# Patient Record
Sex: MALE | Race: BLACK OR AFRICAN AMERICAN | NOT HISPANIC OR LATINO | Employment: OTHER | URBAN - NONMETROPOLITAN AREA
[De-identification: names, ages, dates, MRNs, and addresses within clinical notes are randomized per-mention and may not be internally consistent; named-entity substitution may affect disease eponyms.]

---

## 2017-01-09 ENCOUNTER — LAB (OUTPATIENT)
Dept: LAB | Facility: HOSPITAL | Age: 72
End: 2017-01-09

## 2017-01-09 ENCOUNTER — TRANSCRIBE ORDERS (OUTPATIENT)
Dept: ADMINISTRATIVE | Facility: HOSPITAL | Age: 72
End: 2017-01-09

## 2017-01-09 DIAGNOSIS — Z79.899 LONG TERM USE OF DRUG: Primary | ICD-10-CM

## 2017-01-09 PROCEDURE — 85610 PROTHROMBIN TIME: CPT | Performed by: INTERNAL MEDICINE

## 2017-01-10 LAB
INR PPP: 2.7 (ref 0.91–1.09)
PROTHROMBIN TIME: 32.8 SECONDS (ref 10–13.8)

## 2017-01-23 ENCOUNTER — TRANSCRIBE ORDERS (OUTPATIENT)
Dept: ADMINISTRATIVE | Facility: HOSPITAL | Age: 72
End: 2017-01-23

## 2017-01-23 ENCOUNTER — LAB (OUTPATIENT)
Dept: LAB | Facility: HOSPITAL | Age: 72
End: 2017-01-23

## 2017-01-23 DIAGNOSIS — I80.9 THROMBOPHLEBITIS: ICD-10-CM

## 2017-01-23 DIAGNOSIS — I10 ESSENTIAL (PRIMARY) HYPERTENSION: Primary | ICD-10-CM

## 2017-01-23 DIAGNOSIS — Z79.899 OTHER LONG TERM (CURRENT) DRUG THERAPY: ICD-10-CM

## 2017-01-23 LAB
ALBUMIN SERPL-MCNC: 3.9 G/DL (ref 3.5–5)
ALBUMIN/GLOB SERPL: 1.1 G/DL (ref 1.1–2.5)
ALP SERPL-CCNC: 99 U/L (ref 24–120)
ALT SERPL W P-5'-P-CCNC: 35 U/L (ref 0–54)
ANION GAP SERPL CALCULATED.3IONS-SCNC: 9 MMOL/L (ref 4–13)
ARTICHOKE IGE QN: 116 MG/DL (ref 0–99)
AST SERPL-CCNC: 28 U/L (ref 7–45)
BASOPHILS # BLD AUTO: 0.02 10*3/MM3 (ref 0–0.2)
BASOPHILS NFR BLD AUTO: 0.3 % (ref 0–2)
BILIRUB SERPL-MCNC: 0.4 MG/DL (ref 0.1–1)
BUN BLD-MCNC: 25 MG/DL (ref 5–21)
BUN/CREAT SERPL: 24.3 (ref 7–25)
CALCIUM SPEC-SCNC: 9 MG/DL (ref 8.4–10.4)
CHLORIDE SERPL-SCNC: 99 MMOL/L (ref 98–110)
CHOLEST SERPL-MCNC: 205 MG/DL (ref 130–200)
CO2 SERPL-SCNC: 34 MMOL/L (ref 24–31)
CREAT BLD-MCNC: 1.03 MG/DL (ref 0.5–1.4)
DEPRECATED RDW RBC AUTO: 45.6 FL (ref 40–54)
EOSINOPHIL # BLD AUTO: 0.18 10*3/MM3 (ref 0–0.7)
EOSINOPHIL NFR BLD AUTO: 2.9 % (ref 0–4)
ERYTHROCYTE [DISTWIDTH] IN BLOOD BY AUTOMATED COUNT: 13.7 % (ref 12–15)
GFR SERPL CREATININE-BSD FRML MDRD: 87 ML/MIN/1.73
GLOBULIN UR ELPH-MCNC: 3.6 GM/DL
GLUCOSE BLD-MCNC: 124 MG/DL (ref 70–100)
HCT VFR BLD AUTO: 38.1 % (ref 40–52)
HDLC SERPL-MCNC: 42 MG/DL
HGB BLD-MCNC: 12.5 G/DL (ref 14–18)
IMM GRANULOCYTES # BLD: 0.01 10*3/MM3 (ref 0–0.03)
IMM GRANULOCYTES NFR BLD: 0.2 % (ref 0–5)
INR PPP: 2.5 (ref 0.91–1.09)
LDLC/HDLC SERPL: 2.95 {RATIO}
LYMPHOCYTES # BLD AUTO: 1.72 10*3/MM3 (ref 0.72–4.86)
LYMPHOCYTES NFR BLD AUTO: 27.5 % (ref 15–45)
MCH RBC QN AUTO: 30.2 PG (ref 28–32)
MCHC RBC AUTO-ENTMCNC: 32.8 G/DL (ref 33–36)
MCV RBC AUTO: 92 FL (ref 82–95)
MONOCYTES # BLD AUTO: 0.55 10*3/MM3 (ref 0.19–1.3)
MONOCYTES NFR BLD AUTO: 8.8 % (ref 4–12)
NEUTROPHILS # BLD AUTO: 3.77 10*3/MM3 (ref 1.87–8.4)
NEUTROPHILS NFR BLD AUTO: 60.3 % (ref 39–78)
PLATELET # BLD AUTO: 153 10*3/MM3 (ref 130–400)
PMV BLD AUTO: 12.3 FL (ref 6–12)
POTASSIUM BLD-SCNC: 4.1 MMOL/L (ref 3.5–5.3)
PROT SERPL-MCNC: 7.5 G/DL (ref 6.3–8.7)
PROTHROMBIN TIME: 29.6 SECONDS (ref 10–13.8)
RBC # BLD AUTO: 4.14 10*6/MM3 (ref 4.8–5.9)
SODIUM BLD-SCNC: 142 MMOL/L (ref 135–145)
TRIGL SERPL-MCNC: 195 MG/DL (ref 0–149)
TSH SERPL DL<=0.05 MIU/L-ACNC: 0.69 MIU/ML (ref 0.47–4.68)
WBC NRBC COR # BLD: 6.25 10*3/MM3 (ref 4.8–10.8)

## 2017-01-23 PROCEDURE — 80053 COMPREHEN METABOLIC PANEL: CPT | Performed by: INTERNAL MEDICINE

## 2017-01-23 PROCEDURE — 80061 LIPID PANEL: CPT | Performed by: INTERNAL MEDICINE

## 2017-01-23 PROCEDURE — 85025 COMPLETE CBC W/AUTO DIFF WBC: CPT | Performed by: INTERNAL MEDICINE

## 2017-01-23 PROCEDURE — 85610 PROTHROMBIN TIME: CPT | Performed by: INTERNAL MEDICINE

## 2017-01-23 PROCEDURE — 36415 COLL VENOUS BLD VENIPUNCTURE: CPT | Performed by: INTERNAL MEDICINE

## 2017-01-23 PROCEDURE — 84443 ASSAY THYROID STIM HORMONE: CPT | Performed by: INTERNAL MEDICINE

## 2017-01-30 PROBLEM — D64.9 ANEMIA: Status: ACTIVE | Noted: 2017-01-30

## 2017-01-30 PROBLEM — F32.A DEPRESSION: Status: ACTIVE | Noted: 2017-01-30

## 2017-01-30 PROBLEM — M54.50 LOW BACK PAIN: Status: ACTIVE | Noted: 2017-01-30

## 2017-01-30 PROBLEM — I10 HYPERTENSION: Status: ACTIVE | Noted: 2017-01-30

## 2017-01-30 PROBLEM — K29.70 GASTRITIS: Status: ACTIVE | Noted: 2017-01-30

## 2017-01-30 PROBLEM — G89.29 CHRONIC PAIN: Status: ACTIVE | Noted: 2017-01-30

## 2017-01-30 PROBLEM — I49.9 ARRHYTHMIA: Status: ACTIVE | Noted: 2017-01-30

## 2017-01-30 PROBLEM — F41.9 ANXIETY: Status: ACTIVE | Noted: 2017-01-30

## 2017-01-30 PROBLEM — E78.5 HYPERLIPIDEMIA: Status: ACTIVE | Noted: 2017-01-30

## 2017-01-30 PROBLEM — G62.9 NEUROPATHY: Status: ACTIVE | Noted: 2017-01-30

## 2017-01-30 PROBLEM — I50.9 CHF (CONGESTIVE HEART FAILURE) (HCC): Status: ACTIVE | Noted: 2017-01-30

## 2017-01-30 PROBLEM — Z95.0 STATUS POST PLACEMENT OF CARDIAC PACEMAKER: Status: ACTIVE | Noted: 2017-01-30

## 2017-01-30 PROBLEM — E66.9 OBESITY: Status: ACTIVE | Noted: 2017-01-30

## 2017-01-30 PROBLEM — I42.9 CARDIOMYOPATHY: Status: ACTIVE | Noted: 2017-01-30

## 2017-01-30 RX ORDER — WARFARIN SODIUM 5 MG/1
5 TABLET ORAL
COMMUNITY
End: 2019-08-22 | Stop reason: SDUPTHER

## 2017-01-30 RX ORDER — DIAZEPAM 2 MG/1
1 TABLET ORAL DAILY
COMMUNITY

## 2017-01-30 RX ORDER — WARFARIN SODIUM 2 MG/1
2 TABLET ORAL
COMMUNITY
End: 2019-08-22 | Stop reason: SDUPTHER

## 2017-01-30 RX ORDER — WARFARIN SODIUM 4 MG/1
4 TABLET ORAL
COMMUNITY
End: 2019-08-22 | Stop reason: SDUPTHER

## 2017-01-30 RX ORDER — METOPROLOL SUCCINATE 100 MG/1
100 TABLET, EXTENDED RELEASE ORAL DAILY
COMMUNITY
End: 2019-04-09 | Stop reason: SDUPTHER

## 2017-01-30 RX ORDER — HYDROCODONE BITARTRATE AND ACETAMINOPHEN 7.5; 325 MG/1; MG/1
1 TABLET ORAL EVERY 6 HOURS PRN
COMMUNITY
End: 2017-07-31

## 2017-01-30 RX ORDER — FUROSEMIDE 40 MG/1
40 TABLET ORAL DAILY
Status: ON HOLD | COMMUNITY
End: 2019-10-29 | Stop reason: SDUPTHER

## 2017-01-30 RX ORDER — LOSARTAN POTASSIUM 25 MG/1
25 TABLET ORAL DAILY
COMMUNITY
End: 2019-01-14 | Stop reason: SDUPTHER

## 2017-01-30 RX ORDER — UBIQUINOL 100 MG
750 CAPSULE ORAL 2 TIMES DAILY
COMMUNITY

## 2017-01-30 RX ORDER — PANTOPRAZOLE SODIUM 40 MG/1
40 TABLET, DELAYED RELEASE ORAL DAILY
COMMUNITY
End: 2019-05-30 | Stop reason: SDUPTHER

## 2017-01-30 RX ORDER — FUROSEMIDE 20 MG/1
20 TABLET ORAL DAILY
Status: ON HOLD | COMMUNITY
End: 2019-10-29 | Stop reason: SDUPTHER

## 2017-01-30 RX ORDER — SIMVASTATIN 20 MG
20 TABLET ORAL NIGHTLY
COMMUNITY
End: 2019-01-31 | Stop reason: SDUPTHER

## 2017-01-30 RX ORDER — TAMSULOSIN HYDROCHLORIDE 0.4 MG/1
1 CAPSULE ORAL NIGHTLY
COMMUNITY
End: 2019-02-27 | Stop reason: SDUPTHER

## 2017-01-30 RX ORDER — POLYETHYLENE GLYCOL 3350 17 G/17G
17 POWDER, FOR SOLUTION ORAL DAILY
COMMUNITY
End: 2017-07-31

## 2017-01-30 RX ORDER — BUPRENORPHINE 10 UG/H
10 PATCH TRANSDERMAL WEEKLY
COMMUNITY
End: 2017-07-31

## 2017-01-30 RX ORDER — GABAPENTIN 400 MG/1
800 CAPSULE ORAL 3 TIMES DAILY
COMMUNITY
End: 2021-05-18

## 2017-01-30 RX ORDER — TIZANIDINE 4 MG/1
4 TABLET ORAL 3 TIMES DAILY PRN
COMMUNITY
End: 2020-12-10

## 2017-01-31 ENCOUNTER — OFFICE VISIT (OUTPATIENT)
Dept: CARDIOLOGY | Facility: CLINIC | Age: 72
End: 2017-01-31

## 2017-01-31 ENCOUNTER — CLINICAL SUPPORT (OUTPATIENT)
Dept: CARDIOLOGY | Facility: CLINIC | Age: 72
End: 2017-01-31

## 2017-01-31 VITALS
HEIGHT: 74 IN | WEIGHT: 295 LBS | HEART RATE: 77 BPM | BODY MASS INDEX: 37.86 KG/M2 | SYSTOLIC BLOOD PRESSURE: 128 MMHG | DIASTOLIC BLOOD PRESSURE: 60 MMHG

## 2017-01-31 DIAGNOSIS — I50.22 CHRONIC SYSTOLIC CONGESTIVE HEART FAILURE (HCC): ICD-10-CM

## 2017-01-31 DIAGNOSIS — I42.8 NON-ISCHEMIC CARDIOMYOPATHY (HCC): ICD-10-CM

## 2017-01-31 DIAGNOSIS — I50.22 CHRONIC SYSTOLIC CONGESTIVE HEART FAILURE (HCC): Primary | ICD-10-CM

## 2017-01-31 DIAGNOSIS — E78.2 MIXED HYPERLIPIDEMIA: ICD-10-CM

## 2017-01-31 DIAGNOSIS — I10 ESSENTIAL HYPERTENSION: ICD-10-CM

## 2017-01-31 DIAGNOSIS — Z95.810 AICD (AUTOMATIC CARDIOVERTER/DEFIBRILLATOR) PRESENT: Primary | ICD-10-CM

## 2017-01-31 DIAGNOSIS — I47.20 VENTRICULAR TACHYCARDIA (HCC): ICD-10-CM

## 2017-01-31 PROCEDURE — 93289 INTERROG DEVICE EVAL HEART: CPT | Performed by: INTERNAL MEDICINE

## 2017-01-31 PROCEDURE — 93000 ELECTROCARDIOGRAM COMPLETE: CPT | Performed by: INTERNAL MEDICINE

## 2017-01-31 PROCEDURE — 99203 OFFICE O/P NEW LOW 30 MIN: CPT | Performed by: INTERNAL MEDICINE

## 2017-01-31 NOTE — PROGRESS NOTES
Subjective:     Encounter Date:01/31/2017      Patient ID: Jonh Peacock is a 71 y.o. male.  With nonischemic cardiomyopathy, nonsustained ventricular tachycardia, mitral regurgitation, hypertension, hyperlipidemia here to establish cardiac care.    Referring Providers: Mir Avelar M.D. and John Broadbent, M.D.    Reason for Referral: Establish cardiac care    Chief Complaint:  Congestive Heart Failure   Presents for initial visit. Associated symptoms include edema, palpitations and shortness of breath. Pertinent negatives include no abdominal pain, chest pain, chest pressure, claudication, fatigue, muscle weakness, near-syncope, nocturia, orthopnea, paroxysmal nocturnal dyspnea or unexpected weight change. The symptoms have been stable. Past treatments include angiotensin receptor blockers, beta blockers and salt and fluid restriction. The treatment provided significant relief. Compliance with prior treatments has been good. His past medical history is significant for DVT and HTN. There is no history of CAD, DM or myocarditis.   Hypertension   This is a chronic problem. The problem is unchanged. The problem is controlled. Associated symptoms include palpitations, peripheral edema and shortness of breath. Pertinent negatives include no blurred vision, chest pain, headaches, neck pain, orthopnea or PND. Past treatments include angiotensin blockers and beta blockers. The current treatment provides significant improvement. There are no compliance problems.  Hypertensive end-organ damage includes heart failure. There is no history of CAD/MI or CVA.   Hyperlipidemia   This is a chronic problem. The problem is controlled. Recent lipid tests were reviewed and are normal. Exacerbating diseases include obesity. He has no history of diabetes. Factors aggravating his hyperlipidemia include beta blockers. Associated symptoms include shortness of breath. Pertinent negatives include no chest pain, focal sensory loss,  focal weakness, leg pain or myalgias. Current antihyperlipidemic treatment includes statins. The current treatment provides significant improvement of lipids. Risk factors for coronary artery disease include hypertension, male sex, obesity and dyslipidemia.     The patient presents today to establish care.  He has a history of nonischemic cardiomyopathy, previously followed by Dr. Broadbent.  He also has a history of nonsustained ventricular tachycardia and possible atrial fibrillation, mitral regurgitation, hypertension, hyperlipidemia.  The patient has an ICD in place.  He has had no trouble lately.  He has back pain due to multiple back surgeries in his mobility is somewhat limited.  He does note persistent shortness of breath and dyspnea on exertion with mild activities.  He denies any orthopnea or PND.  He sleeps with the bed elevated due to back pain but not due to shortness of breath.  He does have mild intermittent edema.  He denies any lightheadedness, dizziness, syncope, ICD shocks.  He has had no trouble with his medications.    Past Medical History   Diagnosis Date   • Anemia 1/30/2017   • Anxiety 1/30/2017   • Arrhythmia 1/30/2017   • Cardiomyopathy 1/30/2017   • CHF (congestive heart failure) 1/30/2017   • Chronic pain 1/30/2017   • Depression 1/30/2017   • DVT (deep venous thrombosis)    • Gastritis 1/30/2017   • Hyperlipidemia 1/30/2017   • Hypertension 1/30/2017   • Low back pain 1/30/2017   • Neuropathy 1/30/2017   • Obesity 1/30/2017   • Status post placement of cardiac pacemaker 1/30/2017     Past Surgical History   Procedure Laterality Date   • Back surgery  2002     BACK FUSION   • Joint replacement Right 2002     KNEE   • Total hip arthroplasty Right 2004   • Foot surgery Bilateral 2005   • Back surgery  2007   • Total hip arthroplasty Left 12/2008   • Pacemaker implantation  2012   • Colon resection with colostomy  03/09/2010       Current Outpatient Prescriptions:   •  Buprenorphine  (BUTRANS) 10 MCG/HR patch weekly, Place 10 mcg on the skin 1 (One) Time Per Week., Disp: , Rfl:   •  CHONDROITIN SULFATE A PO, Take 600 mg by mouth 2 (Two) Times a Day., Disp: , Rfl:   •  diazePAM (VALIUM) 2 MG tablet, Take 2 mg by mouth Daily., Disp: , Rfl:   •  furosemide (LASIX) 20 MG tablet, Take 20 mg by mouth Daily., Disp: , Rfl:   •  furosemide (LASIX) 40 MG tablet, Take 40 mg by mouth Daily. TAKE WITH 20 MG TABLET FOR 60 PER MG PER DAY, Disp: , Rfl:   •  gabapentin (NEURONTIN) 600 MG tablet, Take 600 mg by mouth 3 (Three) Times a Day., Disp: , Rfl:   •  Glucosamine 750 MG tablet, Take 750 mg by mouth 2 (Two) Times a Day., Disp: , Rfl:   •  HYDROcodone-acetaminophen (NORCO) 7.5-325 MG per tablet, Take 1 tablet by mouth Every 6 (Six) Hours As Needed for moderate pain (4-6)., Disp: , Rfl:   •  losartan (COZAAR) 25 MG tablet, Take 25 mg by mouth Daily., Disp: , Rfl:   •  metoprolol succinate XL (TOPROL-XL) 100 MG 24 hr tablet, Take 100 mg by mouth Daily., Disp: , Rfl:   •  Multiple Vitamins-Minerals (MULTIVITAMIN ADULT PO), Take  by mouth Daily., Disp: , Rfl:   •  pantoprazole (PROTONIX) 40 MG EC tablet, Take 40 mg by mouth Daily., Disp: , Rfl:   •  polyethylene glycol (MIRALAX) packet, Take 17 g by mouth Daily., Disp: , Rfl:   •  simvastatin (ZOCOR) 20 MG tablet, Take 20 mg by mouth Every Night., Disp: , Rfl:   •  tamsulosin (FLOMAX) 0.4 MG capsule 24 hr capsule, Take 1 capsule by mouth Every Night., Disp: , Rfl:   •  tiZANidine (ZANAFLEX) 4 MG tablet, Take 4 mg by mouth 3 (Three) Times a Day As Needed for muscle spasms., Disp: , Rfl:   •  warfarin (COUMADIN) 2 MG tablet, Take 2 mg by mouth Daily., Disp: , Rfl:   •  warfarin (COUMADIN) 4 MG tablet, Take 4 mg by mouth Daily., Disp: , Rfl:   •  warfarin (COUMADIN) 5 MG tablet, Take 5 mg by mouth Daily., Disp: , Rfl:     Allergies   Allergen Reactions   • Amiodarone    • Celebrex [Celecoxib]    • Oxycontin [Oxycodone Hcl]    • Penicillins Hives     Allergic to All  cillins. Amoxacillin.   • Vioxx [Rofecoxib]      Social History   Substance Use Topics   • Smoking status: Never Smoker   • Smokeless tobacco: Never Used   • Alcohol use No     Family History   Problem Relation Age of Onset   • Heart disease Mother    • Heart disease Brother      Review of Systems   Constitution: Negative for chills, fatigue, fever, night sweats, unexpected weight change and weight loss.   HENT: Negative for congestion, headaches and hearing loss.    Eyes: Negative for blurred vision and pain.   Cardiovascular: Positive for dyspnea on exertion, leg swelling and palpitations. Negative for chest pain, claudication, irregular heartbeat, near-syncope, orthopnea, paroxysmal nocturnal dyspnea and syncope.   Respiratory: Positive for shortness of breath. Negative for cough, hemoptysis and wheezing.    Endocrine: Negative for cold intolerance, heat intolerance, polydipsia and polyuria.   Hematologic/Lymphatic: Negative for adenopathy and bleeding problem. Does not bruise/bleed easily.   Skin: Negative for color change, poor wound healing and rash.   Musculoskeletal: Positive for arthritis, back pain and joint pain. Negative for joint swelling, muscle weakness, myalgias and neck pain.   Gastrointestinal: Negative for abdominal pain, change in bowel habit, constipation, diarrhea, heartburn, hematochezia, melena, nausea and vomiting.   Genitourinary: Negative for bladder incontinence, dysuria, frequency, hematuria and nocturia.   Neurological: Negative for dizziness, focal weakness, light-headedness, loss of balance, numbness and seizures.   Psychiatric/Behavioral: Negative for altered mental status, memory loss and substance abuse.   Allergic/Immunologic: Negative for hives and persistent infections.       ECG 12 Lead  Date/Time: 1/31/2017 3:45 PM  Performed by: RANDOLPH BILLS  Authorized by: RANDOLPH BILLS   Comparison: compared with previous ECG   Similar to previous ECG  Rhythm: sinus  rhythm  Ectopy: PVCs  Rate: normal  QRS axis: normal  Clinical impression: abnormal ECG             Objective:     Physical Exam   Constitutional: He is oriented to person, place, and time. Vital signs are normal. He appears well-developed and well-nourished. He is cooperative.  Non-toxic appearance. No distress.   HENT:   Head: Normocephalic and atraumatic.   Right Ear: External ear normal.   Left Ear: External ear normal.   Nose: Nose normal.   Mouth/Throat: Uvula is midline, oropharynx is clear and moist and mucous membranes are normal. Mucous membranes are not pale, not dry and not cyanotic. No oropharyngeal exudate.   Eyes: EOM and lids are normal. Pupils are equal, round, and reactive to light.   Neck: Normal range of motion. Neck supple. No hepatojugular reflux and no JVD present. Carotid bruit is not present. No tracheal deviation and no edema present. No thyroid mass and no thyromegaly present.   Cardiovascular: Normal rate, regular rhythm, S1 normal, S2 normal, normal heart sounds and intact distal pulses.   No extrasystoles are present. PMI is not displaced.  Exam reveals no gallop and no friction rub.    No murmur heard.  Pulses:       Radial pulses are 2+ on the right side, and 2+ on the left side.        Femoral pulses are 2+ on the right side, and 2+ on the left side.       Dorsalis pedis pulses are 1+ on the right side, and 1+ on the left side.        Posterior tibial pulses are 1+ on the right side, and 1+ on the left side.   Pulmonary/Chest: Effort normal and breath sounds normal. No accessory muscle usage. No respiratory distress. He has no wheezes. He has no rales. He exhibits no tenderness.   Abdominal: Soft. Normal appearance and bowel sounds are normal. He exhibits no distension, no abdominal bruit and no pulsatile midline mass. There is no hepatosplenomegaly. There is no tenderness.   Musculoskeletal: Normal range of motion. He exhibits edema (trace bilateral lower extremity edema). He  "exhibits no tenderness or deformity.   Lymphadenopathy:     He has no cervical adenopathy.   Neurological: He is oriented to person, place, and time. He has normal strength. No cranial nerve deficit.   Skin: Skin is warm, dry and intact. No rash noted. He is not diaphoretic. No cyanosis or erythema. Nails show no clubbing.   Psychiatric: He has a normal mood and affect. His speech is normal and behavior is normal. Thought content normal.   Vitals reviewed.    Visit Vitals   • /60 (BP Location: Left arm, Patient Position: Sitting)   • Pulse 77   • Ht 74\" (188 cm)   • Wt 295 lb (134 kg)   • BMI 37.88 kg/m2     Lab/Data Review:   Echocardiogram in 2010 showed ejection fraction 25-30%, moderate to severe mitral regurgitation      Assessment:          Diagnosis Plan   1. Chronic systolic congestive heart failure     2. Non-ischemic cardiomyopathy     3. Essential hypertension     4. Mixed hyperlipidemia     5. Ventricular tachycardia            Plan:       1.  Chronic systolic congestive heart failure/nonischemic cardiomyopathy: The patient appears to be rather euvolemic at this time and doing quite well.  He has chronic class 2-3 symptoms.  These are unchanged for him.  He does have a ICD implant placed and we will check this today to transfer his care here.  Otherwise, I would see him back in 6 months unless needed sooner.  Continue current medical therapy.    2.  Essential hypertension: The patient's blood pressures under good control current time.  Continue current medications.    3.  Mixed hyperlipidemia: Dr. Avelar follows the patient's lipids closely.  He remains on Zocor 20 mg daily.    4.  History of ventricular tachycardia: The patient has an ICD in place.  We will interrogate his device today.    Follow-up: 6 months unless needed sooner.    EMR Dragon/Transcription disclaimer: Much of this encounter note is an electronic transcription/translation of spoken language to printed text. The electronic " translation of spoken language may permit erroneous, or at times, nonsensical words or phrases to be inadvertently transcribed; although I have reviewed the note for such errors, some may still exist.

## 2017-01-31 NOTE — LETTER
January 31, 2017     Mir Avelar MD  [address] [place] [address]    Patient: Jonh Peacock   YOB: 1945   Date of Visit: 1/31/2017       Dear Dr. Valentino MD:    Thank you for referring Jonh Peacock to me for evaluation. Below are the relevant portions of my assessment and plan of care.    If you have questions, please do not hesitate to call me. I look forward to following Jonh along with you.         Sincerely,        Arun Banks MD        CC: John E Broadbent, MD Michael Wade Faulkner, MD  1/31/2017  3:49 PM  Sign at close encounter      Subjective:     Encounter Date:01/31/2017      Patient ID: Jonh Peacock is a 71 y.o. male.  With nonischemic cardiomyopathy, nonsustained ventricular tachycardia, mitral regurgitation, hypertension, hyperlipidemia here to establish cardiac care.    Referring Providers: Mir Avelar M.D. and John Broadbent, M.D.    Reason for Referral: Establish cardiac care    Chief Complaint:  Congestive Heart Failure   Presents for initial visit. Associated symptoms include edema, palpitations and shortness of breath. Pertinent negatives include no abdominal pain, chest pain, chest pressure, claudication, fatigue, muscle weakness, near-syncope, nocturia, orthopnea, paroxysmal nocturnal dyspnea or unexpected weight change. The symptoms have been stable. Past treatments include angiotensin receptor blockers, beta blockers and salt and fluid restriction. The treatment provided significant relief. Compliance with prior treatments has been good. His past medical history is significant for DVT and HTN. There is no history of CAD, DM or myocarditis.   Hypertension   This is a chronic problem. The problem is unchanged. The problem is controlled. Associated symptoms include palpitations, peripheral edema and shortness of breath. Pertinent negatives include no blurred vision, chest pain, headaches, neck pain, orthopnea or PND. Past treatments include  angiotensin blockers and beta blockers. The current treatment provides significant improvement. There are no compliance problems.  Hypertensive end-organ damage includes heart failure. There is no history of CAD/MI or CVA.   Hyperlipidemia   This is a chronic problem. The problem is controlled. Recent lipid tests were reviewed and are normal. Exacerbating diseases include obesity. He has no history of diabetes. Factors aggravating his hyperlipidemia include beta blockers. Associated symptoms include shortness of breath. Pertinent negatives include no chest pain, focal sensory loss, focal weakness, leg pain or myalgias. Current antihyperlipidemic treatment includes statins. The current treatment provides significant improvement of lipids. Risk factors for coronary artery disease include hypertension, male sex, obesity and dyslipidemia.     The patient presents today to establish care.  He has a history of nonischemic cardiomyopathy, previously followed by Dr. Broadbent.  He also has a history of nonsustained ventricular tachycardia and possible atrial fibrillation, mitral regurgitation, hypertension, hyperlipidemia.  The patient has an ICD in place.  He has had no trouble lately.  He has back pain due to multiple back surgeries in his mobility is somewhat limited.  He does note persistent shortness of breath and dyspnea on exertion with mild activities.  He denies any orthopnea or PND.  He sleeps with the bed elevated due to back pain but not due to shortness of breath.  He does have mild intermittent edema.  He denies any lightheadedness, dizziness, syncope, ICD shocks.  He has had no trouble with his medications.    Past Medical History   Diagnosis Date   • Anemia 1/30/2017   • Anxiety 1/30/2017   • Arrhythmia 1/30/2017   • Cardiomyopathy 1/30/2017   • CHF (congestive heart failure) 1/30/2017   • Chronic pain 1/30/2017   • Depression 1/30/2017   • DVT (deep venous thrombosis)    • Gastritis 1/30/2017   •  Hyperlipidemia 1/30/2017   • Hypertension 1/30/2017   • Low back pain 1/30/2017   • Neuropathy 1/30/2017   • Obesity 1/30/2017   • Status post placement of cardiac pacemaker 1/30/2017     Past Surgical History   Procedure Laterality Date   • Back surgery  2002     BACK FUSION   • Joint replacement Right 2002     KNEE   • Total hip arthroplasty Right 2004   • Foot surgery Bilateral 2005   • Back surgery  2007   • Total hip arthroplasty Left 12/2008   • Pacemaker implantation  2012   • Colon resection with colostomy  03/09/2010       Current Outpatient Prescriptions:   •  Buprenorphine (BUTRANS) 10 MCG/HR patch weekly, Place 10 mcg on the skin 1 (One) Time Per Week., Disp: , Rfl:   •  CHONDROITIN SULFATE A PO, Take 600 mg by mouth 2 (Two) Times a Day., Disp: , Rfl:   •  diazePAM (VALIUM) 2 MG tablet, Take 2 mg by mouth Daily., Disp: , Rfl:   •  furosemide (LASIX) 20 MG tablet, Take 20 mg by mouth Daily., Disp: , Rfl:   •  furosemide (LASIX) 40 MG tablet, Take 40 mg by mouth Daily. TAKE WITH 20 MG TABLET FOR 60 PER MG PER DAY, Disp: , Rfl:   •  gabapentin (NEURONTIN) 600 MG tablet, Take 600 mg by mouth 3 (Three) Times a Day., Disp: , Rfl:   •  Glucosamine 750 MG tablet, Take 750 mg by mouth 2 (Two) Times a Day., Disp: , Rfl:   •  HYDROcodone-acetaminophen (NORCO) 7.5-325 MG per tablet, Take 1 tablet by mouth Every 6 (Six) Hours As Needed for moderate pain (4-6)., Disp: , Rfl:   •  losartan (COZAAR) 25 MG tablet, Take 25 mg by mouth Daily., Disp: , Rfl:   •  metoprolol succinate XL (TOPROL-XL) 100 MG 24 hr tablet, Take 100 mg by mouth Daily., Disp: , Rfl:   •  Multiple Vitamins-Minerals (MULTIVITAMIN ADULT PO), Take  by mouth Daily., Disp: , Rfl:   •  pantoprazole (PROTONIX) 40 MG EC tablet, Take 40 mg by mouth Daily., Disp: , Rfl:   •  polyethylene glycol (MIRALAX) packet, Take 17 g by mouth Daily., Disp: , Rfl:   •  simvastatin (ZOCOR) 20 MG tablet, Take 20 mg by mouth Every Night., Disp: , Rfl:   •  tamsulosin  (FLOMAX) 0.4 MG capsule 24 hr capsule, Take 1 capsule by mouth Every Night., Disp: , Rfl:   •  tiZANidine (ZANAFLEX) 4 MG tablet, Take 4 mg by mouth 3 (Three) Times a Day As Needed for muscle spasms., Disp: , Rfl:   •  warfarin (COUMADIN) 2 MG tablet, Take 2 mg by mouth Daily., Disp: , Rfl:   •  warfarin (COUMADIN) 4 MG tablet, Take 4 mg by mouth Daily., Disp: , Rfl:   •  warfarin (COUMADIN) 5 MG tablet, Take 5 mg by mouth Daily., Disp: , Rfl:     Allergies   Allergen Reactions   • Amiodarone    • Celebrex [Celecoxib]    • Oxycontin [Oxycodone Hcl]    • Penicillins Hives     Allergic to All cillins. Amoxacillin.   • Vioxx [Rofecoxib]      Social History   Substance Use Topics   • Smoking status: Never Smoker   • Smokeless tobacco: Never Used   • Alcohol use No     Family History   Problem Relation Age of Onset   • Heart disease Mother    • Heart disease Brother      Review of Systems   Constitution: Negative for chills, fatigue, fever, night sweats, unexpected weight change and weight loss.   HENT: Negative for congestion, headaches and hearing loss.    Eyes: Negative for blurred vision and pain.   Cardiovascular: Positive for dyspnea on exertion, leg swelling and palpitations. Negative for chest pain, claudication, irregular heartbeat, near-syncope, orthopnea, paroxysmal nocturnal dyspnea and syncope.   Respiratory: Positive for shortness of breath. Negative for cough, hemoptysis and wheezing.    Endocrine: Negative for cold intolerance, heat intolerance, polydipsia and polyuria.   Hematologic/Lymphatic: Negative for adenopathy and bleeding problem. Does not bruise/bleed easily.   Skin: Negative for color change, poor wound healing and rash.   Musculoskeletal: Positive for arthritis, back pain and joint pain. Negative for joint swelling, muscle weakness, myalgias and neck pain.   Gastrointestinal: Negative for abdominal pain, change in bowel habit, constipation, diarrhea, heartburn, hematochezia, melena, nausea and  vomiting.   Genitourinary: Negative for bladder incontinence, dysuria, frequency, hematuria and nocturia.   Neurological: Negative for dizziness, focal weakness, light-headedness, loss of balance, numbness and seizures.   Psychiatric/Behavioral: Negative for altered mental status, memory loss and substance abuse.   Allergic/Immunologic: Negative for hives and persistent infections.       ECG 12 Lead  Date/Time: 1/31/2017 3:45 PM  Performed by: RANDOLPH BILLS  Authorized by: RANDOLPH BILLS   Comparison: compared with previous ECG   Similar to previous ECG  Rhythm: sinus rhythm  Ectopy: PVCs  Rate: normal  QRS axis: normal  Clinical impression: abnormal ECG             Objective:     Physical Exam   Constitutional: He is oriented to person, place, and time. Vital signs are normal. He appears well-developed and well-nourished. He is cooperative.  Non-toxic appearance. No distress.   HENT:   Head: Normocephalic and atraumatic.   Right Ear: External ear normal.   Left Ear: External ear normal.   Nose: Nose normal.   Mouth/Throat: Uvula is midline, oropharynx is clear and moist and mucous membranes are normal. Mucous membranes are not pale, not dry and not cyanotic. No oropharyngeal exudate.   Eyes: EOM and lids are normal. Pupils are equal, round, and reactive to light.   Neck: Normal range of motion. Neck supple. No hepatojugular reflux and no JVD present. Carotid bruit is not present. No tracheal deviation and no edema present. No thyroid mass and no thyromegaly present.   Cardiovascular: Normal rate, regular rhythm, S1 normal, S2 normal, normal heart sounds and intact distal pulses.   No extrasystoles are present. PMI is not displaced.  Exam reveals no gallop and no friction rub.    No murmur heard.  Pulses:       Radial pulses are 2+ on the right side, and 2+ on the left side.        Femoral pulses are 2+ on the right side, and 2+ on the left side.       Dorsalis pedis pulses are 1+ on the right side,  "and 1+ on the left side.        Posterior tibial pulses are 1+ on the right side, and 1+ on the left side.   Pulmonary/Chest: Effort normal and breath sounds normal. No accessory muscle usage. No respiratory distress. He has no wheezes. He has no rales. He exhibits no tenderness.   Abdominal: Soft. Normal appearance and bowel sounds are normal. He exhibits no distension, no abdominal bruit and no pulsatile midline mass. There is no hepatosplenomegaly. There is no tenderness.   Musculoskeletal: Normal range of motion. He exhibits edema (trace bilateral lower extremity edema). He exhibits no tenderness or deformity.   Lymphadenopathy:     He has no cervical adenopathy.   Neurological: He is oriented to person, place, and time. He has normal strength. No cranial nerve deficit.   Skin: Skin is warm, dry and intact. No rash noted. He is not diaphoretic. No cyanosis or erythema. Nails show no clubbing.   Psychiatric: He has a normal mood and affect. His speech is normal and behavior is normal. Thought content normal.   Vitals reviewed.    Visit Vitals   • /60 (BP Location: Left arm, Patient Position: Sitting)   • Pulse 77   • Ht 74\" (188 cm)   • Wt 295 lb (134 kg)   • BMI 37.88 kg/m2     Lab/Data Review:   Echocardiogram in 2010 showed ejection fraction 25-30%, moderate to severe mitral regurgitation      Assessment:          Diagnosis Plan   1. Chronic systolic congestive heart failure     2. Non-ischemic cardiomyopathy     3. Essential hypertension     4. Mixed hyperlipidemia     5. Ventricular tachycardia            Plan:       1.  Chronic systolic congestive heart failure/nonischemic cardiomyopathy: The patient appears to be rather euvolemic at this time and doing quite well.  He has chronic class 2-3 symptoms.  These are unchanged for him.  He does have a ICD implant placed and we will check this today to transfer his care here.  Otherwise, I would see him back in 6 months unless needed sooner.  Continue " current medical therapy.    2.  Essential hypertension: The patient's blood pressures under good control current time.  Continue current medications.    3.  Mixed hyperlipidemia: Dr. Avelar follows the patient's lipids closely.  He remains on Zocor 20 mg daily.    4.  History of ventricular tachycardia: The patient has an ICD in place.  We will interrogate his device today.    Follow-up: 6 months unless needed sooner.    EMR Dragon/Transcription disclaimer: Much of this encounter note is an electronic transcription/translation of spoken language to printed text. The electronic translation of spoken language may permit erroneous, or at times, nonsensical words or phrases to be inadvertently transcribed; although I have reviewed the note for such errors, some may still exist.

## 2017-01-31 NOTE — MR AVS SNAPSHOT
Jonh Peacock   1/31/2017 1:45 PM   Office Visit    Dept Phone:  [phone]   Encounter #:  [id number]    Provider:  Arun Banks MD   Department:  [facility]                Your Full Care Plan              Your Updated Medication List          This list is accurate as of: 1/31/17  2:31 PM.  Always use your most recent med list.                BUTRANS 10 MCG/HR patch weekly   Generic drug:  Buprenorphine       CHONDROITIN SULFATE A PO       diazePAM 2 MG tablet   Commonly known as:  VALIUM       * furosemide 40 MG tablet   Commonly known as:  LASIX       * furosemide 20 MG tablet   Commonly known as:  LASIX       gabapentin 600 MG tablet   Commonly known as:  NEURONTIN       Glucosamine 750 MG tablet       HYDROcodone-acetaminophen 7.5-325 MG per tablet   Commonly known as:  NORCO       losartan 25 MG tablet   Commonly known as:  COZAAR       metoprolol succinate  MG 24 hr tablet   Commonly known as:  TOPROL-XL       MULTIVITAMIN ADULT PO       pantoprazole 40 MG EC tablet   Commonly known as:  PROTONIX       polyethylene glycol packet   Commonly known as:  MIRALAX       simvastatin 20 MG tablet   Commonly known as:  ZOCOR       tamsulosin 0.4 MG capsule 24 hr capsule   Commonly known as:  FLOMAX       tiZANidine 4 MG tablet   Commonly known as:  ZANAFLEX       * warfarin 4 MG tablet   Commonly known as:  COUMADIN       * warfarin 5 MG tablet   Commonly known as:  COUMADIN       * warfarin 2 MG tablet   Commonly known as:  COUMADIN       * Notice:  This list has 5 medication(s) that are the same as other medications prescribed for you. Read the directions carefully, and ask your doctor or other care provider to review them with you.            You Were Diagnosed With        Codes Comments    Chronic systolic congestive heart failure    -  Primary ICD-10-CM: I50.22  ICD-9-CM: 428.22, 428.0     Non-ischemic cardiomyopathy     ICD-10-CM:  I42.9  ICD-9-CM: 425.4     Essential hypertension     ICD-10-CM: I10  ICD-9-CM: 401.9     Mixed hyperlipidemia     ICD-10-CM: E78.2  ICD-9-CM: 272.2     Ventricular tachycardia     ICD-10-CM: I47.2  ICD-9-CM: 427.1       Instructions     None    Patient Instructions History      Upcoming Appointments     Visit Type Date Time Department    NEW PATIENT 2017  1:45 PM [facility]    FOLLOW UP 2017  1:45 PM [facility]      MyChart Signup     [facility] Dragonfly List allows you to send messages to your doctor, view your test results, renew your prescriptions, schedule appointments, and more. To sign up, go to [employer] and click on the Sign Up Now link in the New User? box. Enter your Dragonfly List Activation Code exactly as it appears below along with the last four digits of your Social Security Number and your Date of Birth () to complete the sign-up process. If you do not sign up before the expiration date, you must request a new code.    Dragonfly List Activation Code: [email or web address]  Expires: 2017  2:27 PM    If you have questions, you can email [email or web address] or call [phone] to talk to our Dragonfly List staff. Remember, Dragonfly List is NOT to be used for urgent needs. For medical emergencies, dial 911.               Other Info from Your Visit           Your Appointments     2017  1:45 PM CDT   Follow Up with Arun Banks MD   [facility] (--)    [address] [place] [address] [address]  [facility] [id number] [phone]           Arrive 15 minutes prior to appointment.              Allergies     Amiodarone      Celebrex [Celecoxib]      Oxycontin [Oxycodone Hcl]      Penicillins Allergy Hives    Allergic to All cillins. Amoxacillin.    Vioxx [Rofecoxib]        Reason for Visit     Cardiomyopathy     Hypertension     Hyperlipidemia     Irregular Heart Beat           Vital Signs     Blood Pressure Pulse Height Weight Body Mass Index  "Smoking Status    128/60 (BP Location: Left arm, Patient Position: Sitting) 77 74\" (188 cm) 295 lb (134 kg) 37.88 kg/m2 Never Smoker      Problems and Diagnoses Noted     Abnormal heart rhythm    Heart failure    High cholesterol or triglycerides    High blood pressure    Non-ischemic cardiomyopathy        "

## 2017-02-13 ENCOUNTER — APPOINTMENT (OUTPATIENT)
Dept: LAB | Facility: HOSPITAL | Age: 72
End: 2017-02-13

## 2017-02-13 ENCOUNTER — TRANSCRIBE ORDERS (OUTPATIENT)
Dept: ADMINISTRATIVE | Facility: HOSPITAL | Age: 72
End: 2017-02-13

## 2017-02-13 DIAGNOSIS — Z79.899 OTHER LONG TERM (CURRENT) DRUG THERAPY: Primary | ICD-10-CM

## 2017-02-13 DIAGNOSIS — I10 ESSENTIAL (PRIMARY) HYPERTENSION: Primary | ICD-10-CM

## 2017-02-13 DIAGNOSIS — Z79.899 OTHER LONG TERM (CURRENT) DRUG THERAPY: ICD-10-CM

## 2017-02-13 DIAGNOSIS — I49.9 CARDIAC ARRHYTHMIA, UNSPECIFIED CARDIAC ARRHYTHMIA TYPE: ICD-10-CM

## 2017-02-27 ENCOUNTER — LAB (OUTPATIENT)
Dept: LAB | Facility: HOSPITAL | Age: 72
End: 2017-02-27

## 2017-02-27 ENCOUNTER — APPOINTMENT (OUTPATIENT)
Dept: LAB | Facility: HOSPITAL | Age: 72
End: 2017-02-27

## 2017-02-27 LAB
INR PPP: 2.4 (ref 0.91–1.09)
PROTHROMBIN TIME: 29.2 SECONDS (ref 10–13.8)

## 2017-02-27 PROCEDURE — 85610 PROTHROMBIN TIME: CPT | Performed by: INTERNAL MEDICINE

## 2017-03-13 ENCOUNTER — TRANSCRIBE ORDERS (OUTPATIENT)
Dept: ADMINISTRATIVE | Facility: HOSPITAL | Age: 72
End: 2017-03-13

## 2017-03-13 ENCOUNTER — LAB (OUTPATIENT)
Dept: LAB | Facility: HOSPITAL | Age: 72
End: 2017-03-13

## 2017-03-13 DIAGNOSIS — I49.9 CARDIAC ARRHYTHMIA, UNSPECIFIED CARDIAC ARRHYTHMIA TYPE: Primary | ICD-10-CM

## 2017-03-13 LAB
INR PPP: 2.2 (ref 0.91–1.09)
PROTHROMBIN TIME: 26.5 SECONDS (ref 10–13.8)

## 2017-03-13 PROCEDURE — 85610 PROTHROMBIN TIME: CPT | Performed by: INTERNAL MEDICINE

## 2017-03-27 ENCOUNTER — LAB (OUTPATIENT)
Dept: LAB | Facility: HOSPITAL | Age: 72
End: 2017-03-27

## 2017-03-27 ENCOUNTER — TRANSCRIBE ORDERS (OUTPATIENT)
Dept: ADMINISTRATIVE | Facility: HOSPITAL | Age: 72
End: 2017-03-27

## 2017-03-27 DIAGNOSIS — I49.9 CARDIAC ARRHYTHMIA, UNSPECIFIED: Primary | ICD-10-CM

## 2017-03-27 LAB
INR PPP: 2.6 (ref 0.91–1.09)
PROTHROMBIN TIME: 30.8 SECONDS (ref 10–13.8)

## 2017-03-27 PROCEDURE — 85610 PROTHROMBIN TIME: CPT | Performed by: INTERNAL MEDICINE

## 2017-04-10 ENCOUNTER — TRANSCRIBE ORDERS (OUTPATIENT)
Dept: ADMINISTRATIVE | Facility: HOSPITAL | Age: 72
End: 2017-04-10

## 2017-04-10 ENCOUNTER — LAB (OUTPATIENT)
Dept: LAB | Facility: HOSPITAL | Age: 72
End: 2017-04-10

## 2017-04-10 ENCOUNTER — CLINICAL SUPPORT (OUTPATIENT)
Dept: CARDIOLOGY | Facility: CLINIC | Age: 72
End: 2017-04-10

## 2017-04-10 DIAGNOSIS — I49.9 CARDIAC ARRHYTHMIA, UNSPECIFIED CARDIAC ARRHYTHMIA TYPE: Primary | ICD-10-CM

## 2017-04-10 DIAGNOSIS — I47.1 PAROXYSMAL SVT (SUPRAVENTRICULAR TACHYCARDIA) (HCC): ICD-10-CM

## 2017-04-10 DIAGNOSIS — I10 ESSENTIAL (PRIMARY) HYPERTENSION: ICD-10-CM

## 2017-04-10 DIAGNOSIS — I42.8 NON-ISCHEMIC CARDIOMYOPATHY (HCC): ICD-10-CM

## 2017-04-10 DIAGNOSIS — I50.22 CHRONIC SYSTOLIC CONGESTIVE HEART FAILURE (HCC): ICD-10-CM

## 2017-04-10 DIAGNOSIS — Z79.899 OTHER LONG TERM (CURRENT) DRUG THERAPY: ICD-10-CM

## 2017-04-10 DIAGNOSIS — Z95.810 AICD (AUTOMATIC CARDIOVERTER/DEFIBRILLATOR) PRESENT: Primary | ICD-10-CM

## 2017-04-10 DIAGNOSIS — I49.9 CARDIAC ARRHYTHMIA, UNSPECIFIED CARDIAC ARRHYTHMIA TYPE: ICD-10-CM

## 2017-04-10 LAB
ALBUMIN SERPL-MCNC: 3.7 G/DL (ref 3.5–5)
ALBUMIN/GLOB SERPL: 1.2 G/DL (ref 1.1–2.5)
ALP SERPL-CCNC: 99 U/L (ref 24–120)
ALT SERPL W P-5'-P-CCNC: 22 U/L (ref 0–54)
ANION GAP SERPL CALCULATED.3IONS-SCNC: 9 MMOL/L (ref 4–13)
ARTICHOKE IGE QN: 107 MG/DL (ref 0–99)
AST SERPL-CCNC: 26 U/L (ref 7–45)
BASOPHILS # BLD AUTO: 0.02 10*3/MM3 (ref 0–0.2)
BASOPHILS NFR BLD AUTO: 0.3 % (ref 0–2)
BILIRUB SERPL-MCNC: 0.4 MG/DL (ref 0.1–1)
BUN BLD-MCNC: 19 MG/DL (ref 5–21)
BUN/CREAT SERPL: 19.2 (ref 7–25)
CALCIUM SPEC-SCNC: 9 MG/DL (ref 8.4–10.4)
CHLORIDE SERPL-SCNC: 98 MMOL/L (ref 98–110)
CHOLEST SERPL-MCNC: 203 MG/DL (ref 130–200)
CO2 SERPL-SCNC: 33 MMOL/L (ref 24–31)
CREAT BLD-MCNC: 0.99 MG/DL (ref 0.5–1.4)
DEPRECATED RDW RBC AUTO: 45.8 FL (ref 40–54)
EOSINOPHIL # BLD AUTO: 0.13 10*3/MM3 (ref 0–0.7)
EOSINOPHIL NFR BLD AUTO: 2.1 % (ref 0–4)
ERYTHROCYTE [DISTWIDTH] IN BLOOD BY AUTOMATED COUNT: 13.7 % (ref 12–15)
GFR SERPL CREATININE-BSD FRML MDRD: 91 ML/MIN/1.73
GLOBULIN UR ELPH-MCNC: 3.2 GM/DL
GLUCOSE BLD-MCNC: 192 MG/DL (ref 70–100)
HCT VFR BLD AUTO: 36.4 % (ref 40–52)
HDLC SERPL-MCNC: 41 MG/DL
HGB BLD-MCNC: 12.4 G/DL (ref 14–18)
IMM GRANULOCYTES # BLD: 0.01 10*3/MM3 (ref 0–0.03)
IMM GRANULOCYTES NFR BLD: 0.2 % (ref 0–5)
INR PPP: 2.4 (ref 0.91–1.09)
LDLC/HDLC SERPL: 2.99 {RATIO}
LYMPHOCYTES # BLD AUTO: 1.71 10*3/MM3 (ref 0.72–4.86)
LYMPHOCYTES NFR BLD AUTO: 28.2 % (ref 15–45)
MCH RBC QN AUTO: 30.9 PG (ref 28–32)
MCHC RBC AUTO-ENTMCNC: 34.1 G/DL (ref 33–36)
MCV RBC AUTO: 90.8 FL (ref 82–95)
MONOCYTES # BLD AUTO: 0.45 10*3/MM3 (ref 0.19–1.3)
MONOCYTES NFR BLD AUTO: 7.4 % (ref 4–12)
NEUTROPHILS # BLD AUTO: 3.75 10*3/MM3 (ref 1.87–8.4)
NEUTROPHILS NFR BLD AUTO: 61.8 % (ref 39–78)
NRBC BLD MANUAL-RTO: 0 /100 WBC (ref 0–0)
PLATELET # BLD AUTO: 161 10*3/MM3 (ref 130–400)
PMV BLD AUTO: 11.6 FL (ref 6–12)
POTASSIUM BLD-SCNC: 3.8 MMOL/L (ref 3.5–5.3)
PROT SERPL-MCNC: 6.9 G/DL (ref 6.3–8.7)
PROTHROMBIN TIME: 28.8 SECONDS (ref 10–13.8)
RBC # BLD AUTO: 4.01 10*6/MM3 (ref 4.8–5.9)
SODIUM BLD-SCNC: 140 MMOL/L (ref 135–145)
TRIGL SERPL-MCNC: 197 MG/DL (ref 0–149)
TSH SERPL DL<=0.05 MIU/L-ACNC: 0.6 MIU/ML (ref 0.47–4.68)
WBC NRBC COR # BLD: 6.07 10*3/MM3 (ref 4.8–10.8)

## 2017-04-10 PROCEDURE — 93297 REM INTERROG DEV EVAL ICPMS: CPT | Performed by: INTERNAL MEDICINE

## 2017-04-10 PROCEDURE — 36415 COLL VENOUS BLD VENIPUNCTURE: CPT

## 2017-04-10 PROCEDURE — 84443 ASSAY THYROID STIM HORMONE: CPT

## 2017-04-10 PROCEDURE — 80053 COMPREHEN METABOLIC PANEL: CPT

## 2017-04-10 PROCEDURE — 80061 LIPID PANEL: CPT

## 2017-04-10 PROCEDURE — 93299 PR REM INTERROG ICPMS/SCRMS <30 D TECH REVIEW: CPT | Performed by: INTERNAL MEDICINE

## 2017-04-10 PROCEDURE — 85025 COMPLETE CBC W/AUTO DIFF WBC: CPT

## 2017-04-10 PROCEDURE — 85610 PROTHROMBIN TIME: CPT

## 2017-04-10 NOTE — PROGRESS NOTES
Dual Chamber AICD Evaluation Report  [facility]    April 10, 2017    Primary Cardiologist: Jocelyn  : Medtronic Model: Protecta  Implant date: September 27, 2012    Reason for evaluation:routine  Indication for AICD: chronic systolic congestive heart failure and nonischemic cardiomyopathy    Measurements  Atrial sensing - P wave: 2.5 mV  Atrial threshold: 0.375 V @ 0.4 ms  Atrial lead impedance: 361 ohms  Ventricular sensing - R wave: 5.8 mV  Ventricular threshold: 0.875 V @ 0.4 ms  Ventricular lead impedance:  399 ohms  Shock impedance:  RV- 38 ohms   SVC- 49 ohms      Diagnostic Data  Atrial paced: 40.1 %  Ventricular paced: 0.2 %  Other: 73 SVT episodes- longest 22 min max v rate 194 bpm, pt shocked once unsuccessfully and treated with ATP 22 times   5 a-fib episodes- longest 84 min, max v rate 162 bpm   Optivol WNL  Battery status: satisfactory  2.95V      Final Parameters  Mode: AAIR+  Lower rate: 60 bpm Upper rate: 130 bpm  AV Delay: Paced- 180 ms    Sensed- 150 ms     Atrial - Amplitude: 1.5 V Pulse width: 0.4 ms Sensitivity: 0.6 mV   Ventricular - Amplitude: 2 V Pulse width: 0.4 ms Sensitivity: 0.3 mV   Changes made: n/a  Conclusions: normal AICD function and stable pacing and sensing thresholds    Follow up: 3 months or sooner if needed    Notified Dr. Banks of events.     I will make referral to Dr. Chavez ad the patient has a history of cardiomyopathy and likely limited options for antiarrhythmics to determine if he is best served with medical therapy versus abaltion.

## 2017-04-27 NOTE — PROGRESS NOTES
Diamond, can you please make Mr. Talbot know that I have placed a referral for him to see Dr. Cahvez.  He has had quite a few rhythm abnormalities recently, and I think, given his history of heart disease, that we should let EP see him and make recommendations.

## 2017-04-28 ENCOUNTER — OFFICE VISIT (OUTPATIENT)
Dept: PODIATRY | Facility: CLINIC | Age: 72
End: 2017-04-28

## 2017-04-28 VITALS
BODY MASS INDEX: 36.7 KG/M2 | WEIGHT: 286 LBS | HEART RATE: 64 BPM | DIASTOLIC BLOOD PRESSURE: 72 MMHG | HEIGHT: 74 IN | SYSTOLIC BLOOD PRESSURE: 116 MMHG

## 2017-04-28 DIAGNOSIS — B35.3 TINEA PEDIS OF BOTH FEET: ICD-10-CM

## 2017-04-28 DIAGNOSIS — M21.41 PES PLANUS OF BOTH FEET: ICD-10-CM

## 2017-04-28 DIAGNOSIS — B35.1 ONYCHOMYCOSIS: Primary | ICD-10-CM

## 2017-04-28 DIAGNOSIS — M21.42 PES PLANUS OF BOTH FEET: ICD-10-CM

## 2017-04-28 DIAGNOSIS — Z79.01 ANTICOAGULANT LONG-TERM USE: ICD-10-CM

## 2017-04-28 DIAGNOSIS — M21.70 LOWER LIMB LENGTH DIFFERENCE: ICD-10-CM

## 2017-04-28 PROCEDURE — 11721 DEBRIDE NAIL 6 OR MORE: CPT | Performed by: PODIATRIST

## 2017-04-28 PROCEDURE — 99203 OFFICE O/P NEW LOW 30 MIN: CPT | Performed by: PODIATRIST

## 2017-04-28 RX ORDER — PRENATAL VIT 91/IRON/FOLIC/DHA 28-975-200
COMBINATION PACKAGE (EA) ORAL 2 TIMES DAILY
Qty: 36 G | Refills: 5 | Status: SHIPPED | OUTPATIENT
Start: 2017-04-28 | End: 2017-07-31

## 2017-04-28 NOTE — PROGRESS NOTES
[facility] - PODIATRY    Today's Date: 04/28/2017    Patient Name: Jonh Peacock  MRN: [id number]  CSN: [id number]  PCP: Mir Avelar MD  Referring Provider: No ref. provider found    SUBJECTIVE   CC: Painful fungal toenails    HPI: Jonh Peacock, a 71 y.o.male, comes to clinic as a(n) new patient complaining of painful fungal toenails. Pt with h/o Anxiety, Arrhythmia, Cardiomyopathy, CHF, Depression, previous DVT, HTN, Obesity, Anticoagulation with Warfarin. Denies diabetes. Relates that his nails are thick and he is unable to trim them himelf and routinely his wife will trim them but does not feel comfortable trimming them due to being on warfarin. Also relates that he has a limb length discrepancy which he has a lift in his left foot and custom inserts for both feet. Admits pain at 3/10 level and described as aching and tingling. Denies any constitutional symptoms. No other pedal complaints at this time.    Past Medical History:   Diagnosis Date   • Anemia 1/30/2017   • Anxiety 1/30/2017   • Arrhythmia 1/30/2017   • Cardiomyopathy 1/30/2017   • CHF (congestive heart failure) 1/30/2017   • Chronic pain 1/30/2017   • Depression 1/30/2017   • DVT (deep venous thrombosis)    • Gastritis 1/30/2017   • Hyperlipidemia 1/30/2017   • Hypertension 1/30/2017   • Low back pain 1/30/2017   • Neuropathy 1/30/2017   • Obesity 1/30/2017   • Status post placement of cardiac pacemaker 1/30/2017     Past Surgical History:   Procedure Laterality Date   • BACK SURGERY  2002    BACK FUSION   • BACK SURGERY  2007   • COLON RESECTION WITH COLOSTOMY  03/09/2010   • FOOT SURGERY Bilateral 2005   • JOINT REPLACEMENT Right 2002    KNEE   • PACEMAKER IMPLANTATION  2012   • TOTAL HIP ARTHROPLASTY Right 2004   • TOTAL HIP ARTHROPLASTY Left 12/2008     Family History   Problem Relation Age of Onset   • Heart disease Mother    • Heart disease Brother      Social History     Social History   • Marital status:       Spouse name: N/A   • Number of children: N/A   • Years of education: N/A     Occupational History   • Not on file.     Social History Main Topics   • Smoking status: Never Smoker   • Smokeless tobacco: Never Used   • Alcohol use No   • Drug use: No   • Sexual activity: Defer     Other Topics Concern   • Not on file     Social History Narrative     Allergies   Allergen Reactions   • Amiodarone    • Celebrex [Celecoxib]    • Oxycontin [Oxycodone Hcl]    • Penicillins Hives     Allergic to All cillins. Amoxacillin.   • Vioxx [Rofecoxib]      Current Outpatient Prescriptions   Medication Sig Dispense Refill   • Buprenorphine (BUTRANS) 10 MCG/HR patch weekly Place 10 mcg on the skin 1 (One) Time Per Week.     • CHONDROITIN SULFATE A PO Take 600 mg by mouth 2 (Two) Times a Day.     • diazePAM (VALIUM) 2 MG tablet Take 2 mg by mouth Daily.     • furosemide (LASIX) 20 MG tablet Take 20 mg by mouth Daily.     • furosemide (LASIX) 40 MG tablet Take 40 mg by mouth Daily. TAKE WITH 20 MG TABLET FOR 60 PER MG PER DAY     • gabapentin (NEURONTIN) 600 MG tablet Take 600 mg by mouth 3 (Three) Times a Day.     • Glucosamine 750 MG tablet Take 750 mg by mouth 2 (Two) Times a Day.     • HYDROcodone-acetaminophen (NORCO) 7.5-325 MG per tablet Take 1 tablet by mouth Every 6 (Six) Hours As Needed for moderate pain (4-6).     • losartan (COZAAR) 25 MG tablet Take 25 mg by mouth Daily.     • metoprolol succinate XL (TOPROL-XL) 100 MG 24 hr tablet Take 100 mg by mouth Daily.     • Multiple Vitamins-Minerals (MULTIVITAMIN ADULT PO) Take  by mouth Daily.     • pantoprazole (PROTONIX) 40 MG EC tablet Take 40 mg by mouth Daily.     • polyethylene glycol (MIRALAX) packet Take 17 g by mouth Daily.     • simvastatin (ZOCOR) 20 MG tablet Take 20 mg by mouth Every Night.     • tamsulosin (FLOMAX) 0.4 MG capsule 24 hr capsule Take 1 capsule by mouth Every Night.     • terbinafine (lamISIL) 1 % cream Apply  topically 2 (Two) Times a Day. 36 g 5    • tiZANidine (ZANAFLEX) 4 MG tablet Take 4 mg by mouth 3 (Three) Times a Day As Needed for muscle spasms.     • warfarin (COUMADIN) 2 MG tablet Take 2 mg by mouth Daily.     • warfarin (COUMADIN) 4 MG tablet Take 4 mg by mouth Daily.     • warfarin (COUMADIN) 5 MG tablet Take 5 mg by mouth Daily.       No current facility-administered medications for this visit.      Review of Systems   Constitutional: Negative for chills and fever.   HENT: Negative for congestion.    Respiratory: Negative for shortness of breath.    Cardiovascular: Positive for leg swelling. Negative for chest pain.   Gastrointestinal: Negative for constipation, diarrhea, nausea and vomiting.   Musculoskeletal:        Foot pain   Skin: Negative for wound.   Neurological: Negative for numbness.       OBJECTIVE     Vitals:    04/28/17 1508   BP: 116/72   Pulse: 64       PHYSICAL EXAM  GEN:   A&Ox3, NAD. Pt presents to clinic ambulating with walker and wearing Casual Shoes with custom inserts, left shoes has lift. Accompanied by wife.     NEURO:   Protective sensation intact to 10/10 sites Right foot, 10/10 site Left foot using [place] monofilament  Light touch sensation diminished  No Tinel's or [place] sign.    VASC:  Skin temperature Warm to Warm proximal to distal carlotta  DP pulses 1/4 Right, 1/4 Left  PT pulses 1/4 Right, 1/4 Left  CFT 4 sec carlotta  Pedal hair growth absent  1+ pitting edema noted carlotta  Varicosities absent carlotta    MUSC/SKEL:  Muscle Strength Right foot Dorsiflexors 5/5, Plantarflexors 5/5, Evertors 5/5, Invertors 5/5  Muscle Strength Left foot Dorsiflexors 5/5, Plantarflexors 5/5, Evertors 5/5, Invertors 5/5  ROM of the 1st MTP is full without pain or crepitus  ROM of the MTJ is full without pain or crepitus    ROM of the STJ is full without pain or crepitus    ROM of the ankle joint is full without pain or crepitus    POP of nail plates  Planus foot type with decreased arch height and abduction of forefoot  Right LE longer  than Left LE     DERM:  Pedal nails x10 are thickened, elongated and discolored with presence of subunugual debris  Webspace 4 carlotta is mildly macerated but intact  Skin is normal in turgor and texture with no open wounds or sores appreciated.  Dry flaky skin in moccasin distribution carlotta  Stable cicatrix noted to dorsal 1st MTPJ carlotta and dorsal digits      RADIOLOGY/NUCLEAR:  No results found.    LABORATORY/CULTURE RESULTS:      PATHOLOGY RESULTS:       ASSESSMENT/PLAN     Diagnoses and all orders for this visit:    Onychomycosis    Tinea pedis of both feet    Anticoagulant long-term use    Pes planus of both feet    Lower limb length difference    Other orders  -     terbinafine (lamISIL) 1 % cream; Apply  topically 2 (Two) Times a Day.      Comprehensive lower extremity examination and evaluation was performed.  Discussed findings and treatment plan including risks, benefits, and treatment options with patient in detail. Patient agreed with treatment plan.  After verbal consent obtained, nail(s) x10 debrided of length and thickness with nail nipper without incidence, Patient may maintain nails and calluses at home utilizing [place] board of pumice stone between visits as needed, Reviewed at home diabetic foot care   Continue with custom shoes with and inserts  An After Visit Summary was printed and given to the patient at discharge, including (if requested) any available informative/educational handouts regarding diagnosis, treatment, or medications. All questions were answered to patient/family satisfaction. Should symptoms fail to improve or worsen they agree to call or return to clinic or to go to the Emergency Department. Discussed the importance of following up with any needed screening tests/labs/specialist appointments and any requested follow-up recommended by me today. Importance of maintaining follow-up discussed and patient accepts that missed appointments can delay diagnosis and potentially lead to  worsening of conditions.  Return in about 3 months (around 7/28/2017)., or sooner if acute issues arise.        This document has been electronically signed by Deion Avelar DPM on April 28, 2017 4:39 PM     Please note that portions of this note may have been completed with a voice recognition program. Efforts were made to edit the dictations, but occasionally words are mistranscribed.

## 2017-04-28 NOTE — PATIENT INSTRUCTIONS
Athlete's Foot  Athlete's foot (tinea pedis) is a fungal infection of the skin on the feet. It often occurs on the skin that is between or underneath the toes. It can also occur on the soles of the feet. The infection can spread from person to person (is contagious).  HOME CARE  · Apply or take over-the-counter and prescription medicines only as told by your doctor.  · Keep all follow-up visits as told by your doctor. This is important.  · Do not scratch your feet.  · Keep your feet dry:    Wear cotton or wool socks. Change your socks every day or if they become wet.    Wear shoes that allow air to move around, such as sandals or canvas tennis shoes.  · Wash and dry your feet:    Every day or as told by your doctor.    After exercising.    Including the area between your toes.  · Wear sandals in wet areas, such as locker rooms and shared showers.  · Do not share any of these items:    Towels.    Nail clippers.    Other personal items that touch your feet.  · If you have diabetes, keep your blood sugar under control.  GET HELP IF:  · You have a fever.  · You have swelling, soreness, warmth, or redness in your foot.  · You are not getting better with treatment.  · Your symptoms get worse.  · You have new symptoms.     This information is not intended to replace advice given to you by your health care provider. Make sure you discuss any questions you have with your health care provider.     Document Released: 06/05/2009 Document Revised: 01/13/2017 Document Reviewed: 06/20/2016  [employer] Interactive Patient Education ©2016 [employer]    Flat Feet  Flat feet, also called fallen arches, are feet that do not have a curve (arch) on the inner side of the foot. Normally, an arch develops during childhood and creates a gap between the foot and the ground. Flat feet rest entirely on the ground, without a gap. In some cases, an arch never develops. In other cases, an arch develops and later collapses.   This is a common  condition that can affect one foot or both feet.  CAUSES  This condition may be caused by:  · Failure of normal arch development during childhood.  · Injury to connective tissues (tendons and ligaments) in the foot, such as the tendon that supports the arch (posterior tibial tendon).  · Loose tendons or ligaments in the foot.  · A wearing down of the arch over time.  · Injury to bones in the foot.  · Abnormality in the bones of the foot (tarsal coalition). This is when two or more bones in the foot are joined together (fused) during development before birth. This limits foot movement and can cause flat feet.  RISK FACTORS  This condition is more common in females. It is also more likely to develop in people who:  · Are 40 years or older.  · Have a family history of flat feet.  · Have a history of childhood flexible flatfoot. This is a common childhood condition in which the arch disappears when a child is standing but is present when a child is sitting or standing on tiptoes.  · Are obese.  · Have diabetes.  · Have high blood pressure.  · Participate in high-impact sports that can damage the posterior tibial tendon.  · Have inflammatory arthritis.  · Have a history of broken (fractured) or dislocated bones in the foot.  SYMPTOMS  Symptoms of this condition may include:  · Pain or tightness along the bottom of the foot.  · Foot pain that gets worse with activity.  · Swelling of the inner foot.  · Swelling of the ankle.  · Pain on the outside of the ankle.  · Changes in the way you walk (gait).  · Pronation. This is when the foot and ankle lean inward when standing.  · Bony bumps on the top or inside of the foot.  DIAGNOSIS  This condition is diagnosed with a physical exam of your foot and ankle. Your health care provider may look at your shoes for patterns of wear on the soles. You may also have tests, including X-rays, CT scan, or MRI. You may be referred to a health care provider who specializes in feet  (podiatrist) or a physical therapist.  TREATMENT  Treatment may include one or more of the following:  · Stretching exercises or physical therapy to lengthen the heel tendon (Achilles tendon), increase range of motion, and relieve pain.  · Shoe inserts (orthotics) to support the arches of your feet. These can be purchased from a store or they can be custom-made by your health care provider.  · Wearing shoes with appropriate arch support. This is especially important for athletes. Your health care provider may recommend shoes for you to wear.  · Medicines to relieve pain.  · An ankle brace, boot, or cast to relieve pressure on your foot. You may be given crutches if walking is painful.  · Surgery to improve alignment of your foot. This is only needed if your posterior tibial tendon is torn, or if you have tarsal coalition.  HOME CARE INSTRUCTIONS  · Take over-the-counter and prescription medicines only as told by your health care provider.  · Wear orthotics and appropriate shoes as told by your health care provider.  · Rest your feet and avoid or change activities that cause pain.  · Perform exercises to strengthen and stretch your feet as told by your health care provider or physical therapist.  · If you have nerve damage from diabetes, check your feet daily for swelling, sores, blisters, or calluses.  PREVENTION  Flat feet cannot always be prevented. However, you can take these actions to help reduce your chance of developing flat feet or to help prevent your current condition from getting worse:  · Wear comfortable, supportive shoes that are appropriate for your activities.  · Maintain a healthy weight.  · Stay active, as told by your health care provider. This will help to keep your feet flexible and strong.  · Manage long-term (chronic) health conditions, such as diabetes, high blood pressure, and inflammatory arthritis, if this applies.  · Work with a health care provider if you have concerns about your feet or  your shoes.  SEEK MEDICAL CARE IF:  · You have pain in your foot or lower leg that gets worse or does not improve with medicine.  · You have pain or difficulty when walking.  · You have problems with your orthotics or your shoes.     This information is not intended to replace advice given to you by your health care provider. Make sure you discuss any questions you have with your health care provider.     Document Released: 10/15/2010 Document Revised: 01/13/2017 Document Reviewed: 06/15/2016  [employer] Interactive Patient Education ©2016 [employer]

## 2017-05-08 ENCOUNTER — TRANSCRIBE ORDERS (OUTPATIENT)
Dept: ADMINISTRATIVE | Facility: HOSPITAL | Age: 72
End: 2017-05-08

## 2017-05-08 ENCOUNTER — LAB (OUTPATIENT)
Dept: LAB | Facility: HOSPITAL | Age: 72
End: 2017-05-08

## 2017-05-08 DIAGNOSIS — I49.9 CARDIAC ARRHYTHMIA, UNSPECIFIED CARDIAC ARRHYTHMIA TYPE: Primary | ICD-10-CM

## 2017-05-08 LAB
INR PPP: 2.4 (ref 0.91–1.09)
PROTHROMBIN TIME: 29 SECONDS (ref 10–13.8)

## 2017-05-08 PROCEDURE — 85610 PROTHROMBIN TIME: CPT | Performed by: INTERNAL MEDICINE

## 2017-06-05 ENCOUNTER — LAB (OUTPATIENT)
Dept: LAB | Facility: HOSPITAL | Age: 72
End: 2017-06-05

## 2017-06-05 ENCOUNTER — TRANSCRIBE ORDERS (OUTPATIENT)
Dept: ADMINISTRATIVE | Facility: HOSPITAL | Age: 72
End: 2017-06-05

## 2017-06-05 DIAGNOSIS — I48.91 ATRIAL FIBRILLATION, UNSPECIFIED TYPE (HCC): Primary | ICD-10-CM

## 2017-06-05 PROCEDURE — 85610 PROTHROMBIN TIME: CPT | Performed by: INTERNAL MEDICINE

## 2017-06-06 LAB
INR PPP: 2.3 (ref 0.91–1.09)
PROTHROMBIN TIME: 27.1 SECONDS (ref 10–13.8)

## 2017-06-19 ENCOUNTER — TELEPHONE (OUTPATIENT)
Dept: UROLOGY | Facility: CLINIC | Age: 72
End: 2017-06-19

## 2017-06-19 DIAGNOSIS — N40.1 BENIGN NON-NODULAR PROSTATIC HYPERPLASIA WITH LOWER URINARY TRACT SYMPTOMS: Primary | ICD-10-CM

## 2017-06-19 NOTE — TELEPHONE ENCOUNTER
----- Message from Glo Chris sent at 6/14/2017  2:42 PM CDT -----  He needs order for PSA and he will have it done at [facility] on 7/24/17. thanks

## 2017-06-26 ENCOUNTER — TRANSCRIBE ORDERS (OUTPATIENT)
Dept: ADMINISTRATIVE | Facility: HOSPITAL | Age: 72
End: 2017-06-26

## 2017-06-26 ENCOUNTER — LAB (OUTPATIENT)
Dept: LAB | Facility: HOSPITAL | Age: 72
End: 2017-06-26

## 2017-06-26 DIAGNOSIS — I49.9 VENTRICULAR ARRHYTHMIA: Primary | ICD-10-CM

## 2017-06-26 LAB
INR PPP: 2 (ref 0.91–1.09)
PROTHROMBIN TIME: 24 SECONDS (ref 10–13.8)

## 2017-06-26 PROCEDURE — 85610 PROTHROMBIN TIME: CPT | Performed by: INTERNAL MEDICINE

## 2017-07-10 ENCOUNTER — LAB (OUTPATIENT)
Dept: LAB | Facility: HOSPITAL | Age: 72
End: 2017-07-10

## 2017-07-10 ENCOUNTER — TRANSCRIBE ORDERS (OUTPATIENT)
Dept: ADMINISTRATIVE | Facility: HOSPITAL | Age: 72
End: 2017-07-10

## 2017-07-10 DIAGNOSIS — I10 ESSENTIAL HYPERTENSION: ICD-10-CM

## 2017-07-10 DIAGNOSIS — I49.9 CARDIAC ARRHYTHMIA, UNSPECIFIED CARDIAC ARRHYTHMIA TYPE: Primary | ICD-10-CM

## 2017-07-10 DIAGNOSIS — Z79.899 LONG TERM USE OF DRUG: ICD-10-CM

## 2017-07-10 DIAGNOSIS — Z13.29 SCREENING FOR THYROID DISORDER: ICD-10-CM

## 2017-07-10 DIAGNOSIS — D64.9 ANEMIA, UNSPECIFIED TYPE: ICD-10-CM

## 2017-07-10 DIAGNOSIS — E78.2 MIXED HYPERLIPIDEMIA: ICD-10-CM

## 2017-07-10 LAB
INR PPP: 3 (ref 0.91–1.09)
PROTHROMBIN TIME: 36.1 SECONDS (ref 10–13.8)

## 2017-07-10 PROCEDURE — 85610 PROTHROMBIN TIME: CPT | Performed by: INTERNAL MEDICINE

## 2017-07-11 ENCOUNTER — APPOINTMENT (OUTPATIENT)
Dept: LAB | Facility: HOSPITAL | Age: 72
End: 2017-07-11

## 2017-07-11 LAB
ALBUMIN SERPL-MCNC: 3.9 G/DL (ref 3.5–5)
ALBUMIN/GLOB SERPL: 1.2 G/DL (ref 1.1–2.5)
ALP SERPL-CCNC: 111 U/L (ref 24–120)
ALT SERPL W P-5'-P-CCNC: 39 U/L (ref 0–54)
ANION GAP SERPL CALCULATED.3IONS-SCNC: 9 MMOL/L (ref 4–13)
ARTICHOKE IGE QN: 123 MG/DL (ref 0–99)
AST SERPL-CCNC: 31 U/L (ref 7–45)
BASOPHILS # BLD AUTO: 0.02 10*3/MM3 (ref 0–0.2)
BASOPHILS NFR BLD AUTO: 0.3 % (ref 0–2)
BILIRUB SERPL-MCNC: 0.7 MG/DL (ref 0.1–1)
BUN BLD-MCNC: 18 MG/DL (ref 5–21)
BUN/CREAT SERPL: 19.4 (ref 7–25)
CALCIUM SPEC-SCNC: 9 MG/DL (ref 8.4–10.4)
CHLORIDE SERPL-SCNC: 101 MMOL/L (ref 98–110)
CHOLEST SERPL-MCNC: 226 MG/DL (ref 130–200)
CO2 SERPL-SCNC: 34 MMOL/L (ref 24–31)
CREAT BLD-MCNC: 0.93 MG/DL (ref 0.5–1.4)
DEPRECATED RDW RBC AUTO: 44.8 FL (ref 40–54)
EOSINOPHIL # BLD AUTO: 0.16 10*3/MM3 (ref 0–0.7)
EOSINOPHIL NFR BLD AUTO: 2.7 % (ref 0–4)
ERYTHROCYTE [DISTWIDTH] IN BLOOD BY AUTOMATED COUNT: 13.6 % (ref 12–15)
GFR SERPL CREATININE-BSD FRML MDRD: 97 ML/MIN/1.73
GLOBULIN UR ELPH-MCNC: 3.3 GM/DL
GLUCOSE BLD-MCNC: 165 MG/DL (ref 70–100)
HCT VFR BLD AUTO: 38.2 % (ref 40–52)
HDLC SERPL-MCNC: 37 MG/DL
HGB BLD-MCNC: 12.6 G/DL (ref 14–18)
IMM GRANULOCYTES # BLD: 0.01 10*3/MM3 (ref 0–0.03)
IMM GRANULOCYTES NFR BLD: 0.2 % (ref 0–5)
LDLC/HDLC SERPL: 4.15 {RATIO}
LYMPHOCYTES # BLD AUTO: 1.66 10*3/MM3 (ref 0.72–4.86)
LYMPHOCYTES NFR BLD AUTO: 27.6 % (ref 15–45)
MCH RBC QN AUTO: 30.2 PG (ref 28–32)
MCHC RBC AUTO-ENTMCNC: 33 G/DL (ref 33–36)
MCV RBC AUTO: 91.6 FL (ref 82–95)
MONOCYTES # BLD AUTO: 0.39 10*3/MM3 (ref 0.19–1.3)
MONOCYTES NFR BLD AUTO: 6.5 % (ref 4–12)
NEUTROPHILS # BLD AUTO: 3.77 10*3/MM3 (ref 1.87–8.4)
NEUTROPHILS NFR BLD AUTO: 62.7 % (ref 39–78)
NRBC BLD MANUAL-RTO: 0 /100 WBC (ref 0–0)
PLATELET # BLD AUTO: 164 10*3/MM3 (ref 130–400)
PMV BLD AUTO: 12.3 FL (ref 6–12)
POTASSIUM BLD-SCNC: 3.8 MMOL/L (ref 3.5–5.3)
PROT SERPL-MCNC: 7.2 G/DL (ref 6.3–8.7)
RBC # BLD AUTO: 4.17 10*6/MM3 (ref 4.8–5.9)
SODIUM BLD-SCNC: 144 MMOL/L (ref 135–145)
TRIGL SERPL-MCNC: 178 MG/DL (ref 0–149)
TSH SERPL DL<=0.05 MIU/L-ACNC: 0.62 MIU/ML (ref 0.47–4.68)
WBC NRBC COR # BLD: 6.01 10*3/MM3 (ref 4.8–10.8)

## 2017-07-11 PROCEDURE — 36415 COLL VENOUS BLD VENIPUNCTURE: CPT | Performed by: INTERNAL MEDICINE

## 2017-07-11 PROCEDURE — 80053 COMPREHEN METABOLIC PANEL: CPT | Performed by: INTERNAL MEDICINE

## 2017-07-11 PROCEDURE — 84443 ASSAY THYROID STIM HORMONE: CPT | Performed by: INTERNAL MEDICINE

## 2017-07-11 PROCEDURE — 80061 LIPID PANEL: CPT | Performed by: INTERNAL MEDICINE

## 2017-07-11 PROCEDURE — 85025 COMPLETE CBC W/AUTO DIFF WBC: CPT | Performed by: INTERNAL MEDICINE

## 2017-07-24 ENCOUNTER — TRANSCRIBE ORDERS (OUTPATIENT)
Dept: ADMINISTRATIVE | Facility: HOSPITAL | Age: 72
End: 2017-07-24

## 2017-07-24 ENCOUNTER — LAB (OUTPATIENT)
Dept: LAB | Facility: HOSPITAL | Age: 72
End: 2017-07-24

## 2017-07-24 ENCOUNTER — APPOINTMENT (OUTPATIENT)
Dept: LAB | Facility: HOSPITAL | Age: 72
End: 2017-07-24
Attending: UROLOGY

## 2017-07-24 DIAGNOSIS — I49.40 CARDIAC ARRHYTHMIA DUE TO PREMATURE DEPOLARIZATION, UNSPECIFIED TYPE: Primary | ICD-10-CM

## 2017-07-24 DIAGNOSIS — N40.1 BENIGN NON-NODULAR PROSTATIC HYPERPLASIA WITH LOWER URINARY TRACT SYMPTOMS: Primary | ICD-10-CM

## 2017-07-24 LAB
INR PPP: 2.3 (ref 0.91–1.09)
PROTHROMBIN TIME: 27.4 SECONDS (ref 10–13.8)

## 2017-07-24 PROCEDURE — 36415 COLL VENOUS BLD VENIPUNCTURE: CPT

## 2017-07-24 PROCEDURE — 85610 PROTHROMBIN TIME: CPT | Performed by: INTERNAL MEDICINE

## 2017-07-24 PROCEDURE — 84153 ASSAY OF PSA TOTAL: CPT | Performed by: UROLOGY

## 2017-07-25 LAB — PSA SERPL-MCNC: 1.2 NG/ML (ref 0–4)

## 2017-07-31 ENCOUNTER — OFFICE VISIT (OUTPATIENT)
Dept: CARDIOLOGY | Facility: CLINIC | Age: 72
End: 2017-07-31

## 2017-07-31 ENCOUNTER — OFFICE VISIT (OUTPATIENT)
Dept: PODIATRY | Facility: CLINIC | Age: 72
End: 2017-07-31

## 2017-07-31 VITALS
HEIGHT: 74 IN | BODY MASS INDEX: 36.57 KG/M2 | DIASTOLIC BLOOD PRESSURE: 80 MMHG | HEART RATE: 82 BPM | WEIGHT: 285 LBS | SYSTOLIC BLOOD PRESSURE: 130 MMHG

## 2017-07-31 VITALS
HEIGHT: 74 IN | SYSTOLIC BLOOD PRESSURE: 116 MMHG | OXYGEN SATURATION: 95 % | BODY MASS INDEX: 36.57 KG/M2 | WEIGHT: 285 LBS | RESPIRATION RATE: 18 BRPM | DIASTOLIC BLOOD PRESSURE: 100 MMHG | HEART RATE: 91 BPM

## 2017-07-31 DIAGNOSIS — B35.1 ONYCHOMYCOSIS: Primary | ICD-10-CM

## 2017-07-31 DIAGNOSIS — I48.0 PAF (PAROXYSMAL ATRIAL FIBRILLATION) (HCC): ICD-10-CM

## 2017-07-31 DIAGNOSIS — G62.9 NEUROPATHY: ICD-10-CM

## 2017-07-31 DIAGNOSIS — Z79.01 ANTICOAGULANT LONG-TERM USE: ICD-10-CM

## 2017-07-31 DIAGNOSIS — I50.22 CHRONIC SYSTOLIC CONGESTIVE HEART FAILURE (HCC): Primary | ICD-10-CM

## 2017-07-31 DIAGNOSIS — I10 ESSENTIAL HYPERTENSION: ICD-10-CM

## 2017-07-31 PROCEDURE — 99213 OFFICE O/P EST LOW 20 MIN: CPT | Performed by: PODIATRIST

## 2017-07-31 PROCEDURE — 99214 OFFICE O/P EST MOD 30 MIN: CPT | Performed by: INTERNAL MEDICINE

## 2017-07-31 PROCEDURE — 11721 DEBRIDE NAIL 6 OR MORE: CPT | Performed by: PODIATRIST

## 2017-07-31 PROCEDURE — 93000 ELECTROCARDIOGRAM COMPLETE: CPT | Performed by: INTERNAL MEDICINE

## 2017-07-31 RX ORDER — HYDROCODONE BITARTRATE AND ACETAMINOPHEN 7.5; 325 MG/1; MG/1
1 TABLET ORAL EVERY 6 HOURS PRN
COMMUNITY
End: 2017-11-01

## 2017-07-31 RX ORDER — BUPRENORPHINE 10 UG/H
15 PATCH TRANSDERMAL
COMMUNITY

## 2017-07-31 NOTE — PROGRESS NOTES
Subjective:     Encounter Date: 07/31/2017      Patient ID: Jonh Peacock is a 71 y.o. male.With nonischemic cardiomyopathy, nonsustained ventricular tachycardia, atrial fibrillation, atrial tachycardia, mitral regurgitation, hypertension, hyperlipidemia here for follow-up.    Chief Complaint: Routine follow-up    Congestive Heart Failure   Presents for follow-up visit. Associated symptoms include edema and shortness of breath. Pertinent negatives include no abdominal pain, chest pain, chest pressure, claudication, fatigue, muscle weakness, near-syncope, nocturia, palpitations, paroxysmal nocturnal dyspnea or unexpected weight change. The symptoms have been stable. There is no history of CAD. Compliance with total regimen is %. Compliance with diet is %. Compliance with exercise is %. Compliance with medications is %.   Atrial Fibrillation   Presents for follow-up visit. Symptoms include shortness of breath. Symptoms are negative for an AICD problem, chest pain, dizziness, hemodynamic instability, hypertension, hypotension, palpitations, syncope, tachycardia and weakness. The symptoms have been stable. Past medical history includes atrial fibrillation and CHF. There is no history of CAD. There are no medication compliance problems.   Shortness of Breath   This is a chronic problem. The problem occurs intermittently. The problem has been unchanged. Associated symptoms include leg swelling. Pertinent negatives include no abdominal pain, chest pain, claudication, fever, headaches, hemoptysis, orthopnea, PND, sore throat, syncope, vomiting or wheezing. The symptoms are aggravated by exercise. He has tried rest for the symptoms. The treatment provided significant relief. His past medical history is significant for a heart failure. There is no history of CAD.     The patient presents today for routine follow-up.  We last saw him in January.  At that time, his defibrillator was interrogated  and it seems that he received shocks for inappropriate reasons, atrial fibrillation, atrial tachycardia.  He was then referred to Dr. Chavez for further evaluation, given his history of nonischemic cardiomyopathy in the difficulty in potentially managing antiarrhythmic drugs.  Dr. Chavez saw the patient and reprogrammed his device.  The patient has not been shocked since his last office visit with us in January.  His shortness of breath and dyspnea on exertion or chronic and unchanged.  He is short of breath with moderate exertion at this time.  He has some mild intermittent lower extremity edema but this is not new or changed recently.  He denies any chest pain.  He has not had any trouble with his medications, including Coumadin.  He denies any significant palpitations.  He denies lightheadedness, dizziness, syncopal, orthopnea, PND.      Current Outpatient Prescriptions:   •  Buprenorphine (BUTRANS) 10 MCG/HR patch weekly, Place 10 mcg on the skin Every 7 (Seven) Days., Disp: , Rfl:   •  CHONDROITIN SULFATE A PO, Take 600 mg by mouth 2 (Two) Times a Day., Disp: , Rfl:   •  diazePAM (VALIUM) 2 MG tablet, Take 2 mg by mouth Daily., Disp: , Rfl:   •  furosemide (LASIX) 20 MG tablet, Take 20 mg by mouth Daily., Disp: , Rfl:   •  furosemide (LASIX) 40 MG tablet, Take 40 mg by mouth Daily. TAKE WITH 20 MG TABLET FOR 60 PER MG PER DAY, Disp: , Rfl:   •  gabapentin (NEURONTIN) 600 MG tablet, Take 600 mg by mouth 3 (Three) Times a Day., Disp: , Rfl:   •  Glucosamine 750 MG tablet, Take 750 mg by mouth 2 (Two) Times a Day., Disp: , Rfl:   •  HYDROcodone-acetaminophen (NORCO) 7.5-325 MG per tablet, Take 1 tablet by mouth Every 6 (Six) Hours As Needed for Moderate Pain (4-6)., Disp: , Rfl:   •  losartan (COZAAR) 25 MG tablet, Take 25 mg by mouth Daily., Disp: , Rfl:   •  metoprolol succinate XL (TOPROL-XL) 100 MG 24 hr tablet, Take 100 mg by mouth Daily., Disp: , Rfl:   •  Multiple Vitamins-Minerals (MULTIVITAMIN ADULT PO),  Take  by mouth Daily., Disp: , Rfl:   •  pantoprazole (PROTONIX) 40 MG EC tablet, Take 40 mg by mouth Daily., Disp: , Rfl:   •  POLYETHYLENE GLYCOL 3350 PO, Take 17 granules by mouth Every Night., Disp: , Rfl:   •  simvastatin (ZOCOR) 20 MG tablet, Take 20 mg by mouth Every Night., Disp: , Rfl:   •  tamsulosin (FLOMAX) 0.4 MG capsule 24 hr capsule, Take 1 capsule by mouth Every Night., Disp: , Rfl:   •  tiZANidine (ZANAFLEX) 4 MG tablet, Take 4 mg by mouth 3 (Three) Times a Day As Needed for muscle spasms., Disp: , Rfl:   •  warfarin (COUMADIN) 2 MG tablet, Take 2 mg by mouth Daily., Disp: , Rfl:   •  warfarin (COUMADIN) 4 MG tablet, Take 4 mg by mouth Daily., Disp: , Rfl:   •  warfarin (COUMADIN) 5 MG tablet, Take 5 mg by mouth Daily., Disp: , Rfl:     Allergies   Allergen Reactions   • Amiodarone    • Oxycontin [Oxycodone Hcl] Other (See Comments)     Can not urinate.   • Penicillins Hives     Allergic to All cillins. Amoxacillin.   • Vioxx [Rofecoxib]      Social History   Substance Use Topics   • Smoking status: Never Smoker   • Smokeless tobacco: Never Used   • Alcohol use No     Review of Systems   Constitution: Negative for chills, fatigue, fever, weakness, night sweats, unexpected weight change and weight loss.   HENT: Negative for congestion, headaches and sore throat.    Cardiovascular: Positive for dyspnea on exertion and leg swelling. Negative for chest pain, claudication, irregular heartbeat, near-syncope, orthopnea, palpitations, paroxysmal nocturnal dyspnea and syncope.   Respiratory: Positive for shortness of breath. Negative for cough, hemoptysis and wheezing.    Endocrine: Negative for cold intolerance, heat intolerance, polydipsia and polyuria.   Hematologic/Lymphatic: Negative for bleeding problem. Does not bruise/bleed easily.   Musculoskeletal: Negative for muscle weakness.   Gastrointestinal: Negative for abdominal pain, hematemesis, hematochezia, melena, nausea and vomiting.   Genitourinary:  "Negative for bladder incontinence, dysuria, hematuria and nocturia.   Neurological: Negative for dizziness, loss of balance, numbness, paresthesias and seizures.       ECG 12 Lead  Date/Time: 7/31/2017 2:20 PM  Performed by: RANDOLPH BILLS  Authorized by: RANDOLPH BILLS   Comparison: compared with previous ECG from 1/31/2017  Similar to previous ECG  Rhythm: sinus rhythm  Ectopy: PVCs  Rate: normal  BPM: 82  Conduction: conduction normal  QRS axis: normal  Other findings: prolonged QTc interval  Clinical impression: abnormal ECG             Objective:     Physical Exam   Constitutional: He is oriented to person, place, and time. He appears well-developed and well-nourished. No distress.   HENT:   Head: Normocephalic and atraumatic.   Mouth/Throat: Oropharynx is clear and moist.   Eyes: EOM are normal. Pupils are equal, round, and reactive to light.   Neck: Normal range of motion. Neck supple. No JVD present. No thyromegaly present.   Cardiovascular: Normal rate, regular rhythm, S1 normal, S2 normal, normal heart sounds and intact distal pulses.   Occasional extrasystoles are present. Exam reveals no gallop and no friction rub.    No murmur heard.  Pulmonary/Chest: Effort normal and breath sounds normal.   Abdominal: Soft. Bowel sounds are normal. He exhibits no distension. There is no tenderness.   Musculoskeletal: Normal range of motion. He exhibits edema (trace pretibial edema bilaterally).   Neurological: He is alert and oriented to person, place, and time. No cranial nerve deficit.   Skin: Skin is warm and dry. No rash noted. No cyanosis or erythema. Nails show no clubbing.   Psychiatric: He has a normal mood and affect.   Vitals reviewed.    /80 (BP Location: Left arm, Patient Position: Sitting)  Pulse 82  Ht 74\" (188 cm)  Wt 285 lb (129 kg)  BMI 36.59 kg/m2    Data/Lab Review:     I reivewed office noted from Dr. Chavez on 6/10/17.  At that time, the patient had his defibrillator " reprogrammed.  It was thought that he had supraventricular arrhythmias that had led to his ICD shocks.  At that time, his ventricular fibrillation zone was increased at 200.  Also, the VT zone was eliminated.      Assessment:          Diagnosis Plan   1. Chronic systolic congestive heart failure  ECG 12 Lead   2. Essential hypertension     3. PAF (paroxysmal atrial fibrillation)            Plan:       1.  Chronic systolic congestive heart failure/nonischemic cardiomyopathy: The patient remains clinically stable at this time with New York Heart Association class II-III symptoms.  He appears to be euvolemic on examination.  He continues to take Lasix which he is tolerating well.  He is also on angiotensin receptor blocker as well as beta blocker therapies.  Continue current medications.  We will see him back in 6 months unless needed sooner.     2.  Essential hypertension: The patient continues to have good control of his blood pressure.  No changes at this time.     3.  Paroxysmal atrial fibrillation and paroxysmal atrial tachycardia: The patient remains clinically stable without any significant palpitations.  He had previously been shocked by his ICD for these reasons.  The device was reprogrammed as noted above by Dr. Chavez.  The patient has had no further ICD therapies delivered.  At this time, continue beta blocker therapy.     Follow-up: 6 months unless needed sooner.     EMR Dragon/Transcription disclaimer: Much of this encounter note is an electronic transcription/translation of spoken language to printed text. The electronic translation of spoken language may permit erroneous, or at times, nonsensical words or phrases to be inadvertently transcribed; although I have reviewed the note for such errors, some may still exist.

## 2017-07-31 NOTE — PROGRESS NOTES
[facility] - PODIATRY    Today's Date: 07/31/17    Patient Name: Jonh Peacock  MRN: [id number]  CSN: [id number]  PCP: Mir Avlear MD  Referring Provider: No ref. provider found    SUBJECTIVE     Chief Complaint   Patient presents with   • Left Foot - Follow-up, athletes feet   • Right Foot - Follow-up, athletes feet       HPI: Jonh Peacock, a 71 y.o.male, comes to clinic as a(n) established patient complaining of painful fungal toenails. Pt with h/o Anxiety, Arrhythmia, Cardiomyopathy, CHF, Depression, previous DVT, HTN, Obesity, Anticoagulation with Warfarin. Denies diabetes. Relates that his nails are thick and he is unable to trim them himelf and routinely his wife will trim them but does not feel comfortable trimming them due to being on warfarin. Also relates that he has a limb length discrepancy which he has a lift in his left foot and custom inserts for both feet. Admits pain at 3/10 level and described as aching and tingling. Denies any constitutional symptoms. No other pedal complaints at this time.    Past Medical History:   Diagnosis Date   • Anemia 1/30/2017   • Anxiety 1/30/2017   • Arrhythmia 1/30/2017   • Cardiomyopathy 1/30/2017   • CHF (congestive heart failure) 1/30/2017   • Chronic pain 1/30/2017   • Depression 1/30/2017   • DVT (deep venous thrombosis)    • Gastritis 1/30/2017   • Hyperlipidemia 1/30/2017   • Hypertension 1/30/2017   • Low back pain 1/30/2017   • Neuropathy 1/30/2017   • Obesity 1/30/2017   • Status post placement of cardiac pacemaker 1/30/2017     Past Surgical History:   Procedure Laterality Date   • BACK SURGERY  2002    BACK FUSION   • BACK SURGERY  2007   • COLON RESECTION WITH COLOSTOMY  03/09/2010   • FOOT SURGERY Bilateral 2005   • JOINT REPLACEMENT Right 2002    KNEE   • PACEMAKER IMPLANTATION  2012   • TOTAL HIP ARTHROPLASTY Right 2004   • TOTAL HIP ARTHROPLASTY Left 12/2008     Family History   Problem Relation Age of Onset   • Heart  disease Mother    • Heart disease Brother      Social History     Social History   • Marital status:      Spouse name: N/A   • Number of children: N/A   • Years of education: N/A     Occupational History   • Not on file.     Social History Main Topics   • Smoking status: Never Smoker   • Smokeless tobacco: Never Used   • Alcohol use No   • Drug use: No   • Sexual activity: Defer     Other Topics Concern   • Not on file     Social History Narrative     Allergies   Allergen Reactions   • Amiodarone    • Celebrex [Celecoxib]    • Oxycontin [Oxycodone Hcl]    • Penicillins Hives     Allergic to All cillins. Amoxacillin.   • Vioxx [Rofecoxib]      Current Outpatient Prescriptions   Medication Sig Dispense Refill   • diazePAM (VALIUM) 2 MG tablet Take 2 mg by mouth Daily.     • furosemide (LASIX) 20 MG tablet Take 20 mg by mouth Daily.     • furosemide (LASIX) 40 MG tablet Take 40 mg by mouth Daily. TAKE WITH 20 MG TABLET FOR 60 PER MG PER DAY     • gabapentin (NEURONTIN) 600 MG tablet Take 600 mg by mouth 3 (Three) Times a Day.     • Glucosamine 750 MG tablet Take 750 mg by mouth 2 (Two) Times a Day.     • losartan (COZAAR) 25 MG tablet Take 25 mg by mouth Daily.     • metoprolol succinate XL (TOPROL-XL) 100 MG 24 hr tablet Take 100 mg by mouth Daily.     • Multiple Vitamins-Minerals (MULTIVITAMIN ADULT PO) Take  by mouth Daily.     • pantoprazole (PROTONIX) 40 MG EC tablet Take 40 mg by mouth Daily.     • simvastatin (ZOCOR) 20 MG tablet Take 20 mg by mouth Every Night.     • tamsulosin (FLOMAX) 0.4 MG capsule 24 hr capsule Take 1 capsule by mouth Every Night.     • tiZANidine (ZANAFLEX) 4 MG tablet Take 4 mg by mouth 3 (Three) Times a Day As Needed for muscle spasms.     • warfarin (COUMADIN) 2 MG tablet Take 2 mg by mouth Daily.     • warfarin (COUMADIN) 4 MG tablet Take 4 mg by mouth Daily.     • warfarin (COUMADIN) 5 MG tablet Take 5 mg by mouth Daily.       No current facility-administered medications for  this visit.      Review of Systems   Constitutional: Negative for chills and fever.   HENT: Negative for congestion.    Respiratory: Negative for shortness of breath.    Cardiovascular: Positive for leg swelling. Negative for chest pain.   Gastrointestinal: Negative for constipation, diarrhea, nausea and vomiting.   Musculoskeletal:        Foot pain   Skin: Negative for wound.   Neurological: Negative for numbness.       OBJECTIVE     Vitals:    07/31/17 1134   BP: 116/100   Pulse: 91   Resp: 18   SpO2: 95%       PHYSICAL EXAM  GEN:   A&Ox3, NAD. Pt presents to clinic ambulating with walker and wearing Casual Shoes with custom inserts, left shoes has lift. Accompanied by wife.     NEURO:   Protective sensation intact to 10/10 sites Right foot, 10/10 site Left foot using [place] monofilament  Light touch sensation diminished  No Tinel's or [place] sign.    VASC:  Skin temperature Warm to Warm proximal to distal carlotta  DP pulses 1/4 Right, 1/4 Left  PT pulses 1/4 Right, 1/4 Left  CFT 4 sec carlotta  Pedal hair growth absent  1+ pitting edema noted carlotta  Varicosities absent carlotta    MUSC/SKEL:  Muscle Strength Right foot Dorsiflexors 5/5, Plantarflexors 5/5, Evertors 5/5, Invertors 5/5  Muscle Strength Left foot Dorsiflexors 5/5, Plantarflexors 5/5, Evertors 5/5, Invertors 5/5  ROM of the 1st MTP is full without pain or crepitus  ROM of the MTJ is full without pain or crepitus    ROM of the STJ is full without pain or crepitus    ROM of the ankle joint is full without pain or crepitus    POP of nail plates  Planus foot type with decreased arch height and abduction of forefoot  Right LE longer than Left LE     DERM:  Pedal nails x10 are thickened, elongated and discolored with presence of subunugual debris  Webspaces are clean, dry, intact  Skin is normal in turgor and texture with no open wounds or sores appreciated.  Stable cicatrix noted to dorsal 1st MTPJ carlotta and dorsal digits      RADIOLOGY/NUCLEAR:  No results  found.    LABORATORY/CULTURE RESULTS:      PATHOLOGY RESULTS:       ASSESSMENT/PLAN     Jonh was seen today for follow-up, athletes feet, follow-up and athletes feet.    Diagnoses and all orders for this visit:    Onychomycosis    Neuropathy    Anticoagulant long-term use      Comprehensive lower extremity examination and evaluation was performed.  Discussed findings and treatment plan including risks, benefits, and treatment options with patient in detail. Patient agreed with treatment plan.  After verbal consent obtained, nail(s) x10 debrided of length and thickness with nail nipper without incidence, Patient may maintain nails and calluses at home utilizing emery board of pumice stone between visits as needed, Reviewed at home diabetic foot care   Continue with custom shoes with and inserts  An After Visit Summary was printed and given to the patient at discharge, including (if requested) any available informative/educational handouts regarding diagnosis, treatment, or medications. All questions were answered to patient/family satisfaction. Should symptoms fail to improve or worsen they agree to call or return to clinic or to go to the Emergency Department. Discussed the importance of following up with any needed screening tests/labs/specialist appointments and any requested follow-up recommended by me today. Importance of maintaining follow-up discussed and patient accepts that missed appointments can delay diagnosis and potentially lead to worsening of conditions.  Return in about 3 months (around 10/31/2017)., or sooner if acute issues arise.        This document has been electronically signed by Deion Avelar DPM on July 31, 2017 11:49 AM     Please note that portions of this note may have been completed with a voice recognition program. Efforts were made to edit the dictations, but occasionally words are mistranscribed.

## 2017-08-02 ENCOUNTER — OFFICE VISIT (OUTPATIENT)
Dept: UROLOGY | Facility: CLINIC | Age: 72
End: 2017-08-02

## 2017-08-02 VITALS — HEIGHT: 74 IN | BODY MASS INDEX: 36.7 KG/M2 | TEMPERATURE: 97.6 F | WEIGHT: 286 LBS

## 2017-08-02 DIAGNOSIS — N40.1 BENIGN NON-NODULAR PROSTATIC HYPERPLASIA WITH LOWER URINARY TRACT SYMPTOMS: Primary | ICD-10-CM

## 2017-08-02 LAB
BILIRUB BLD-MCNC: NEGATIVE MG/DL
CLARITY, POC: CLEAR
COLOR UR: YELLOW
GLUCOSE UR STRIP-MCNC: NEGATIVE MG/DL
KETONES UR QL: NEGATIVE
LEUKOCYTE EST, POC: NEGATIVE
NITRITE UR-MCNC: NEGATIVE MG/ML
PH UR: 6 [PH] (ref 5–8)
PROT UR STRIP-MCNC: ABNORMAL MG/DL
RBC # UR STRIP: NEGATIVE /UL
SP GR UR: 1.02 (ref 1–1.03)
UROBILINOGEN UR QL: NORMAL

## 2017-08-02 PROCEDURE — 81003 URINALYSIS AUTO W/O SCOPE: CPT | Performed by: UROLOGY

## 2017-08-02 PROCEDURE — 99212 OFFICE O/P EST SF 10 MIN: CPT | Performed by: UROLOGY

## 2017-08-02 NOTE — PROGRESS NOTES
Mr. Peacock is 71 y.o. male    CHIEF COMPLAINT: I am here to follow up on my BPH    HPI  Benign Prostatic hyperplasia  Patient was initially diagnosed with benign prostatic hyperplasia approximately6 years ago. This was identified in the context of worsening voiding symptoms. Severity of the diease would be moderate. This has been managed with Alpha blockers. Alpha blockers has/have improved the symptoms to an acceptable level to the patient.  His disease has not been complicated by gross hematuria, urinary infection, bladder stones and retention requiring catheterization. His most bothersome symptom(s) is/are urinary hesitancy, weak stream.      Lab Results   Component Value Date    PSA 1.2 07/24/2017    PSA 1.01 07/12/2016         The following portions of the patient's history were reviewed and updated as appropriate: allergies, current medications, past family history, past medical history, past social history, past surgical history and problem list.    Review of Systems   Constitutional: Negative for chills and fever.   Gastrointestinal: Negative for abdominal pain, anal bleeding and blood in stool.   Genitourinary: Negative for flank pain and hematuria.         Current Outpatient Prescriptions:   •  Buprenorphine (BUTRANS) 10 MCG/HR patch weekly, Place 10 mcg on the skin Every 7 (Seven) Days., Disp: , Rfl:   •  CHONDROITIN SULFATE A PO, Take 600 mg by mouth 2 (Two) Times a Day., Disp: , Rfl:   •  diazePAM (VALIUM) 2 MG tablet, Take 2 mg by mouth Daily., Disp: , Rfl:   •  furosemide (LASIX) 20 MG tablet, Take 20 mg by mouth Daily., Disp: , Rfl:   •  furosemide (LASIX) 40 MG tablet, Take 40 mg by mouth Daily. TAKE WITH 20 MG TABLET FOR 60 PER MG PER DAY, Disp: , Rfl:   •  gabapentin (NEURONTIN) 600 MG tablet, Take 600 mg by mouth 3 (Three) Times a Day., Disp: , Rfl:   •  Glucosamine 750 MG tablet, Take 750 mg by mouth 2 (Two) Times a Day., Disp: , Rfl:   •  HYDROcodone-acetaminophen (NORCO) 7.5-325 MG per  tablet, Take 1 tablet by mouth Every 6 (Six) Hours As Needed for Moderate Pain (4-6)., Disp: , Rfl:   •  losartan (COZAAR) 25 MG tablet, Take 25 mg by mouth Daily., Disp: , Rfl:   •  metoprolol succinate XL (TOPROL-XL) 100 MG 24 hr tablet, Take 100 mg by mouth Daily., Disp: , Rfl:   •  Multiple Vitamins-Minerals (MULTIVITAMIN ADULT PO), Take  by mouth Daily., Disp: , Rfl:   •  pantoprazole (PROTONIX) 40 MG EC tablet, Take 40 mg by mouth Daily., Disp: , Rfl:   •  POLYETHYLENE GLYCOL 3350 PO, Take 17 granules by mouth Every Night., Disp: , Rfl:   •  simvastatin (ZOCOR) 20 MG tablet, Take 20 mg by mouth Every Night., Disp: , Rfl:   •  tamsulosin (FLOMAX) 0.4 MG capsule 24 hr capsule, Take 1 capsule by mouth Every Night., Disp: , Rfl:   •  tiZANidine (ZANAFLEX) 4 MG tablet, Take 4 mg by mouth 3 (Three) Times a Day As Needed for muscle spasms., Disp: , Rfl:   •  warfarin (COUMADIN) 2 MG tablet, Take 2 mg by mouth Daily., Disp: , Rfl:   •  warfarin (COUMADIN) 4 MG tablet, Take 4 mg by mouth Daily., Disp: , Rfl:   •  warfarin (COUMADIN) 5 MG tablet, Take 5 mg by mouth Daily., Disp: , Rfl:     Past Medical History:   Diagnosis Date   • Anemia 1/30/2017   • Anxiety 1/30/2017   • Arrhythmia 1/30/2017   • Cardiomyopathy 1/30/2017   • CHF (congestive heart failure) 1/30/2017   • Chronic pain 1/30/2017   • Depression 1/30/2017   • DVT (deep venous thrombosis)    • Gastritis 1/30/2017   • Hyperlipidemia 1/30/2017   • Hypertension 1/30/2017   • Low back pain 1/30/2017   • Neuropathy 1/30/2017   • Obesity 1/30/2017   • Status post placement of cardiac pacemaker 1/30/2017       Past Surgical History:   Procedure Laterality Date   • BACK SURGERY  2002    BACK FUSION   • BACK SURGERY  2007   • COLON RESECTION WITH COLOSTOMY  03/09/2010   • FOOT SURGERY Bilateral 2005   • JOINT REPLACEMENT Right 2002    KNEE   • PACEMAKER IMPLANTATION  2012   • TOTAL HIP ARTHROPLASTY Right 2004   • TOTAL HIP ARTHROPLASTY Left 12/2008       Social  "History     Social History   • Marital status:      Spouse name: N/A   • Number of children: N/A   • Years of education: N/A     Social History Main Topics   • Smoking status: Never Smoker   • Smokeless tobacco: Never Used   • Alcohol use No   • Drug use: No   • Sexual activity: Defer     Other Topics Concern   • None     Social History Narrative       Family History   Problem Relation Age of Onset   • Heart disease Mother    • Heart disease Brother        Temp 97.6 °F (36.4 °C)  Ht 74\" (188 cm)  Wt 286 lb (130 kg)  BMI 36.72 kg/m2    Physical Exam  Constitutional: He is oriented to person, place, and time. He appears well-developed and well-nourished.   Pulmonary/Chest: Effort normal.   Abdominal: Soft. He exhibits no distension and no mass. There is no tenderness. There is no rebound and no guarding. No hernia.   Genitourinary: Penis normal. Rectal exam shows no mass, no tenderness and anal tone normal. Enlarged: for the age of the patient. Right testis shows no mass, no swelling and no tenderness. Left testis shows no mass, no swelling and no tenderness. No hypospadias. No discharge found.   Genitourinary Comments:  The urethral meatus normal in position without evidence of stricture. Epididymis without mass or tenderness. Vas Deferens is palpably normal.Anus and perineum without mass or tenderness. The prostate is approximately 25 ml. It is Symmetric, with a Soft consistency. There are no nodules present. . The seminal vesicles are Palpably normal in size and without mass.   Neurological: He is alert and oriented to person, place, and time.   Vitals reviewed.        Results for orders placed or performed in visit on 08/02/17   POC Urinalysis Dipstick, Automated   Result Value Ref Range    Color Yellow Yellow, Straw, Dark Yellow, Slime    Clarity, UA Clear Clear    Glucose, UA Negative Negative, 1000 mg/dL (3+) mg/dL    Bilirubin Negative Negative    Ketones, UA Negative Negative    Specific Gravity  " 1.020 1.005 - 1.030    Blood, UA Negative Negative    pH, Urine 6.0 5.0 - 8.0    Protein,  mg/dL (A) Negative mg/dL    Urobilinogen, UA Normal Normal    Leukocytes Negative Negative    Nitrite, UA Negative Negative       Imaging Results (last 7 days)     ** No results found for the last 168 hours. **          Assessment and Plan  Diagnoses and all orders for this visit:    Benign non-nodular prostatic hyperplasia with lower urinary tract symptoms  -     POC Urinalysis Dipstick, Automated  His voiding symptoms are stable on his current regimen. He has remained infection free since his last visit. He has no evidence of progression. We discussed symptoms that would be worrisome of either of these. We talked about the importance of being seen if he develops gross hematuria, burning with urination, or episodes that may be worrisome for inability to empty the bladder. We talked about medications that may increase the risk of urinary retention especially over the counter decongestants. This chronic issue appears stable overall.       Celso Ellsworth MD  08/02/17  11:30 AM      Cc: Mir Avelar MD

## 2017-08-14 ENCOUNTER — LAB (OUTPATIENT)
Dept: LAB | Facility: HOSPITAL | Age: 72
End: 2017-08-14

## 2017-08-14 ENCOUNTER — TRANSCRIBE ORDERS (OUTPATIENT)
Dept: ADMINISTRATIVE | Facility: HOSPITAL | Age: 72
End: 2017-08-14

## 2017-08-14 DIAGNOSIS — Z79.899 LONG TERM USE OF DRUG: ICD-10-CM

## 2017-08-14 DIAGNOSIS — E78.5 HYPERLIPIDEMIA, UNSPECIFIED HYPERLIPIDEMIA TYPE: ICD-10-CM

## 2017-08-14 DIAGNOSIS — Z79.01 LONG TERM (CURRENT) USE OF ANTICOAGULANTS: Primary | ICD-10-CM

## 2017-08-14 DIAGNOSIS — Z13.29 SCREENING FOR THYROID DISORDER: ICD-10-CM

## 2017-08-14 DIAGNOSIS — I49.9 CARDIAC ARRHYTHMIA, UNSPECIFIED CARDIAC ARRHYTHMIA TYPE: ICD-10-CM

## 2017-08-14 DIAGNOSIS — I10 ESSENTIAL (PRIMARY) HYPERTENSION: ICD-10-CM

## 2017-08-14 LAB
INR PPP: 1.9 (ref 0.91–1.09)
PROTHROMBIN TIME: 23.2 SECONDS (ref 10–13.8)

## 2017-08-14 PROCEDURE — 85610 PROTHROMBIN TIME: CPT | Performed by: INTERNAL MEDICINE

## 2017-08-15 ENCOUNTER — TRANSCRIBE ORDERS (OUTPATIENT)
Dept: ADMINISTRATIVE | Facility: HOSPITAL | Age: 72
End: 2017-08-15

## 2017-08-28 ENCOUNTER — TRANSCRIBE ORDERS (OUTPATIENT)
Dept: ADMINISTRATIVE | Facility: HOSPITAL | Age: 72
End: 2017-08-28

## 2017-08-28 ENCOUNTER — LAB (OUTPATIENT)
Dept: LAB | Facility: HOSPITAL | Age: 72
End: 2017-08-28

## 2017-08-28 DIAGNOSIS — Z79.01 LONG TERM (CURRENT) USE OF ANTICOAGULANTS: Primary | ICD-10-CM

## 2017-08-28 LAB
INR PPP: 2.8 (ref 0.91–1.09)
PROTHROMBIN TIME: 34.1 SECONDS (ref 10–13.8)

## 2017-08-28 PROCEDURE — 85610 PROTHROMBIN TIME: CPT | Performed by: INTERNAL MEDICINE

## 2017-09-11 ENCOUNTER — LAB (OUTPATIENT)
Dept: LAB | Facility: HOSPITAL | Age: 72
End: 2017-09-11

## 2017-09-11 ENCOUNTER — TELEPHONE (OUTPATIENT)
Dept: CARDIOLOGY | Facility: CLINIC | Age: 72
End: 2017-09-11

## 2017-09-11 LAB
INR PPP: 2.4 (ref 0.91–1.09)
PROTHROMBIN TIME: 28.3 SECONDS (ref 10–13.8)

## 2017-09-11 PROCEDURE — 85610 PROTHROMBIN TIME: CPT | Performed by: INTERNAL MEDICINE

## 2017-09-11 NOTE — TELEPHONE ENCOUNTER
----- Message from Blossom Fermin sent at 9/11/2017  8:09 AM CDT -----      ----- Message -----     From: Marlene Valle PA-C     Sent: 9/8/2017   6:26 PM       To: Blossom Fermin    Please call and inquire about S&S of HF..shortness of breath, weight gain, leg swelling, orthopnea, pnd, etc.  Optivol trending up.  F/u 1 mo.  ----- Message -----     From: Blossom Fermin     Sent: 9/7/2017   4:37 PM       To: Marlene Valle PA-C

## 2017-09-11 NOTE — TELEPHONE ENCOUNTER
Patient came into the office since he was already up here and he Said that he has Shortness of Breathe but that is all the time. He stated he doesn't have any other symptoms. He does not take his Lasix Monday Wednesday or Friday.

## 2017-09-27 ENCOUNTER — TELEPHONE (OUTPATIENT)
Dept: CARDIOLOGY | Facility: CLINIC | Age: 72
End: 2017-09-27

## 2017-10-02 ENCOUNTER — LAB (OUTPATIENT)
Dept: LAB | Facility: HOSPITAL | Age: 72
End: 2017-10-02

## 2017-10-02 ENCOUNTER — TRANSCRIBE ORDERS (OUTPATIENT)
Dept: ADMINISTRATIVE | Facility: HOSPITAL | Age: 72
End: 2017-10-02

## 2017-10-02 DIAGNOSIS — Z79.01 LONG-TERM (CURRENT) USE OF ANTICOAGULANTS: ICD-10-CM

## 2017-10-02 DIAGNOSIS — Z13.29 SCREENING FOR THYROID DISORDER: ICD-10-CM

## 2017-10-02 DIAGNOSIS — I10 ESSENTIAL HYPERTENSION: ICD-10-CM

## 2017-10-02 DIAGNOSIS — E78.5 HYPERLIPIDEMIA, UNSPECIFIED HYPERLIPIDEMIA TYPE: Primary | ICD-10-CM

## 2017-10-02 DIAGNOSIS — L10.0 PEMPHIGUS VULGARIS: ICD-10-CM

## 2017-10-02 DIAGNOSIS — Z79.899 NEED FOR PROPHYLACTIC CHEMOTHERAPY: ICD-10-CM

## 2017-10-02 DIAGNOSIS — Z79.899 LONG TERM USE OF DRUG: ICD-10-CM

## 2017-10-02 LAB
INR PPP: 2.5 (ref 0.91–1.09)
PROTHROMBIN TIME: 29.8 SECONDS (ref 10–13.8)

## 2017-10-02 PROCEDURE — 85610 PROTHROMBIN TIME: CPT | Performed by: INTERNAL MEDICINE

## 2017-10-16 ENCOUNTER — LAB (OUTPATIENT)
Dept: LAB | Facility: HOSPITAL | Age: 72
End: 2017-10-16

## 2017-10-16 ENCOUNTER — TRANSCRIBE ORDERS (OUTPATIENT)
Dept: ADMINISTRATIVE | Facility: HOSPITAL | Age: 72
End: 2017-10-16

## 2017-10-16 DIAGNOSIS — Z79.01 LONG-TERM (CURRENT) USE OF ANTICOAGULANTS: ICD-10-CM

## 2017-10-16 DIAGNOSIS — Z79.01 LONG-TERM (CURRENT) USE OF ANTICOAGULANTS: Primary | ICD-10-CM

## 2017-10-16 LAB
INR PPP: 2.4 (ref 0.91–1.09)
PROTHROMBIN TIME: 28.9 SECONDS (ref 10–13.8)

## 2017-10-16 PROCEDURE — 85610 PROTHROMBIN TIME: CPT | Performed by: INTERNAL MEDICINE

## 2017-10-17 ENCOUNTER — CLINICAL SUPPORT (OUTPATIENT)
Dept: CARDIOLOGY | Facility: CLINIC | Age: 72
End: 2017-10-17

## 2017-10-17 DIAGNOSIS — I50.22 CHRONIC SYSTOLIC CONGESTIVE HEART FAILURE (HCC): ICD-10-CM

## 2017-10-17 DIAGNOSIS — Z95.810 AICD (AUTOMATIC CARDIOVERTER/DEFIBRILLATOR) PRESENT: Primary | ICD-10-CM

## 2017-10-17 NOTE — PROGRESS NOTES
[facility]    October 17, 2017    Primary Cardiologist: Jocelyn    Reason for evaluation: Heart Failure Management  Indication for AICD: chronic systolic congestive heart failure    Findings:  First monthly HFMR reviewed. Impedance below reference and Optivol  is at the threshold of 60 ohms. Of note, Optivol has been around 60  ohms since 9/16. ICD is V paced 0.2% and A paced 27.3%. 1 VT/VF  and 1 SVT. 1 shock on 9/15. 2 days in AT/AF since the beginning of  September. Patient is on Warfarin. The remaining cardiac compass  trends are within normal limits. Weight and blood pressure readings  within normal limits. Patient is reporting edema in his ankles. HFMR  indicates Limited high risk markers identified for worsening HFRecommend  clinic to follow up within 1 week    Follow up: 1 month

## 2017-10-24 ENCOUNTER — CLINICAL SUPPORT (OUTPATIENT)
Dept: CARDIOLOGY | Facility: CLINIC | Age: 72
End: 2017-10-24

## 2017-10-24 DIAGNOSIS — Z95.810 AICD (AUTOMATIC CARDIOVERTER/DEFIBRILLATOR) PRESENT: Primary | ICD-10-CM

## 2017-10-24 DIAGNOSIS — I50.22 CHRONIC SYSTOLIC CONGESTIVE HEART FAILURE (HCC): ICD-10-CM

## 2017-10-24 PROCEDURE — 93297 REM INTERROG DEV EVAL ICPMS: CPT | Performed by: INTERNAL MEDICINE

## 2017-10-26 ENCOUNTER — CLINICAL SUPPORT (OUTPATIENT)
Dept: FAMILY MEDICINE CLINIC | Facility: CLINIC | Age: 72
End: 2017-10-26

## 2017-10-26 DIAGNOSIS — Z23 NEED FOR PNEUMOCOCCAL VACCINE: ICD-10-CM

## 2017-10-26 DIAGNOSIS — Z23 NEED FOR VACCINATION: Primary | ICD-10-CM

## 2017-10-26 PROCEDURE — G0009 ADMIN PNEUMOCOCCAL VACCINE: HCPCS | Performed by: FAMILY MEDICINE

## 2017-10-26 PROCEDURE — 90732 PPSV23 VACC 2 YRS+ SUBQ/IM: CPT | Performed by: FAMILY MEDICINE

## 2017-10-30 ENCOUNTER — TRANSCRIBE ORDERS (OUTPATIENT)
Dept: LAB | Facility: HOSPITAL | Age: 72
End: 2017-10-30

## 2017-10-30 ENCOUNTER — LAB (OUTPATIENT)
Dept: LAB | Facility: HOSPITAL | Age: 72
End: 2017-10-30

## 2017-10-30 ENCOUNTER — APPOINTMENT (OUTPATIENT)
Dept: LAB | Facility: HOSPITAL | Age: 72
End: 2017-10-30

## 2017-10-30 DIAGNOSIS — Z79.01 LONG-TERM (CURRENT) USE OF ANTICOAGULANTS: Primary | ICD-10-CM

## 2017-10-30 DIAGNOSIS — I10 ESSENTIAL (PRIMARY) HYPERTENSION: ICD-10-CM

## 2017-10-30 DIAGNOSIS — Z79.899 LONG TERM USE OF DRUG: ICD-10-CM

## 2017-10-30 DIAGNOSIS — E78.2 MIXED HYPERLIPIDEMIA: ICD-10-CM

## 2017-10-30 DIAGNOSIS — Z13.29 SCREENING FOR THYROID DISORDER: ICD-10-CM

## 2017-10-30 DIAGNOSIS — D64.9 ANEMIA, UNSPECIFIED TYPE: ICD-10-CM

## 2017-10-30 LAB
ALBUMIN SERPL-MCNC: 3.8 G/DL (ref 3.5–5)
ALBUMIN/GLOB SERPL: 1.2 G/DL (ref 1.1–2.5)
ALP SERPL-CCNC: 89 U/L (ref 24–120)
ALT SERPL W P-5'-P-CCNC: 34 U/L (ref 0–54)
ANION GAP SERPL CALCULATED.3IONS-SCNC: 9 MMOL/L (ref 4–13)
ARTICHOKE IGE QN: 110 MG/DL (ref 0–99)
AST SERPL-CCNC: 24 U/L (ref 7–45)
BASOPHILS # BLD AUTO: 0.02 10*3/MM3 (ref 0–0.2)
BASOPHILS NFR BLD AUTO: 0.3 % (ref 0–2)
BILIRUB SERPL-MCNC: 0.3 MG/DL (ref 0.1–1)
BUN BLD-MCNC: 22 MG/DL (ref 5–21)
BUN/CREAT SERPL: 18 (ref 7–25)
CALCIUM SPEC-SCNC: 9 MG/DL (ref 8.4–10.4)
CHLORIDE SERPL-SCNC: 101 MMOL/L (ref 98–110)
CHOLEST SERPL-MCNC: 194 MG/DL (ref 130–200)
CO2 SERPL-SCNC: 34 MMOL/L (ref 24–31)
CREAT BLD-MCNC: 1.22 MG/DL (ref 0.5–1.4)
DEPRECATED RDW RBC AUTO: 46.9 FL (ref 40–54)
EOSINOPHIL # BLD AUTO: 0.18 10*3/MM3 (ref 0–0.7)
EOSINOPHIL NFR BLD AUTO: 2.8 % (ref 0–4)
ERYTHROCYTE [DISTWIDTH] IN BLOOD BY AUTOMATED COUNT: 13.7 % (ref 12–15)
GFR SERPL CREATININE-BSD FRML MDRD: 71 ML/MIN/1.73
GLOBULIN UR ELPH-MCNC: 3.1 GM/DL
GLUCOSE BLD-MCNC: 154 MG/DL (ref 70–100)
HCT VFR BLD AUTO: 37.2 % (ref 40–52)
HDLC SERPL-MCNC: 42 MG/DL
HGB BLD-MCNC: 12 G/DL (ref 14–18)
IMM GRANULOCYTES # BLD: 0.01 10*3/MM3 (ref 0–0.03)
IMM GRANULOCYTES NFR BLD: 0.2 % (ref 0–5)
INR PPP: 2.5 (ref 0.91–1.09)
LDLC/HDLC SERPL: 2.59 {RATIO}
LYMPHOCYTES # BLD AUTO: 1.7 10*3/MM3 (ref 0.72–4.86)
LYMPHOCYTES NFR BLD AUTO: 26.2 % (ref 15–45)
MCH RBC QN AUTO: 30.4 PG (ref 28–32)
MCHC RBC AUTO-ENTMCNC: 32.3 G/DL (ref 33–36)
MCV RBC AUTO: 94.2 FL (ref 82–95)
MONOCYTES # BLD AUTO: 0.53 10*3/MM3 (ref 0.19–1.3)
MONOCYTES NFR BLD AUTO: 8.2 % (ref 4–12)
NEUTROPHILS # BLD AUTO: 4.05 10*3/MM3 (ref 1.87–8.4)
NEUTROPHILS NFR BLD AUTO: 62.3 % (ref 39–78)
PLATELET # BLD AUTO: 146 10*3/MM3 (ref 130–400)
PMV BLD AUTO: 12.4 FL (ref 6–12)
POTASSIUM BLD-SCNC: 4.2 MMOL/L (ref 3.5–5.3)
PROT SERPL-MCNC: 6.9 G/DL (ref 6.3–8.7)
PROTHROMBIN TIME: 29.9 SECONDS (ref 10–13.8)
RBC # BLD AUTO: 3.95 10*6/MM3 (ref 4.8–5.9)
SODIUM BLD-SCNC: 144 MMOL/L (ref 135–145)
TRIGL SERPL-MCNC: 216 MG/DL (ref 0–149)
TSH SERPL DL<=0.05 MIU/L-ACNC: 0.53 MIU/ML (ref 0.47–4.68)
WBC NRBC COR # BLD: 6.49 10*3/MM3 (ref 4.8–10.8)

## 2017-10-30 PROCEDURE — 85610 PROTHROMBIN TIME: CPT | Performed by: INTERNAL MEDICINE

## 2017-10-30 PROCEDURE — 80053 COMPREHEN METABOLIC PANEL: CPT | Performed by: INTERNAL MEDICINE

## 2017-10-30 PROCEDURE — 80061 LIPID PANEL: CPT | Performed by: INTERNAL MEDICINE

## 2017-10-30 PROCEDURE — 84443 ASSAY THYROID STIM HORMONE: CPT | Performed by: INTERNAL MEDICINE

## 2017-10-30 PROCEDURE — 85025 COMPLETE CBC W/AUTO DIFF WBC: CPT | Performed by: INTERNAL MEDICINE

## 2017-10-30 PROCEDURE — 36415 COLL VENOUS BLD VENIPUNCTURE: CPT

## 2017-11-01 ENCOUNTER — OFFICE VISIT (OUTPATIENT)
Dept: PODIATRY | Facility: CLINIC | Age: 72
End: 2017-11-01

## 2017-11-01 VITALS — HEART RATE: 67 BPM | OXYGEN SATURATION: 98 % | HEIGHT: 74 IN | WEIGHT: 290 LBS | BODY MASS INDEX: 37.22 KG/M2

## 2017-11-01 DIAGNOSIS — Z79.01 ANTICOAGULANT LONG-TERM USE: ICD-10-CM

## 2017-11-01 DIAGNOSIS — B35.1 ONYCHOMYCOSIS: Primary | ICD-10-CM

## 2017-11-01 DIAGNOSIS — G62.9 NEUROPATHY: ICD-10-CM

## 2017-11-01 PROCEDURE — 11721 DEBRIDE NAIL 6 OR MORE: CPT | Performed by: PODIATRIST

## 2017-11-01 NOTE — PROGRESS NOTES
[facility] - PODIATRY    Today's Date: 11/01/17    Patient Name: Jonh Peacock  MRN: [id number]  CSN: [id number]  PCP: Mir Avelar MD  Referring Provider: No ref. provider found    SUBJECTIVE     Chief Complaint   Patient presents with   • Nail Problem     3 month f/u of onychomycosis and neuropathy.  Pt states that he is doing better. Pt would like his nail care.  Pt last saw his PCP, Dr. Mir Avelar, last week.       HPI: Jonh Peacock, a 71 y.o.male, comes to clinic as a(n) {new established:71391} patient {Clinic Presentation:09314}. {Med Hx:58809}. {Pod Subjective #1 (Optional):92742} {Pod Subjective #2 (Optional):85793}. {Pod Subjective #3 (Optional):03115}. Denies any constitutional symptoms. No other pedal complaints at this time.    Past Medical History:   Diagnosis Date   • Anemia 1/30/2017   • Anxiety 1/30/2017   • Arrhythmia 1/30/2017   • Cardiomyopathy 1/30/2017   • CHF (congestive heart failure) 1/30/2017   • Chronic pain 1/30/2017   • Depression 1/30/2017   • DVT (deep venous thrombosis)    • Gastritis 1/30/2017   • Hyperlipidemia 1/30/2017   • Hypertension 1/30/2017   • Low back pain 1/30/2017   • Neuropathy 1/30/2017   • Obesity 1/30/2017   • Status post placement of cardiac pacemaker 1/30/2017     Past Surgical History:   Procedure Laterality Date   • BACK SURGERY  2002    BACK FUSION   • BACK SURGERY  2007   • COLON RESECTION WITH COLOSTOMY  03/09/2010   • FOOT SURGERY Bilateral 2005   • JOINT REPLACEMENT Right 2002    KNEE   • PACEMAKER IMPLANTATION  2012   • TOTAL HIP ARTHROPLASTY Right 2004   • TOTAL HIP ARTHROPLASTY Left 12/2008     Family History   Problem Relation Age of Onset   • Heart disease Mother    • Heart disease Brother      Social History     Social History   • Marital status:      Spouse name: N/A   • Number of children: N/A   • Years of education: N/A     Occupational History   • Not on file.     Social History Main Topics   • Smoking status:  Never Smoker   • Smokeless tobacco: Never Used   • Alcohol use No   • Drug use: No   • Sexual activity: Defer     Other Topics Concern   • Not on file     Social History Narrative     Allergies   Allergen Reactions   • Amiodarone    • Oxycontin [Oxycodone Hcl] Other (See Comments)     Can not urinate.   • Penicillins Hives     Allergic to All cillins. Amoxacillin.   • Vioxx [Rofecoxib]      Current Outpatient Prescriptions   Medication Sig Dispense Refill   • Buprenorphine (BUTRANS) 10 MCG/HR patch weekly Place 10 mcg on the skin Every 7 (Seven) Days.     • CHONDROITIN SULFATE A PO Take 600 mg by mouth 2 (Two) Times a Day.     • diazePAM (VALIUM) 2 MG tablet Take 2 mg by mouth Daily.     • furosemide (LASIX) 20 MG tablet Take 20 mg by mouth Daily.     • furosemide (LASIX) 40 MG tablet Take 40 mg by mouth Daily. TAKE WITH 20 MG TABLET FOR 60 PER MG PER DAY     • gabapentin (NEURONTIN) 600 MG tablet Take 600 mg by mouth 3 (Three) Times a Day.     • Glucosamine 750 MG tablet Take 750 mg by mouth 2 (Two) Times a Day.     • losartan (COZAAR) 25 MG tablet Take 25 mg by mouth Daily.     • metoprolol succinate XL (TOPROL-XL) 100 MG 24 hr tablet Take 100 mg by mouth Daily.     • Multiple Vitamins-Minerals (MULTIVITAMIN ADULT PO) Take  by mouth Daily.     • pantoprazole (PROTONIX) 40 MG EC tablet Take 40 mg by mouth Daily.     • POLYETHYLENE GLYCOL 3350 PO Take 17 granules by mouth Every Night.     • simvastatin (ZOCOR) 20 MG tablet Take 20 mg by mouth Every Night.     • tamsulosin (FLOMAX) 0.4 MG capsule 24 hr capsule Take 1 capsule by mouth Every Night.     • tiZANidine (ZANAFLEX) 4 MG tablet Take 4 mg by mouth 3 (Three) Times a Day As Needed for muscle spasms.     • warfarin (COUMADIN) 2 MG tablet Take 2 mg by mouth Daily.     • warfarin (COUMADIN) 4 MG tablet Take 4 mg by mouth Daily.     • warfarin (COUMADIN) 5 MG tablet Take 5 mg by mouth Daily.       No current facility-administered medications for this visit.           Review of Systems    OBJECTIVE     Vitals:    11/01/17 1518   Pulse: 67   SpO2: 98%       PHYSICAL EXAM  GEN:   A&Ox3, NAD. Pt presents to clinic {Ambulation Status:60151} and wearing {Shoe Gear:28475}. Accompanied by {ACCOMPANYING PERSON:44609}.     NEURO:   Protective sensation intact to {NUMBERS 0-10:24141}/10 sites Right foot, {NUMBERS 0-10:61083}/10 site Left foot using [place] monofilament  Light touch sensation {ABSENT/DIMINISHED:14983}  No Tinel's or [place] sign.    VASC:  Skin temperature {Foot Temp:13043} to {Foot Temp:62440} proximal to distal carlotta  DP pulses {NUMBERS 0-4:07304}/4 Right, {NUMBERS 0-4:99479}/4 Left  PT pulses {NUMBERS 0-4:92732}/4 Right, {NUMBERS 0-4:47185}/4 Left  CFT {Foot CFT:47163} sec carlotta  Pedal hair growth {DESC; PRESENT/ABSENT:19112}  {NUMBERS; EDEMA:88152} edema noted carlotta  Varicosities {POD VARICOSITIES:20028} carlotta    MUSC/SKEL:  Muscle Strength Right foot Dorsiflexors 5/5, Plantarflexors 5/5, Evertors 5/5, Invertors 5/5  Muscle Strength Left foot Dorsiflexors 5/5, Plantarflexors 5/5, Evertors 5/5, Invertors 5/5  ROM of the 1st MTP is full without pain or crepitus  ROM of the MTJ is full without pain or crepitus    ROM of the STJ is full without pain or crepitus    ROM of the ankle joint is full without pain or crepitus    POP of ***  {Foot Type:36703} foot type   Gait pattern: {Gait Pattern:35833}  No gross pedal musculoskeletal deformities noted.     DERM:  Pedal nails x10 are {Pod Nail:73231}  Webspaces are {Pod Webspace Exam:45097}  Skin is {Skin Texture:33101} with no open wounds or sores appreciated.      RADIOLOGY/NUCLEAR:  No results found.    LABORATORY/CULTURE RESULTS:      PATHOLOGY RESULTS:       ASSESSMENT/PLAN     Jonh was seen today for nail problem.    Diagnoses and all orders for this visit:    Onychomycosis    Anticoagulant long-term use      Comprehensive lower extremity examination and evaluation was performed.  Discussed findings and treatment plan  including risks, benefits, and treatment options with patient in detail. Patient agreed with treatment plan.  {Pod Plan #1:55005}   An After Visit Summary was printed and given to the patient at discharge, including (if requested) any available informative/educational handouts regarding diagnosis, treatment, or medications. All questions were answered to patient/family satisfaction. Should symptoms fail to improve or worsen they agree to call or return to clinic or to go to the Emergency Department. Discussed the importance of following up with any needed screening tests/labs/specialist appointments and any requested follow-up recommended by me today. Importance of maintaining follow-up discussed and patient accepts that missed appointments can delay diagnosis and potentially lead to worsening of conditions.  Return in about 3 months (around 2/1/2018)., or sooner if acute issues arise.    Lab Frequency Next Occurrence   PSA Once 8/2/2018   Protime-INR         This document has been electronically signed by Deion Avelar DPM on November 1, 2017 3:36 PM

## 2017-11-02 NOTE — PROGRESS NOTES
[facility] - PODIATRY    Today's Date: 11/01/17    Patient Name: Jonh Peacock  MRN: [id number]  CSN: [id number]  PCP: Mir Avelar MD  Referring Provider: No ref. provider found    SUBJECTIVE     Chief Complaint   Patient presents with   • Nail Problem     3 month f/u of onychomycosis and neuropathy.  Pt states that he is doing better. Pt would like his nail care.  Pt last saw his PCP, Dr. Mir Avelar, last week.         HPI: Jonh Peacock, a 71 y.o.male, comes to clinic as a(n) established patient complaining of painful fungal toenails. Pt with h/o Anxiety, Arrhythmia, Cardiomyopathy, CHF, Depression, previous DVT, HTN, Obesity, Anticoagulation with Warfarin. Denies diabetes. Relates that his nails are thick and he is unable to trim them himelf and routinely his wife will trim them but does not feel comfortable trimming them due to being on warfarin. Also relates that he has a limb length discrepancy which he has a lift in his left foot and custom inserts for both feet. Admits pain at 1/10 level and described as aching and tingling. Relates that he typically gets around in his motorized scooter. Denies any constitutional symptoms. No other pedal complaints at this time.    Past Medical History:   Diagnosis Date   • Anemia 1/30/2017   • Anxiety 1/30/2017   • Arrhythmia 1/30/2017   • Cardiomyopathy 1/30/2017   • CHF (congestive heart failure) 1/30/2017   • Chronic pain 1/30/2017   • Depression 1/30/2017   • DVT (deep venous thrombosis)    • Gastritis 1/30/2017   • Hyperlipidemia 1/30/2017   • Hypertension 1/30/2017   • Low back pain 1/30/2017   • Neuropathy 1/30/2017   • Obesity 1/30/2017   • Status post placement of cardiac pacemaker 1/30/2017     Past Surgical History:   Procedure Laterality Date   • BACK SURGERY  2002    BACK FUSION   • BACK SURGERY  2007   • COLON RESECTION WITH COLOSTOMY  03/09/2010   • FOOT SURGERY Bilateral 2005   • JOINT REPLACEMENT Right 2002    KNEE   •  PACEMAKER IMPLANTATION  2012   • TOTAL HIP ARTHROPLASTY Right 2004   • TOTAL HIP ARTHROPLASTY Left 12/2008     Family History   Problem Relation Age of Onset   • Heart disease Mother    • Heart disease Brother      Social History     Social History   • Marital status:      Spouse name: N/A   • Number of children: N/A   • Years of education: N/A     Occupational History   • Not on file.     Social History Main Topics   • Smoking status: Never Smoker   • Smokeless tobacco: Never Used   • Alcohol use No   • Drug use: No   • Sexual activity: Defer     Other Topics Concern   • Not on file     Social History Narrative     Allergies   Allergen Reactions   • Amiodarone    • Oxycontin [Oxycodone Hcl] Other (See Comments)     Can not urinate.   • Penicillins Hives     Allergic to All cillins. Amoxacillin.   • Vioxx [Rofecoxib]      Current Outpatient Prescriptions   Medication Sig Dispense Refill   • Buprenorphine (BUTRANS) 10 MCG/HR patch weekly Place 10 mcg on the skin Every 7 (Seven) Days.     • CHONDROITIN SULFATE A PO Take 600 mg by mouth 2 (Two) Times a Day.     • diazePAM (VALIUM) 2 MG tablet Take 2 mg by mouth Daily.     • furosemide (LASIX) 20 MG tablet Take 20 mg by mouth Daily.     • furosemide (LASIX) 40 MG tablet Take 40 mg by mouth Daily. TAKE WITH 20 MG TABLET FOR 60 PER MG PER DAY     • gabapentin (NEURONTIN) 600 MG tablet Take 600 mg by mouth 3 (Three) Times a Day.     • Glucosamine 750 MG tablet Take 750 mg by mouth 2 (Two) Times a Day.     • losartan (COZAAR) 25 MG tablet Take 25 mg by mouth Daily.     • metoprolol succinate XL (TOPROL-XL) 100 MG 24 hr tablet Take 100 mg by mouth Daily.     • Multiple Vitamins-Minerals (MULTIVITAMIN ADULT PO) Take  by mouth Daily.     • pantoprazole (PROTONIX) 40 MG EC tablet Take 40 mg by mouth Daily.     • POLYETHYLENE GLYCOL 3350 PO Take 17 granules by mouth Every Night.     • simvastatin (ZOCOR) 20 MG tablet Take 20 mg by mouth Every Night.     • tamsulosin  (FLOMAX) 0.4 MG capsule 24 hr capsule Take 1 capsule by mouth Every Night.     • tiZANidine (ZANAFLEX) 4 MG tablet Take 4 mg by mouth 3 (Three) Times a Day As Needed for muscle spasms.     • warfarin (COUMADIN) 2 MG tablet Take 2 mg by mouth Daily.     • warfarin (COUMADIN) 4 MG tablet Take 4 mg by mouth Daily.     • warfarin (COUMADIN) 5 MG tablet Take 5 mg by mouth Daily.       No current facility-administered medications for this visit.      Review of Systems   Constitutional: Negative for chills and fever.   HENT: Negative for congestion.    Respiratory: Negative for shortness of breath.    Cardiovascular: Positive for leg swelling. Negative for chest pain.   Gastrointestinal: Negative for constipation, diarrhea, nausea and vomiting.   Musculoskeletal:        Foot pain   Skin: Negative for wound.        Thick Toenails   Neurological: Negative for numbness.       OBJECTIVE     Vitals:    11/01/17 1518   Pulse: 67   SpO2: 98%       PHYSICAL EXAM  GEN:   A&Ox3, NAD. Pt presents to clinic in motorized scooter and wearing Casual Shoes with custom inserts, left shoes has lift.      NEURO:   Protective sensation intact to 10/10 sites Right foot, 10/10 site Left foot using [place] monofilament  Light touch sensation diminished  No Tinel's or [place] sign.    VASC:  Skin temperature Warm to Warm proximal to distal carlotta  DP pulses 1/4 Right, 1/4 Left  PT pulses 1/4 Right, 1/4 Left  CFT 4 sec carlotta  Pedal hair growth absent  1+ pitting edema noted carlotta  Varicosities absent carlotta    MUSC/SKEL:  Muscle Strength Right foot Dorsiflexors 5/5, Plantarflexors 5/5, Evertors 5/5, Invertors 5/5  Muscle Strength Left foot Dorsiflexors 5/5, Plantarflexors 5/5, Evertors 5/5, Invertors 5/5  ROM of the 1st MTP is full without pain or crepitus  ROM of the MTJ is full without pain or crepitus    ROM of the STJ is full without pain or crepitus    ROM of the ankle joint is full without pain or crepitus    POP of nail plates  Planus foot  type with decreased arch height and abduction of forefoot  Right LE longer than Left LE     DERM:  Pedal nails x10 are thickened, elongated and discolored with presence of subunugual debris  Webspaces are clean, dry, intact  Skin is normal in turgor and texture with no open wounds or sores appreciated.  Stable cicatrix noted to dorsal 1st MTPJ carlotta and dorsal digits      RADIOLOGY/NUCLEAR:  No results found.    LABORATORY/CULTURE RESULTS:      PATHOLOGY RESULTS:       ASSESSMENT/PLAN     Jonh was seen today for nail problem.    Diagnoses and all orders for this visit:    Onychomycosis    Anticoagulant long-term use    Neuropathy      Comprehensive lower extremity examination and evaluation was performed.  Discussed findings and treatment plan including risks, benefits, and treatment options with patient in detail. Patient agreed with treatment plan.  After verbal consent obtained, nail(s) x10 debrided of length and thickness with nail nipper without incidence  Patient may maintain nails and calluses at home utilizing emery board or pumice stone between visits as needed  Reviewed at home diabetic foot care including daily foot checks   Continue with custom shoes with and inserts  Defers compression stockings at this time  An After Visit Summary was printed and given to the patient at discharge, including (if requested) any available informative/educational handouts regarding diagnosis, treatment, or medications. All questions were answered to patient/family satisfaction. Should symptoms fail to improve or worsen they agree to call or return to clinic or to go to the Emergency Department. Discussed the importance of following up with any needed screening tests/labs/specialist appointments and any requested follow-up recommended by me today. Importance of maintaining follow-up discussed and patient accepts that missed appointments can delay diagnosis and potentially lead to worsening of conditions.  Return in about 3  months (around 2/1/2018)., or sooner if acute issues arise.        This document has been electronically signed by Deion Avelar DPM on November 2, 2017 12:01 PM     Please note that portions of this note may have been completed with a voice recognition program. Efforts were made to edit the dictations, but occasionally words are mistranscribed.

## 2017-11-13 ENCOUNTER — LAB (OUTPATIENT)
Dept: LAB | Facility: HOSPITAL | Age: 72
End: 2017-11-13

## 2017-11-13 DIAGNOSIS — Z79.01 LONG-TERM (CURRENT) USE OF ANTICOAGULANTS: ICD-10-CM

## 2017-11-13 LAB
INR PPP: 2.5 (ref 0.91–1.09)
PROTHROMBIN TIME: 30.4 SECONDS (ref 10–13.8)

## 2017-11-13 PROCEDURE — 85610 PROTHROMBIN TIME: CPT | Performed by: INTERNAL MEDICINE

## 2017-11-14 ENCOUNTER — CLINICAL SUPPORT (OUTPATIENT)
Dept: CARDIOLOGY | Facility: CLINIC | Age: 72
End: 2017-11-14

## 2017-11-14 DIAGNOSIS — I50.22 CHRONIC SYSTOLIC CONGESTIVE HEART FAILURE (HCC): ICD-10-CM

## 2017-11-14 DIAGNOSIS — Z95.810 AICD (AUTOMATIC CARDIOVERTER/DEFIBRILLATOR) PRESENT: Primary | ICD-10-CM

## 2017-11-14 NOTE — PROGRESS NOTES
Oral Report    November 14, 2017    Primary Cardiologist: Jocelyn    Reason for evaluation: Heart Failure Management  Indication for AICD: chronic systolic congestive heart failure    Findings:  Monthly HFMR reviewed. Impedance below reference and Optivol 5  ohms. ICD is V paced 0.2% and A paced 26.8%. Patient was shocked  10/23 after getting out of the shower. He did not go to the ER or call his  cardiology team as he assumed his cardiology team got a report and  was aware. 1 VT/VF. 11 hours in AT/AF. Ventricular rate averaging 120  bpm with rates up to 150 bpm. Patient is on Warfarin. The remaining  cardiac compass trends are within normal limits. Blood pressure  readings within normal limits. Weight is up 5.8 lbs in the last 4 days.  Patient reported having some increased sodium intake. Patient is  reporting worsening edema below his knees. He said his SOB is not  worse and he does not have any abdominal distension. HFMR indicates  Multiple high risk markers identified for worsening HF - Recommend  clinic to follow up within 24 hours    Follow up: 1 month

## 2017-11-27 ENCOUNTER — LAB (OUTPATIENT)
Dept: LAB | Facility: HOSPITAL | Age: 72
End: 2017-11-27

## 2017-11-27 DIAGNOSIS — Z79.01 LONG-TERM (CURRENT) USE OF ANTICOAGULANTS: ICD-10-CM

## 2017-11-27 PROCEDURE — 85610 PROTHROMBIN TIME: CPT

## 2017-11-28 LAB
INR PPP: 1.9 (ref 0.91–1.09)
PROTHROMBIN TIME: 23.1 SECONDS (ref 10–13.8)

## 2017-12-04 ENCOUNTER — CLINICAL SUPPORT (OUTPATIENT)
Dept: CARDIOLOGY | Facility: CLINIC | Age: 72
End: 2017-12-04

## 2017-12-04 DIAGNOSIS — I42.8 NON-ISCHEMIC CARDIOMYOPATHY (HCC): ICD-10-CM

## 2017-12-04 PROCEDURE — 93295 DEV INTERROG REMOTE 1/2/MLT: CPT | Performed by: PHYSICIAN ASSISTANT

## 2017-12-05 NOTE — PROGRESS NOTES
Dual Chamber AICD Evaluation Report  [facility]    December 5, 2017    Primary Cardiologist: Jocelyn  : Medtronic Model: Protecta  Implant date: 9/27/12    Reason for evaluation: routine  Indication for pacemaker: chronic systolic congestive heart failure and nonischemic cardiomyopathy    Measurements  Atrial sensing - P wave: 2.1 mV  Atrial threshold: 0.375 V@ 0.4ms  Atrial lead impedance: 361 ohms  Ventricular sensing - R wave: 7.0 mV  Ventricular threshold: 0.875 V @ 0.4 ms  Ventricular lead impedance:   361 ohms   Shock impedance:  RV-37 ohms SVC 45 ohms    Diagnostic Data  Atrial paced: 30.4 %  Ventricular paced: 0.1 %  Other: ATP x 2 and shock delivered for SVT.  Battery status: satisfactory         Final Parameters  Mode:  AAIR+  Lower rate: 60 bpm   Upper rate: 130 bpm  AV Delay: paced- 180 ms  Sensed-150 ms  Atrial - Amplitude: 1.5 V   Pulse width: 0.4 ms   Sensitivity: 0.6 mV     Ventricular - Amplitude: 2.0 V  Pulse width: 0.4 ms  Sensitivity: 0.3 mV    Changes made: n/a  Conclusions: normal pacemaker function    Follow up: 3 months

## 2017-12-11 ENCOUNTER — LAB (OUTPATIENT)
Dept: LAB | Facility: HOSPITAL | Age: 72
End: 2017-12-11

## 2017-12-11 DIAGNOSIS — Z79.01 LONG-TERM (CURRENT) USE OF ANTICOAGULANTS: ICD-10-CM

## 2017-12-11 LAB
INR PPP: 2.2 (ref 0.91–1.09)
PROTHROMBIN TIME: 26.9 SECONDS (ref 10–13.8)

## 2017-12-11 PROCEDURE — 85610 PROTHROMBIN TIME: CPT | Performed by: INTERNAL MEDICINE

## 2017-12-12 ENCOUNTER — TRANSCRIBE ORDERS (OUTPATIENT)
Dept: ADMINISTRATIVE | Facility: HOSPITAL | Age: 72
End: 2017-12-12

## 2017-12-27 ENCOUNTER — TRANSCRIBE ORDERS (OUTPATIENT)
Dept: ADMINISTRATIVE | Facility: HOSPITAL | Age: 72
End: 2017-12-27

## 2017-12-27 ENCOUNTER — LAB (OUTPATIENT)
Dept: LAB | Facility: HOSPITAL | Age: 72
End: 2017-12-27
Attending: EMERGENCY MEDICINE

## 2017-12-27 DIAGNOSIS — E78.5 HYPERLIPIDEMIA, UNSPECIFIED HYPERLIPIDEMIA TYPE: ICD-10-CM

## 2017-12-27 DIAGNOSIS — I10 ESSENTIAL (PRIMARY) HYPERTENSION: ICD-10-CM

## 2017-12-27 DIAGNOSIS — Z79.01 LONG-TERM (CURRENT) USE OF ANTICOAGULANTS: ICD-10-CM

## 2017-12-27 DIAGNOSIS — Z13.29 SCREENING FOR THYROID DISORDER: ICD-10-CM

## 2017-12-27 DIAGNOSIS — Z79.899 LONG TERM USE OF DRUG: Primary | ICD-10-CM

## 2017-12-27 LAB
INR PPP: 2.3 (ref 0.91–1.09)
PROTHROMBIN TIME: 27.5 SECONDS (ref 10–13.8)

## 2017-12-27 PROCEDURE — 85610 PROTHROMBIN TIME: CPT | Performed by: EMERGENCY MEDICINE

## 2018-01-03 ENCOUNTER — CLINICAL SUPPORT (OUTPATIENT)
Dept: CARDIOLOGY | Facility: CLINIC | Age: 73
End: 2018-01-03

## 2018-01-03 DIAGNOSIS — Z95.810 AICD (AUTOMATIC CARDIOVERTER/DEFIBRILLATOR) PRESENT: Primary | ICD-10-CM

## 2018-01-03 DIAGNOSIS — I50.22 CHRONIC SYSTOLIC CONGESTIVE HEART FAILURE (HCC): ICD-10-CM

## 2018-01-03 PROCEDURE — 93297 REM INTERROG DEV EVAL ICPMS: CPT | Performed by: INTERNAL MEDICINE

## 2018-01-03 NOTE — PROGRESS NOTES
[place] Report    January 3, 2018    Primary Cardiologist: Jocelyn    Reason for evaluation: Heart Failure Management  Indication for AICD: chronic systolic congestive heart failure    Findings:  Monthly HFMR reviewed. Impedance below reference and no recent  Optivol crossing. Optivol 20 ohms. ICD is V paced 0.1% and A paced  25.7%. No episodes of VT/VF and no shocks. No AT/AF. The remaining  cardiac compass trends are within normal limits. Weight and blood  pressure readings within normal limits and patient is not reporting any  symptoms of heart fialure. HFMR indicates Low risk for worsening HF -  Recommend routine clinic follow up    Follow up: 1 month

## 2018-01-08 ENCOUNTER — TRANSCRIBE ORDERS (OUTPATIENT)
Dept: ADMINISTRATIVE | Facility: HOSPITAL | Age: 73
End: 2018-01-08

## 2018-01-08 ENCOUNTER — LAB (OUTPATIENT)
Dept: LAB | Facility: HOSPITAL | Age: 73
End: 2018-01-08
Attending: EMERGENCY MEDICINE

## 2018-01-08 DIAGNOSIS — Z79.01 LONG-TERM (CURRENT) USE OF ANTICOAGULANTS: Primary | ICD-10-CM

## 2018-01-08 LAB
INR PPP: 2.4 (ref 0.91–1.09)
PROTHROMBIN TIME: 28.8 SECONDS (ref 10–13.8)

## 2018-01-08 PROCEDURE — 85610 PROTHROMBIN TIME: CPT | Performed by: EMERGENCY MEDICINE

## 2018-01-23 ENCOUNTER — LAB (OUTPATIENT)
Dept: LAB | Facility: HOSPITAL | Age: 73
End: 2018-01-23

## 2018-01-23 DIAGNOSIS — Z79.01 LONG-TERM (CURRENT) USE OF ANTICOAGULANTS: ICD-10-CM

## 2018-01-23 LAB
INR PPP: 2.5 (ref 0.91–1.09)
PROTHROMBIN TIME: 30.1 SECONDS (ref 10–13.8)

## 2018-01-23 PROCEDURE — 85610 PROTHROMBIN TIME: CPT | Performed by: INTERNAL MEDICINE

## 2018-01-30 ENCOUNTER — OFFICE VISIT (OUTPATIENT)
Dept: CARDIOLOGY | Facility: CLINIC | Age: 73
End: 2018-01-30

## 2018-01-30 VITALS
WEIGHT: 290 LBS | OXYGEN SATURATION: 96 % | HEIGHT: 74 IN | BODY MASS INDEX: 37.22 KG/M2 | DIASTOLIC BLOOD PRESSURE: 80 MMHG | SYSTOLIC BLOOD PRESSURE: 130 MMHG | HEART RATE: 74 BPM

## 2018-01-30 DIAGNOSIS — I48.0 PAF (PAROXYSMAL ATRIAL FIBRILLATION) (HCC): ICD-10-CM

## 2018-01-30 DIAGNOSIS — I50.22 CHRONIC SYSTOLIC CONGESTIVE HEART FAILURE (HCC): Primary | ICD-10-CM

## 2018-01-30 DIAGNOSIS — I10 ESSENTIAL HYPERTENSION: ICD-10-CM

## 2018-01-30 PROCEDURE — 99214 OFFICE O/P EST MOD 30 MIN: CPT | Performed by: INTERNAL MEDICINE

## 2018-01-30 PROCEDURE — 93000 ELECTROCARDIOGRAM COMPLETE: CPT | Performed by: INTERNAL MEDICINE

## 2018-01-30 RX ORDER — HYDROCODONE BITARTRATE AND ACETAMINOPHEN 7.5; 325 MG/1; MG/1
1 TABLET ORAL AS NEEDED
COMMUNITY
End: 2022-07-21

## 2018-01-30 NOTE — PROGRESS NOTES
Subjective:     Encounter Date:01/30/2018      Patient ID: Jonh Peacock is a 72 y.o. male with nonischemic cardiomyopathy, nonsustained ventricular tachycardia, atrial fibrillation, atrial tachycardia, mitral regurgitation, hypertension, hyperlipidemia here for follow-up.    Chief Complaint: Routine follow-up    Congestive Heart Failure   Presents for follow-up visit. Associated symptoms include edema and shortness of breath. Pertinent negatives include no abdominal pain, chest pain, claudication, fatigue, near-syncope, orthopnea, palpitations, paroxysmal nocturnal dyspnea or unexpected weight change. The symptoms have been stable. Compliance with total regimen is %. Compliance with diet is %. Compliance with exercise is %. Compliance with medications is %.   Atrial Fibrillation   Presents for follow-up visit. Symptoms include shortness of breath. Symptoms are negative for an AICD problem, chest pain, dizziness, palpitations, syncope and weakness. The symptoms have been stable. Past medical history includes atrial fibrillation and CHF. There are no medication compliance problems.   Shortness of Breath   This is a chronic problem. The problem occurs intermittently. The problem has been waxing and waning. Associated symptoms include leg swelling. Pertinent negatives include no abdominal pain, chest pain, claudication, fever, headaches, hemoptysis, orthopnea, PND, sore throat, syncope, vomiting or wheezing. The symptoms are aggravated by exercise. He has tried rest for the symptoms. The treatment provided moderate relief. His past medical history is significant for a heart failure.     The patient presents today for routine follow-up.  He says that overall he is stable and doing reasonably well.  He says that his volume status varies based on the days that he takes his diuretics.  The patient remains physically active although does note some shortness of breath with mild to moderate  exertion.  His weight has been reasonably stable.  His diuretics were well to keep him about the same weight.  The patient denies orthopnea or PND but does have chronic lower extremity edema.  The patient has a history of atrial arrhythmias and has received a few ICD shocks in recent years.  The patient has not experienced one recently.  The patient continues anticoagulation for his history of atrial fibrillation and is tolerating this well.  Altogether, the patient says that he feels reasonably well given his circumstances and continues to be active doing things that he would like to do, albeit still with some limitation due to shortness of breath.      Current Outpatient Prescriptions:   •  Buprenorphine (BUTRANS) 10 MCG/HR patch weekly, Place 10 mcg on the skin Every 7 (Seven) Days., Disp: , Rfl:   •  CHONDROITIN SULFATE A PO, Take 600 mg by mouth 2 (Two) Times a Day., Disp: , Rfl:   •  diazePAM (VALIUM) 2 MG tablet, Take 2 mg by mouth Daily., Disp: , Rfl:   •  furosemide (LASIX) 20 MG tablet, Take 20 mg by mouth Daily., Disp: , Rfl:   •  furosemide (LASIX) 40 MG tablet, Take 40 mg by mouth Daily. TAKE WITH 20 MG TABLET FOR 60 PER MG PER DAY, Disp: , Rfl:   •  gabapentin (NEURONTIN) 600 MG tablet, Take 600 mg by mouth 3 (Three) Times a Day., Disp: , Rfl:   •  Glucosamine 750 MG tablet, Take 750 mg by mouth 2 (Two) Times a Day., Disp: , Rfl:   •  HYDROcodone-acetaminophen (NORCO) 7.5-325 MG per tablet, Take 1 tablet by mouth Every 6 (Six) Hours As Needed for Moderate Pain ., Disp: , Rfl:   •  metoprolol succinate XL (TOPROL-XL) 100 MG 24 hr tablet, Take 100 mg by mouth Daily., Disp: , Rfl:   •  Multiple Vitamins-Minerals (MULTIVITAMIN ADULT PO), Take  by mouth Daily., Disp: , Rfl:   •  pantoprazole (PROTONIX) 40 MG EC tablet, Take 40 mg by mouth Daily., Disp: , Rfl:   •  POLYETHYLENE GLYCOL 3350 PO, Take 17 granules by mouth Every Night., Disp: , Rfl:   •  simvastatin (ZOCOR) 20 MG tablet, Take 20 mg by mouth  Every Night., Disp: , Rfl:   •  tamsulosin (FLOMAX) 0.4 MG capsule 24 hr capsule, Take 1 capsule by mouth Every Night., Disp: , Rfl:   •  tiZANidine (ZANAFLEX) 4 MG tablet, Take 4 mg by mouth 3 (Three) Times a Day As Needed for muscle spasms., Disp: , Rfl:   •  warfarin (COUMADIN) 2 MG tablet, Take 2 mg by mouth Daily., Disp: , Rfl:   •  warfarin (COUMADIN) 4 MG tablet, Take 4 mg by mouth Daily., Disp: , Rfl:   •  warfarin (COUMADIN) 5 MG tablet, Take 5 mg by mouth Daily., Disp: , Rfl:   •  losartan (COZAAR) 25 MG tablet, Take 25 mg by mouth Daily., Disp: , Rfl:     Allergies   Allergen Reactions   • Amiodarone    • Oxycontin [Oxycodone Hcl] Other (See Comments)     Can not urinate.   • Penicillins Hives     Allergic to All cillins. Amoxacillin.   • Vioxx [Rofecoxib]      Social History   Substance Use Topics   • Smoking status: Never Smoker   • Smokeless tobacco: Never Used   • Alcohol use No     Review of Systems   Constitution: Negative for chills, fatigue, fever, weakness, night sweats, unexpected weight change and weight loss.   HENT: Negative for congestion and sore throat.    Cardiovascular: Positive for dyspnea on exertion and leg swelling. Negative for chest pain, claudication, irregular heartbeat, near-syncope, orthopnea, palpitations, paroxysmal nocturnal dyspnea and syncope.   Respiratory: Positive for shortness of breath. Negative for cough, hemoptysis and wheezing.    Endocrine: Negative for cold intolerance, heat intolerance, polydipsia and polyuria.   Hematologic/Lymphatic: Negative for bleeding problem. Does not bruise/bleed easily.   Gastrointestinal: Negative for abdominal pain, hematemesis, hematochezia, melena, nausea and vomiting.   Genitourinary: Negative for bladder incontinence, dysuria and hematuria.   Neurological: Negative for dizziness, headaches, loss of balance, numbness, paresthesias and seizures.       ECG 12 Lead  Date/Time: 1/30/2018 5:09 PM  Performed by: RANDOLPH BILLS  "ERIN  Authorized by: RANDOLPH BILLS   Comparison: compared with previous ECG from 7/31/2017  Similar to previous ECG  Rhythm: sinus rhythm  Rate: normal  Conduction: conduction normal  ST Segments: ST segments normal  T Waves: T waves normal  QRS axis: left  Other: no other findings  Clinical impression: non-specific ECG             Objective:     Physical Exam   Constitutional: He is oriented to person, place, and time. He appears well-developed and well-nourished. No distress.   HENT:   Head: Normocephalic and atraumatic.   Mouth/Throat: Oropharynx is clear and moist.   Eyes: EOM are normal. Pupils are equal, round, and reactive to light.   Neck: Normal range of motion. Neck supple. No JVD present. No thyromegaly present.   Cardiovascular: Normal rate, regular rhythm, S1 normal, S2 normal, normal heart sounds and intact distal pulses.  Exam reveals no gallop and no friction rub.    No murmur heard.  Pulmonary/Chest: Effort normal and breath sounds normal.   Abdominal: Soft. Bowel sounds are normal. He exhibits no distension. There is no tenderness.   Musculoskeletal: Normal range of motion. He exhibits edema.   Neurological: He is alert and oriented to person, place, and time. No cranial nerve deficit.   Skin: Skin is warm and dry. No rash noted. No cyanosis or erythema. Nails show no clubbing.   Psychiatric: He has a normal mood and affect.   Nursing note and vitals reviewed.    /80 (BP Location: Right arm, Patient Position: Sitting)  Pulse 74  Ht 188 cm (74\")  Wt 132 kg (290 lb)  SpO2 96%  BMI 37.23 kg/m2    Data/Lab Review:     Did review the patient's most recent pacemaker interrogation from 12/5/17: In summary, the patient's parameters are all appropriate however the patient did receive ATP ×2 and had one shock delivered for what appeared to be potentially SVT.    I reviewed the patient's most recent [place] report from 1/3/18: At that time, everything seemed to be appropriate and the " patient was at low risk for worsening heart failure.      Assessment:          Diagnosis Plan   1. Chronic systolic congestive heart failure     2. PAF (paroxysmal atrial fibrillation)  ECG 12 Lead   3. Essential hypertension            Plan:       1.  Chronic systolic congestive heart failure/nonischemic cardiomyopathy: The patient continues to be reasonably stable at this time.  He continues to have New York Heart Association class II-III symptoms.  On examination today he appears to be euvolemic.  Continue current medical therapies and follow-up at six-month intervals.  The patient is also enrolled in [employer], which would continue to follow.      2.  Essential hypertension: Blood pressure remains well-controlled at this time.  Continue current medications.      3.  Paroxysmal atrial fibrillation and paroxysmal atrial tachycardia: The patient continues to have some evidence of atrial arrhythmias and has had ATP on 2 occasions and one shock delivered for what appears to be SVT prior to his last pacemaker interrogation.  Dr. Chavez had previously reprogram the device and recommended amiodarone.  The patient has previously had an intolerance to amiodarone.  The patient has not been shocked in quite some time.  We will continue to monitor this closely.  I have asked the patient to call or return if he should experience more shocks.      Follow-up: 6 months unless needed sooner.      EMR Dragon/Transcription disclaimer: Much of this encounter note is an electronic transcription/translation of spoken language to printed text. The electronic translation of spoken language may permit erroneous, or at times, nonsensical words or phrases to be inadvertently transcribed; although I have reviewed the note for such errors, some may still exist.

## 2018-02-05 ENCOUNTER — OFFICE VISIT (OUTPATIENT)
Dept: PODIATRY | Facility: CLINIC | Age: 73
End: 2018-02-05

## 2018-02-05 ENCOUNTER — LAB (OUTPATIENT)
Dept: LAB | Facility: HOSPITAL | Age: 73
End: 2018-02-05

## 2018-02-05 VITALS
OXYGEN SATURATION: 98 % | HEART RATE: 76 BPM | BODY MASS INDEX: 37.22 KG/M2 | HEIGHT: 74 IN | DIASTOLIC BLOOD PRESSURE: 86 MMHG | SYSTOLIC BLOOD PRESSURE: 124 MMHG | WEIGHT: 290 LBS

## 2018-02-05 DIAGNOSIS — Z79.01 LONG-TERM (CURRENT) USE OF ANTICOAGULANTS: ICD-10-CM

## 2018-02-05 DIAGNOSIS — Z79.01 ANTICOAGULANT LONG-TERM USE: ICD-10-CM

## 2018-02-05 DIAGNOSIS — B35.1 ONYCHOMYCOSIS: Primary | ICD-10-CM

## 2018-02-05 DIAGNOSIS — G62.9 NEUROPATHY: ICD-10-CM

## 2018-02-05 LAB
INR PPP: 2 (ref 0.91–1.09)
PROTHROMBIN TIME: 24.6 SECONDS (ref 10–13.8)

## 2018-02-05 PROCEDURE — 99213 OFFICE O/P EST LOW 20 MIN: CPT | Performed by: PODIATRIST

## 2018-02-05 PROCEDURE — 85610 PROTHROMBIN TIME: CPT | Performed by: INTERNAL MEDICINE

## 2018-02-05 PROCEDURE — 11721 DEBRIDE NAIL 6 OR MORE: CPT | Performed by: PODIATRIST

## 2018-02-05 NOTE — PROGRESS NOTES
[facility] - PODIATRY    Today's Date: 02/05/18    Patient Name: Jonh Peacock  MRN: [id number]  CSN: [id number]  PCP: Mir Avelar MD  Referring Provider: No ref. provider found    SUBJECTIVE     Chief Complaint   Patient presents with   • Left Foot - Follow-up, Pain     Patient states he is here for nail care. He is complaining of bilateral heel pain while walking in the morning. 5/10 on a pain scale. He describes the pain as lightening and pins and needles sticking in. PCP Dr. Ric Rehman 01/02/2018   • Right Foot - Follow-up, Pain       HPI: Jonh Peacock, a 72 y.o.male, comes to clinic as a(n) established patient complaining of painful fungal toenails. Pt with h/o Anxiety, Arrhythmia, Cardiomyopathy, CHF, Depression, previous DVT, HTN, Obesity, Anticoagulation with Warfarin. Denies diabetes. Relates that his nails are thick and he is unable to trim them himelf and routinely his wife will trim them but does not feel comfortable trimming them due to being on warfarin. Also relates that he has a limb length discrepancy which he has a lift in his left foot and custom inserts for both feet. Admits pain at 5/10 level and described as shooting, pins/needles and tingling. Relates that he typically gets around in his motorized scooter. Denies any constitutional symptoms. No other pedal complaints at this time.    Past Medical History:   Diagnosis Date   • Anemia 1/30/2017   • Anxiety 1/30/2017   • Arrhythmia 1/30/2017   • Cardiomyopathy 1/30/2017   • CHF (congestive heart failure) 1/30/2017   • Chronic pain 1/30/2017   • Depression 1/30/2017   • DVT (deep venous thrombosis)    • Gastritis 1/30/2017   • Hyperlipidemia 1/30/2017   • Hypertension 1/30/2017   • Low back pain 1/30/2017   • Neuropathy 1/30/2017   • Obesity 1/30/2017   • Status post placement of cardiac pacemaker 1/30/2017     Past Surgical History:   Procedure Laterality Date   • BACK SURGERY  2002    BACK FUSION   • BACK  SURGERY  2007   • COLON RESECTION WITH COLOSTOMY  03/09/2010   • FOOT SURGERY Bilateral 2005   • JOINT REPLACEMENT Right 2002    KNEE   • PACEMAKER IMPLANTATION  2012   • TOTAL HIP ARTHROPLASTY Right 2004   • TOTAL HIP ARTHROPLASTY Left 12/2008     Family History   Problem Relation Age of Onset   • Heart disease Mother    • Heart disease Brother      Social History     Social History   • Marital status:      Spouse name: N/A   • Number of children: N/A   • Years of education: N/A     Occupational History   • Not on file.     Social History Main Topics   • Smoking status: Never Smoker   • Smokeless tobacco: Never Used   • Alcohol use No   • Drug use: No   • Sexual activity: Defer     Other Topics Concern   • Not on file     Social History Narrative     Allergies   Allergen Reactions   • Amiodarone    • Oxycontin [Oxycodone Hcl] Other (See Comments)     Can not urinate.   • Penicillins Hives     Allergic to All cillins. Amoxacillin.   • Vioxx [Rofecoxib]      Current Outpatient Prescriptions   Medication Sig Dispense Refill   • Buprenorphine (BUTRANS) 10 MCG/HR patch weekly Place 10 mcg on the skin Every 7 (Seven) Days.     • CHONDROITIN SULFATE A PO Take 600 mg by mouth 2 (Two) Times a Day.     • diazePAM (VALIUM) 2 MG tablet Take 2 mg by mouth Daily.     • furosemide (LASIX) 20 MG tablet Take 20 mg by mouth Daily.     • furosemide (LASIX) 40 MG tablet Take 40 mg by mouth Daily. TAKE WITH 20 MG TABLET FOR 60 PER MG PER DAY     • gabapentin (NEURONTIN) 600 MG tablet Take 600 mg by mouth 3 (Three) Times a Day.     • HYDROcodone-acetaminophen (NORCO) 7.5-325 MG per tablet Take 1 tablet by mouth Every 6 (Six) Hours As Needed for Moderate Pain .     • losartan (COZAAR) 25 MG tablet Take 25 mg by mouth Daily.     • metoprolol succinate XL (TOPROL-XL) 100 MG 24 hr tablet Take 100 mg by mouth Daily.     • Multiple Vitamins-Minerals (MULTIVITAMIN ADULT PO) Take  by mouth Daily.     • pantoprazole (PROTONIX) 40 MG EC  tablet Take 40 mg by mouth Daily.     • POLYETHYLENE GLYCOL 3350 PO Take 17 granules by mouth Every Night.     • simvastatin (ZOCOR) 20 MG tablet Take 20 mg by mouth Every Night.     • tamsulosin (FLOMAX) 0.4 MG capsule 24 hr capsule Take 1 capsule by mouth Every Night.     • warfarin (COUMADIN) 2 MG tablet Take 2 mg by mouth Daily.     • warfarin (COUMADIN) 4 MG tablet Take 4 mg by mouth Daily.     • warfarin (COUMADIN) 5 MG tablet Take 5 mg by mouth Daily.     • Glucosamine 750 MG tablet Take 750 mg by mouth 2 (Two) Times a Day.     • tiZANidine (ZANAFLEX) 4 MG tablet Take 4 mg by mouth 3 (Three) Times a Day As Needed for muscle spasms.       No current facility-administered medications for this visit.      Review of Systems   Constitutional: Negative for chills and fever.   HENT: Negative for congestion.    Respiratory: Negative for shortness of breath.    Cardiovascular: Positive for leg swelling. Negative for chest pain.   Gastrointestinal: Negative for constipation, diarrhea, nausea and vomiting.   Musculoskeletal:        Foot pain   Skin: Negative for wound.        Thick Toenails   Neurological: Negative for numbness.       OBJECTIVE     Vitals:    02/05/18 1500   BP: 124/86   Pulse: 76   SpO2: 98%       PHYSICAL EXAM  GEN:   A&Ox3, NAD. Pt presents to clinic in motorized scooter and wearing Casual Shoes with custom inserts, left shoe has lift.      NEURO:   Protective sensation intact to 10/10 sites Right foot, 10/10 site Left foot using [place] monofilament  Light touch sensation diminished  No Tinel's or V[place] sign.    VASC:  Skin temperature Warm to Warm proximal to distal carlotta  DP pulses 1/4 Right, 1/4 Left  PT pulses 1/4 Right, 1/4 Left  CFT 4 sec carlotta  Pedal hair growth absent  1+ pitting edema noted carlotta  Varicosities absent carlotta    MUSC/SKEL:  Muscle Strength Right foot Dorsiflexors 5/5, Plantarflexors 5/5, Evertors 5/5, Invertors 5/5  Muscle Strength Left foot Dorsiflexors 5/5, Plantarflexors  5/5, Evertors 5/5, Invertors 5/5  ROM of the 1st MTP is full without pain or crepitus  ROM of the MTJ is full without pain or crepitus    ROM of the STJ is full without pain or crepitus    ROM of the ankle joint is full without pain or crepitus    POP of nail plates  Planus foot type with decreased arch height and abduction of forefoot  Right LE longer than Left LE     DERM:  Pedal nails x10 are thickened, elongated and discolored with presence of subunugual debris  Webspaces are clean, dry, intact  Skin is normal in turgor and texture with no open wounds or sores appreciated.  Stable cicatrix noted to dorsal 1st MTPJ carlotta and dorsal digits      RADIOLOGY/NUCLEAR:  No results found.    LABORATORY/CULTURE RESULTS:      PATHOLOGY RESULTS:       ASSESSMENT/PLAN     Jonh was seen today for follow-up, pain, follow-up and pain.    Diagnoses and all orders for this visit:    Onychomycosis    Anticoagulant long-term use    Neuropathy      Comprehensive lower extremity examination and evaluation was performed.  Discussed findings and treatment plan including risks, benefits, and treatment options with patient in detail. Patient agreed with treatment plan.  After verbal consent obtained, nail(s) x10 debrided of length and thickness with nail nipper without incidence  Patient may maintain nails and calluses at home utilizing emery board or pumice stone between visits as needed  Reviewed at home diabetic foot care including daily foot checks   Continue with custom shoes with and inserts  Defers compression stockings at this time  An After Visit Summary was printed and given to the patient at discharge, including (if requested) any available informative/educational handouts regarding diagnosis, treatment, or medications. All questions were answered to patient/family satisfaction. Should symptoms fail to improve or worsen they agree to call or return to clinic or to go to the Emergency Department. Discussed the importance of  following up with any needed screening tests/labs/specialist appointments and any requested follow-up recommended by me today. Importance of maintaining follow-up discussed and patient accepts that missed appointments can delay diagnosis and potentially lead to worsening of conditions.  Return in about 3 months (around 5/5/2018)., or sooner if acute issues arise.        This document has been electronically signed by Deion Avelar DPM on February 5, 2018 3:33 PM     Please note that portions of this note may have been completed with a voice recognition program. Efforts were made to edit the dictations, but occasionally words are mistranscribed.

## 2018-02-19 ENCOUNTER — LAB (OUTPATIENT)
Dept: LAB | Facility: HOSPITAL | Age: 73
End: 2018-02-19
Attending: EMERGENCY MEDICINE

## 2018-02-19 ENCOUNTER — TRANSCRIBE ORDERS (OUTPATIENT)
Dept: ADMINISTRATIVE | Facility: HOSPITAL | Age: 73
End: 2018-02-19

## 2018-02-19 DIAGNOSIS — Z79.01 LONG-TERM (CURRENT) USE OF ANTICOAGULANTS: Primary | ICD-10-CM

## 2018-02-19 LAB
INR PPP: 2.3 (ref 0.91–1.09)
PROTHROMBIN TIME: 27.9 SECONDS (ref 10–13.8)

## 2018-02-19 PROCEDURE — 85610 PROTHROMBIN TIME: CPT | Performed by: EMERGENCY MEDICINE

## 2018-03-05 ENCOUNTER — LAB (OUTPATIENT)
Dept: LAB | Facility: HOSPITAL | Age: 73
End: 2018-03-05

## 2018-03-05 DIAGNOSIS — Z79.01 LONG-TERM (CURRENT) USE OF ANTICOAGULANTS: ICD-10-CM

## 2018-03-05 LAB
INR PPP: 3.4 (ref 0.91–1.09)
PROTHROMBIN TIME: 40.4 SECONDS (ref 10–13.8)

## 2018-03-05 PROCEDURE — 85610 PROTHROMBIN TIME: CPT | Performed by: INTERNAL MEDICINE

## 2018-03-20 ENCOUNTER — LAB (OUTPATIENT)
Dept: LAB | Facility: HOSPITAL | Age: 73
End: 2018-03-20
Attending: EMERGENCY MEDICINE

## 2018-03-20 ENCOUNTER — TRANSCRIBE ORDERS (OUTPATIENT)
Dept: ADMINISTRATIVE | Facility: HOSPITAL | Age: 73
End: 2018-03-20

## 2018-03-20 DIAGNOSIS — E78.5 HYPERLIPIDEMIA, UNSPECIFIED HYPERLIPIDEMIA TYPE: ICD-10-CM

## 2018-03-20 DIAGNOSIS — Z79.01 LONG-TERM (CURRENT) USE OF ANTICOAGULANTS: ICD-10-CM

## 2018-03-20 DIAGNOSIS — Z79.899 LONG TERM USE OF DRUG: Primary | ICD-10-CM

## 2018-03-20 DIAGNOSIS — I10 ESSENTIAL HYPERTENSION, MALIGNANT: ICD-10-CM

## 2018-03-20 DIAGNOSIS — Z79.899 LONG TERM USE OF DRUG: ICD-10-CM

## 2018-03-20 DIAGNOSIS — Z13.29 THYROID DISORDER SCREENING: ICD-10-CM

## 2018-03-20 LAB
ALBUMIN SERPL-MCNC: 4 G/DL (ref 3.5–5)
ALBUMIN/GLOB SERPL: 1.1 G/DL (ref 1.1–2.5)
ALP SERPL-CCNC: 100 U/L (ref 24–120)
ALT SERPL W P-5'-P-CCNC: 32 U/L (ref 0–54)
ANION GAP SERPL CALCULATED.3IONS-SCNC: 10 MMOL/L (ref 4–13)
ARTICHOKE IGE QN: 117 MG/DL (ref 0–99)
AST SERPL-CCNC: 30 U/L (ref 7–45)
BASOPHILS # BLD AUTO: 0.03 10*3/MM3 (ref 0–0.2)
BASOPHILS NFR BLD AUTO: 0.5 % (ref 0–2)
BILIRUB SERPL-MCNC: 0.6 MG/DL (ref 0.1–1)
BUN BLD-MCNC: 15 MG/DL (ref 5–21)
BUN/CREAT SERPL: 14 (ref 7–25)
CALCIUM SPEC-SCNC: 9.3 MG/DL (ref 8.4–10.4)
CHLORIDE SERPL-SCNC: 99 MMOL/L (ref 98–110)
CHOLEST SERPL-MCNC: 208 MG/DL (ref 130–200)
CO2 SERPL-SCNC: 36 MMOL/L (ref 24–31)
CREAT BLD-MCNC: 1.07 MG/DL (ref 0.5–1.4)
DEPRECATED RDW RBC AUTO: 45.1 FL (ref 40–54)
EOSINOPHIL # BLD AUTO: 0.18 10*3/MM3 (ref 0–0.7)
EOSINOPHIL NFR BLD AUTO: 3.2 % (ref 0–4)
ERYTHROCYTE [DISTWIDTH] IN BLOOD BY AUTOMATED COUNT: 13.4 % (ref 12–15)
GFR SERPL CREATININE-BSD FRML MDRD: 83 ML/MIN/1.73
GLOBULIN UR ELPH-MCNC: 3.5 GM/DL
GLUCOSE BLD-MCNC: 122 MG/DL (ref 70–100)
HCT VFR BLD AUTO: 38.4 % (ref 40–52)
HDLC SERPL-MCNC: 43 MG/DL
HGB BLD-MCNC: 12.8 G/DL (ref 14–18)
IMM GRANULOCYTES # BLD: 0.01 10*3/MM3 (ref 0–0.03)
IMM GRANULOCYTES NFR BLD: 0.2 % (ref 0–5)
INR PPP: 1.87 (ref 0.91–1.09)
INR PPP: 2 (ref 0.91–1.09)
LDLC/HDLC SERPL: 3.15 {RATIO}
LYMPHOCYTES # BLD AUTO: 1.59 10*3/MM3 (ref 0.72–4.86)
LYMPHOCYTES NFR BLD AUTO: 28.1 % (ref 15–45)
MCH RBC QN AUTO: 30.5 PG (ref 28–32)
MCHC RBC AUTO-ENTMCNC: 33.3 G/DL (ref 33–36)
MCV RBC AUTO: 91.4 FL (ref 82–95)
MONOCYTES # BLD AUTO: 0.45 10*3/MM3 (ref 0.19–1.3)
MONOCYTES NFR BLD AUTO: 8 % (ref 4–12)
NEUTROPHILS # BLD AUTO: 3.4 10*3/MM3 (ref 1.87–8.4)
NEUTROPHILS NFR BLD AUTO: 60 % (ref 39–78)
NRBC BLD MANUAL-RTO: 0 /100 WBC (ref 0–0)
PLATELET # BLD AUTO: 162 10*3/MM3 (ref 130–400)
PMV BLD AUTO: 11.7 FL (ref 6–12)
POTASSIUM BLD-SCNC: 3.7 MMOL/L (ref 3.5–5.3)
PROT SERPL-MCNC: 7.5 G/DL (ref 6.3–8.7)
PROTHROMBIN TIME: 22.2 SECONDS (ref 11.9–14.6)
PROTHROMBIN TIME: 24.1 SECONDS (ref 10–13.8)
RBC # BLD AUTO: 4.2 10*6/MM3 (ref 4.8–5.9)
SODIUM BLD-SCNC: 145 MMOL/L (ref 135–145)
TRIGL SERPL-MCNC: 147 MG/DL (ref 0–149)
TSH SERPL DL<=0.05 MIU/L-ACNC: 1.06 MIU/ML (ref 0.47–4.68)
WBC NRBC COR # BLD: 5.66 10*3/MM3 (ref 4.8–10.8)

## 2018-03-20 PROCEDURE — 80053 COMPREHEN METABOLIC PANEL: CPT | Performed by: EMERGENCY MEDICINE

## 2018-03-20 PROCEDURE — 36415 COLL VENOUS BLD VENIPUNCTURE: CPT

## 2018-03-20 PROCEDURE — 85610 PROTHROMBIN TIME: CPT | Performed by: EMERGENCY MEDICINE

## 2018-03-20 PROCEDURE — 84443 ASSAY THYROID STIM HORMONE: CPT | Performed by: EMERGENCY MEDICINE

## 2018-03-20 PROCEDURE — 80061 LIPID PANEL: CPT | Performed by: EMERGENCY MEDICINE

## 2018-03-20 PROCEDURE — 85025 COMPLETE CBC W/AUTO DIFF WBC: CPT | Performed by: EMERGENCY MEDICINE

## 2018-03-22 ENCOUNTER — CLINICAL SUPPORT NO REQUIREMENTS (OUTPATIENT)
Dept: CARDIOLOGY | Facility: CLINIC | Age: 73
End: 2018-03-22

## 2018-03-22 DIAGNOSIS — Z95.810 AICD (AUTOMATIC CARDIOVERTER/DEFIBRILLATOR) PRESENT: Primary | ICD-10-CM

## 2018-03-22 DIAGNOSIS — I50.22 CHRONIC SYSTOLIC CONGESTIVE HEART FAILURE (HCC): ICD-10-CM

## 2018-03-22 DIAGNOSIS — I42.8 NON-ISCHEMIC CARDIOMYOPATHY (HCC): ICD-10-CM

## 2018-03-22 NOTE — PROGRESS NOTES
Dual Chamber AICD Evaluation Report  IN OFFICE    March 22, 2018    Primary Cardiologist: Jocelyn  : Medtronic Model: Protecta XT DR  Implant date: 09/27/2012    Reason for evaluation: routine  Indication for AICD: chronic systolic congestive heart failure and nonischemic cardiomyopathy    Measurements  Atrial sensing - P wave: 2 mV  Atrial threshold: 0.375 V @ 0.4 ms  Atrial lead impedance: 399 ohms  Ventricular sensing - R wave: 6 mV  Ventricular threshold: 1 V @ 0.4 ms  Ventricular lead impedance:  361 ohms  Shock impedance:  RV- 40 ohms   SVC- 46 ohms      Diagnostic Data  Atrial paced: 30.2 %  Ventricular paced: 0.1 %    Other:  Since Jan 2, 2018:    23 episodes of recorded as SVT-ST:  Max rate 207 bpm, longest duration 7 minutes, 12 seconds.  2 episodes of SVT-Wavelet:  Max rate 194 bpm, longest duration 1 minute, 40 seconds.    2 episodes of VT-Mon:  Max ventricular rate 188 bpm, longest duration 52 seconds.      Battery status: satisfactory  2.87V      Final Parameters  Mode: AAIR+  Lower rate: 60 bpm Upper rate: 130 bpm  AV Delay: Paced- 180 ms    Sensed- 150 ms     Atrial - Amplitude: 1.5 V Pulse width: 0.4 ms Sensitivity: 0.6 mV   Ventricular - Amplitude: 2 V Pulse width: 0.4 ms Sensitivity: 0.3 mV   Changes made: No changes made.  Report taken by Brenda Mccauley Rep, to Dr. Banks and Dr. Chavez for review.  Per Dr. Jocelyn Mccauley will be increasing patient's beta blocker and patient needs to follow up with Dr. Chavez in one month.  Patient notified of need to make appointment with Dr. Chavez.   Conclusions: normal AICD function, stable pacing and sensing thresholds and adequate battery reserve    Follow up: 1 month with Dr. Chavez, 3 months via [facility]     Interrogation performed by Garrick Keys RN, [employer] Device Rep

## 2018-03-23 ENCOUNTER — TELEPHONE (OUTPATIENT)
Dept: CARDIOLOGY | Facility: CLINIC | Age: 73
End: 2018-03-23

## 2018-03-23 NOTE — TELEPHONE ENCOUNTER
RN called and spoke to patient's wife, as patient was currently unavailable to speak.  RN informed patient's wife that patient had some elevated heart rate episodes on his AICD check this week and that Dr. Chavez would like to see him in one month.  Dr. Chavez's office number provided ([phone]) so that patient can call and make appointment at a time that is most convenient for his schedule.  RN also explained that Dr. Banks's medical assistant would be contacting patient soon about medication changes.  Patient instructed to call [facility] with any questions.  Wife verbalized understanding.

## 2018-03-27 ENCOUNTER — CLINICAL SUPPORT NO REQUIREMENTS (OUTPATIENT)
Dept: CARDIOLOGY | Facility: CLINIC | Age: 73
End: 2018-03-27

## 2018-03-27 DIAGNOSIS — I50.22 CHRONIC SYSTOLIC CONGESTIVE HEART FAILURE (HCC): ICD-10-CM

## 2018-03-27 DIAGNOSIS — Z95.810 AICD (AUTOMATIC CARDIOVERTER/DEFIBRILLATOR) PRESENT: Primary | ICD-10-CM

## 2018-03-27 PROCEDURE — 93297 REM INTERROG DEV EVAL ICPMS: CPT | Performed by: INTERNAL MEDICINE

## 2018-03-29 DIAGNOSIS — I48.0 PAROXYSMAL ATRIAL FIBRILLATION (HCC): Primary | ICD-10-CM

## 2018-03-29 NOTE — PROGRESS NOTES
I have reviewed this patient's medications.  Previously, Dr. Chavez had recommended potentially starting this patient on amiodarone however he does have an allergy to this.  He is on the max dose of beta blocker therapy at this time.  I will place the patient on Cardizem 30 mg every 6 hours to see if this may help with heart rate control.  The patient also follows up with Dr. Chavez very soon and medications can be adjusted further at that time.

## 2018-03-30 ENCOUNTER — TELEPHONE (OUTPATIENT)
Dept: CARDIOLOGY | Facility: CLINIC | Age: 73
End: 2018-03-30

## 2018-03-30 NOTE — PROGRESS NOTES
Spoke with patients wife and told her Dr Banks called a prescription for cardizem in to pharmacy. She verbalized understanding.

## 2018-03-30 NOTE — TELEPHONE ENCOUNTER
Spoke with patients wife and told her Dr Banks called him in a prescription for cardizem. She verbalized understanding.

## 2018-04-03 ENCOUNTER — TELEPHONE (OUTPATIENT)
Dept: CARDIOLOGY | Facility: CLINIC | Age: 73
End: 2018-04-03

## 2018-04-03 ENCOUNTER — LAB (OUTPATIENT)
Dept: LAB | Facility: HOSPITAL | Age: 73
End: 2018-04-03
Attending: EMERGENCY MEDICINE

## 2018-04-03 DIAGNOSIS — Z79.01 LONG-TERM (CURRENT) USE OF ANTICOAGULANTS: ICD-10-CM

## 2018-04-03 LAB
INR PPP: 2.6 (ref 0.91–1.09)
PROTHROMBIN TIME: 30.9 SECONDS (ref 10–13.8)

## 2018-04-03 PROCEDURE — 85610 PROTHROMBIN TIME: CPT | Performed by: EMERGENCY MEDICINE

## 2018-04-04 DIAGNOSIS — I48.0 PAROXYSMAL ATRIAL FIBRILLATION (HCC): ICD-10-CM

## 2018-04-17 ENCOUNTER — LAB (OUTPATIENT)
Dept: LAB | Facility: HOSPITAL | Age: 73
End: 2018-04-17
Attending: EMERGENCY MEDICINE

## 2018-04-17 DIAGNOSIS — Z79.01 LONG-TERM (CURRENT) USE OF ANTICOAGULANTS: ICD-10-CM

## 2018-04-17 LAB
INR PPP: 2.8 (ref 0.91–1.09)
PROTHROMBIN TIME: 33.2 SECONDS (ref 10–13.8)

## 2018-04-17 PROCEDURE — 85610 PROTHROMBIN TIME: CPT | Performed by: EMERGENCY MEDICINE

## 2018-04-30 ENCOUNTER — LAB (OUTPATIENT)
Dept: LAB | Facility: HOSPITAL | Age: 73
End: 2018-04-30
Attending: EMERGENCY MEDICINE

## 2018-04-30 DIAGNOSIS — Z79.01 LONG-TERM (CURRENT) USE OF ANTICOAGULANTS: ICD-10-CM

## 2018-04-30 PROCEDURE — 85610 PROTHROMBIN TIME: CPT | Performed by: EMERGENCY MEDICINE

## 2018-05-01 LAB
INR PPP: 2.5 (ref 0.91–1.09)
PROTHROMBIN TIME: 29.6 SECONDS (ref 10–13.8)

## 2018-05-07 ENCOUNTER — OFFICE VISIT (OUTPATIENT)
Dept: PODIATRY | Facility: CLINIC | Age: 73
End: 2018-05-07

## 2018-05-07 VITALS
SYSTOLIC BLOOD PRESSURE: 118 MMHG | HEART RATE: 65 BPM | BODY MASS INDEX: 36.57 KG/M2 | OXYGEN SATURATION: 91 % | WEIGHT: 285 LBS | HEIGHT: 74 IN | DIASTOLIC BLOOD PRESSURE: 80 MMHG

## 2018-05-07 DIAGNOSIS — Z79.01 ANTICOAGULANT LONG-TERM USE: ICD-10-CM

## 2018-05-07 DIAGNOSIS — B35.1 ONYCHOMYCOSIS: Primary | ICD-10-CM

## 2018-05-07 DIAGNOSIS — G62.9 NEUROPATHY: ICD-10-CM

## 2018-05-07 PROCEDURE — 11721 DEBRIDE NAIL 6 OR MORE: CPT | Performed by: PODIATRIST

## 2018-05-07 NOTE — PROGRESS NOTES
[facility] - PODIATRY    Today's Date: 05/07/18    Patient Name: Jonh Peacock  MRN: [id number]  CSN: [id number]  PCP: Ric Mustafa MD  Referring Provider: No ref. provider found    SUBJECTIVE     Chief Complaint   Patient presents with   • Left Foot - Follow-up, Pain     PT c/o occasional bilateral pain in feet, swelling, and occasional bilateral numbness. Pain scale: 3/10. PCP: Dr. Ric Mustafa    • Right Foot - Follow-up, Pain       HPI: Jonh Peacock, a 72 y.o.male, comes to clinic as a(n) established patient complaining of painful fungal toenails. Pt with h/o Anxiety, Arrhythmia, Cardiomyopathy, CHF, Depression, previous DVT, HTN, Obesity, Anticoagulation with Warfarin. Denies diabetes. Relates that his nails are thick and he is unable to cut them himelf and routinely his wife will cut them but does not feel comfortable cutting them due to being on warfarin. Also relates that he has a limb length discrepancy which he has a lift in his left foot and custom inserts for both feet. Admits pain at 3/10 level and described as shooting, pins/needles and tingling. Relates that he typically gets around in his motorized scooter. Notes improvement with regular visits. Denies any constitutional symptoms. No other pedal complaints at this time.    Past Medical History:   Diagnosis Date   • Anemia 1/30/2017   • Anxiety 1/30/2017   • Arrhythmia 1/30/2017   • Cardiomyopathy 1/30/2017   • CHF (congestive heart failure) 1/30/2017   • Chronic pain 1/30/2017   • Depression 1/30/2017   • DVT (deep venous thrombosis)    • Gastritis 1/30/2017   • Hyperlipidemia 1/30/2017   • Hypertension 1/30/2017   • Low back pain 1/30/2017   • Neuropathy 1/30/2017   • Obesity 1/30/2017   • Status post placement of cardiac pacemaker 1/30/2017     Past Surgical History:   Procedure Laterality Date   • BACK SURGERY  2002    BACK FUSION   • BACK SURGERY  2007   • COLON RESECTION WITH COLOSTOMY  03/09/2010   • FOOT  SURGERY Bilateral 2005   • JOINT REPLACEMENT Right 2002    KNEE   • PACEMAKER IMPLANTATION  2012   • TOTAL HIP ARTHROPLASTY Right 2004   • TOTAL HIP ARTHROPLASTY Left 12/2008     Family History   Problem Relation Age of Onset   • Heart disease Mother    • Heart disease Brother      Social History     Social History   • Marital status:      Spouse name: N/A   • Number of children: N/A   • Years of education: N/A     Occupational History   • Not on file.     Social History Main Topics   • Smoking status: Never Smoker   • Smokeless tobacco: Never Used   • Alcohol use No   • Drug use: No   • Sexual activity: Defer     Other Topics Concern   • Not on file     Social History Narrative   • No narrative on file     Allergies   Allergen Reactions   • Amiodarone    • Oxycontin [Oxycodone Hcl] Other (See Comments)     Can not urinate.   • Penicillins Hives     Allergic to All cillins. Amoxacillin.   • Vioxx [Rofecoxib]      Current Outpatient Prescriptions   Medication Sig Dispense Refill   • Buprenorphine (BUTRANS) 10 MCG/HR patch weekly Place 10 mcg on the skin Every 7 (Seven) Days.     • CHONDROITIN SULFATE A PO Take 600 mg by mouth 2 (Two) Times a Day.     • diazePAM (VALIUM) 2 MG tablet Take 2 mg by mouth Daily.     • diltiaZEM (CARDIZEM) 30 MG tablet Take 1 tablet by mouth 4 (Four) Times a Day. 360 tablet 3   • furosemide (LASIX) 20 MG tablet Take 20 mg by mouth Daily.     • furosemide (LASIX) 40 MG tablet Take 40 mg by mouth Daily. TAKE WITH 20 MG TABLET FOR 60 PER MG PER DAY     • gabapentin (NEURONTIN) 600 MG tablet Take 600 mg by mouth 3 (Three) Times a Day.     • Glucosamine 750 MG tablet Take 750 mg by mouth 2 (Two) Times a Day.     • HYDROcodone-acetaminophen (NORCO) 7.5-325 MG per tablet Take 1 tablet by mouth Every 6 (Six) Hours As Needed for Moderate Pain .     • losartan (COZAAR) 25 MG tablet Take 25 mg by mouth Daily.     • metoprolol succinate XL (TOPROL-XL) 100 MG 24 hr tablet Take 100 mg by mouth  Daily.     • Multiple Vitamins-Minerals (MULTIVITAMIN ADULT PO) Take  by mouth Daily.     • pantoprazole (PROTONIX) 40 MG EC tablet Take 40 mg by mouth Daily.     • POLYETHYLENE GLYCOL 3350 PO Take 17 granules by mouth Every Night.     • simvastatin (ZOCOR) 20 MG tablet Take 20 mg by mouth Every Night.     • tamsulosin (FLOMAX) 0.4 MG capsule 24 hr capsule Take 1 capsule by mouth Every Night.     • tiZANidine (ZANAFLEX) 4 MG tablet Take 4 mg by mouth 3 (Three) Times a Day As Needed for muscle spasms.     • warfarin (COUMADIN) 2 MG tablet Take 2 mg by mouth Daily.     • warfarin (COUMADIN) 4 MG tablet Take 4 mg by mouth Daily.     • warfarin (COUMADIN) 5 MG tablet Take 5 mg by mouth Daily.       No current facility-administered medications for this visit.      Review of Systems   Constitutional: Negative for chills and fever.   HENT: Negative for congestion.    Respiratory: Negative for shortness of breath.    Cardiovascular: Positive for leg swelling. Negative for chest pain.   Gastrointestinal: Negative for constipation, diarrhea, nausea and vomiting.   Musculoskeletal:        Foot pain   Skin: Negative for wound.        Thick Toenails   Neurological: Negative for numbness.       OBJECTIVE     Vitals:    05/07/18 1550   BP: 118/80   Pulse: 65   SpO2: 91%       PHYSICAL EXAM  GEN:   A&Ox3, NAD. Pt presents to clinic in motorized scooter and wearing Casual Shoes with custom inserts, left shoe has lift.      NEURO:   Protective sensation intact to 10/10 sites Right foot, 10/10 site Left foot using Gasquet-Akhil monofilament  Light touch sensation diminished  No Tinel's or Villeux sign.    VASC:  Skin temperature Warm to Warm proximal to distal carlotta  DP pulses 1/4 Right, 1/4 Left  PT pulses 1/4 Right, 1/4 Left  CFT 4 sec carlotta  Pedal hair growth absent  1+ pitting edema noted carlotta  Varicosities absent carlotta    MUSC/SKEL:  Muscle Strength Right foot Dorsiflexors 5/5, Plantarflexors 5/5, Evertors 5/5, Invertors 5/5  Muscle  Strength Left foot Dorsiflexors 5/5, Plantarflexors 5/5, Evertors 5/5, Invertors 5/5  ROM of the 1st MTP is full without pain or crepitus  ROM of the MTJ is full without pain or crepitus    ROM of the STJ is full without pain or crepitus    ROM of the ankle joint is full without pain or crepitus    POP of nail plates  Planus foot type with decreased arch height and abduction of forefoot  Right LE longer than Left LE     DERM:  Pedal nails x10 are thickened, elongated and discolored with presence of subunugual debris  Webspaces are clean, dry, intact  Skin is normal in turgor and texture with no open wounds or sores appreciated.  Stable cicatrix noted to dorsal 1st MTPJ carlotta and dorsal digits      RADIOLOGY/NUCLEAR:  No results found.    LABORATORY/CULTURE RESULTS:      PATHOLOGY RESULTS:       ASSESSMENT/PLAN     Jonh was seen today for follow-up, pain, follow-up and pain.    Diagnoses and all orders for this visit:    Onychomycosis    Anticoagulant long-term use    Neuropathy      Comprehensive lower extremity examination and evaluation was performed.  Discussed findings and treatment plan including risks, benefits, and treatment options with patient in detail. Patient agreed with treatment plan.  After verbal consent obtained, nail(s) x10 debrided of length and thickness with nail nipper without incidence  Patient may maintain nails and calluses at home utilizing emery board or pumice stone between visits as needed  Reviewed at home diabetic foot care including daily foot checks   Continue with custom shoes with and inserts  Defers compression stockings at this time  An After Visit Summary was printed and given to the patient at discharge, including (if requested) any available informative/educational handouts regarding diagnosis, treatment, or medications. All questions were answered to patient/family satisfaction. Should symptoms fail to improve or worsen they agree to call or return to clinic or to go to the  Emergency Department. Discussed the importance of following up with any needed screening tests/labs/specialist appointments and any requested follow-up recommended by me today. Importance of maintaining follow-up discussed and patient accepts that missed appointments can delay diagnosis and potentially lead to worsening of conditions.  Return in about 3 months (around 8/7/2018)., or sooner if acute issues arise.        This document has been electronically signed by Deion Avelar DPM on May 7, 2018 4:30 PM     Please note that portions of this note may have been completed with a voice recognition program. Efforts were made to edit the dictations, but occasionally words are mistranscribed.

## 2018-05-08 ENCOUNTER — CLINICAL SUPPORT (OUTPATIENT)
Dept: CARDIOLOGY | Facility: CLINIC | Age: 73
End: 2018-05-08

## 2018-05-08 DIAGNOSIS — Z95.810 AICD (AUTOMATIC CARDIOVERTER/DEFIBRILLATOR) PRESENT: Primary | ICD-10-CM

## 2018-05-08 DIAGNOSIS — I50.22 CHRONIC SYSTOLIC CONGESTIVE HEART FAILURE (HCC): ICD-10-CM

## 2018-05-08 PROCEDURE — 93297 REM INTERROG DEV EVAL ICPMS: CPT | Performed by: INTERNAL MEDICINE

## 2018-05-08 NOTE — PROGRESS NOTES
[place] Report    May 8, 2018    Primary Cardiologist: Jocelyn    Reason for evaluation: Heart Failure Management  Indication for AICD: chronic systolic congestive heart failure    Findings:  Monthly HFMR reviewed. Impedance at reference and no recent Optivol  crossing. Optivol at 20 ohms. ICD pt is v paced 0.2% and atrial pacing  46.6%. No episodes of VT/VF, AT/AF or shocks. Pt activity at 1 hr/day  and appears to be baseline. The remaining cardiac compass trends  within normal limits. Weight and blood pressure trends within normal  limits and pt is not reporting any symptoms of HF. HFMR indicates Low  Risk for worsening HF-Recommend routine clinic follow up.    Follow up: 1 month

## 2018-05-21 ENCOUNTER — APPOINTMENT (OUTPATIENT)
Dept: LAB | Facility: HOSPITAL | Age: 73
End: 2018-05-21

## 2018-05-21 ENCOUNTER — LAB (OUTPATIENT)
Dept: LAB | Facility: HOSPITAL | Age: 73
End: 2018-05-21
Attending: EMERGENCY MEDICINE

## 2018-05-21 DIAGNOSIS — Z79.01 LONG-TERM (CURRENT) USE OF ANTICOAGULANTS: ICD-10-CM

## 2018-05-21 LAB
INR PPP: 2.4 (ref 0.91–1.09)
PROTHROMBIN TIME: 28.6 SECONDS (ref 10–13.8)

## 2018-05-21 PROCEDURE — 85610 PROTHROMBIN TIME: CPT | Performed by: EMERGENCY MEDICINE

## 2018-06-08 ENCOUNTER — CLINICAL SUPPORT (OUTPATIENT)
Dept: CARDIOLOGY | Facility: CLINIC | Age: 73
End: 2018-06-08

## 2018-06-08 DIAGNOSIS — I50.22 CHRONIC SYSTOLIC CONGESTIVE HEART FAILURE (HCC): ICD-10-CM

## 2018-06-08 DIAGNOSIS — Z95.810 AICD (AUTOMATIC CARDIOVERTER/DEFIBRILLATOR) PRESENT: Primary | ICD-10-CM

## 2018-06-08 PROCEDURE — 93297 REM INTERROG DEV EVAL ICPMS: CPT | Performed by: INTERNAL MEDICINE

## 2018-06-08 NOTE — PROGRESS NOTES
[place] Report    June 8, 2018    Primary Cardiologist: Jocelyn    Reason for evaluation: Heart Failure Management  Indication for AICD: chronic systolic congestive heart failure    Findings:  Monthly HFMR reviewed. Impedance just above reference and no  recent Optivol crossing. Optivol at 0 ohms. ICD pt is v paced 0.2% and  atrial pacing 39.3%. No episodes of VT/VF, AT/AF or shocks. Pt activity  at 1 hr/day. The remaining cardiac compass trends within normal limits.  Blood pressure trends within normal limits. After starting Cardizem in  March, pt reports increased fatigue and weight gain. Weight is back at  baseline. He has started to take Lasix 7 days a week. HFMR indicates  Low Risk for worsening HF-Recommend routine clinic follow up.    Follow up: 1 month

## 2018-06-11 ENCOUNTER — LAB (OUTPATIENT)
Dept: LAB | Facility: HOSPITAL | Age: 73
End: 2018-06-11
Attending: EMERGENCY MEDICINE

## 2018-06-11 DIAGNOSIS — Z79.01 LONG-TERM (CURRENT) USE OF ANTICOAGULANTS: ICD-10-CM

## 2018-06-11 LAB
INR PPP: 2.5 (ref 0.91–1.09)
PROTHROMBIN TIME: 30.1 SECONDS (ref 10–13.8)

## 2018-06-11 PROCEDURE — 85610 PROTHROMBIN TIME: CPT | Performed by: EMERGENCY MEDICINE

## 2018-06-13 ENCOUNTER — OFFICE VISIT (OUTPATIENT)
Dept: CARDIOLOGY | Facility: CLINIC | Age: 73
End: 2018-06-13

## 2018-06-13 ENCOUNTER — CLINICAL SUPPORT NO REQUIREMENTS (OUTPATIENT)
Dept: CARDIOLOGY | Facility: CLINIC | Age: 73
End: 2018-06-13

## 2018-06-13 VITALS
WEIGHT: 283 LBS | BODY MASS INDEX: 36.32 KG/M2 | DIASTOLIC BLOOD PRESSURE: 62 MMHG | HEIGHT: 74 IN | HEART RATE: 82 BPM | SYSTOLIC BLOOD PRESSURE: 120 MMHG

## 2018-06-13 DIAGNOSIS — I50.22 CHRONIC SYSTOLIC CONGESTIVE HEART FAILURE (HCC): Primary | ICD-10-CM

## 2018-06-13 DIAGNOSIS — I42.8 NON-ISCHEMIC CARDIOMYOPATHY (HCC): ICD-10-CM

## 2018-06-13 DIAGNOSIS — Z95.810 AICD (AUTOMATIC CARDIOVERTER/DEFIBRILLATOR) PRESENT: Primary | ICD-10-CM

## 2018-06-13 DIAGNOSIS — I50.22 CHRONIC SYSTOLIC CONGESTIVE HEART FAILURE (HCC): ICD-10-CM

## 2018-06-13 DIAGNOSIS — I48.0 PAROXYSMAL ATRIAL FIBRILLATION (HCC): ICD-10-CM

## 2018-06-13 DIAGNOSIS — I48.0 PAF (PAROXYSMAL ATRIAL FIBRILLATION) (HCC): ICD-10-CM

## 2018-06-13 DIAGNOSIS — I49.8 VENTRICULAR BIGEMINY: ICD-10-CM

## 2018-06-13 DIAGNOSIS — I10 ESSENTIAL HYPERTENSION: ICD-10-CM

## 2018-06-13 PROCEDURE — 93289 INTERROG DEVICE EVAL HEART: CPT | Performed by: INTERNAL MEDICINE

## 2018-06-13 PROCEDURE — 93000 ELECTROCARDIOGRAM COMPLETE: CPT | Performed by: NURSE PRACTITIONER

## 2018-06-13 PROCEDURE — 99214 OFFICE O/P EST MOD 30 MIN: CPT | Performed by: NURSE PRACTITIONER

## 2018-06-13 NOTE — PATIENT INSTRUCTIONS
"Heart-Healthy Eating Plan  Heart-healthy meal planning includes:  · Limiting unhealthy fats.  · Increasing healthy fats.  · Making other small dietary changes.  You may need to talk with your doctor or a diet specialist (dietitian) to create an eating plan that is right for you.  What types of fat should I choose?  · Choose healthy fats. These include olive oil and canola oil, flaxseeds, walnuts, almonds, and seeds.  · Eat more omega-3 fats. These include salmon, mackerel, sardines, tuna, flaxseed oil, and ground flaxseeds. Try to eat fish at least twice each week.  · Limit saturated fats.  ¨ Saturated fats are often found in animal products, such as meats, butter, and cream.  ¨ Plant sources of saturated fats include palm oil, palm kernel oil, and coconut oil.  · Avoid foods with partially hydrogenated oils in them. These include stick margarine, some tub margarines, cookies, crackers, and other baked goods. These contain trans fats.  What general guidelines do I need to follow?  · Check food labels carefully. Identify foods with trans fats or high amounts of saturated fat.  · Fill one half of your plate with vegetables and green salads. Eat 4-5 servings of vegetables per day. A serving of vegetables is:  ¨ 1 cup of raw leafy vegetables.  ¨ ½ cup of raw or cooked cut-up vegetables.  ¨ ½ cup of vegetable juice.  · Fill one fourth of your plate with whole grains. Look for the word \"whole\" as the first word in the ingredient list.  · Fill one fourth of your plate with lean protein foods.  · Eat 4-5 servings of fruit per day. A serving of fruit is:  ¨ One medium whole fruit.  ¨ ¼ cup of dried fruit.  ¨ ½ cup of fresh, frozen, or canned fruit.  ¨ ½ cup of 100% fruit juice.  · Eat more foods that contain soluble fiber. These include apples, broccoli, carrots, beans, peas, and barley. Try to get 20-30 g of fiber per day.  · Eat more home-cooked food. Eat less restaurant, buffet, and fast food.  · Limit or avoid " alcohol.  · Limit foods high in starch and sugar.  · Avoid fried foods.  · Avoid frying your food. Try baking, boiling, grilling, or broiling it instead. You can also reduce fat by:  ¨ Removing the skin from poultry.  ¨ Removing all visible fats from meats.  ¨ Skimming the fat off of stews, soups, and gravies before serving them.  ¨ Steaming vegetables in water or broth.  · Lose weight if you are overweight.  · Eat 4-5 servings of nuts, legumes, and seeds per week:  ¨ One serving of dried beans or legumes equals ½ cup after being cooked.  ¨ One serving of nuts equals 1½ ounces.  ¨ One serving of seeds equals ½ ounce or one tablespoon.  · You may need to keep track of how much salt or sodium you eat. This is especially true if you have high blood pressure. Talk with your doctor or dietitian to get more information.  What foods can I eat?  Grains   Breads, including [place], white, pat, wheat, raisin, rye, oatmeal, and Italian. Tortillas that are neither fried nor made with lard or trans fat. Low-fat rolls, including hotdog and hamburger buns and English muffins. Biscuits. Muffins. Waffles. Pancakes. Light popcorn. Whole-grain cereals. Flatbread. Chinle toast. Pretzels. Breadsticks. Rusks. Low-fat snacks. Low-fat crackers, including oyster, saltine, matzo, flora, animal, and rye. Rice and pasta, including brown rice and pastas that are made with whole wheat.  Vegetables   All vegetables.  Fruits   All fruits, but limit coconut.  Meats and Other Protein Sources   Lean, well-trimmed beef, veal, pork, and lamb. Chicken and turkey without skin. All fish and shellfish. Wild duck, rabbit, pheasant, and venison. Egg whites or low-cholesterol egg substitutes. Dried beans, peas, lentils, and tofu. Seeds and most nuts.  Dairy   Low-fat or nonfat cheeses, including ricotta, string, and mozzarella. Skim or 1% milk that is liquid, powdered, or evaporated. Buttermilk that is made with low-fat milk. Nonfat or low-fat  yogurt.  Beverages   Mineral water. Diet carbonated beverages.  Sweets and Desserts   Sherbets and fruit ices. Honey, jam, marmalade, jelly, and syrups. Meringues and gelatins. Pure sugar candy, such as hard candy, jelly beans, gumdrops, mints, marshmallows, and small amounts of dark chocolate. Alexander food cake.  Eat all sweets and desserts in moderation.  Fats and Oils   Nonhydrogenated (trans-free) margarines. Vegetable oils, including soybean, sesame, sunflower, olive, peanut, safflower, corn, canola, and cottonseed. Salad dressings or mayonnaise made with a vegetable oil. Limit added fats and oils that you use for cooking, baking, salads, and as spreads.  Other   Cocoa powder. Coffee and tea. All seasonings and condiments.  The items listed above may not be a complete list of recommended foods or beverages. Contact your dietitian for more options.   What foods are not recommended?  Grains   Breads that are made with saturated or trans fats, oils, or whole milk. Croissants. Butter rolls. Cheese breads. Sweet rolls. Donuts. Buttered popcorn. Chow mein noodles. High-fat crackers, such as cheese or butter crackers.  Meats and Other Protein Sources   Fatty meats, such as hotdogs, short ribs, sausage, spareribs, chavira, rib eye roast or steak, and mutton. High-fat deli meats, such as salami and bologna. Caviar. Domestic duck and goose. Organ meats, such as kidney, liver, sweetbreads, and heart.  Dairy   Cream, sour cream, cream cheese, and creamed cottage cheese. Whole-milk cheeses, including blue (amna), [place] M[place], Brie, Corey, American, Havarti, Swiss, cheddar, Camembert, and [place]. Whole or 2% milk that is liquid, evaporated, or condensed. Whole buttermilk. Cream sauce or high-fat cheese sauce. Yogurt that is made from whole milk.  Beverages   Regular sodas and juice drinks with added sugar.  Sweets and Desserts   Frosting. Pudding. Cookies. Cakes other than alexander food cake. Candy that has milk chocolate or  white chocolate, hydrogenated fat, butter, coconut, or unknown ingredients. Buttered syrups. Full-fat ice cream or ice cream drinks.  Fats and Oils   Gravy that has suet, meat fat, or shortening. Cocoa butter, hydrogenated oils, palm oil, coconut oil, palm kernel oil. These can often be found in baked products, candy, fried foods, nondairy creamers, and whipped toppings. Solid fats and shortenings, including chavira fat, salt pork, lard, and butter. Nondairy cream substitutes, such as coffee creamers and sour cream substitutes. Salad dressings that are made of unknown oils, cheese, or sour cream.  The items listed above may not be a complete list of foods and beverages to avoid. Contact your dietitian for more information.   This information is not intended to replace advice given to you by your health care provider. Make sure you discuss any questions you have with your health care provider.  Document Released: 06/18/2013 Document Revised: 05/25/2017 Document Reviewed: 06/11/2015  [employer] Interactive Patient Education © 2017 [employer]

## 2018-06-13 NOTE — PROGRESS NOTES
"    Subjective:     Encounter Date:06/13/2018      Patient ID: Jonh Peacock is a 72 y.o. male.    Chief Complaint:  The patient reports he is feeling more tired and fatigued since starting Cardizem in March. He also reports he gained 8-10 lbs after starting the medication, but has since lost the weight. Per previous notes by Dr. Banks, Dr. Chavez recommended amiodarone for his PAF/SVT/VT on a previous ICD interrogation.  However the patient is allergic to amiodarone. He was already on a high dose beta blocker, therefore, Dr. Banks and Dr. Chavez agreed to start short acting low dose cardizem. Per review of recent [facility] reports, he has had no further sustained arrhythmias, but as noted above he is having side effects from the cardizem.  He denies chest pain, shortness of breath, palpitations, edema, syncope, or pre syncope.         The following portions of the patient's history were reviewed and updated as appropriate: allergies, current medications, past family history, past medical history, past social history, past surgical history and problem list.  /62   Pulse 82   Ht 188 cm (74\")   Wt 128 kg (283 lb)   BMI 36.34 kg/m²   Allergies   Allergen Reactions   • Amiodarone    • Oxycontin [Oxycodone Hcl] Other (See Comments)     Can not urinate.   • Penicillins Hives     Allergic to All cillins. Amoxacillin.   • Vioxx [Rofecoxib]        Current Outpatient Prescriptions:   •  Buprenorphine (BUTRANS) 10 MCG/HR patch weekly, Place 10 mcg on the skin Every 7 (Seven) Days., Disp: , Rfl:   •  CHONDROITIN SULFATE A PO, Take 600 mg by mouth 2 (Two) Times a Day., Disp: , Rfl:   •  diazePAM (VALIUM) 2 MG tablet, Take 2 mg by mouth Daily., Disp: , Rfl:   •  diltiaZEM (CARDIZEM) 30 MG tablet, Take 1 tablet by mouth 4 (Four) Times a Day., Disp: 360 tablet, Rfl: 3  •  furosemide (LASIX) 20 MG tablet, Take 20 mg by mouth Daily., Disp: , Rfl:   •  furosemide (LASIX) 40 MG tablet, Take 40 mg by mouth Daily. TAKE " WITH 20 MG TABLET FOR 60 PER MG PER DAY, Disp: , Rfl:   •  gabapentin (NEURONTIN) 600 MG tablet, Take 600 mg by mouth 3 (Three) Times a Day., Disp: , Rfl:   •  Glucosamine 750 MG tablet, Take 750 mg by mouth 2 (Two) Times a Day., Disp: , Rfl:   •  HYDROcodone-acetaminophen (NORCO) 7.5-325 MG per tablet, Take 1 tablet by mouth Every 6 (Six) Hours As Needed for Moderate Pain ., Disp: , Rfl:   •  losartan (COZAAR) 25 MG tablet, Take 25 mg by mouth Daily., Disp: , Rfl:   •  metoprolol succinate XL (TOPROL-XL) 100 MG 24 hr tablet, Take 100 mg by mouth Daily., Disp: , Rfl:   •  Multiple Vitamins-Minerals (MULTIVITAMIN ADULT PO), Take  by mouth Daily., Disp: , Rfl:   •  pantoprazole (PROTONIX) 40 MG EC tablet, Take 40 mg by mouth Daily., Disp: , Rfl:   •  POLYETHYLENE GLYCOL 3350 PO, Take 17 granules by mouth Every Night., Disp: , Rfl:   •  simvastatin (ZOCOR) 20 MG tablet, Take 20 mg by mouth Every Night., Disp: , Rfl:   •  tamsulosin (FLOMAX) 0.4 MG capsule 24 hr capsule, Take 1 capsule by mouth Every Night., Disp: , Rfl:   •  tiZANidine (ZANAFLEX) 4 MG tablet, Take 4 mg by mouth 3 (Three) Times a Day As Needed for muscle spasms., Disp: , Rfl:   •  warfarin (COUMADIN) 2 MG tablet, Take 2 mg by mouth Daily., Disp: , Rfl:   •  warfarin (COUMADIN) 4 MG tablet, Take 4 mg by mouth Daily., Disp: , Rfl:   •  warfarin (COUMADIN) 5 MG tablet, Take 5 mg by mouth Daily., Disp: , Rfl:   Past Medical History:   Diagnosis Date   • Anemia 1/30/2017   • Anxiety 1/30/2017   • Arrhythmia 1/30/2017   • Cardiomyopathy 1/30/2017   • CHF (congestive heart failure) 1/30/2017   • Chronic pain 1/30/2017   • Depression 1/30/2017   • DVT (deep venous thrombosis)    • Gastritis 1/30/2017   • Hyperlipidemia 1/30/2017   • Hypertension 1/30/2017   • Low back pain 1/30/2017   • Neuropathy 1/30/2017   • Obesity 1/30/2017   • Status post placement of cardiac pacemaker 1/30/2017     Past Surgical History:   Procedure Laterality Date   • BACK SURGERY  2002     BACK FUSION   • BACK SURGERY  2007   • COLON RESECTION WITH COLOSTOMY  03/09/2010   • FOOT SURGERY Bilateral 2005   • JOINT REPLACEMENT Right 2002    KNEE   • PACEMAKER IMPLANTATION  2012   • TOTAL HIP ARTHROPLASTY Right 2004   • TOTAL HIP ARTHROPLASTY Left 12/2008     Social History     Social History   • Marital status:      Spouse name: N/A   • Number of children: N/A   • Years of education: N/A     Occupational History   • Not on file.     Social History Main Topics   • Smoking status: Never Smoker   • Smokeless tobacco: Never Used   • Alcohol use No   • Drug use: No   • Sexual activity: Defer     Other Topics Concern   • Not on file     Social History Narrative   • No narrative on file     Family History   Problem Relation Age of Onset   • Heart disease Mother    • Heart disease Brother        Review of Systems   Constitution: Positive for weakness, malaise/fatigue and weight gain. Negative for chills, diaphoresis and fever.   HENT: Negative for nosebleeds.    Eyes: Negative for visual disturbance.   Cardiovascular: Negative for chest pain, claudication, cyanosis, dyspnea on exertion, irregular heartbeat, leg swelling, near-syncope, orthopnea, palpitations, paroxysmal nocturnal dyspnea and syncope.   Respiratory: Negative for cough, hemoptysis, shortness of breath, sputum production and wheezing.    Hematologic/Lymphatic: Negative for bleeding problem.   Skin: Negative for color change and flushing.   Musculoskeletal: Negative for falls.   Gastrointestinal: Negative for bloating, abdominal pain, hematemesis, hematochezia, melena, nausea and vomiting.   Genitourinary: Negative for hematuria.   Neurological: Negative for dizziness and light-headedness.   Psychiatric/Behavioral: Negative for altered mental status.         ECG 12 Lead  Date/Time: 6/13/2018 12:13 PM  Performed by: CLIFFORD LEWIS  Authorized by: CLIFFORD LEWIS   Comparison: compared with previous ECG from 1/30/2018  Similar to previous  ECG  Comparison to previous ECG: NSR with frequent PVCs in form of bigeminy and intermittent atrial pacing   Rhythm: sinus rhythm and paced  Ectopy: bigeminy               Objective:     Physical Exam   Constitutional: He is oriented to person, place, and time. He appears well-developed and well-nourished. No distress.   HENT:   Head: Normocephalic and atraumatic.   Eyes: Pupils are equal, round, and reactive to light.   Neck: Normal range of motion. Neck supple. No JVD present. No thyromegaly present.   Cardiovascular: Normal rate, normal heart sounds and intact distal pulses.  An irregular rhythm present. Exam reveals no gallop and no friction rub.    No murmur heard.  Pulmonary/Chest: Effort normal and breath sounds normal. No respiratory distress. He has no wheezes. He has no rales. He exhibits no tenderness.   Abdominal: Soft. Bowel sounds are normal. He exhibits no distension. There is no tenderness.   Musculoskeletal: Normal range of motion. He exhibits no edema.   Neurological: He is alert and oriented to person, place, and time. No cranial nerve deficit.   Skin: Skin is warm and dry. He is not diaphoretic.   Psychiatric: He has a normal mood and affect. His behavior is normal.     Patient's Body mass index is 36.34 kg/m². BMI is above normal parameters. Recommendations include: exercise counseling and nutrition counseling   .  Lab Review:       Assessment:          Diagnosis Plan   1. Chronic systolic congestive heart failure  Stage C, Class II-III. Compensated  LVEF 40-45% with mild MR by 2011 echo  DC AICD present - interrogated today- no sustained arrhythmia or therapy delivered. Optivol not elevated per recent [facility] report      2. Non-ischemic cardiomyopathy  Normal coronaries per cath 2012      3. PAF (paroxysmal atrial fibrillation) /PAT  NSR today, anticoagulated with warfarin    4. Essential hypertension    Controlled      5. Ventricular bigeminy  Per EKG today and ICD interrogation- possibly  contributing to his symptoms/fatigue           Plan:       Discussed with Dr. Banks   Due to complaints of fatigue with Cardizem- decrease to 30 mg TID   Otherwise, continue current medical therapy as listed above  Keep follow ups with Dr. Chavez and Dr. Banks in 6 weeks     Reviewed signs and symptoms of CHF and what to report with the patient. Patient instructed to restrict sodium and weigh daily. Report weight gain of greater than 2 lbs overnight or 5 lbs in 1 week. Pt verbalized understanding of instructions and plan of care.

## 2018-06-26 ENCOUNTER — APPOINTMENT (OUTPATIENT)
Dept: LAB | Facility: HOSPITAL | Age: 73
End: 2018-06-26

## 2018-06-26 LAB
INR PPP: 2.8 (ref 0.91–1.09)
PROTHROMBIN TIME: 33.1 SECONDS (ref 10–13.8)

## 2018-06-26 PROCEDURE — 85610 PROTHROMBIN TIME: CPT | Performed by: EMERGENCY MEDICINE

## 2018-06-27 ENCOUNTER — LAB (OUTPATIENT)
Dept: LAB | Facility: HOSPITAL | Age: 73
End: 2018-06-27
Attending: EMERGENCY MEDICINE

## 2018-06-27 DIAGNOSIS — E78.5 HYPERLIPIDEMIA, UNSPECIFIED HYPERLIPIDEMIA TYPE: ICD-10-CM

## 2018-06-27 DIAGNOSIS — I10 ESSENTIAL HYPERTENSION, MALIGNANT: ICD-10-CM

## 2018-06-27 DIAGNOSIS — Z13.29 THYROID DISORDER SCREENING: ICD-10-CM

## 2018-06-27 DIAGNOSIS — Z79.899 LONG TERM USE OF DRUG: ICD-10-CM

## 2018-06-27 LAB
ALBUMIN SERPL-MCNC: 4 G/DL (ref 3.5–5)
ALBUMIN/GLOB SERPL: 1.2 G/DL (ref 1.1–2.5)
ALP SERPL-CCNC: 99 U/L (ref 24–120)
ALT SERPL W P-5'-P-CCNC: 29 U/L (ref 0–54)
ANION GAP SERPL CALCULATED.3IONS-SCNC: 10 MMOL/L (ref 4–13)
ARTICHOKE IGE QN: 108 MG/DL (ref 0–99)
AST SERPL-CCNC: 25 U/L (ref 7–45)
BASOPHILS # BLD AUTO: 0.03 10*3/MM3 (ref 0–0.2)
BASOPHILS NFR BLD AUTO: 0.5 % (ref 0–2)
BILIRUB SERPL-MCNC: 0.6 MG/DL (ref 0.1–1)
BUN BLD-MCNC: 18 MG/DL (ref 5–21)
BUN/CREAT SERPL: 17.6 (ref 7–25)
CALCIUM SPEC-SCNC: 9 MG/DL (ref 8.4–10.4)
CHLORIDE SERPL-SCNC: 98 MMOL/L (ref 98–110)
CHOLEST SERPL-MCNC: 201 MG/DL (ref 130–200)
CO2 SERPL-SCNC: 35 MMOL/L (ref 24–31)
CREAT BLD-MCNC: 1.02 MG/DL (ref 0.5–1.4)
DEPRECATED RDW RBC AUTO: 43.1 FL (ref 40–54)
EOSINOPHIL # BLD AUTO: 0.14 10*3/MM3 (ref 0–0.7)
EOSINOPHIL NFR BLD AUTO: 2.3 % (ref 0–4)
ERYTHROCYTE [DISTWIDTH] IN BLOOD BY AUTOMATED COUNT: 13.2 % (ref 12–15)
GFR SERPL CREATININE-BSD FRML MDRD: 87 ML/MIN/1.73
GLOBULIN UR ELPH-MCNC: 3.3 GM/DL
GLUCOSE BLD-MCNC: 134 MG/DL (ref 70–100)
HCT VFR BLD AUTO: 39.9 % (ref 40–52)
HDLC SERPL-MCNC: 40 MG/DL
HGB BLD-MCNC: 13.2 G/DL (ref 14–18)
IMM GRANULOCYTES # BLD: 0.02 10*3/MM3 (ref 0–0.03)
IMM GRANULOCYTES NFR BLD: 0.3 % (ref 0–5)
LDLC/HDLC SERPL: 3.15 {RATIO}
LYMPHOCYTES # BLD AUTO: 1.44 10*3/MM3 (ref 0.72–4.86)
LYMPHOCYTES NFR BLD AUTO: 23.8 % (ref 15–45)
MCH RBC QN AUTO: 29.7 PG (ref 28–32)
MCHC RBC AUTO-ENTMCNC: 33.1 G/DL (ref 33–36)
MCV RBC AUTO: 89.9 FL (ref 82–95)
MONOCYTES # BLD AUTO: 0.42 10*3/MM3 (ref 0.19–1.3)
MONOCYTES NFR BLD AUTO: 6.9 % (ref 4–12)
NEUTROPHILS # BLD AUTO: 4.01 10*3/MM3 (ref 1.87–8.4)
NEUTROPHILS NFR BLD AUTO: 66.2 % (ref 39–78)
NRBC BLD MANUAL-RTO: 0 /100 WBC (ref 0–0)
PLATELET # BLD AUTO: 158 10*3/MM3 (ref 130–400)
PMV BLD AUTO: 12.2 FL (ref 6–12)
POTASSIUM BLD-SCNC: 3.7 MMOL/L (ref 3.5–5.3)
PROT SERPL-MCNC: 7.3 G/DL (ref 6.3–8.7)
RBC # BLD AUTO: 4.44 10*6/MM3 (ref 4.8–5.9)
SODIUM BLD-SCNC: 143 MMOL/L (ref 135–145)
TRIGL SERPL-MCNC: 176 MG/DL (ref 0–149)
TSH SERPL DL<=0.05 MIU/L-ACNC: 0.69 MIU/ML (ref 0.47–4.68)
WBC NRBC COR # BLD: 6.06 10*3/MM3 (ref 4.8–10.8)

## 2018-06-27 PROCEDURE — 80053 COMPREHEN METABOLIC PANEL: CPT | Performed by: EMERGENCY MEDICINE

## 2018-06-27 PROCEDURE — 36415 COLL VENOUS BLD VENIPUNCTURE: CPT

## 2018-06-27 PROCEDURE — 80061 LIPID PANEL: CPT | Performed by: EMERGENCY MEDICINE

## 2018-06-27 PROCEDURE — 84443 ASSAY THYROID STIM HORMONE: CPT | Performed by: EMERGENCY MEDICINE

## 2018-06-27 PROCEDURE — 85025 COMPLETE CBC W/AUTO DIFF WBC: CPT | Performed by: EMERGENCY MEDICINE

## 2018-07-17 ENCOUNTER — LAB (OUTPATIENT)
Dept: LAB | Facility: HOSPITAL | Age: 73
End: 2018-07-17
Attending: EMERGENCY MEDICINE

## 2018-07-17 DIAGNOSIS — Z79.01 LONG-TERM (CURRENT) USE OF ANTICOAGULANTS: ICD-10-CM

## 2018-07-17 LAB
INR PPP: 2.3 (ref 0.91–1.09)
PROTHROMBIN TIME: 27.5 SECONDS (ref 10–13.8)

## 2018-07-17 PROCEDURE — 85610 PROTHROMBIN TIME: CPT | Performed by: EMERGENCY MEDICINE

## 2018-07-31 ENCOUNTER — OFFICE VISIT (OUTPATIENT)
Dept: CARDIOLOGY | Facility: CLINIC | Age: 73
End: 2018-07-31

## 2018-07-31 VITALS
DIASTOLIC BLOOD PRESSURE: 54 MMHG | HEART RATE: 74 BPM | SYSTOLIC BLOOD PRESSURE: 94 MMHG | OXYGEN SATURATION: 97 % | WEIGHT: 279 LBS | BODY MASS INDEX: 35.81 KG/M2 | HEIGHT: 74 IN

## 2018-07-31 DIAGNOSIS — I10 ESSENTIAL HYPERTENSION: ICD-10-CM

## 2018-07-31 DIAGNOSIS — I42.8 NON-ISCHEMIC CARDIOMYOPATHY (HCC): ICD-10-CM

## 2018-07-31 DIAGNOSIS — I48.0 PAF (PAROXYSMAL ATRIAL FIBRILLATION) (HCC): ICD-10-CM

## 2018-07-31 DIAGNOSIS — I50.22 CHRONIC SYSTOLIC CONGESTIVE HEART FAILURE (HCC): Primary | ICD-10-CM

## 2018-07-31 PROCEDURE — 93000 ELECTROCARDIOGRAM COMPLETE: CPT | Performed by: INTERNAL MEDICINE

## 2018-07-31 PROCEDURE — 99214 OFFICE O/P EST MOD 30 MIN: CPT | Performed by: INTERNAL MEDICINE

## 2018-07-31 NOTE — PROGRESS NOTES
Subjective:     Encounter Date:07/31/2018      Patient ID: Jonh Peacock is a 72 y.o. male with nonischemic cardiomyopathy, history of nonsustained ventricular tachycardia, atrial fibrillation, atrial tachycardia, mitral regurgitation, hypertension and hyperlipidemia who presents today for follow-up.    Chief Complaint: Follow-up    Congestive Heart Failure   Presents for follow-up visit. Associated symptoms include edema, fatigue and shortness of breath. Pertinent negatives include no abdominal pain, chest pain, chest pressure, claudication, near-syncope, orthopnea, palpitations, paroxysmal nocturnal dyspnea or unexpected weight change. The symptoms have been stable. Compliance with total regimen is %. Compliance with diet is %. Compliance with exercise is %. Compliance with medications is %.   Atrial Fibrillation   Presents for follow-up visit. Symptoms include shortness of breath and weakness. Symptoms are negative for an AICD problem, chest pain, dizziness, hemodynamic instability, palpitations and syncope. The symptoms have been stable. Past medical history includes atrial fibrillation and CHF. There are no medication compliance problems.     This patient presents today for routine follow-up.  He says that he has been doing reasonably well.  At last office visit with our clinic, medications were adjusted as, to help control his heart rate, heart is not had been started but he started to feel more fatigued.  Dosing was decreased to 3 times daily and the patient says that he is feeling somewhat better with less fatigue.  The patient's breathing has been stable.  He does continue to have some mild shortness of breath and dyspnea on exertion.  The patient has been taking Lasix on a daily basis and his ketones lower extremity edema at a very minimum at this time.  His weight actually has improved and gone down quite a bit over the past few months.  The patient has been trying to watch  his salt and fluid intake.  He has not had any trouble with his medications.  He has not received any therapies from his device.  He denies any palpitations.  Overall, the patient says that he is doing reasonably well.    Current Outpatient Prescriptions:   •  Buprenorphine (BUTRANS) 10 MCG/HR patch weekly, Place 10 mcg on the skin Every 7 (Seven) Days., Disp: , Rfl:   •  CHONDROITIN SULFATE A PO, Take 600 mg by mouth 2 (Two) Times a Day., Disp: , Rfl:   •  diazePAM (VALIUM) 2 MG tablet, Take 2 mg by mouth Daily., Disp: , Rfl:   •  diltiaZEM (CARDIZEM) 30 MG tablet, Take 1 tablet by mouth 3 (Three) Times a Day., Disp: , Rfl:   •  furosemide (LASIX) 20 MG tablet, Take 20 mg by mouth Daily., Disp: , Rfl:   •  furosemide (LASIX) 40 MG tablet, Take 40 mg by mouth Daily. TAKE WITH 20 MG TABLET FOR 60 PER MG PER DAY, Disp: , Rfl:   •  gabapentin (NEURONTIN) 600 MG tablet, Take 600 mg by mouth 3 (Three) Times a Day., Disp: , Rfl:   •  Glucosamine 750 MG tablet, Take 750 mg by mouth 2 (Two) Times a Day., Disp: , Rfl:   •  HYDROcodone-acetaminophen (NORCO) 7.5-325 MG per tablet, Take 1 tablet by mouth Every 6 (Six) Hours As Needed for Moderate Pain ., Disp: , Rfl:   •  losartan (COZAAR) 25 MG tablet, Take 25 mg by mouth Daily., Disp: , Rfl:   •  metoprolol succinate XL (TOPROL-XL) 100 MG 24 hr tablet, Take 100 mg by mouth Daily., Disp: , Rfl:   •  Multiple Vitamins-Minerals (MULTIVITAMIN ADULT PO), Take  by mouth Daily., Disp: , Rfl:   •  pantoprazole (PROTONIX) 40 MG EC tablet, Take 40 mg by mouth Daily., Disp: , Rfl:   •  POLYETHYLENE GLYCOL 3350 PO, Take 17 granules by mouth Every Night., Disp: , Rfl:   •  simvastatin (ZOCOR) 20 MG tablet, Take 20 mg by mouth Every Night., Disp: , Rfl:   •  tamsulosin (FLOMAX) 0.4 MG capsule 24 hr capsule, Take 1 capsule by mouth Every Night., Disp: , Rfl:   •  tiZANidine (ZANAFLEX) 4 MG tablet, Take 4 mg by mouth 3 (Three) Times a Day As Needed for muscle spasms., Disp: , Rfl:   •   warfarin (COUMADIN) 2 MG tablet, Take 2 mg by mouth Daily., Disp: , Rfl:   •  warfarin (COUMADIN) 4 MG tablet, Take 4 mg by mouth Daily., Disp: , Rfl:   •  warfarin (COUMADIN) 5 MG tablet, Take 5 mg by mouth Daily., Disp: , Rfl:     Allergies   Allergen Reactions   • Amiodarone Other (See Comments)     INTERFERES WITH OTHER MEDICATIONS   • Oxycontin [Oxycodone Hcl] Other (See Comments)     Can not urinate.   • Penicillins Hives     Allergic to All cillins. Amoxacillin.   • Vioxx [Rofecoxib] GI Intolerance     Social History   Substance Use Topics   • Smoking status: Never Smoker   • Smokeless tobacco: Never Used   • Alcohol use No     Review of Systems   Constitution: Positive for fatigue, weakness and malaise/fatigue. Negative for chills, fever, night sweats, unexpected weight change and weight loss.   Cardiovascular: Positive for dyspnea on exertion and leg swelling. Negative for chest pain, claudication, irregular heartbeat, near-syncope, orthopnea, palpitations, paroxysmal nocturnal dyspnea and syncope.   Respiratory: Positive for shortness of breath. Negative for cough, hemoptysis and wheezing.    Endocrine: Negative for cold intolerance and heat intolerance.   Hematologic/Lymphatic: Negative for bleeding problem. Does not bruise/bleed easily.   Gastrointestinal: Negative for abdominal pain, hematemesis, hematochezia, melena, nausea and vomiting.   Genitourinary: Negative for dysuria and hematuria.   Neurological: Negative for dizziness, headaches, loss of balance and numbness.       ECG 12 Lead  Date/Time: 7/31/2018 1:09 PM  Performed by: RANDOLPH BILLS  Authorized by: RANDOLPH BILLS   Rhythm: paced  Ectopy: PVCs  Rate: normal  BPM: 69  Clinical impression: abnormal ECG             Objective:     Physical Exam   Constitutional: He is oriented to person, place, and time. He appears well-developed and well-nourished. No distress.   HENT:   Head: Normocephalic and atraumatic.   Mouth/Throat:  "Oropharynx is clear and moist.   Eyes: Pupils are equal, round, and reactive to light. EOM are normal.   Neck: Normal range of motion. Neck supple. No JVD present. No thyromegaly present.   Cardiovascular: Normal rate, regular rhythm, S1 normal, S2 normal, normal heart sounds and intact distal pulses.  Exam reveals no gallop and no friction rub.    No murmur heard.  Pulmonary/Chest: Effort normal and breath sounds normal.   Abdominal: Soft. Bowel sounds are normal. He exhibits no distension. There is no tenderness.   Musculoskeletal: Normal range of motion. He exhibits edema (trace).   Neurological: He is alert and oriented to person, place, and time. No cranial nerve deficit.   Skin: Skin is warm and dry. No rash noted. No cyanosis or erythema. Nails show no clubbing.   Psychiatric: He has a normal mood and affect.   Vitals reviewed.    BP 94/54 (BP Location: Right arm, Patient Position: Sitting)   Pulse 74   Ht 188 cm (74\")   Wt 127 kg (279 lb)   SpO2 97%   BMI 35.82 kg/m²     Lab Review:     Lab Results   Component Value Date    CHOL 201 (H) 06/27/2018    CHLPL 220 (H) 07/12/2016    TRIG 176 (H) 06/27/2018    HDL 40 06/27/2018     (H) 06/27/2018     Lab Results   Component Value Date    INR 2.3 (H) 07/17/2018    INR 2.8 (H) 06/26/2018    INR 2.5 (H) 06/11/2018    PROTIME 27.5 (H) 07/17/2018    PROTIME 33.1 (H) 06/26/2018    PROTIME 30.1 (H) 06/11/2018         Assessment:          Diagnosis Plan   1. Chronic systolic congestive heart failure (CMS/HCC)     2. Non-ischemic cardiomyopathy (CMS/HCC)     3. PAF (paroxysmal atrial fibrillation) (CMS/HCC)  ECG 12 Lead   4. Essential hypertension          Plan:       1.  Chronic systolic congestive heart failure/nonischemic cardiomyopathy: Clinically stable at the present time.  The patient appears to be euvolemic on examination today.  Continue current medications at this time.  The patient is enrolled in the [facility] program and we will continue to follow " this.    2.  Paroxysmal atrial fibrillation/paroxysmal atrial tachycardia: Heart rate seems better controlled after Cardizem was added.  The patient has not had any therapies from his ICD since this change was made.  We will continue to monitor for any recurrent arrhythmias or tachycardia.  The patient also continues Coumadin.  He has not had any difficulty with this medication.  Patient also continues to follow with Dr. Chavez regarding his arrhythmias.    3.  Essential hypertension: Blood pressure is well-controlled at this time, in fact somewhat low, although the patient is not symptomatic from this.  Continue current medications at this time.    Follow-up: 6 months unless otherwise needed sooner.

## 2018-08-02 ENCOUNTER — RESULTS ENCOUNTER (OUTPATIENT)
Dept: UROLOGY | Facility: CLINIC | Age: 73
End: 2018-08-02

## 2018-08-02 DIAGNOSIS — N40.1 BENIGN NON-NODULAR PROSTATIC HYPERPLASIA WITH LOWER URINARY TRACT SYMPTOMS: ICD-10-CM

## 2018-08-06 ENCOUNTER — APPOINTMENT (OUTPATIENT)
Dept: LAB | Facility: HOSPITAL | Age: 73
End: 2018-08-06
Attending: UROLOGY

## 2018-08-06 ENCOUNTER — LAB (OUTPATIENT)
Dept: LAB | Facility: HOSPITAL | Age: 73
End: 2018-08-06
Attending: EMERGENCY MEDICINE

## 2018-08-06 DIAGNOSIS — Z79.01 LONG-TERM (CURRENT) USE OF ANTICOAGULANTS: ICD-10-CM

## 2018-08-06 LAB
INR PPP: 2.6 (ref 0.91–1.09)
PROTHROMBIN TIME: 31.3 SECONDS (ref 10–13.8)

## 2018-08-06 PROCEDURE — 85610 PROTHROMBIN TIME: CPT | Performed by: EMERGENCY MEDICINE

## 2018-08-06 PROCEDURE — 36415 COLL VENOUS BLD VENIPUNCTURE: CPT | Performed by: UROLOGY

## 2018-08-06 PROCEDURE — 84153 ASSAY OF PSA TOTAL: CPT | Performed by: UROLOGY

## 2018-08-07 LAB — PSA SERPL-MCNC: 1.1 NG/ML (ref 0–4)

## 2018-08-08 ENCOUNTER — CLINICAL SUPPORT (OUTPATIENT)
Dept: CARDIOLOGY | Facility: CLINIC | Age: 73
End: 2018-08-08

## 2018-08-08 DIAGNOSIS — I50.22 CHRONIC SYSTOLIC CONGESTIVE HEART FAILURE (HCC): Primary | ICD-10-CM

## 2018-08-08 DIAGNOSIS — Z95.810 AICD (AUTOMATIC CARDIOVERTER/DEFIBRILLATOR) PRESENT: ICD-10-CM

## 2018-08-08 PROCEDURE — 93297 REM INTERROG DEV EVAL ICPMS: CPT | Performed by: INTERNAL MEDICINE

## 2018-08-08 NOTE — PROGRESS NOTES
[facility] Report    August 8, 2018    Primary Cardiologist: Jocelyn    Reason for evaluation: Heart Failure Management  Indication for AICD: chronic systolic congestive heart failure    Findings:  Monthly HFMR reviewed. Impedance at reference and no recent OptiVol  crossing. OptiVol is at 0 ohms. ICD pt is v paced 0.1% and atrial pacing  30.6%. No episodes of VT/VF, AT/AF or shocks. The remaining cardiac  compass trends within normal limits. Weight and blood pressure trends  within normal limits and pt is not reporting any symptoms of HF. HFMR  indicates Low Risk for worsening HF-Recommend routine clinic follow  up.    Follow up: 1 month

## 2018-08-10 NOTE — PROGRESS NOTES
Mr. Peacock is 72 y.o. male    CHIEF COMPLAINT: I am here for follow up on BPH. My PSA is 1.1    HPI  F/u BPH with obstruction  Location: Prostate enlargement  Severity: Symptom score of 5 suggest mild symptomatology.  Duration: Initially noted to have obstructive voiding symptoms consistent with benign prostatic hyperplasia around 2010.  Timing: This is a continuous issue with rather stable symptoms.  Modifying factors: Tamsulosin has improved his voiding symptoms significantly.  At times caffeine makes the symptoms worse.  Associated signs or symptoms: Intermittency and a weak stream occur for him less than half the time but are present.      Lab Results   Component Value Date    PSA 1.1 08/06/2018    PSA 1.2 07/24/2017    PSA 1.01 07/12/2016       The following portions of the patient's history were reviewed and updated as appropriate: allergies, current medications, past family history, past medical history, past social history, past surgical history and problem list.      Review of Systems   Constitutional: Negative for chills and fever.   Gastrointestinal: Negative for abdominal pain, anal bleeding and blood in stool.   Genitourinary: Negative for flank pain, frequency, hematuria and urgency.           Current Outpatient Prescriptions:   •  Buprenorphine (BUTRANS) 10 MCG/HR patch weekly, Place 10 mcg on the skin Every 7 (Seven) Days., Disp: , Rfl:   •  CHONDROITIN SULFATE A PO, Take 600 mg by mouth 2 (Two) Times a Day., Disp: , Rfl:   •  diazePAM (VALIUM) 2 MG tablet, Take 2 mg by mouth Daily., Disp: , Rfl:   •  diltiaZEM (CARDIZEM) 30 MG tablet, Take 1 tablet by mouth 3 (Three) Times a Day., Disp: , Rfl:   •  furosemide (LASIX) 20 MG tablet, Take 20 mg by mouth Daily., Disp: , Rfl:   •  furosemide (LASIX) 40 MG tablet, Take 40 mg by mouth Daily. TAKE WITH 20 MG TABLET FOR 60 PER MG PER DAY, Disp: , Rfl:   •  gabapentin (NEURONTIN) 600 MG tablet, Take 600 mg by mouth 3 (Three) Times a Day., Disp: , Rfl:    •  Glucosamine 750 MG tablet, Take 750 mg by mouth 2 (Two) Times a Day., Disp: , Rfl:   •  HYDROcodone-acetaminophen (NORCO) 7.5-325 MG per tablet, Take 1 tablet by mouth Every 6 (Six) Hours As Needed for Moderate Pain ., Disp: , Rfl:   •  losartan (COZAAR) 25 MG tablet, Take 25 mg by mouth Daily., Disp: , Rfl:   •  metoprolol succinate XL (TOPROL-XL) 100 MG 24 hr tablet, Take 100 mg by mouth Daily., Disp: , Rfl:   •  Multiple Vitamins-Minerals (MULTIVITAMIN ADULT PO), Take  by mouth Daily., Disp: , Rfl:   •  pantoprazole (PROTONIX) 40 MG EC tablet, Take 40 mg by mouth Daily., Disp: , Rfl:   •  POLYETHYLENE GLYCOL 3350 PO, Take 17 granules by mouth Every Night., Disp: , Rfl:   •  simvastatin (ZOCOR) 20 MG tablet, Take 20 mg by mouth Every Night., Disp: , Rfl:   •  tamsulosin (FLOMAX) 0.4 MG capsule 24 hr capsule, Take 1 capsule by mouth Every Night., Disp: , Rfl:   •  tiZANidine (ZANAFLEX) 4 MG tablet, Take 4 mg by mouth 3 (Three) Times a Day As Needed for muscle spasms., Disp: , Rfl:   •  warfarin (COUMADIN) 2 MG tablet, Take 2 mg by mouth Daily., Disp: , Rfl:   •  warfarin (COUMADIN) 4 MG tablet, Take 4 mg by mouth Daily., Disp: , Rfl:   •  warfarin (COUMADIN) 5 MG tablet, Take 5 mg by mouth Daily., Disp: , Rfl:     Past Medical History:   Diagnosis Date   • Anemia 1/30/2017   • Anxiety 1/30/2017   • Arrhythmia 1/30/2017   • Cardiomyopathy (CMS/HCC) 1/30/2017   • CHF (congestive heart failure) (CMS/HCC) 1/30/2017   • Chronic pain 1/30/2017   • Depression 1/30/2017   • DVT (deep venous thrombosis) (CMS/HCC)    • Gastritis 1/30/2017   • Hyperlipidemia 1/30/2017   • Hypertension 1/30/2017   • Low back pain 1/30/2017   • Neuropathy 1/30/2017   • Obesity 1/30/2017   • Status post placement of cardiac pacemaker 1/30/2017       Past Surgical History:   Procedure Laterality Date   • BACK SURGERY  2002    BACK FUSION   • BACK SURGERY  2007   • COLON RESECTION WITH COLOSTOMY  03/09/2010   • FOOT SURGERY Bilateral 2005   •  "JOINT REPLACEMENT Right 2002    KNEE   • PACEMAKER IMPLANTATION  2012   • TOTAL HIP ARTHROPLASTY Right 2004   • TOTAL HIP ARTHROPLASTY Left 12/2008       Social History     Social History   • Marital status:      Social History Main Topics   • Smoking status: Never Smoker   • Smokeless tobacco: Never Used   • Alcohol use No   • Drug use: No   • Sexual activity: Defer     Other Topics Concern   • Not on file       Family History   Problem Relation Age of Onset   • Heart disease Mother    • Heart disease Brother          /62   Temp 96.9 °F (36.1 °C)   Ht 188 cm (74\")   Wt 130 kg (286 lb)   BMI 36.72 kg/m²       Physical Exam  Alert and oriented ×3  Not agitated or distressed  No obvious deformities  No respiratory distress  Skin without pallor or diaphoresis  SERINA: Benign feeling prostate without nodule approximately 20 mL in size.   Penis and testicles are normal         Data  Results for orders placed or performed in visit on 08/16/18   POC Urinalysis Dipstick, Multipro   Result Value Ref Range    Color Yellow Yellow, Straw, Dark Yellow, Slime    Clarity, UA Clear Clear    Glucose, UA Negative Negative, 1000 mg/dL (3+) mg/dL    Bilirubin Negative Negative    Ketones, UA Negative Negative    Specific Gravity  1.020 1.005 - 1.030    Blood, UA Trace (A) Negative    pH, Urine 6.0 5.0 - 8.0    Protein, POC Negative Negative mg/dL    Urobilinogen, UA Normal Normal    Nitrite, UA Negative Negative    Leukocytes Negative Negative     Microscopic Urinalysis  I inspected the urine myself based on the clinical situation including the dipstick urine. The urine is spun in a centrifuge for three minutes. The spun urine shows 0-2 rbc/hpf, none wbc/hpf, none epi/hpf, negative bacteria, negative crystals, and negative casts.     International Prostate Symptom Score  The following is posted based on patient questionnaire answers:  0 - not at all    1-7 mild symptoms  1- Less than one time in five  8-19 moderate " symptoms  2 -Less than half the time  20-35 severe symptoms  3 - About half the time  4 - More than half the time  5 - Almost always     For following sections:  Incomplete Emptying: - How often have you had the sensation  of not emptying your bladder completely after you finished urinating?  0  Frequency: -How often have you had to urinate again less than   two hours after you finished urinating?      0  Intermittency: -How often have you found you stopped and started again  Several times when you urinate?       2  Urgency: -How often do you find it difficult to postpone urination?             0  Weak stream: - How often have you had a weak urinary stream?             2  Straining: - How often have you had to push or strain to begin  Urination?          0  Sleeping: -How many times did you most typically get up to urinate   From the time you went to bed at night until the time you got up in the   1  Morning          Total `  5    Quality of Life  How would you feel if you had to live with your urinary condition the way   1  It is now, no better, no worse for the rest of your life?    Where: 0=delighted; 1= pleased, 2= mostly satisfied, 3= mixed, 4 = mostly  Dissatisfied, 5= Unhappy, 6 = terrible      Assessment and Plan  Diagnoses and all orders for this visit:    Benign non-nodular prostatic hyperplasia with lower urinary tract symptoms  -     POC Urinalysis Dipstick, Multipro  -     PSA DIAGNOSTIC; Future    Microscopic hematuria      It is explained the benign prostatic hyperplasia is a chronic urologic condition.  His voiding symptoms are stable on his current regimen. He has remained infection free since his last visit. He has no evidence of progression. We discussed symptoms that would be worrisome of either of these. We talked about the importance of being seen if he develops gross hematuria, burning with urination, or episodes that may be worrisome for inability to empty the bladder. We talked about  medications that may increase the risk of urinary retention especially over the counter decongestants. This chronic issue appears stable overall. We will continue to monitor this with history, exam, urinalysis. Any suggestion of progression will require further work-up.      Hematuria low grade. Insignificant on microscopy. Takes coumadin.         (Please note that portions of this note were completed with a voice recognition program.)  Celso Ellsworth MD  08/16/18  3:47 PM      Cc: Ric Mustafa MD

## 2018-08-16 ENCOUNTER — TELEPHONE (OUTPATIENT)
Dept: PODIATRY | Facility: CLINIC | Age: 73
End: 2018-08-16

## 2018-08-16 ENCOUNTER — OFFICE VISIT (OUTPATIENT)
Dept: UROLOGY | Facility: CLINIC | Age: 73
End: 2018-08-16

## 2018-08-16 VITALS
BODY MASS INDEX: 36.7 KG/M2 | WEIGHT: 286 LBS | DIASTOLIC BLOOD PRESSURE: 62 MMHG | HEIGHT: 74 IN | TEMPERATURE: 96.9 F | SYSTOLIC BLOOD PRESSURE: 124 MMHG

## 2018-08-16 DIAGNOSIS — N40.1 BENIGN NON-NODULAR PROSTATIC HYPERPLASIA WITH LOWER URINARY TRACT SYMPTOMS: Primary | ICD-10-CM

## 2018-08-16 DIAGNOSIS — R31.29 MICROSCOPIC HEMATURIA: ICD-10-CM

## 2018-08-16 LAB
BILIRUB BLD-MCNC: NEGATIVE MG/DL
CLARITY, POC: CLEAR
COLOR UR: YELLOW
GLUCOSE UR STRIP-MCNC: NEGATIVE MG/DL
KETONES UR QL: NEGATIVE
LEUKOCYTE EST, POC: NEGATIVE
NITRITE UR-MCNC: NEGATIVE MG/ML
PH UR: 6 [PH] (ref 5–8)
PROT UR STRIP-MCNC: NEGATIVE MG/DL
RBC # UR STRIP: ABNORMAL /UL
SP GR UR: 1.02 (ref 1–1.03)
UROBILINOGEN UR QL: NORMAL

## 2018-08-16 PROCEDURE — 99213 OFFICE O/P EST LOW 20 MIN: CPT | Performed by: UROLOGY

## 2018-08-16 PROCEDURE — 81003 URINALYSIS AUTO W/O SCOPE: CPT | Performed by: UROLOGY

## 2018-08-16 NOTE — TELEPHONE ENCOUNTER
Left message and callback number reminding patient of appointment with Dr. Onofre Avelar on 08/20/2018 at 2:30 pm. Unable to confirm appointment at this time.

## 2018-08-16 NOTE — PATIENT INSTRUCTIONS

## 2018-08-20 ENCOUNTER — OFFICE VISIT (OUTPATIENT)
Dept: PODIATRY | Facility: CLINIC | Age: 73
End: 2018-08-20

## 2018-08-20 VITALS
HEIGHT: 74 IN | BODY MASS INDEX: 36.7 KG/M2 | WEIGHT: 286 LBS | SYSTOLIC BLOOD PRESSURE: 126 MMHG | OXYGEN SATURATION: 97 % | DIASTOLIC BLOOD PRESSURE: 72 MMHG | HEART RATE: 79 BPM

## 2018-08-20 DIAGNOSIS — G62.9 NEUROPATHY: ICD-10-CM

## 2018-08-20 DIAGNOSIS — B35.1 ONYCHOMYCOSIS: Primary | ICD-10-CM

## 2018-08-20 DIAGNOSIS — Z79.01 ANTICOAGULANT LONG-TERM USE: ICD-10-CM

## 2018-08-20 PROCEDURE — 11721 DEBRIDE NAIL 6 OR MORE: CPT | Performed by: PODIATRIST

## 2018-08-20 NOTE — PROGRESS NOTES
[facility] - PODIATRY    Today's Date: 08/20/18    Patient Name: Jonh Peacock  MRN: [id number]  CSN: [id number]  PCP: Ric Mustafa MD  Referring Provider: No ref. provider found    SUBJECTIVE     Chief Complaint   Patient presents with   • Left Foot - Follow-up     PT c/o bilateral painful toenails. Pain scale: 3/10. Denies diabetes. PCP: Dr. Ric Mustafa    • Right Foot - Follow-up       HPI: Jonh Peacock, a 72 y.o.male, comes to clinic as a(n) established patient complaining of painful fungal toenails. Pt with h/o Anxiety, Arrhythmia, Cardiomyopathy, CHF, Depression, previous DVT, HTN, Obesity, Anticoagulation with Warfarin. Denies diabetes. Relates that his nails are thick and he is unable to cut them himelf and routinely his wife will cut them but does not feel comfortable cutting them due to being on warfarin. Also relates that he has a limb length discrepancy which he has a lift in his left foot and custom inserts for both feet. Admits pain at 3/10 level and described as shooting, pins/needles and tingling. Relates that he typically gets around in his motorized scooter but is using a cane today. Notes improvement with regular visits. Denies any constitutional symptoms. No other pedal complaints at this time.    Past Medical History:   Diagnosis Date   • Anemia 1/30/2017   • Anxiety 1/30/2017   • Arrhythmia 1/30/2017   • Cardiomyopathy (CMS/HCC) 1/30/2017   • CHF (congestive heart failure) (CMS/HCC) 1/30/2017   • Chronic pain 1/30/2017   • Depression 1/30/2017   • DVT (deep venous thrombosis) (CMS/[facility])    • Gastritis 1/30/2017   • Hyperlipidemia 1/30/2017   • Hypertension 1/30/2017   • Low back pain 1/30/2017   • Neuropathy 1/30/2017   • Obesity 1/30/2017   • Status post placement of cardiac pacemaker 1/30/2017     Past Surgical History:   Procedure Laterality Date   • BACK SURGERY  2002    BACK FUSION   • BACK SURGERY  2007   • COLON RESECTION WITH COLOSTOMY  03/09/2010   •  FOOT SURGERY Bilateral 2005   • JOINT REPLACEMENT Right 2002    KNEE   • PACEMAKER IMPLANTATION  2012   • TOTAL HIP ARTHROPLASTY Right 2004   • TOTAL HIP ARTHROPLASTY Left 12/2008     Family History   Problem Relation Age of Onset   • Heart disease Mother    • Heart disease Brother      Social History     Social History   • Marital status:      Spouse name: N/A   • Number of children: N/A   • Years of education: N/A     Occupational History   • Not on file.     Social History Main Topics   • Smoking status: Never Smoker   • Smokeless tobacco: Never Used   • Alcohol use No   • Drug use: No   • Sexual activity: Defer     Other Topics Concern   • Not on file     Social History Narrative   • No narrative on file     Allergies   Allergen Reactions   • Amiodarone Other (See Comments)     INTERFERES WITH OTHER MEDICATIONS   • Oxycontin [Oxycodone Hcl] Other (See Comments)     Can not urinate.   • Penicillins Hives     Allergic to All cillins. Amoxacillin.   • Vioxx [Rofecoxib] GI Intolerance     Current Outpatient Prescriptions   Medication Sig Dispense Refill   • Buprenorphine (BUTRANS) 10 MCG/HR patch weekly Place 10 mcg on the skin Every 7 (Seven) Days.     • CHONDROITIN SULFATE A PO Take 600 mg by mouth 2 (Two) Times a Day.     • diazePAM (VALIUM) 2 MG tablet Take 2 mg by mouth Daily.     • diltiaZEM (CARDIZEM) 30 MG tablet Take 1 tablet by mouth 3 (Three) Times a Day.     • furosemide (LASIX) 20 MG tablet Take 20 mg by mouth Daily.     • furosemide (LASIX) 40 MG tablet Take 40 mg by mouth Daily. TAKE WITH 20 MG TABLET FOR 60 PER MG PER DAY     • gabapentin (NEURONTIN) 600 MG tablet Take 600 mg by mouth 3 (Three) Times a Day.     • Glucosamine 750 MG tablet Take 750 mg by mouth 2 (Two) Times a Day.     • HYDROcodone-acetaminophen (NORCO) 7.5-325 MG per tablet Take 1 tablet by mouth Every 6 (Six) Hours As Needed for Moderate Pain .     • losartan (COZAAR) 25 MG tablet Take 25 mg by mouth Daily.     • metoprolol  succinate XL (TOPROL-XL) 100 MG 24 hr tablet Take 100 mg by mouth Daily.     • Multiple Vitamins-Minerals (MULTIVITAMIN ADULT PO) Take  by mouth Daily.     • pantoprazole (PROTONIX) 40 MG EC tablet Take 40 mg by mouth Daily.     • POLYETHYLENE GLYCOL 3350 PO Take 17 granules by mouth Every Night.     • simvastatin (ZOCOR) 20 MG tablet Take 20 mg by mouth Every Night.     • tamsulosin (FLOMAX) 0.4 MG capsule 24 hr capsule Take 1 capsule by mouth Every Night.     • tiZANidine (ZANAFLEX) 4 MG tablet Take 4 mg by mouth 3 (Three) Times a Day As Needed for muscle spasms.     • warfarin (COUMADIN) 2 MG tablet Take 2 mg by mouth Daily.     • warfarin (COUMADIN) 4 MG tablet Take 4 mg by mouth Daily.     • warfarin (COUMADIN) 5 MG tablet Take 5 mg by mouth Daily.       No current facility-administered medications for this visit.      Review of Systems   Constitutional: Negative for chills and fever.   HENT: Negative for congestion.    Respiratory: Negative for shortness of breath.    Cardiovascular: Positive for leg swelling. Negative for chest pain.   Gastrointestinal: Negative for constipation, diarrhea, nausea and vomiting.   Musculoskeletal:        Foot pain   Skin: Negative for wound.        Thick Toenails   Neurological: Negative for numbness.       OBJECTIVE     Vitals:    08/20/18 1409   BP: 126/72   Pulse: 79   SpO2: 97%       PHYSICAL EXAM  GEN:   A&Ox3, NAD. Pt presents to clinic ambulating with cane and wearing Casual Shoes with custom inserts, left shoe has lift.      NEURO:   Protective sensation intact to 10/10 sites Right foot, 10/10 site Left foot using [place] monofilament  Light touch sensation diminished  No Tinel's or Villeux sign.    VASC:  Skin temperature Warm to Warm proximal to distal carlotta  DP pulses 1/4 Right, 1/4 Left  PT pulses 1/4 Right, 1/4 Left  CFT 4 sec carlotta  Pedal hair growth absent  1+ pitting edema noted carlotta  Varicosities absent carlotta    MUSC/SKEL:  Muscle Strength Right foot  Dorsiflexors 5/5, Plantarflexors 5/5, Evertors 5/5, Invertors 5/5  Muscle Strength Left foot Dorsiflexors 5/5, Plantarflexors 5/5, Evertors 5/5, Invertors 5/5  ROM of the 1st MTP is full without pain or crepitus  ROM of the MTJ is full without pain or crepitus    ROM of the STJ is full without pain or crepitus    ROM of the ankle joint is full without pain or crepitus    POP of nail plates  Planus foot type with decreased arch height and abduction of forefoot  Right LE longer than Left LE     DERM:  Pedal nails x10 are thickened, elongated and discolored with presence of subunugual debris  Webspaces are clean, dry, intact  Skin is normal in turgor and texture with no open wounds or sores appreciated.  Stable cicatrix noted to dorsal 1st MTPJ carlotta and dorsal digits      RADIOLOGY/NUCLEAR:  No results found.    LABORATORY/CULTURE RESULTS:      PATHOLOGY RESULTS:       ASSESSMENT/PLAN     Jonh was seen today for follow-up and follow-up.    Diagnoses and all orders for this visit:    Onychomycosis    Neuropathy    Anticoagulant long-term use      Comprehensive lower extremity examination and evaluation was performed.  Discussed findings and treatment plan including risks, benefits, and treatment options with patient in detail. Patient agreed with treatment plan.  After verbal consent obtained, nail(s) x10 debrided of length and thickness with nail nipper without incidence  Patient may maintain nails and calluses at home utilizing emery board or pumice stone between visits as needed  Reviewed at home diabetic foot care including daily foot checks   Continue with custom shoes with and inserts  Defers compression stockings at this time  An After Visit Summary was printed and given to the patient at discharge, including (if requested) any available informative/educational handouts regarding diagnosis, treatment, or medications. All questions were answered to patient/family satisfaction. Should symptoms fail to improve or  worsen they agree to call or return to clinic or to go to the Emergency Department. Discussed the importance of following up with any needed screening tests/labs/specialist appointments and any requested follow-up recommended by me today. Importance of maintaining follow-up discussed and patient accepts that missed appointments can delay diagnosis and potentially lead to worsening of conditions.  Return in about 3 months (around 11/20/2018)., or sooner if acute issues arise.        This document has been electronically signed by Deion Avelar DPM on August 20, 2018 2:42 PM     Please note that portions of this note may have been completed with a voice recognition program. Efforts were made to edit the dictations, but occasionally words are mistranscribed.

## 2018-08-27 ENCOUNTER — LAB (OUTPATIENT)
Dept: LAB | Facility: HOSPITAL | Age: 73
End: 2018-08-27
Attending: EMERGENCY MEDICINE

## 2018-08-27 DIAGNOSIS — Z79.01 LONG-TERM (CURRENT) USE OF ANTICOAGULANTS: ICD-10-CM

## 2018-08-27 PROCEDURE — 85610 PROTHROMBIN TIME: CPT | Performed by: EMERGENCY MEDICINE

## 2018-09-10 ENCOUNTER — LAB (OUTPATIENT)
Dept: LAB | Facility: HOSPITAL | Age: 73
End: 2018-09-10
Attending: EMERGENCY MEDICINE

## 2018-09-10 ENCOUNTER — CLINICAL SUPPORT (OUTPATIENT)
Dept: CARDIOLOGY | Facility: CLINIC | Age: 73
End: 2018-09-10

## 2018-09-10 DIAGNOSIS — Z79.01 LONG-TERM (CURRENT) USE OF ANTICOAGULANTS: ICD-10-CM

## 2018-09-10 DIAGNOSIS — I50.22 CHRONIC SYSTOLIC CONGESTIVE HEART FAILURE (HCC): Primary | ICD-10-CM

## 2018-09-10 LAB
INR PPP: 2.1 (ref 0.91–1.09)
PROTHROMBIN TIME: 24.9 SECONDS (ref 10–13.8)

## 2018-09-10 PROCEDURE — 93297 REM INTERROG DEV EVAL ICPMS: CPT | Performed by: INTERNAL MEDICINE

## 2018-09-10 PROCEDURE — 85610 PROTHROMBIN TIME: CPT | Performed by: EMERGENCY MEDICINE

## 2018-09-10 NOTE — PROGRESS NOTES
[facility] Report    September 10, 2018    Primary Cardiologist: Jocelyn    Reason for evaluation: Heart Failure Management  Indication for AICD: chronic systolic congestive heart failure    Findings:  Monthly HFMR reviewed. Impedance below reference and no recent  OptiVol crossing. OptiVol is at 35 ohms and trending up. ICD pt is v  paced 0.2% and atrial pacing 34.2%. 3 seconds of AT/AF. No episodes  of VT/VF or shocks. Patient activity level <1hr/day and reports activity  gradually declining. The remaining cardiac compass trends within  normal limits. Weight and blood pressure trends within normal limits.  He feels his SOB gradually increasing. Pt reported started school  again. He eats school lunches and takes Lasix on T, Th, Sat, Sun only  since volunteering at school again. HFMR indicates Low Risk for  worsening HF-Recommend routine clinic follow up.    Follow up: 1 month

## 2018-09-19 ENCOUNTER — CLINICAL SUPPORT (OUTPATIENT)
Dept: CARDIOLOGY | Facility: CLINIC | Age: 73
End: 2018-09-19

## 2018-09-19 DIAGNOSIS — Z95.810 AICD (AUTOMATIC CARDIOVERTER/DEFIBRILLATOR) PRESENT: Primary | ICD-10-CM

## 2018-09-19 DIAGNOSIS — I42.8 NON-ISCHEMIC CARDIOMYOPATHY (HCC): ICD-10-CM

## 2018-09-19 DIAGNOSIS — I50.22 CHRONIC SYSTOLIC CONGESTIVE HEART FAILURE (HCC): ICD-10-CM

## 2018-09-19 PROCEDURE — 93295 DEV INTERROG REMOTE 1/2/MLT: CPT | Performed by: INTERNAL MEDICINE

## 2018-09-19 PROCEDURE — 93296 REM INTERROG EVL PM/IDS: CPT | Performed by: INTERNAL MEDICINE

## 2018-09-26 ENCOUNTER — LAB (OUTPATIENT)
Dept: LAB | Facility: HOSPITAL | Age: 73
End: 2018-09-26
Attending: EMERGENCY MEDICINE

## 2018-09-26 DIAGNOSIS — Z79.01 LONG-TERM (CURRENT) USE OF ANTICOAGULANTS: ICD-10-CM

## 2018-09-26 LAB
INR PPP: 1.8 (ref 0.91–1.09)
PROTHROMBIN TIME: 21.3 SECONDS (ref 10–13.8)

## 2018-09-26 PROCEDURE — 85610 PROTHROMBIN TIME: CPT | Performed by: EMERGENCY MEDICINE

## 2018-10-02 ENCOUNTER — TRANSCRIBE ORDERS (OUTPATIENT)
Dept: LAB | Facility: HOSPITAL | Age: 73
End: 2018-10-02

## 2018-10-02 ENCOUNTER — HOSPITAL ENCOUNTER (OUTPATIENT)
Dept: ULTRASOUND IMAGING | Facility: HOSPITAL | Age: 73
Discharge: HOME | End: 2018-10-02
Attending: EMERGENCY MEDICINE | Admitting: EMERGENCY MEDICINE

## 2018-10-02 DIAGNOSIS — I10 ESSENTIAL (PRIMARY) HYPERTENSION: ICD-10-CM

## 2018-10-02 DIAGNOSIS — N28.1 CYSTIC KIDNEY DISEASE, ACQUIRED: ICD-10-CM

## 2018-10-02 DIAGNOSIS — I10 ESSENTIAL (PRIMARY) HYPERTENSION: Primary | ICD-10-CM

## 2018-10-02 PROCEDURE — 76700 US EXAM ABDOM COMPLETE: CPT

## 2018-10-08 ENCOUNTER — CLINICAL SUPPORT (OUTPATIENT)
Dept: CARDIOLOGY | Facility: CLINIC | Age: 73
End: 2018-10-08

## 2018-10-08 DIAGNOSIS — I50.22 CHRONIC SYSTOLIC CONGESTIVE HEART FAILURE (HCC): Primary | ICD-10-CM

## 2018-10-08 NOTE — PROGRESS NOTES
[place] Report    October 8, 2018    Primary Cardiologist: Jocelyn    Reason for evaluation: Heart Failure Management  Indication for AICD: chronic systolic congestive heart failure    Findings:  Monthly HFMR reviewed. Impedance below reference and OptiVol is at  85 ohms. Of note, OptiVol crossed 60 ohms on 9/9 and trending up.  ICD pt is v paced 0.2% and atrial pacing 43.8%%. 30 minutes of AT/AF.  No episodes of VT/VF or shocks. Pt activity is <1hr/day but baseline for  patient. The remaining cardiac compass trends within normal limits.  Blood pressure trends within normal limits. During the past month,  weight exceeded 5lb/7days but weight is not back to baseline. Pt is not  reporting any symptoms of HF. HFMR indicates Limited high risk  markers identified for worsening HF- Recommend clinic to follow up  within 1 week    Follow up: 1 month

## 2018-10-10 DIAGNOSIS — I48.0 PAROXYSMAL ATRIAL FIBRILLATION (HCC): ICD-10-CM

## 2018-10-10 NOTE — TELEPHONE ENCOUNTER
Needs a refill on the Diltiazem sent to his mail order [facility] for a 90 day supply.  Shreyas Benitez, CMA

## 2018-10-15 ENCOUNTER — LAB (OUTPATIENT)
Dept: LAB | Facility: HOSPITAL | Age: 73
End: 2018-10-15
Attending: EMERGENCY MEDICINE

## 2018-10-15 DIAGNOSIS — Z79.01 LONG TERM CURRENT USE OF ANTICOAGULANT THERAPY: ICD-10-CM

## 2018-10-15 LAB
INR PPP: 2.6 (ref 0.91–1.09)
PROTHROMBIN TIME: 31.4 SECONDS (ref 10–13.8)

## 2018-10-15 PROCEDURE — 85610 PROTHROMBIN TIME: CPT | Performed by: EMERGENCY MEDICINE

## 2018-10-29 ENCOUNTER — LAB (OUTPATIENT)
Dept: LAB | Facility: HOSPITAL | Age: 73
End: 2018-10-29
Attending: EMERGENCY MEDICINE

## 2018-10-29 DIAGNOSIS — Z13.29 THYROID DISORDER SCREENING: ICD-10-CM

## 2018-10-29 DIAGNOSIS — Z79.899 LONG TERM USE OF DRUG: ICD-10-CM

## 2018-10-29 DIAGNOSIS — I10 ESSENTIAL HYPERTENSION, MALIGNANT: ICD-10-CM

## 2018-10-29 DIAGNOSIS — E78.5 HYPERLIPIDEMIA, UNSPECIFIED HYPERLIPIDEMIA TYPE: ICD-10-CM

## 2018-10-29 LAB
ALBUMIN SERPL-MCNC: 3.8 G/DL (ref 3.5–5)
ALBUMIN/GLOB SERPL: 1.2 G/DL (ref 1.1–2.5)
ALP SERPL-CCNC: 88 U/L (ref 24–120)
ALT SERPL W P-5'-P-CCNC: 26 U/L (ref 0–54)
ANION GAP SERPL CALCULATED.3IONS-SCNC: 9 MMOL/L (ref 4–13)
ARTICHOKE IGE QN: 108 MG/DL (ref 0–99)
AST SERPL-CCNC: 27 U/L (ref 7–45)
BASOPHILS # BLD AUTO: 0.03 10*3/MM3 (ref 0–0.2)
BASOPHILS NFR BLD AUTO: 0.5 % (ref 0–2)
BILIRUB SERPL-MCNC: 0.5 MG/DL (ref 0.1–1)
BUN BLD-MCNC: 22 MG/DL (ref 5–21)
BUN/CREAT SERPL: 21 (ref 7–25)
CALCIUM SPEC-SCNC: 8.9 MG/DL (ref 8.4–10.4)
CHLORIDE SERPL-SCNC: 98 MMOL/L (ref 98–110)
CHOLEST SERPL-MCNC: 185 MG/DL (ref 130–200)
CO2 SERPL-SCNC: 34 MMOL/L (ref 24–31)
CREAT BLD-MCNC: 1.05 MG/DL (ref 0.5–1.4)
DEPRECATED RDW RBC AUTO: 43.3 FL (ref 40–54)
EOSINOPHIL # BLD AUTO: 0.16 10*3/MM3 (ref 0–0.7)
EOSINOPHIL NFR BLD AUTO: 2.6 % (ref 0–4)
ERYTHROCYTE [DISTWIDTH] IN BLOOD BY AUTOMATED COUNT: 13.1 % (ref 12–15)
GFR SERPL CREATININE-BSD FRML MDRD: 84 ML/MIN/1.73
GLOBULIN UR ELPH-MCNC: 3.1 GM/DL
GLUCOSE BLD-MCNC: 222 MG/DL (ref 70–100)
HCT VFR BLD AUTO: 36.2 % (ref 40–52)
HDLC SERPL-MCNC: 36 MG/DL
HGB BLD-MCNC: 12.3 G/DL (ref 14–18)
IMM GRANULOCYTES # BLD: 0.04 10*3/MM3 (ref 0–0.03)
IMM GRANULOCYTES NFR BLD: 0.6 % (ref 0–5)
INR PPP: 2.5 (ref 0.91–1.09)
LDLC/HDLC SERPL: 3.07 {RATIO}
LYMPHOCYTES # BLD AUTO: 1.59 10*3/MM3 (ref 0.72–4.86)
LYMPHOCYTES NFR BLD AUTO: 25.6 % (ref 15–45)
MCH RBC QN AUTO: 30.9 PG (ref 28–32)
MCHC RBC AUTO-ENTMCNC: 34 G/DL (ref 33–36)
MCV RBC AUTO: 91 FL (ref 82–95)
MONOCYTES # BLD AUTO: 0.47 10*3/MM3 (ref 0.19–1.3)
MONOCYTES NFR BLD AUTO: 7.6 % (ref 4–12)
NEUTROPHILS # BLD AUTO: 3.93 10*3/MM3 (ref 1.87–8.4)
NEUTROPHILS NFR BLD AUTO: 63.1 % (ref 39–78)
NRBC BLD MANUAL-RTO: 0 /100 WBC (ref 0–0)
PLATELET # BLD AUTO: 139 10*3/MM3 (ref 130–400)
PMV BLD AUTO: 12.4 FL (ref 6–12)
POTASSIUM BLD-SCNC: 3.7 MMOL/L (ref 3.5–5.3)
PROT SERPL-MCNC: 6.9 G/DL (ref 6.3–8.7)
PROTHROMBIN TIME: 30.5 SECONDS (ref 10–13.8)
RBC # BLD AUTO: 3.98 10*6/MM3 (ref 4.8–5.9)
SODIUM BLD-SCNC: 141 MMOL/L (ref 135–145)
TRIGL SERPL-MCNC: 193 MG/DL (ref 0–149)
WBC NRBC COR # BLD: 6.22 10*3/MM3 (ref 4.8–10.8)

## 2018-10-29 PROCEDURE — 36415 COLL VENOUS BLD VENIPUNCTURE: CPT

## 2018-10-29 PROCEDURE — 80061 LIPID PANEL: CPT | Performed by: EMERGENCY MEDICINE

## 2018-10-29 PROCEDURE — 80053 COMPREHEN METABOLIC PANEL: CPT | Performed by: EMERGENCY MEDICINE

## 2018-10-29 PROCEDURE — 85610 PROTHROMBIN TIME: CPT | Performed by: EMERGENCY MEDICINE

## 2018-10-29 PROCEDURE — 85025 COMPLETE CBC W/AUTO DIFF WBC: CPT | Performed by: EMERGENCY MEDICINE

## 2018-11-12 ENCOUNTER — CLINICAL SUPPORT (OUTPATIENT)
Dept: CARDIOLOGY | Facility: CLINIC | Age: 73
End: 2018-11-12

## 2018-11-12 DIAGNOSIS — I50.22 CHRONIC SYSTOLIC CONGESTIVE HEART FAILURE (HCC): Primary | ICD-10-CM

## 2018-11-12 PROCEDURE — 93297 REM INTERROG DEV EVAL ICPMS: CPT | Performed by: INTERNAL MEDICINE

## 2018-11-12 NOTE — PROGRESS NOTES
[place] Report    November 12, 2018    Primary Cardiologist: Jocelyn    Reason for evaluation: Heart Failure Management  Indication for AICD: chronic systolic congestive heart failure    Findings:  Monthly HFMR reviewed. Impedance just below reference. OptiVol 30  ohms. Of note, Optivol reached a max of 80 ohms before resetting on  10/6. ICD is V paced 0.3% and A paced 42.9%. 2 VT-NS. No shocks. 9  seconds in AT/AF. Activity is down due to hip pain. The remaining  cardiac compass trends are within normal limits. 30 day blood pressure  averages 109/68. Weight readings within normal limits. Pt is not  reporting any symptoms of HF. Patient did report that he currently has a  bad cold. HFMR indicates Low risk for worsening HF - Recommend  routine clinic follow up    Follow up: 1 month

## 2018-11-19 ENCOUNTER — LAB (OUTPATIENT)
Dept: LAB | Facility: HOSPITAL | Age: 73
End: 2018-11-19
Attending: EMERGENCY MEDICINE

## 2018-11-19 DIAGNOSIS — Z79.01 LONG TERM CURRENT USE OF ANTICOAGULANT THERAPY: ICD-10-CM

## 2018-11-19 LAB
INR PPP: 2.2 (ref 0.91–1.09)
PROTHROMBIN TIME: 26.3 SECONDS (ref 10–13.8)

## 2018-11-19 PROCEDURE — 85610 PROTHROMBIN TIME: CPT | Performed by: EMERGENCY MEDICINE

## 2018-11-20 NOTE — PROGRESS NOTES
[facility] - PODIATRY    Today's Date: 11/30/18    Patient Name: Jonh Peacock  MRN: [id number]  CSN: [id number]  PCP: Ric Mustafa MD  Referring Provider: No ref. provider found     SUBJECTIVE     Chief Complaint   Patient presents with   • Follow-up     Patient c/o painful fungus toenails. Pain scale: 3/10 PCP: Dr. Ric Mustafa 10/29/2018       HPI: Jonh Peacock, a 72 y.o.male, comes to clinic as a(n) established patient complaining of painful fungal toenails. Pt with h/o Anxiety, Arrhythmia, Cardiomyopathy, CHF, Depression, previous DVT, HTN, Obesity, Anticoagulation with Warfarin. Denies diabetes. Relates that his nails are thick and he is unable to cut them himelf and routinely his wife will cut them but does not feel comfortable cutting them due to being on warfarin. Also relates that he has a limb length discrepancy which he has a lift in his left foot and custom inserts for both feet. States that he would like to have a referral to establish with a new PCP. Admits pain at 3/10 level and described as shooting, pins/needles and tingling. Relates that he typically gets around in his motorized scooter. Notes improvement with regular visits. Denies any constitutional symptoms. No other pedal complaints at this time.    Past Medical History:   Diagnosis Date   • Anemia 1/30/2017   • Anxiety 1/30/2017   • Arrhythmia 1/30/2017   • Cardiomyopathy (CMS/HCC) 1/30/2017   • CHF (congestive heart failure) (CMS/HCC) 1/30/2017   • Chronic pain 1/30/2017   • Depression 1/30/2017   • DVT (deep venous thrombosis) (CMS/[facility])    • Gastritis 1/30/2017   • Hyperlipidemia 1/30/2017   • Hypertension 1/30/2017   • Low back pain 1/30/2017   • Neuropathy 1/30/2017   • Obesity 1/30/2017   • Status post placement of cardiac pacemaker 1/30/2017     Past Surgical History:   Procedure Laterality Date   • BACK SURGERY  2002    BACK FUSION   • BACK SURGERY  2007   • COLON RESECTION WITH COLOSTOMY  03/09/2010    • FOOT SURGERY Bilateral 2005   • JOINT REPLACEMENT Right 2002    KNEE   • PACEMAKER IMPLANTATION  2012   • TOTAL HIP ARTHROPLASTY Right 2004   • TOTAL HIP ARTHROPLASTY Left 12/2008     Family History   Problem Relation Age of Onset   • Heart disease Mother    • Heart disease Brother      Social History     Socioeconomic History   • Marital status:      Spouse name: Not on file   • Number of children: Not on file   • Years of education: Not on file   • Highest education level: Not on file   Social Needs   • Financial resource strain: Not on file   • Food insecurity - worry: Not on file   • Food insecurity - inability: Not on file   • Transportation needs - medical: Not on file   • Transportation needs - non-medical: Not on file   Occupational History   • Not on file   Tobacco Use   • Smoking status: Never Smoker   • Smokeless tobacco: Never Used   Substance and Sexual Activity   • Alcohol use: No   • Drug use: No   • Sexual activity: Defer   Other Topics Concern   • Not on file   Social History Narrative   • Not on file     Allergies   Allergen Reactions   • Amiodarone Other (See Comments)     INTERFERES WITH OTHER MEDICATIONS   • Oxycontin [Oxycodone Hcl] Other (See Comments)     Can not urinate.   • Penicillins Hives     Allergic to All cillins. Amoxacillin.   • Vioxx [Rofecoxib] GI Intolerance     Current Outpatient Medications   Medication Sig Dispense Refill   • Buprenorphine (BUTRANS) 10 MCG/HR patch weekly Place 10 mcg on the skin Every 7 (Seven) Days.     • CHONDROITIN SULFATE A PO Take 600 mg by mouth 2 (Two) Times a Day.     • diazePAM (VALIUM) 2 MG tablet Take 2 mg by mouth Daily.     • diltiaZEM (CARDIZEM) 30 MG tablet Take 1 tablet by mouth 3 (Three) Times a Day. 270 tablet 3   • furosemide (LASIX) 20 MG tablet Take 20 mg by mouth Daily.     • furosemide (LASIX) 40 MG tablet Take 40 mg by mouth Daily. TAKE WITH 20 MG TABLET FOR 60 PER MG PER DAY     • gabapentin (NEURONTIN) 600 MG tablet Take  600 mg by mouth 3 (Three) Times a Day.     • Glucosamine 750 MG tablet Take 750 mg by mouth 2 (Two) Times a Day.     • HYDROcodone-acetaminophen (NORCO) 7.5-325 MG per tablet Take 1 tablet by mouth Every 6 (Six) Hours As Needed for Moderate Pain .     • losartan (COZAAR) 25 MG tablet Take 25 mg by mouth Daily.     • metoprolol succinate XL (TOPROL-XL) 100 MG 24 hr tablet Take 100 mg by mouth Daily.     • Multiple Vitamins-Minerals (MULTIVITAMIN ADULT PO) Take  by mouth Daily.     • pantoprazole (PROTONIX) 40 MG EC tablet Take 40 mg by mouth Daily.     • POLYETHYLENE GLYCOL 3350 PO Take 17 granules by mouth Every Night.     • simvastatin (ZOCOR) 20 MG tablet Take 20 mg by mouth Every Night.     • tamsulosin (FLOMAX) 0.4 MG capsule 24 hr capsule Take 1 capsule by mouth Every Night.     • tiZANidine (ZANAFLEX) 4 MG tablet Take 4 mg by mouth 3 (Three) Times a Day As Needed for muscle spasms.     • warfarin (COUMADIN) 2 MG tablet Take 2 mg by mouth Daily.     • warfarin (COUMADIN) 4 MG tablet Take 4 mg by mouth Daily.     • warfarin (COUMADIN) 5 MG tablet Take 5 mg by mouth Daily.       No current facility-administered medications for this visit.      Review of Systems   Constitutional: Negative for chills and fever.   HENT: Negative for congestion.    Respiratory: Negative for shortness of breath.    Cardiovascular: Positive for leg swelling. Negative for chest pain.   Gastrointestinal: Negative for constipation, diarrhea, nausea and vomiting.   Musculoskeletal:        Foot pain   Skin: Negative for wound.        Thick Toenails   Neurological: Negative for numbness.       OBJECTIVE     Vitals:    11/30/18 1443   BP: 132/70   Pulse: 70   SpO2: 95%       PHYSICAL EXAM  GEN:   A&Ox3, NAD. Pt presents to clinic ambulating with cane and wearing Casual Shoes with custom inserts, left shoe has lift.      NEURO:   Protective sensation intact to 10/10 sites Right foot, 10/10 site Left foot using [place]  monofilament  Light touch sensation diminished  No Tinel's or V[place] sign.    VASC:  Skin temperature Warm to Warm proximal to distal carlotta  DP pulses 1/4 Right, 1/4 Left  PT pulses 1/4 Right, 1/4 Left  CFT 4 sec carlotta  Pedal hair growth absent  1+ pitting edema noted carlotta  Varicosities absent carlotta    MUSC/SKEL:  Muscle Strength Right foot Dorsiflexors 5/5, Plantarflexors 5/5, Evertors 5/5, Invertors 5/5  Muscle Strength Left foot Dorsiflexors 5/5, Plantarflexors 5/5, Evertors 5/5, Invertors 5/5  ROM of the 1st MTP is full without pain or crepitus  ROM of the MTJ is full without pain or crepitus    ROM of the STJ is full without pain or crepitus    ROM of the ankle joint is full without pain or crepitus    POP of nail plates  Planus foot type with decreased arch height and abduction of forefoot  Right LE longer than Left LE     DERM:  Pedal nails x10 are thickened, elongated and discolored with presence of subunugual debris  Webspaces are clean, dry, intact  Skin is normal in turgor and texture with no open wounds or sores appreciated.  Stable cicatrix noted to dorsal 1st MTPJ carlotta and dorsal digits      RADIOLOGY/NUCLEAR:  No results found.    LABORATORY/CULTURE RESULTS:      PATHOLOGY RESULTS:       ASSESSMENT/PLAN     Jonh was seen today for follow-up.    Diagnoses and all orders for this visit:    Onychomycosis    Neuropathy    Anticoagulant long-term use    Peripheral edema    Encounter to establish care  -     Ambulatory Referral to Internal Medicine      Comprehensive lower extremity examination and evaluation was performed.  Discussed findings and treatment plan including risks, benefits, and treatment options with patient in detail. Patient agreed with treatment plan.  After verbal consent obtained, nail(s) x10 debrided of length and thickness with nail nipper without incidence  Patient may maintain nails and calluses at home utilizing emery board or pumice stone between visits as needed  Reviewed at home  diabetic foot care including daily foot checks   Continue with custom shoes with and inserts  Defers compression stockings at this time  An After Visit Summary was printed and given to the patient at discharge, including (if requested) any available informative/educational handouts regarding diagnosis, treatment, or medications. All questions were answered to patient/family satisfaction. Should symptoms fail to improve or worsen they agree to call or return to clinic or to go to the Emergency Department. Discussed the importance of following up with any needed screening tests/labs/specialist appointments and any requested follow-up recommended by me today. Importance of maintaining follow-up discussed and patient accepts that missed appointments can delay diagnosis and potentially lead to worsening of conditions.  Return in about 3 months (around 2/28/2019)., or sooner if acute issues arise.        This document has been electronically signed by Deion Avelar DPM on November 30, 2018 3:39 PM     Please note that portions of this note may have been completed with a voice recognition program. Efforts were made to edit the dictations, but occasionally words are mistranscribed.

## 2018-11-29 ENCOUNTER — TELEPHONE (OUTPATIENT)
Dept: PODIATRY | Facility: CLINIC | Age: 73
End: 2018-11-29

## 2018-11-29 NOTE — TELEPHONE ENCOUNTER
Left message and callback number reminding patient of appointment with Dr. Onofre Avelar on November 30, 2018 at 3:15 pm. Unable to confirm appointment with patient at this time.

## 2018-11-30 ENCOUNTER — OFFICE VISIT (OUTPATIENT)
Dept: PODIATRY | Facility: CLINIC | Age: 73
End: 2018-11-30

## 2018-11-30 VITALS
DIASTOLIC BLOOD PRESSURE: 70 MMHG | OXYGEN SATURATION: 95 % | BODY MASS INDEX: 36.7 KG/M2 | WEIGHT: 286 LBS | HEART RATE: 70 BPM | SYSTOLIC BLOOD PRESSURE: 132 MMHG | HEIGHT: 74 IN

## 2018-11-30 DIAGNOSIS — Z79.01 ANTICOAGULANT LONG-TERM USE: ICD-10-CM

## 2018-11-30 DIAGNOSIS — R60.9 PERIPHERAL EDEMA: ICD-10-CM

## 2018-11-30 DIAGNOSIS — G62.9 NEUROPATHY: ICD-10-CM

## 2018-11-30 DIAGNOSIS — Z76.89 ENCOUNTER TO ESTABLISH CARE: ICD-10-CM

## 2018-11-30 DIAGNOSIS — B35.1 ONYCHOMYCOSIS: Primary | ICD-10-CM

## 2018-11-30 PROBLEM — R60.0 PERIPHERAL EDEMA: Status: ACTIVE | Noted: 2018-11-30

## 2018-11-30 PROCEDURE — 99213 OFFICE O/P EST LOW 20 MIN: CPT | Performed by: PODIATRIST

## 2018-11-30 PROCEDURE — 11721 DEBRIDE NAIL 6 OR MORE: CPT | Performed by: PODIATRIST

## 2018-12-03 ENCOUNTER — TELEPHONE (OUTPATIENT)
Dept: INTERNAL MEDICINE | Facility: CLINIC | Age: 73
End: 2018-12-03

## 2018-12-03 ENCOUNTER — LAB (OUTPATIENT)
Dept: LAB | Facility: HOSPITAL | Age: 73
End: 2018-12-03
Attending: EMERGENCY MEDICINE

## 2018-12-03 DIAGNOSIS — Z79.01 LONG TERM CURRENT USE OF ANTICOAGULANT THERAPY: ICD-10-CM

## 2018-12-03 LAB
INR PPP: 2.8 (ref 0.91–1.09)
PROTHROMBIN TIME: 34.1 SECONDS (ref 10–13.8)

## 2018-12-03 PROCEDURE — 85610 PROTHROMBIN TIME: CPT | Performed by: EMERGENCY MEDICINE

## 2018-12-03 NOTE — TELEPHONE ENCOUNTER
Patient requested refill of Bactriban cream upon check out. He states he has a small amount left and uses it prn.

## 2018-12-12 ENCOUNTER — CLINICAL SUPPORT (OUTPATIENT)
Dept: CARDIOLOGY | Facility: CLINIC | Age: 73
End: 2018-12-12

## 2018-12-12 DIAGNOSIS — I50.22 CHRONIC SYSTOLIC CONGESTIVE HEART FAILURE (HCC): Primary | ICD-10-CM

## 2018-12-12 PROCEDURE — 93297 REM INTERROG DEV EVAL ICPMS: CPT | Performed by: INTERNAL MEDICINE

## 2018-12-12 NOTE — PROGRESS NOTES
[place] Report    December 12, 2018    Primary Cardiologist: Jocelyn    Reason for evaluation: Heart Failure Management  Indication for AICD: chronic systolic congestive heart failure    Findings:  Monthly HFMR reviewed. Impedance below reference and no recent  OptiVol crossing. OptiVol 25 ohms. ICD is V paced 0.3% and A paced  41.8%. No episodes of VT/VF and no shocks. 8 seconds in AT/AF. The  remaining cardiac compass trends are within normal limits. 30 day blood  pressure averages 115/68. Weight readings within normal limits. Pt is  not reporting any symptoms of HF. HFMR indicates Low Risk for  worsening HF-Recommend routine clinic follow up.    Follow up: 1 month

## 2018-12-13 ENCOUNTER — OFFICE VISIT (OUTPATIENT)
Dept: INTERNAL MEDICINE | Facility: CLINIC | Age: 73
End: 2018-12-13

## 2018-12-13 VITALS
HEART RATE: 62 BPM | BODY MASS INDEX: 36.26 KG/M2 | OXYGEN SATURATION: 97 % | WEIGHT: 282.5 LBS | HEIGHT: 74 IN | SYSTOLIC BLOOD PRESSURE: 120 MMHG | DIASTOLIC BLOOD PRESSURE: 60 MMHG | RESPIRATION RATE: 16 BRPM

## 2018-12-13 DIAGNOSIS — G89.29 CHRONIC MIDLINE LOW BACK PAIN WITH BILATERAL SCIATICA: ICD-10-CM

## 2018-12-13 DIAGNOSIS — I48.0 PAROXYSMAL ATRIAL FIBRILLATION (HCC): ICD-10-CM

## 2018-12-13 DIAGNOSIS — M54.42 CHRONIC MIDLINE LOW BACK PAIN WITH BILATERAL SCIATICA: ICD-10-CM

## 2018-12-13 DIAGNOSIS — Z79.01 ANTICOAGULANT LONG-TERM USE: ICD-10-CM

## 2018-12-13 DIAGNOSIS — E78.2 MIXED HYPERLIPIDEMIA: ICD-10-CM

## 2018-12-13 DIAGNOSIS — M54.41 CHRONIC MIDLINE LOW BACK PAIN WITH BILATERAL SCIATICA: ICD-10-CM

## 2018-12-13 DIAGNOSIS — I50.22 CHRONIC SYSTOLIC CONGESTIVE HEART FAILURE (HCC): Primary | ICD-10-CM

## 2018-12-13 DIAGNOSIS — I42.8 NON-ISCHEMIC CARDIOMYOPATHY (HCC): ICD-10-CM

## 2018-12-13 DIAGNOSIS — N40.0 BENIGN PROSTATIC HYPERPLASIA WITHOUT LOWER URINARY TRACT SYMPTOMS: ICD-10-CM

## 2018-12-13 DIAGNOSIS — I10 ESSENTIAL HYPERTENSION: ICD-10-CM

## 2018-12-13 DIAGNOSIS — Z95.810 CARDIAC DEFIBRILLATOR IN SITU: ICD-10-CM

## 2018-12-13 DIAGNOSIS — E66.01 CLASS 2 SEVERE OBESITY DUE TO EXCESS CALORIES WITH SERIOUS COMORBIDITY AND BODY MASS INDEX (BMI) OF 36.0 TO 36.9 IN ADULT (HCC): ICD-10-CM

## 2018-12-13 PROBLEM — E66.812 CLASS 2 SEVERE OBESITY DUE TO EXCESS CALORIES WITH SERIOUS COMORBIDITY AND BODY MASS INDEX (BMI) OF 36.0 TO 36.9 IN ADULT: Status: ACTIVE | Noted: 2017-01-30

## 2018-12-13 PROBLEM — I49.9 ARRHYTHMIA: Status: RESOLVED | Noted: 2017-01-30 | Resolved: 2018-12-13

## 2018-12-13 PROBLEM — G47.33 OBSTRUCTIVE SLEEP APNEA OF ADULT: Status: ACTIVE | Noted: 2018-12-13

## 2018-12-13 PROCEDURE — 99204 OFFICE O/P NEW MOD 45 MIN: CPT | Performed by: INTERNAL MEDICINE

## 2018-12-13 NOTE — PROGRESS NOTES
CC: Establish care for atrial fibrillation    History:  Jonh Peacock is a 73 y.o. male who presents today for evaluation of the above problems.  He reports he has done well without any acute illness.  He has history of atrial fibrillation and also of systolic heart failure with a defibrillator in place.  He remains on rate control with diltiazem and metoprolol and is anticoagulated on warfarin.  He notes he did take Xarelto for a period of time, but had an episode of bleeding into the stool, so he was placed back on warfarin.  He has never used any other DOAC therapy.  He does follow with Dr. Banks and is on Toprol-XL and for a semi-as well as losartan as it relates to his systolic heart failure.  He watches his weight daily and has noticed no significant swings in his weight.  He does phone these in daily for monitoring.  He continues on Flomax and has no current lower urinary tract symptoms.    ROS:  Review of Systems   Constitutional: Negative for chills and fever.   HENT: Negative for congestion and sore throat.    Eyes: Negative for visual disturbance.   Respiratory: Negative for cough and shortness of breath.    Cardiovascular: Negative for chest pain, palpitations and leg swelling.   Gastrointestinal: Negative for abdominal pain, constipation and nausea.   Endocrine: Negative for cold intolerance and heat intolerance.   Genitourinary: Negative for difficulty urinating and frequency.   Musculoskeletal: Negative for arthralgias and back pain.   Skin: Negative for rash.   Neurological: Negative for dizziness and headaches.   Psychiatric/Behavioral: Negative for dysphoric mood. The patient is not nervous/anxious.        Allergies   Allergen Reactions   • Amiodarone Other (See Comments)     INTERFERES WITH OTHER MEDICATIONS   • Oxycontin [Oxycodone Hcl] Other (See Comments)     Can not urinate.   • Penicillins Hives     Allergic to All cillins. Amoxacillin.   • Vioxx [Rofecoxib] GI Intolerance     Past  Medical History:   Diagnosis Date   • Anemia 1/30/2017   • Anxiety 1/30/2017   • Arrhythmia 1/30/2017   • Cardiomyopathy (CMS/HCC) 1/30/2017   • CHF (congestive heart failure) (CMS/[facility]) 1/30/2017   • Chronic pain 1/30/2017   • Depression 1/30/2017   • Diverticulitis 2010   • DVT (deep venous thrombosis) (CMS/[facility])    • Gastritis 1/30/2017   • Hyperlipidemia 1/30/2017   • Hypertension 1/30/2017   • Low back pain 1/30/2017   • Neuropathy 1/30/2017   • Obesity 1/30/2017   • Status post placement of cardiac pacemaker 1/30/2017     Past Surgical History:   Procedure Laterality Date   • BACK SURGERY  2002    BACK FUSION   • BACK SURGERY  2007   • COLON RESECTION WITH COLOSTOMY  03/09/2010   • FOOT SURGERY Bilateral 2005   • JOINT REPLACEMENT Right 2002    KNEE   • PACEMAKER IMPLANTATION  2012   • TOTAL HIP ARTHROPLASTY Right 2004   • TOTAL HIP ARTHROPLASTY Left 12/2008     Family History   Problem Relation Age of Onset   • Heart disease Mother    • Heart disease Brother       reports that  has never smoked. he has never used smokeless tobacco. He reports that he does not drink alcohol or use drugs.      Current Outpatient Medications:   •  Buprenorphine (BUTRANS) 10 MCG/HR patch weekly, Place 10 mcg on the skin Every 7 (Seven) Days., Disp: , Rfl:   •  CHONDROITIN SULFATE A PO, Take 600 mg by mouth 2 (Two) Times a Day., Disp: , Rfl:   •  diazePAM (VALIUM) 2 MG tablet, Take 2 mg by mouth Daily., Disp: , Rfl:   •  diltiaZEM (CARDIZEM) 30 MG tablet, Take 1 tablet by mouth 3 (Three) Times a Day., Disp: 270 tablet, Rfl: 3  •  furosemide (LASIX) 20 MG tablet, Take 20 mg by mouth Daily., Disp: , Rfl:   •  furosemide (LASIX) 40 MG tablet, Take 40 mg by mouth Daily. TAKE WITH 20 MG TABLET FOR 60 PER MG PER DAY, Disp: , Rfl:   •  gabapentin (NEURONTIN) 600 MG tablet, Take 600 mg by mouth 3 (Three) Times a Day., Disp: , Rfl:   •  Glucosamine 750 MG tablet, Take 750 mg by mouth 2 (Two) Times a Day., Disp: , Rfl:   •   "HYDROcodone-acetaminophen (NORCO) 7.5-325 MG per tablet, Take 1 tablet by mouth Every 6 (Six) Hours As Needed for Moderate Pain ., Disp: , Rfl:   •  losartan (COZAAR) 25 MG tablet, Take 25 mg by mouth Daily., Disp: , Rfl:   •  metoprolol succinate XL (TOPROL-XL) 100 MG 24 hr tablet, Take 100 mg by mouth Daily., Disp: , Rfl:   •  Multiple Vitamins-Minerals (MULTIVITAMIN ADULT PO), Take  by mouth Daily., Disp: , Rfl:   •  pantoprazole (PROTONIX) 40 MG EC tablet, Take 40 mg by mouth Daily., Disp: , Rfl:   •  POLYETHYLENE GLYCOL 3350 PO, Take 17 granules by mouth Every Night., Disp: , Rfl:   •  simvastatin (ZOCOR) 20 MG tablet, Take 20 mg by mouth Every Night., Disp: , Rfl:   •  tamsulosin (FLOMAX) 0.4 MG capsule 24 hr capsule, Take 1 capsule by mouth Every Night., Disp: , Rfl:   •  tiZANidine (ZANAFLEX) 4 MG tablet, Take 4 mg by mouth 3 (Three) Times a Day As Needed for muscle spasms., Disp: , Rfl:   •  warfarin (COUMADIN) 2 MG tablet, Take 2 mg by mouth Daily., Disp: , Rfl:   •  warfarin (COUMADIN) 4 MG tablet, Take 4 mg by mouth Daily., Disp: , Rfl:   •  warfarin (COUMADIN) 5 MG tablet, Take 5 mg by mouth Daily., Disp: , Rfl:     OBJECTIVE:  /60 (BP Location: Left arm, Patient Position: Sitting, Cuff Size: Adult)   Pulse 62   Resp 16   Ht 188 cm (74\")   Wt 128 kg (282 lb 8 oz)   SpO2 97%   BMI 36.27 kg/m²    Physical Exam   Constitutional: He is oriented to person, place, and time. He appears well-developed and well-nourished. No distress.   HENT:   Head: Normocephalic and atraumatic.   Right Ear: External ear normal.   Left Ear: External ear normal.   Nose: Nose normal.   Mouth/Throat: Oropharynx is clear and moist. No oropharyngeal exudate.   Eyes: EOM are normal. No scleral icterus.   Neck: Normal range of motion. No tracheal deviation present.   Cardiovascular: Normal rate, regular rhythm and normal heart sounds.   No murmur heard.  Pulmonary/Chest: Effort normal and breath sounds normal. No accessory " muscle usage. No respiratory distress. He has no wheezes.   Abdominal: Soft. Bowel sounds are normal. He exhibits no distension. There is no tenderness.   Musculoskeletal: Normal range of motion. He exhibits edema (trace in the bilateral lower extremities to the knee).   Neurological: He is alert and oriented to person, place, and time. Coordination and gait normal.   Skin: Skin is warm and dry. No cyanosis. Nails show no clubbing.   No jaundice   Psychiatric: He has a normal mood and affect. His mood appears not anxious. He does not exhibit a depressed mood.       Assessment/Plan    Diagnoses and all orders for this visit:    Chronic systolic congestive heart failure (CMS/HCC)  Cardiac defibrillator in situ  Non-ischemic cardiomyopathy (CMS/HCC)  He is euvolemic on exam and continues on Toprol-XL, losartan, and diuretic therapy.    Essential hypertension  -     Comprehensive Metabolic Panel; Future  -     CBC (No Diff); Future  Well controlled, BP goal for age is <140/90 per JNC 8 guidelines and continue current medications    Paroxysmal atrial fibrillation (CMS/HCC)  Anticoagulant long-term use  -     Protime-INR; Standing  He is rate controlled on metoprolol and diltiazem and anticoagulated on warfarin.  We did discuss the possibility of trying an alternative DOAC agent such as Eliquis.  Furthermore, we discussed the possibility of home INR monitoring as an option.  He prefers not to make any changes at this time.  Also, we discussed the possible utility of long-acting diltiazem, but he is hesitant to do this because it would likely cost him more in co-pays.    Mixed hyperlipidemia  -     Lipid Panel; Future  Stable on moderate intensity statin therapy per ACC/AHA guidelines.    Chronic midline low back pain with bilateral sciatica  He is under the management of Dr. Sterling and reports no exacerbations in his pain.  He uses his motorized scooter primarily, but does ambulate around the house.    Benign prostatic  hyperplasia without lower urinary tract symptoms  Stable without symptoms on Flomax.    Class 2 severe obesity due to excess calories with serious comorbidity and body mass index (BMI) of 36.0 to 36.9 in adult (CMS/[facility])  Recommended attention to portion control and being careful about the types and timing of meals for the purpose of weight management.    An After Visit Summary was printed and given to the patient at discharge.  Return in about 4 months (around 4/13/2019) for Medicare Wellness.         Ric Antony D.O. 12/13/2018

## 2018-12-17 ENCOUNTER — LAB (OUTPATIENT)
Dept: LAB | Facility: HOSPITAL | Age: 73
End: 2018-12-17
Attending: INTERNAL MEDICINE

## 2018-12-17 DIAGNOSIS — Z79.01 ANTICOAGULANT LONG-TERM USE: ICD-10-CM

## 2018-12-17 DIAGNOSIS — I48.0 PAROXYSMAL ATRIAL FIBRILLATION (HCC): ICD-10-CM

## 2018-12-17 LAB
INR PPP: 2.3 (ref 0.91–1.09)
PROTHROMBIN TIME: 28.1 SECONDS (ref 10–13.8)

## 2018-12-17 PROCEDURE — 85610 PROTHROMBIN TIME: CPT | Performed by: INTERNAL MEDICINE

## 2018-12-31 ENCOUNTER — LAB (OUTPATIENT)
Dept: LAB | Facility: HOSPITAL | Age: 73
End: 2018-12-31
Attending: INTERNAL MEDICINE

## 2018-12-31 DIAGNOSIS — I48.0 PAROXYSMAL ATRIAL FIBRILLATION (HCC): ICD-10-CM

## 2018-12-31 DIAGNOSIS — Z79.01 ANTICOAGULANT LONG-TERM USE: ICD-10-CM

## 2018-12-31 PROCEDURE — 85610 PROTHROMBIN TIME: CPT | Performed by: INTERNAL MEDICINE

## 2019-01-02 LAB
INR PPP: 2.2 (ref 0.91–1.09)
PROTHROMBIN TIME: 26.3 SECONDS (ref 10–13.8)

## 2019-01-14 ENCOUNTER — LAB (OUTPATIENT)
Dept: LAB | Facility: HOSPITAL | Age: 74
End: 2019-01-14
Attending: INTERNAL MEDICINE

## 2019-01-14 ENCOUNTER — CLINICAL SUPPORT (OUTPATIENT)
Dept: CARDIOLOGY | Facility: CLINIC | Age: 74
End: 2019-01-14

## 2019-01-14 DIAGNOSIS — I48.0 PAROXYSMAL ATRIAL FIBRILLATION (HCC): ICD-10-CM

## 2019-01-14 DIAGNOSIS — Z79.01 ANTICOAGULANT LONG-TERM USE: ICD-10-CM

## 2019-01-14 DIAGNOSIS — I50.22 CHRONIC SYSTOLIC CONGESTIVE HEART FAILURE (HCC): Primary | ICD-10-CM

## 2019-01-14 PROCEDURE — 93297 REM INTERROG DEV EVAL ICPMS: CPT | Performed by: INTERNAL MEDICINE

## 2019-01-14 PROCEDURE — 85610 PROTHROMBIN TIME: CPT | Performed by: INTERNAL MEDICINE

## 2019-01-14 RX ORDER — LOSARTAN POTASSIUM 25 MG/1
25 TABLET ORAL DAILY
Qty: 90 TABLET | Refills: 2 | Status: SHIPPED | OUTPATIENT
Start: 2019-01-14 | End: 2019-04-09 | Stop reason: SDUPTHER

## 2019-01-14 NOTE — PROGRESS NOTES
[place] Report    January 14, 2019    Primary Cardiologist: Jocelyn    Reason for evaluation: Heart Failure Management  Indication for AICD: chronic systolic congestive heart failure    Findings:  Monthly HFMR reviewed. Impedance below reference. OptiVol 0 ohms.  ICD is V paced 0.4% and A paced 44.4%. No episodes of VT/VF and no  shocks. No AT/AF. The remaining cardiac compass trends are within  normal limits. 30 day blood pressure averages 114/71. Weight readings  within normal limits. Pt is not reporting any symptoms of HF. HFMR  indicates Low Risk for worsening HF-Recommend routine clinic follow  up.    Follow up: 1 month

## 2019-01-15 LAB
INR PPP: 2.9 (ref 0.91–1.09)
PROTHROMBIN TIME: 34.6 SECONDS (ref 10–13.8)

## 2019-01-28 ENCOUNTER — LAB (OUTPATIENT)
Dept: LAB | Facility: HOSPITAL | Age: 74
End: 2019-01-28
Attending: INTERNAL MEDICINE

## 2019-01-28 DIAGNOSIS — I48.0 PAROXYSMAL ATRIAL FIBRILLATION (HCC): ICD-10-CM

## 2019-01-28 DIAGNOSIS — Z79.01 ANTICOAGULANT LONG-TERM USE: ICD-10-CM

## 2019-01-28 PROCEDURE — 85610 PROTHROMBIN TIME: CPT | Performed by: INTERNAL MEDICINE

## 2019-01-29 LAB
INR PPP: 2.3 (ref 0.91–1.09)
PROTHROMBIN TIME: 27.5 SECONDS (ref 10–13.8)

## 2019-01-31 ENCOUNTER — TELEPHONE (OUTPATIENT)
Dept: INTERNAL MEDICINE | Facility: CLINIC | Age: 74
End: 2019-01-31

## 2019-01-31 RX ORDER — SIMVASTATIN 20 MG
20 TABLET ORAL NIGHTLY
Qty: 90 TABLET | Refills: 3 | Status: SHIPPED | OUTPATIENT
Start: 2019-01-31 | End: 2019-04-09 | Stop reason: SDUPTHER

## 2019-02-05 ENCOUNTER — OFFICE VISIT (OUTPATIENT)
Dept: CARDIOLOGY | Facility: CLINIC | Age: 74
End: 2019-02-05

## 2019-02-05 VITALS
DIASTOLIC BLOOD PRESSURE: 70 MMHG | HEIGHT: 74 IN | WEIGHT: 292 LBS | HEART RATE: 79 BPM | BODY MASS INDEX: 37.47 KG/M2 | SYSTOLIC BLOOD PRESSURE: 128 MMHG | OXYGEN SATURATION: 99 %

## 2019-02-05 DIAGNOSIS — I48.0 PAROXYSMAL ATRIAL FIBRILLATION (HCC): ICD-10-CM

## 2019-02-05 DIAGNOSIS — I42.8 NON-ISCHEMIC CARDIOMYOPATHY (HCC): Primary | ICD-10-CM

## 2019-02-05 DIAGNOSIS — I10 ESSENTIAL HYPERTENSION: ICD-10-CM

## 2019-02-05 PROCEDURE — 99214 OFFICE O/P EST MOD 30 MIN: CPT | Performed by: INTERNAL MEDICINE

## 2019-02-05 PROCEDURE — 93000 ELECTROCARDIOGRAM COMPLETE: CPT | Performed by: INTERNAL MEDICINE

## 2019-02-05 NOTE — PROGRESS NOTES
Subjective:     Encounter Date:02/05/2019      Patient ID: Jonh Peacock is a 73 y.o. male with nonischemic cardiomyopathy, history of nonsustained ventricular tachycardia, atrial fibrillation, atrial tachycardia, mitral regurgitation, hypertension and hyperlipidemia who presents today for follow-up.    Chief Complaint: Follow-up    Congestive Heart Failure   Presents for follow-up visit. Associated symptoms include edema, fatigue and shortness of breath. Pertinent negatives include no abdominal pain, chest pain, orthopnea, palpitations, paroxysmal nocturnal dyspnea or unexpected weight change. The symptoms have been stable. Compliance with total regimen is %. Compliance with diet is %. Compliance with exercise is %. Compliance with medications is %.   Atrial Fibrillation   Presents for follow-up visit. Symptoms include shortness of breath. Symptoms are negative for an AICD problem, chest pain, dizziness, hemodynamic instability, hypotension, palpitations, syncope, tachycardia and weakness. The symptoms have been stable. Past medical history includes atrial fibrillation and CHF. There are no medication compliance problems.      This patient presents today for routine follow-up of chronic nonischemic cardiomyopathy, atrial fibrillation.  The patient says that he has been doing reasonably well.  He continues to have chronic generalized fatigue.  However, this patient does remain active.  The patient denies any palpitations, lightheadedness, dizziness, syncope.  He has not had any significant chest discomfort.  He does have some intermittent lower extremity edema.  He denies orthopnea or PND.  He has some mild chronic shortness of breath and dyspnea on exertion but no increase lately.  His blood pressure has been well-controlled.  He remains on Coumadin and has not had any bleeding difficulties.  He has a history of an ICD placement and has not had any therapies delivered from his device  and has not had any difficulties with his device.  Otherwise, no complaints at this time.      Current Outpatient Medications:   •  Buprenorphine (BUTRANS) 10 MCG/HR patch weekly, Place 10 mcg on the skin Every 7 (Seven) Days., Disp: , Rfl:   •  CHONDROITIN SULFATE A PO, Take 600 mg by mouth 2 (Two) Times a Day., Disp: , Rfl:   •  diazePAM (VALIUM) 2 MG tablet, Take 2 mg by mouth Daily., Disp: , Rfl:   •  diltiaZEM (CARDIZEM) 30 MG tablet, Take 1 tablet by mouth 3 (Three) Times a Day., Disp: 270 tablet, Rfl: 3  •  furosemide (LASIX) 20 MG tablet, Take 20 mg by mouth Daily., Disp: , Rfl:   •  furosemide (LASIX) 40 MG tablet, Take 40 mg by mouth Daily. TAKE WITH 20 MG TABLET FOR 60 PER MG PER DAY, Disp: , Rfl:   •  gabapentin (NEURONTIN) 600 MG tablet, Take 600 mg by mouth 3 (Three) Times a Day., Disp: , Rfl:   •  Glucosamine 750 MG tablet, Take 750 mg by mouth 2 (Two) Times a Day., Disp: , Rfl:   •  HYDROcodone-acetaminophen (NORCO) 7.5-325 MG per tablet, Take 1 tablet by mouth Every 6 (Six) Hours As Needed for Moderate Pain ., Disp: , Rfl:   •  losartan (COZAAR) 25 MG tablet, Take 1 tablet by mouth Daily., Disp: 90 tablet, Rfl: 2  •  metoprolol succinate XL (TOPROL-XL) 100 MG 24 hr tablet, Take 100 mg by mouth Daily., Disp: , Rfl:   •  Multiple Vitamins-Minerals (MULTIVITAMIN ADULT PO), Take  by mouth Daily., Disp: , Rfl:   •  pantoprazole (PROTONIX) 40 MG EC tablet, Take 40 mg by mouth Daily., Disp: , Rfl:   •  POLYETHYLENE GLYCOL 3350 PO, Take 17 granules by mouth Every Night., Disp: , Rfl:   •  simvastatin (ZOCOR) 20 MG tablet, Take 1 tablet by mouth Every Night., Disp: 90 tablet, Rfl: 3  •  tamsulosin (FLOMAX) 0.4 MG capsule 24 hr capsule, Take 1 capsule by mouth Every Night., Disp: , Rfl:   •  tiZANidine (ZANAFLEX) 4 MG tablet, Take 4 mg by mouth 3 (Three) Times a Day As Needed for muscle spasms., Disp: , Rfl:   •  warfarin (COUMADIN) 2 MG tablet, Take 2 mg by mouth Daily., Disp: , Rfl:   •  warfarin (COUMADIN)  4 MG tablet, Take 4 mg by mouth Daily., Disp: , Rfl:   •  warfarin (COUMADIN) 5 MG tablet, Take 5 mg by mouth Daily., Disp: , Rfl:     Allergies   Allergen Reactions   • Amiodarone Other (See Comments)     INTERFERES WITH OTHER MEDICATIONS   • Oxycontin [Oxycodone Hcl] Other (See Comments)     Can not urinate.   • Penicillins Hives     Allergic to All cillins. Amoxacillin.   • Vioxx [Rofecoxib] GI Intolerance     Social History     Tobacco Use   • Smoking status: Never Smoker   • Smokeless tobacco: Never Used   Substance Use Topics   • Alcohol use: No     Review of Systems   Constitution: Positive for fatigue and malaise/fatigue. Negative for chills, fever, weakness, night sweats, unexpected weight change and weight loss.   Cardiovascular: Positive for dyspnea on exertion. Negative for chest pain, leg swelling, orthopnea, palpitations, paroxysmal nocturnal dyspnea and syncope.   Respiratory: Positive for shortness of breath. Negative for cough, hemoptysis and wheezing.    Endocrine: Negative for cold intolerance and heat intolerance.   Hematologic/Lymphatic: Negative for bleeding problem. Does not bruise/bleed easily.   Musculoskeletal: Positive for arthritis.   Gastrointestinal: Negative for abdominal pain, hematemesis, hematochezia, melena, nausea and vomiting.   Genitourinary: Negative for dysuria and hematuria.   Neurological: Negative for dizziness, headaches and loss of balance.       ECG 12 Lead  Date/Time: 2/5/2019 1:21 PM  Performed by: Arun Banks MD  Authorized by: Arun Banks MD   Comparison: compared with previous ECG from 7/31/2018  Similar to previous ECG  Rhythm: sinus rhythm  Ectopy: PVCs  Rate: normal  BPM: 76  Conduction: conduction normal  T Waves: T waves normal  QRS axis: normal  Other: no other findings  Clinical impression: abnormal ECG             Objective:     Physical Exam   Constitutional: He is oriented to person, place, and time. He appears well-developed and  "well-nourished. No distress.   HENT:   Head: Normocephalic and atraumatic.   Mouth/Throat: Oropharynx is clear and moist.   Eyes: EOM are normal. Pupils are equal, round, and reactive to light.   Neck: Normal range of motion. Neck supple. No JVD present. No thyromegaly present.   Cardiovascular: Normal rate, regular rhythm, S1 normal, S2 normal, normal heart sounds and intact distal pulses. Exam reveals no gallop and no friction rub.   No murmur heard.  Pulmonary/Chest: Effort normal and breath sounds normal.   Abdominal: Soft. Bowel sounds are normal. He exhibits no distension. There is no tenderness.   Musculoskeletal: Normal range of motion. He exhibits edema.   Neurological: He is alert and oriented to person, place, and time. No cranial nerve deficit.   Skin: Skin is warm and dry. No rash noted. No cyanosis or erythema. Nails show no clubbing.   Psychiatric: He has a normal mood and affect.   Vitals reviewed.    /70 (BP Location: Left arm, Patient Position: Sitting)   Pulse 79   Ht 188 cm (74\")   Wt 132 kg (292 lb)   SpO2 99%   BMI 37.49 kg/m²     Data/Lab Review:     Lab Results   Component Value Date    CHOL 185 10/29/2018    CHLPL 220 (H) 07/12/2016    TRIG 193 (H) 10/29/2018    HDL 36 (L) 10/29/2018     (H) 10/29/2018     I reviewed the patient's most recent [place] report on 1/14/19: This shows that the device is atrial paced 44.4% of the time, no episodes of VT/VF and no shocks delivered.  No evidence of atrial tachycardia or atrial fibrillation.  Cardiac Compass trends are within normal limits.  No reported symptoms of heart failure.    =============================================    Echocardiogram in 2010 showed ejection fraction 25-30 percent with moderate to severe mitral regurgitation.    ================================================    Records from Dr. Broadbent's office indicates that this patient did undergo cardiac catheterization on 11/19/2012 which showed no obstructive " coronary artery disease, ejection fraction of 40% with 1+ mitral regurgitation      Assessment:          Diagnosis Plan   1. Non-ischemic cardiomyopathy (CMS/HCC)  ECG 12 Lead    Adult Transthoracic Echo Complete W/ Cont if Necessary Per Protocol   2. Paroxysmal atrial fibrillation (CMS/HCC)     3. Essential hypertension            Plan:       1.  Chronic systolic congestive heart failure/nonischemic cardiomyopathy: The patient is clinically stable at this time.  He appears to be euvolemic on examination.  We continue to follow reports from his ICD which have been showing suitable volume status.  The patient has not had an assessment of his left ventricular systolic function number of years.  Although he remains stable, think that it is reasonable at this time to repeat an echocardiogram.     2.  Paroxysmal atrial fibrillation/paroxysmal atrial tachycardia: Clinically stable.  No obvious recurrence.  Dr. Chavez continues to follow this patient as well.     3.  Essential hypertension: Blood pressure remains well-controlled.  No changes in medications today.    Patient's Body mass index is 37.49 kg/m². BMI is above normal parameters. Recommendations include: exercise counseling and nutrition counseling.     Follow-up: 6 months unless otherwise needed sooner.

## 2019-02-07 ENCOUNTER — HOSPITAL ENCOUNTER (OUTPATIENT)
Dept: CARDIOLOGY | Facility: HOSPITAL | Age: 74
Discharge: HOME | End: 2019-02-07
Admitting: INTERNAL MEDICINE

## 2019-02-07 VITALS
BODY MASS INDEX: 37.35 KG/M2 | WEIGHT: 291.01 LBS | DIASTOLIC BLOOD PRESSURE: 79 MMHG | HEIGHT: 74 IN | SYSTOLIC BLOOD PRESSURE: 129 MMHG

## 2019-02-07 DIAGNOSIS — I42.8 NON-ISCHEMIC CARDIOMYOPATHY (HCC): ICD-10-CM

## 2019-02-07 PROCEDURE — 93306 TTE W/DOPPLER COMPLETE: CPT | Performed by: INTERNAL MEDICINE

## 2019-02-07 PROCEDURE — 25010000002 PERFLUTREN 6.52 MG/ML SUSPENSION: Performed by: INTERNAL MEDICINE

## 2019-02-07 PROCEDURE — 93306 TTE W/DOPPLER COMPLETE: CPT

## 2019-02-07 RX ADMIN — PERFLUTREN 8.48 MG: 6.52 INJECTION, SUSPENSION INTRAVENOUS at 15:45

## 2019-02-11 LAB
BH CV ECHO MEAS - AO MAX PG (FULL): 3.4 MMHG
BH CV ECHO MEAS - AO MAX PG: 5.3 MMHG
BH CV ECHO MEAS - AO MEAN PG (FULL): 3 MMHG
BH CV ECHO MEAS - AO MEAN PG: 4 MMHG
BH CV ECHO MEAS - AO ROOT AREA (BSA CORRECTED): 1.5
BH CV ECHO MEAS - AO ROOT AREA: 10.8 CM^2
BH CV ECHO MEAS - AO ROOT DIAM: 3.7 CM
BH CV ECHO MEAS - AO V2 MAX: 115 CM/SEC
BH CV ECHO MEAS - AO V2 MEAN: 92.2 CM/SEC
BH CV ECHO MEAS - AO V2 VTI: 28.5 CM
BH CV ECHO MEAS - AVA(I,A): 2.5 CM^2
BH CV ECHO MEAS - AVA(I,D): 2.5 CM^2
BH CV ECHO MEAS - AVA(V,A): 2.9 CM^2
BH CV ECHO MEAS - AVA(V,D): 2.9 CM^2
BH CV ECHO MEAS - BSA(HAYCOCK): 2.7 M^2
BH CV ECHO MEAS - BSA: 2.5 M^2
BH CV ECHO MEAS - BZI_BMI: 37.4 KILOGRAMS/M^2
BH CV ECHO MEAS - BZI_METRIC_HEIGHT: 188 CM
BH CV ECHO MEAS - BZI_METRIC_WEIGHT: 132 KG
BH CV ECHO MEAS - EDV(CUBED): 262.1 ML
BH CV ECHO MEAS - EDV(MOD-SP4): 340 ML
BH CV ECHO MEAS - EDV(TEICH): 208.5 ML
BH CV ECHO MEAS - EF(CUBED): 29.4 %
BH CV ECHO MEAS - EF(MOD-SP4): 29.4 %
BH CV ECHO MEAS - EF(TEICH): 23.2 %
BH CV ECHO MEAS - ESV(CUBED): 185.2 ML
BH CV ECHO MEAS - ESV(MOD-SP4): 240 ML
BH CV ECHO MEAS - ESV(TEICH): 160 ML
BH CV ECHO MEAS - FS: 10.9 %
BH CV ECHO MEAS - IVS/LVPW: 1.4
BH CV ECHO MEAS - IVSD: 1 CM
BH CV ECHO MEAS - LA DIMENSION: 4.4 CM
BH CV ECHO MEAS - LA/AO: 1.2
BH CV ECHO MEAS - LAT PEAK E' VEL: 4 CM/SEC
BH CV ECHO MEAS - LV DIASTOLIC VOL/BSA (35-75): 133.4 ML/M^2
BH CV ECHO MEAS - LV MASS(C)D: 224.7 GRAMS
BH CV ECHO MEAS - LV MASS(C)DI: 88.1 GRAMS/M^2
BH CV ECHO MEAS - LV MAX PG: 1.9 MMHG
BH CV ECHO MEAS - LV MEAN PG: 1 MMHG
BH CV ECHO MEAS - LV SYSTOLIC VOL/BSA (12-30): 94.2 ML/M^2
BH CV ECHO MEAS - LV V1 MAX: 68.5 CM/SEC
BH CV ECHO MEAS - LV V1 MEAN: 48.5 CM/SEC
BH CV ECHO MEAS - LV V1 VTI: 14.7 CM
BH CV ECHO MEAS - LVIDD: 6.4 CM
BH CV ECHO MEAS - LVIDS: 5.7 CM
BH CV ECHO MEAS - LVLD AP4: 11.1 CM
BH CV ECHO MEAS - LVLS AP4: 9.4 CM
BH CV ECHO MEAS - LVOT AREA (M): 4.9 CM^2
BH CV ECHO MEAS - LVOT AREA: 4.9 CM^2
BH CV ECHO MEAS - LVOT DIAM: 2.5 CM
BH CV ECHO MEAS - LVPWD: 0.7 CM
BH CV ECHO MEAS - MED PEAK E' VEL: 5.22 CM/SEC
BH CV ECHO MEAS - MV A MAX VEL: 80.1 CM/SEC
BH CV ECHO MEAS - MV DEC TIME: 0.3 SEC
BH CV ECHO MEAS - MV E MAX VEL: 46.1 CM/SEC
BH CV ECHO MEAS - MV E/A: 0.58
BH CV ECHO MEAS - PI END-D VEL: 125 CM/SEC
BH CV ECHO MEAS - RAP SYSTOLE: 5 MMHG
BH CV ECHO MEAS - RVSP: 35.5 MMHG
BH CV ECHO MEAS - SI(AO): 120.2 ML/M^2
BH CV ECHO MEAS - SI(CUBED): 30.2 ML/M^2
BH CV ECHO MEAS - SI(LVOT): 28.3 ML/M^2
BH CV ECHO MEAS - SI(MOD-SP4): 39.2 ML/M^2
BH CV ECHO MEAS - SI(TEICH): 19 ML/M^2
BH CV ECHO MEAS - SV(AO): 306.4 ML
BH CV ECHO MEAS - SV(CUBED): 77 ML
BH CV ECHO MEAS - SV(LVOT): 72.2 ML
BH CV ECHO MEAS - SV(MOD-SP4): 100 ML
BH CV ECHO MEAS - SV(TEICH): 48.5 ML
BH CV ECHO MEAS - TR MAX VEL: 276 CM/SEC
BH CV ECHO MEASUREMENTS AVERAGE E/E' RATIO: 10
LEFT ATRIUM VOLUME: 109 CM3
MAXIMAL PREDICTED HEART RATE: 148 BPM
STRESS TARGET HR: 126 BPM

## 2019-02-14 ENCOUNTER — CLINICAL SUPPORT (OUTPATIENT)
Dept: CARDIOLOGY | Facility: CLINIC | Age: 74
End: 2019-02-14

## 2019-02-14 DIAGNOSIS — I50.22 CHRONIC SYSTOLIC CONGESTIVE HEART FAILURE (HCC): Primary | ICD-10-CM

## 2019-02-14 PROCEDURE — 93297 REM INTERROG DEV EVAL ICPMS: CPT | Performed by: INTERNAL MEDICINE

## 2019-02-18 ENCOUNTER — LAB (OUTPATIENT)
Dept: LAB | Facility: HOSPITAL | Age: 74
End: 2019-02-18

## 2019-02-18 DIAGNOSIS — Z79.01 ANTICOAGULANT LONG-TERM USE: ICD-10-CM

## 2019-02-18 DIAGNOSIS — I48.0 PAROXYSMAL ATRIAL FIBRILLATION (HCC): ICD-10-CM

## 2019-02-18 LAB
INR PPP: 2.7 (ref 0.91–1.09)
PROTHROMBIN TIME: 33 SECONDS (ref 10–13.8)

## 2019-02-18 PROCEDURE — 85610 PROTHROMBIN TIME: CPT | Performed by: INTERNAL MEDICINE

## 2019-02-27 DIAGNOSIS — I48.0 PAROXYSMAL ATRIAL FIBRILLATION (HCC): ICD-10-CM

## 2019-02-27 RX ORDER — TAMSULOSIN HYDROCHLORIDE 0.4 MG/1
1 CAPSULE ORAL NIGHTLY
Qty: 90 CAPSULE | Refills: 3 | Status: SHIPPED | OUTPATIENT
Start: 2019-02-27 | End: 2020-01-28 | Stop reason: SDUPTHER

## 2019-02-27 NOTE — TELEPHONE ENCOUNTER
PLEASE CALL IN FLOMAX 0.4 MG CAPSULE AND DILTIAZEM 30 MG TABLET..TAKES 3 TIMES DAILY TO [facility] MAIL ORDER...90 DAY SUPPLY WITH 3 REFILLS

## 2019-03-04 ENCOUNTER — LAB (OUTPATIENT)
Dept: LAB | Facility: HOSPITAL | Age: 74
End: 2019-03-04

## 2019-03-04 ENCOUNTER — TELEPHONE (OUTPATIENT)
Dept: INTERNAL MEDICINE | Facility: CLINIC | Age: 74
End: 2019-03-04

## 2019-03-04 DIAGNOSIS — Z79.01 ANTICOAGULANT LONG-TERM USE: ICD-10-CM

## 2019-03-04 DIAGNOSIS — I48.0 PAROXYSMAL ATRIAL FIBRILLATION (HCC): ICD-10-CM

## 2019-03-04 LAB
INR PPP: 2.3 (ref 0.91–1.09)
PROTHROMBIN TIME: 27.4 SECONDS (ref 10–13.8)

## 2019-03-04 PROCEDURE — 85610 PROTHROMBIN TIME: CPT | Performed by: INTERNAL MEDICINE

## 2019-03-18 ENCOUNTER — LAB (OUTPATIENT)
Dept: LAB | Facility: HOSPITAL | Age: 74
End: 2019-03-18

## 2019-03-18 ENCOUNTER — CLINICAL SUPPORT (OUTPATIENT)
Dept: CARDIOLOGY | Facility: CLINIC | Age: 74
End: 2019-03-18

## 2019-03-18 DIAGNOSIS — I50.22 CHRONIC SYSTOLIC CONGESTIVE HEART FAILURE (HCC): Primary | ICD-10-CM

## 2019-03-18 DIAGNOSIS — I48.0 PAROXYSMAL ATRIAL FIBRILLATION (HCC): ICD-10-CM

## 2019-03-18 DIAGNOSIS — Z79.01 ANTICOAGULANT LONG-TERM USE: ICD-10-CM

## 2019-03-18 LAB
INR PPP: 3.1 (ref 0.91–1.09)
PROTHROMBIN TIME: 37.5 SECONDS (ref 10–13.8)

## 2019-03-18 PROCEDURE — 85610 PROTHROMBIN TIME: CPT | Performed by: INTERNAL MEDICINE

## 2019-03-18 PROCEDURE — 93297 REM INTERROG DEV EVAL ICPMS: CPT | Performed by: INTERNAL MEDICINE

## 2019-03-18 NOTE — PROGRESS NOTES
[facility] Report    March 18, 2019    Primary Cardiologist: Jocelyn    Reason for evaluation: Heart Failure Management  Indication for AICD: chronic systolic congestive heart failure    Findings:  Monthly HFMR reviewed. Impedance at reference and no recent OptiVol  crossing. OptiVol 0 ohms. ICD is V paced 0.3% and A paced 33.4%. No  episodes of VT/VF and no shocks. No AT/AF. The remaining cardiac  compass trends are within normal limits. 30 day blood pressure  averages 113/70. Weight readings within normal limits. Pt is not  reporting any symptoms of HF. HFMR indicates Low Risk for worsening  HF-Recommend routine clinic follow up.    Follow up: 1 month

## 2019-03-19 NOTE — PROGRESS NOTES
[facility] - PODIATRY    Today's Date: 03/22/19    Patient Name: Jonh Peacock  MRN: [id number]  CSN: [id number]  PCP: Ric Antony DO  Referring Provider: No ref. provider found     SUBJECTIVE     Chief Complaint   Patient presents with   • Follow-up     3 month f/u for foot and nail care-pt c/o long, thickened toenails and  bilateral foot pain- heel area , pain scale 3-4/10  PCP Dr. Antony last office visit 12/13/18 -        HPI: Jonh Peacock, a 73 y.o.male, comes to clinic as a(n) established patient complaining of painful fungal toenails. Pt with h/o Anxiety, Arrhythmia, Cardiomyopathy, CHF, Depression, previous DVT, HTN, Obesity, Anticoagulation with Warfarin. Denies diabetes. Relates that his nails are thick and he is unable to cut them himelf and routinely his wife will cut them but does not feel comfortable cutting them due to being on warfarin. Also relates that he has a limb length discrepancy which he has a lift in his left foot and custom inserts for both feet. States that he has established with his new PCP and very happy with the change. Admits pain at 3-4/10 level and described as shooting, pins/needles and tingling. Relates that he typically gets around in his motorized scooter. Notes improvement with regular visits. Denies any constitutional symptoms. No other pedal complaints at this time.    Past Medical History:   Diagnosis Date   • Anemia 1/30/2017   • Anxiety 1/30/2017   • Arrhythmia 1/30/2017   • Cardiomyopathy (CMS/HCC) 1/30/2017   • CHF (congestive heart failure) (CMS/HCC) 1/30/2017   • Chronic pain 1/30/2017   • Depression 1/30/2017   • Diverticulitis 2010   • DVT (deep venous thrombosis) (CMS/[facility])    • Gastritis 1/30/2017   • Hyperlipidemia 1/30/2017   • Hypertension 1/30/2017   • Low back pain 1/30/2017   • Neuropathy 1/30/2017   • Obesity 1/30/2017   • Status post placement of cardiac pacemaker 1/30/2017     Past Surgical History:   Procedure Laterality  Date   • BACK SURGERY  2002    BACK FUSION   • BACK SURGERY  2007   • COLON RESECTION WITH COLOSTOMY  03/09/2010   • FOOT SURGERY Bilateral 2005   • JOINT REPLACEMENT Right 2002    KNEE   • PACEMAKER IMPLANTATION  2012   • TOTAL HIP ARTHROPLASTY Right 2004   • TOTAL HIP ARTHROPLASTY Left 12/2008     Family History   Problem Relation Age of Onset   • Heart disease Mother    • Heart disease Brother      Social History     Socioeconomic History   • Marital status:      Spouse name: Not on file   • Number of children: Not on file   • Years of education: Not on file   • Highest education level: Not on file   Tobacco Use   • Smoking status: Never Smoker   • Smokeless tobacco: Never Used   Substance and Sexual Activity   • Alcohol use: No   • Drug use: No   • Sexual activity: Defer     Allergies   Allergen Reactions   • Amiodarone Other (See Comments)     INTERFERES WITH OTHER MEDICATIONS   • Oxycontin [Oxycodone Hcl] Other (See Comments)     Can not urinate.   • Penicillins Hives     Allergic to All cillins. Amoxacillin.   • Vioxx [Rofecoxib] GI Intolerance     Current Outpatient Medications   Medication Sig Dispense Refill   • Buprenorphine (BUTRANS) 10 MCG/HR patch weekly Place 10 mcg on the skin Every 7 (Seven) Days.     • CHONDROITIN SULFATE A PO Take 600 mg by mouth 2 (Two) Times a Day.     • diazePAM (VALIUM) 2 MG tablet Take 2 mg by mouth Daily.     • diltiaZEM (CARDIZEM) 30 MG tablet Take 1 tablet by mouth 3 (Three) Times a Day. 270 tablet 3   • furosemide (LASIX) 20 MG tablet Take 20 mg by mouth Daily.     • furosemide (LASIX) 40 MG tablet Take 40 mg by mouth Daily. TAKE WITH 20 MG TABLET FOR 60 PER MG PER DAY     • gabapentin (NEURONTIN) 600 MG tablet Take 600 mg by mouth 3 (Three) Times a Day.     • Glucosamine 750 MG tablet Take 750 mg by mouth 2 (Two) Times a Day.     • HYDROcodone-acetaminophen (NORCO) 7.5-325 MG per tablet Take 1 tablet by mouth Every 6 (Six) Hours As Needed for Moderate Pain .     •  losartan (COZAAR) 25 MG tablet Take 1 tablet by mouth Daily. 90 tablet 2   • metoprolol succinate XL (TOPROL-XL) 100 MG 24 hr tablet Take 100 mg by mouth Daily.     • Multiple Vitamins-Minerals (MULTIVITAMIN ADULT PO) Take  by mouth Daily.     • pantoprazole (PROTONIX) 40 MG EC tablet Take 40 mg by mouth Daily.     • POLYETHYLENE GLYCOL 3350 PO Take 17 granules by mouth Every Night.     • simvastatin (ZOCOR) 20 MG tablet Take 1 tablet by mouth Every Night. 90 tablet 3   • tamsulosin (FLOMAX) 0.4 MG capsule 24 hr capsule Take 1 capsule by mouth Every Night. 90 capsule 3   • tiZANidine (ZANAFLEX) 4 MG tablet Take 4 mg by mouth 3 (Three) Times a Day As Needed for muscle spasms.     • warfarin (COUMADIN) 2 MG tablet Take 2 mg by mouth Daily.     • warfarin (COUMADIN) 4 MG tablet Take 4 mg by mouth Daily.     • warfarin (COUMADIN) 5 MG tablet Take 5 mg by mouth Daily.       No current facility-administered medications for this visit.      Review of Systems   Constitutional: Negative for chills and fever.   HENT: Negative for congestion.    Respiratory: Negative for shortness of breath.    Cardiovascular: Positive for leg swelling. Negative for chest pain.   Gastrointestinal: Negative for constipation, diarrhea, nausea and vomiting.   Musculoskeletal:        Foot pain   Skin: Negative for wound.        Thick Toenails   Neurological: Negative for numbness.       OBJECTIVE     Vitals:    03/22/19 1518   BP: 118/82   Pulse: 72   SpO2: 94%       PHYSICAL EXAM  GEN:   A&Ox3, NAD. Pt presents to clinic ambulating with cane and wearing Casual Shoes with custom inserts, left shoe has lift.      NEURO:   Protective sensation intact to 10/10 sites Right foot, 10/10 site Left foot using [place] monofilament  Light touch sensation diminished  No Tinel's or [place] sign.    VASC:  Skin temperature Warm to Warm proximal to distal carlotta  DP pulses 1/4 Right, 1/4 Left  PT pulses 1/4 Right, 1/4 Left  CFT 4 sec carlotta  Pedal hair  growth absent  1+ pitting edema noted carlotta  Varicosities absent carlotta    MUSC/SKEL:  Muscle Strength Right foot Dorsiflexors 5/5, Plantarflexors 5/5, Evertors 5/5, Invertors 5/5  Muscle Strength Left foot Dorsiflexors 5/5, Plantarflexors 5/5, Evertors 5/5, Invertors 5/5  ROM of the 1st MTP is full without pain or crepitus  ROM of the MTJ is full without pain or crepitus    ROM of the STJ is full without pain or crepitus    ROM of the ankle joint is full without pain or crepitus    POP of nail plates  Planus foot type with decreased arch height and abduction of forefoot  Right LE longer than Left LE     DERM:  Pedal nails x10 are thickened, elongated and discolored with presence of subunugual debris  Webspaces are clean, dry, intact  Skin is normal in turgor and texture with no open wounds or sores appreciated.  Stable cicatrix noted to dorsal 1st MTPJ carlotta and dorsal digits      RADIOLOGY/NUCLEAR:  No results found.    LABORATORY/CULTURE RESULTS:      PATHOLOGY RESULTS:       ASSESSMENT/PLAN     Jonh was seen today for follow-up.    Diagnoses and all orders for this visit:    Onychomycosis    Neuropathy    Anticoagulant long-term use    Peripheral edema      Comprehensive lower extremity examination and evaluation was performed.  Discussed findings and treatment plan including risks, benefits, and treatment options with patient in detail. Patient agreed with treatment plan.  After verbal consent obtained, nail(s) x10 debrided of length and thickness with nail nipper without incidence  Patient may maintain nails and calluses at home utilizing emery board or pumice stone between visits as needed  Reviewed at home diabetic foot care including daily foot checks   Continue with custom shoes with and inserts  Defers compression stockings at this time  An After Visit Summary was printed and given to the patient at discharge, including (if requested) any available informative/educational handouts regarding diagnosis, treatment,  or medications. All questions were answered to patient/family satisfaction. Should symptoms fail to improve or worsen they agree to call or return to clinic or to go to the Emergency Department. Discussed the importance of following up with any needed screening tests/labs/specialist appointments and any requested follow-up recommended by me today. Importance of maintaining follow-up discussed and patient accepts that missed appointments can delay diagnosis and potentially lead to worsening of conditions.  Return in about 3 months (around 6/22/2019)., or sooner if acute issues arise.        This document has been electronically signed by Deion Avelar DPM on March 22, 2019 3:41 PM     Please note that portions of this note may have been completed with a voice recognition program. Efforts were made to edit the dictations, but occasionally words are mistranscribed.

## 2019-03-21 ENCOUNTER — TELEPHONE (OUTPATIENT)
Dept: PODIATRY | Facility: CLINIC | Age: 74
End: 2019-03-21

## 2019-03-21 NOTE — TELEPHONE ENCOUNTER
Left message and callback number reminding patient of appointment with Dr. Onofre Avelar on March 22, 2019 at 3:15 pm.

## 2019-03-22 ENCOUNTER — OFFICE VISIT (OUTPATIENT)
Dept: PODIATRY | Facility: CLINIC | Age: 74
End: 2019-03-22

## 2019-03-22 VITALS
BODY MASS INDEX: 37.35 KG/M2 | OXYGEN SATURATION: 94 % | WEIGHT: 291 LBS | SYSTOLIC BLOOD PRESSURE: 118 MMHG | HEIGHT: 74 IN | HEART RATE: 72 BPM | DIASTOLIC BLOOD PRESSURE: 82 MMHG

## 2019-03-22 DIAGNOSIS — Z79.01 ANTICOAGULANT LONG-TERM USE: ICD-10-CM

## 2019-03-22 DIAGNOSIS — R60.9 PERIPHERAL EDEMA: ICD-10-CM

## 2019-03-22 DIAGNOSIS — B35.1 ONYCHOMYCOSIS: Primary | ICD-10-CM

## 2019-03-22 DIAGNOSIS — G62.9 NEUROPATHY: ICD-10-CM

## 2019-03-22 PROCEDURE — 99212 OFFICE O/P EST SF 10 MIN: CPT | Performed by: PODIATRIST

## 2019-03-22 PROCEDURE — 11721 DEBRIDE NAIL 6 OR MORE: CPT | Performed by: PODIATRIST

## 2019-04-02 ENCOUNTER — TELEPHONE (OUTPATIENT)
Dept: CARDIOLOGY | Facility: CLINIC | Age: 74
End: 2019-04-02

## 2019-04-02 ENCOUNTER — CLINICAL SUPPORT NO REQUIREMENTS (OUTPATIENT)
Dept: CARDIOLOGY | Facility: CLINIC | Age: 74
End: 2019-04-02

## 2019-04-02 DIAGNOSIS — I42.8 NON-ISCHEMIC CARDIOMYOPATHY (HCC): ICD-10-CM

## 2019-04-02 DIAGNOSIS — Z95.810 CARDIAC DEFIBRILLATOR IN SITU: Primary | ICD-10-CM

## 2019-04-02 PROCEDURE — 93289 INTERROG DEVICE EVAL HEART: CPT | Performed by: INTERNAL MEDICINE

## 2019-04-02 NOTE — PROGRESS NOTES
Dual Chamber AICD Evaluation Report  IN OFFICE INTERROGATION    April 2, 2019    Primary Cardiologist: Jocelyn  : Medtronic Model: Protecta XT  [id number]  Implant date: 9/27/2012    Reason for evaluation: routine  Indication for AICD: nonischemic cardiomyopathy    Measurements  Atrial sensing - P wave: 2 mV  Atrial threshold: 0.5 V @ 0.4 ms  Atrial lead impedance: 361 ohms  Ventricular sensing - R wave: 5.5 mV  Ventricular threshold: 0.875 V @ 0.4 ms  Ventricular lead impedance:  361 ohms  Shock impedance:  RV- 38 ohms   SVC- 45 ohms      Diagnostic Data  Atrial paced: 44.9 %  Ventricular paced: 0.3 %    Episodes recorded since 9/19/2018:  3 short SVT episodes, longest duration 3 seconds, rates up to 205 bpm.  1 AF episode, duration 29 minutes, avg ventricular rate 118 bpm.  Patient is anticoagulated with coumadin.  AF burden <0.1%.    Battery status: intensify follow up  2.64V--Per patient, he and Dr. Banks had previously discussed that Dr. Chavez would do patient's gen change when needed.      Note:  Patient instructed to notify our clinic if his device starts to alarm, as this means device has reached RRT.  Understanding verbalized.        Final Parameters  Mode: AAIR+  Lower rate: 60 bpm Upper rate: 130 bpm  AV Delay: Paced- 180 ms    Sensed- 150 ms     Atrial - Amplitude: 1.5 V Pulse width: 0.4 ms Sensitivity: 0.6 mV   Ventricular - Amplitude: 2 V Pulse width: 0.4 ms Sensitivity: 0.3 mV   Changes made: No changes made.  Conclusions: normal AICD function, no therapy delivered, stable pacing and sensing thresholds and battery nearing RRT    Follow up: 6 week remote battery check

## 2019-04-02 NOTE — TELEPHONE ENCOUNTER
Patient was in office for a device check and inquired about his echo results from February 2019.  RN explained that Dr. Banks was out of the office on vacation but that RN would have someone call him with results as soon as possible.  Understanding verbalized.

## 2019-04-08 ENCOUNTER — LAB (OUTPATIENT)
Dept: LAB | Facility: HOSPITAL | Age: 74
End: 2019-04-08

## 2019-04-08 DIAGNOSIS — I48.0 PAROXYSMAL ATRIAL FIBRILLATION (HCC): ICD-10-CM

## 2019-04-08 DIAGNOSIS — Z79.01 ANTICOAGULANT LONG-TERM USE: ICD-10-CM

## 2019-04-08 PROCEDURE — 85610 PROTHROMBIN TIME: CPT | Performed by: INTERNAL MEDICINE

## 2019-04-09 LAB
INR PPP: 2.5 (ref 0.91–1.09)
PROTHROMBIN TIME: 30.3 SECONDS (ref 10–13.8)

## 2019-04-09 RX ORDER — METOPROLOL SUCCINATE 100 MG/1
100 TABLET, EXTENDED RELEASE ORAL DAILY
Qty: 90 TABLET | Refills: 3 | Status: SHIPPED | OUTPATIENT
Start: 2019-04-09 | End: 2020-06-27 | Stop reason: SDUPTHER

## 2019-04-09 RX ORDER — LOSARTAN POTASSIUM 25 MG/1
25 TABLET ORAL DAILY
Qty: 90 TABLET | Refills: 2 | Status: SHIPPED | OUTPATIENT
Start: 2019-04-09 | End: 2020-03-23 | Stop reason: SDUPTHER

## 2019-04-09 RX ORDER — SIMVASTATIN 20 MG
20 TABLET ORAL NIGHTLY
Qty: 90 TABLET | Refills: 3 | Status: SHIPPED | OUTPATIENT
Start: 2019-04-09 | End: 2020-03-23 | Stop reason: SDUPTHER

## 2019-04-15 ENCOUNTER — LAB (OUTPATIENT)
Dept: LAB | Facility: HOSPITAL | Age: 74
End: 2019-04-15

## 2019-04-15 DIAGNOSIS — I10 ESSENTIAL HYPERTENSION: ICD-10-CM

## 2019-04-15 DIAGNOSIS — E78.2 MIXED HYPERLIPIDEMIA: ICD-10-CM

## 2019-04-15 DIAGNOSIS — Z79.01 ANTICOAGULANT LONG-TERM USE: ICD-10-CM

## 2019-04-15 DIAGNOSIS — I48.0 PAROXYSMAL ATRIAL FIBRILLATION (HCC): ICD-10-CM

## 2019-04-15 LAB
ALBUMIN SERPL-MCNC: 4 G/DL (ref 3.5–5)
ALBUMIN/GLOB SERPL: 1.3 G/DL (ref 1.1–2.5)
ALP SERPL-CCNC: 82 U/L (ref 24–120)
ALT SERPL W P-5'-P-CCNC: 25 U/L (ref 0–54)
ANION GAP SERPL CALCULATED.3IONS-SCNC: 8 MMOL/L (ref 4–13)
ARTICHOKE IGE QN: 98 MG/DL (ref 0–99)
AST SERPL-CCNC: 30 U/L (ref 7–45)
BILIRUB SERPL-MCNC: 0.5 MG/DL (ref 0.1–1)
BUN BLD-MCNC: 21 MG/DL (ref 5–21)
BUN/CREAT SERPL: 19.8 (ref 7–25)
CALCIUM SPEC-SCNC: 9 MG/DL (ref 8.4–10.4)
CHLORIDE SERPL-SCNC: 101 MMOL/L (ref 98–110)
CHOLEST SERPL-MCNC: 194 MG/DL (ref 130–200)
CO2 SERPL-SCNC: 34 MMOL/L (ref 24–31)
CREAT BLD-MCNC: 1.06 MG/DL (ref 0.5–1.4)
DEPRECATED RDW RBC AUTO: 45 FL (ref 40–54)
ERYTHROCYTE [DISTWIDTH] IN BLOOD BY AUTOMATED COUNT: 13.4 % (ref 12–15)
GFR SERPL CREATININE-BSD FRML MDRD: 83 ML/MIN/1.73
GLOBULIN UR ELPH-MCNC: 3.1 GM/DL
GLUCOSE BLD-MCNC: 130 MG/DL (ref 70–100)
HCT VFR BLD AUTO: 36.8 % (ref 40–52)
HDLC SERPL-MCNC: 42 MG/DL
HGB BLD-MCNC: 12.3 G/DL (ref 14–18)
INR PPP: 3.1 (ref 0.91–1.09)
LDLC/HDLC SERPL: 3 {RATIO}
MCH RBC QN AUTO: 30.5 PG (ref 28–32)
MCHC RBC AUTO-ENTMCNC: 33.4 G/DL (ref 33–36)
MCV RBC AUTO: 91.3 FL (ref 82–95)
PLATELET # BLD AUTO: 129 10*3/MM3 (ref 130–400)
PMV BLD AUTO: 12 FL (ref 6–12)
POTASSIUM BLD-SCNC: 4 MMOL/L (ref 3.5–5.3)
PROT SERPL-MCNC: 7.1 G/DL (ref 6.3–8.7)
PROTHROMBIN TIME: 37.6 SECONDS (ref 10–13.8)
RBC # BLD AUTO: 4.03 10*6/MM3 (ref 4.8–5.9)
SODIUM BLD-SCNC: 143 MMOL/L (ref 135–145)
TRIGL SERPL-MCNC: 130 MG/DL (ref 0–149)
WBC NRBC COR # BLD: 6.21 10*3/MM3 (ref 4.8–10.8)

## 2019-04-15 PROCEDURE — 85610 PROTHROMBIN TIME: CPT | Performed by: INTERNAL MEDICINE

## 2019-04-15 PROCEDURE — 80053 COMPREHEN METABOLIC PANEL: CPT | Performed by: INTERNAL MEDICINE

## 2019-04-15 PROCEDURE — 36415 COLL VENOUS BLD VENIPUNCTURE: CPT

## 2019-04-15 PROCEDURE — 80061 LIPID PANEL: CPT | Performed by: INTERNAL MEDICINE

## 2019-04-15 PROCEDURE — 85027 COMPLETE CBC AUTOMATED: CPT | Performed by: INTERNAL MEDICINE

## 2019-04-18 ENCOUNTER — OFFICE VISIT (OUTPATIENT)
Dept: INTERNAL MEDICINE | Facility: CLINIC | Age: 74
End: 2019-04-18

## 2019-04-18 VITALS
DIASTOLIC BLOOD PRESSURE: 64 MMHG | BODY MASS INDEX: 36.32 KG/M2 | RESPIRATION RATE: 16 BRPM | HEIGHT: 74 IN | TEMPERATURE: 97.6 F | WEIGHT: 283 LBS | HEART RATE: 51 BPM | SYSTOLIC BLOOD PRESSURE: 108 MMHG | OXYGEN SATURATION: 97 %

## 2019-04-18 DIAGNOSIS — Z00.00 MEDICARE ANNUAL WELLNESS VISIT, SUBSEQUENT: Primary | ICD-10-CM

## 2019-04-18 PROBLEM — Z93.3 COLOSTOMY IN PLACE: Status: ACTIVE | Noted: 2019-04-18

## 2019-04-18 PROCEDURE — G0439 PPPS, SUBSEQ VISIT: HCPCS | Performed by: NURSE PRACTITIONER

## 2019-04-18 NOTE — PATIENT INSTRUCTIONS

## 2019-04-19 ENCOUNTER — CLINICAL SUPPORT (OUTPATIENT)
Dept: CARDIOLOGY | Facility: CLINIC | Age: 74
End: 2019-04-19

## 2019-04-19 DIAGNOSIS — I50.22 CHRONIC SYSTOLIC CONGESTIVE HEART FAILURE (HCC): Primary | ICD-10-CM

## 2019-04-19 PROCEDURE — 93297 REM INTERROG DEV EVAL ICPMS: CPT | Performed by: INTERNAL MEDICINE

## 2019-04-29 ENCOUNTER — LAB (OUTPATIENT)
Dept: LAB | Facility: HOSPITAL | Age: 74
End: 2019-04-29

## 2019-04-29 DIAGNOSIS — Z79.01 ANTICOAGULANT LONG-TERM USE: ICD-10-CM

## 2019-04-29 DIAGNOSIS — I48.0 PAROXYSMAL ATRIAL FIBRILLATION (HCC): ICD-10-CM

## 2019-04-29 LAB
INR PPP: 1.8 (ref 0.91–1.09)
PROTHROMBIN TIME: 22.1 SECONDS (ref 10–13.8)

## 2019-04-29 PROCEDURE — 85610 PROTHROMBIN TIME: CPT | Performed by: INTERNAL MEDICINE

## 2019-05-13 ENCOUNTER — LAB (OUTPATIENT)
Dept: LAB | Facility: HOSPITAL | Age: 74
End: 2019-05-13

## 2019-05-13 DIAGNOSIS — Z79.01 ANTICOAGULANT LONG-TERM USE: ICD-10-CM

## 2019-05-13 DIAGNOSIS — I48.0 PAROXYSMAL ATRIAL FIBRILLATION (HCC): ICD-10-CM

## 2019-05-13 LAB
INR PPP: 2.7 (ref 0.91–1.09)
PROTHROMBIN TIME: 33 SECONDS (ref 10–13.8)

## 2019-05-13 PROCEDURE — 85610 PROTHROMBIN TIME: CPT | Performed by: INTERNAL MEDICINE

## 2019-05-15 ENCOUNTER — DOCUMENTATION (OUTPATIENT)
Dept: CARDIOLOGY | Facility: CLINIC | Age: 74
End: 2019-05-15

## 2019-05-15 DIAGNOSIS — I42.8 NON-ISCHEMIC CARDIOMYOPATHY (HCC): ICD-10-CM

## 2019-05-15 DIAGNOSIS — Z95.810 CARDIAC DEFIBRILLATOR IN SITU: Primary | ICD-10-CM

## 2019-05-15 NOTE — PROGRESS NOTES
Dual Chamber AICD Evaluation Report  REMOTE/CARELINK    May 15, 2019    Primary Cardiologist: Jocelyn  : Medtronic Model: Protecta XT DR [id number]  Implant date: 9/27/2012    Reason for evaluation: battery check  Indication for AICD: nonischemic cardiomyopathy    Measurements  Atrial sensing - P wave: 1.6 mV  Atrial threshold: 0.375 V @ 0.4 ms  Atrial lead impedance: 361 ohms  Ventricular sensing - R wave: 5.3 mV  Ventricular threshold: 0.875 V @ 0.4 ms  Ventricular lead impedance:  361 ohms  Shock impedance:  RV- 39 ohms   SVC- 42 ohms      Diagnostic Data  Atrial paced: 48.4 %  Ventricular paced: 0.3 %    Episodes recorded since 4/2/2019:  4/16/2019:  7 SVT episodes within 13 minutes of each other.  Longest duration 4m:45s.  Rates range from 182-194 bpm.  No ATP or shocks.    Battery status: intensify follow up  2.63V      Final Parameters  Mode: AAIR+  Lower rate: 60 bpm Upper rate: 130 bpm  AV Delay: Paced- 180 ms    Sensed- 150 ms     Atrial - Amplitude: 1.5 V Pulse width: 0.4 ms Sensitivity: 0.6 mV   Ventricular - Amplitude: 2 V Pulse width: 0.4 ms Sensitivity: 0.3 mV   Changes made: No changes made.  Conclusions: normal AICD function, no therapy delivered, stable pacing and sensing thresholds and battery nearing RRT    Follow up: Remote battery check on 6/16/2019 (4 weeks)

## 2019-05-21 ENCOUNTER — CLINICAL SUPPORT (OUTPATIENT)
Dept: CARDIOLOGY | Facility: CLINIC | Age: 74
End: 2019-05-21

## 2019-05-21 DIAGNOSIS — I50.22 CHRONIC SYSTOLIC CONGESTIVE HEART FAILURE (HCC): Primary | ICD-10-CM

## 2019-05-21 PROCEDURE — 93297 REM INTERROG DEV EVAL ICPMS: CPT | Performed by: INTERNAL MEDICINE

## 2019-05-21 NOTE — PROGRESS NOTES
[place] Report    May 21, 2019    Primary Cardiologist: Jocelyn    Reason for evaluation: Heart Failure Management  Indication for AICD: chronic systolic congestive heart failure    Findings:  Monthly HFMR reviewed. Impedance just below reference and no  recent OptiVol crossing. OptiVol is at 0 ohms. Of note, since last  transmission on 4/2, OptiVol reached maximum of 50 ohms and reset  on 5/12. ICD pt is v paced 0.4% and atrial pacing 43.1%. 3 monitored  VT. No episodes of AT/AF or shocks. The remaining cardiac compass  trends within normal limits. Weight within normal limits. 30 day blood  pressure averages 99//84. Pt is not reporting any symptoms of  HF. HFMR indicates Low Risk for worsening HF-Recommend routine  clinic follow up.    Follow up: 1 month

## 2019-05-30 RX ORDER — PANTOPRAZOLE SODIUM 40 MG/1
40 TABLET, DELAYED RELEASE ORAL DAILY
Qty: 90 TABLET | Refills: 3 | Status: SHIPPED | OUTPATIENT
Start: 2019-05-30 | End: 2020-03-23 | Stop reason: SDUPTHER

## 2019-05-30 NOTE — TELEPHONE ENCOUNTER
PT NEEDS REFILL ON PANTOPRAZOLE 40 MG SENT TO [facility] [employer] [facility]  90 DAY SUPPLY TAKES IT ONCE A DAY.

## 2019-06-04 ENCOUNTER — LAB (OUTPATIENT)
Dept: LAB | Facility: HOSPITAL | Age: 74
End: 2019-06-04

## 2019-06-04 DIAGNOSIS — Z79.01 ANTICOAGULANT LONG-TERM USE: ICD-10-CM

## 2019-06-04 DIAGNOSIS — I48.0 PAROXYSMAL ATRIAL FIBRILLATION (HCC): ICD-10-CM

## 2019-06-04 PROCEDURE — 85610 PROTHROMBIN TIME: CPT | Performed by: INTERNAL MEDICINE

## 2019-06-05 LAB
INR PPP: 1.8 (ref 0.91–1.09)
PROTHROMBIN TIME: 22 SECONDS (ref 10–13.8)

## 2019-06-17 ENCOUNTER — DOCUMENTATION (OUTPATIENT)
Dept: CARDIOLOGY | Facility: CLINIC | Age: 74
End: 2019-06-17

## 2019-06-17 DIAGNOSIS — Z95.810 CARDIAC DEFIBRILLATOR IN SITU: Primary | ICD-10-CM

## 2019-06-17 NOTE — PROGRESS NOTES
Dual Chamber AICD Evaluation Report  REMOTE/CARELINK    June 17, 2019    Primary Cardiologist: Joceyln  : Medtronic Model: Protecta XT  [id number]  Implant date: 9/27/2012    Reason for evaluation: battery check (Dr. Chavez will perform gen change)  Indication for AICD: nonischemic cardiomyopathy      Diagnostic Data  Atrial paced: 46.3 %  Ventricular paced: 0.3 %    Episodes recorded since 5/15/2019:  P-AF, longest duration 50 minutes, average ventricular rates 114-127 bpm, anticoagulated with coumadin.  No ATP. No shocks.  Optivol just above 0.    Battery status: intensify follow up  2.62V    Follow up: 1 month remote battery check (7/17/2019)

## 2019-06-20 ENCOUNTER — LAB (OUTPATIENT)
Dept: LAB | Facility: HOSPITAL | Age: 74
End: 2019-06-20

## 2019-06-20 DIAGNOSIS — Z79.01 ANTICOAGULANT LONG-TERM USE: ICD-10-CM

## 2019-06-20 DIAGNOSIS — I48.0 PAROXYSMAL ATRIAL FIBRILLATION (HCC): ICD-10-CM

## 2019-06-20 LAB
INR PPP: 2.7 (ref 0.91–1.09)
PROTHROMBIN TIME: 32.9 SECONDS (ref 10–13.8)

## 2019-06-20 PROCEDURE — 85610 PROTHROMBIN TIME: CPT | Performed by: INTERNAL MEDICINE

## 2019-06-21 ENCOUNTER — CLINICAL SUPPORT (OUTPATIENT)
Dept: CARDIOLOGY | Facility: CLINIC | Age: 74
End: 2019-06-21

## 2019-06-21 DIAGNOSIS — I50.22 CHRONIC SYSTOLIC CONGESTIVE HEART FAILURE (HCC): Primary | ICD-10-CM

## 2019-06-21 PROCEDURE — 93297 REM INTERROG DEV EVAL ICPMS: CPT | Performed by: INTERNAL MEDICINE

## 2019-06-21 NOTE — PROGRESS NOTES
[place] Report    June 21, 2019    Primary Cardiologist: Jocelyn    Reason for evaluation: Heart Failure Management  Indication for AICD: chronic systolic congestive heart failure    Findings:  Monthly HFMR reviewed. Impedance just below reference and no  recent OptiVol crossing. OptiVol 0 ohms. ICD is V paced 0.3% and A  paced 41.2%. No episodes of VT/VF and no shocks. 96 minutes newly  in AT/AF today. Ventricular rate averaging 125 bpm with occasional rate  up to 140 bpm. Patient is on Warfarin. The remaining cardiac compass  trends are within normal limits. 30 day blood pressure averages 111/72.  Weight readings within normal limits. Pt is not reporting any symptoms  of HF. HFMR indicates Limited high risk markers identified for  worsening HF- Recommend clinic to follow up within 1 week    Follow up: 1 month

## 2019-07-02 NOTE — PROGRESS NOTES
[facility] - PODIATRY    Today's Date: 07/19/19    Patient Name: Jonh Peacock  MRN: [id number]  CSN: [id number]  PCP: Ric Antony DO  Referring Provider: No ref. provider found     SUBJECTIVE     Chief Complaint   Patient presents with   • Follow-up     3 month f/u for foot and nail care-pt c/o painful  long, thickened toenails - denies pain at present time   Antonella DWYER last visit 4/18/19- non-diabetic        HPI: Jonh Peacock, a 73 y.o.male, comes to clinic as a(n) established patient complaining of painful fungal toenails. Pt with h/o Anxiety, Arrhythmia, Cardiomyopathy, CHF, Depression, previous DVT, HTN, Obesity, Anticoagulation with Warfarin. Denies diabetes. Relates that his nails are thick and he is unable to cut them himelf and routinely his wife will cut them but does not feel comfortable cutting them due to being on warfarin. Also relates that he has a limb length discrepancy which he has a lift in his left foot and custom inserts for both feet. Denies pain today. Relates that he typically gets around in his motorized scooter but using a walker today. Notes improvement with regular visits. Denies any constitutional symptoms. No other pedal complaints at this time.    Past Medical History:   Diagnosis Date   • Anemia 1/30/2017   • Anxiety 1/30/2017   • Arrhythmia 1/30/2017   • Cardiomyopathy (CMS/HCC) 1/30/2017   • CHF (congestive heart failure) (CMS/HCC) 1/30/2017   • Chronic pain 1/30/2017   • Depression 1/30/2017   • Diverticulitis 2010   • DVT (deep venous thrombosis) (CMS/[facility])    • Gastritis 1/30/2017   • Hyperlipidemia 1/30/2017   • Hypertension 1/30/2017   • Low back pain 1/30/2017   • Neuropathy 1/30/2017   • Obesity 1/30/2017   • Status post placement of cardiac pacemaker 1/30/2017     Past Surgical History:   Procedure Laterality Date   • BACK SURGERY  2002    BACK FUSION   • BACK SURGERY  2007   • COLON RESECTION WITH COLOSTOMY  03/09/2010   • FOOT  SURGERY Bilateral 2005   • JOINT REPLACEMENT Right 2002    KNEE   • PACEMAKER IMPLANTATION  2012   • TOTAL HIP ARTHROPLASTY Right 2004   • TOTAL HIP ARTHROPLASTY Left 12/2008     Family History   Problem Relation Age of Onset   • Heart disease Mother    • Heart disease Brother      Social History     Socioeconomic History   • Marital status:      Spouse name: Not on file   • Number of children: Not on file   • Years of education: Not on file   • Highest education level: Not on file   Tobacco Use   • Smoking status: Never Smoker   • Smokeless tobacco: Never Used   Substance and Sexual Activity   • Alcohol use: No   • Drug use: No   • Sexual activity: Defer     Allergies   Allergen Reactions   • Amiodarone Other (See Comments)     INTERFERES WITH OTHER MEDICATIONS   • Oxycontin [Oxycodone Hcl] Other (See Comments)     Can not urinate.   • Penicillins Hives     Allergic to All cillins. Amoxacillin.   • Vioxx [Rofecoxib] GI Intolerance     Current Outpatient Medications   Medication Sig Dispense Refill   • Buprenorphine (BUTRANS) 10 MCG/HR patch weekly Place 10 mcg on the skin Every 7 (Seven) Days.     • diazePAM (VALIUM) 2 MG tablet Take 2 mg by mouth Daily.     • diltiaZEM (CARDIZEM) 30 MG tablet Take 1 tablet by mouth 3 (Three) Times a Day. 270 tablet 3   • furosemide (LASIX) 20 MG tablet Take 20 mg by mouth Daily.     • furosemide (LASIX) 40 MG tablet Take 40 mg by mouth Daily. TAKE WITH 20 MG TABLET FOR 60 PER MG PER DAY     • gabapentin (NEURONTIN) 600 MG tablet Take 600 mg by mouth 3 (Three) Times a Day.     • Glucosamine 750 MG tablet Take 750 mg by mouth 2 (Two) Times a Day.     • HYDROcodone-acetaminophen (NORCO) 7.5-325 MG per tablet Take 1 tablet by mouth Every 6 (Six) Hours As Needed for Moderate Pain .     • losartan (COZAAR) 25 MG tablet Take 1 tablet by mouth Daily. 90 tablet 2   • metoprolol succinate XL (TOPROL-XL) 100 MG 24 hr tablet Take 1 tablet by mouth Daily. 90 tablet 3   • Multiple  Vitamins-Minerals (MULTIVITAMIN ADULT PO) Take  by mouth Daily.     • pantoprazole (PROTONIX) 40 MG EC tablet Take 1 tablet by mouth Daily. 90 tablet 3   • POLYETHYLENE GLYCOL 3350 PO Take 17 granules by mouth Every Night.     • simvastatin (ZOCOR) 20 MG tablet Take 1 tablet by mouth Every Night. 90 tablet 3   • tamsulosin (FLOMAX) 0.4 MG capsule 24 hr capsule Take 1 capsule by mouth Every Night. 90 capsule 3   • tiZANidine (ZANAFLEX) 4 MG tablet Take 4 mg by mouth 3 (Three) Times a Day As Needed for muscle spasms.     • warfarin (COUMADIN) 2 MG tablet Take 2 mg by mouth Daily.     • warfarin (COUMADIN) 4 MG tablet Take 4 mg by mouth Daily.     • warfarin (COUMADIN) 5 MG tablet Take 5 mg by mouth Daily.       No current facility-administered medications for this visit.      Review of Systems   Constitutional: Negative for chills and fever.   HENT: Negative for congestion.    Respiratory: Negative for shortness of breath.    Cardiovascular: Positive for leg swelling. Negative for chest pain.   Gastrointestinal: Negative for constipation, diarrhea, nausea and vomiting.   Musculoskeletal:        Foot pain   Skin: Negative for wound.        Thick Toenails   Neurological: Negative for numbness.       OBJECTIVE     Vitals:    07/19/19 1443   BP: 108/60   Pulse: 77   SpO2: 97%       PHYSICAL EXAM  GEN:   A&Ox3, NAD. Pt presents to clinic ambulating with walker and wearing Casual Shoes with custom inserts, left shoe has lift.      NEURO:   Protective sensation intact to 10/10 sites Right foot, 10/10 site Left foot using [place] monofilament  Light touch sensation diminished  No Tinel's or [place] sign.    VASC:  Skin temperature Warm to Warm proximal to distal carlotta  DP pulses 1/4 Right, 1/4 Left  PT pulses 1/4 Right, 1/4 Left  CFT 4 sec carlotta  Pedal hair growth absent  1+ pitting edema noted carlotta  Varicosities absent carlotta    MUSC/SKEL:  Muscle Strength Right foot Dorsiflexors 5/5, Plantarflexors 5/5, Evertors 5/5,  Invertors 5/5  Muscle Strength Left foot Dorsiflexors 5/5, Plantarflexors 5/5, Evertors 5/5, Invertors 5/5  ROM of the 1st MTP is full without pain or crepitus  ROM of the MTJ is full without pain or crepitus    ROM of the STJ is full without pain or crepitus    ROM of the ankle joint is full without pain or crepitus    POP of nail plates  Planus foot type with decreased arch height and abduction of forefoot  Right LE longer than Left LE     DERM:  Pedal nails x10 are thickened, elongated and discolored with presence of subunugual debris  Webspaces are clean, dry, intact  Skin is normal in turgor and texture with no open wounds or sores appreciated.  Stable cicatrix noted to dorsal 1st MTPJ carlotta and dorsal digits      RADIOLOGY/NUCLEAR:  No results found.    LABORATORY/CULTURE RESULTS:      PATHOLOGY RESULTS:       ASSESSMENT/PLAN     Jonh was seen today for follow-up.    Diagnoses and all orders for this visit:    Onychomycosis    Neuropathy    Anticoagulant long-term use    Peripheral edema      Comprehensive lower extremity examination and evaluation was performed.  Discussed findings and treatment plan including risks, benefits, and treatment options with patient in detail. Patient agreed with treatment plan.  After verbal consent obtained, nail(s) x10 debrided of length and thickness with nail nipper without incidence  Patient may maintain nails and calluses at home utilizing emery board or pumice stone between visits as needed  Reviewed at home diabetic foot care including daily foot checks   Continue with custom shoes with and inserts  Defers compression stockings at this time  An After Visit Summary was printed and given to the patient at discharge, including (if requested) any available informative/educational handouts regarding diagnosis, treatment, or medications. All questions were answered to patient/family satisfaction. Should symptoms fail to improve or worsen they agree to call or return to clinic or  to go to the Emergency Department. Discussed the importance of following up with any needed screening tests/labs/specialist appointments and any requested follow-up recommended by me today. Importance of maintaining follow-up discussed and patient accepts that missed appointments can delay diagnosis and potentially lead to worsening of conditions.  Return in about 3 months (around 10/19/2019)., or sooner if acute issues arise.        This document has been electronically signed by Deion Avelar DPM on July 19, 2019 2:59 PM     Please note that portions of this note may have been completed with a voice recognition program. Efforts were made to edit the dictations, but occasionally words are mistranscribed.

## 2019-07-05 ENCOUNTER — LAB (OUTPATIENT)
Dept: LAB | Facility: HOSPITAL | Age: 74
End: 2019-07-05

## 2019-07-05 DIAGNOSIS — Z79.01 ANTICOAGULANT LONG-TERM USE: ICD-10-CM

## 2019-07-05 DIAGNOSIS — I48.0 PAROXYSMAL ATRIAL FIBRILLATION (HCC): ICD-10-CM

## 2019-07-05 LAB
INR PPP: 2 (ref 0.91–1.09)
PROTHROMBIN TIME: 24.6 SECONDS (ref 10–13.8)

## 2019-07-05 PROCEDURE — 85610 PROTHROMBIN TIME: CPT | Performed by: INTERNAL MEDICINE

## 2019-07-10 ENCOUNTER — CLINICAL SUPPORT NO REQUIREMENTS (OUTPATIENT)
Dept: CARDIOLOGY | Facility: CLINIC | Age: 74
End: 2019-07-10

## 2019-07-10 DIAGNOSIS — I42.8 NON-ISCHEMIC CARDIOMYOPATHY (HCC): ICD-10-CM

## 2019-07-10 DIAGNOSIS — Z95.810 CARDIAC DEFIBRILLATOR IN SITU: Primary | ICD-10-CM

## 2019-07-10 PROCEDURE — 93290 INTERROG DEV EVAL ICPMS IP: CPT | Performed by: INTERNAL MEDICINE

## 2019-07-10 PROCEDURE — 93289 INTERROG DEVICE EVAL HEART: CPT | Performed by: INTERNAL MEDICINE

## 2019-07-10 NOTE — PROGRESS NOTES
Dual Chamber AICD Evaluation Report  IN OFFICE INTERROGATION    July 10, 2019    Primary Cardiologist: Jocelyn   : Medtronic Model: Protecta XT  [id number]  Implant date: 9/27/2012    Reason for evaluation: patient reported beeping, possibly from device  Indication for AICD: nonischemic cardiomyopathy    Measurements  Atrial sensing - P wave: 1.8 mV  Atrial threshold: 0.5 V @ 0.4 ms  Atrial lead impedance: 418 ohms  Ventricular sensing - R wave: 5.1 mV  Ventricular threshold: 0.625 V @ 0.4 ms  Ventricular lead impedance:  399 ohms  Shock impedance:  RV- 43 ohms   SVC- 51 ohms      Diagnostic Data  Atrial paced: 48.8 %  Ventricular paced: 0.3 %    Episodes recorded since 6/17/2019:  SVT at 188 bpm, duration 5 seconds  No ATP or shocks.  Optivol at 0.  No alerts or indications of alarming device noted.    Battery status: intensify follow up  2.62V (Patient prefers that Dr. Chavez to do gen change.)      Final Parameters  Mode: AAIR+  Lower rate: 60 bpm Upper rate: 130 bpm  AV Delay: Paced- 180 ms    Sensed- 150 ms     Atrial - Amplitude: 1.5 V Pulse width: 0.4 ms Sensitivity: 0.6 mV   Ventricular - Amplitude: 2 V Pulse width: 0.4 ms Sensitivity: 0.3 mV   Changes made: No changes made.  Conclusions: normal AICD function, no therapy delivered, stable pacing and sensing thresholds and battery nearing RRT    Follow up: 2 week battery check via [employer]

## 2019-07-18 ENCOUNTER — TELEPHONE (OUTPATIENT)
Dept: PODIATRY | Facility: CLINIC | Age: 74
End: 2019-07-18

## 2019-07-18 ENCOUNTER — CLINICAL SUPPORT (OUTPATIENT)
Dept: CARDIOLOGY | Facility: CLINIC | Age: 74
End: 2019-07-18

## 2019-07-18 DIAGNOSIS — I50.22 CHRONIC SYSTOLIC CONGESTIVE HEART FAILURE (HCC): Primary | ICD-10-CM

## 2019-07-18 NOTE — PROGRESS NOTES
[place] Report    July 18, 2019    Primary Cardiologist: Jocelyn    Reason for evaluation: Heart Failure Management  Indication for AICD: chronic systolic congestive heart failure    Findings:  Monthly HFMR reviewed. Impedance above reference and no recent  OptiVol crossing. OptiVol 0 ohms. ICD is V paced 0.3% and A paced  47.1%. No episodes of VT/VF and no shocks. No AT/AF. The remaining  cardiac compass trends are within normal limits. 30 day blood pressure  averages 108/69. Weight is down 6.6 lbs in the last 6 days. Pt is not  reporting any symptoms of HF. HFMR indicates Low Risk for worsening  HF-Recommend routine clinic follow up.    Follow up: 1 month

## 2019-07-18 NOTE — TELEPHONE ENCOUNTER
Left message and callback number reminding patient of appointment with Dr. Onofre Avelar on July 19, 2019.

## 2019-07-19 ENCOUNTER — OFFICE VISIT (OUTPATIENT)
Dept: PODIATRY | Facility: CLINIC | Age: 74
End: 2019-07-19

## 2019-07-19 VITALS
DIASTOLIC BLOOD PRESSURE: 60 MMHG | BODY MASS INDEX: 22.46 KG/M2 | OXYGEN SATURATION: 97 % | HEART RATE: 77 BPM | SYSTOLIC BLOOD PRESSURE: 108 MMHG | WEIGHT: 175 LBS | HEIGHT: 74 IN

## 2019-07-19 DIAGNOSIS — G62.9 NEUROPATHY: ICD-10-CM

## 2019-07-19 DIAGNOSIS — R60.9 PERIPHERAL EDEMA: ICD-10-CM

## 2019-07-19 DIAGNOSIS — Z79.01 ANTICOAGULANT LONG-TERM USE: ICD-10-CM

## 2019-07-19 DIAGNOSIS — B35.1 ONYCHOMYCOSIS: Primary | ICD-10-CM

## 2019-07-19 PROCEDURE — 11721 DEBRIDE NAIL 6 OR MORE: CPT | Performed by: PODIATRIST

## 2019-07-23 ENCOUNTER — LAB (OUTPATIENT)
Dept: LAB | Facility: HOSPITAL | Age: 74
End: 2019-07-23

## 2019-07-23 DIAGNOSIS — I48.0 PAROXYSMAL ATRIAL FIBRILLATION (HCC): ICD-10-CM

## 2019-07-23 DIAGNOSIS — Z79.01 ANTICOAGULANT LONG-TERM USE: ICD-10-CM

## 2019-07-23 LAB
INR PPP: 3.2 (ref 0.91–1.09)
PROTHROMBIN TIME: 38.2 SECONDS (ref 10–13.8)

## 2019-07-23 PROCEDURE — 85610 PROTHROMBIN TIME: CPT | Performed by: INTERNAL MEDICINE

## 2019-07-24 ENCOUNTER — DOCUMENTATION (OUTPATIENT)
Dept: CARDIOLOGY | Facility: CLINIC | Age: 74
End: 2019-07-24

## 2019-07-24 DIAGNOSIS — Z95.810 CARDIAC DEFIBRILLATOR IN SITU: Primary | ICD-10-CM

## 2019-07-26 NOTE — PROGRESS NOTES
Dual Chamber AICD Evaluation Report  IN OFFICE INTERROGATION     July 24, 2019     Primary Cardiologist: Jocelyn   : Medtronic Model: Protecta XT  [id number]  Implant date: 9/27/2012     Reason for evaluation: battery check  Indication for AICD: nonischemic cardiomyopathy    Episodes recorded since 7/10/2019:  1 paroxysmal AT/AF episode on cardiac compass. [place] < 0.1%.  Anticoagulated with coumadin.  No ATP or shocks.    Battery:  2.61V, has yet to reach RRT    Follow Up:  2 week battery check via [employer]

## 2019-08-09 ENCOUNTER — DOCUMENTATION (OUTPATIENT)
Dept: CARDIOLOGY | Facility: CLINIC | Age: 74
End: 2019-08-09

## 2019-08-09 DIAGNOSIS — Z95.810 CARDIAC DEFIBRILLATOR IN SITU: Primary | ICD-10-CM

## 2019-08-12 ENCOUNTER — LAB (OUTPATIENT)
Dept: LAB | Facility: HOSPITAL | Age: 74
End: 2019-08-12

## 2019-08-12 DIAGNOSIS — N40.1 BENIGN NON-NODULAR PROSTATIC HYPERPLASIA WITH LOWER URINARY TRACT SYMPTOMS: ICD-10-CM

## 2019-08-12 DIAGNOSIS — Z79.01 ANTICOAGULANT LONG-TERM USE: ICD-10-CM

## 2019-08-12 DIAGNOSIS — I48.0 PAROXYSMAL ATRIAL FIBRILLATION (HCC): ICD-10-CM

## 2019-08-12 LAB
INR PPP: 2.6 (ref 0.91–1.09)
PROTHROMBIN TIME: 31.1 SECONDS (ref 10–13.8)
PSA SERPL-MCNC: 0.92 NG/ML (ref 0–4)

## 2019-08-12 PROCEDURE — 85610 PROTHROMBIN TIME: CPT | Performed by: INTERNAL MEDICINE

## 2019-08-12 PROCEDURE — 84153 ASSAY OF PSA TOTAL: CPT | Performed by: UROLOGY

## 2019-08-12 PROCEDURE — 36415 COLL VENOUS BLD VENIPUNCTURE: CPT

## 2019-08-14 NOTE — PROGRESS NOTES
Dual Chamber AICD Evaluation Report  IN OFFICE INTERROGATION     August 9, 2019     Primary Cardiologist: Jocelyn   : Medtronic Model: Protecta XT  [id number]  Implant date: 9/27/2012     Reason for evaluation: battery check  Indication for AICD: nonischemic cardiomyopathy     Episodes recorded since 7/24/2019:  SVT x 2, longest duration 1 minute 11 seconds, rate 182 bpm.  No ATP or shocks.     Battery:  2.62V, has yet to reach RRT     Follow Up:  2 week battery check via [employer]

## 2019-08-19 ENCOUNTER — CLINICAL SUPPORT (OUTPATIENT)
Dept: CARDIOLOGY | Facility: CLINIC | Age: 74
End: 2019-08-19

## 2019-08-19 DIAGNOSIS — I50.22 CHRONIC SYSTOLIC CONGESTIVE HEART FAILURE (HCC): Primary | ICD-10-CM

## 2019-08-19 PROCEDURE — 93297 REM INTERROG DEV EVAL ICPMS: CPT | Performed by: INTERNAL MEDICINE

## 2019-08-19 NOTE — PROGRESS NOTES
[place] Report    August 19, 2019    Primary Cardiologist: Jocelyn    Reason for evaluation: Heart Failure Management  Indication for AICD: chronic systolic congestive heart failure    Findings:  Monthly HFMR reviewed. Impedance at reference and no recent OptiVol  crossing. OptiVol 30 ohms. ICD is V paced 0% and A paced 32.7%. 1  VT-NS. No shocks. 2 seconds in AT/AF. The remaining cardiac  compass trends are within normal limits. 30 day blood pressure  averages 105/66. Weight readings within normal limits. Pt is not  reporting any symptoms of HF. HFMR indicates Low Risk for worsening  HF-Recommend routine clinic follow up.    Follow up: 1 month

## 2019-08-21 NOTE — PROGRESS NOTES
Mr. Peacock is 73 y.o. male    CHIEF COMPLAINT: I am here for my yearly appointment for BPH    HPI  F/u BPH with obstruction  Location: Prostate enlargement  Severity & Symptoms: His prostate symptom score is 4 indicating mild severity. Quality of life assessment is rated as 0=delighted. He is minimally bothered by Urgency and Nocturia but is without hematuria or dysuria.   Duration: Initially noted to have obstructive voiding symptoms consistent with benign prostatic hyperplasia around 2010.  Timing: This is a continuous issue with rather stable symptoms.  Modifying factors: Tamsulosin has improved his voiding symptoms significantly.  At times caffeine makes the symptoms worse.    The following portions of the patient's history were reviewed and updated as appropriate: allergies, current medications, past family history, past medical history, past social history, past surgical history and problem list.      Review of Systems   Constitutional: Negative for chills and fever.   Gastrointestinal: Negative for abdominal pain, anal bleeding and blood in stool.   Genitourinary: Negative for dysuria and hematuria.         Current Outpatient Medications:   •  Buprenorphine (BUTRANS) 10 MCG/HR patch weekly, Place 10 mcg on the skin Every 7 (Seven) Days., Disp: , Rfl:   •  diazePAM (VALIUM) 2 MG tablet, Take 2 mg by mouth Daily., Disp: , Rfl:   •  diltiaZEM (CARDIZEM) 30 MG tablet, Take 1 tablet by mouth 3 (Three) Times a Day., Disp: 270 tablet, Rfl: 3  •  furosemide (LASIX) 20 MG tablet, Take 20 mg by mouth Daily., Disp: , Rfl:   •  furosemide (LASIX) 40 MG tablet, Take 40 mg by mouth Daily. TAKE WITH 20 MG TABLET FOR 60 PER MG PER DAY, Disp: , Rfl:   •  gabapentin (NEURONTIN) 600 MG tablet, Take 600 mg by mouth 3 (Three) Times a Day., Disp: , Rfl:   •  Glucosamine 750 MG tablet, Take 750 mg by mouth 2 (Two) Times a Day., Disp: , Rfl:   •  HYDROcodone-acetaminophen (NORCO) 7.5-325 MG per tablet, Take 1 tablet by mouth  Every 6 (Six) Hours As Needed for Moderate Pain ., Disp: , Rfl:   •  losartan (COZAAR) 25 MG tablet, Take 1 tablet by mouth Daily., Disp: 90 tablet, Rfl: 2  •  metoprolol succinate XL (TOPROL-XL) 100 MG 24 hr tablet, Take 1 tablet by mouth Daily., Disp: 90 tablet, Rfl: 3  •  Multiple Vitamins-Minerals (MULTIVITAMIN ADULT PO), Take  by mouth Daily., Disp: , Rfl:   •  pantoprazole (PROTONIX) 40 MG EC tablet, Take 1 tablet by mouth Daily., Disp: 90 tablet, Rfl: 3  •  POLYETHYLENE GLYCOL 3350 PO, Take 17 granules by mouth Every Night., Disp: , Rfl:   •  simvastatin (ZOCOR) 20 MG tablet, Take 1 tablet by mouth Every Night., Disp: 90 tablet, Rfl: 3  •  tamsulosin (FLOMAX) 0.4 MG capsule 24 hr capsule, Take 1 capsule by mouth Every Night., Disp: 90 capsule, Rfl: 3  •  tiZANidine (ZANAFLEX) 4 MG tablet, Take 4 mg by mouth 3 (Three) Times a Day As Needed for muscle spasms., Disp: , Rfl:   •  warfarin (COUMADIN) 2 MG tablet, Take 2 mg by mouth Daily., Disp: , Rfl:   •  warfarin (COUMADIN) 4 MG tablet, Take 4 mg by mouth Daily., Disp: , Rfl:   •  warfarin (COUMADIN) 5 MG tablet, Take 5 mg by mouth Daily., Disp: , Rfl:     Past Medical History:   Diagnosis Date   • Anemia 1/30/2017   • Anxiety 1/30/2017   • Arrhythmia 1/30/2017   • Arthritis    • Atrial fibrillation (CMS/HCC)    • Cardiomyopathy (CMS/HCC) 1/30/2017   • CHF (congestive heart failure) (CMS/HCC) 1/30/2017   • Chronic pain 1/30/2017   • Colon polyp    • Connective tissue disease (CMS/HCC)    • Depression 1/30/2017   • Diverticulitis 2010   • DVT (deep venous thrombosis) (CMS/HCC)    • Gastritis 1/30/2017   • Hiatal hernia    • Hyperlipidemia 1/30/2017   • Hypertension 1/30/2017   • Low back pain 1/30/2017   • Neuropathy 1/30/2017   • Obesity 1/30/2017   • Pneumonia    • Status post placement of cardiac pacemaker 1/30/2017       Past Surgical History:   Procedure Laterality Date   • APPENDECTOMY     • BACK SURGERY  2002    BACK FUSION   • BACK SURGERY  2007   •  "CARDIAC CATHETERIZATION     • CARDIAC DEFIBRILLATOR PLACEMENT     • COLON RESECTION WITH COLOSTOMY  03/09/2010   • COLONOSCOPY  09/04/2014    diverticulosis   • ENDOSCOPY  02/24/1999    small sliding hiatal hernia   • FOOT SURGERY Bilateral 2005   • JOINT REPLACEMENT Right 2002    KNEE   • PACEMAKER IMPLANTATION  2012   • RECTAL FISSURE INCISION AND DRAINAGE     • REPLACEMENT TOTAL KNEE     • SPINAL CORD STIMULATOR IMPLANT     • TOTAL HIP ARTHROPLASTY Right 2004   • TOTAL HIP ARTHROPLASTY Left 12/2008       Social History     Socioeconomic History   • Marital status:      Spouse name: Not on file   • Number of children: Not on file   • Years of education: Not on file   • Highest education level: Not on file   Tobacco Use   • Smoking status: Never Smoker   • Smokeless tobacco: Never Used   Substance and Sexual Activity   • Alcohol use: No   • Drug use: No   • Sexual activity: Defer       Family History   Problem Relation Age of Onset   • Heart disease Mother    • Heart disease Brother          Temp 98 °F (36.7 °C)   Ht 188 cm (74\")   Wt 122 kg (270 lb)   BMI 34.67 kg/m²       Physical Exam  Neuro: Alert and oriented ×3  Const: Not agitated or distressed; Vital signs are reviewed. No obvious deformities  Pulmonary: No respiratory distress  Skin: Without pallor or diaphoresis  GI: Abdomen is soft and nontender.  No significant distention.  No hernias noted.  : Penis and testicles are normal. Benign feeling prostate without nodule approximately 35 mL in size.           Data  Results for orders placed or performed in visit on 08/22/19   POC Urinalysis Dipstick, Multipro   Result Value Ref Range    Color Yellow Yellow, Straw, Dark Yellow, Slime    Clarity, UA Clear Clear    Glucose, UA Negative Negative, 1000 mg/dL (3+) mg/dL    Bilirubin Negative Negative    Ketones, UA Negative Negative    Specific Gravity  1.010 1.005 - 1.030    Blood, UA Negative Negative    pH, Urine 5.5 5.0 - 8.0    Protein, POC Trace " (A) Negative mg/dL    Urobilinogen, UA Normal Normal    Nitrite, UA Negative Negative    Leukocytes Negative Negative       International Prostate Symptom Score  The following is posted based on patient questionnaire answers:  0 - not at all    1-7 mild symptoms  1- Less than one time in five  8-19 moderate symptoms  2 -Less than half the time  20-35 severe symptoms  3 - About half the time  4 - More than half the time  5 - Almost always     For following sections:  Incomplete Emptying: - How often have you had the sensation  of not emptying your bladder completely after you finished urinating?  0  Frequency: -How often have you had to urinate again less than   two hours after you finished urinating?      0  Intermittency: -How often have you found you stopped and started again  Several times when you urinate?       1  Urgency: -How often do you find it difficult to postpone urination?             1  Weak stream: - How often have you had a weak urinary stream?             1  Straining: - How often have you had to push or strain to begin  Urination?          0  Sleeping: -How many times did you most typically get up to urinate   From the time you went to bed at night until the time you got up in the   1  Morning          Total `  4    Quality of Life  How would you feel if you had to live with your urinary condition the way   0  It is now, no better, no worse for the rest of your life?    Where: 0=delighted; 1= pleased, 2= mostly satisfied, 3= mixed, 4 = mostly  Dissatisfied, 5= Unhappy, 6 = terrible    Lab Results   Component Value Date    PSA 0.918 08/12/2019    PSA 1.1 08/06/2018    PSA 1.2 07/24/2017         Assessment and Plan  Diagnoses and all orders for this visit:    Benign non-nodular prostatic hyperplasia with lower urinary tract symptoms  -     POC Urinalysis Dipstick, Multipro    It is explained the benign prostatic hyperplasia is a chronic urologic condition.  His voiding symptoms are stable on his  current regimen. He has remained infection free since his last visit. He has no evidence of progression. We discussed symptoms that would be worrisome of either of these. We talked about the importance of being seen if he develops gross hematuria, burning with urination, or episodes that may be worrisome for inability to empty the bladder. We talked about medications that may increase the risk of urinary retention especially over the counter decongestants. This chronic issue appears stable overall. We will continue to monitor this with history, exam, urinalysis. Any suggestion of progression will require further work-up.     F/u: PRN    (Please note that portions of this note were completed with a voice recognition program.)  Celso Ellsworth MD  08/22/19  2:54 PM

## 2019-08-22 ENCOUNTER — OFFICE VISIT (OUTPATIENT)
Dept: UROLOGY | Facility: CLINIC | Age: 74
End: 2019-08-22

## 2019-08-22 ENCOUNTER — OFFICE VISIT (OUTPATIENT)
Dept: INTERNAL MEDICINE | Facility: CLINIC | Age: 74
End: 2019-08-22

## 2019-08-22 VITALS — HEIGHT: 74 IN | TEMPERATURE: 98 F | BODY MASS INDEX: 34.65 KG/M2 | WEIGHT: 270 LBS

## 2019-08-22 VITALS
HEIGHT: 74 IN | SYSTOLIC BLOOD PRESSURE: 102 MMHG | OXYGEN SATURATION: 97 % | DIASTOLIC BLOOD PRESSURE: 78 MMHG | BODY MASS INDEX: 36 KG/M2 | WEIGHT: 280.5 LBS | HEART RATE: 63 BPM | RESPIRATION RATE: 16 BRPM

## 2019-08-22 DIAGNOSIS — Z93.3 COLOSTOMY IN PLACE (HCC): ICD-10-CM

## 2019-08-22 DIAGNOSIS — I50.22 CHRONIC SYSTOLIC CONGESTIVE HEART FAILURE (HCC): ICD-10-CM

## 2019-08-22 DIAGNOSIS — I10 ESSENTIAL HYPERTENSION: ICD-10-CM

## 2019-08-22 DIAGNOSIS — Z00.00 ENCOUNTER FOR PREVENTIVE HEALTH EXAMINATION: ICD-10-CM

## 2019-08-22 DIAGNOSIS — I48.0 PAROXYSMAL ATRIAL FIBRILLATION (HCC): Primary | ICD-10-CM

## 2019-08-22 DIAGNOSIS — E78.2 MIXED HYPERLIPIDEMIA: ICD-10-CM

## 2019-08-22 DIAGNOSIS — I42.8 NON-ISCHEMIC CARDIOMYOPATHY (HCC): ICD-10-CM

## 2019-08-22 DIAGNOSIS — E66.01 CLASS 2 SEVERE OBESITY DUE TO EXCESS CALORIES WITH SERIOUS COMORBIDITY AND BODY MASS INDEX (BMI) OF 36.0 TO 36.9 IN ADULT (HCC): ICD-10-CM

## 2019-08-22 DIAGNOSIS — N40.1 BENIGN NON-NODULAR PROSTATIC HYPERPLASIA WITH LOWER URINARY TRACT SYMPTOMS: Primary | ICD-10-CM

## 2019-08-22 DIAGNOSIS — N40.0 BENIGN PROSTATIC HYPERPLASIA WITHOUT LOWER URINARY TRACT SYMPTOMS: ICD-10-CM

## 2019-08-22 LAB
BILIRUB BLD-MCNC: NEGATIVE MG/DL
CLARITY, POC: CLEAR
COLOR UR: YELLOW
GLUCOSE UR STRIP-MCNC: NEGATIVE MG/DL
KETONES UR QL: NEGATIVE
LEUKOCYTE EST, POC: NEGATIVE
NITRITE UR-MCNC: NEGATIVE MG/ML
PH UR: 5.5 [PH] (ref 5–8)
PROT UR STRIP-MCNC: ABNORMAL MG/DL
RBC # UR STRIP: NEGATIVE /UL
SP GR UR: 1.01 (ref 1–1.03)
UROBILINOGEN UR QL: NORMAL

## 2019-08-22 PROCEDURE — 81003 URINALYSIS AUTO W/O SCOPE: CPT | Performed by: UROLOGY

## 2019-08-22 PROCEDURE — 99212 OFFICE O/P EST SF 10 MIN: CPT | Performed by: UROLOGY

## 2019-08-22 PROCEDURE — 99214 OFFICE O/P EST MOD 30 MIN: CPT | Performed by: INTERNAL MEDICINE

## 2019-08-22 RX ORDER — WARFARIN SODIUM 2 MG/1
2 TABLET ORAL
Qty: 90 TABLET | Refills: 1 | Status: SHIPPED | OUTPATIENT
Start: 2019-08-22 | End: 2020-01-28 | Stop reason: SDUPTHER

## 2019-08-22 RX ORDER — WARFARIN SODIUM 4 MG/1
4 TABLET ORAL
Qty: 90 TABLET | Refills: 1 | Status: SHIPPED | OUTPATIENT
Start: 2019-08-22 | End: 2020-01-28 | Stop reason: SDUPTHER

## 2019-08-22 RX ORDER — WARFARIN SODIUM 5 MG/1
5 TABLET ORAL
Qty: 90 TABLET | Refills: 1 | Status: SHIPPED | OUTPATIENT
Start: 2019-08-22 | End: 2020-01-28 | Stop reason: SDUPTHER

## 2019-08-22 NOTE — PROGRESS NOTES
CC: Follow-up atrial fibrillation    History:  Jonh Peacock is a 73 y.o. male   He notes he has been doing well without any acute illness.  He continues on anticoagulation with warfarin related to atrial fibrillation and has seen his numbers remain therapeutic on each and every check recently.  He is able to manipulate his dosing usually between 5 and 6 mg daily based on alterations to his diet.  He has no worsening shortness of breath or edema to suggest decompensation of his heart failure and he does continue on metoprolol, losartan, and loop diuretic.  Blood pressure is within normal limits and he has no symptoms of headache or vision disturbance.  He has had no issues with his colostomy and has no changes to his stools.    ROS:  Review of Systems   Constitutional: Negative for chills and fever.   Respiratory: Negative for cough and shortness of breath.    Cardiovascular: Negative for chest pain and palpitations.   Gastrointestinal: Negative for abdominal pain and constipation.   Genitourinary: Negative for difficulty urinating and dysuria.        reports that he has never smoked. He has never used smokeless tobacco. He reports that he does not drink alcohol or use drugs.      Current Outpatient Medications:   •  Buprenorphine (BUTRANS) 10 MCG/HR patch weekly, Place 10 mcg on the skin Every 7 (Seven) Days., Disp: , Rfl:   •  diazePAM (VALIUM) 2 MG tablet, Take 2 mg by mouth Daily., Disp: , Rfl:   •  diltiaZEM (CARDIZEM) 30 MG tablet, Take 1 tablet by mouth 3 (Three) Times a Day., Disp: 270 tablet, Rfl: 3  •  furosemide (LASIX) 20 MG tablet, Take 20 mg by mouth Daily., Disp: , Rfl:   •  furosemide (LASIX) 40 MG tablet, Take 40 mg by mouth Daily. TAKE WITH 20 MG TABLET FOR 60 PER MG PER DAY, Disp: , Rfl:   •  gabapentin (NEURONTIN) 600 MG tablet, Take 600 mg by mouth 3 (Three) Times a Day., Disp: , Rfl:   •  Glucosamine 750 MG tablet, Take 750 mg by mouth 2 (Two) Times a Day., Disp: , Rfl:   •   "HYDROcodone-acetaminophen (NORCO) 7.5-325 MG per tablet, Take 1 tablet by mouth Every 6 (Six) Hours As Needed for Moderate Pain ., Disp: , Rfl:   •  losartan (COZAAR) 25 MG tablet, Take 1 tablet by mouth Daily., Disp: 90 tablet, Rfl: 2  •  metoprolol succinate XL (TOPROL-XL) 100 MG 24 hr tablet, Take 1 tablet by mouth Daily., Disp: 90 tablet, Rfl: 3  •  Multiple Vitamins-Minerals (MULTIVITAMIN ADULT PO), Take  by mouth Daily., Disp: , Rfl:   •  pantoprazole (PROTONIX) 40 MG EC tablet, Take 1 tablet by mouth Daily., Disp: 90 tablet, Rfl: 3  •  POLYETHYLENE GLYCOL 3350 PO, Take 17 granules by mouth Every Night., Disp: , Rfl:   •  simvastatin (ZOCOR) 20 MG tablet, Take 1 tablet by mouth Every Night., Disp: 90 tablet, Rfl: 3  •  tamsulosin (FLOMAX) 0.4 MG capsule 24 hr capsule, Take 1 capsule by mouth Every Night., Disp: 90 capsule, Rfl: 3  •  tiZANidine (ZANAFLEX) 4 MG tablet, Take 4 mg by mouth 3 (Three) Times a Day As Needed for muscle spasms., Disp: , Rfl:   •  warfarin (COUMADIN) 2 MG tablet, Take 1 tablet by mouth Daily., Disp: 90 tablet, Rfl: 1  •  warfarin (COUMADIN) 4 MG tablet, Take 1 tablet by mouth Daily., Disp: 90 tablet, Rfl: 1  •  warfarin (COUMADIN) 5 MG tablet, Take 1 tablet by mouth Daily., Disp: 90 tablet, Rfl: 1    OBJECTIVE:  /78 (BP Location: Left arm, Patient Position: Sitting, Cuff Size: Adult)   Pulse 63   Resp 16   Ht 188 cm (74\")   Wt 127 kg (280 lb 8 oz)   SpO2 97%   BMI 36.01 kg/m²    Physical Exam   Constitutional: He is oriented to person, place, and time. He appears well-nourished. No distress.   Cardiovascular: Normal rate, regular rhythm and normal heart sounds.   No murmur heard.  Pulmonary/Chest: Effort normal and breath sounds normal. He has no wheezes.   Neurological: He is alert and oriented to person, place, and time.   Psychiatric: He has a normal mood and affect.       Assessment/Plan    Diagnoses and all orders for this visit:    Paroxysmal atrial fibrillation " (CMS/[facility])  -     warfarin (COUMADIN) 5 MG tablet; Take 1 tablet by mouth Daily.  -     warfarin (COUMADIN) 2 MG tablet; Take 1 tablet by mouth Daily.  -     warfarin (COUMADIN) 4 MG tablet; Take 1 tablet by mouth Daily.  Last INR therapeutic between 2 and 3.  He is rate controlled on metoprolol and anticoagulated with warfarin.    Essential hypertension  -     CBC & Differential; Future  -     Comprehensive Metabolic Panel; Future  Well controlled, BP goal for age is <140/90 per JNC 8 guidelines and continue current medications    Chronic systolic congestive heart failure (CMS/HCC)  Non-ischemic cardiomyopathy (CMS/[facility])  Stable on Toprol-XL, RAAS blockade, and loop diuretic.  He is euvolemic without evidence of decompensation.    Class 2 severe obesity due to excess calories with serious comorbidity and body mass index (BMI) of 36.0 to 36.9 in adult (CMS/[facility])  Recommended attention to portion control and being careful about the types and timing of meals for the purpose of weight management.    Benign prostatic hyperplasia without lower urinary tract symptoms  Stable without urinary tract symptoms on Flomax.    Mixed hyperlipidemia  Stable on moderate intensity statin therapy per ACC/AHA guidelines.    Colostomy in place (CMS/[facility])  No complications or stool changes with colostomy in place.    Encounter for preventive health examination  -     Hepatitis C antibody; Future    An After Visit Summary was printed and given to the patient at discharge.  Return in about 4 months (around 12/22/2019) for Recheck.         Ric Antony D.O. 8/23/2019   Electronically signed.

## 2019-08-26 ENCOUNTER — DOCUMENTATION (OUTPATIENT)
Dept: CARDIOLOGY | Facility: CLINIC | Age: 74
End: 2019-08-26

## 2019-08-26 DIAGNOSIS — Z95.810 CARDIAC DEFIBRILLATOR IN SITU: Primary | ICD-10-CM

## 2019-09-04 ENCOUNTER — TELEPHONE (OUTPATIENT)
Dept: INTERNAL MEDICINE | Facility: CLINIC | Age: 74
End: 2019-09-04

## 2019-09-04 DIAGNOSIS — J01.00 ACUTE NON-RECURRENT MAXILLARY SINUSITIS: Primary | ICD-10-CM

## 2019-09-04 RX ORDER — AZITHROMYCIN 250 MG/1
TABLET, FILM COATED ORAL
Qty: 6 TABLET | Refills: 0 | Status: SHIPPED | OUTPATIENT
Start: 2019-09-04 | End: 2019-09-19

## 2019-09-04 NOTE — TELEPHONE ENCOUNTER
Patient called he stated that he has greenish sputum & congestion, over the weekend and still has it getting worse. Would you please call him in an antibiotic

## 2019-09-06 NOTE — PROGRESS NOTES
Dual Chamber AICD Evaluation Report  REMOTE/CARELINK     August 26, 2019     Primary Cardiologist: Jocelyn   : Medtronic Model: Protecta XT  [id number]  Implant date: 9/27/2012     Reason for evaluation: battery check  Indication for AICD: nonischemic cardiomyopathy     Episodes recorded since 8/9/2019:  No episodes, ATP or shocks.     Battery:  2.62V, has yet to reach RRT     Follow Up:  2 week battery check via carelink

## 2019-09-09 ENCOUNTER — LAB (OUTPATIENT)
Dept: LAB | Facility: HOSPITAL | Age: 74
End: 2019-09-09

## 2019-09-09 DIAGNOSIS — I10 ESSENTIAL HYPERTENSION: ICD-10-CM

## 2019-09-09 DIAGNOSIS — R73.09 ELEVATED RANDOM BLOOD GLUCOSE LEVEL: ICD-10-CM

## 2019-09-09 DIAGNOSIS — Z00.00 ENCOUNTER FOR PREVENTIVE HEALTH EXAMINATION: ICD-10-CM

## 2019-09-09 LAB
ALBUMIN SERPL-MCNC: 3.7 G/DL (ref 3.5–5)
ALBUMIN/GLOB SERPL: 1.2 G/DL (ref 1.1–2.5)
ALP SERPL-CCNC: 76 U/L (ref 24–120)
ALT SERPL W P-5'-P-CCNC: 31 U/L (ref 0–54)
ANION GAP SERPL CALCULATED.3IONS-SCNC: 6 MMOL/L (ref 4–13)
AST SERPL-CCNC: 29 U/L (ref 7–45)
BASOPHILS # BLD AUTO: 0.03 10*3/MM3 (ref 0–0.2)
BASOPHILS NFR BLD AUTO: 0.5 % (ref 0–1.5)
BILIRUB SERPL-MCNC: 0.5 MG/DL (ref 0.1–1)
BUN BLD-MCNC: 24 MG/DL (ref 5–21)
BUN/CREAT SERPL: 22.2 (ref 7–25)
CALCIUM SPEC-SCNC: 8.7 MG/DL (ref 8.4–10.4)
CHLORIDE SERPL-SCNC: 105 MMOL/L (ref 98–110)
CO2 SERPL-SCNC: 29 MMOL/L (ref 24–31)
CREAT BLD-MCNC: 1.08 MG/DL (ref 0.5–1.4)
DEPRECATED RDW RBC AUTO: 44.8 FL (ref 37–54)
EOSINOPHIL # BLD AUTO: 0.2 10*3/MM3 (ref 0–0.4)
EOSINOPHIL NFR BLD AUTO: 3.3 % (ref 0.3–6.2)
ERYTHROCYTE [DISTWIDTH] IN BLOOD BY AUTOMATED COUNT: 13.5 % (ref 12.3–15.4)
GFR SERPL CREATININE-BSD FRML MDRD: 81 ML/MIN/1.73
GLOBULIN UR ELPH-MCNC: 3.2 GM/DL
GLUCOSE BLD-MCNC: 162 MG/DL (ref 70–100)
HCT VFR BLD AUTO: 34.7 % (ref 37.5–51)
HCV AB SER DONR QL: NEGATIVE
HCV S/C RATIO: 0.01 (ref 0–0.99)
HGB BLD-MCNC: 11.5 G/DL (ref 13–17.7)
IMM GRANULOCYTES # BLD AUTO: 0.01 10*3/MM3 (ref 0–0.05)
IMM GRANULOCYTES NFR BLD AUTO: 0.2 % (ref 0–0.5)
INR PPP: 2.2 (ref 0.91–1.09)
LYMPHOCYTES # BLD AUTO: 1.56 10*3/MM3 (ref 0.7–3.1)
LYMPHOCYTES NFR BLD AUTO: 25.6 % (ref 19.6–45.3)
MCH RBC QN AUTO: 30.1 PG (ref 26.6–33)
MCHC RBC AUTO-ENTMCNC: 33.1 G/DL (ref 31.5–35.7)
MCV RBC AUTO: 90.8 FL (ref 79–97)
MONOCYTES # BLD AUTO: 0.44 10*3/MM3 (ref 0.1–0.9)
MONOCYTES NFR BLD AUTO: 7.2 % (ref 5–12)
NEUTROPHILS # BLD AUTO: 3.86 10*3/MM3 (ref 1.7–7)
NEUTROPHILS NFR BLD AUTO: 63.2 % (ref 42.7–76)
NRBC BLD AUTO-RTO: 0 /100 WBC (ref 0–0.2)
PLATELET # BLD AUTO: 127 10*3/MM3 (ref 140–450)
PMV BLD AUTO: 12.1 FL (ref 6–12)
POTASSIUM BLD-SCNC: 3.8 MMOL/L (ref 3.5–5.3)
PROT SERPL-MCNC: 6.9 G/DL (ref 6.3–8.7)
PROTHROMBIN TIME: 26.8 SECONDS (ref 10–13.8)
RBC # BLD AUTO: 3.82 10*6/MM3 (ref 4.14–5.8)
SODIUM BLD-SCNC: 140 MMOL/L (ref 135–145)
WBC NRBC COR # BLD: 6.1 10*3/MM3 (ref 3.4–10.8)

## 2019-09-09 PROCEDURE — 85025 COMPLETE CBC W/AUTO DIFF WBC: CPT | Performed by: INTERNAL MEDICINE

## 2019-09-09 PROCEDURE — 80053 COMPREHEN METABOLIC PANEL: CPT | Performed by: INTERNAL MEDICINE

## 2019-09-09 PROCEDURE — 36415 COLL VENOUS BLD VENIPUNCTURE: CPT

## 2019-09-09 PROCEDURE — 86803 HEPATITIS C AB TEST: CPT | Performed by: INTERNAL MEDICINE

## 2019-09-09 PROCEDURE — 85610 PROTHROMBIN TIME: CPT | Performed by: INTERNAL MEDICINE

## 2019-09-09 PROCEDURE — 83036 HEMOGLOBIN GLYCOSYLATED A1C: CPT | Performed by: INTERNAL MEDICINE

## 2019-09-10 ENCOUNTER — DOCUMENTATION (OUTPATIENT)
Dept: CARDIOLOGY | Facility: CLINIC | Age: 74
End: 2019-09-10

## 2019-09-10 DIAGNOSIS — Z95.810 CARDIAC DEFIBRILLATOR IN SITU: Primary | ICD-10-CM

## 2019-09-10 DIAGNOSIS — I42.8 NON-ISCHEMIC CARDIOMYOPATHY (HCC): ICD-10-CM

## 2019-09-11 DIAGNOSIS — R73.09 ELEVATED RANDOM BLOOD GLUCOSE LEVEL: Primary | ICD-10-CM

## 2019-09-11 LAB — HBA1C MFR BLD: 7.6 % (ref 4.8–5.6)

## 2019-09-12 PROBLEM — E11.9 TYPE 2 DIABETES MELLITUS WITHOUT COMPLICATION, WITHOUT LONG-TERM CURRENT USE OF INSULIN (HCC): Status: ACTIVE | Noted: 2019-09-12

## 2019-09-12 PROBLEM — Z99.2 TYPE 2 DIABETES MELLITUS WITH CHRONIC KIDNEY DISEASE ON CHRONIC DIALYSIS: Status: ACTIVE | Noted: 2019-09-12

## 2019-09-12 PROBLEM — N18.6 TYPE 2 DIABETES MELLITUS WITH CHRONIC KIDNEY DISEASE ON CHRONIC DIALYSIS: Status: ACTIVE | Noted: 2019-09-12

## 2019-09-12 PROBLEM — E11.22 TYPE 2 DIABETES MELLITUS WITH CHRONIC KIDNEY DISEASE ON CHRONIC DIALYSIS (HCC): Status: ACTIVE | Noted: 2019-09-12

## 2019-09-19 ENCOUNTER — CLINICAL SUPPORT (OUTPATIENT)
Dept: CARDIOLOGY | Facility: CLINIC | Age: 74
End: 2019-09-19

## 2019-09-19 ENCOUNTER — OFFICE VISIT (OUTPATIENT)
Dept: GASTROENTEROLOGY | Facility: CLINIC | Age: 74
End: 2019-09-19

## 2019-09-19 ENCOUNTER — TELEPHONE (OUTPATIENT)
Dept: GASTROENTEROLOGY | Facility: CLINIC | Age: 74
End: 2019-09-19

## 2019-09-19 VITALS
HEART RATE: 77 BPM | WEIGHT: 282 LBS | HEIGHT: 74 IN | OXYGEN SATURATION: 98 % | TEMPERATURE: 95.9 F | BODY MASS INDEX: 36.19 KG/M2 | DIASTOLIC BLOOD PRESSURE: 70 MMHG | SYSTOLIC BLOOD PRESSURE: 110 MMHG

## 2019-09-19 DIAGNOSIS — Z86.010 HX OF COLONIC POLYP: Primary | ICD-10-CM

## 2019-09-19 DIAGNOSIS — I10 ESSENTIAL HYPERTENSION: ICD-10-CM

## 2019-09-19 DIAGNOSIS — D68.9 COAGULOPATHY (HCC): ICD-10-CM

## 2019-09-19 DIAGNOSIS — I50.22 CHRONIC SYSTOLIC CONGESTIVE HEART FAILURE (HCC): Primary | ICD-10-CM

## 2019-09-19 PROCEDURE — 93297 REM INTERROG DEV EVAL ICPMS: CPT | Performed by: INTERNAL MEDICINE

## 2019-09-19 PROCEDURE — S0260 H&P FOR SURGERY: HCPCS | Performed by: NURSE PRACTITIONER

## 2019-09-19 NOTE — PROGRESS NOTES
[facility] Report    September 19, 2019    Primary Cardiologist: Jocelyn    Reason for evaluation: Heart Failure Management  Indication for AICD: chronic systolic congestive heart failure    Findings:  Monthly HFMR reviewed. Impedance at reference and no recent OptiVol  crossing. OptiVol is at 20 ohms. ICD pt is v paced 0.4% and atrial  pacing 40.2%. 20 seconds AT/AF. No episodes of VT/VF or shocks.  The remaining cardiac compass trends within normal limits. Weight  within normal limits. 30 day blood pressure ranges from 105/67-  120/76. Pt is not reporting any symptoms of HF on health checks.  HFMR indicates Low Risk for worsening HF-Recommend routine clinic  follow up.    Follow up: 1 month

## 2019-09-19 NOTE — TELEPHONE ENCOUNTER
PT was wondering about his internal spinal column stimulator. Does he need to turn of off the day of his procedure or leave it on?

## 2019-09-19 NOTE — PROGRESS NOTES
[facility]    Primary Physician Ric Antony,     9/19/2019    Jonh Peacock   1945      Chief Complaint   Patient presents with   • Colonoscopy       Subjective     HPI    Drew C Vanderford is a 73 y.o. male who presents as a referral for preventative maintenance. He has no complaints of nausea or vomiting. No change in bowels. No wt loss. No BRBPR. No melena. No abdominal pain.        Last colonoscopy was 9/20104 noting diverticulosis of the transverse colon.  The patient does have history of colon polyps. The patient does not have history of colon cancer.   There is no family history of colon polyps. There is no family history of colon cancer.       He is on coumadin for history of blood clot LLE abdomen also has atrial fib.     Past Medical History:   Diagnosis Date   • Anemia 1/30/2017   • Anxiety 1/30/2017   • Arrhythmia 1/30/2017   • Arthritis    • Atrial fibrillation (CMS/HCC)    • Cardiomyopathy (CMS/HCC) 1/30/2017   • CHF (congestive heart failure) (CMS/HCC) 1/30/2017   • Chronic pain 1/30/2017   • Colon polyp    • Connective tissue disease (CMS/HCC)    • Depression 1/30/2017   • Diverticulitis 2010   • DVT (deep venous thrombosis) (CMS/HCC)    • Gastritis 1/30/2017   • Hiatal hernia    • Hyperlipidemia 1/30/2017   • Hypertension 1/30/2017   • Low back pain 1/30/2017   • Neuropathy 1/30/2017   • Obesity 1/30/2017   • Pneumonia    • Status post placement of cardiac pacemaker 1/30/2017       Past Surgical History:   Procedure Laterality Date   • APPENDECTOMY     • BACK SURGERY  2002    BACK FUSION   • BACK SURGERY  2007   • CARDIAC CATHETERIZATION     • CARDIAC DEFIBRILLATOR PLACEMENT     • COLON RESECTION WITH COLOSTOMY  03/09/2010   • COLONOSCOPY  09/04/2014    diverticulosis   • ENDOSCOPY  02/24/1999    small sliding hiatal hernia   • FOOT SURGERY Bilateral 2005   • JOINT REPLACEMENT Right 2002    KNEE   • PACEMAKER IMPLANTATION  2012   • RECTAL FISSURE  INCISION AND DRAINAGE     • REPLACEMENT TOTAL KNEE     • SPINAL CORD STIMULATOR IMPLANT     • TOTAL HIP ARTHROPLASTY Right 2004   • TOTAL HIP ARTHROPLASTY Left 12/2008       Outpatient Medications Marked as Taking for the 9/19/19 encounter (Office Visit) with Sujatha Barlow APRN   Medication Sig Dispense Refill   • Buprenorphine (BUTRANS) 10 MCG/HR patch weekly Place 10 mcg on the skin Every 7 (Seven) Days.     • CHONDROITIN SULFATE OP Apply  to eye(s) as directed by provider.     • diazePAM (VALIUM) 2 MG tablet Take 2 mg by mouth Daily.     • diltiaZEM (CARDIZEM) 30 MG tablet Take 1 tablet by mouth 3 (Three) Times a Day. 270 tablet 3   • furosemide (LASIX) 20 MG tablet Take 20 mg by mouth Daily.     • furosemide (LASIX) 40 MG tablet Take 40 mg by mouth Daily. TAKE WITH 20 MG TABLET FOR 60 PER MG PER DAY     • gabapentin (NEURONTIN) 600 MG tablet Take 600 mg by mouth 3 (Three) Times a Day.     • Glucosamine 750 MG tablet Take 750 mg by mouth 2 (Two) Times a Day.     • HYDROcodone-acetaminophen (NORCO) 7.5-325 MG per tablet Take 1 tablet by mouth Every 6 (Six) Hours As Needed for Moderate Pain .     • losartan (COZAAR) 25 MG tablet Take 1 tablet by mouth Daily. 90 tablet 2   • metoprolol succinate XL (TOPROL-XL) 100 MG 24 hr tablet Take 1 tablet by mouth Daily. 90 tablet 3   • Multiple Vitamins-Minerals (MULTIVITAMIN ADULT PO) Take  by mouth Daily.     • pantoprazole (PROTONIX) 40 MG EC tablet Take 1 tablet by mouth Daily. 90 tablet 3   • POLYETHYLENE GLYCOL 3350 PO Take 17 granules by mouth Every Night.     • simvastatin (ZOCOR) 20 MG tablet Take 1 tablet by mouth Every Night. 90 tablet 3   • tamsulosin (FLOMAX) 0.4 MG capsule 24 hr capsule Take 1 capsule by mouth Every Night. 90 capsule 3   • warfarin (COUMADIN) 2 MG tablet Take 1 tablet by mouth Daily. 90 tablet 1   • warfarin (COUMADIN) 4 MG tablet Take 1 tablet by mouth Daily. 90 tablet 1   • warfarin (COUMADIN) 5 MG tablet Take 1 tablet by mouth Daily. 90  tablet 1       Allergies   Allergen Reactions   • Amiodarone Other (See Comments)     INTERFERES WITH OTHER MEDICATIONS   • Oxycontin [Oxycodone Hcl] Other (See Comments)     Can not urinate.   • Penicillins Hives     Allergic to All cillins. Amoxacillin.   • Vioxx [Rofecoxib] GI Intolerance       Social History     Socioeconomic History   • Marital status:      Spouse name: Not on file   • Number of children: Not on file   • Years of education: Not on file   • Highest education level: Not on file   Tobacco Use   • Smoking status: Never Smoker   • Smokeless tobacco: Never Used   Substance and Sexual Activity   • Alcohol use: Yes     Comment: rare   • Drug use: No   • Sexual activity: Defer       Family History   Problem Relation Age of Onset   • Heart disease Mother    • Heart disease Brother    • Colon cancer Neg Hx    • Colon polyps Neg Hx        Review of Systems   Constitutional: Negative for appetite change, chills, fatigue, fever and unexpected weight change.   HENT: Negative for sore throat and trouble swallowing.    Eyes: Negative for visual disturbance.   Respiratory: Positive for shortness of breath (chronic. ). Negative for cough and wheezing.    Cardiovascular: Negative for chest pain and palpitations.   Gastrointestinal: Negative for abdominal distention, abdominal pain, anal bleeding, blood in stool, constipation, diarrhea, nausea and vomiting.        As mentioned in hpi   Genitourinary: Negative for difficulty urinating and hematuria.   Musculoskeletal: Negative for arthralgias and back pain.   Skin: Negative for color change and rash.   Neurological: Negative for dizziness, seizures, syncope, light-headedness and headaches.   Hematological: Negative for adenopathy.   Psychiatric/Behavioral: Negative for confusion. The patient is not nervous/anxious.        Objective     Vitals:    09/19/19 1417   BP: 110/70   Pulse: 77   Temp: 95.9 °F (35.5 °C)   SpO2: 98%         09/19/19  1417   Weight: 128  kg (282 lb)     Body mass index is 36.21 kg/m².    Physical Exam   Constitutional: He appears well-developed and well-nourished. No distress.   HENT:   Head: Normocephalic and atraumatic.   Eyes: EOM are normal. No scleral icterus.   Neck: Neck supple. No JVD present.   Cardiovascular: Normal rate, regular rhythm and normal heart sounds.   Pulmonary/Chest: Effort normal and breath sounds normal.   Abdominal: Soft. Bowel sounds are normal. He exhibits no distension. There is no tenderness.   Musculoskeletal: Normal range of motion. He exhibits no deformity.   Neurological: He is alert.   Skin: Skin is warm and dry. No rash noted.   Psychiatric: He has a normal mood and affect. His behavior is normal.   Vitals reviewed.      Imaging Results (most recent)     None          Assessment/Plan     Jonh was seen today for colonoscopy.    Diagnoses and all orders for this visit:    Hx of colonic polyp  -     Case Request; Standing  -     Case Request    Coagulopathy (CMS/HCC)  Comments:  Coumadin    Essential hypertension    Other orders  -     Follow Anesthesia Guidelines / Standing Orders; Future  -     Implement Anesthesia Orders Day of Procedure; Standing  -     Obtain Informed Consent; Standing  -     Obtain Informed Consent; Future  -     polyethylene glycol (GOLYTELY) 236 g solution; Take 4,000 mL by mouth 1 (One) Time for 1 dose. Take as directed per instruction sheet.      Plan for colonoscopy. The patient was advised to take any blood pressure or heart  medications the morning of  procedure if that is when he/she normally takes.      He has been instructed to use enema per rectum the am of procedure. He verbalizes understanding.             Body mass index is 36.21 kg/m².    Patient's Body mass index is 36.21 kg/m². BMI is above normal parameters. Recommendations include: No follow-up, recommend weight loss.      COLONOSCOPY WITH ANESTHESIA (N/A)  All risks, benefits, alternatives, and indications of colonoscopy  procedure have been discussed with the patient. Risks to include perforation of the colon requiring possible surgery or colostomy, risk of bleeding from biopsies or removal of colon tissue, possibility of missing a colon polyp or cancer, or adverse drug reaction.  Benefits to include the diagnosis and management of disease of the colon and rectum. Alternatives to include barium enema, radiographic evaluation, lab testing or no intervention. Pt verbalizes understanding and agrees.     The patient was advised on the risks of stopping blood thinners for a procedure.  The risks discussed included the risk of developing myocardial infarction, CVA, embolus, clogging of the arteries or stents, etc.  We discussed the potential consequences of the risks discussed.  The benefits of stopping as well as the alternatives were discussed as well.   Patient is to hold their anticoagulation medication per the direction of their prescribing physician, Dr. Banks.    A letter will be sent to prescribing This is to prevent any risk or complication from bleeding intra and post procedure. If they develop bleeding post procedure they are to go the emergency department for further evaluation and treatment immediately.               REYMUNDO Mcghee/transcription disclaimer:  Much of this encounter note is electronic transcription/translation of spoken language to printed text.  The electronic translation of spoken language may be erroneous, or at times, nonsensical words or phrases may be inadvertently transcribed.  Although I have reviewed the note for such errors, some may still exist.

## 2019-09-20 PROBLEM — Z86.010 HX OF COLONIC POLYP: Status: ACTIVE | Noted: 2019-09-20

## 2019-09-20 PROBLEM — Z86.0100 HX OF COLONIC POLYP: Status: ACTIVE | Noted: 2019-09-20

## 2019-09-27 ENCOUNTER — CLINICAL SUPPORT (OUTPATIENT)
Dept: CARDIOLOGY | Facility: CLINIC | Age: 74
End: 2019-09-27

## 2019-09-27 DIAGNOSIS — I42.8 NON-ISCHEMIC CARDIOMYOPATHY (HCC): ICD-10-CM

## 2019-09-27 DIAGNOSIS — Z95.810 CARDIAC DEFIBRILLATOR IN SITU: Primary | ICD-10-CM

## 2019-09-27 NOTE — PROGRESS NOTES
It should be reasonably safe to hold Coumadin for 5 days as requested without the need for Lovenox.  This patient takes Coumadin given a history of atrial fibrillation alone.

## 2019-09-27 NOTE — PROGRESS NOTES
Dual Chamber AICD Evaluation Report  REMOTE/CARELINK    September 27, 2019    Primary Cardiologist: Jocelyn Gen change to be done by Dr. Chavez [facility]  : Medtronic Model: Protecta XT  [id number]  Implant date: 9/27/2012    Reason for evaluation: battery check, device has reached RRT  Indication for AICD: nonischemic cardiomyopathy    Measurements  Atrial sensing - P wave: 1.6 mV  Atrial threshold: 0.5 V @ 0.4 ms  Atrial lead impedance: 361 ohms  Ventricular sensing - R wave: 4.5 mV  Ventricular threshold: 0.875 V @ 0.4 ms  Ventricular lead impedance:  342 ohms  Shock impedance:  RV- 39 ohms   SVC- 46 ohms      Diagnostic Data  Atrial paced: 65 %  Ventricular paced: 0.3 %    Episodes recorded since 9/10/2019:  No episodes recorded.  No ATP or shocks.  Optivol is at 40.    Battery status: elective replacement time  2.61V  RRT=2.63V on 27-Sep-2019    Final Parameters  Mode: AAIR+  Lower rate: 60 bpm Upper rate: 130 bpm  AV Delay: Paced- 180 ms    Sensed- 150 ms     Atrial - Amplitude: 1.5 V Pulse width: 0.4 ms Sensitivity: 0.6 mV   Ventricular - Amplitude: 2 V Pulse width: 0.4 ms Sensitivity: 0.3 mV   Changes made: No changes made.  Conclusions: normal AICD function, no therapy delivered, stable pacing and sensing thresholds and battery at elective replacement voltage    Follow up: 6 weeks post gen change.    Note:  Patient prefers that Dr. Chavez perform his gen change here in [place].  RRT Report faxed to Dr. Chavez and message left for Dr. Chavez's RN, Vilma.

## 2019-09-30 ENCOUNTER — TELEPHONE (OUTPATIENT)
Dept: GASTROENTEROLOGY | Facility: CLINIC | Age: 74
End: 2019-09-30

## 2019-09-30 ENCOUNTER — OFFICE VISIT (OUTPATIENT)
Dept: CARDIOLOGY | Facility: CLINIC | Age: 74
End: 2019-09-30

## 2019-09-30 VITALS
SYSTOLIC BLOOD PRESSURE: 111 MMHG | OXYGEN SATURATION: 96 % | DIASTOLIC BLOOD PRESSURE: 72 MMHG | HEIGHT: 74 IN | BODY MASS INDEX: 36.57 KG/M2 | HEART RATE: 67 BPM | WEIGHT: 285 LBS

## 2019-09-30 DIAGNOSIS — I48.0 PAROXYSMAL ATRIAL FIBRILLATION (HCC): ICD-10-CM

## 2019-09-30 DIAGNOSIS — I10 ESSENTIAL HYPERTENSION: ICD-10-CM

## 2019-09-30 DIAGNOSIS — I50.22 CHRONIC SYSTOLIC CONGESTIVE HEART FAILURE (HCC): Primary | ICD-10-CM

## 2019-09-30 PROCEDURE — 99214 OFFICE O/P EST MOD 30 MIN: CPT | Performed by: INTERNAL MEDICINE

## 2019-09-30 NOTE — TELEPHONE ENCOUNTER
Please let patient know that Dr. Banks says ok to hold coumadin x 5 days prior to procedure. No lovenox.         Routed Note     Author: Arun Banks MD Service: -- Author Type: Physician   Filed: 09/27/19 1322 Encounter Date: 9/19/2019 Status: Signed   : Arun Banks MD (Physician)        Show:Clear all  [x]Manual[]Template[]Copied    Added by:  [x]Arun Banks MD    []Hover for details  It should be reasonably safe to hold Coumadin for 5 days as requested without the need for Lovenox.  This patient takes Coumadin given a history of atrial fibrillation alone.                    ----- Message from Arun Banks MD sent at 9/27/2019  1:22 PM CDT -----      ----- Message -----  From: Sujatha Barlow APRN  Sent: 9/19/2019   3:17 PM  To: Arun Banks MD

## 2019-10-01 ENCOUNTER — DOCUMENTATION (OUTPATIENT)
Dept: CARDIOLOGY | Facility: CLINIC | Age: 74
End: 2019-10-01

## 2019-10-01 DIAGNOSIS — Z95.810 CARDIAC DEFIBRILLATOR IN SITU: Primary | ICD-10-CM

## 2019-10-01 DIAGNOSIS — I42.8 NON-ISCHEMIC CARDIOMYOPATHY (HCC): ICD-10-CM

## 2019-10-01 PROCEDURE — 93000 ELECTROCARDIOGRAM COMPLETE: CPT | Performed by: INTERNAL MEDICINE

## 2019-10-01 NOTE — PROGRESS NOTES
Subjective:     Encounter Date: 9/30/2019      Patient ID: Jonh Peacock is a 73 y.o. male with nonischemic cardiomyopathy, history of nonsustained ventricular tachycardia, atrial fibrillation, atrial tachycardia, mitral regurgitation, hypertension and hyperlipidemia who presents today for follow-up.    Chief Complaint: Routine follow-up    Congestive Heart Failure   Presents for follow-up visit. Associated symptoms include edema (intermittent) and shortness of breath (mild - unchanged). Pertinent negatives include no abdominal pain, chest pain, orthopnea, palpitations, paroxysmal nocturnal dyspnea or unexpected weight change. The symptoms have been stable. Compliance with total regimen is %. Compliance with diet is %. Compliance with exercise is %. Compliance with medications is %.   Hypertension   This is a chronic problem. The problem is unchanged. The problem is controlled. Associated symptoms include malaise/fatigue, peripheral edema and shortness of breath (mild - unchanged). Pertinent negatives include no chest pain, headaches, orthopnea, palpitations or PND. There are no associated agents to hypertension. Risk factors for coronary artery disease include male gender, obesity and sedentary lifestyle. Current antihypertension treatment includes calcium channel blockers, angiotensin blockers, beta blockers and diuretics. The current treatment provides significant improvement. Hypertensive end-organ damage includes heart failure.      This patient presents today for routine follow-up.  He has a history of nonischemic cardiomyopathy.  He has a defibrillator in place.  This generator is nearing end-of-life.  He will be following up with Dr. Chavez on Thursday for setting up a an elective generator replacement.  No shocks or therapies from his device recently.  Occasionally, he will have palpitations but nothing significant or prolonged.  He describes some chronic mild shortness of  breath and dyspnea on exertion as well as some chronic lower extremity edema.  His weight remains relatively stable.  He says that his Lasix dosing varies based on his activity level.  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  No chest pain.  No problems or side effects from medications.  Blood pressure remains well controlled.  Overall, he describes himself is doing well and feeling reasonably well at this time.      Current Outpatient Medications:   •  Buprenorphine (BUTRANS) 10 MCG/HR patch weekly, Place 10 mcg on the skin Every 7 (Seven) Days., Disp: , Rfl:   •  CHONDROITIN SULFATE PO, Take  by mouth., Disp: , Rfl:   •  diazePAM (VALIUM) 2 MG tablet, Take 2 mg by mouth Daily., Disp: , Rfl:   •  diltiaZEM (CARDIZEM) 30 MG tablet, Take 1 tablet by mouth 3 (Three) Times a Day., Disp: 270 tablet, Rfl: 3  •  furosemide (LASIX) 20 MG tablet, Take 20 mg by mouth Daily., Disp: , Rfl:   •  furosemide (LASIX) 40 MG tablet, Take 40 mg by mouth Daily. TAKE WITH 20 MG TABLET FOR 60 PER MG PER DAY, Disp: , Rfl:   •  gabapentin (NEURONTIN) 600 MG tablet, Take 600 mg by mouth 3 (Three) Times a Day., Disp: , Rfl:   •  Glucosamine 750 MG tablet, Take 750 mg by mouth 2 (Two) Times a Day., Disp: , Rfl:   •  HYDROcodone-acetaminophen (NORCO) 7.5-325 MG per tablet, Take 1 tablet by mouth Every 6 (Six) Hours As Needed for Moderate Pain ., Disp: , Rfl:   •  losartan (COZAAR) 25 MG tablet, Take 1 tablet by mouth Daily., Disp: 90 tablet, Rfl: 2  •  metoprolol succinate XL (TOPROL-XL) 100 MG 24 hr tablet, Take 1 tablet by mouth Daily., Disp: 90 tablet, Rfl: 3  •  Multiple Vitamins-Minerals (MULTIVITAMIN ADULT PO), Take  by mouth Daily., Disp: , Rfl:   •  pantoprazole (PROTONIX) 40 MG EC tablet, Take 1 tablet by mouth Daily., Disp: 90 tablet, Rfl: 3  •   POLYETHYLENE GLYCOL 3350 PO, Take 17 granules by mouth Every Night., Disp: , Rfl:   •  simvastatin (ZOCOR) 20 MG tablet, Take 1 tablet by mouth Every Night., Disp: 90 tablet, Rfl: 3  •  tamsulosin (FLOMAX) 0.4 MG capsule 24 hr capsule, Take 1 capsule by mouth Every Night., Disp: 90 capsule, Rfl: 3  •  tiZANidine (ZANAFLEX) 4 MG tablet, Take 4 mg by mouth 3 (Three) Times a Day As Needed for muscle spasms., Disp: , Rfl:   •  warfarin (COUMADIN) 2 MG tablet, Take 1 tablet by mouth Daily., Disp: 90 tablet, Rfl: 1  •  warfarin (COUMADIN) 4 MG tablet, Take 1 tablet by mouth Daily., Disp: 90 tablet, Rfl: 1  •  warfarin (COUMADIN) 5 MG tablet, Take 1 tablet by mouth Daily., Disp: 90 tablet, Rfl: 1    Allergies   Allergen Reactions   • Amiodarone Other (See Comments)     INTERFERES WITH OTHER MEDICATIONS   • Oxycontin [Oxycodone Hcl] Other (See Comments)     Can not urinate.   • Penicillins Hives     Allergic to All cillins. Amoxacillin.   • Vioxx [Rofecoxib] GI Intolerance     Social History     Tobacco Use   • Smoking status: Never Smoker   • Smokeless tobacco: Never Used   Substance Use Topics   • Alcohol use: Yes     Comment: rare     Review of Systems   Constitution: Positive for malaise/fatigue. Negative for weakness, unexpected weight change and weight loss.   Cardiovascular: Positive for dyspnea on exertion and leg swelling. Negative for chest pain, orthopnea, palpitations, paroxysmal nocturnal dyspnea and syncope.   Respiratory: Positive for shortness of breath (mild - unchanged). Negative for cough, hemoptysis and wheezing.    Endocrine: Negative for cold intolerance and heat intolerance.   Hematologic/Lymphatic: Negative for bleeding problem. Does not bruise/bleed easily.   Musculoskeletal: Positive for arthritis and joint pain.   Gastrointestinal: Negative for abdominal pain, hematemesis, hematochezia, melena, nausea and vomiting.   Neurological: Negative for dizziness, headaches and loss of balance.       ECG 12  "Lead  Date/Time: 10/1/2019 5:02 PM  Performed by: Arun Banks MD  Authorized by: Arun Banks MD   Rhythm: paced  Ectopy: unifocal PVCs  Rate: normal  BPM: 67  Other findings: non-specific ST-T wave changes    Clinical impression: abnormal EKG             Objective:     Physical Exam   Constitutional: He is oriented to person, place, and time. He appears well-developed and well-nourished. No distress.   HENT:   Head: Normocephalic and atraumatic.   Mouth/Throat: Oropharynx is clear and moist.   Eyes: EOM are normal. Pupils are equal, round, and reactive to light.   Neck: Normal range of motion. Neck supple. No JVD present. No thyromegaly present.   Cardiovascular: Normal rate, regular rhythm, S1 normal, S2 normal, normal heart sounds and intact distal pulses. Exam reveals no gallop and no friction rub.   No murmur heard.  Pulmonary/Chest: Effort normal and breath sounds normal.   Abdominal: Soft. Bowel sounds are normal. He exhibits no distension. There is no tenderness.   Musculoskeletal: Normal range of motion. He exhibits edema (1+ bilateral lower extremities).   Neurological: He is alert and oriented to person, place, and time. No cranial nerve deficit.   Skin: Skin is warm and dry. No rash noted. No cyanosis or erythema. Nails show no clubbing.   Psychiatric: He has a normal mood and affect.   Vitals reviewed.    /72   Pulse 67   Ht 188 cm (74\")   Wt 129 kg (285 lb)   SpO2 96%   BMI 36.59 kg/m²     Data/Lab Review:     Echocardiogram on 2/7/2019:  · Left ventricular systolic function is moderately decreased. Estimated EF appears to be in the range of 31 - 35%.  · Left ventricular diastolic dysfunction (grade I) consistent with impaired relaxation.  · The left ventricular cavity is moderately dilated.  · Trace tricuspid valve regurgitation is present. Estimated right ventricular systolic pressure from tricuspid regurgitation is mildly elevated (35-45 " mmHg).    =============================================================    Lab Results   Component Value Date    CHOL 194 04/15/2019    CHLPL 220 (H) 07/12/2016    TRIG 130 04/15/2019    HDL 42 04/15/2019    LDL 98 04/15/2019     I reviewed the patient's most recent [place] report from 9/17/2019 showing a low risk for worsening.  Blood pressure range from 105-120 mmHg.  Cardiac Compass trends remain within normal limits.  No episodes of VT or VF.  20 seconds of AT/AF.    =============================================    Echocardiogram in 2010 showed ejection fraction 25-30 percent with moderate to severe mitral regurgitation.    ================================================     Records from Dr. Broadbent's office indicates that this patient did undergo cardiac catheterization on 11/19/2012 which showed no obstructive coronary artery disease, ejection fraction of 40% with 1+ mitral regurgitation      Assessment:          Diagnosis Plan   1. Chronic systolic congestive heart failure (CMS/HCC)  ECG 12 Lead   2. Paroxysmal atrial fibrillation (CMS/HCC)     3. Essential hypertension            Plan:       1.  Chronic systolic congestive heart failure/nonischemic cardiomyopathy: Stable at this time.  We continue to follow the patient's beacon reports which indicate a low risk of heart failure in the future.  He appears to be euvolemic on examination today with exception of peripheral edema which is a chronic issue for the patient.  No changes at this time.  He will see Dr. Chavez on Thursday and make arrangements for an ICD generator change.     2.  Paroxysmal atrial fibrillation/paroxysmal atrial tachycardia: The patient remains clinically stable with no obvious recurrence.  He continues to follow with Dr. Chavez and will see him later this week.     3.  Essential hypertension: The patient's blood pressure remains under good control at this time.  Continue current medications.    Patient's Body mass index is 36.59 kg/m².  BMI is above normal parameters. Recommendations include: exercise counseling and nutrition counseling.     Follow-up: 6 months unless otherwise needed sooner.

## 2019-10-02 NOTE — PROGRESS NOTES
Dual Chamber AICD Evaluation Report  IN OFFICE INTERROGATION BY [facility]    Interrogation Date:  9/30/2019    Primary Cardiologist: Jocelyn  : Medtronic Model: Protecta XT  [id number]  Implant date: 9/27/2012    Reason for evaluation: to turn off low battery alarm since device has reached RRT  Indication for AICD: nonischemic cardiomyopathy    Measurements  Atrial sensing - P wave: 1.9 mV  Atrial threshold: 0.5 V @ 0.4 ms  Atrial lead impedance: 399 ohms  Ventricular sensing - R wave: 4.5 mV  Ventricular threshold: 0.875 V @ 0.4 ms  Ventricular lead impedance:  361 ohms  Shock impedance:  RV- 39 ohms   SVC- 44 ohms      Diagnostic Data  Atrial paced: 49.7 %  Ventricular paced: 0.4 %    Episodes recorded since 9/27/2019:  P-AF noted on cardiac compass.  Patient anticoagulated with coumadin.  No ATP or shocks.    Battery status: elective replacement time  2.61V RRT 2.63V on 27-Sep-2019      Final Parameters  Mode: AAIR+  Lower rate: 60 bpm Upper rate: 130 bpm  AV Delay: Paced- 180 ms    Sensed- 150 ms     Atrial - Amplitude: 1.5 V Pulse width: 0.4 ms Sensitivity: 0.6 mV   Ventricular - Amplitude: 2 V Pulse width: 0.4 ms Sensitivity: 0.3 mV   Changes made: Patient alerts turned off.  Conclusions: normal AICD function, no therapy delivered, stable pacing and sensing thresholds and battery at elective replacement voltage    Follow up: Dr. Chavez to do gen change.  Will see patient 6 weeks post.       Interrogation performed by Blossom Fermin CMA

## 2019-10-08 ENCOUNTER — TRANSCRIBE ORDERS (OUTPATIENT)
Dept: ADMINISTRATIVE | Facility: HOSPITAL | Age: 74
End: 2019-10-08

## 2019-10-08 DIAGNOSIS — I50.22 CHRONIC SYSTOLIC HEART FAILURE (HCC): Primary | ICD-10-CM

## 2019-10-14 ENCOUNTER — LAB (OUTPATIENT)
Dept: LAB | Facility: HOSPITAL | Age: 74
End: 2019-10-14

## 2019-10-14 DIAGNOSIS — Z79.01 ANTICOAGULANT LONG-TERM USE: ICD-10-CM

## 2019-10-14 DIAGNOSIS — I48.0 PAROXYSMAL ATRIAL FIBRILLATION (HCC): ICD-10-CM

## 2019-10-14 LAB
INR PPP: 2.1 (ref 0.91–1.09)
PROTHROMBIN TIME: 24.9 SECONDS (ref 10–13.8)

## 2019-10-14 PROCEDURE — 85610 PROTHROMBIN TIME: CPT | Performed by: INTERNAL MEDICINE

## 2019-10-24 ENCOUNTER — LAB (OUTPATIENT)
Dept: LAB | Facility: HOSPITAL | Age: 74
End: 2019-10-24

## 2019-10-24 DIAGNOSIS — I48.0 PAROXYSMAL ATRIAL FIBRILLATION (HCC): ICD-10-CM

## 2019-10-24 DIAGNOSIS — Z79.01 ANTICOAGULANT LONG-TERM USE: ICD-10-CM

## 2019-10-24 LAB
INR PPP: 1.8 (ref 0.91–1.09)
PROTHROMBIN TIME: 22.1 SECONDS (ref 10–13.8)

## 2019-10-24 PROCEDURE — 85610 PROTHROMBIN TIME: CPT | Performed by: INTERNAL MEDICINE

## 2019-10-29 ENCOUNTER — ANESTHESIA EVENT (OUTPATIENT)
Dept: GASTROENTEROLOGY | Facility: HOSPITAL | Age: 74
End: 2019-10-29

## 2019-10-29 ENCOUNTER — HOSPITAL ENCOUNTER (OUTPATIENT)
Facility: HOSPITAL | Age: 74
Setting detail: HOSPITAL OUTPATIENT SURGERY
Discharge: HOME | End: 2019-10-29
Attending: INTERNAL MEDICINE | Admitting: INTERNAL MEDICINE

## 2019-10-29 ENCOUNTER — ANESTHESIA (OUTPATIENT)
Dept: GASTROENTEROLOGY | Facility: HOSPITAL | Age: 74
End: 2019-10-29

## 2019-10-29 VITALS
RESPIRATION RATE: 15 BRPM | TEMPERATURE: 97.8 F | HEART RATE: 66 BPM | BODY MASS INDEX: 35.16 KG/M2 | OXYGEN SATURATION: 99 % | HEIGHT: 74 IN | DIASTOLIC BLOOD PRESSURE: 77 MMHG | WEIGHT: 274 LBS | SYSTOLIC BLOOD PRESSURE: 116 MMHG

## 2019-10-29 DIAGNOSIS — I48.0 PAROXYSMAL ATRIAL FIBRILLATION (HCC): ICD-10-CM

## 2019-10-29 DIAGNOSIS — Z86.010 HX OF COLONIC POLYP: ICD-10-CM

## 2019-10-29 LAB — GLUCOSE BLDC GLUCOMTR-MCNC: 138 MG/DL (ref 70–130)

## 2019-10-29 PROCEDURE — 44394 COLONOSCOPY W/SNARE: CPT | Performed by: INTERNAL MEDICINE

## 2019-10-29 PROCEDURE — 88305 TISSUE EXAM BY PATHOLOGIST: CPT | Performed by: INTERNAL MEDICINE

## 2019-10-29 PROCEDURE — 82962 GLUCOSE BLOOD TEST: CPT

## 2019-10-29 PROCEDURE — 25010000002 PROPOFOL 10 MG/ML EMULSION: Performed by: NURSE ANESTHETIST, CERTIFIED REGISTERED

## 2019-10-29 RX ORDER — FUROSEMIDE 40 MG/1
40 TABLET ORAL DAILY
Qty: 90 TABLET | Refills: 3 | Status: SHIPPED | OUTPATIENT
Start: 2019-10-29 | End: 2020-09-21 | Stop reason: SDUPTHER

## 2019-10-29 RX ORDER — FUROSEMIDE 20 MG/1
20 TABLET ORAL DAILY
Qty: 90 TABLET | Refills: 3 | Status: SHIPPED | OUTPATIENT
Start: 2019-10-29 | End: 2020-09-21 | Stop reason: SDUPTHER

## 2019-10-29 RX ORDER — PROPOFOL 10 MG/ML
VIAL (ML) INTRAVENOUS AS NEEDED
Status: DISCONTINUED | OUTPATIENT
Start: 2019-10-29 | End: 2019-10-29 | Stop reason: SURG

## 2019-10-29 RX ORDER — POLYETHYLENE GLYCOL 3350 17 G/17G
17 POWDER, FOR SOLUTION ORAL NIGHTLY
Qty: 1581 G | Refills: 3 | Status: SHIPPED | OUTPATIENT
Start: 2019-10-29

## 2019-10-29 RX ORDER — ONDANSETRON 2 MG/ML
4 INJECTION INTRAMUSCULAR; INTRAVENOUS ONCE AS NEEDED
Status: DISCONTINUED | OUTPATIENT
Start: 2019-10-29 | End: 2019-10-29 | Stop reason: HOSPADM

## 2019-10-29 RX ORDER — SODIUM CHLORIDE 9 MG/ML
500 INJECTION, SOLUTION INTRAVENOUS CONTINUOUS PRN
Status: DISCONTINUED | OUTPATIENT
Start: 2019-10-29 | End: 2019-10-29 | Stop reason: HOSPADM

## 2019-10-29 RX ORDER — SODIUM CHLORIDE 0.9 % (FLUSH) 0.9 %
10 SYRINGE (ML) INJECTION AS NEEDED
Status: DISCONTINUED | OUTPATIENT
Start: 2019-10-29 | End: 2019-10-29 | Stop reason: HOSPADM

## 2019-10-29 RX ADMIN — PROPOFOL 25 MG: 10 INJECTION, EMULSION INTRAVENOUS at 09:20

## 2019-10-29 RX ADMIN — SODIUM CHLORIDE 500 ML: 9 INJECTION, SOLUTION INTRAVENOUS at 08:56

## 2019-10-29 RX ADMIN — PROPOFOL 50 MG: 10 INJECTION, EMULSION INTRAVENOUS at 09:15

## 2019-10-29 RX ADMIN — PROPOFOL 50 MG: 10 INJECTION, EMULSION INTRAVENOUS at 09:12

## 2019-10-29 RX ADMIN — PROPOFOL 25 MG: 10 INJECTION, EMULSION INTRAVENOUS at 09:25

## 2019-10-29 NOTE — ANESTHESIA POSTPROCEDURE EVALUATION
Patient: Jonh Peacock    Procedure Summary     Date:  10/29/19 Room / Location:  [facility] ENDOSCOPY 5 / [facility]    Anesthesia Start:  0911 Anesthesia Stop:  0932    Procedure:  COLONOSCOPY WITH ANESTHESIA (N/A ) Diagnosis:       Hx of colonic polyp      (Hx of colonic polyp [Z86.010])    Surgeon:  Tenzin Abrams MD Provider:  Axel Flaherty CRNA    Anesthesia Type:  MAC ASA Status:  3          Anesthesia Type: MAC  Last vitals  BP   120/76 (10/29/19 0939)   Temp   97.8 °F (36.6 °C) (10/29/19 0823)   Pulse   120 (10/29/19 0939)   Resp   17 (10/29/19 0939)     SpO2   95 % (10/29/19 0939)     Post Anesthesia Care and Evaluation    Patient location during evaluation: PHASE II  Patient participation: complete - patient participated  Level of consciousness: awake and alert  Pain score: 0  Pain management: adequate  Airway patency: patent  Anesthetic complications: No anesthetic complications  PONV Status: none  Cardiovascular status: acceptable and stable  Respiratory status: acceptable  Hydration status: acceptable

## 2019-10-29 NOTE — ANESTHESIA PREPROCEDURE EVALUATION
Anesthesia Evaluation     Patient summary reviewed   no history of anesthetic complications:  NPO Solid Status: > 8 hours  NPO Liquid Status: > 2 hours           Airway   Mallampati: I  TM distance: >3 FB  Neck ROM: full  No difficulty expected  Dental - normal exam     Pulmonary    (-) COPD, asthma, sleep apnea, not a smoker  Cardiovascular     PT is on anticoagulation therapy    (+) pacemaker (medtronic, on schedule for replacement 10/31) ICD, pacemaker, hypertension, dysrhythmias Atrial Fib, CHF Systolic <55%, DVT, hyperlipidemia,   (-) past MI, CAD, cardiac stents    ROS comment: Off coumadin 5 days ago      Nonischemic cardiomyopathy    Echo:  ·Left ventricular systolic function is moderately decreased. Estimated EF appears to be in the range of 31 - 35%.  ·Left ventricular diastolic dysfunction (grade I) consistent with impaired relaxation.  ·The left ventricular cavity is moderately dilated.  ·Trace tricuspid valve regurgitation is present. Estimated right ventricular systolic pressure from tricuspid regurgitation is mildly elevated (35-45 mmHg).    Neuro/Psych  (-) seizures, TIA, CVA  GI/Hepatic/Renal/Endo    (+) obesity,   diabetes mellitus,   (-) liver disease, no renal disease    ROS Comment: Colostomy 2010 for diverticuitis    Musculoskeletal         ROS comment: Spinal cord stimulator  Abdominal    Substance History      OB/GYN          Other                      Anesthesia Plan    ASA 3     MAC   (Have magnet in room for defib)  intravenous induction   Anesthetic plan, all risks, benefits, and alternatives have been provided, discussed and informed consent has been obtained with: patient.

## 2019-10-30 LAB
CYTO UR: NORMAL
LAB AP CASE REPORT: NORMAL
PATH REPORT.FINAL DX SPEC: NORMAL
PATH REPORT.GROSS SPEC: NORMAL

## 2019-10-31 ENCOUNTER — HOSPITAL ENCOUNTER (OUTPATIENT)
Facility: HOSPITAL | Age: 74
Discharge: HOME | End: 2019-10-31
Attending: INTERNAL MEDICINE | Admitting: INTERNAL MEDICINE

## 2019-10-31 VITALS
HEIGHT: 74 IN | OXYGEN SATURATION: 100 % | DIASTOLIC BLOOD PRESSURE: 78 MMHG | SYSTOLIC BLOOD PRESSURE: 150 MMHG | BODY MASS INDEX: 35.04 KG/M2 | TEMPERATURE: 97.8 F | WEIGHT: 273 LBS | RESPIRATION RATE: 18 BRPM | HEART RATE: 65 BPM

## 2019-10-31 DIAGNOSIS — I50.22 CHRONIC SYSTOLIC HEART FAILURE (HCC): ICD-10-CM

## 2019-10-31 LAB
ALBUMIN SERPL-MCNC: 4.2 G/DL (ref 3.5–5.2)
ALBUMIN/GLOB SERPL: 1.3 G/DL
ALP SERPL-CCNC: 92 U/L (ref 39–117)
ALT SERPL W P-5'-P-CCNC: 17 U/L (ref 1–41)
ANION GAP SERPL CALCULATED.3IONS-SCNC: 11 MMOL/L (ref 5–15)
AST SERPL-CCNC: 21 U/L (ref 1–40)
BILIRUB SERPL-MCNC: 0.5 MG/DL (ref 0.2–1.2)
BUN BLD-MCNC: 25 MG/DL (ref 8–23)
BUN/CREAT SERPL: 18.7 (ref 7–25)
CALCIUM SPEC-SCNC: 9.2 MG/DL (ref 8.6–10.5)
CHLORIDE SERPL-SCNC: 100 MMOL/L (ref 98–107)
CO2 SERPL-SCNC: 32 MMOL/L (ref 22–29)
CREAT BLD-MCNC: 1.34 MG/DL (ref 0.76–1.27)
DEPRECATED RDW RBC AUTO: 43.4 FL (ref 37–54)
ERYTHROCYTE [DISTWIDTH] IN BLOOD BY AUTOMATED COUNT: 13.1 % (ref 12.3–15.4)
GFR SERPL CREATININE-BSD FRML MDRD: 64 ML/MIN/1.73
GLOBULIN UR ELPH-MCNC: 3.2 GM/DL
GLUCOSE BLD-MCNC: 145 MG/DL (ref 65–99)
HCT VFR BLD AUTO: 37.3 % (ref 37.5–51)
HGB BLD-MCNC: 12.4 G/DL (ref 13–17.7)
INR PPP: 1.01 (ref 0.91–1.09)
MCH RBC QN AUTO: 30.2 PG (ref 26.6–33)
MCHC RBC AUTO-ENTMCNC: 33.2 G/DL (ref 31.5–35.7)
MCV RBC AUTO: 91 FL (ref 79–97)
PLATELET # BLD AUTO: 128 10*3/MM3 (ref 140–450)
PMV BLD AUTO: 11.9 FL (ref 6–12)
POTASSIUM BLD-SCNC: 4 MMOL/L (ref 3.5–5.2)
PROT SERPL-MCNC: 7.4 G/DL (ref 6–8.5)
PROTHROMBIN TIME: 13.6 SECONDS (ref 11.9–14.6)
RBC # BLD AUTO: 4.1 10*6/MM3 (ref 4.14–5.8)
SODIUM BLD-SCNC: 143 MMOL/L (ref 136–145)
WBC NRBC COR # BLD: 6.78 10*3/MM3 (ref 3.4–10.8)

## 2019-10-31 PROCEDURE — 25010000002 VANCOMYCIN PER 500 MG: Performed by: INTERNAL MEDICINE

## 2019-10-31 PROCEDURE — 25010000002 MIDAZOLAM PER 1 MG: Performed by: INTERNAL MEDICINE

## 2019-10-31 PROCEDURE — 99152 MOD SED SAME PHYS/QHP 5/>YRS: CPT | Performed by: INTERNAL MEDICINE

## 2019-10-31 PROCEDURE — 33249 INSJ/RPLCMT DEFIB W/LEAD(S): CPT | Performed by: INTERNAL MEDICINE

## 2019-10-31 PROCEDURE — 25010000002 FENTANYL CITRATE (PF) 100 MCG/2ML SOLUTION: Performed by: INTERNAL MEDICINE

## 2019-10-31 PROCEDURE — 33263 RMVL & RPLCMT DFB GEN 2 LEAD: CPT | Performed by: INTERNAL MEDICINE

## 2019-10-31 PROCEDURE — 80053 COMPREHEN METABOLIC PANEL: CPT | Performed by: INTERNAL MEDICINE

## 2019-10-31 PROCEDURE — 85610 PROTHROMBIN TIME: CPT | Performed by: INTERNAL MEDICINE

## 2019-10-31 PROCEDURE — C1721 AICD, DUAL CHAMBER: HCPCS | Performed by: INTERNAL MEDICINE

## 2019-10-31 PROCEDURE — 99153 MOD SED SAME PHYS/QHP EA: CPT | Performed by: INTERNAL MEDICINE

## 2019-10-31 PROCEDURE — 85027 COMPLETE CBC AUTOMATED: CPT | Performed by: INTERNAL MEDICINE

## 2019-10-31 DEVICE — IMPLANTABLE DEVICE: Type: IMPLANTABLE DEVICE | Status: FUNCTIONAL

## 2019-10-31 RX ORDER — MIDAZOLAM HYDROCHLORIDE 1 MG/ML
INJECTION INTRAMUSCULAR; INTRAVENOUS AS NEEDED
Status: DISCONTINUED | OUTPATIENT
Start: 2019-10-31 | End: 2019-10-31 | Stop reason: HOSPADM

## 2019-10-31 RX ORDER — MAGNESIUM HYDROXIDE 1200 MG/15ML
LIQUID ORAL AS NEEDED
Status: DISCONTINUED | OUTPATIENT
Start: 2019-10-31 | End: 2019-10-31 | Stop reason: HOSPADM

## 2019-10-31 RX ORDER — FENTANYL CITRATE 50 UG/ML
INJECTION, SOLUTION INTRAMUSCULAR; INTRAVENOUS AS NEEDED
Status: DISCONTINUED | OUTPATIENT
Start: 2019-10-31 | End: 2019-10-31 | Stop reason: HOSPADM

## 2019-10-31 RX ORDER — VANCOMYCIN HYDROCHLORIDE 1 G/200ML
INJECTION, SOLUTION INTRAVENOUS CONTINUOUS PRN
Status: COMPLETED | OUTPATIENT
Start: 2019-10-31 | End: 2019-10-31

## 2019-10-31 RX ORDER — NEOMYCIN AND POLYMYXIN B SULFATES 40; 200000 MG/ML; [USP'U]/ML
SOLUTION IRRIGATION AS NEEDED
Status: DISCONTINUED | OUTPATIENT
Start: 2019-10-31 | End: 2019-10-31 | Stop reason: HOSPADM

## 2019-10-31 RX ORDER — SODIUM CHLORIDE 0.9 % (FLUSH) 0.9 %
10 SYRINGE (ML) INJECTION EVERY 12 HOURS SCHEDULED
Status: DISCONTINUED | OUTPATIENT
Start: 2019-10-31 | End: 2019-10-31 | Stop reason: HOSPADM

## 2019-10-31 RX ORDER — LIDOCAINE HYDROCHLORIDE 20 MG/ML
INJECTION, SOLUTION INFILTRATION; PERINEURAL AS NEEDED
Status: DISCONTINUED | OUTPATIENT
Start: 2019-10-31 | End: 2019-10-31 | Stop reason: HOSPADM

## 2019-10-31 RX ORDER — SODIUM CHLORIDE 0.9 % (FLUSH) 0.9 %
10 SYRINGE (ML) INJECTION AS NEEDED
Status: DISCONTINUED | OUTPATIENT
Start: 2019-10-31 | End: 2019-10-31 | Stop reason: HOSPADM

## 2019-11-01 ENCOUNTER — LAB (OUTPATIENT)
Dept: LAB | Facility: HOSPITAL | Age: 74
End: 2019-11-01

## 2019-11-01 DIAGNOSIS — Z79.01 ANTICOAGULANT LONG-TERM USE: ICD-10-CM

## 2019-11-01 DIAGNOSIS — I48.0 PAROXYSMAL ATRIAL FIBRILLATION (HCC): ICD-10-CM

## 2019-11-01 LAB
INR PPP: 1.2 (ref 0.91–1.09)
PROTHROMBIN TIME: 13.9 SECONDS (ref 10–13.8)

## 2019-11-01 PROCEDURE — 85610 PROTHROMBIN TIME: CPT | Performed by: INTERNAL MEDICINE

## 2019-11-06 ENCOUNTER — TELEPHONE (OUTPATIENT)
Dept: INTERNAL MEDICINE | Facility: CLINIC | Age: 74
End: 2019-11-06

## 2019-11-06 ENCOUNTER — DOCUMENTATION (OUTPATIENT)
Dept: CARDIOLOGY | Facility: CLINIC | Age: 74
End: 2019-11-06

## 2019-11-06 DIAGNOSIS — I42.8 NON-ISCHEMIC CARDIOMYOPATHY (HCC): ICD-10-CM

## 2019-11-06 DIAGNOSIS — Z95.810 CARDIAC DEFIBRILLATOR IN SITU: Primary | ICD-10-CM

## 2019-11-06 NOTE — TELEPHONE ENCOUNTER
Noted. No need to do it sooner. I was not aware of him stopping his warfarin, so I was just making sure he had received guidance on what he was to do with it.

## 2019-11-06 NOTE — TELEPHONE ENCOUNTER
Patient stated Dr. Chavez recommended he restart warfarin the day of the colonoscopy (10/29) and he did.  Since then he has been adjusting the dose on his own.  He took 6 mg for a few days and increased to 7 mg three days ago. Patient plans to have PT/INR repeated on Monday, but said he will go sooner if you need him to.

## 2019-11-06 NOTE — TELEPHONE ENCOUNTER
Patient informed Dr. Antony just wanted to make sure he had received guidance on what to do regarding warfarin dose, and there is no need for him to have PT/INR labs drawn before Monday.

## 2019-11-08 ENCOUNTER — CLINICAL SUPPORT NO REQUIREMENTS (OUTPATIENT)
Dept: CARDIOLOGY | Facility: CLINIC | Age: 74
End: 2019-11-08

## 2019-11-08 ENCOUNTER — OFFICE VISIT (OUTPATIENT)
Dept: PODIATRY | Facility: CLINIC | Age: 74
End: 2019-11-08

## 2019-11-08 VITALS
SYSTOLIC BLOOD PRESSURE: 118 MMHG | HEART RATE: 60 BPM | WEIGHT: 273 LBS | BODY MASS INDEX: 35.04 KG/M2 | HEIGHT: 74 IN | DIASTOLIC BLOOD PRESSURE: 60 MMHG | OXYGEN SATURATION: 99 %

## 2019-11-08 DIAGNOSIS — G62.9 NEUROPATHY: ICD-10-CM

## 2019-11-08 DIAGNOSIS — Z95.810 AICD (AUTOMATIC CARDIOVERTER/DEFIBRILLATOR) PRESENT: Primary | ICD-10-CM

## 2019-11-08 DIAGNOSIS — R60.9 PERIPHERAL EDEMA: ICD-10-CM

## 2019-11-08 DIAGNOSIS — B35.1 ONYCHOMYCOSIS: Primary | ICD-10-CM

## 2019-11-08 DIAGNOSIS — Z79.01 ANTICOAGULANT LONG-TERM USE: ICD-10-CM

## 2019-11-08 PROCEDURE — 11721 DEBRIDE NAIL 6 OR MORE: CPT | Performed by: PODIATRIST

## 2019-11-08 NOTE — PROGRESS NOTES
[facility] - PODIATRY    Today's Date: 11/08/19    Patient Name: Jonh Peacock  MRN: [id number]  CSN: [id number]  PCP: Ric Antony DO  Referring Provider: No ref. provider found     SUBJECTIVE     Chief Complaint   Patient presents with   • Follow-up     3 month f/u for foot and nail care-pt c/o painful  long, thickened toenails - denies pain at present time- PCP Dr. Antony last visit 9/9/19- non-diabetic        HPI: Jonh Peacock, a 73 y.o.male, comes to clinic as a(n) established patient complaining of thick fungal toenails. Pt with h/o Anxiety, Arrhythmia, Cardiomyopathy, CHF, Depression, previous DVT, HTN, Obesity, Anticoagulation with Warfarin. Denies diabetes. Relates that his nails are thick and he is unable to cut them himelf and routinely his wife will cut them but does not feel comfortable cutting them due to being on warfarin. Also relates that he has a limb length discrepancy which he has a lift in his left foot and custom inserts for both feet. Denies pain today. Relates that he typically gets around in his motorized scooter. Notes improvement with regular visits. Denies any constitutional symptoms. No other pedal complaints at this time.    Past Medical History:   Diagnosis Date   • Anemia 1/30/2017   • Anxiety 1/30/2017   • Arrhythmia 1/30/2017   • Arthritis    • Atrial fibrillation (CMS/HCC)    • Cardiomyopathy (CMS/HCC) 1/30/2017   • CHF (congestive heart failure) (CMS/HCC) 1/30/2017   • Chronic pain 1/30/2017   • Colon polyp    • Connective tissue disease (CMS/HCC)    • Depression 1/30/2017   • Diverticulitis 2010   • DVT (deep venous thrombosis) (CMS/HCC)    • Gastritis 1/30/2017   • Hiatal hernia    • History of transfusion    • Hyperlipidemia 1/30/2017   • Hypertension 1/30/2017   • Low back pain 1/30/2017   • Neuropathy 1/30/2017   • Obesity 1/30/2017   • Pneumonia    • Status post placement of cardiac pacemaker 1/30/2017     Past Surgical History:   Procedure  Laterality Date   • APPENDECTOMY     • BACK SURGERY  2002    BACK FUSION   • BACK SURGERY  2007   • CARDIAC CATHETERIZATION     • CARDIAC DEFIBRILLATOR PLACEMENT     • CARDIAC ELECTROPHYSIOLOGY PROCEDURE N/A 10/31/2019    Procedure: ICD GEN CHANGE;  Surgeon: Chaz Chavez MD;  Location: [facility] CATH INVASIVE LOCATION;  Service: Cardiology   • COLON RESECTION WITH COLOSTOMY     • COLONOSCOPY  09/04/2014    diverticulosis   • COLONOSCOPY N/A 10/29/2019    Procedure: COLONOSCOPY WITH ANESTHESIA;  Surgeon: Tenzin Abrams MD;  Location: [facility] ENDOSCOPY;  Service: Gastroenterology   • ENDOSCOPY  02/24/1999    small sliding hiatal hernia   • FOOT SURGERY Bilateral 2005   • JOINT REPLACEMENT Right 2002    KNEE   • PACEMAKER IMPLANTATION  2012   • RECTAL FISSURE INCISION AND DRAINAGE     • REPLACEMENT TOTAL KNEE     • SPINAL CORD STIMULATOR IMPLANT     • TOTAL HIP ARTHROPLASTY Right 2004   • TOTAL HIP ARTHROPLASTY Left 12/2008     Family History   Problem Relation Age of Onset   • Heart disease Mother    • Heart disease Brother    • Breast cancer Brother    • Colon cancer Neg Hx    • Colon polyps Neg Hx      Social History     Socioeconomic History   • Marital status:      Spouse name: Not on file   • Number of children: Not on file   • Years of education: Not on file   • Highest education level: Not on file   Tobacco Use   • Smoking status: Never Smoker   • Smokeless tobacco: Never Used   Substance and Sexual Activity   • Alcohol use: Yes     Comment: rare   • Drug use: No   • Sexual activity: Defer     Allergies   Allergen Reactions   • Amiodarone Other (See Comments)     INTERFERES WITH OTHER MEDICATIONS   • Oxycontin [Oxycodone Hcl] Other (See Comments)     Can not urinate.   • Penicillins Hives     Allergic to All cillins. Amoxacillin.   • Vioxx [Rofecoxib] GI Intolerance     Current Outpatient Medications   Medication Sig Dispense Refill   • Buprenorphine (BUTRANS) 10 MCG/HR patch weekly Place 10 mcg  on the skin Every 7 (Seven) Days.     • CHONDROITIN SULFATE PO Take  by mouth.     • diazePAM (VALIUM) 2 MG tablet Take 2 mg by mouth Daily.     • diltiaZEM (CARDIZEM) 30 MG tablet Take 1 tablet by mouth 3 (Three) Times a Day. 270 tablet 3   • furosemide (LASIX) 20 MG tablet Take 1 tablet by mouth Daily. 90 tablet 3   • furosemide (LASIX) 40 MG tablet Take 1 tablet by mouth Daily. TAKE WITH 20 MG TABLET FOR 60 PER MG PER DAY 90 tablet 3   • gabapentin (NEURONTIN) 600 MG tablet Take 600 mg by mouth 3 (Three) Times a Day.     • Glucosamine 750 MG tablet Take 750 mg by mouth 2 (Two) Times a Day.     • HYDROcodone-acetaminophen (NORCO) 7.5-325 MG per tablet Take 1 tablet by mouth Every 6 (Six) Hours As Needed for Moderate Pain .     • losartan (COZAAR) 25 MG tablet Take 1 tablet by mouth Daily. 90 tablet 2   • metoprolol succinate XL (TOPROL-XL) 100 MG 24 hr tablet Take 1 tablet by mouth Daily. 90 tablet 3   • Multiple Vitamins-Minerals (MULTIVITAMIN ADULT PO) Take  by mouth Daily.     • pantoprazole (PROTONIX) 40 MG EC tablet Take 1 tablet by mouth Daily. 90 tablet 3   • polyethylene glycol (MIRALAX) powder Take 17 g by mouth Every Night. 1581 g 3   • simvastatin (ZOCOR) 20 MG tablet Take 1 tablet by mouth Every Night. 90 tablet 3   • tamsulosin (FLOMAX) 0.4 MG capsule 24 hr capsule Take 1 capsule by mouth Every Night. 90 capsule 3   • tiZANidine (ZANAFLEX) 4 MG tablet Take 4 mg by mouth 3 (Three) Times a Day As Needed for muscle spasms.     • warfarin (COUMADIN) 2 MG tablet Take 1 tablet by mouth Daily. 90 tablet 1   • warfarin (COUMADIN) 4 MG tablet Take 1 tablet by mouth Daily. 90 tablet 1   • warfarin (COUMADIN) 5 MG tablet Take 1 tablet by mouth Daily. 90 tablet 1     No current facility-administered medications for this visit.      Review of Systems   Constitutional: Negative for chills and fever.   HENT: Negative for congestion.    Respiratory: Negative for shortness of breath.    Cardiovascular: Positive for  leg swelling. Negative for chest pain.   Gastrointestinal: Negative for constipation, diarrhea, nausea and vomiting.   Musculoskeletal:        Foot pain   Skin: Negative for wound.        Thick Toenails   Neurological: Negative for numbness.       OBJECTIVE     Vitals:    11/08/19 1525   BP: 118/60   Pulse: 60   SpO2: 99%       PHYSICAL EXAM  GEN:   A&Ox3, NAD. Pt presents to clinic in motorized scooter and wearing Casual Shoes with custom inserts, left shoe has lift.      NEURO:   Protective sensation intact to 10/10 sites Right foot, 10/10 site Left foot using [place] monofilament  Light touch sensation diminished  No Tinel's or [place] sign.    VASC:  Skin temperature Warm to Warm proximal to distal carlotta  DP pulses 1/4 Right, 1/4 Left  PT pulses 1/4 Right, 1/4 Left  CFT 4 sec carlotta  Pedal hair growth absent  1+ pitting edema noted carlotta  Varicosities absent carlotta    MUSC/SKEL:  Muscle Strength Right foot Dorsiflexors 5/5, Plantarflexors 5/5, Evertors 5/5, Invertors 5/5  Muscle Strength Left foot Dorsiflexors 5/5, Plantarflexors 5/5, Evertors 5/5, Invertors 5/5  ROM of the 1st MTP is full without pain or crepitus  ROM of the MTJ is full without pain or crepitus    ROM of the STJ is full without pain or crepitus    ROM of the ankle joint is full without pain or crepitus    POP of nail plates  Planus foot type with decreased arch height and abduction of forefoot  Right LE longer than Left LE     DERM:  Pedal nails x10 are thickened, elongated and discolored with presence of subunugual debris  Webspaces are clean, dry, intact  Skin is normal in turgor and texture with no open wounds or sores appreciated.  Stable cicatrix noted to dorsal 1st MTPJ carlotta and dorsal digits      RADIOLOGY/NUCLEAR:  No results found.    LABORATORY/CULTURE RESULTS:      PATHOLOGY RESULTS:       ASSESSMENT/PLAN     Jonh was seen today for follow-up.    Diagnoses and all orders for this visit:    Onychomycosis    Neuropathy    Anticoagulant  long-term use    Peripheral edema      Comprehensive lower extremity examination and evaluation was performed.  Discussed findings and treatment plan including risks, benefits, and treatment options with patient in detail. Patient agreed with treatment plan.  After verbal consent obtained, nail(s) x10 debrided of length and thickness with nail nipper without incidence  Patient may maintain nails and calluses at home utilizing emery board or pumice stone between visits as needed  Reviewed at home diabetic foot care including daily foot checks   Continue with custom shoes with and inserts  Defers compression stockings at this time  An After Visit Summary was printed and given to the patient at discharge, including (if requested) any available informative/educational handouts regarding diagnosis, treatment, or medications. All questions were answered to patient/family satisfaction. Should symptoms fail to improve or worsen they agree to call or return to clinic or to go to the Emergency Department. Discussed the importance of following up with any needed screening tests/labs/specialist appointments and any requested follow-up recommended by me today. Importance of maintaining follow-up discussed and patient accepts that missed appointments can delay diagnosis and potentially lead to worsening of conditions.  Return in about 3 months (around 2/8/2020)., or sooner if acute issues arise.        This document has been electronically signed by Deion Avelar DPM on November 8, 2019 3:57 PM     Please note that portions of this note may have been completed with a voice recognition program. Efforts were made to edit the dictations, but occasionally words are mistranscribed.

## 2019-11-11 ENCOUNTER — LAB (OUTPATIENT)
Dept: LAB | Facility: HOSPITAL | Age: 74
End: 2019-11-11

## 2019-11-11 ENCOUNTER — TELEPHONE (OUTPATIENT)
Dept: INTERNAL MEDICINE | Facility: CLINIC | Age: 74
End: 2019-11-11

## 2019-11-11 DIAGNOSIS — Z79.01 ANTICOAGULANT LONG-TERM USE: ICD-10-CM

## 2019-11-11 DIAGNOSIS — I48.0 PAROXYSMAL ATRIAL FIBRILLATION (HCC): ICD-10-CM

## 2019-11-11 LAB
INR PPP: 2.4 (ref 0.91–1.09)
PROTHROMBIN TIME: 29 SECONDS (ref 10–13.8)

## 2019-11-11 PROCEDURE — 85610 PROTHROMBIN TIME: CPT | Performed by: INTERNAL MEDICINE

## 2019-11-11 NOTE — TELEPHONE ENCOUNTER
Patient stopped by the office to report that he is back on his blood thinners after his surgery and his INR is 2.4 today. He said that he is on 8 mg today and will start on 6 mg tomorrow.

## 2019-11-13 NOTE — PROGRESS NOTES
AICD Site Check    November 8, 2019    Patient had AICD generator change performed by Dr. Chavez on 10/31/2019.  AICD is followed by Dr. Banks.     Site is well approximated and is without edema, erythema, warmth, open areas, or drainage of any kind.  Surgical adhesive remains visible.  Patient denies fevers since time of procedure.  Patient instructed to contact our clinic if signs/symptoms of infection develop.  Understanding verbalized.      Follow up:  12/12/2019 for 6 week post generator change AICD check

## 2019-11-25 ENCOUNTER — LAB (OUTPATIENT)
Dept: LAB | Facility: HOSPITAL | Age: 74
End: 2019-11-25

## 2019-11-25 DIAGNOSIS — Z79.01 ANTICOAGULANT LONG-TERM USE: ICD-10-CM

## 2019-11-25 DIAGNOSIS — I48.0 PAROXYSMAL ATRIAL FIBRILLATION (HCC): ICD-10-CM

## 2019-11-25 LAB
INR PPP: 1.9 (ref 0.91–1.09)
PROTHROMBIN TIME: 22.6 SECONDS (ref 10–13.8)

## 2019-11-25 PROCEDURE — 85610 PROTHROMBIN TIME: CPT | Performed by: INTERNAL MEDICINE

## 2019-12-13 ENCOUNTER — LAB (OUTPATIENT)
Dept: LAB | Facility: HOSPITAL | Age: 74
End: 2019-12-13

## 2019-12-13 ENCOUNTER — CLINICAL SUPPORT NO REQUIREMENTS (OUTPATIENT)
Dept: CARDIOLOGY | Facility: CLINIC | Age: 74
End: 2019-12-13

## 2019-12-13 DIAGNOSIS — I42.8 NON-ISCHEMIC CARDIOMYOPATHY (HCC): ICD-10-CM

## 2019-12-13 DIAGNOSIS — Z95.810 CARDIAC DEFIBRILLATOR IN SITU: Primary | ICD-10-CM

## 2019-12-13 DIAGNOSIS — Z79.01 ANTICOAGULANT LONG-TERM USE: ICD-10-CM

## 2019-12-13 DIAGNOSIS — I48.0 PAROXYSMAL ATRIAL FIBRILLATION (HCC): ICD-10-CM

## 2019-12-13 LAB
INR PPP: 2.5 (ref 0.91–1.09)
PROTHROMBIN TIME: 30.1 SECONDS (ref 10–13.8)

## 2019-12-13 PROCEDURE — 93289 INTERROG DEVICE EVAL HEART: CPT | Performed by: INTERNAL MEDICINE

## 2019-12-13 PROCEDURE — 85610 PROTHROMBIN TIME: CPT | Performed by: INTERNAL MEDICINE

## 2019-12-17 ENCOUNTER — OFFICE VISIT (OUTPATIENT)
Dept: INTERNAL MEDICINE | Facility: CLINIC | Age: 74
End: 2019-12-17

## 2019-12-17 VITALS
WEIGHT: 279.3 LBS | DIASTOLIC BLOOD PRESSURE: 82 MMHG | HEART RATE: 69 BPM | HEIGHT: 74 IN | RESPIRATION RATE: 16 BRPM | BODY MASS INDEX: 35.84 KG/M2 | OXYGEN SATURATION: 97 % | SYSTOLIC BLOOD PRESSURE: 130 MMHG

## 2019-12-17 DIAGNOSIS — E11.9 TYPE 2 DIABETES MELLITUS WITHOUT COMPLICATION, WITHOUT LONG-TERM CURRENT USE OF INSULIN (HCC): Primary | ICD-10-CM

## 2019-12-17 DIAGNOSIS — N40.0 BENIGN PROSTATIC HYPERPLASIA WITHOUT LOWER URINARY TRACT SYMPTOMS: ICD-10-CM

## 2019-12-17 DIAGNOSIS — E66.01 CLASS 2 SEVERE OBESITY DUE TO EXCESS CALORIES WITH SERIOUS COMORBIDITY AND BODY MASS INDEX (BMI) OF 35.0 TO 35.9 IN ADULT (HCC): ICD-10-CM

## 2019-12-17 DIAGNOSIS — E78.2 MIXED HYPERLIPIDEMIA: ICD-10-CM

## 2019-12-17 DIAGNOSIS — I10 ESSENTIAL HYPERTENSION: ICD-10-CM

## 2019-12-17 DIAGNOSIS — I48.0 PAROXYSMAL ATRIAL FIBRILLATION (HCC): ICD-10-CM

## 2019-12-17 DIAGNOSIS — I50.22 CHRONIC SYSTOLIC CHF (CONGESTIVE HEART FAILURE), NYHA CLASS 3 (HCC): ICD-10-CM

## 2019-12-17 LAB — HBA1C MFR BLD: 7.2 %

## 2019-12-17 PROCEDURE — 83036 HEMOGLOBIN GLYCOSYLATED A1C: CPT | Performed by: INTERNAL MEDICINE

## 2019-12-17 PROCEDURE — 99214 OFFICE O/P EST MOD 30 MIN: CPT | Performed by: INTERNAL MEDICINE

## 2019-12-17 NOTE — PROGRESS NOTES
CC: f/u diabetes    History:  Jonh Peacock is a 74 y.o. male   He notes he has been doing well without acute illness. 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 However, he has had no worsening edema nor dyspnea given his chronic systolic heart failure and he continues also on Toprol XL, ARB and diuretics. BP has done well on meds without side effects. He has no acid reflux symptoms on PPI and tolerates this well.     ROS:  Review of Systems   Constitutional: Negative for chills and fever.   Respiratory: Negative for cough and shortness of breath.    Cardiovascular: Negative for chest pain and palpitations.   Gastrointestinal: Negative for abdominal pain and constipation.   Endocrine: Positive for polyuria (with loop diuretic).   Genitourinary: Negative for difficulty urinating and dysuria.        reports that he has never smoked. He has never used smokeless tobacco. He reports that he drinks alcohol. He reports that he does not use drugs.      Current Outpatient Medications:   •  Buprenorphine (BUTRANS) 10 MCG/HR patch weekly, Place 10 mcg on the skin Every 7 (Seven) Days., Disp: , Rfl:   •  CHONDROITIN SULFATE PO, Take  by mouth., Disp: , Rfl:   •  diazePAM (VALIUM) 2 MG tablet, Take 2 mg by mouth Daily., Disp: , Rfl:   •  diltiaZEM (CARDIZEM) 30 MG tablet, Take 1 tablet by mouth 3 (Three) Times a Day., Disp: 270 tablet, Rfl: 3  •  furosemide (LASIX) 20 MG tablet, Take 1 tablet by mouth Daily., Disp: 90 tablet, Rfl: 3  •  furosemide (LASIX) 40 MG tablet, Take 1 tablet by mouth Daily. TAKE WITH 20 MG TABLET FOR 60 PER MG PER DAY, Disp: 90 tablet, Rfl: 3  •  gabapentin (NEURONTIN) 600 MG tablet, Take 600 mg by mouth 3 (Three) Times a Day., Disp: , Rfl:   •  Glucosamine 750 MG tablet, Take 750 mg by mouth 2 (Two) Times a Day., Disp: , Rfl:   •  HYDROcodone-acetaminophen (NORCO) 7.5-325 MG per tablet, Take 1 tablet  "by mouth Every 6 (Six) Hours As Needed for Moderate Pain ., Disp: , Rfl:   •  losartan (COZAAR) 25 MG tablet, Take 1 tablet by mouth Daily., Disp: 90 tablet, Rfl: 2  •  metoprolol succinate XL (TOPROL-XL) 100 MG 24 hr tablet, Take 1 tablet by mouth Daily., Disp: 90 tablet, Rfl: 3  •  Multiple Vitamins-Minerals (MULTIVITAMIN ADULT PO), Take  by mouth Daily., Disp: , Rfl:   •  pantoprazole (PROTONIX) 40 MG EC tablet, Take 1 tablet by mouth Daily., Disp: 90 tablet, Rfl: 3  •  polyethylene glycol (MIRALAX) powder, Take 17 g by mouth Every Night., Disp: 1581 g, Rfl: 3  •  simvastatin (ZOCOR) 20 MG tablet, Take 1 tablet by mouth Every Night., Disp: 90 tablet, Rfl: 3  •  tamsulosin (FLOMAX) 0.4 MG capsule 24 hr capsule, Take 1 capsule by mouth Every Night., Disp: 90 capsule, Rfl: 3  •  tiZANidine (ZANAFLEX) 4 MG tablet, Take 4 mg by mouth 3 (Three) Times a Day As Needed for muscle spasms., Disp: , Rfl:   •  warfarin (COUMADIN) 2 MG tablet, Take 1 tablet by mouth Daily., Disp: 90 tablet, Rfl: 1  •  warfarin (COUMADIN) 4 MG tablet, Take 1 tablet by mouth Daily., Disp: 90 tablet, Rfl: 1  •  warfarin (COUMADIN) 5 MG tablet, Take 1 tablet by mouth Daily., Disp: 90 tablet, Rfl: 1    OBJECTIVE:  /82 (BP Location: Left arm, Patient Position: Sitting, Cuff Size: Adult)   Pulse 69   Resp 16   Ht 188 cm (74\")   Wt 127 kg (279 lb 4.8 oz)   SpO2 97%   BMI 35.86 kg/m²    Physical Exam   Constitutional: He is oriented to person, place, and time. He appears well-nourished. No distress.   Cardiovascular: Normal rate and normal heart sounds. An irregularly irregular rhythm present.   No murmur heard.  Pulmonary/Chest: Effort normal and breath sounds normal. He has no wheezes.   Neurological: He is alert and oriented to person, place, and time.   Psychiatric: He has a normal mood and affect.       Assessment/Plan    Diagnoses and all orders for this visit:    Type 2 diabetes mellitus without complication, without long-term " current use of insulin (CMS/[facility])  -     POC Glycosylated Hemoglobin (Hb A1C)  A1c 7.2 and improved from last check. Continue lifestyle modification as goal for A1c is <7.5 or 8 given comorbid conditions.  No pharmacotherapy is needed at this time.     Paroxysmal atrial fibrillation (CMS/[facility])  Rate control with toprol and anticoagulation on warfarin with INR within range on last check.     Essential hypertension  Well controlled, BP goal for age is <140/90 per JNC 8 guidelines and continue current medications    Class 2 severe obesity due to excess calories with serious comorbidity and body mass index (BMI) of 35.0 to 35.9 in adult (CMS/[facility])  Recommended attention to portion control and being careful about the types and timing of meals for the purpose of weight management.    Chronic systolic CHF (congestive heart failure), NYHA class 3 (CMS/[facility])  Stable on Toprol XL, ARB, and loop diuretic every other day. Euvolemic without exacerbation.     Benign prostatic hyperplasia without lower urinary tract symptoms  Stable without new symptoms on Flomax.     Mixed hyperlipidemia  Stable on moderate intensity statin therapy per ACC/AHA guidelines.    An After Visit Summary was printed and given to the patient at discharge.  Return in about 4 months (around 4/17/2020) for Medicare Wellness.         Ric Antony D.O. 12/17/2019   Electronically signed.

## 2020-01-12 DIAGNOSIS — I48.0 PAROXYSMAL ATRIAL FIBRILLATION (HCC): ICD-10-CM

## 2020-01-13 ENCOUNTER — TELEPHONE (OUTPATIENT)
Dept: INTERNAL MEDICINE | Facility: CLINIC | Age: 75
End: 2020-01-13

## 2020-01-13 ENCOUNTER — TRANSCRIBE ORDERS (OUTPATIENT)
Dept: INTERNAL MEDICINE | Facility: CLINIC | Age: 75
End: 2020-01-13

## 2020-01-13 ENCOUNTER — LAB (OUTPATIENT)
Dept: LAB | Facility: HOSPITAL | Age: 75
End: 2020-01-13

## 2020-01-13 DIAGNOSIS — I48.0 PAROXYSMAL ATRIAL FIBRILLATION (HCC): Primary | ICD-10-CM

## 2020-01-13 DIAGNOSIS — I48.0 PAROXYSMAL ATRIAL FIBRILLATION (HCC): ICD-10-CM

## 2020-01-13 LAB
INR PPP: 2.6 (ref 0.91–1.09)
PROTHROMBIN TIME: 31.2 SECONDS (ref 10–13.8)

## 2020-01-13 PROCEDURE — 85610 PROTHROMBIN TIME: CPT | Performed by: INTERNAL MEDICINE

## 2020-01-13 RX ORDER — WARFARIN SODIUM 2 MG/1
TABLET ORAL
Qty: 90 TABLET | Refills: 1 | OUTPATIENT
Start: 2020-01-13

## 2020-01-13 RX ORDER — WARFARIN SODIUM 5 MG/1
TABLET ORAL
Qty: 90 TABLET | Refills: 1 | OUTPATIENT
Start: 2020-01-13

## 2020-01-13 RX ORDER — TAMSULOSIN HYDROCHLORIDE 0.4 MG/1
CAPSULE ORAL
Qty: 90 CAPSULE | Refills: 3 | OUTPATIENT
Start: 2020-01-13

## 2020-01-13 RX ORDER — WARFARIN SODIUM 4 MG/1
TABLET ORAL
Qty: 90 TABLET | Refills: 1 | OUTPATIENT
Start: 2020-01-13

## 2020-01-14 NOTE — TELEPHONE ENCOUNTER
Diomedes from Outpatient Lab called to see if the patient was supposed to have a PT order in his chart. The patient was there to have this test done and no order had been placed.    Dr. Antony placed the order.....

## 2020-01-28 DIAGNOSIS — I48.0 PAROXYSMAL ATRIAL FIBRILLATION (HCC): ICD-10-CM

## 2020-01-28 RX ORDER — WARFARIN SODIUM 2 MG/1
2 TABLET ORAL
Qty: 90 TABLET | Refills: 1 | Status: SHIPPED | OUTPATIENT
Start: 2020-01-28 | End: 2020-06-27 | Stop reason: SDUPTHER

## 2020-01-28 RX ORDER — WARFARIN SODIUM 4 MG/1
4 TABLET ORAL
Qty: 90 TABLET | Refills: 1 | Status: SHIPPED | OUTPATIENT
Start: 2020-01-28 | End: 2020-06-27 | Stop reason: SDUPTHER

## 2020-01-28 RX ORDER — WARFARIN SODIUM 5 MG/1
5 TABLET ORAL
Qty: 90 TABLET | Refills: 1 | Status: SHIPPED | OUTPATIENT
Start: 2020-01-28 | End: 2020-06-27 | Stop reason: SDUPTHER

## 2020-01-28 RX ORDER — TAMSULOSIN HYDROCHLORIDE 0.4 MG/1
1 CAPSULE ORAL NIGHTLY
Qty: 90 CAPSULE | Refills: 3 | Status: SHIPPED | OUTPATIENT
Start: 2020-01-28 | End: 2021-01-04 | Stop reason: SDUPTHER

## 2020-02-10 ENCOUNTER — LAB (OUTPATIENT)
Dept: LAB | Facility: HOSPITAL | Age: 75
End: 2020-02-10

## 2020-02-10 ENCOUNTER — TELEPHONE (OUTPATIENT)
Dept: PODIATRY | Facility: CLINIC | Age: 75
End: 2020-02-10

## 2020-02-10 DIAGNOSIS — I48.0 PAROXYSMAL ATRIAL FIBRILLATION (HCC): ICD-10-CM

## 2020-02-10 LAB
INR PPP: 1.9 (ref 0.91–1.09)
PROTHROMBIN TIME: 22.6 SECONDS (ref 10–13.8)

## 2020-02-10 PROCEDURE — 85610 PROTHROMBIN TIME: CPT | Performed by: INTERNAL MEDICINE

## 2020-02-10 NOTE — PROGRESS NOTES
[facility] - PODIATRY    Today's Date: 02/11/20    Patient Name: Jonh Peacock  MRN: [id number]  CSN: [id number]  PCP: Ric Antony DO  Referring Provider: No ref. provider found     SUBJECTIVE     Chief Complaint   Patient presents with   • Follow-up      foot and nail care-pt c/o painful  long, thickened toenails - denies pain at present time- pt is taking blood thinner ,  PCP Dr. Antony last visit 12/17/19 non-diabetic        HPI: Jonh Peacock, a 74 y.o.male, comes to clinic as a(n) established patient complaining of thick fungal toenails. Pt with h/o Anxiety, Arrhythmia, Cardiomyopathy, CHF, Depression, previous DVT, HTN, Obesity, Anticoagulation with Warfarin. Denies diabetes. Relates that his nails are thick and he is unable to cut them himelf and routinely his wife will cut them but does not feel comfortable cutting them due to being on warfarin. Also relates that he has a limb length discrepancy which he has a lift in his left foot and custom inserts for both feet. Denies pain today. Relates that he typically gets around in his motorized scooter. Notes improvement with regular visits. Denies any constitutional symptoms. No other pedal complaints at this time.    Past Medical History:   Diagnosis Date   • Anemia 1/30/2017   • Anxiety 1/30/2017   • Arrhythmia 1/30/2017   • Arthritis    • Atrial fibrillation (CMS/HCC)    • Cardiomyopathy (CMS/HCC) 1/30/2017   • CHF (congestive heart failure) (CMS/HCC) 1/30/2017   • Chronic pain 1/30/2017   • Colon polyp    • Connective tissue disease (CMS/HCC)    • Depression 1/30/2017   • Diverticulitis 2010   • DVT (deep venous thrombosis) (CMS/HCC)    • Gastritis 1/30/2017   • Hiatal hernia    • History of transfusion    • Hyperlipidemia 1/30/2017   • Hypertension 1/30/2017   • Low back pain 1/30/2017   • Neuropathy 1/30/2017   • Obesity 1/30/2017   • Pneumonia    • Status post placement of cardiac pacemaker 1/30/2017     Past Surgical  History:   Procedure Laterality Date   • APPENDECTOMY     • BACK SURGERY  2002    BACK FUSION   • BACK SURGERY  2007   • CARDIAC CATHETERIZATION     • CARDIAC DEFIBRILLATOR PLACEMENT     • CARDIAC ELECTROPHYSIOLOGY PROCEDURE N/A 10/31/2019    Procedure: ICD GEN CHANGE;  Surgeon: Chaz Chavez MD;  Location: [facility] CATH INVASIVE LOCATION;  Service: Cardiology   • COLON RESECTION WITH COLOSTOMY     • COLONOSCOPY  09/04/2014    diverticulosis   • COLONOSCOPY N/A 10/29/2019    Procedure: COLONOSCOPY WITH ANESTHESIA;  Surgeon: Tenzin Abrams MD;  Location: [facility] ENDOSCOPY;  Service: Gastroenterology   • ENDOSCOPY  02/24/1999    small sliding hiatal hernia   • FOOT SURGERY Bilateral 2005   • JOINT REPLACEMENT Right 2002    KNEE   • PACEMAKER IMPLANTATION  2012   • RECTAL FISSURE INCISION AND DRAINAGE     • REPLACEMENT TOTAL KNEE     • SPINAL CORD STIMULATOR IMPLANT     • TOTAL HIP ARTHROPLASTY Right 2004   • TOTAL HIP ARTHROPLASTY Left 12/2008     Family History   Problem Relation Age of Onset   • Heart disease Mother    • Heart disease Brother    • Breast cancer Brother    • Colon cancer Neg Hx    • Colon polyps Neg Hx      Social History     Socioeconomic History   • Marital status:      Spouse name: Not on file   • Number of children: Not on file   • Years of education: Not on file   • Highest education level: Not on file   Tobacco Use   • Smoking status: Never Smoker   • Smokeless tobacco: Never Used   Substance and Sexual Activity   • Alcohol use: Yes     Comment: rare   • Drug use: No   • Sexual activity: Defer     Allergies   Allergen Reactions   • Amiodarone Other (See Comments)     INTERFERES WITH OTHER MEDICATIONS   • Oxycontin [Oxycodone Hcl] Other (See Comments)     Can not urinate.   • Penicillins Hives     Allergic to All cillins. Amoxacillin.   • Vioxx [Rofecoxib] GI Intolerance     Current Outpatient Medications   Medication Sig Dispense Refill   • Buprenorphine (BUTRANS) 10 MCG/HR  patch weekly Place 10 mcg on the skin Every 7 (Seven) Days.     • CHONDROITIN SULFATE PO Take  by mouth.     • diazePAM (VALIUM) 2 MG tablet Take 2 mg by mouth Daily.     • diltiaZEM (CARDIZEM) 30 MG tablet Take 1 tablet by mouth 3 (Three) Times a Day. 270 tablet 3   • furosemide (LASIX) 20 MG tablet Take 1 tablet by mouth Daily. 90 tablet 3   • furosemide (LASIX) 40 MG tablet Take 1 tablet by mouth Daily. TAKE WITH 20 MG TABLET FOR 60 PER MG PER DAY 90 tablet 3   • gabapentin (NEURONTIN) 400 MG capsule Take 600 mg by mouth 3 (Three) Times a Day.     • Glucosamine 750 MG tablet Take 750 mg by mouth 2 (Two) Times a Day.     • HYDROcodone-acetaminophen (NORCO) 7.5-325 MG per tablet Take 1 tablet by mouth Every 6 (Six) Hours As Needed for Moderate Pain .     • losartan (COZAAR) 25 MG tablet Take 1 tablet by mouth Daily. 90 tablet 2   • metoprolol succinate XL (TOPROL-XL) 100 MG 24 hr tablet Take 1 tablet by mouth Daily. 90 tablet 3   • Multiple Vitamins-Minerals (MULTIVITAMIN ADULT PO) Take  by mouth Daily.     • pantoprazole (PROTONIX) 40 MG EC tablet Take 1 tablet by mouth Daily. 90 tablet 3   • polyethylene glycol (MIRALAX) powder Take 17 g by mouth Every Night. 1581 g 3   • simvastatin (ZOCOR) 20 MG tablet Take 1 tablet by mouth Every Night. 90 tablet 3   • tamsulosin (FLOMAX) 0.4 MG capsule 24 hr capsule Take 1 capsule by mouth Every Night. 90 capsule 3   • tiZANidine (ZANAFLEX) 4 MG tablet Take 4 mg by mouth 3 (Three) Times a Day As Needed for muscle spasms.     • warfarin (COUMADIN) 2 MG tablet Take 1 tablet by mouth Daily. 90 tablet 1   • warfarin (COUMADIN) 4 MG tablet Take 1 tablet by mouth Daily. 90 tablet 1   • warfarin (COUMADIN) 5 MG tablet Take 1 tablet by mouth Daily. 90 tablet 1   • gabapentin (NEURONTIN) 600 MG tablet        No current facility-administered medications for this visit.      Review of Systems   Constitutional: Negative for chills and fever.   HENT: Negative for congestion.     Respiratory: Negative for shortness of breath.    Cardiovascular: Positive for leg swelling. Negative for chest pain.   Gastrointestinal: Negative for constipation, diarrhea, nausea and vomiting.   Musculoskeletal:        Foot pain   Skin: Negative for wound.        Thick Toenails   Neurological: Negative for numbness.       OBJECTIVE     Vitals:    02/11/20 1522   BP: 110/60   Pulse: 61   SpO2: 97%       PHYSICAL EXAM  GEN:   A&Ox3, NAD. Pt presents to clinic in motorized scooter and wearing Casual Shoes with custom inserts, left shoe has lift.      NEURO:   Protective sensation intact to 10/10 sites Right foot, 10/10 site Left foot using Gueydan-Akhil monofilament  Light touch sensation diminished  No Tinel's or Villeux sign.    VASC:  Skin temperature Warm to Warm proximal to distal carlotta  DP pulses 1/4 Right, 1/4 Left  PT pulses 1/4 Right, 1/4 Left  CFT 4 sec carlotta  Pedal hair growth absent  1+ pitting edema noted carlotta  Varicosities absent carlotta    MUSC/SKEL:  Muscle Strength Right foot Dorsiflexors 5/5, Plantarflexors 5/5, Evertors 5/5, Invertors 5/5  Muscle Strength Left foot Dorsiflexors 5/5, Plantarflexors 5/5, Evertors 5/5, Invertors 5/5  ROM of the 1st MTP is full without pain or crepitus  ROM of the MTJ is full without pain or crepitus    ROM of the STJ is full without pain or crepitus    ROM of the ankle joint is full without pain or crepitus    POP of nail plates  Planus foot type with decreased arch height and abduction of forefoot  Right LE longer than Left LE     DERM:  Pedal nails x10 are thickened, elongated and discolored with presence of subunugual debris  Webspaces are clean, dry, intact  Skin is normal in turgor and texture with no open wounds or sores appreciated.  Stable cicatrix noted to dorsal 1st MTPJ carlotta and dorsal digits      RADIOLOGY/NUCLEAR:  No results found.    LABORATORY/CULTURE RESULTS:      PATHOLOGY RESULTS:       ASSESSMENT/PLAN     Jonh was seen today for follow-up.    Diagnoses  and all orders for this visit:    Onychomycosis    Neuropathy    Anticoagulant long-term use    Peripheral edema      Comprehensive lower extremity examination and evaluation was performed.  Discussed findings and treatment plan including risks, benefits, and treatment options with patient in detail. Patient agreed with treatment plan.  After verbal consent obtained, nail(s) x10 debrided of length and thickness with nail nipper without incidence  Patient may maintain nails and calluses at home utilizing emery board or pumice stone between visits as needed  Reviewed at home diabetic foot care including daily foot checks   Continue with custom shoes with and inserts  Defers compression stockings at this time  An After Visit Summary was printed and given to the patient at discharge, including (if requested) any available informative/educational handouts regarding diagnosis, treatment, or medications. All questions were answered to patient/family satisfaction. Should symptoms fail to improve or worsen they agree to call or return to clinic or to go to the Emergency Department. Discussed the importance of following up with any needed screening tests/labs/specialist appointments and any requested follow-up recommended by me today. Importance of maintaining follow-up discussed and patient accepts that missed appointments can delay diagnosis and potentially lead to worsening of conditions.  Return in about 3 months (around 5/11/2020)., or sooner if acute issues arise.        This document has been electronically signed by Deion Avelar DPM on February 11, 2020 3:53 PM     Please note that portions of this note may have been completed with a voice recognition program. Efforts were made to edit the dictations, but occasionally words are mistranscribed.

## 2020-02-11 ENCOUNTER — OFFICE VISIT (OUTPATIENT)
Dept: PODIATRY | Facility: CLINIC | Age: 75
End: 2020-02-11

## 2020-02-11 VITALS
OXYGEN SATURATION: 97 % | HEART RATE: 61 BPM | DIASTOLIC BLOOD PRESSURE: 60 MMHG | HEIGHT: 74 IN | WEIGHT: 270 LBS | SYSTOLIC BLOOD PRESSURE: 110 MMHG | BODY MASS INDEX: 34.65 KG/M2

## 2020-02-11 DIAGNOSIS — R60.9 PERIPHERAL EDEMA: ICD-10-CM

## 2020-02-11 DIAGNOSIS — Z79.01 ANTICOAGULANT LONG-TERM USE: ICD-10-CM

## 2020-02-11 DIAGNOSIS — B35.1 ONYCHOMYCOSIS: Primary | ICD-10-CM

## 2020-02-11 DIAGNOSIS — G62.9 NEUROPATHY: ICD-10-CM

## 2020-02-11 PROCEDURE — 99212 OFFICE O/P EST SF 10 MIN: CPT | Performed by: PODIATRIST

## 2020-02-11 PROCEDURE — 11721 DEBRIDE NAIL 6 OR MORE: CPT | Performed by: PODIATRIST

## 2020-02-11 RX ORDER — GABAPENTIN 600 MG/1
TABLET ORAL
COMMUNITY
Start: 2019-11-04 | End: 2020-04-30 | Stop reason: ALTCHOICE

## 2020-03-13 ENCOUNTER — CLINICAL SUPPORT (OUTPATIENT)
Dept: CARDIOLOGY | Facility: CLINIC | Age: 75
End: 2020-03-13

## 2020-03-13 DIAGNOSIS — Z95.810 CARDIAC DEFIBRILLATOR IN SITU: ICD-10-CM

## 2020-03-13 PROCEDURE — 93295 DEV INTERROG REMOTE 1/2/MLT: CPT | Performed by: PHYSICIAN ASSISTANT

## 2020-03-13 PROCEDURE — 93296 REM INTERROG EVL PM/IDS: CPT | Performed by: PHYSICIAN ASSISTANT

## 2020-03-14 NOTE — PROGRESS NOTES
Dual Chamber AICD Evaluation Report  [facility]    March 14, 2020    Primary Cardiologist: Jocelyn  : Medtronic Model: Evera MRI  Implant date: 10/31/19    Reason for evaluation: routine  Indication for AICD: cardiomyopathy    Measurements  Atrial sensing - P wave: 1.6 mV  Atrial threshold: 0.5 V @ 0.4 ms  Atrial lead impedance: 399 ohms  Ventricular sensing - R wave: 5.8 mV  Ventricular threshold: 0.875 V @ 0.4 ms  Ventricular lead impedance:  361 ohms  Shock impedance:  RV- 36 ohms   SVC- 40 ohms      Diagnostic Data  Atrial paced: 61.3 %  Ventricular paced: 0.3 %  Other: Few episodes of atrial flutter.  Longest is just over 2 min. Pt is anticoagulated.  Battery status: satisfactory        Final Parameters  Mode: AAIR+  Lower rate: 60 bpm Upper rate: 130 bpm  AV Delay: Paced- 180 ms    Sensed- 150 ms     Atrial - Amplitude: 1.5 V Pulse width: 0.4 ms Sensitivity: 0.6 mV   Ventricular - Amplitude: 2.0 V Pulse width: 0.4 ms Sensitivity: 0.3 mV   Changes made: n/a  Conclusions: normal AICD function    Follow up: 3 months

## 2020-03-16 ENCOUNTER — LAB (OUTPATIENT)
Dept: LAB | Facility: HOSPITAL | Age: 75
End: 2020-03-16

## 2020-03-16 DIAGNOSIS — I48.0 PAROXYSMAL ATRIAL FIBRILLATION (HCC): ICD-10-CM

## 2020-03-16 PROCEDURE — 85610 PROTHROMBIN TIME: CPT | Performed by: INTERNAL MEDICINE

## 2020-03-17 LAB
INR PPP: 2.6 (ref 0.91–1.09)
PROTHROMBIN TIME: 31.1 SECONDS (ref 10–13.8)

## 2020-03-23 DIAGNOSIS — I48.0 PAROXYSMAL ATRIAL FIBRILLATION (HCC): ICD-10-CM

## 2020-03-23 RX ORDER — LOSARTAN POTASSIUM 25 MG/1
25 TABLET ORAL DAILY
Qty: 90 TABLET | Refills: 2 | Status: SHIPPED | OUTPATIENT
Start: 2020-03-23 | End: 2021-01-01 | Stop reason: SDUPTHER

## 2020-03-23 RX ORDER — PANTOPRAZOLE SODIUM 40 MG/1
40 TABLET, DELAYED RELEASE ORAL DAILY
Qty: 90 TABLET | Refills: 3 | Status: SHIPPED | OUTPATIENT
Start: 2020-03-23 | End: 2021-01-01 | Stop reason: SDUPTHER

## 2020-03-23 RX ORDER — SIMVASTATIN 20 MG
20 TABLET ORAL NIGHTLY
Qty: 90 TABLET | Refills: 3 | Status: SHIPPED | OUTPATIENT
Start: 2020-03-23 | End: 2021-01-04 | Stop reason: SDUPTHER

## 2020-04-14 ENCOUNTER — TELEPHONE (OUTPATIENT)
Dept: INTERNAL MEDICINE | Facility: CLINIC | Age: 75
End: 2020-04-14

## 2020-04-14 ENCOUNTER — LAB (OUTPATIENT)
Dept: LAB | Facility: HOSPITAL | Age: 75
End: 2020-04-14

## 2020-04-14 DIAGNOSIS — I48.0 PAROXYSMAL ATRIAL FIBRILLATION (HCC): ICD-10-CM

## 2020-04-14 DIAGNOSIS — E11.9 TYPE 2 DIABETES MELLITUS WITHOUT COMPLICATION, WITHOUT LONG-TERM CURRENT USE OF INSULIN (HCC): ICD-10-CM

## 2020-04-14 DIAGNOSIS — I48.0 PAROXYSMAL ATRIAL FIBRILLATION (HCC): Primary | ICD-10-CM

## 2020-04-14 DIAGNOSIS — Z79.01 ANTICOAGULANT LONG-TERM USE: ICD-10-CM

## 2020-04-14 DIAGNOSIS — I10 ESSENTIAL HYPERTENSION: ICD-10-CM

## 2020-04-14 DIAGNOSIS — E78.2 MIXED HYPERLIPIDEMIA: ICD-10-CM

## 2020-04-14 LAB
ALBUMIN SERPL-MCNC: 4.2 G/DL (ref 3.5–5.2)
ALBUMIN/GLOB SERPL: 1.3 G/DL
ALP SERPL-CCNC: 105 U/L (ref 39–117)
ALT SERPL W P-5'-P-CCNC: 18 U/L (ref 1–41)
ANION GAP SERPL CALCULATED.3IONS-SCNC: 11 MMOL/L (ref 5–15)
AST SERPL-CCNC: 22 U/L (ref 1–40)
BILIRUB SERPL-MCNC: 0.5 MG/DL (ref 0.2–1.2)
BUN BLD-MCNC: 16 MG/DL (ref 8–23)
BUN/CREAT SERPL: 16.2 (ref 7–25)
CALCIUM SPEC-SCNC: 9.4 MG/DL (ref 8.6–10.5)
CHLORIDE SERPL-SCNC: 100 MMOL/L (ref 98–107)
CHOLEST SERPL-MCNC: 183 MG/DL (ref 0–200)
CO2 SERPL-SCNC: 33 MMOL/L (ref 22–29)
CREAT BLD-MCNC: 0.99 MG/DL (ref 0.76–1.27)
DEPRECATED RDW RBC AUTO: 43.4 FL (ref 37–54)
ERYTHROCYTE [DISTWIDTH] IN BLOOD BY AUTOMATED COUNT: 13.2 % (ref 12.3–15.4)
GFR SERPL CREATININE-BSD FRML MDRD: 90 ML/MIN/1.73
GLOBULIN UR ELPH-MCNC: 3.2 GM/DL
GLUCOSE BLD-MCNC: 135 MG/DL (ref 65–99)
HBA1C MFR BLD: 7.3 % (ref 4.8–5.6)
HCT VFR BLD AUTO: 37.6 % (ref 37.5–51)
HDLC SERPL-MCNC: 50 MG/DL (ref 40–60)
HGB BLD-MCNC: 12.6 G/DL (ref 13–17.7)
INR PPP: 2.27 (ref 0.91–1.09)
LDLC SERPL CALC-MCNC: 107 MG/DL (ref 0–100)
LDLC/HDLC SERPL: 2.14 {RATIO}
MCH RBC QN AUTO: 29.9 PG (ref 26.6–33)
MCHC RBC AUTO-ENTMCNC: 33.5 G/DL (ref 31.5–35.7)
MCV RBC AUTO: 89.3 FL (ref 79–97)
PLATELET # BLD AUTO: 161 10*3/MM3 (ref 140–450)
PMV BLD AUTO: 11.7 FL (ref 6–12)
POTASSIUM BLD-SCNC: 3.7 MMOL/L (ref 3.5–5.2)
PROT SERPL-MCNC: 7.4 G/DL (ref 6–8.5)
PROTHROMBIN TIME: 25.1 SECONDS (ref 11.9–14.6)
RBC # BLD AUTO: 4.21 10*6/MM3 (ref 4.14–5.8)
SODIUM BLD-SCNC: 144 MMOL/L (ref 136–145)
TRIGL SERPL-MCNC: 129 MG/DL (ref 0–150)
VLDLC SERPL-MCNC: 25.8 MG/DL
WBC NRBC COR # BLD: 5.29 10*3/MM3 (ref 3.4–10.8)

## 2020-04-14 PROCEDURE — 80053 COMPREHEN METABOLIC PANEL: CPT | Performed by: INTERNAL MEDICINE

## 2020-04-14 PROCEDURE — 36415 COLL VENOUS BLD VENIPUNCTURE: CPT

## 2020-04-14 PROCEDURE — 85027 COMPLETE CBC AUTOMATED: CPT | Performed by: INTERNAL MEDICINE

## 2020-04-14 PROCEDURE — 80061 LIPID PANEL: CPT | Performed by: INTERNAL MEDICINE

## 2020-04-14 PROCEDURE — 85610 PROTHROMBIN TIME: CPT | Performed by: INTERNAL MEDICINE

## 2020-04-14 PROCEDURE — 83036 HEMOGLOBIN GLYCOSYLATED A1C: CPT | Performed by: INTERNAL MEDICINE

## 2020-04-14 NOTE — TELEPHONE ENCOUNTER
Miryam from ab called she states that the patient is at the lab for his  PT / INR has been put in wrong and the Lab has been resending the correct one, but she stated that they shouldn't being doing that. The correct one (she stated ) shebould be [id number] ALL ONE WORD.   She also needs 4 mo labs put in as well. She mentioned CMP & other orders as well.   Will you please look in to this and send to the lab.   Thank you.

## 2020-04-30 ENCOUNTER — OFFICE VISIT (OUTPATIENT)
Dept: INTERNAL MEDICINE | Facility: CLINIC | Age: 75
End: 2020-04-30

## 2020-04-30 VITALS
BODY MASS INDEX: 33.88 KG/M2 | DIASTOLIC BLOOD PRESSURE: 74 MMHG | RESPIRATION RATE: 16 BRPM | HEART RATE: 78 BPM | OXYGEN SATURATION: 97 % | TEMPERATURE: 98.6 F | SYSTOLIC BLOOD PRESSURE: 106 MMHG | WEIGHT: 264 LBS | HEIGHT: 74 IN

## 2020-04-30 DIAGNOSIS — Z93.3 COLOSTOMY IN PLACE (HCC): ICD-10-CM

## 2020-04-30 DIAGNOSIS — E66.09 CLASS 1 OBESITY DUE TO EXCESS CALORIES WITH SERIOUS COMORBIDITY AND BODY MASS INDEX (BMI) OF 33.0 TO 33.9 IN ADULT: ICD-10-CM

## 2020-04-30 DIAGNOSIS — I50.22 CHRONIC SYSTOLIC CHF (CONGESTIVE HEART FAILURE), NYHA CLASS 3 (HCC): ICD-10-CM

## 2020-04-30 DIAGNOSIS — Z79.01 ANTICOAGULANT LONG-TERM USE: ICD-10-CM

## 2020-04-30 DIAGNOSIS — I10 ESSENTIAL HYPERTENSION: ICD-10-CM

## 2020-04-30 DIAGNOSIS — G89.29 CHRONIC MIDLINE LOW BACK PAIN WITH BILATERAL SCIATICA: ICD-10-CM

## 2020-04-30 DIAGNOSIS — M54.41 CHRONIC MIDLINE LOW BACK PAIN WITH BILATERAL SCIATICA: ICD-10-CM

## 2020-04-30 DIAGNOSIS — E78.2 MIXED HYPERLIPIDEMIA: ICD-10-CM

## 2020-04-30 DIAGNOSIS — M54.42 CHRONIC MIDLINE LOW BACK PAIN WITH BILATERAL SCIATICA: ICD-10-CM

## 2020-04-30 DIAGNOSIS — R14.2 BELCHING: ICD-10-CM

## 2020-04-30 DIAGNOSIS — E11.9 TYPE 2 DIABETES MELLITUS WITHOUT COMPLICATION, WITHOUT LONG-TERM CURRENT USE OF INSULIN (HCC): ICD-10-CM

## 2020-04-30 DIAGNOSIS — I48.0 PAROXYSMAL ATRIAL FIBRILLATION (HCC): ICD-10-CM

## 2020-04-30 DIAGNOSIS — Z00.00 MEDICARE ANNUAL WELLNESS VISIT, SUBSEQUENT: Primary | ICD-10-CM

## 2020-04-30 PROBLEM — E66.811 CLASS 1 OBESITY DUE TO EXCESS CALORIES WITH SERIOUS COMORBIDITY IN ADULT: Status: ACTIVE | Noted: 2017-01-30

## 2020-04-30 PROCEDURE — 99214 OFFICE O/P EST MOD 30 MIN: CPT | Performed by: NURSE PRACTITIONER

## 2020-04-30 PROCEDURE — G0439 PPPS, SUBSEQ VISIT: HCPCS | Performed by: NURSE PRACTITIONER

## 2020-04-30 NOTE — PROGRESS NOTES
CC: medicare wellness      History:  Jonh Peacock is a 74 y.o. male who presents today for follow-up for evaluation of the above:  Patient presents today for Medicare wellness exam. He states he has been feeling well without acute illness.  Patient reports compliance with blood pressure medications without adverse side effects.   Patient has been compliant with diabetic medications and tolerating them without symptoms of hyperglycemia or hypoglycemia.   He does request a new prescription for his left shoe orthopedic….  He has to have a three-quarter to 1 inch left on the left side due to previous hip replacement.  He had previously obtained this from [facility] but this place has gone out of business. He has been instructed to use  Orthotics.   Patient also inquires today about an upright walker.  He has a rolling walker at home but states that he must bend over to use it causing more back pain.  Additionally patient requests bed repairs for his electronic wheelchair.  Bmi today is 33.9  Patient's wife does report that he has excessive belching throughout the day.  When asked if this is related to certain foods he states that it occurs regardless of which foods he eats.  He does have an colostomy in place.        ROS:  Review of Systems   Constitutional: Negative for activity change, appetite change, fatigue, fever and unexpected weight change.   HENT: Negative.    Respiratory: Negative for cough, chest tightness, shortness of breath and wheezing.    Cardiovascular: Negative for chest pain, palpitations and leg swelling.   Gastrointestinal: Negative.         Excessive belching   Endocrine: Negative.    Genitourinary: Negative.    Musculoskeletal: Negative.    Skin: Negative.    Neurological: Negative for dizziness and headaches.   Psychiatric/Behavioral: Negative.        Mr. Peacock  reports that he has never smoked. He has never used smokeless tobacco. He reports that he drinks alcohol. He  reports that he does not use drugs.      Current Outpatient Medications:   •  Buprenorphine (BUTRANS) 10 MCG/HR patch weekly, Place 10 mcg on the skin Every 7 (Seven) Days., Disp: , Rfl:   •  CHONDROITIN SULFATE PO, Take  by mouth., Disp: , Rfl:   •  diazePAM (VALIUM) 2 MG tablet, Take 2 mg by mouth Daily., Disp: , Rfl:   •  dilTIAZem (Cardizem) 30 MG tablet, Take 1 tablet by mouth 3 (Three) Times a Day., Disp: 270 tablet, Rfl: 3  •  furosemide (LASIX) 20 MG tablet, Take 1 tablet by mouth Daily., Disp: 90 tablet, Rfl: 3  •  furosemide (LASIX) 40 MG tablet, Take 1 tablet by mouth Daily. TAKE WITH 20 MG TABLET FOR 60 PER MG PER DAY, Disp: 90 tablet, Rfl: 3  •  gabapentin (NEURONTIN) 400 MG capsule, Take 400 mg by mouth 3 (Three) Times a Day., Disp: , Rfl:   •  Glucosamine 750 MG tablet, Take 750 mg by mouth 2 (Two) Times a Day., Disp: , Rfl:   •  HYDROcodone-acetaminophen (NORCO) 7.5-325 MG per tablet, Take 1 tablet by mouth Every 6 (Six) Hours As Needed for Moderate Pain ., Disp: , Rfl:   •  losartan (COZAAR) 25 MG tablet, Take 1 tablet by mouth Daily., Disp: 90 tablet, Rfl: 2  •  metoprolol succinate XL (TOPROL-XL) 100 MG 24 hr tablet, Take 1 tablet by mouth Daily., Disp: 90 tablet, Rfl: 3  •  Multiple Vitamins-Minerals (MULTIVITAMIN ADULT PO), Take  by mouth Daily., Disp: , Rfl:   •  pantoprazole (PROTONIX) 40 MG EC tablet, Take 1 tablet by mouth Daily., Disp: 90 tablet, Rfl: 3  •  polyethylene glycol (MIRALAX) powder, Take 17 g by mouth Every Night., Disp: 1581 g, Rfl: 3  •  simvastatin (ZOCOR) 20 MG tablet, Take 1 tablet by mouth Every Night., Disp: 90 tablet, Rfl: 3  •  tamsulosin (FLOMAX) 0.4 MG capsule 24 hr capsule, Take 1 capsule by mouth Every Night., Disp: 90 capsule, Rfl: 3  •  tiZANidine (ZANAFLEX) 4 MG tablet, Take 4 mg by mouth 3 (Three) Times a Day As Needed for muscle spasms., Disp: , Rfl:   •  warfarin (COUMADIN) 2 MG tablet, Take 1 tablet by mouth Daily., Disp: 90 tablet, Rfl: 1  •  warfarin  "(COUMADIN) 4 MG tablet, Take 1 tablet by mouth Daily., Disp: 90 tablet, Rfl: 1  •  warfarin (COUMADIN) 5 MG tablet, Take 1 tablet by mouth Daily., Disp: 90 tablet, Rfl: 1      OBJECTIVE:  /74 (BP Location: Left arm, Patient Position: Sitting, Cuff Size: Large Adult)   Pulse 78   Temp 98.6 °F (37 °C) (Oral)   Resp 16   Ht 188 cm (74\")   Wt 120 kg (264 lb)   SpO2 97%   BMI 33.90 kg/m²    Physical Exam   Constitutional: He is oriented to person, place, and time. He appears well-developed and well-nourished.   Eyes: Pupils are equal, round, and reactive to light. EOM are normal.   Neck: Normal range of motion.   Pulmonary/Chest: No respiratory distress.   Neurological: He is alert and oriented to person, place, and time.   Psychiatric: He has a normal mood and affect.       Assessment/Plan    Jonh was seen today for medicare wellness-subsequent.    Diagnoses and all orders for this visit:    Medicare annual wellness visit, subsequent  See associated note.     Chronic midline low back pain with bilateral sciatica  He is under the management of Dr. Sterling and reports no exacerbations in his pain.  He uses his motorized scooter primarily, but does ambulate around the house with a rolling walker. He does inquire about an Upright walker.   He also asks for order for battery for electronic wheelchair. Script provided. Will contact  Clinic to determine what is needed for 3/4 to 1\" orthopedic lift for left foot.     Type 2 diabetes mellitus without complication, without long-term current use of insulin (CMS/[facility])  A1c 7.3% Goal for A1c is <7.5 or 8 given comorbid conditions.  Continue current therapy.       Essential hypertension  Well controlled, BP goal for age is <140/90 per JNC 8 guidelines and continue current medications     Belching  Colostomy in place (CMS/[facility])  He continues on Protonix daily. Encouraged to use OTC mylicon or gas relief tablet.     Class 1 obesity due to excess calories with serious " comorbidity and body mass index (BMI) of 33.0 to 33.9 in adult  Recommended attention to portion control and being careful about the types and timing of meals for the purpose of weight management.    Paroxysmal atrial fibrillation (CMS/HCC)  Anticoagulant long-term use  Last INR therapeutic between 2 and 3.  He is rate controlled on metoprolol and anticoagulated with warfarin.    Chronic systolic CHF (congestive heart failure), NYHA class 3 (CMS/HCC)  Stable on Toprol-XL, RAAS blockade, and loop diuretic.  He is euvolemic without evidence of decompensation.    Mixed hyperlipidemia  Stable on moderate intensity statin therapy per ACC/AHA guidelines.       An After Visit Summary was printed and given to the patient at discharge.  Return in about 3 months (around 7/30/2020). Sooner if problems arise.          Linda DWYER. 4/30/2020   Electronically Signed

## 2020-04-30 NOTE — PROGRESS NOTES
The ABCs of the Annual Wellness Visit  Subsequent Medicare Wellness Visit    Chief Complaint   Patient presents with   • Medicare Wellness-subsequent       Subjective   History of Present Illness:  See associated note  Jonh Peacock is a 74 y.o. male who presents for a Subsequent Medicare Wellness Visit.    HEALTH RISK ASSESSMENT    Recent Hospitalizations:  No hospitalization(s) within the last year.    Current Medical Providers:  Patient Care Team:  Ric Antony DO as PCP - General (Internal Medicine)  Kehrer, Leon Frank II, APRN as PCP - Claims Attributed  Arun Banks MD as Cardiologist (Cardiology)  Celso Ellsworth MD as Consulting Physician (Urology)  Diomedes Sterling DO as Consulting Physician (Pain Medicine)  Tenzin Abrams MD as Consulting Physician (Gastroenterology)  Deion Avelar DPM as Consulting Physician (Podiatry)  Chaz Chavez MD as Cardiologist (Cardiac Electrophysiology)  Jaime Vazquez OD (Optometry)    Smoking Status:  Social History     Tobacco Use   Smoking Status Never Smoker   Smokeless Tobacco Never Used       Alcohol Consumption:  Social History     Substance and Sexual Activity   Alcohol Use Yes    Comment: rare       Depression Screen:   PHQ-2/PHQ-9 Depression Screening 4/30/2020   Little interest or pleasure in doing things 0   Feeling down, depressed, or hopeless 0   Total Score 0       Fall Risk Screen:  STEADI Fall Risk Assessment has not been completed.    Health Habits and Functional and Cognitive Screening:  Functional & Cognitive Status 4/30/2020   Do you have difficulty preparing food and eating? No   Do you have difficulty bathing yourself, getting dressed or grooming yourself? No   Do you have difficulty using the toilet? (No Data)   Do you have difficulty moving around from place to place? Yes   Do you have trouble with steps or getting out of a bed or a chair? (No Data)   Current Diet Well Balanced Diet   Dental Exam Not up to  date   Eye Exam Up to date   Exercise (times per week) 0 times per week   Current Exercise Activities Include None   Do you need help using the phone?  No   Are you deaf or do you have serious difficulty hearing?  Yes   Do you need help with transportation? No   Do you need help shopping? Yes   Do you need help preparing meals?  No   Do you need help with housework?  No   Do you need help with laundry? (No Data)   Do you need help taking your medications? No   Do you need help managing money? No   Do you ever drive or ride in a car without wearing a seat belt? No   Have you felt unusual stress, anger or loneliness in the last month? No   Who do you live with? (No Data)   If you need help, do you have trouble finding someone available to you? No   Have you been bothered in the last four weeks by sexual problems? No   Do you have difficulty concentrating, remembering or making decisions? No         Does the patient have evidence of cognitive impairment? No    Asprin use counseling:Does not need ASA (and currently is not on it)    Age-appropriate Screening Schedule:  Refer to the list below for future screening recommendations based on patient's age, sex and/or medical conditions. Orders for these recommended tests are listed in the plan section. The patient has been provided with a written plan.    Health Maintenance   Topic Date Due   • ZOSTER VACCINE (1 of 2) 12/10/1996   • DIABETIC FOOT EXAM  04/10/2017   • DIABETIC EYE EXAM  04/10/2017   • URINE MICROALBUMIN  12/01/2020 (Originally 1945)   • INFLUENZA VACCINE  08/01/2020   • HEMOGLOBIN A1C  10/14/2020   • LIPID PANEL  04/14/2021   • TDAP/TD VACCINES (2 - Td) 09/21/2022   • COLONOSCOPY  10/29/2024          The following portions of the patient's history were reviewed and updated as appropriate: allergies, current medications, past family history, past medical history, past social history, past surgical history and problem list.    Outpatient Medications Prior  to Visit   Medication Sig Dispense Refill   • Buprenorphine (BUTRANS) 10 MCG/HR patch weekly Place 10 mcg on the skin Every 7 (Seven) Days.     • CHONDROITIN SULFATE PO Take  by mouth.     • diazePAM (VALIUM) 2 MG tablet Take 2 mg by mouth Daily.     • dilTIAZem (Cardizem) 30 MG tablet Take 1 tablet by mouth 3 (Three) Times a Day. 270 tablet 3   • furosemide (LASIX) 20 MG tablet Take 1 tablet by mouth Daily. 90 tablet 3   • furosemide (LASIX) 40 MG tablet Take 1 tablet by mouth Daily. TAKE WITH 20 MG TABLET FOR 60 PER MG PER DAY 90 tablet 3   • gabapentin (NEURONTIN) 400 MG capsule Take 400 mg by mouth 3 (Three) Times a Day.     • Glucosamine 750 MG tablet Take 750 mg by mouth 2 (Two) Times a Day.     • HYDROcodone-acetaminophen (NORCO) 7.5-325 MG per tablet Take 1 tablet by mouth Every 6 (Six) Hours As Needed for Moderate Pain .     • losartan (COZAAR) 25 MG tablet Take 1 tablet by mouth Daily. 90 tablet 2   • metoprolol succinate XL (TOPROL-XL) 100 MG 24 hr tablet Take 1 tablet by mouth Daily. 90 tablet 3   • Multiple Vitamins-Minerals (MULTIVITAMIN ADULT PO) Take  by mouth Daily.     • pantoprazole (PROTONIX) 40 MG EC tablet Take 1 tablet by mouth Daily. 90 tablet 3   • polyethylene glycol (MIRALAX) powder Take 17 g by mouth Every Night. 1581 g 3   • simvastatin (ZOCOR) 20 MG tablet Take 1 tablet by mouth Every Night. 90 tablet 3   • tamsulosin (FLOMAX) 0.4 MG capsule 24 hr capsule Take 1 capsule by mouth Every Night. 90 capsule 3   • tiZANidine (ZANAFLEX) 4 MG tablet Take 4 mg by mouth 3 (Three) Times a Day As Needed for muscle spasms.     • warfarin (COUMADIN) 2 MG tablet Take 1 tablet by mouth Daily. 90 tablet 1   • warfarin (COUMADIN) 4 MG tablet Take 1 tablet by mouth Daily. 90 tablet 1   • warfarin (COUMADIN) 5 MG tablet Take 1 tablet by mouth Daily. 90 tablet 1   • gabapentin (NEURONTIN) 600 MG tablet        No facility-administered medications prior to visit.        Patient Active Problem List   Diagnosis  "  • Chronic midline low back pain with bilateral sciatica   • Mixed hyperlipidemia   • Essential hypertension   • Anemia   • Neuropathy   • Anxiety   • Depression   • Gastritis   • Class 1 obesity due to excess calories with serious comorbidity and body mass index (BMI) of 33.0 to 33.9 in adult   • Chronic systolic CHF (congestive heart failure), NYHA class 3 (CMS/[facility])   • Non-ischemic cardiomyopathy (CMS/[facility])   • Status post placement of cardiac pacemaker   • Paroxysmal atrial fibrillation (CMS/[facility])   • Anticoagulant long-term use   • Onychomycosis   • Ventricular bigeminy   • Peripheral edema   • Obstructive sleep apnea of adult   • Cardiac defibrillator in situ   • Benign prostatic hyperplasia without lower urinary tract symptoms   • Colostomy in place (CMS/[facility])   • Type 2 diabetes mellitus without complication, without long-term current use of insulin (CMS/[facility])   • Hx of colonic polyp       Advanced Care Planning:  ACP discussion was declined by the patient. Patient does not have an advance directive, declines further assistance.    Review of Systems  See associated note  Compared to one year ago, the patient feels his physical health is the same.  Compared to one year ago, the patient feels his mental health is the same.    Reviewed chart for potential of high risk medication in the elderly: yes  Reviewed chart for potential of harmful drug interactions in the elderly:yes    Objective         Vitals:    04/30/20 1351   BP: 106/74   BP Location: Left arm   Patient Position: Sitting   Cuff Size: Large Adult   Pulse: 78   Resp: 16   Temp: 98.6 °F (37 °C)   TempSrc: Oral   SpO2: 97%   Weight: 120 kg (264 lb)   Height: 188 cm (74\")       Body mass index is 33.9 kg/m².  Discussed the patient's BMI with him. The BMI is above average; BMI management plan is completed.    Physical Exam  See associated note    Lab Results   Component Value Date    TRIG 129 04/14/2020    HDL 50 04/14/2020     (H) 04/14/2020    VLDL " 25.8 04/14/2020    HGBA1C 7.30 (H) 04/14/2020        Assessment/Plan   Medicare Risks and Personalized Health Plan  CMS Preventative Services Quick Reference  Diabetic Lab Screening   Fall Risk  Hearing Problem    The above risks/problems have been discussed with the patient.  Pertinent information has been shared with the patient in the After Visit Summary.  Follow up plans and orders are seen below in the Assessment/Plan Section.    Diagnoses and all orders for this visit:    1. Medicare annual wellness visit, subsequent (Primary)    2. Chronic midline low back pain with bilateral sciatica    3. Type 2 diabetes mellitus without complication, without long-term current use of insulin (CMS/[facility])    4. Essential hypertension    5. Belching    6. Class 1 obesity due to excess calories with serious comorbidity and body mass index (BMI) of 33.0 to 33.9 in adult    7. Paroxysmal atrial fibrillation (CMS/[facility])    8. Colostomy in place (CMS/[facility])    9. Chronic systolic CHF (congestive heart failure), NYHA class 3 (CMS/[facility])    10. Anticoagulant long-term use    11. Mixed hyperlipidemia      Follow Up:  Return in about 3 months (around 7/30/2020).     An After Visit Summary and PPPS were given to the patient.

## 2020-04-30 NOTE — PATIENT INSTRUCTIONS
Medicare Wellness  Personal Prevention Plan of Service     Date of Office Visit:  2020  Encounter Provider:  REYMUNDO Jackson  Place of Service:  [facility]  Patient Name: Jonh Peacock  :  1945    As part of the Medicare Wellness portion of your visit today, we are providing you with this personalized preventive plan of services (PPPS). This plan is based upon recommendations of the [place] Preventive Services Task Force (USPSTF) and the Advisory Committee on Immunization Practices (ACIP).    This lists the preventive care services that should be considered, and provides dates of when you are due. Items listed as completed are up-to-date and do not require any further intervention.    Health Maintenance   Topic Date Due   • ZOSTER VACCINE (1 of 2) 12/10/1996   • DIABETIC FOOT EXAM  04/10/2017   • DIABETIC EYE EXAM  04/10/2017   • MEDICARE ANNUAL WELLNESS  2020   • URINE MICROALBUMIN  2020 (Originally 1945)   • INFLUENZA VACCINE  2020   • HEMOGLOBIN A1C  10/14/2020   • LIPID PANEL  2021   • TDAP/TD VACCINES (2 - Td) 2022   • COLONOSCOPY  10/29/2024   • HEPATITIS C SCREENING  Completed   • Pneumococcal Vaccine Once at 65 Years Old  Completed       No orders of the defined types were placed in this encounter.      Return in about 3 months (around 2020).

## 2020-05-13 ENCOUNTER — TELEPHONE (OUTPATIENT)
Dept: PODIATRY | Facility: CLINIC | Age: 75
End: 2020-05-13

## 2020-05-14 ENCOUNTER — OFFICE VISIT (OUTPATIENT)
Dept: PODIATRY | Facility: CLINIC | Age: 75
End: 2020-05-14

## 2020-05-14 ENCOUNTER — LAB (OUTPATIENT)
Dept: LAB | Facility: HOSPITAL | Age: 75
End: 2020-05-14

## 2020-05-14 VITALS
DIASTOLIC BLOOD PRESSURE: 80 MMHG | SYSTOLIC BLOOD PRESSURE: 124 MMHG | OXYGEN SATURATION: 99 % | BODY MASS INDEX: 33.37 KG/M2 | HEART RATE: 78 BPM | WEIGHT: 260 LBS | HEIGHT: 74 IN

## 2020-05-14 DIAGNOSIS — Z79.01 ANTICOAGULANT LONG-TERM USE: ICD-10-CM

## 2020-05-14 DIAGNOSIS — R60.9 PERIPHERAL EDEMA: ICD-10-CM

## 2020-05-14 DIAGNOSIS — B35.1 ONYCHOMYCOSIS: Primary | ICD-10-CM

## 2020-05-14 DIAGNOSIS — B35.3 TINEA PEDIS OF BOTH FEET: ICD-10-CM

## 2020-05-14 DIAGNOSIS — R73.03 BORDERLINE DIABETES: ICD-10-CM

## 2020-05-14 DIAGNOSIS — I48.0 PAROXYSMAL ATRIAL FIBRILLATION (HCC): ICD-10-CM

## 2020-05-14 DIAGNOSIS — G62.9 NEUROPATHY: ICD-10-CM

## 2020-05-14 LAB
INR PPP: 2.3 (ref 0.91–1.09)
PROTHROMBIN TIME: 27.5 SECONDS (ref 10–13.8)

## 2020-05-14 PROCEDURE — 11721 DEBRIDE NAIL 6 OR MORE: CPT | Performed by: PODIATRIST

## 2020-05-14 PROCEDURE — 85610 PROTHROMBIN TIME: CPT | Performed by: INTERNAL MEDICINE

## 2020-05-14 RX ORDER — PRENATAL VIT 91/IRON/FOLIC/DHA 28-975-200
COMBINATION PACKAGE (EA) ORAL 2 TIMES DAILY
Qty: 36 G | Refills: 2 | Status: SHIPPED | OUTPATIENT
Start: 2020-05-14 | End: 2021-01-07

## 2020-05-14 NOTE — PROGRESS NOTES
[facility] - PODIATRY    Today's Date: 05/14/20    Patient Name: Jonh Peacock  MRN: [id number]  CSN: [id number]  PCP: Ric Antony DO  Referring Provider: No ref. provider found     SUBJECTIVE     Chief Complaint   Patient presents with   • Follow-up     Pt is here today for a 3 month f/u on foot/nail care - Pt is diabetic - Pt presents with thickened discoloration of the toenails - pt denies any pain at the moment - PCP 02/10/2020       HPI: Jonh Peacock, a 74 y.o.male, comes to clinic as a(n) established patient complaining of thick fungal toenails. Pt with h/o Anxiety, Arrhythmia, Cardiomyopathy, CHF, Depression, previous DVT, HTN, Obesity, Anticoagulation with Warfarin. Relates borderline diabetes but is trying to control with diet. Relates that his nails are thick and he is unable to cut them himelf and routinely his wife will cut them but does not feel comfortable cutting them due to being on warfarin. Also relates that he has a limb length discrepancy which he has a lift in his left foot and custom inserts for both feet. Denies pain today. Relates that he typically gets around in his motorized scooter but using a rolling walker today. States that he has some mild numbness in his feet. Notes improvement with regular visits. Denies any constitutional symptoms. No other pedal complaints at this time.    Past Medical History:   Diagnosis Date   • Anemia 1/30/2017   • Anxiety 1/30/2017   • Arrhythmia 1/30/2017   • Arthritis    • Atrial fibrillation (CMS/HCC)    • Cardiomyopathy (CMS/HCC) 1/30/2017   • CHF (congestive heart failure) (CMS/HCC) 1/30/2017   • Chronic pain 1/30/2017   • Colon polyp    • Connective tissue disease (CMS/HCC)    • Depression 1/30/2017   • Diverticulitis 2010   • DVT (deep venous thrombosis) (CMS/HCC)    • Gastritis 1/30/2017   • Hiatal hernia    • History of transfusion    • Hyperlipidemia 1/30/2017   • Hypertension 1/30/2017   • Low back pain 1/30/2017    • Neuropathy 1/30/2017   • Obesity 1/30/2017   • Pneumonia    • Status post placement of cardiac pacemaker 1/30/2017     Past Surgical History:   Procedure Laterality Date   • APPENDECTOMY     • BACK SURGERY  2002    BACK FUSION   • BACK SURGERY  2007   • CARDIAC CATHETERIZATION     • CARDIAC DEFIBRILLATOR PLACEMENT     • CARDIAC ELECTROPHYSIOLOGY PROCEDURE N/A 10/31/2019    Procedure: ICD GEN CHANGE;  Surgeon: Chaz Chavez MD;  Location: [facility] CATH INVASIVE LOCATION;  Service: Cardiology   • COLON RESECTION WITH COLOSTOMY     • COLONOSCOPY  09/04/2014    diverticulosis   • COLONOSCOPY N/A 10/29/2019    Procedure: COLONOSCOPY WITH ANESTHESIA;  Surgeon: Tenzin Abrasm MD;  Location: [facility] ENDOSCOPY;  Service: Gastroenterology   • ENDOSCOPY  02/24/1999    small sliding hiatal hernia   • FOOT SURGERY Bilateral 2005   • JOINT REPLACEMENT Right 2002    KNEE   • PACEMAKER IMPLANTATION  2012   • RECTAL FISSURE INCISION AND DRAINAGE     • REPLACEMENT TOTAL KNEE     • SPINAL CORD STIMULATOR IMPLANT     • TOTAL HIP ARTHROPLASTY Right 2004   • TOTAL HIP ARTHROPLASTY Left 12/2008     Family History   Problem Relation Age of Onset   • Heart disease Mother    • Heart disease Brother    • Breast cancer Brother    • Colon cancer Neg Hx    • Colon polyps Neg Hx      Social History     Socioeconomic History   • Marital status:      Spouse name: Not on file   • Number of children: Not on file   • Years of education: Not on file   • Highest education level: Not on file   Tobacco Use   • Smoking status: Never Smoker   • Smokeless tobacco: Never Used   Substance and Sexual Activity   • Alcohol use: Yes     Comment: rare   • Drug use: No   • Sexual activity: Defer     Allergies   Allergen Reactions   • Amiodarone Other (See Comments)     INTERFERES WITH OTHER MEDICATIONS   • Oxycontin [Oxycodone Hcl] Other (See Comments)     Can not urinate.   • Penicillins Hives     Allergic to All cillins. Amoxacillin.   • Vioxx  [Rofecoxib] GI Intolerance     Current Outpatient Medications   Medication Sig Dispense Refill   • Buprenorphine (BUTRANS) 10 MCG/HR patch weekly Place 10 mcg on the skin Every 7 (Seven) Days.     • CHONDROITIN SULFATE PO Take  by mouth.     • diazePAM (VALIUM) 2 MG tablet Take 2 mg by mouth Daily.     • dilTIAZem (Cardizem) 30 MG tablet Take 1 tablet by mouth 3 (Three) Times a Day. 270 tablet 3   • furosemide (LASIX) 20 MG tablet Take 1 tablet by mouth Daily. 90 tablet 3   • furosemide (LASIX) 40 MG tablet Take 1 tablet by mouth Daily. TAKE WITH 20 MG TABLET FOR 60 PER MG PER DAY 90 tablet 3   • gabapentin (NEURONTIN) 400 MG capsule Take 400 mg by mouth 3 (Three) Times a Day.     • Glucosamine 750 MG tablet Take 750 mg by mouth 2 (Two) Times a Day.     • HYDROcodone-acetaminophen (NORCO) 7.5-325 MG per tablet Take 1 tablet by mouth Every 6 (Six) Hours As Needed for Moderate Pain .     • losartan (COZAAR) 25 MG tablet Take 1 tablet by mouth Daily. 90 tablet 2   • metoprolol succinate XL (TOPROL-XL) 100 MG 24 hr tablet Take 1 tablet by mouth Daily. 90 tablet 3   • Multiple Vitamins-Minerals (MULTIVITAMIN ADULT PO) Take  by mouth Daily.     • pantoprazole (PROTONIX) 40 MG EC tablet Take 1 tablet by mouth Daily. 90 tablet 3   • polyethylene glycol (MIRALAX) powder Take 17 g by mouth Every Night. 1581 g 3   • simvastatin (ZOCOR) 20 MG tablet Take 1 tablet by mouth Every Night. 90 tablet 3   • tamsulosin (FLOMAX) 0.4 MG capsule 24 hr capsule Take 1 capsule by mouth Every Night. 90 capsule 3   • tiZANidine (ZANAFLEX) 4 MG tablet Take 4 mg by mouth 3 (Three) Times a Day As Needed for muscle spasms.     • warfarin (COUMADIN) 2 MG tablet Take 1 tablet by mouth Daily. 90 tablet 1   • warfarin (COUMADIN) 4 MG tablet Take 1 tablet by mouth Daily. 90 tablet 1   • warfarin (COUMADIN) 5 MG tablet Take 1 tablet by mouth Daily. 90 tablet 1   • terbinafine (lamISIL) 1 % cream Apply  topically to the appropriate area as directed 2  (Two) Times a Day. 36 g 2     No current facility-administered medications for this visit.      Review of Systems   Constitutional: Negative for chills and fever.   HENT: Negative for congestion.    Respiratory: Negative for shortness of breath.    Cardiovascular: Positive for leg swelling. Negative for chest pain.   Gastrointestinal: Negative for constipation, diarrhea, nausea and vomiting.   Musculoskeletal: Positive for gait problem.        Foot pain   Skin: Negative for wound.        Thick Toenails   Neurological: Positive for numbness.       OBJECTIVE     Vitals:    05/14/20 1456   BP: 124/80   Pulse: 78   SpO2: 99%       PHYSICAL EXAM  GEN:   A&Ox3, NAD. Pt presents to clinic ambulating with walker and wearing Casual Shoes with custom inserts, left shoe has lift.      NEURO:   Protective sensation intact to 9/10 sites Right foot, 9/10 site Left foot using [place] monofilament  Light touch sensation diminished  No Tinel's or [place] sign.    VASC:  Skin temperature Warm to Warm proximal to distal carlotta  DP pulses 1/4 Right, 1/4 Left  PT pulses 1/4 Right, 1/4 Left  CFT 4 sec carlotta  Pedal hair growth absent  1+ pitting edema noted carlotta  Varicosities absent carlotta    MUSC/SKEL:  Muscle Strength Right foot Dorsiflexors 5/5, Plantarflexors 5/5, Evertors 5/5, Invertors 5/5  Muscle Strength Left foot Dorsiflexors 5/5, Plantarflexors 5/5, Evertors 5/5, Invertors 5/5  ROM of the 1st MTP is full without pain or crepitus  ROM of the MTJ is full without pain or crepitus    ROM of the STJ is full without pain or crepitus    ROM of the ankle joint is full without pain or crepitus    POP of nail plates  Planus foot type with decreased arch height and abduction of forefoot  Right LE longer than Left LE     DERM:  Pedal nails x10 are thickened, elongated and discolored with presence of subunugual debris  Webspace 4 on right is mildly macerated but intact. Remaining webspaces are c/d/i.  Skin is normal in turgor and texture  with no open wounds or sores appreciated.  Stable cicatrix noted to dorsal 1st MTPJ carlotta and dorsal digits      RADIOLOGY/NUCLEAR:  No results found.    LABORATORY/CULTURE RESULTS:      PATHOLOGY RESULTS:       ASSESSMENT/PLAN     Jonh was seen today for follow-up.    Diagnoses and all orders for this visit:    Onychomycosis    Neuropathy    Anticoagulant long-term use    Peripheral edema    Tinea pedis of both feet    Borderline diabetes    Other orders  -     terbinafine (lamISIL) 1 % cream; Apply  topically to the appropriate area as directed 2 (Two) Times a Day.      Comprehensive lower extremity examination and evaluation was performed.  Discussed findings and treatment plan including risks, benefits, and treatment options with patient in detail. Patient agreed with treatment plan.  After verbal consent obtained, nail(s) x10 debrided of length and thickness with nail nipper without incidence  Patient may maintain nails and calluses at home utilizing emery board or pumice stone between visits as needed  Reviewed at home diabetic foot care including daily foot checks   Continue with custom shoes with and inserts  Defers compression stockings at this time. Elevate legs when possible.   Apply antifungal BID as prescribed. Dry well between toes.  An After Visit Summary was printed and given to the patient at discharge, including (if requested) any available informative/educational handouts regarding diagnosis, treatment, or medications. All questions were answered to patient/family satisfaction. Should symptoms fail to improve or worsen they agree to call or return to clinic or to go to the Emergency Department. Discussed the importance of following up with any needed screening tests/labs/specialist appointments and any requested follow-up recommended by me today. Importance of maintaining follow-up discussed and patient accepts that missed appointments can delay diagnosis and potentially lead to worsening of  conditions.  Return in about 3 months (around 8/14/2020)., or sooner if acute issues arise.        This document has been electronically signed by Deion Avelar DPM on May 14, 2020 15:12     Please note that portions of this note may have been completed with a voice recognition program. Efforts were made to edit the dictations, but occasionally words are mistranscribed.

## 2020-06-12 ENCOUNTER — CLINICAL SUPPORT (OUTPATIENT)
Dept: CARDIOLOGY | Facility: CLINIC | Age: 75
End: 2020-06-12

## 2020-06-12 DIAGNOSIS — I50.22 CHRONIC SYSTOLIC CHF (CONGESTIVE HEART FAILURE), NYHA CLASS 3 (HCC): ICD-10-CM

## 2020-06-12 PROCEDURE — 93296 REM INTERROG EVL PM/IDS: CPT | Performed by: PHYSICIAN ASSISTANT

## 2020-06-12 PROCEDURE — 93295 DEV INTERROG REMOTE 1/2/MLT: CPT | Performed by: PHYSICIAN ASSISTANT

## 2020-06-13 NOTE — PROGRESS NOTES
Dual Chamber AICD Evaluation Report  [facility]    June 12, 2020    Primary Cardiologist: Jocelyn  : Medtronic Model: Evera MRI  Implant date: 10/31/19    Reason for evaluation: routine  Indication for AICD: cardiomyopathy    Measurements  Atrial sensing - P wave: 2.1 mV  Atrial threshold: 0.5 V @ 0.4 ms  Atrial lead impedance: 399 ohms  Ventricular sensing - R wave: 4.5 mV  Ventricular threshold: 0.625 V @ 0.4 ms  Ventricular lead impedance:  361 ohms  Shock impedance:  RV- 39 ohms   SVC- 46  ohms      Diagnostic Data  Atrial paced: 53.7 %  Ventricular paced: 0.2 %  Other: SVT noted.  Longest duration is 1:20 min.  Battery status: satisfactory        Final Parameters  Mode: AAIR+  Lower rate: 60 bpm Upper rate: 130 bpm  AV Delay: Paced- 180 ms    Sensed- 150 ms     Atrial - Amplitude: 1.5 V Pulse width: 0.4 ms Sensitivity: 0.6 mV   Ventricular - Amplitude: 2.0 V Pulse width: 0.4 ms Sensitivity: 0.3 mV   Changes made: n/a  Conclusions: normal AICD function    Follow up: 3 months

## 2020-06-19 ENCOUNTER — LAB (OUTPATIENT)
Dept: LAB | Facility: HOSPITAL | Age: 75
End: 2020-06-19

## 2020-06-19 DIAGNOSIS — I48.0 PAROXYSMAL ATRIAL FIBRILLATION (HCC): ICD-10-CM

## 2020-06-19 DIAGNOSIS — Z79.01 ANTICOAGULANT LONG-TERM USE: ICD-10-CM

## 2020-06-19 LAB
INR PPP: 1.8 (ref 0.91–1.09)
PROTHROMBIN TIME: 21.8 SECONDS (ref 10–13.8)

## 2020-06-19 PROCEDURE — 85610 PROTHROMBIN TIME: CPT | Performed by: INTERNAL MEDICINE

## 2020-06-27 DIAGNOSIS — I48.0 PAROXYSMAL ATRIAL FIBRILLATION (HCC): ICD-10-CM

## 2020-06-29 RX ORDER — WARFARIN SODIUM 2 MG/1
2 TABLET ORAL
Qty: 90 TABLET | Refills: 1 | Status: SHIPPED | OUTPATIENT
Start: 2020-06-29 | End: 2021-01-04 | Stop reason: SDUPTHER

## 2020-06-29 RX ORDER — WARFARIN SODIUM 5 MG/1
5 TABLET ORAL
Qty: 90 TABLET | Refills: 1 | Status: SHIPPED | OUTPATIENT
Start: 2020-06-29 | End: 2021-01-04 | Stop reason: SDUPTHER

## 2020-06-29 RX ORDER — WARFARIN SODIUM 4 MG/1
4 TABLET ORAL
Qty: 90 TABLET | Refills: 1 | Status: SHIPPED | OUTPATIENT
Start: 2020-06-29 | End: 2021-01-04 | Stop reason: SDUPTHER

## 2020-06-29 RX ORDER — METOPROLOL SUCCINATE 100 MG/1
100 TABLET, EXTENDED RELEASE ORAL DAILY
Qty: 90 TABLET | Refills: 3 | Status: SHIPPED | OUTPATIENT
Start: 2020-06-29 | End: 2021-06-09 | Stop reason: SDUPTHER

## 2020-07-15 ENCOUNTER — LAB (OUTPATIENT)
Dept: LAB | Facility: HOSPITAL | Age: 75
End: 2020-07-15

## 2020-07-15 DIAGNOSIS — Z79.01 ANTICOAGULANT LONG-TERM USE: ICD-10-CM

## 2020-07-15 DIAGNOSIS — I48.0 PAROXYSMAL ATRIAL FIBRILLATION (HCC): ICD-10-CM

## 2020-07-15 LAB
INR PPP: 2.2 (ref 0.91–1.09)
PROTHROMBIN TIME: 26 SECONDS (ref 10–13.8)

## 2020-07-15 PROCEDURE — 85610 PROTHROMBIN TIME: CPT | Performed by: INTERNAL MEDICINE

## 2020-07-31 ENCOUNTER — OFFICE VISIT (OUTPATIENT)
Dept: INTERNAL MEDICINE | Facility: CLINIC | Age: 75
End: 2020-07-31

## 2020-07-31 ENCOUNTER — TRANSCRIBE ORDERS (OUTPATIENT)
Dept: ADMINISTRATIVE | Facility: HOSPITAL | Age: 75
End: 2020-07-31

## 2020-07-31 VITALS
HEART RATE: 63 BPM | OXYGEN SATURATION: 96 % | SYSTOLIC BLOOD PRESSURE: 122 MMHG | BODY MASS INDEX: 33.88 KG/M2 | WEIGHT: 264 LBS | TEMPERATURE: 97.9 F | DIASTOLIC BLOOD PRESSURE: 78 MMHG | HEIGHT: 74 IN | RESPIRATION RATE: 16 BRPM

## 2020-07-31 DIAGNOSIS — M54.41 CHRONIC MIDLINE LOW BACK PAIN WITH BILATERAL SCIATICA: ICD-10-CM

## 2020-07-31 DIAGNOSIS — M25.561 RIGHT KNEE PAIN, UNSPECIFIED CHRONICITY: Primary | ICD-10-CM

## 2020-07-31 DIAGNOSIS — M54.42 CHRONIC MIDLINE LOW BACK PAIN WITH BILATERAL SCIATICA: ICD-10-CM

## 2020-07-31 DIAGNOSIS — E11.42 TYPE 2 DIABETES MELLITUS WITH DIABETIC POLYNEUROPATHY, WITHOUT LONG-TERM CURRENT USE OF INSULIN (HCC): Primary | ICD-10-CM

## 2020-07-31 DIAGNOSIS — E11.9 TYPE 2 DIABETES MELLITUS WITHOUT COMPLICATION, WITHOUT LONG-TERM CURRENT USE OF INSULIN (HCC): ICD-10-CM

## 2020-07-31 DIAGNOSIS — I48.0 PAROXYSMAL ATRIAL FIBRILLATION (HCC): ICD-10-CM

## 2020-07-31 DIAGNOSIS — I50.22 CHRONIC SYSTOLIC CHF (CONGESTIVE HEART FAILURE), NYHA CLASS 3 (HCC): ICD-10-CM

## 2020-07-31 DIAGNOSIS — G89.29 CHRONIC MIDLINE LOW BACK PAIN WITH BILATERAL SCIATICA: ICD-10-CM

## 2020-07-31 PROCEDURE — 99214 OFFICE O/P EST MOD 30 MIN: CPT | Performed by: INTERNAL MEDICINE

## 2020-07-31 NOTE — PROGRESS NOTES
CC: f/u CHF    History:  Jonh Peacock is a 74 y.o. male   He notes he has been doing well without any acute illness.  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  As such, he has noticed much improvement in his lower extremity swelling and he has been having no symptoms of decompensation such as increased shortness of breath.      He notes he had previously used diclofenac gel, which had been helpful for the arthritis in his hands and he wonders if this could be used again.      He has had no symptoms of tachycardia nor uncontrolled rate with regard to his atrial fibrillation.  He is rate controlled on diltiazem and is anticoagulated on warfarin with therapeutic INR on last check 2 weeks ago.       ROS:  Review of Systems   Constitutional: Negative for chills and fever.   Respiratory: Negative for cough and shortness of breath.    Cardiovascular: Negative for chest pain, palpitations and leg swelling.   Gastrointestinal: Negative for abdominal pain and constipation.        reports that he has never smoked. He has never used smokeless tobacco. He reports that he drinks alcohol. He reports that he does not use drugs.      Current Outpatient Medications:   •  Buprenorphine (BUTRANS) 10 MCG/HR patch weekly, Place 10 mcg on the skin Every 7 (Seven) Days., Disp: , Rfl:   •  CHONDROITIN SULFATE PO, Take  by mouth., Disp: , Rfl:   •  diazePAM (VALIUM) 2 MG tablet, Take 2 mg by mouth Daily., Disp: , Rfl:   •  dilTIAZem (Cardizem) 30 MG tablet, Take 1 tablet by mouth 3 (Three) Times a Day., Disp: 270 tablet, Rfl: 3  •  furosemide (LASIX) 20 MG tablet, Take 1 tablet by mouth Daily., Disp: 90 tablet, Rfl: 3  •  furosemide (LASIX) 40 MG tablet, Take 1 tablet by mouth Daily. TAKE WITH 20 MG TABLET FOR 60 PER MG PER DAY, Disp: 90 tablet, Rfl: 3  •  gabapentin (NEURONTIN) 400 MG capsule, Take 400 mg by mouth 3 (Three) Times a Day., Disp: , Rfl:   •  Glucosamine 750 MG tablet,  "Take 750 mg by mouth 2 (Two) Times a Day., Disp: , Rfl:   •  HYDROcodone-acetaminophen (NORCO) 7.5-325 MG per tablet, Take 1 tablet by mouth Every 6 (Six) Hours As Needed for Moderate Pain ., Disp: , Rfl:   •  losartan (COZAAR) 25 MG tablet, Take 1 tablet by mouth Daily., Disp: 90 tablet, Rfl: 2  •  metoprolol succinate XL (TOPROL-XL) 100 MG 24 hr tablet, Take 1 tablet by mouth Daily., Disp: 90 tablet, Rfl: 3  •  Multiple Vitamins-Minerals (MULTIVITAMIN ADULT PO), Take  by mouth Daily., Disp: , Rfl:   •  pantoprazole (PROTONIX) 40 MG EC tablet, Take 1 tablet by mouth Daily., Disp: 90 tablet, Rfl: 3  •  polyethylene glycol (MIRALAX) powder, Take 17 g by mouth Every Night., Disp: 1581 g, Rfl: 3  •  simvastatin (ZOCOR) 20 MG tablet, Take 1 tablet by mouth Every Night., Disp: 90 tablet, Rfl: 3  •  tamsulosin (FLOMAX) 0.4 MG capsule 24 hr capsule, Take 1 capsule by mouth Every Night., Disp: 90 capsule, Rfl: 3  •  terbinafine (lamISIL) 1 % cream, Apply  topically to the appropriate area as directed 2 (Two) Times a Day., Disp: 36 g, Rfl: 2  •  tiZANidine (ZANAFLEX) 4 MG tablet, Take 4 mg by mouth 3 (Three) Times a Day As Needed for muscle spasms., Disp: , Rfl:   •  warfarin (COUMADIN) 2 MG tablet, Take 1 tablet by mouth Daily., Disp: 90 tablet, Rfl: 1  •  warfarin (COUMADIN) 4 MG tablet, Take 1 tablet by mouth Daily., Disp: 90 tablet, Rfl: 1  •  warfarin (COUMADIN) 5 MG tablet, Take 1 tablet by mouth Daily., Disp: 90 tablet, Rfl: 1    OBJECTIVE:  /78 (BP Location: Left arm, Patient Position: Sitting, Cuff Size: Adult)   Pulse 63   Temp 97.9 °F (36.6 °C)   Resp 16   Ht 188 cm (74\")   Wt 120 kg (264 lb)   SpO2 96%   BMI 33.90 kg/m²    Physical Exam   Constitutional: He is oriented to person, place, and time. He appears well-nourished. No distress.   Cardiovascular: Normal rate, regular rhythm and normal heart sounds.   No murmur heard.  Pulmonary/Chest: Effort normal and breath sounds normal. He has no wheezes. "   Musculoskeletal: He exhibits edema (trace in the BLE).   Neurological: He is alert and oriented to person, place, and time.   Psychiatric: He has a normal mood and affect.       Assessment/Plan     Diagnoses and all orders for this visit:    Type 2 diabetes mellitus with diabetic polyneuropathy, without long-term current use of insulin (CMS/HCC)  -     CBC & Differential; Standing  -     Comprehensive Metabolic Panel; Standing  -     Hemoglobin A1c; Standing  -     Miscellaneous DME  A1c well controlled on last check in April (7.3). Goal is <8. Continue meds and recheck with next labs.     Chronic midline low back pain with bilateral sciatica  -     Miscellaneous DME  Rx for taller Rollator given his stooped posture with resultant exacerbation of back pain with his standard rollator that he purchased out of pocket.     Chronic systolic CHF (congestive heart failure), NYHA class 3 (CMS/HCC)  -     Miscellaneous DME  Stable on Toprol XL, RAAS blockade and loop diuretic without symptoms of decompnesation.     Paroxysmal atrial fibrillation (CMS/[facility])  Rate controlled and anticoagulated without symptoms of worsening.       An After Visit Summary was printed and given to the patient at discharge.  Return in about 4 months (around 11/30/2020) for Recheck.          Ric Antony D.O. 7/31/2020   Electronically signed.

## 2020-08-07 ENCOUNTER — HOSPITAL ENCOUNTER (OUTPATIENT)
Dept: CT IMAGING | Facility: HOSPITAL | Age: 75
Discharge: HOME | End: 2020-08-07
Admitting: ORTHOPAEDIC SURGERY

## 2020-08-07 DIAGNOSIS — M25.561 RIGHT KNEE PAIN, UNSPECIFIED CHRONICITY: ICD-10-CM

## 2020-08-07 PROCEDURE — 73700 CT LOWER EXTREMITY W/O DYE: CPT

## 2020-08-11 NOTE — PROGRESS NOTES
[facility] - PODIATRY    Today's Date: 08/18/20    Patient Name: Jonh Peacock  MRN: [id number]  CSN: [id number]  PCP: Ric Antony DO  Referring Provider: No ref. provider found     SUBJECTIVE     Chief Complaint   Patient presents with   • Follow-up     3 month f/u for foot and nail care-pt c/o painful  long, thickened toenails - denies pain at present time   Antonella DWYER last visit 7/31/20- non-diabetic        HPI: Jonh Peacock, a 74 y.o.male, comes to clinic as a(n) established patient complaining of thick fungal toenails. Pt with h/o Anxiety, Arrhythmia, Cardiomyopathy, CHF, Depression, previous DVT, HTN, Obesity, Anticoagulation with Warfarin. Relates borderline diabetes and controlling with diet. Relates that his nails are thick and he is unable to cut them himelf and routinely his wife will cut them but does not feel comfortable cutting them due to being on warfarin. Also relates that he has a limb length discrepancy which he has a lift in his left foot and custom inserts for both feet. Denies pain today. Relates that he typically gets around in his motorized scooter but using a rolling walker today. States that he has some mild numbness in his feet. Notes improvement with regular visits. Denies any constitutional symptoms. No other pedal complaints at this time.    Past Medical History:   Diagnosis Date   • Anemia 1/30/2017   • Anxiety 1/30/2017   • Arrhythmia 1/30/2017   • Arthritis    • Atrial fibrillation (CMS/HCC)    • Cardiomyopathy (CMS/HCC) 1/30/2017   • CHF (congestive heart failure) (CMS/HCC) 1/30/2017   • Chronic pain 1/30/2017   • Colon polyp    • Connective tissue disease (CMS/HCC)    • Depression 1/30/2017   • Diverticulitis 2010   • DVT (deep venous thrombosis) (CMS/HCC)    • Gastritis 1/30/2017   • Hiatal hernia    • History of transfusion    • Hyperlipidemia 1/30/2017   • Hypertension 1/30/2017   • Low back pain 1/30/2017   • Neuropathy 1/30/2017   •  Obesity 1/30/2017   • Pneumonia    • Status post placement of cardiac pacemaker 1/30/2017     Past Surgical History:   Procedure Laterality Date   • APPENDECTOMY     • BACK SURGERY  2002    BACK FUSION   • BACK SURGERY  2007   • CARDIAC CATHETERIZATION     • CARDIAC DEFIBRILLATOR PLACEMENT     • CARDIAC ELECTROPHYSIOLOGY PROCEDURE N/A 10/31/2019    Procedure: ICD GEN CHANGE;  Surgeon: Chaz Chavez MD;  Location: [facility] CATH INVASIVE LOCATION;  Service: Cardiology   • COLON RESECTION WITH COLOSTOMY     • COLONOSCOPY  09/04/2014    diverticulosis   • COLONOSCOPY N/A 10/29/2019    Procedure: COLONOSCOPY WITH ANESTHESIA;  Surgeon: Tenzin Abrams MD;  Location: [facility] ENDOSCOPY;  Service: Gastroenterology   • ENDOSCOPY  02/24/1999    small sliding hiatal hernia   • FOOT SURGERY Bilateral 2005   • JOINT REPLACEMENT Right 2002    KNEE   • PACEMAKER IMPLANTATION  2012   • RECTAL FISSURE INCISION AND DRAINAGE     • REPLACEMENT TOTAL KNEE     • SPINAL CORD STIMULATOR IMPLANT     • TOTAL HIP ARTHROPLASTY Right 2004   • TOTAL HIP ARTHROPLASTY Left 12/2008     Family History   Problem Relation Age of Onset   • Heart disease Mother    • Heart disease Brother    • Breast cancer Brother    • Colon cancer Neg Hx    • Colon polyps Neg Hx      Social History     Socioeconomic History   • Marital status:      Spouse name: Not on file   • Number of children: Not on file   • Years of education: Not on file   • Highest education level: Not on file   Tobacco Use   • Smoking status: Never Smoker   • Smokeless tobacco: Never Used   Substance and Sexual Activity   • Alcohol use: Yes     Comment: rare   • Drug use: No   • Sexual activity: Defer     Allergies   Allergen Reactions   • Amiodarone Other (See Comments)     INTERFERES WITH OTHER MEDICATIONS   • Oxycontin [Oxycodone Hcl] Other (See Comments)     Can not urinate.   • Penicillins Hives     Allergic to All cillins. Amoxacillin.   • Vioxx [Rofecoxib] GI Intolerance      Current Outpatient Medications   Medication Sig Dispense Refill   • Buprenorphine (BUTRANS) 10 MCG/HR patch weekly Place 10 mcg on the skin Every 7 (Seven) Days.     • CHONDROITIN SULFATE PO Take  by mouth.     • diazePAM (VALIUM) 2 MG tablet Take 2 mg by mouth Daily.     • diclofenac (VOLTAREN) 1 % gel gel Apply 4 g topically to the appropriate area as directed 4 (Four) Times a Day As Needed.     • dilTIAZem (Cardizem) 30 MG tablet Take 1 tablet by mouth 3 (Three) Times a Day. 270 tablet 3   • furosemide (LASIX) 20 MG tablet Take 1 tablet by mouth Daily. 90 tablet 3   • furosemide (LASIX) 40 MG tablet Take 1 tablet by mouth Daily. TAKE WITH 20 MG TABLET FOR 60 PER MG PER DAY 90 tablet 3   • gabapentin (NEURONTIN) 400 MG capsule Take 400 mg by mouth 3 (Three) Times a Day.     • Glucosamine 750 MG tablet Take 750 mg by mouth 2 (Two) Times a Day.     • HYDROcodone-acetaminophen (NORCO) 7.5-325 MG per tablet Take 1 tablet by mouth Every 6 (Six) Hours As Needed for Moderate Pain .     • losartan (COZAAR) 25 MG tablet Take 1 tablet by mouth Daily. 90 tablet 2   • metoprolol succinate XL (TOPROL-XL) 100 MG 24 hr tablet Take 1 tablet by mouth Daily. 90 tablet 3   • Multiple Vitamins-Minerals (MULTIVITAMIN ADULT PO) Take  by mouth Daily.     • pantoprazole (PROTONIX) 40 MG EC tablet Take 1 tablet by mouth Daily. 90 tablet 3   • polyethylene glycol (MIRALAX) powder Take 17 g by mouth Every Night. 1581 g 3   • simvastatin (ZOCOR) 20 MG tablet Take 1 tablet by mouth Every Night. 90 tablet 3   • tamsulosin (FLOMAX) 0.4 MG capsule 24 hr capsule Take 1 capsule by mouth Every Night. 90 capsule 3   • terbinafine (lamISIL) 1 % cream Apply  topically to the appropriate area as directed 2 (Two) Times a Day. 36 g 2   • warfarin (COUMADIN) 2 MG tablet Take 1 tablet by mouth Daily. 90 tablet 1   • warfarin (COUMADIN) 4 MG tablet Take 1 tablet by mouth Daily. 90 tablet 1   • warfarin (COUMADIN) 5 MG tablet Take 1 tablet by mouth  Daily. 90 tablet 1   • tiZANidine (ZANAFLEX) 4 MG tablet Take 4 mg by mouth 3 (Three) Times a Day As Needed for muscle spasms.       No current facility-administered medications for this visit.      Review of Systems   Constitutional: Negative for chills and fever.   HENT: Negative for congestion.    Respiratory: Negative for shortness of breath.    Cardiovascular: Positive for leg swelling. Negative for chest pain.   Gastrointestinal: Negative for constipation, diarrhea, nausea and vomiting.   Musculoskeletal: Positive for gait problem.        Foot pain   Skin: Negative for wound.        Thick Toenails   Neurological: Positive for numbness.       OBJECTIVE     Vitals:    08/18/20 1506   BP: 102/60   Pulse: 62   SpO2: 93%       PHYSICAL EXAM  GEN:   A&Ox3, NAD. Pt presents to clinic ambulating with walker and wearing Casual Shoes with custom inserts, left shoe has lift.      NEURO:   Protective sensation intact to 9/10 sites Right foot, 9/10 site Left foot using [place] monofilament  Light touch sensation diminished  No Tinel's or [place] sign.    VASC:  Skin temperature Warm to Warm proximal to distal carlotta  DP pulses 1/4 Right, 1/4 Left  PT pulses 1/4 Right, 1/4 Left  CFT 4 sec carlotta  Pedal hair growth absent  1+ pitting edema noted carlotta  Varicosities absent carlotta    MUSC/SKEL:  Muscle Strength Right foot Dorsiflexors 5/5, Plantarflexors 5/5, Evertors 5/5, Invertors 5/5  Muscle Strength Left foot Dorsiflexors 5/5, Plantarflexors 5/5, Evertors 5/5, Invertors 5/5  ROM of the 1st MTP is full without pain or crepitus  ROM of the MTJ is full without pain or crepitus    ROM of the STJ is full without pain or crepitus    ROM of the ankle joint is full without pain or crepitus    POP of nail plates  Planus foot type with decreased arch height and abduction of forefoot  Right LE longer than Left LE     DERM:  Pedal nails x10 are thickened, elongated and discolored with presence of subunugual debris  Webspaces are  c/d/i.  Skin is normal in turgor and texture with no open wounds or sores appreciated.  Stable cicatrix noted to dorsal 1st MTPJ carlotta and dorsal digits  Thickened HPK notes to distal left hallux.      RADIOLOGY/NUCLEAR:  Ct Lower Extremity Right Without Contrast    Result Date: 8/7/2020  Narrative: EXAMINATION: CT LOWER EXTREMITY RIGHT WO CONTRAST-  8/7/2020 3:28 PM CDT  HISTORY: RIGHT KNEE PAIN; M25.561-Pain in right knee  Noncontrast CT imaging of the right knee. Artifact reduction sequences used in an attempt to better evaluate the bones adjacent to the knee prosthesis.  The femoral and tibial components appear to have a normal aligned and well seated appearance.  There is a small to moderate joint effusion and a few tiny slightly hyperdense structures within this joint fluid suspicious for debris related to the prosthetic joint.  There is no occult fracture.  No discrete soft tissue abnormality.  Summary: 1. Small debris fragments noted within the joint effusion. 2. Prosthetic components show normal alignment and appear to be well seated. This report was finalized on 08/07/2020 16:00 by Dr. Roderick Bland MD.      LABORATORY/CULTURE RESULTS:      PATHOLOGY RESULTS:       ASSESSMENT/PLAN     Jonh was seen today for follow-up.    Diagnoses and all orders for this visit:    Onychomycosis    Neuropathy    Anticoagulant long-term use    Peripheral edema    Borderline diabetes    Foot callus      Comprehensive lower extremity examination and evaluation was performed.  Discussed findings and treatment plan including risks, benefits, and treatment options with patient in detail. Patient agreed with treatment plan.  After verbal consent obtained, nail(s) x10 debrided of length and thickness with nail nipper without incidence  After verbal consent obtained, calluses x1 pared utilizing dermal curette and/or scalpel without incidence  Patient may maintain nails and calluses at home utilizing emery board or pumice stone  between visits as needed  Reviewed at home diabetic foot care including daily foot checks   Continue with custom shoes with and inserts  Defers compression stockings at this time. Elevate legs when possible.   Continue antifungal BID PRN  An After Visit Summary was printed and given to the patient at discharge, including (if requested) any available informative/educational handouts regarding diagnosis, treatment, or medications. All questions were answered to patient/family satisfaction. Should symptoms fail to improve or worsen they agree to call or return to clinic or to go to the Emergency Department. Discussed the importance of following up with any needed screening tests/labs/specialist appointments and any requested follow-up recommended by me today. Importance of maintaining follow-up discussed and patient accepts that missed appointments can delay diagnosis and potentially lead to worsening of conditions.  Return in about 3 months (around 11/18/2020)., or sooner if acute issues arise.        This document has been electronically signed by Deion Avelar DPM on August 18, 2020 16:29     Please note that portions of this note may have been completed with a voice recognition program. Efforts were made to edit the dictations, but occasionally words are mistranscribed.

## 2020-08-14 ENCOUNTER — LAB (OUTPATIENT)
Dept: LAB | Facility: HOSPITAL | Age: 75
End: 2020-08-14

## 2020-08-14 DIAGNOSIS — E11.42 TYPE 2 DIABETES MELLITUS WITH DIABETIC POLYNEUROPATHY, WITHOUT LONG-TERM CURRENT USE OF INSULIN (HCC): ICD-10-CM

## 2020-08-14 DIAGNOSIS — I48.0 PAROXYSMAL ATRIAL FIBRILLATION (HCC): ICD-10-CM

## 2020-08-14 DIAGNOSIS — Z79.01 ANTICOAGULANT LONG-TERM USE: ICD-10-CM

## 2020-08-14 LAB
ALBUMIN SERPL-MCNC: 4.1 G/DL (ref 3.5–5.2)
ALBUMIN/GLOB SERPL: 1.3 G/DL
ALP SERPL-CCNC: 96 U/L (ref 39–117)
ALT SERPL W P-5'-P-CCNC: 26 U/L (ref 1–41)
ANION GAP SERPL CALCULATED.3IONS-SCNC: 10 MMOL/L (ref 5–15)
AST SERPL-CCNC: 29 U/L (ref 1–40)
BASOPHILS # BLD AUTO: 0.02 10*3/MM3 (ref 0–0.2)
BASOPHILS NFR BLD AUTO: 0.3 % (ref 0–1.5)
BILIRUB SERPL-MCNC: 0.4 MG/DL (ref 0–1.2)
BUN SERPL-MCNC: 22 MG/DL (ref 8–23)
BUN/CREAT SERPL: 20 (ref 7–25)
CALCIUM SPEC-SCNC: 9.5 MG/DL (ref 8.6–10.5)
CHLORIDE SERPL-SCNC: 101 MMOL/L (ref 98–107)
CO2 SERPL-SCNC: 31 MMOL/L (ref 22–29)
CREAT SERPL-MCNC: 1.1 MG/DL (ref 0.76–1.27)
DEPRECATED RDW RBC AUTO: 43.8 FL (ref 37–54)
EOSINOPHIL # BLD AUTO: 0.2 10*3/MM3 (ref 0–0.4)
EOSINOPHIL NFR BLD AUTO: 3.2 % (ref 0.3–6.2)
ERYTHROCYTE [DISTWIDTH] IN BLOOD BY AUTOMATED COUNT: 13.2 % (ref 12.3–15.4)
GFR SERPL CREATININE-BSD FRML MDRD: 80 ML/MIN/1.73
GLOBULIN UR ELPH-MCNC: 3.1 GM/DL
GLUCOSE SERPL-MCNC: 209 MG/DL (ref 65–99)
HCT VFR BLD AUTO: 37.6 % (ref 37.5–51)
HGB BLD-MCNC: 12.4 G/DL (ref 13–17.7)
IMM GRANULOCYTES # BLD AUTO: 0.01 10*3/MM3 (ref 0–0.05)
IMM GRANULOCYTES NFR BLD AUTO: 0.2 % (ref 0–0.5)
INR PPP: 2.4 (ref 0.91–1.09)
LYMPHOCYTES # BLD AUTO: 1.51 10*3/MM3 (ref 0.7–3.1)
LYMPHOCYTES NFR BLD AUTO: 24.4 % (ref 19.6–45.3)
MCH RBC QN AUTO: 30 PG (ref 26.6–33)
MCHC RBC AUTO-ENTMCNC: 33 G/DL (ref 31.5–35.7)
MCV RBC AUTO: 90.8 FL (ref 79–97)
MONOCYTES # BLD AUTO: 0.31 10*3/MM3 (ref 0.1–0.9)
MONOCYTES NFR BLD AUTO: 5 % (ref 5–12)
NEUTROPHILS NFR BLD AUTO: 4.14 10*3/MM3 (ref 1.7–7)
NEUTROPHILS NFR BLD AUTO: 66.9 % (ref 42.7–76)
NRBC BLD AUTO-RTO: 0 /100 WBC (ref 0–0.2)
PLATELET # BLD AUTO: 177 10*3/MM3 (ref 140–450)
PMV BLD AUTO: 11.3 FL (ref 6–12)
POTASSIUM SERPL-SCNC: 4.2 MMOL/L (ref 3.5–5.2)
PROT SERPL-MCNC: 7.2 G/DL (ref 6–8.5)
PROTHROMBIN TIME: 29.2 SECONDS (ref 10–13.8)
RBC # BLD AUTO: 4.14 10*6/MM3 (ref 4.14–5.8)
SODIUM SERPL-SCNC: 142 MMOL/L (ref 136–145)
WBC # BLD AUTO: 6.19 10*3/MM3 (ref 3.4–10.8)

## 2020-08-14 PROCEDURE — 85610 PROTHROMBIN TIME: CPT | Performed by: INTERNAL MEDICINE

## 2020-08-14 PROCEDURE — 36415 COLL VENOUS BLD VENIPUNCTURE: CPT

## 2020-08-14 PROCEDURE — 83036 HEMOGLOBIN GLYCOSYLATED A1C: CPT | Performed by: INTERNAL MEDICINE

## 2020-08-14 PROCEDURE — 80053 COMPREHEN METABOLIC PANEL: CPT | Performed by: INTERNAL MEDICINE

## 2020-08-14 PROCEDURE — 85025 COMPLETE CBC W/AUTO DIFF WBC: CPT | Performed by: INTERNAL MEDICINE

## 2020-08-15 LAB — HBA1C MFR BLD: 7.1 % (ref 4.8–5.6)

## 2020-08-17 ENCOUNTER — TELEPHONE (OUTPATIENT)
Dept: PODIATRY | Facility: CLINIC | Age: 75
End: 2020-08-17

## 2020-08-18 ENCOUNTER — OFFICE VISIT (OUTPATIENT)
Dept: PODIATRY | Facility: CLINIC | Age: 75
End: 2020-08-18

## 2020-08-18 VITALS
SYSTOLIC BLOOD PRESSURE: 102 MMHG | BODY MASS INDEX: 33.88 KG/M2 | HEIGHT: 74 IN | WEIGHT: 264 LBS | HEART RATE: 62 BPM | OXYGEN SATURATION: 93 % | DIASTOLIC BLOOD PRESSURE: 60 MMHG

## 2020-08-18 DIAGNOSIS — L84 FOOT CALLUS: ICD-10-CM

## 2020-08-18 DIAGNOSIS — B35.1 ONYCHOMYCOSIS: Primary | ICD-10-CM

## 2020-08-18 DIAGNOSIS — G62.9 NEUROPATHY: ICD-10-CM

## 2020-08-18 DIAGNOSIS — Z79.01 ANTICOAGULANT LONG-TERM USE: ICD-10-CM

## 2020-08-18 DIAGNOSIS — R60.9 PERIPHERAL EDEMA: ICD-10-CM

## 2020-08-18 DIAGNOSIS — R73.03 BORDERLINE DIABETES: ICD-10-CM

## 2020-08-18 PROCEDURE — 11055 PARING/CUTG B9 HYPRKER LES 1: CPT | Performed by: PODIATRIST

## 2020-08-18 PROCEDURE — 11721 DEBRIDE NAIL 6 OR MORE: CPT | Performed by: PODIATRIST

## 2020-08-18 PROCEDURE — 99213 OFFICE O/P EST LOW 20 MIN: CPT | Performed by: PODIATRIST

## 2020-09-16 ENCOUNTER — LAB (OUTPATIENT)
Dept: LAB | Facility: HOSPITAL | Age: 75
End: 2020-09-16

## 2020-09-16 DIAGNOSIS — I48.0 PAROXYSMAL ATRIAL FIBRILLATION (HCC): ICD-10-CM

## 2020-09-16 DIAGNOSIS — Z79.01 ANTICOAGULANT LONG-TERM USE: ICD-10-CM

## 2020-09-16 DIAGNOSIS — E11.42 TYPE 2 DIABETES MELLITUS WITH DIABETIC POLYNEUROPATHY, WITHOUT LONG-TERM CURRENT USE OF INSULIN (HCC): ICD-10-CM

## 2020-09-16 LAB
INR PPP: 3.1 (ref 0.91–1.09)
PROTHROMBIN TIME: 37.7 SECONDS (ref 10–13.8)

## 2020-09-16 PROCEDURE — 85610 PROTHROMBIN TIME: CPT | Performed by: INTERNAL MEDICINE

## 2020-09-22 RX ORDER — FUROSEMIDE 20 MG/1
20 TABLET ORAL DAILY
Qty: 90 TABLET | Refills: 3 | Status: SHIPPED | OUTPATIENT
Start: 2020-09-22 | End: 2021-01-01 | Stop reason: SDUPTHER

## 2020-09-22 RX ORDER — FUROSEMIDE 40 MG/1
40 TABLET ORAL DAILY
Qty: 90 TABLET | Refills: 3 | Status: SHIPPED | OUTPATIENT
Start: 2020-09-22 | End: 2021-01-01 | Stop reason: SDUPTHER

## 2020-10-15 ENCOUNTER — LAB (OUTPATIENT)
Dept: LAB | Facility: HOSPITAL | Age: 75
End: 2020-10-15

## 2020-10-15 PROCEDURE — 85610 PROTHROMBIN TIME: CPT | Performed by: INTERNAL MEDICINE

## 2020-10-16 LAB
INR PPP: 2.3 (ref 0.91–1.09)
PROTHROMBIN TIME: 27.7 SECONDS (ref 10–13.8)

## 2020-11-09 NOTE — PROGRESS NOTES
[facility] - PODIATRY    Today's Date: 11/19/20    Patient Name: Jonh Peacock  MRN: [id number]  CSN: [id number]  PCP: Ric Antony DO  Referring Provider: No ref. provider found     SUBJECTIVE     Chief Complaint   Patient presents with   • Follow-up     pt is here for 3 month f/u for foot and nail care-pt c/o painful  long, thickened toenails - denies pain at present time  - Antonella Curtis APRN 7/31/20 - non-diabetic        HPI: Jonh Peacock, a 74 y.o.male, comes to clinic as a(n) established patient complaining of thick fungal toenails. Pt with h/o Anxiety, Arrhythmia, Cardiomyopathy, CHF, Depression, previous DVT, HTN, Obesity, Anticoagulation with Warfarin. Relates borderline diabetes and controlling with diet. Presents with nails that are thick and irregular. He is unable to cut them himelf and his wife does not feel comfortable cutting them due to being on warfarin. Also relates that he has a limb length discrepancy which he has a lift in his left foot and custom inserts for both feet. Denies pain at the present time. Uses motorized scooter or rolling walker for mobilization. States that he has some mild numbness in his feet. Notes improvement with regular visits. Denies any constitutional symptoms. No other pedal complaints at this time.    Past Medical History:   Diagnosis Date   • Anemia 1/30/2017   • Anxiety 1/30/2017   • Arrhythmia 1/30/2017   • Arthritis    • Atrial fibrillation (CMS/HCC)    • Cardiomyopathy (CMS/HCC) 1/30/2017   • CHF (congestive heart failure) (CMS/HCC) 1/30/2017   • Chronic pain 1/30/2017   • Colon polyp    • Connective tissue disease (CMS/HCC)    • Depression 1/30/2017   • Diverticulitis 2010   • DVT (deep venous thrombosis) (CMS/HCC)    • Gastritis 1/30/2017   • Hiatal hernia    • History of transfusion    • Hyperlipidemia 1/30/2017   • Hypertension 1/30/2017   • Low back pain 1/30/2017   • Neuropathy 1/30/2017   • Obesity 1/30/2017   • Pneumonia     • Status post placement of cardiac pacemaker 1/30/2017     Past Surgical History:   Procedure Laterality Date   • APPENDECTOMY     • BACK SURGERY  2002    BACK FUSION   • BACK SURGERY  2007   • CARDIAC CATHETERIZATION     • CARDIAC DEFIBRILLATOR PLACEMENT     • CARDIAC ELECTROPHYSIOLOGY PROCEDURE N/A 10/31/2019    Procedure: ICD GEN CHANGE;  Surgeon: Chaz Chavez MD;  Location: [facility] CATH INVASIVE LOCATION;  Service: Cardiology   • COLON RESECTION WITH COLOSTOMY     • COLONOSCOPY  09/04/2014    diverticulosis   • COLONOSCOPY N/A 10/29/2019    Procedure: COLONOSCOPY WITH ANESTHESIA;  Surgeon: Tenzin Abrams MD;  Location: [facility] ENDOSCOPY;  Service: Gastroenterology   • ENDOSCOPY  02/24/1999    small sliding hiatal hernia   • FOOT SURGERY Bilateral 2005   • JOINT REPLACEMENT Right 2002    KNEE   • PACEMAKER IMPLANTATION  2012   • RECTAL FISSURE INCISION AND DRAINAGE     • REPLACEMENT TOTAL KNEE     • SPINAL CORD STIMULATOR IMPLANT     • TOTAL HIP ARTHROPLASTY Right 2004   • TOTAL HIP ARTHROPLASTY Left 12/2008     Family History   Problem Relation Age of Onset   • Heart disease Mother    • Heart disease Brother    • Breast cancer Brother    • Colon cancer Neg Hx    • Colon polyps Neg Hx      Social History     Socioeconomic History   • Marital status:      Spouse name: Not on file   • Number of children: Not on file   • Years of education: Not on file   • Highest education level: Not on file   Tobacco Use   • Smoking status: Never Smoker   • Smokeless tobacco: Never Used   Substance and Sexual Activity   • Alcohol use: Yes     Comment: rare   • Drug use: No   • Sexual activity: Defer     Allergies   Allergen Reactions   • Amiodarone Other (See Comments)     INTERFERES WITH OTHER MEDICATIONS   • Oxycontin [Oxycodone Hcl] Other (See Comments)     Can not urinate.   • Penicillins Hives     Allergic to All cillins. Amoxacillin.   • Vioxx [Rofecoxib] GI Intolerance     Current Outpatient Medications    Medication Sig Dispense Refill   • Buprenorphine (BUTRANS) 10 MCG/HR patch weekly Place 10 mcg on the skin Every 7 (Seven) Days.     • CHONDROITIN SULFATE PO Take  by mouth.     • diazePAM (VALIUM) 2 MG tablet Take 2 mg by mouth Daily.     • diclofenac (VOLTAREN) 1 % gel gel Apply 4 g topically to the appropriate area as directed 4 (Four) Times a Day As Needed.     • dilTIAZem (Cardizem) 30 MG tablet Take 1 tablet by mouth 3 (Three) Times a Day. 270 tablet 3   • furosemide (LASIX) 20 MG tablet Take 1 tablet by mouth Daily. 90 tablet 3   • furosemide (LASIX) 40 MG tablet Take 1 tablet by mouth Daily. TAKE WITH 20 MG TABLET FOR 60 PER MG PER DAY 90 tablet 3   • gabapentin (NEURONTIN) 400 MG capsule Take 400 mg by mouth 3 (Three) Times a Day.     • Glucosamine 750 MG tablet Take 750 mg by mouth 2 (Two) Times a Day.     • HYDROcodone-acetaminophen (NORCO) 7.5-325 MG per tablet Take 1 tablet by mouth Every 6 (Six) Hours As Needed for Moderate Pain .     • losartan (COZAAR) 25 MG tablet Take 1 tablet by mouth Daily. 90 tablet 2   • metoprolol succinate XL (TOPROL-XL) 100 MG 24 hr tablet Take 1 tablet by mouth Daily. 90 tablet 3   • Multiple Vitamins-Minerals (MULTIVITAMIN ADULT PO) Take  by mouth Daily.     • pantoprazole (PROTONIX) 40 MG EC tablet Take 1 tablet by mouth Daily. 90 tablet 3   • polyethylene glycol (MIRALAX) powder Take 17 g by mouth Every Night. 1581 g 3   • simvastatin (ZOCOR) 20 MG tablet Take 1 tablet by mouth Every Night. 90 tablet 3   • tamsulosin (FLOMAX) 0.4 MG capsule 24 hr capsule Take 1 capsule by mouth Every Night. 90 capsule 3   • terbinafine (lamISIL) 1 % cream Apply  topically to the appropriate area as directed 2 (Two) Times a Day. 36 g 2   • tiZANidine (ZANAFLEX) 4 MG tablet Take 4 mg by mouth 3 (Three) Times a Day As Needed for muscle spasms.     • warfarin (COUMADIN) 2 MG tablet Take 1 tablet by mouth Daily. 90 tablet 1   • warfarin (COUMADIN) 4 MG tablet Take 1 tablet by mouth Daily.  90 tablet 1   • warfarin (COUMADIN) 5 MG tablet Take 1 tablet by mouth Daily. 90 tablet 1     No current facility-administered medications for this visit.      Review of Systems   Constitutional: Negative for chills and fever.   HENT: Negative for congestion.    Respiratory: Negative for shortness of breath.    Cardiovascular: Positive for leg swelling. Negative for chest pain.   Gastrointestinal: Negative for constipation, diarrhea, nausea and vomiting.   Musculoskeletal: Positive for gait problem.        Foot pain   Skin: Negative for wound.        Thick Toenails   Neurological: Positive for numbness.       OBJECTIVE     Vitals:    11/19/20 1508   BP: 124/80   Pulse: 80   SpO2: 99%       PHYSICAL EXAM  GEN:   A&Ox3, NAD. Pt presents to clinic ambulating with walker and wearing Casual Shoes with custom inserts, left shoe has lift. Accompanied by wife.     NEURO:   Protective sensation intact to 9/10 sites Right foot, 9/10 site Left foot using [place] monofilament  Light touch sensation diminished  No Tinel's or [place] sign.    VASC:  Skin temperature Warm to Warm proximal to distal carlotta  DP pulses 1/4 Right, 1/4 Left  PT pulses 1/4 Right, 1/4 Left  CFT 4 sec carlotta  Pedal hair growth absent  1+ pitting edema noted carlotta  Varicosities absent carlotta    MUSC/SKEL:  Muscle Strength Right foot Dorsiflexors 5/5, Plantarflexors 5/5, Evertors 5/5, Invertors 5/5  Muscle Strength Left foot Dorsiflexors 5/5, Plantarflexors 5/5, Evertors 5/5, Invertors 5/5  ROM of the 1st MTP is full without pain or crepitus  ROM of the MTJ is full without pain or crepitus    ROM of the STJ is full without pain or crepitus    ROM of the ankle joint is full without pain or crepitus    POP of nail plates  Planus foot type with decreased arch height and abduction of forefoot  Right LE longer than Left LE     DERM:  Pedal nails x10 are thickened, elongated and discolored with presence of subunugual debris  Webspaces are c/d/i.  Skin is normal in  turgor and texture with no open wounds or sores appreciated.  Stable cicatrix noted to dorsal 1st MTPJ carlotta and dorsal digits  Thickened HPK noted to distal left hallux.      RADIOLOGY/NUCLEAR:  No results found.    LABORATORY/CULTURE RESULTS:      PATHOLOGY RESULTS:       ASSESSMENT/PLAN     Diagnoses and all orders for this visit:    1. Onychomycosis (Primary)    2. Neuropathy    3. Anticoagulant long-term use    4. Foot callus    5. Peripheral edema      Comprehensive lower extremity examination and evaluation was performed.  Discussed findings and treatment plan including risks, benefits, and treatment options with patient in detail. Patient agreed with treatment plan.  After verbal consent obtained, nail(s) x10 debrided of length and thickness with nail nipper without incidence  Patient may maintain nails and calluses at home utilizing emery board or pumice stone between visits as needed  Reviewed at home diabetic foot care including daily foot checks   Continue with custom shoes with and inserts  Continue to elevate legs when possible.   Continue antifungal BID PRN  An After Visit Summary was printed and given to the patient at discharge, including (if requested) any available informative/educational handouts regarding diagnosis, treatment, or medications. All questions were answered to patient/family satisfaction. Should symptoms fail to improve or worsen they agree to call or return to clinic or to go to the Emergency Department. Discussed the importance of following up with any needed screening tests/labs/specialist appointments and any requested follow-up recommended by me today. Importance of maintaining follow-up discussed and patient accepts that missed appointments can delay diagnosis and potentially lead to worsening of conditions.  Return in about 3 months (around 2/19/2021)., or sooner if acute issues arise.        This document has been electronically signed by Deion Avelar DPM on November 19,  2020 15:35 CST     Please note that portions of this note may have been completed with a voice recognition program. Efforts were made to edit the dictations, but occasionally words are mistranscribed.

## 2020-11-12 ENCOUNTER — LAB (OUTPATIENT)
Dept: LAB | Facility: HOSPITAL | Age: 75
End: 2020-11-12

## 2020-11-12 DIAGNOSIS — N40.1 BENIGN NON-NODULAR PROSTATIC HYPERPLASIA WITH LOWER URINARY TRACT SYMPTOMS: ICD-10-CM

## 2020-11-12 DIAGNOSIS — N40.1 BENIGN NON-NODULAR PROSTATIC HYPERPLASIA WITH LOWER URINARY TRACT SYMPTOMS: Primary | ICD-10-CM

## 2020-11-12 DIAGNOSIS — I48.0 PAROXYSMAL ATRIAL FIBRILLATION (HCC): ICD-10-CM

## 2020-11-12 DIAGNOSIS — Z79.01 ANTICOAGULANT LONG-TERM USE: ICD-10-CM

## 2020-11-12 LAB
INR PPP: 2.2 (ref 0.91–1.09)
PROTHROMBIN TIME: 25.9 SECONDS (ref 10–13.8)

## 2020-11-12 PROCEDURE — 85610 PROTHROMBIN TIME: CPT | Performed by: INTERNAL MEDICINE

## 2020-11-19 ENCOUNTER — OFFICE VISIT (OUTPATIENT)
Dept: PODIATRY | Facility: CLINIC | Age: 75
End: 2020-11-19

## 2020-11-19 VITALS
DIASTOLIC BLOOD PRESSURE: 80 MMHG | OXYGEN SATURATION: 99 % | WEIGHT: 263 LBS | SYSTOLIC BLOOD PRESSURE: 124 MMHG | HEART RATE: 80 BPM | BODY MASS INDEX: 33.75 KG/M2 | HEIGHT: 74 IN

## 2020-11-19 DIAGNOSIS — G62.9 NEUROPATHY: ICD-10-CM

## 2020-11-19 DIAGNOSIS — R60.9 PERIPHERAL EDEMA: ICD-10-CM

## 2020-11-19 DIAGNOSIS — Z79.01 ANTICOAGULANT LONG-TERM USE: ICD-10-CM

## 2020-11-19 DIAGNOSIS — B35.1 ONYCHOMYCOSIS: Primary | ICD-10-CM

## 2020-11-19 PROCEDURE — 11721 DEBRIDE NAIL 6 OR MORE: CPT | Performed by: PODIATRIST

## 2020-12-01 ENCOUNTER — LAB (OUTPATIENT)
Dept: LAB | Facility: HOSPITAL | Age: 75
End: 2020-12-01

## 2020-12-01 DIAGNOSIS — Z79.01 ANTICOAGULANT LONG-TERM USE: ICD-10-CM

## 2020-12-01 DIAGNOSIS — I48.0 PAROXYSMAL ATRIAL FIBRILLATION (HCC): ICD-10-CM

## 2020-12-01 DIAGNOSIS — E11.42 TYPE 2 DIABETES MELLITUS WITH DIABETIC POLYNEUROPATHY, WITHOUT LONG-TERM CURRENT USE OF INSULIN (HCC): ICD-10-CM

## 2020-12-01 LAB
ALBUMIN SERPL-MCNC: 4.3 G/DL (ref 3.5–5.2)
ALBUMIN/GLOB SERPL: 1.3 G/DL
ALP SERPL-CCNC: 107 U/L (ref 39–117)
ALT SERPL W P-5'-P-CCNC: 20 U/L (ref 1–41)
ANION GAP SERPL CALCULATED.3IONS-SCNC: 8 MMOL/L (ref 5–15)
AST SERPL-CCNC: 28 U/L (ref 1–40)
BASOPHILS # BLD AUTO: 0.03 10*3/MM3 (ref 0–0.2)
BASOPHILS NFR BLD AUTO: 0.5 % (ref 0–1.5)
BILIRUB SERPL-MCNC: 0.5 MG/DL (ref 0–1.2)
BUN SERPL-MCNC: 23 MG/DL (ref 8–23)
BUN/CREAT SERPL: 20.5 (ref 7–25)
CALCIUM SPEC-SCNC: 9.2 MG/DL (ref 8.6–10.5)
CHLORIDE SERPL-SCNC: 101 MMOL/L (ref 98–107)
CO2 SERPL-SCNC: 35 MMOL/L (ref 22–29)
CREAT SERPL-MCNC: 1.12 MG/DL (ref 0.76–1.27)
DEPRECATED RDW RBC AUTO: 43.5 FL (ref 37–54)
EOSINOPHIL # BLD AUTO: 0.16 10*3/MM3 (ref 0–0.4)
EOSINOPHIL NFR BLD AUTO: 2.7 % (ref 0.3–6.2)
ERYTHROCYTE [DISTWIDTH] IN BLOOD BY AUTOMATED COUNT: 13.2 % (ref 12.3–15.4)
GFR SERPL CREATININE-BSD FRML MDRD: 78 ML/MIN/1.73
GLOBULIN UR ELPH-MCNC: 3.2 GM/DL
GLUCOSE SERPL-MCNC: 131 MG/DL (ref 65–99)
HBA1C MFR BLD: 7.5 % (ref 4.8–5.6)
HCT VFR BLD AUTO: 37.1 % (ref 37.5–51)
HGB BLD-MCNC: 12.2 G/DL (ref 13–17.7)
IMM GRANULOCYTES # BLD AUTO: 0.01 10*3/MM3 (ref 0–0.05)
IMM GRANULOCYTES NFR BLD AUTO: 0.2 % (ref 0–0.5)
LYMPHOCYTES # BLD AUTO: 1.8 10*3/MM3 (ref 0.7–3.1)
LYMPHOCYTES NFR BLD AUTO: 30.5 % (ref 19.6–45.3)
MCH RBC QN AUTO: 29.7 PG (ref 26.6–33)
MCHC RBC AUTO-ENTMCNC: 32.9 G/DL (ref 31.5–35.7)
MCV RBC AUTO: 90.3 FL (ref 79–97)
MONOCYTES # BLD AUTO: 0.5 10*3/MM3 (ref 0.1–0.9)
MONOCYTES NFR BLD AUTO: 8.5 % (ref 5–12)
NEUTROPHILS NFR BLD AUTO: 3.41 10*3/MM3 (ref 1.7–7)
NEUTROPHILS NFR BLD AUTO: 57.6 % (ref 42.7–76)
NRBC BLD AUTO-RTO: 0 /100 WBC (ref 0–0.2)
PLATELET # BLD AUTO: 174 10*3/MM3 (ref 140–450)
PMV BLD AUTO: 11.7 FL (ref 6–12)
POTASSIUM SERPL-SCNC: 3.6 MMOL/L (ref 3.5–5.2)
PROT SERPL-MCNC: 7.5 G/DL (ref 6–8.5)
RBC # BLD AUTO: 4.11 10*6/MM3 (ref 4.14–5.8)
SODIUM SERPL-SCNC: 144 MMOL/L (ref 136–145)
WBC # BLD AUTO: 5.91 10*3/MM3 (ref 3.4–10.8)

## 2020-12-01 PROCEDURE — 80053 COMPREHEN METABOLIC PANEL: CPT

## 2020-12-01 PROCEDURE — 85025 COMPLETE CBC W/AUTO DIFF WBC: CPT

## 2020-12-01 PROCEDURE — 36415 COLL VENOUS BLD VENIPUNCTURE: CPT

## 2020-12-01 PROCEDURE — 84153 ASSAY OF PSA TOTAL: CPT | Performed by: UROLOGY

## 2020-12-01 PROCEDURE — 85610 PROTHROMBIN TIME: CPT | Performed by: UROLOGY

## 2020-12-01 PROCEDURE — 83036 HEMOGLOBIN GLYCOSYLATED A1C: CPT

## 2020-12-02 LAB
INR PPP: 2.3 (ref 0.91–1.09)
PROTHROMBIN TIME: 27.6 SECONDS (ref 10–13.8)
PSA SERPL-MCNC: 1.67 NG/ML (ref 0–4)

## 2020-12-07 ENCOUNTER — TELEMEDICINE (OUTPATIENT)
Dept: INTERNAL MEDICINE | Facility: CLINIC | Age: 75
End: 2020-12-07

## 2020-12-07 DIAGNOSIS — Z45.42 BATTERY END OF LIFE OF SPINAL CORD STIMULATOR: ICD-10-CM

## 2020-12-07 DIAGNOSIS — G62.2 POLYNEUROPATHY DUE TO OTHER TOXIC AGENTS (HCC): Primary | ICD-10-CM

## 2020-12-07 DIAGNOSIS — G89.29 CHRONIC MIDLINE LOW BACK PAIN WITH BILATERAL SCIATICA: ICD-10-CM

## 2020-12-07 DIAGNOSIS — M54.42 CHRONIC MIDLINE LOW BACK PAIN WITH BILATERAL SCIATICA: ICD-10-CM

## 2020-12-07 DIAGNOSIS — M54.41 CHRONIC MIDLINE LOW BACK PAIN WITH BILATERAL SCIATICA: ICD-10-CM

## 2020-12-07 DIAGNOSIS — I48.0 PAROXYSMAL ATRIAL FIBRILLATION (HCC): ICD-10-CM

## 2020-12-07 PROCEDURE — 99214 OFFICE O/P EST MOD 30 MIN: CPT | Performed by: INTERNAL MEDICINE

## 2020-12-07 NOTE — PROGRESS NOTES
Patient presents for video appointment.   Consent via E check-in.    CC: Back pain    History:  Jonh Peacock is a 75 y.o. male   He notes he has been doing reasonably well, but has had ongoing back pain and has had to manipulate his gabapentin dose per pain management related to insurance coverage concerns.  He also is near the end of his battery life for his spinal stimulator that was inserted in 2012 and is awaiting referral to neurosurgery for replacement.     He does have a peripheral neuropathy that has been noted in his medical records as far back as October 1995 on review of records from the Department of [employer].  However, the more recent letter supplied to me from the Department of Labor indicates this was made in 2017.  His diagnosis of diabetes has occurred only recently and his back pain was not an issue back in the 90s when he was still actively working and physically fit.  He has a long history of exposure from his work at the [employer].    He does have atrial fibrillation, for which he is on rate control and is anticoagulated on warfarin.  His INR levels have been within range of 2-3.  He has no evidence of bleeding nor uncontrolled rate or symptoms of A. fib.         ROS:  Review of Systems   Constitutional: Negative for chills and fever.   Respiratory: Negative for cough and shortness of breath.    Cardiovascular: Negative for chest pain and palpitations.   Gastrointestinal: Negative for abdominal pain and constipation.   Musculoskeletal: Positive for back pain and gait problem.   Neurological: Positive for numbness.        reports that he has never smoked. He has never used smokeless tobacco. He reports current alcohol use. He reports that he does not use drugs.      Current Outpatient Medications:   •  Buprenorphine (BUTRANS) 10 MCG/HR patch weekly, Place 10 mcg on the skin Every 7 (Seven) Days., Disp: , Rfl:   •  CHONDROITIN SULFATE PO, Take  by mouth., Disp: , Rfl:   •  diazePAM  (VALIUM) 2 MG tablet, Take 2 mg by mouth Daily., Disp: , Rfl:   •  diclofenac (VOLTAREN) 1 % gel gel, Apply 4 g topically to the appropriate area as directed 4 (Four) Times a Day As Needed., Disp: , Rfl:   •  dilTIAZem (Cardizem) 30 MG tablet, Take 1 tablet by mouth 3 (Three) Times a Day., Disp: 270 tablet, Rfl: 3  •  furosemide (LASIX) 20 MG tablet, Take 1 tablet by mouth Daily., Disp: 90 tablet, Rfl: 3  •  furosemide (LASIX) 40 MG tablet, Take 1 tablet by mouth Daily. TAKE WITH 20 MG TABLET FOR 60 PER MG PER DAY, Disp: 90 tablet, Rfl: 3  •  gabapentin (NEURONTIN) 400 MG capsule, Take 400 mg by mouth 3 (Three) Times a Day., Disp: , Rfl:   •  Glucosamine 750 MG tablet, Take 750 mg by mouth 2 (Two) Times a Day., Disp: , Rfl:   •  HYDROcodone-acetaminophen (NORCO) 7.5-325 MG per tablet, Take 1 tablet by mouth Every 6 (Six) Hours As Needed for Moderate Pain ., Disp: , Rfl:   •  losartan (COZAAR) 25 MG tablet, Take 1 tablet by mouth Daily., Disp: 90 tablet, Rfl: 2  •  metoprolol succinate XL (TOPROL-XL) 100 MG 24 hr tablet, Take 1 tablet by mouth Daily., Disp: 90 tablet, Rfl: 3  •  Multiple Vitamins-Minerals (MULTIVITAMIN ADULT PO), Take  by mouth Daily., Disp: , Rfl:   •  pantoprazole (PROTONIX) 40 MG EC tablet, Take 1 tablet by mouth Daily., Disp: 90 tablet, Rfl: 3  •  polyethylene glycol (MIRALAX) powder, Take 17 g by mouth Every Night., Disp: 1581 g, Rfl: 3  •  simvastatin (ZOCOR) 20 MG tablet, Take 1 tablet by mouth Every Night., Disp: 90 tablet, Rfl: 3  •  tamsulosin (FLOMAX) 0.4 MG capsule 24 hr capsule, Take 1 capsule by mouth Every Night., Disp: 90 capsule, Rfl: 3  •  terbinafine (lamISIL) 1 % cream, Apply  topically to the appropriate area as directed 2 (Two) Times a Day., Disp: 36 g, Rfl: 2  •  tiZANidine (ZANAFLEX) 4 MG tablet, Take 4 mg by mouth 3 (Three) Times a Day As Needed for muscle spasms., Disp: , Rfl:   •  warfarin (COUMADIN) 2 MG tablet, Take 1 tablet by mouth Daily., Disp: 90 tablet, Rfl: 1  •   warfarin (COUMADIN) 4 MG tablet, Take 1 tablet by mouth Daily., Disp: 90 tablet, Rfl: 1  •  warfarin (COUMADIN) 5 MG tablet, Take 1 tablet by mouth Daily., Disp: 90 tablet, Rfl: 1    OBJECTIVE:  There were no vitals taken for this visit.   Physical Exam  Constitutional:       General: He is not in acute distress.  Pulmonary:      Effort: Pulmonary effort is normal. No respiratory distress.   Neurological:      Mental Status: He is alert and oriented to person, place, and time.   Psychiatric:         Mood and Affect: Mood normal.         Behavior: Behavior normal.         Assessment/Plan     Diagnoses and all orders for this visit:    1. 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  He does have significant exposures well-documented through the department of labor.  He claims neuropathy that has dated back to the 1990s, predating his diagnoses with back issues or diabetes that would represent alternative etiologies for his neuropathy.  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  A letter will be supplied to the department of labor to this end.    2. Chronic midline low back pain with bilateral sciatica  3. Battery end of life of spinal cord stimulator  -     Ambulatory Referral to Neurosurgery  He continues seeing pain management with fair control, though he needs a replacement of his battery.  We will refer to neurosurgery to help facilitate this.    4. Paroxysmal atrial fibrillation (CMS/HCC)  He has no symptoms of uncontrolled A. fib.  He is anticoagulated, but could stop his warfarin for the procedure and restart on hemostasis at his surgeon's discretion.  We will continue to manage his warfarin with monthly INR checks.      An After Visit Summary was printed and given to the patient at discharge.  Return in about 6 months (around  6/7/2021) for Medicare Wellness.          Ric Antony D.O. 12/10/2020   Electronically signed.

## 2021-01-01 DIAGNOSIS — I48.0 PAROXYSMAL ATRIAL FIBRILLATION (HCC): ICD-10-CM

## 2021-01-04 ENCOUNTER — LAB (OUTPATIENT)
Dept: LAB | Facility: HOSPITAL | Age: 76
End: 2021-01-04

## 2021-01-04 DIAGNOSIS — Z79.01 ANTICOAGULANT LONG-TERM USE: ICD-10-CM

## 2021-01-04 DIAGNOSIS — I48.0 PAROXYSMAL ATRIAL FIBRILLATION (HCC): ICD-10-CM

## 2021-01-04 LAB
INR PPP: 3.5 (ref 0.91–1.09)
PROTHROMBIN TIME: 42 SECONDS (ref 10–13.8)

## 2021-01-04 PROCEDURE — 85610 PROTHROMBIN TIME: CPT | Performed by: INTERNAL MEDICINE

## 2021-01-04 RX ORDER — FUROSEMIDE 20 MG/1
20 TABLET ORAL DAILY
Qty: 90 TABLET | Refills: 3 | Status: SHIPPED | OUTPATIENT
Start: 2021-01-04 | End: 2022-03-10

## 2021-01-04 RX ORDER — WARFARIN SODIUM 5 MG/1
5 TABLET ORAL
Qty: 90 TABLET | Refills: 1 | Status: ON HOLD | OUTPATIENT
Start: 2021-01-04 | End: 2021-01-28 | Stop reason: SDUPTHER

## 2021-01-04 RX ORDER — TAMSULOSIN HYDROCHLORIDE 0.4 MG/1
1 CAPSULE ORAL NIGHTLY
Qty: 90 CAPSULE | Refills: 3 | Status: SHIPPED | OUTPATIENT
Start: 2021-01-04 | End: 2021-11-30

## 2021-01-04 RX ORDER — PANTOPRAZOLE SODIUM 40 MG/1
40 TABLET, DELAYED RELEASE ORAL DAILY
Qty: 90 TABLET | Refills: 3 | Status: SHIPPED | OUTPATIENT
Start: 2021-01-04 | End: 2022-03-10

## 2021-01-04 RX ORDER — FUROSEMIDE 40 MG/1
40 TABLET ORAL DAILY
Qty: 90 TABLET | Refills: 3 | Status: SHIPPED | OUTPATIENT
Start: 2021-01-04 | End: 2021-01-20 | Stop reason: DRUGHIGH

## 2021-01-04 RX ORDER — WARFARIN SODIUM 2 MG/1
2 TABLET ORAL
Qty: 90 TABLET | Refills: 1 | Status: ON HOLD | OUTPATIENT
Start: 2021-01-04 | End: 2021-01-28 | Stop reason: SDUPTHER

## 2021-01-04 RX ORDER — SIMVASTATIN 20 MG
20 TABLET ORAL NIGHTLY
Qty: 90 TABLET | Refills: 3 | Status: SHIPPED | OUTPATIENT
Start: 2021-01-04 | End: 2022-03-10

## 2021-01-04 RX ORDER — LOSARTAN POTASSIUM 25 MG/1
25 TABLET ORAL DAILY
Qty: 90 TABLET | Refills: 3 | Status: SHIPPED | OUTPATIENT
Start: 2021-01-04 | End: 2021-11-30

## 2021-01-04 RX ORDER — WARFARIN SODIUM 4 MG/1
4 TABLET ORAL
Qty: 90 TABLET | Refills: 1 | Status: ON HOLD | OUTPATIENT
Start: 2021-01-04 | End: 2021-01-28 | Stop reason: SDUPTHER

## 2021-01-07 ENCOUNTER — OFFICE VISIT (OUTPATIENT)
Dept: CARDIOLOGY | Facility: CLINIC | Age: 76
End: 2021-01-07

## 2021-01-07 ENCOUNTER — CLINICAL SUPPORT NO REQUIREMENTS (OUTPATIENT)
Dept: CARDIOLOGY | Facility: CLINIC | Age: 76
End: 2021-01-07

## 2021-01-07 VITALS
HEART RATE: 85 BPM | OXYGEN SATURATION: 98 % | HEIGHT: 74 IN | BODY MASS INDEX: 33.37 KG/M2 | SYSTOLIC BLOOD PRESSURE: 106 MMHG | WEIGHT: 260 LBS | DIASTOLIC BLOOD PRESSURE: 70 MMHG

## 2021-01-07 DIAGNOSIS — E78.2 MIXED HYPERLIPIDEMIA: Chronic | ICD-10-CM

## 2021-01-07 DIAGNOSIS — Z79.01 ANTICOAGULANT LONG-TERM USE: ICD-10-CM

## 2021-01-07 DIAGNOSIS — I48.0 PAROXYSMAL ATRIAL FIBRILLATION (HCC): ICD-10-CM

## 2021-01-07 DIAGNOSIS — I42.8 NON-ISCHEMIC CARDIOMYOPATHY (HCC): Chronic | ICD-10-CM

## 2021-01-07 DIAGNOSIS — Z95.810 CARDIAC DEFIBRILLATOR IN SITU: ICD-10-CM

## 2021-01-07 DIAGNOSIS — E66.09 CLASS 1 OBESITY DUE TO EXCESS CALORIES WITH SERIOUS COMORBIDITY AND BODY MASS INDEX (BMI) OF 33.0 TO 33.9 IN ADULT: Chronic | ICD-10-CM

## 2021-01-07 DIAGNOSIS — I10 ESSENTIAL HYPERTENSION: Chronic | ICD-10-CM

## 2021-01-07 DIAGNOSIS — Z95.810 CARDIAC DEFIBRILLATOR IN SITU: Chronic | ICD-10-CM

## 2021-01-07 DIAGNOSIS — I50.22 CHRONIC SYSTOLIC CHF (CONGESTIVE HEART FAILURE), NYHA CLASS 3 (HCC): Primary | Chronic | ICD-10-CM

## 2021-01-07 PROBLEM — I49.8 VENTRICULAR BIGEMINY: Status: RESOLVED | Noted: 2018-06-13 | Resolved: 2021-01-07

## 2021-01-07 PROBLEM — Z95.0 STATUS POST PLACEMENT OF CARDIAC PACEMAKER: Status: RESOLVED | Noted: 2017-01-30 | Resolved: 2021-01-07

## 2021-01-07 PROBLEM — E11.9 TYPE 2 DIABETES MELLITUS WITHOUT COMPLICATION, WITHOUT LONG-TERM CURRENT USE OF INSULIN (HCC): Chronic | Status: ACTIVE | Noted: 2019-09-12

## 2021-01-07 PROBLEM — G47.33 OBSTRUCTIVE SLEEP APNEA OF ADULT: Chronic | Status: ACTIVE | Noted: 2018-12-13

## 2021-01-07 PROBLEM — E66.811 CLASS 1 OBESITY DUE TO EXCESS CALORIES WITH SERIOUS COMORBIDITY AND BODY MASS INDEX (BMI) OF 33.0 TO 33.9 IN ADULT: Chronic | Status: ACTIVE | Noted: 2017-01-30

## 2021-01-07 PROCEDURE — 93000 ELECTROCARDIOGRAM COMPLETE: CPT | Performed by: NURSE PRACTITIONER

## 2021-01-07 PROCEDURE — 99214 OFFICE O/P EST MOD 30 MIN: CPT | Performed by: NURSE PRACTITIONER

## 2021-01-07 PROCEDURE — 93289 INTERROG DEVICE EVAL HEART: CPT | Performed by: PHYSICIAN ASSISTANT

## 2021-01-07 NOTE — PROGRESS NOTES
Subjective:     Encounter Date:01/07/2021      Patient ID: Jonh Peacock is a 75 y.o. male with a previous medical history of nonischemic cardiomyopathy, history of nonsustained ventricular tachycardia, atrial fibrillation, atrial tachycardia, mitral regurgitation, hypertension and hyperlipidemia who presents today for follow-up.    Chief Complaint: Routine follow up   Congestive Heart Failure  Presents for follow-up visit. Pertinent negatives include no abdominal pain, chest pain, chest pressure, claudication, edema, fatigue, orthopnea, palpitations, paroxysmal nocturnal dyspnea or shortness of breath. The symptoms have been stable. Compliance with total regimen is 51-75%. Compliance problems: due to chronic pain his activity level   Compliance with diet is %. Compliance with exercise is 51-75%. Compliance with medications is %.     Mr. Peacock presents today for follow up.  He has been doing well since his last office visit.  Due to the pandemic his last office visit was over 1 year ago.  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  Since the pandemic he has no longer volunteering has started taking his diuretic therapy daily.  Over the course of the last 10 months he is slowly lost weight after increasing his diuretic therapy and is now down 25 pounds from his previous office visit.  He feels well.  He denies any symptoms of dehydration.  He denies any episodes of decompensation since his previous office visit with orthopnea, PND, shortness of breath.  He is being evaluated by neurosurgery for possible battery replacement of his nerve stimulator.  Denies any ICD shocks.  He denies any palpitations or episodes of tachycardia.  He reports no lower extremity swelling especially since his increased diuretic therapy.  He is on 60 mg of Lasix daily.  He previously was on diltiazem 30 mg 4 times a day however could not tolerate this much  medication and it was reduced to 30 mg 3 times a day.  He maintains on this dose and tolerates this well.  He denies any bleeding issues with his warfarin therapy.  His INR is are controlled between 2 and 3 per his report and are followed by his PCP.    The following portions of the patient's history were reviewed and updated as appropriate: allergies, current medications, past family history, past medical history, past social history and past surgical history.     Allergies   Allergen Reactions   • Amiodarone Other (See Comments)     INTERFERES WITH OTHER MEDICATIONS   • Oxycontin [Oxycodone Hcl] Other (See Comments)     Can not urinate.   • Penicillins Hives     Allergic to All cillins. Amoxacillin.   • Vioxx [Rofecoxib] GI Intolerance       Current Outpatient Medications:   •  Buprenorphine (BUTRANS) 10 MCG/HR patch weekly, Place 10 mcg on the skin Every 7 (Seven) Days., Disp: , Rfl:   •  CHONDROITIN SULFATE PO, Take  by mouth., Disp: , Rfl:   •  diazePAM (VALIUM) 2 MG tablet, Take 2 mg by mouth Daily., Disp: , Rfl:   •  dilTIAZem (Cardizem) 30 MG tablet, Take 1 tablet by mouth 3 (Three) Times a Day., Disp: 270 tablet, Rfl: 3  •  furosemide (LASIX) 20 MG tablet, Take 1 tablet by mouth Daily., Disp: 90 tablet, Rfl: 3  •  furosemide (LASIX) 40 MG tablet, Take 1 tablet by mouth Daily. TAKE WITH 20 MG TABLET FOR 60 PER MG PER DAY, Disp: 90 tablet, Rfl: 3  •  gabapentin (NEURONTIN) 400 MG capsule, Take 400 mg by mouth 3 (Three) Times a Day., Disp: , Rfl:   •  Glucosamine 750 MG tablet, Take 750 mg by mouth 2 (Two) Times a Day., Disp: , Rfl:   •  HYDROcodone-acetaminophen (NORCO) 7.5-325 MG per tablet, Take 1 tablet by mouth Every 6 (Six) Hours As Needed for Moderate Pain ., Disp: , Rfl:   •  losartan (COZAAR) 25 MG tablet, Take 1 tablet by mouth Daily., Disp: 90 tablet, Rfl: 3  •  metoprolol succinate XL (TOPROL-XL) 100 MG 24 hr tablet, Take 1 tablet by mouth Daily., Disp: 90 tablet, Rfl: 3  •  Multiple  "Vitamins-Minerals (MULTIVITAMIN ADULT PO), Take  by mouth Daily., Disp: , Rfl:   •  pantoprazole (PROTONIX) 40 MG EC tablet, Take 1 tablet by mouth Daily., Disp: 90 tablet, Rfl: 3  •  polyethylene glycol (MIRALAX) powder, Take 17 g by mouth Every Night., Disp: 1581 g, Rfl: 3  •  simvastatin (ZOCOR) 20 MG tablet, Take 1 tablet by mouth Every Night., Disp: 90 tablet, Rfl: 3  •  tamsulosin (FLOMAX) 0.4 MG capsule 24 hr capsule, Take 1 capsule by mouth Every Night., Disp: 90 capsule, Rfl: 3  •  warfarin (COUMADIN) 2 MG tablet, Take 1 tablet by mouth Daily., Disp: 90 tablet, Rfl: 1  •  warfarin (COUMADIN) 4 MG tablet, Take 1 tablet by mouth Daily., Disp: 90 tablet, Rfl: 1  •  warfarin (COUMADIN) 5 MG tablet, Take 1 tablet by mouth Daily., Disp: 90 tablet, Rfl: 1  •  diclofenac (VOLTAREN) 1 % gel gel, Apply 4 g topically to the appropriate area as directed 4 (Four) Times a Day As Needed., Disp: , Rfl:     Review of Systems   Constitution: Positive for weight loss (30# 10 mo intentional). Negative for fatigue.   Cardiovascular: Negative for chest pain, claudication, dyspnea on exertion, leg swelling, orthopnea, palpitations, paroxysmal nocturnal dyspnea and syncope.   Respiratory: Negative for shortness of breath.    Hematologic/Lymphatic: Negative for bleeding problem. Bruises/bleeds easily.   Musculoskeletal: Positive for arthritis and back pain.   Gastrointestinal: Negative for abdominal pain.         ECG 12 Lead    Date/Time: 1/7/2021 3:39 PM  Performed by: Dasia Rea APRN  Authorized by: Dasia Rea APRN   Comparison: compared with previous ECG from 9/30/2019  Rhythm: sinus rhythm  Ectopy: infrequent PVCs  Rate: normal  BPM: 85  Conduction: non-specific intraventricular conduction delay  ST Segments: ST segments normal  T inversion: III  QRS axis: left  Other: no other findings    Clinical impression: abnormal EKG          /70   Pulse 85   Ht 188 cm (74\")   Wt 118 kg (260 lb)   SpO2 98%   BMI " 33.38 kg/m²        Objective:     Vitals signs reviewed.   Constitutional:       General: Not in acute distress.     Appearance: Well-developed. Not diaphoretic.   Eyes:      General:         Right eye: No discharge.         Left eye: No discharge.   HENT:      Head: Normocephalic and atraumatic.    Mouth/Throat:      Pharynx: No oropharyngeal exudate.   Neck:      Musculoskeletal: Normal range of motion and neck supple.   Pulmonary:      Effort: Pulmonary effort is normal. No respiratory distress.      Breath sounds: Normal breath sounds. No wheezing. No rhonchi. No rales.   Chest:      Chest wall: Not tender to palpatation.   Cardiovascular:      Normal rate. Regular rhythm.      Murmurs: There is no murmur.      No gallop. No rub.   Abdominal:      General: There is no distension.      Palpations: Abdomen is soft.      Tenderness: There is no abdominal tenderness.   Musculoskeletal: Normal range of motion.   Skin:     General: Skin is warm and dry.      Coloration: Skin is not pale.      Findings: No erythema or rash.   Neurological:      Mental Status: Alert, oriented to person, place, and time and oriented to person, place and time.   Psychiatric:         Mood and Affect: Mood normal.         Speech: Speech normal.         Behavior: Behavior normal. Behavior is cooperative.         Thought Content: Thought content normal.         Cognition and Memory: Cognition normal.         Judgment: Judgment normal.         Lab Review:   Results for orders placed during the hospital encounter of 02/07/19   Adult Transthoracic Echo Complete W/ Cont if Necessary Per Protocol    Narrative · Left ventricular systolic function is moderately decreased. Estimated EF   appears to be in the range of 31 - 35%.  · Left ventricular diastolic dysfunction (grade I) consistent with   impaired relaxation.  · The left ventricular cavity is moderately dilated.  · Trace tricuspid valve regurgitation is present. Estimated right   ventricular  systolic pressure from tricuspid regurgitation is mildly   elevated (35-45 mmHg).      Echocardiogram in 2010 showed ejection fraction 25-30 percent with moderate to severe mitral regurgitation.     ================================================      Records from Dr. Broadbent's office indicates that this patient did undergo cardiac catheterization on 11/19/2012 which showed no obstructive coronary artery disease, ejection fraction of 40% with 1+ mitral regurgitation      Assessment:          Diagnosis Plan   1. Chronic systolic CHF (congestive heart failure), NYHA class 3 (CMS/HCC)     2. Non-ischemic cardiomyopathy (CMS/HCC)     3. Cardiac defibrillator in situ     4. Paroxysmal atrial fibrillation (CMS/HCC)     5. Anticoagulant long-term use     6. Essential hypertension     7. Mixed hyperlipidemia     8. Class 1 obesity due to excess calories with serious comorbidity and body mass index (BMI) of 33.0 to 33.9 in adult            Plan:       Chronic systolic congestive heart failure: NYHA class II without significant dyspnea on exertion, shortness of breath, orthopnea, weight gain, abdominal bloating, leg swelling.  But he does have consistent fatigue likely related to his history of arthritis and chronic back pain more so than any decompensated congestive heart failure.  Stable.    Nonischemic cardiomyopathy: Remains on guideline directed medical therapy with Toprol XL, losartan, and furosemide.    ICD: Known nonischemic cardiomyopathy with multiple episodes of VT terminated with ATP. On GDMT at maximally tolerated doses for > 90 days  Had dual chamber ICD placed per Dr. Chaz Chavez 10/31/2019    Paroxysmal atrial fibrillation: No recent findings of atrial fibrillation on device interrogation today.  He is had some episodes of SVT which are short and he remains asymptomatic.    Long-term anticoagulation: Warfarin anticoagulation which is managed through his PCP office.    Essential hypertension:  Well-controlled.    Mixed hyperlipidemia: On statin therapy with simvastatin 20 mg every day.  Managed through his primary care physician's office.    Obesity: BMI 33.38.      Follow-up in 6 months, sooner if needed    Of note: The patient will see Dr. Oliveros on Monday and may require a cardiac risk assessment evaluation for possible upcoming procedure.  Given the patient's clinical evaluation today he does not appear to be in any decompensated heart failure and has had no uncontrolled arrhythmias per his ICD interrogation today.  His anticoagulation is managed through his primary care physician's office.  As he appears to have no active cardiac conditions, there would be no reason for delaying his procedure.  Although he has chronic conditions including nonischemic cardiomyopathy and congestive heart failure he is stable with nonmodifiable risk factors.  So long as his clinical condition does not change prior to need for procedure he would be an acceptable risk.  Anticoagulation will be recommended to be resumed as soon as safe per the surgeon for stroke risk reduction in the setting of known history of paroxysmal atrial fibrillation.

## 2021-01-08 NOTE — PROGRESS NOTES
Dual Chamber AICD Evaluation Report  Clinic Interrogation    January 8, 2021    Primary Cardiologist: Jocelyn  : Medtronic Model: Evera MRI  Implant date: 10/31/19    Reason for evaluation: routine  Indication for AICD: cardiomyopathy    Measurements  Atrial sensing - P wave: 1.9 mV  Atrial threshold: 0.5 V @ 0.4 ms  Atrial lead impedance: 399 ohms  Ventricular sensing - R wave: 4.8 mV  Ventricular threshold: 0.625 V @ 0.4 ms  Ventricular lead impedance:  361 ohms  Shock impedance:  RV- 41 ohms   SVC- 48 ohms      Diagnostic Data  Atrial paced: 55.9 %  Ventricular paced: 0.2 %  Other: Multiple episodes of SVT noted.  Longest duration is 1:35 min.  Battery status: satisfactory        Final Parameters  Mode: AAIR+  Lower rate: 60 bpm Upper rate: 130 bpm  AV Delay: Paced- 180 ms    Sensed- 150 ms     Atrial - Amplitude: 1.5 V Pulse width: 0.4 ms Sensitivity: 0.6 mV   Ventricular - Amplitude: 2.0 V Pulse width: 0.4 ms Sensitivity: 0.3 mV   Changes made: none  Conclusions: normal AICD function    Follow up: 3 months via [facility], annually in office

## 2021-01-11 ENCOUNTER — OFFICE VISIT (OUTPATIENT)
Dept: NEUROSURGERY | Facility: CLINIC | Age: 76
End: 2021-01-11

## 2021-01-11 ENCOUNTER — HOSPITAL ENCOUNTER (OUTPATIENT)
Dept: GENERAL RADIOLOGY | Facility: HOSPITAL | Age: 76
Discharge: HOME | End: 2021-01-11

## 2021-01-11 VITALS — BODY MASS INDEX: 33.75 KG/M2 | HEIGHT: 74 IN | WEIGHT: 263 LBS

## 2021-01-11 DIAGNOSIS — M96.1 FAILED BACK SYNDROME: ICD-10-CM

## 2021-01-11 DIAGNOSIS — G89.4 CHRONIC PAIN SYNDROME: ICD-10-CM

## 2021-01-11 DIAGNOSIS — E66.9 OBESITY (BMI 30-39.9): ICD-10-CM

## 2021-01-11 DIAGNOSIS — T85.192A MALFUNCTION OF SPINAL CORD STIMULATOR, INITIAL ENCOUNTER (HCC): ICD-10-CM

## 2021-01-11 DIAGNOSIS — Z79.01 CURRENT USE OF LONG TERM ANTICOAGULATION: ICD-10-CM

## 2021-01-11 DIAGNOSIS — G89.4 CHRONIC PAIN SYNDROME: Primary | ICD-10-CM

## 2021-01-11 PROCEDURE — 72070 X-RAY EXAM THORAC SPINE 2VWS: CPT

## 2021-01-11 PROCEDURE — 99204 OFFICE O/P NEW MOD 45 MIN: CPT | Performed by: NEUROLOGICAL SURGERY

## 2021-01-11 PROCEDURE — 72100 X-RAY EXAM L-S SPINE 2/3 VWS: CPT

## 2021-01-11 NOTE — PATIENT INSTRUCTIONS
"PATIENT TO CONTINUE TO FOLLOW UP WITH HIS/HER PRIMARY CARE PROVIDER FOR YEARLY PHYSICAL EXAMS TO ENSURE COMPLETE HEALTH MAINTENANCE    DASH Eating Plan  DASH stands for \"Dietary Approaches to Stop Hypertension.\" The DASH eating plan is a healthy eating plan that has been shown to reduce high blood pressure (hypertension). It may also reduce your risk for type 2 diabetes, heart disease, and stroke. The DASH eating plan may also help with weight loss.  What are tips for following this plan?    General guidelines  · Avoid eating more than 2,300 mg (milligrams) of salt (sodium) a day. If you have hypertension, you may need to reduce your sodium intake to 1,500 mg a day.  · Limit alcohol intake to no more than 1 drink a day for nonpregnant women and 2 drinks a day for men. One drink equals 12 oz of beer, 5 oz of wine, or 1½ oz of hard liquor.  · Work with your health care provider to maintain a healthy body weight or to lose weight. Ask what an ideal weight is for you.  · Get at least 30 minutes of exercise that causes your heart to beat faster (aerobic exercise) most days of the week. Activities may include walking, swimming, or biking.  · Work with your health care provider or diet and nutrition specialist (dietitian) to adjust your eating plan to your individual calorie needs.  Reading food labels    · Check food labels for the amount of sodium per serving. Choose foods with less than 5 percent of the Daily Value of sodium. Generally, foods with less than 300 mg of sodium per serving fit into this eating plan.  · To find whole grains, look for the word \"whole\" as the first word in the ingredient list.  Shopping  · Buy products labeled as \"low-sodium\" or \"no salt added.\"  · Buy fresh foods. Avoid canned foods and premade or frozen meals.  Cooking  · Avoid adding salt when cooking. Use salt-free seasonings or herbs instead of table salt or sea salt. Check with your health care provider or pharmacist before using salt " substitutes.  · Do not kramer foods. Cook foods using healthy methods such as baking, boiling, grilling, and broiling instead.  · Cook with heart-healthy oils, such as olive, canola, soybean, or sunflower oil.  Meal planning  · Eat a balanced diet that includes:  ? 5 or more servings of fruits and vegetables each day. At each meal, try to fill half of your plate with fruits and vegetables.  ? Up to 6-8 servings of whole grains each day.  ? Less than 6 oz of lean meat, poultry, or fish each day. A 3-oz serving of meat is about the same size as a deck of cards. One egg equals 1 oz.  ? 2 servings of low-fat dairy each day.  ? A serving of nuts, seeds, or beans 5 times each week.  ? Heart-healthy fats. Healthy fats called Omega-3 fatty acids are found in foods such as flaxseeds and coldwater fish, like sardines, salmon, and mackerel.  · Limit how much you eat of the following:  ? Canned or prepackaged foods.  ? Food that is high in trans fat, such as fried foods.  ? Food that is high in saturated fat, such as fatty meat.  ? Sweets, desserts, sugary drinks, and other foods with added sugar.  ? Full-fat dairy products.  · Do not salt foods before eating.  · Try to eat at least 2 vegetarian meals each week.  · Eat more home-cooked food and less restaurant, buffet, and fast food.  · When eating at a restaurant, ask that your food be prepared with less salt or no salt, if possible.  What foods are recommended?  The items listed may not be a complete list. Talk with your dietitian about what dietary choices are best for you.  Grains  Whole-grain or whole-wheat bread. Whole-grain or whole-wheat pasta. Brown rice. Oatmeal. Quinoa. Bulgur. Whole-grain and low-sodium cereals. Ellen bread. Low-fat, low-sodium crackers. Whole-wheat flour tortillas.  Vegetables  Fresh or frozen vegetables (raw, steamed, roasted, or grilled). Low-sodium or reduced-sodium tomato and vegetable juice. Low-sodium or reduced-sodium tomato sauce and tomato  paste. Low-sodium or reduced-sodium canned vegetables.  Fruits  All fresh, dried, or frozen fruit. Canned fruit in natural juice (without added sugar).  Meat and other protein foods  Skinless chicken or turkey. Ground chicken or turkey. Pork with fat trimmed off. Fish and seafood. Egg whites. Dried beans, peas, or lentils. Unsalted nuts, nut butters, and seeds. Unsalted canned beans. Lean cuts of beef with fat trimmed off. Low-sodium, lean deli meat.  Dairy  Low-fat (1%) or fat-free (skim) milk. Fat-free, low-fat, or reduced-fat cheeses. Nonfat, low-sodium ricotta or cottage cheese. Low-fat or nonfat yogurt. Low-fat, low-sodium cheese.  Fats and oils  Soft margarine without trans fats. Vegetable oil. Low-fat, reduced-fat, or light mayonnaise and salad dressings (reduced-sodium). Canola, safflower, olive, soybean, and sunflower oils. Avocado.  Seasoning and other foods  Herbs. Spices. Seasoning mixes without salt. Unsalted popcorn and pretzels. Fat-free sweets.  What foods are not recommended?  The items listed may not be a complete list. Talk with your dietitian about what dietary choices are best for you.  Grains  Baked goods made with fat, such as croissants, muffins, or some breads. Dry pasta or rice meal packs.  Vegetables  Creamed or fried vegetables. Vegetables in a cheese sauce. Regular canned vegetables (not low-sodium or reduced-sodium). Regular canned tomato sauce and paste (not low-sodium or reduced-sodium). Regular tomato and vegetable juice (not low-sodium or reduced-sodium). Pickles. Olives.  Fruits  Canned fruit in a light or heavy syrup. Fried fruit. Fruit in cream or butter sauce.  Meat and other protein foods  Fatty cuts of meat. Ribs. Fried meat. Elizabeth. Sausage. Bologna and other processed lunch meats. Salami. Fatback. Hotdogs. Bratwurst. Salted nuts and seeds. Canned beans with added salt. Canned or smoked fish. Whole eggs or egg yolks. Chicken or turkey with skin.  Dairy  Whole or 2% milk,  cream, and half-and-half. Whole or full-fat cream cheese. Whole-fat or sweetened yogurt. Full-fat cheese. Nondairy creamers. Whipped toppings. Processed cheese and cheese spreads.  Fats and oils  Butter. Stick margarine. Lard. Shortening. Ghee. Elizabeth fat. Tropical oils, such as coconut, palm kernel, or palm oil.  Seasoning and other foods  Salted popcorn and pretzels. Onion salt, garlic salt, seasoned salt, table salt, and sea salt. Worcestershire sauce. Tartar sauce. Barbecue sauce. Teriyaki sauce. Soy sauce, including reduced-sodium. Steak sauce. Canned and packaged gravies. Fish sauce. Oyster sauce. Cocktail sauce. Horseradish that you find on the shelf. Ketchup. Mustard. Meat flavorings and tenderizers. Bouillon cubes. Hot sauce and Tabasco sauce. Premade or packaged marinades. Premade or packaged taco seasonings. Relishes. Regular salad dressings.  Where to find more information:  · National Heart, Lung, and Blood [place]: www.nhlbi.nih.gov  · American Heart Association: www.heart.org  Summary  · The DASH eating plan is a healthy eating plan that has been shown to reduce high blood pressure (hypertension). It may also reduce your risk for type 2 diabetes, heart disease, and stroke.  · With the DASH eating plan, you should limit salt (sodium) intake to 2,300 mg a day. If you have hypertension, you may need to reduce your sodium intake to 1,500 mg a day.  · When on the DASH eating plan, aim to eat more fresh fruits and vegetables, whole grains, lean proteins, low-fat dairy, and heart-healthy fats.  · Work with your health care provider or diet and nutrition specialist (dietitian) to adjust your eating plan to your individual calorie needs.  This information is not intended to replace advice given to you by your health care provider. Make sure you discuss any questions you have with your health care provider.  Document Revised: 11/30/2018 Document Reviewed: 12/11/2017  [employer]vier Patient Education © 2020 [employer]

## 2021-01-11 NOTE — PROGRESS NOTES
"Primary Care Provider: Ric Antony DO    Chief Complaint:   Chief Complaint   Patient presents with   • Back Pain     Here for discussion regarding replacement of spinal cord stimulator. Originally placed by Dr. Villela 08/2012 after multiple lumbar surgeries. He states with the combination of the stimulator and medications, his pain is well controlled.       History of Present Illness  Jonh Peacock is a 75 y.o. male is being seen for consultation today at the request of Ric Antony DO    2002: Lumbar discectomy with Dr. Sandhu  2007: L3-S1 fusion for bilateral leg pain  Evaluated by Dr. Myers and in [place] who recommended no further surgery.    8/2012: Dr. Villela placed spinal cord stimulator without incidence.  He is received a significant amount of benefits in both pain and activities of daily living with a spinal cord stimulator.  Is reaching end-of-life and has been evaluated by the [employer] rep and found to be appropriate for replacement.    Currently Jonh's pain is located in mid back radiating into his bilateral legs  Severity: 5/10 however the patient is medicated today with a functioning stimulator  Numbness: Bilateral lower extremities  Fine Motor Skills: Denies  Gait: Walks in kyphosis with a walker  Bowel and Bladder Function: Within normal limits    Alternative therapies: Butrans, Norco, gabapentin, and Valium and is managed by Dr. Sterling.     Risk factors for poor outcome following spine surgery: Coumadin and pacemaker    Oswestry Disability Index Lumbar = 46%   Score   Pain Intensity Moderate pain-2   Personal Care I can look after myself but it is slow and painful-2   Lifting Only very light weights-4   Walking Walk with crutches or stick-4   Sitting Sit in \"favorite\" chair as long as I like-1   Standing Pain limits standing to < 10 min-4   Sleeping Can only sleep < 6 hrs-2   Sex Life (if applicable) Not applicable-0   Social Life Social life normal, but increases " pain-1   Traveling Pain restricts to < 1 hr-3   ([place] et al, 1980)    SCORE INTERPRETATION OF THE OSWESTRY LBP DISABILITY QUESTIONNAIRE   40-60% Severe disability Pain remains the main problem in this group of patients, but travel, personal care, social life, sexual activity, and sleep are also affected. These patients require detailed investigation.     Review of Systems   Constitutional: Negative.    HENT: Negative.    Eyes: Negative.    Respiratory: Negative.    Cardiovascular: Negative.    Gastrointestinal: Negative.    Endocrine: Negative.    Genitourinary: Negative.    Musculoskeletal: Positive for back pain and gait problem.   Skin: Negative.    Allergic/Immunologic: Negative.    Hematological: Negative.    Psychiatric/Behavioral: Negative.        Past Medical History:   Diagnosis Date   • Anemia    • Anxiety    • Arrhythmia    • Arthritis    • Atrial fibrillation (CMS/HCC)    • Cardiomyopathy (CMS/HCC)    • CHF (congestive heart failure) (CMS/HCC)    • Colon polyp    • Connective tissue disease (CMS/HCC)    • Depression    • Diverticulitis    • DVT (deep venous thrombosis) (CMS/HCC)    • Gastritis    • Hiatal hernia    • History of transfusion    • Hyperlipidemia    • Hypertension    • Neuropathy    • Pneumonia        Past Surgical History:   Procedure Laterality Date   • APPENDECTOMY     • BACK SURGERY  2002, 2007    Multiple spine surgeries - laminectomy & fusion   • CARDIAC CATHETERIZATION     • CARDIAC DEFIBRILLATOR PLACEMENT     • CARDIAC ELECTROPHYSIOLOGY PROCEDURE N/A 10/31/2019    Procedure: ICD GEN CHANGE;  Surgeon: Chaz Chavez MD;  Location: [facility] CATH INVASIVE LOCATION;  Service: Cardiology   • COLON RESECTION WITH COLOSTOMY     • COLONOSCOPY  09/04/2014    diverticulosis   • COLONOSCOPY N/A 10/29/2019    Procedure: COLONOSCOPY WITH ANESTHESIA;  Surgeon: Tenzin Abrams MD;  Location: [facility] ENDOSCOPY;  Service: Gastroenterology   • ENDOSCOPY  02/24/1999    small sliding hiatal  hernia   • FOOT SURGERY Bilateral 2005   • JOINT REPLACEMENT Right 2002    KNEE   • PACEMAKER IMPLANTATION  2012   • RECTAL FISSURE INCISION AND DRAINAGE     • REPLACEMENT TOTAL KNEE     • SPINAL CORD STIMULATOR IMPLANT  08/2012    Placed by Dr. DEENA Villela, [place]   • TOTAL HIP ARTHROPLASTY Right 2004   • TOTAL HIP ARTHROPLASTY Left 12/2008       Family History: family history includes Breast cancer in his brother; Heart disease in his brother and mother.    Social History:  reports that he has never smoked. He has never used smokeless tobacco. He reports previous alcohol use. He reports that he does not use drugs.    Medications:    Current Outpatient Medications:   •  Buprenorphine (BUTRANS) 10 MCG/HR patch weekly, Place 10 mcg on the skin Every 7 (Seven) Days., Disp: , Rfl:   •  CHONDROITIN SULFATE PO, Take  by mouth., Disp: , Rfl:   •  diazePAM (VALIUM) 2 MG tablet, Take 2 mg by mouth Daily., Disp: , Rfl:   •  diclofenac (VOLTAREN) 1 % gel gel, Apply 4 g topically to the appropriate area as directed 4 (Four) Times a Day As Needed., Disp: , Rfl:   •  dilTIAZem (Cardizem) 30 MG tablet, Take 1 tablet by mouth 3 (Three) Times a Day., Disp: 270 tablet, Rfl: 3  •  furosemide (LASIX) 20 MG tablet, Take 1 tablet by mouth Daily., Disp: 90 tablet, Rfl: 3  •  furosemide (LASIX) 40 MG tablet, Take 1 tablet by mouth Daily. TAKE WITH 20 MG TABLET FOR 60 PER MG PER DAY, Disp: 90 tablet, Rfl: 3  •  gabapentin (NEURONTIN) 400 MG capsule, Take 400 mg by mouth 3 (Three) Times a Day., Disp: , Rfl:   •  Glucosamine 750 MG tablet, Take 750 mg by mouth 2 (Two) Times a Day., Disp: , Rfl:   •  HYDROcodone-acetaminophen (NORCO) 7.5-325 MG per tablet, Take 1 tablet by mouth Every 6 (Six) Hours As Needed for Moderate Pain ., Disp: , Rfl:   •  losartan (COZAAR) 25 MG tablet, Take 1 tablet by mouth Daily., Disp: 90 tablet, Rfl: 3  •  metoprolol succinate XL (TOPROL-XL) 100 MG 24 hr tablet, Take 1 tablet by mouth Daily., Disp: 90 tablet,  Rfl: 3  •  Multiple Vitamins-Minerals (MULTIVITAMIN ADULT PO), Take  by mouth Daily., Disp: , Rfl:   •  pantoprazole (PROTONIX) 40 MG EC tablet, Take 1 tablet by mouth Daily., Disp: 90 tablet, Rfl: 3  •  polyethylene glycol (MIRALAX) powder, Take 17 g by mouth Every Night., Disp: 1581 g, Rfl: 3  •  simvastatin (ZOCOR) 20 MG tablet, Take 1 tablet by mouth Every Night., Disp: 90 tablet, Rfl: 3  •  tamsulosin (FLOMAX) 0.4 MG capsule 24 hr capsule, Take 1 capsule by mouth Every Night., Disp: 90 capsule, Rfl: 3  •  warfarin (COUMADIN) 2 MG tablet, Take 1 tablet by mouth Daily., Disp: 90 tablet, Rfl: 1  •  warfarin (COUMADIN) 4 MG tablet, Take 1 tablet by mouth Daily., Disp: 90 tablet, Rfl: 1  •  warfarin (COUMADIN) 5 MG tablet, Take 1 tablet by mouth Daily., Disp: 90 tablet, Rfl: 1    Allergies:  Amiodarone, Oxycontin [oxycodone hcl], Penicillins, and Vioxx [rofecoxib]    Objective   Physical Exam  Constitutional:       Appearance: He is well-developed.   HENT:      Head: Normocephalic and atraumatic.   Eyes:      Extraocular Movements: EOM normal.      Conjunctiva/sclera: Conjunctivae normal.      Pupils: Pupils are equal, round, and reactive to light.      Funduscopic exam:     Right eye: No hemorrhage, exudate or papilledema.         Left eye: No hemorrhage, exudate or papilledema.   Neck:      Musculoskeletal: Normal range of motion and neck supple.   Cardiovascular:      Pulses:           Dorsalis pedis pulses are 2+ on the right side and 2+ on the left side.        Posterior tibial pulses are 2+ on the right side and 2+ on the left side.   Pulmonary:      Effort: Pulmonary effort is normal. No respiratory distress.   Skin:     General: Skin is warm.      Findings: No erythema or rash.          Neurological:      Mental Status: He is oriented to person, place, and time.      Coordination: Finger-Nose-Finger Test and Heel to Shin Test normal.      Gait: Tandem walk normal.      Deep Tendon Reflexes:      Reflex  Scores:       Tricep reflexes are 2+ on the right side and 2+ on the left side.       Bicep reflexes are 2+ on the right side and 2+ on the left side.       Brachioradialis reflexes are 2+ on the right side and 2+ on the left side.       Patellar reflexes are 1+ on the right side and 1+ on the left side.       Achilles reflexes are 1+ on the right side and 1+ on the left side.  Psychiatric:         Speech: Speech normal.       Neurologic Exam     Mental Status   Oriented to person, place, and time.   Registration: recalls 3 of 3 objects. Recall at 5 minutes: recalls 3 of 3 objects.   Attention: normal. Concentration: normal.   Speech: speech is normal   Level of consciousness: alert  Knowledge: consistent with education.     Cranial Nerves     CN II   Visual acuity: normal    CN III, IV, VI   Pupils are equal, round, and reactive to light.  Extraocular motions are normal.   Diplopia: none    CN V   Facial sensation intact.   Right corneal reflex: normal  Left corneal reflex: normal    CN VII   Right facial weakness: none  Left facial weakness: none    CN VIII   Hearing: intact    CN IX, X   Palate: symmetric  Right gag reflex: normal  Left gag reflex: normal    CN XI   Right trapezius strength: normal  Left trapezius strength: normal    CN XII   Tongue deviation: none    Motor Exam   Right arm tone: normal  Left arm tone: normal  Right arm pronator drift: absent  Left arm pronator drift: absent  Right leg tone: normal  Left leg tone: normal    Strength   Right deltoid: 5/5  Left deltoid: 5/5  Right biceps: 5/5  Left biceps: 5/5  Right triceps: 5/5  Left triceps: 5/5  Right interossei: 5/5  Left interossei: 5/5  Right iliopsoas: 5/5  Left iliopsoas: 4/5  Right quadriceps: 5/5  Left quadriceps: 5/5  Right anterior tibial: 5/5  Left anterior tibial: 5/5  Right gastroc: 5/5  Left gastroc: 5/5Right EHL 5/5   Left EHL 5/5     Sensory Exam   Right arm light touch: normal  Left arm light touch: normal  Right leg light  touch: decreased from knee  Left leg light touch: decreased from knee  Proprioception normal.     Gait, Coordination, and Reflexes     Gait  Gait: (Severe kyphosis with a walker)    Coordination   Finger to nose coordination: normal  Heel to shin coordination: normal  Tandem walking coordination: normal    Reflexes   Right brachioradialis: 2+  Left brachioradialis: 2+  Right biceps: 2+  Left biceps: 2+  Right triceps: 2+  Left triceps: 2+  Right patellar: 1+  Left patellar: 1+  Right achilles: 1+  Left achilles: 1+  Right plantar: equivocal  Left plantar: equivocal  Right Harrell: absent  Left Harrell: absent  Right ankle clonus: absent  Left ankle clonus: absent    Back exam:   + point tenderness over spine   No point tenderness over SI joint right and left   No pain with later loading of hip    Hip exam:   Negative VINNIE right and left    Negative straight leg Raise bilaterally.      Imaging: (independent review and interpretation)  No radiology results for the last 30 days.      ASSESSMENT and PLAN  Jonh Peacock is a 75 y.o. male with a significant comorbidity of hyperlipidemia, hypertension, chronic systolic heart failure, anticoagulation on Coumadin with colostomy bag. He presents with a new problem of spinal cord stimulator generator nearing end of life. Physical exam findings of decreased reflexes in his lower extremity and numbness in his bilateral lower extremities and left IP 4-5 weakness.      Spinal cord stimulator nearing end-of-life  Failed back syndrome  Prior L3-S1 instrumented fusion  Jonh, his wife, and I discussed the risk benefits and possible complications of conservative management versus replacement of spinal cord stimulator versus replacement of time spinal cord system.  He is in agreement with moving forward with replacement of spinal cord stimulator.  If the impedances are found to be high then we will proceed immediately with replacement of the entire system.  He needs clearance from  cardiology and primary care to be off his anticoagulation prior to surgery.  Of note the patient also has a colostomy bag.    Obesity Counseling  Jonh has a BMI 30.0-34.9        Classification: class I obesity.  We spent 0 minutes in weight management counseling including discussing current weight, current diet, and exercise patterns.  Additional we set goals for weight reduction.  Therefore above normal parameters. Recommendations include: none (medical contraindication).           Diagnoses and all orders for this visit:    1. Chronic pain syndrome (Primary)  -     Ambulatory Referral to Family Practice  -     Ambulatory Referral to Cardiology  -     XR Spine Thoracic 2 View; Future  -     XR Spine Lumbar AP & Lateral; Future  -     Case Request; Standing  -     CBC & Differential; Future  -     Basic Metabolic Panel; Future  -     Protime-INR; Future  -     Type & Screen; Future  -     ECG 12 Lead; Future  -     vancomycin (VANCOCIN) 1,750 mg in sodium chloride 0.9 % 250 mL IVPB  -     Case Request    2. Failed back syndrome  -     Ambulatory Referral to [facility]  -     Ambulatory Referral to Cardiology  -     XR Spine Thoracic 2 View; Future  -     XR Spine Lumbar AP & Lateral; Future  -     Case Request; Standing  -     CBC & Differential; Future  -     Basic Metabolic Panel; Future  -     Protime-INR; Future  -     Type & Screen; Future  -     ECG 12 Lead; Future  -     vancomycin (VANCOCIN) 1,750 mg in sodium chloride 0.9 % 250 mL IVPB  -     Case Request    3. Malfunction of spinal cord stimulator, initial encounter (CMS/[facility])  -     Ambulatory Referral to Family Practice  -     Ambulatory Referral to Cardiology  -     XR Spine Thoracic 2 View; Future  -     XR Spine Lumbar AP & Lateral; Future    4. Obesity (BMI 30-39.9)    5. Current use of long term anticoagulation   -     Protime-INR; Future    Other orders  -     Follow Anesthesia Guidelines / Protocol; Future  -     Obtain Informed Consent;  Future  -     Provide NPO Instructions to Patient; Future  -     Chlorhexidine Skin Prep; Future  -     Follow Anesthesia Guidelines / Protocol; Standing  -     Verify / Perform Chlorhexidine Skin Prep; Standing  -     Outpatient In A Bed; Standing  -     Verify NPO Status; Standing  -     Obtain Informed Consent; Standing  -     Have Patient Void Prior to Procedure; Standing        Return for after surgery.    Thank you for this Consultation and the opportunity to participate in Jonh's care.    Sincerely,  Noah Oliveros MD    Level of Risk: High, Main OR  MDM: Low Complexity  (Mod = 62176, High = 58760)

## 2021-01-12 ENCOUNTER — TELEPHONE (OUTPATIENT)
Dept: NEUROSURGERY | Facility: CLINIC | Age: 76
End: 2021-01-12

## 2021-01-12 NOTE — TELEPHONE ENCOUNTER
PATIENT IS SCHEDULED TO HAVE SURGERY ON 1/28/21 TO HAVE A BATTERY REPLACED ON HIS SPINAL CORD STIMULATOR.     WE ARE REQUESTING CARDIAC CLEARANCE.     PATIENT WILL NEED CLEARANCE TO STOP COUMADIN AS WELL.     PATIENT SEES DR BILLS AND WAS LAST SEEN IN YOUR OFFICE BY REYMUNDO HARDIN ON 1/7/21.    THANK YOU!

## 2021-01-20 ENCOUNTER — OFFICE VISIT (OUTPATIENT)
Dept: INTERNAL MEDICINE | Facility: CLINIC | Age: 76
End: 2021-01-20

## 2021-01-20 ENCOUNTER — APPOINTMENT (OUTPATIENT)
Dept: PREADMISSION TESTING | Facility: HOSPITAL | Age: 76
End: 2021-01-20

## 2021-01-20 VITALS
DIASTOLIC BLOOD PRESSURE: 70 MMHG | HEART RATE: 73 BPM | BODY MASS INDEX: 33.75 KG/M2 | HEIGHT: 74 IN | OXYGEN SATURATION: 98 % | TEMPERATURE: 98.1 F | WEIGHT: 263 LBS | RESPIRATION RATE: 16 BRPM | SYSTOLIC BLOOD PRESSURE: 120 MMHG

## 2021-01-20 VITALS
RESPIRATION RATE: 16 BRPM | HEIGHT: 74 IN | OXYGEN SATURATION: 97 % | DIASTOLIC BLOOD PRESSURE: 62 MMHG | HEART RATE: 78 BPM | WEIGHT: 262.35 LBS | SYSTOLIC BLOOD PRESSURE: 126 MMHG | BODY MASS INDEX: 33.67 KG/M2

## 2021-01-20 DIAGNOSIS — Z79.01 CURRENT USE OF LONG TERM ANTICOAGULATION: ICD-10-CM

## 2021-01-20 DIAGNOSIS — Z01.818 PREOP EXAM FOR INTERNAL MEDICINE: Primary | ICD-10-CM

## 2021-01-20 DIAGNOSIS — E11.9 TYPE 2 DIABETES MELLITUS WITHOUT COMPLICATION, WITHOUT LONG-TERM CURRENT USE OF INSULIN (HCC): ICD-10-CM

## 2021-01-20 DIAGNOSIS — G89.4 CHRONIC PAIN SYNDROME: ICD-10-CM

## 2021-01-20 DIAGNOSIS — M96.1 FAILED BACK SYNDROME: ICD-10-CM

## 2021-01-20 DIAGNOSIS — G89.29 CHRONIC MIDLINE LOW BACK PAIN WITH BILATERAL SCIATICA: ICD-10-CM

## 2021-01-20 DIAGNOSIS — I50.22 CHRONIC SYSTOLIC CHF (CONGESTIVE HEART FAILURE), NYHA CLASS 3 (HCC): ICD-10-CM

## 2021-01-20 DIAGNOSIS — M54.42 CHRONIC MIDLINE LOW BACK PAIN WITH BILATERAL SCIATICA: ICD-10-CM

## 2021-01-20 DIAGNOSIS — M54.41 CHRONIC MIDLINE LOW BACK PAIN WITH BILATERAL SCIATICA: ICD-10-CM

## 2021-01-20 LAB
ANION GAP SERPL CALCULATED.3IONS-SCNC: 7 MMOL/L (ref 5–15)
BASOPHILS # BLD AUTO: 0.03 10*3/MM3 (ref 0–0.2)
BASOPHILS NFR BLD AUTO: 0.5 % (ref 0–1.5)
BUN SERPL-MCNC: 22 MG/DL (ref 8–23)
BUN/CREAT SERPL: 21.8 (ref 7–25)
CALCIUM SPEC-SCNC: 9.6 MG/DL (ref 8.6–10.5)
CHLORIDE SERPL-SCNC: 102 MMOL/L (ref 98–107)
CO2 SERPL-SCNC: 33 MMOL/L (ref 22–29)
CREAT SERPL-MCNC: 1.01 MG/DL (ref 0.76–1.27)
DEPRECATED RDW RBC AUTO: 42.7 FL (ref 37–54)
EOSINOPHIL # BLD AUTO: 0.13 10*3/MM3 (ref 0–0.4)
EOSINOPHIL NFR BLD AUTO: 2.1 % (ref 0.3–6.2)
ERYTHROCYTE [DISTWIDTH] IN BLOOD BY AUTOMATED COUNT: 13.1 % (ref 12.3–15.4)
GFR SERPL CREATININE-BSD FRML MDRD: 88 ML/MIN/1.73
GLUCOSE SERPL-MCNC: 140 MG/DL (ref 65–99)
HCT VFR BLD AUTO: 34.6 % (ref 37.5–51)
HGB BLD-MCNC: 11.9 G/DL (ref 13–17.7)
IMM GRANULOCYTES # BLD AUTO: 0.02 10*3/MM3 (ref 0–0.05)
IMM GRANULOCYTES NFR BLD AUTO: 0.3 % (ref 0–0.5)
INR PPP: 1.89 (ref 0.91–1.09)
LYMPHOCYTES # BLD AUTO: 1.34 10*3/MM3 (ref 0.7–3.1)
LYMPHOCYTES NFR BLD AUTO: 21.2 % (ref 19.6–45.3)
MCH RBC QN AUTO: 31 PG (ref 26.6–33)
MCHC RBC AUTO-ENTMCNC: 34.4 G/DL (ref 31.5–35.7)
MCV RBC AUTO: 90.1 FL (ref 79–97)
MONOCYTES # BLD AUTO: 0.44 10*3/MM3 (ref 0.1–0.9)
MONOCYTES NFR BLD AUTO: 7 % (ref 5–12)
NEUTROPHILS NFR BLD AUTO: 4.36 10*3/MM3 (ref 1.7–7)
NEUTROPHILS NFR BLD AUTO: 68.9 % (ref 42.7–76)
NRBC BLD AUTO-RTO: 0 /100 WBC (ref 0–0.2)
PLATELET # BLD AUTO: 169 10*3/MM3 (ref 140–450)
PMV BLD AUTO: 11.6 FL (ref 6–12)
POTASSIUM SERPL-SCNC: 4.1 MMOL/L (ref 3.5–5.2)
PROTHROMBIN TIME: 21.5 SECONDS (ref 11.9–14.6)
RBC # BLD AUTO: 3.84 10*6/MM3 (ref 4.14–5.8)
SODIUM SERPL-SCNC: 142 MMOL/L (ref 136–145)
WBC # BLD AUTO: 6.32 10*3/MM3 (ref 3.4–10.8)

## 2021-01-20 PROCEDURE — 80048 BASIC METABOLIC PNL TOTAL CA: CPT

## 2021-01-20 PROCEDURE — 85025 COMPLETE CBC W/AUTO DIFF WBC: CPT

## 2021-01-20 PROCEDURE — 36415 COLL VENOUS BLD VENIPUNCTURE: CPT

## 2021-01-20 PROCEDURE — 99214 OFFICE O/P EST MOD 30 MIN: CPT | Performed by: INTERNAL MEDICINE

## 2021-01-20 PROCEDURE — 85610 PROTHROMBIN TIME: CPT

## 2021-01-20 NOTE — PROGRESS NOTES
CC: preop evaluation for spinal stimulator    History:  Jonh Peacock is a 75 y.o. male   He reports he has been doing reasonably well and has no new symptoms.  His swelling has been under good control and he has no worsening palpitations or arrhythmia, swelling, dyspnea, nor any chest pain.  He has limited mobility and evaluation of his METS is limited secondary to this.  CBC and renal function were normal back in December and he is scheduled for repeat labs prior to surgery.  However, he had no renal failure nor uncontrolled diabetes at that time.       ROS:  Review of Systems   Constitutional: Negative for diaphoresis, fatigue and fever.   Respiratory: Positive for shortness of breath (chronic). Negative for cough.    Cardiovascular: Negative for chest pain, palpitations and leg swelling.   Gastrointestinal: Negative for abdominal pain, nausea and vomiting.   Musculoskeletal: Negative for back pain and neck pain.   Neurological: Negative for dizziness, syncope, weakness, light-headedness and headaches.   Psychiatric/Behavioral: Negative for confusion.        reports that he has never smoked. He has never used smokeless tobacco. He reports previous alcohol use. He reports that he does not use drugs.      Current Outpatient Medications:   •  Buprenorphine (BUTRANS) 10 MCG/HR patch weekly, Place 10 mcg on the skin Every 7 (Seven) Days., Disp: , Rfl:   •  CHONDROITIN SULFATE PO, Take  by mouth., Disp: , Rfl:   •  diazePAM (VALIUM) 2 MG tablet, Take 2 mg by mouth Daily., Disp: , Rfl:   •  diclofenac (VOLTAREN) 1 % gel gel, Apply 4 g topically to the appropriate area as directed 4 (Four) Times a Day As Needed., Disp: , Rfl:   •  dilTIAZem (Cardizem) 30 MG tablet, Take 1 tablet by mouth 3 (Three) Times a Day., Disp: 270 tablet, Rfl: 3  •  furosemide (LASIX) 20 MG tablet, Take 1 tablet by mouth Daily., Disp: 90 tablet, Rfl: 3  •  furosemide (LASIX) 40 MG tablet, Take 1 tablet by mouth Daily. TAKE WITH 20 MG TABLET  "FOR 60 PER MG PER DAY, Disp: 90 tablet, Rfl: 3  •  gabapentin (NEURONTIN) 400 MG capsule, Take 400 mg by mouth 3 (Three) Times a Day., Disp: , Rfl:   •  Glucosamine 750 MG tablet, Take 750 mg by mouth 2 (Two) Times a Day., Disp: , Rfl:   •  HYDROcodone-acetaminophen (NORCO) 7.5-325 MG per tablet, Take 1 tablet by mouth Every 6 (Six) Hours As Needed for Moderate Pain ., Disp: , Rfl:   •  losartan (COZAAR) 25 MG tablet, Take 1 tablet by mouth Daily., Disp: 90 tablet, Rfl: 3  •  metoprolol succinate XL (TOPROL-XL) 100 MG 24 hr tablet, Take 1 tablet by mouth Daily., Disp: 90 tablet, Rfl: 3  •  Multiple Vitamins-Minerals (MULTIVITAMIN ADULT PO), Take  by mouth Daily., Disp: , Rfl:   •  pantoprazole (PROTONIX) 40 MG EC tablet, Take 1 tablet by mouth Daily., Disp: 90 tablet, Rfl: 3  •  polyethylene glycol (MIRALAX) powder, Take 17 g by mouth Every Night., Disp: 1581 g, Rfl: 3  •  simvastatin (ZOCOR) 20 MG tablet, Take 1 tablet by mouth Every Night., Disp: 90 tablet, Rfl: 3  •  tamsulosin (FLOMAX) 0.4 MG capsule 24 hr capsule, Take 1 capsule by mouth Every Night., Disp: 90 capsule, Rfl: 3  •  warfarin (COUMADIN) 2 MG tablet, Take 1 tablet by mouth Daily., Disp: 90 tablet, Rfl: 1  •  warfarin (COUMADIN) 4 MG tablet, Take 1 tablet by mouth Daily., Disp: 90 tablet, Rfl: 1  •  warfarin (COUMADIN) 5 MG tablet, Take 1 tablet by mouth Daily., Disp: 90 tablet, Rfl: 1    OBJECTIVE:  /70 (BP Location: Left arm, Patient Position: Sitting, Cuff Size: Adult)   Pulse 73   Temp 98.1 °F (36.7 °C)   Resp 16   Ht 188 cm (74\")   Wt 119 kg (263 lb)   SpO2 98%   BMI 33.77 kg/m²    Physical Exam  Constitutional:       General: He is not in acute distress.  Pulmonary:      Effort: Pulmonary effort is normal. No respiratory distress.   Neurological:      Mental Status: He is alert and oriented to person, place, and time.   Psychiatric:         Mood and Affect: Mood normal.         Behavior: Behavior normal.         Assessment/Plan   "   Diagnoses and all orders for this visit:    1. Preop exam for internal medicine (Primary)  2. Chronic midline low back pain with bilateral sciatica  RCRI risk is 2, indicating 10% 30 day risk of cardiac events. METS are unable to be determined secondary to his decreased physical abilities.  However, he has no apparent acute coronary syndrome, decompensated heart failure, uncontrolled arrhythmia, nor evidence of uncontrolled valvular disease.  EKG is reviewed from January 2021 without significant changes and he is without symptoms.  Laboratories reviewed from December 2020 showing no renal failure, normal platelets, and indicating very elevated risks.  Pending confirmation of these laboratories as ordered by Dr. Oliveros, he is at elevated risk for this procedure, but I would recommend he move forward with no further cardiac testing.  He is instructed to hold his warfarin starting on January 22 and to restart the day of or day after his procedure as determined by Dr. Oliveros on hemostasis.    3. Chronic systolic CHF (congestive heart failure), NYHA class 3 (CMS/HCC)  Continue current medical therapy as he is without acute decompensation.    4. Type 2 diabetes mellitus without complication, without long-term current use of insulin (CMS/HCC)  Managing with diet only and is not insulin-dependent.      An After Visit Summary was printed and given to the patient at discharge.  Return for Next scheduled follow up.          Ric Antony D.O. 1/20/2021   Electronically signed.

## 2021-01-22 ENCOUNTER — TRANSCRIBE ORDERS (OUTPATIENT)
Dept: ADMINISTRATIVE | Facility: HOSPITAL | Age: 76
End: 2021-01-22

## 2021-01-22 DIAGNOSIS — Z11.59 SCREENING FOR VIRAL DISEASE: Primary | ICD-10-CM

## 2021-01-25 ENCOUNTER — LAB (OUTPATIENT)
Dept: LAB | Facility: HOSPITAL | Age: 76
End: 2021-01-25

## 2021-01-25 LAB — SARS-COV-2 ORF1AB RESP QL NAA+PROBE: NOT DETECTED

## 2021-01-25 PROCEDURE — C9803 HOPD COVID-19 SPEC COLLECT: HCPCS | Performed by: NEUROLOGICAL SURGERY

## 2021-01-25 PROCEDURE — U0004 COV-19 TEST NON-CDC HGH THRU: HCPCS | Performed by: NEUROLOGICAL SURGERY

## 2021-01-28 ENCOUNTER — ANESTHESIA (OUTPATIENT)
Dept: PERIOP | Facility: HOSPITAL | Age: 76
End: 2021-01-28

## 2021-01-28 ENCOUNTER — ANESTHESIA EVENT (OUTPATIENT)
Dept: PERIOP | Facility: HOSPITAL | Age: 76
End: 2021-01-28

## 2021-01-28 ENCOUNTER — HOSPITAL ENCOUNTER (OUTPATIENT)
Facility: HOSPITAL | Age: 76
Setting detail: HOSPITAL OUTPATIENT SURGERY
Discharge: HOME | End: 2021-01-28
Attending: NEUROLOGICAL SURGERY | Admitting: NEUROLOGICAL SURGERY

## 2021-01-28 VITALS
RESPIRATION RATE: 16 BRPM | HEART RATE: 60 BPM | OXYGEN SATURATION: 96 % | TEMPERATURE: 96.9 F | DIASTOLIC BLOOD PRESSURE: 69 MMHG | SYSTOLIC BLOOD PRESSURE: 109 MMHG

## 2021-01-28 DIAGNOSIS — T85.192S MALFUNCTION OF SPINAL CORD STIMULATOR, SEQUELA: Primary | ICD-10-CM

## 2021-01-28 DIAGNOSIS — G89.4 CHRONIC PAIN SYNDROME: ICD-10-CM

## 2021-01-28 DIAGNOSIS — I48.0 PAROXYSMAL ATRIAL FIBRILLATION (HCC): ICD-10-CM

## 2021-01-28 DIAGNOSIS — M96.1 FAILED BACK SYNDROME: ICD-10-CM

## 2021-01-28 LAB
ABO GROUP BLD: NORMAL
BLD GP AB SCN SERPL QL: NEGATIVE
GLUCOSE BLDC GLUCOMTR-MCNC: 139 MG/DL (ref 70–130)
INR PPP: 1.12 (ref 0.91–1.09)
PROTHROMBIN TIME: 14 SECONDS (ref 11.9–14.6)
RH BLD: POSITIVE
T&S EXPIRATION DATE: NORMAL

## 2021-01-28 PROCEDURE — 86850 RBC ANTIBODY SCREEN: CPT | Performed by: NEUROLOGICAL SURGERY

## 2021-01-28 PROCEDURE — 25010000002 DEXAMETHASONE PER 1 MG: Performed by: NURSE ANESTHETIST, CERTIFIED REGISTERED

## 2021-01-28 PROCEDURE — 63685 INS/RPLC SPI NPG/RCVR POCKET: CPT | Performed by: NEUROLOGICAL SURGERY

## 2021-01-28 PROCEDURE — 82962 GLUCOSE BLOOD TEST: CPT

## 2021-01-28 PROCEDURE — 85610 PROTHROMBIN TIME: CPT | Performed by: NEUROLOGICAL SURGERY

## 2021-01-28 PROCEDURE — C1787 PATIENT PROGR, NEUROSTIM: HCPCS | Performed by: NEUROLOGICAL SURGERY

## 2021-01-28 PROCEDURE — 25010000002 ONDANSETRON PER 1 MG: Performed by: NURSE ANESTHETIST, CERTIFIED REGISTERED

## 2021-01-28 PROCEDURE — C1820 GENERATOR NEURO RECHG BAT SY: HCPCS | Performed by: NEUROLOGICAL SURGERY

## 2021-01-28 PROCEDURE — L8689 EXTERNAL RECHARG SYS INTERN: HCPCS | Performed by: NEUROLOGICAL SURGERY

## 2021-01-28 PROCEDURE — 86900 BLOOD TYPING SEROLOGIC ABO: CPT | Performed by: NEUROLOGICAL SURGERY

## 2021-01-28 PROCEDURE — 25010000002 SUCCINYLCHOLINE PER 20 MG: Performed by: NURSE ANESTHETIST, CERTIFIED REGISTERED

## 2021-01-28 PROCEDURE — 99024 POSTOP FOLLOW-UP VISIT: CPT | Performed by: NURSE PRACTITIONER

## 2021-01-28 PROCEDURE — 86901 BLOOD TYPING SEROLOGIC RH(D): CPT | Performed by: NEUROLOGICAL SURGERY

## 2021-01-28 PROCEDURE — 25010000002 PROPOFOL 10 MG/ML EMULSION: Performed by: NURSE ANESTHETIST, CERTIFIED REGISTERED

## 2021-01-28 PROCEDURE — 25010000002 FENTANYL CITRATE (PF) 100 MCG/2ML SOLUTION: Performed by: NURSE ANESTHETIST, CERTIFIED REGISTERED

## 2021-01-28 PROCEDURE — 25010000002 VANCOMYCIN 1 G RECONSTITUTED SOLUTION 1 EACH VIAL: Performed by: NEUROLOGICAL SURGERY

## 2021-01-28 DEVICE — NEUROSTM INTELLIS SURESCAN MRI W/ADAPTIVE STIM RECHG: Type: IMPLANTABLE DEVICE | Site: BACK | Status: FUNCTIONAL

## 2021-01-28 RX ORDER — LIDOCAINE HYDROCHLORIDE 10 MG/ML
0.5 INJECTION, SOLUTION EPIDURAL; INFILTRATION; INTRACAUDAL; PERINEURAL ONCE AS NEEDED
Status: DISCONTINUED | OUTPATIENT
Start: 2021-01-28 | End: 2021-01-28 | Stop reason: HOSPADM

## 2021-01-28 RX ORDER — DEXTROSE MONOHYDRATE 25 G/50ML
12.5 INJECTION, SOLUTION INTRAVENOUS AS NEEDED
Status: DISCONTINUED | OUTPATIENT
Start: 2021-01-28 | End: 2021-01-28 | Stop reason: HOSPADM

## 2021-01-28 RX ORDER — ONDANSETRON 2 MG/ML
4 INJECTION INTRAMUSCULAR; INTRAVENOUS AS NEEDED
Status: DISCONTINUED | OUTPATIENT
Start: 2021-01-28 | End: 2021-01-28 | Stop reason: HOSPADM

## 2021-01-28 RX ORDER — NEOSTIGMINE METHYLSULFATE 5 MG/5 ML
SYRINGE (ML) INTRAVENOUS AS NEEDED
Status: DISCONTINUED | OUTPATIENT
Start: 2021-01-28 | End: 2021-01-28 | Stop reason: SURG

## 2021-01-28 RX ORDER — SODIUM CHLORIDE, SODIUM LACTATE, POTASSIUM CHLORIDE, CALCIUM CHLORIDE 600; 310; 30; 20 MG/100ML; MG/100ML; MG/100ML; MG/100ML
1000 INJECTION, SOLUTION INTRAVENOUS CONTINUOUS
Status: DISCONTINUED | OUTPATIENT
Start: 2021-01-28 | End: 2021-01-28 | Stop reason: HOSPADM

## 2021-01-28 RX ORDER — AMOXICILLIN 250 MG
1 CAPSULE ORAL DAILY
Qty: 60 TABLET | Refills: 0 | Status: SHIPPED | OUTPATIENT
Start: 2021-01-28 | End: 2021-02-10

## 2021-01-28 RX ORDER — NALOXONE HCL 0.4 MG/ML
0.04 VIAL (ML) INJECTION AS NEEDED
Status: DISCONTINUED | OUTPATIENT
Start: 2021-01-28 | End: 2021-01-28 | Stop reason: HOSPADM

## 2021-01-28 RX ORDER — ONDANSETRON 2 MG/ML
INJECTION INTRAMUSCULAR; INTRAVENOUS AS NEEDED
Status: DISCONTINUED | OUTPATIENT
Start: 2021-01-28 | End: 2021-01-28 | Stop reason: SURG

## 2021-01-28 RX ORDER — METOPROLOL TARTRATE 5 MG/5ML
INJECTION INTRAVENOUS AS NEEDED
Status: DISCONTINUED | OUTPATIENT
Start: 2021-01-28 | End: 2021-01-28 | Stop reason: SURG

## 2021-01-28 RX ORDER — WARFARIN SODIUM 2 MG/1
2 TABLET ORAL
Qty: 90 TABLET | Refills: 1
Start: 2021-02-04 | End: 2021-12-03 | Stop reason: SDUPTHER

## 2021-01-28 RX ORDER — FENTANYL CITRATE 50 UG/ML
25 INJECTION, SOLUTION INTRAMUSCULAR; INTRAVENOUS
Status: DISCONTINUED | OUTPATIENT
Start: 2021-01-28 | End: 2021-01-28 | Stop reason: HOSPADM

## 2021-01-28 RX ORDER — EPHEDRINE SULFATE 50 MG/ML
INJECTION, SOLUTION INTRAVENOUS AS NEEDED
Status: DISCONTINUED | OUTPATIENT
Start: 2021-01-28 | End: 2021-01-28 | Stop reason: SURG

## 2021-01-28 RX ORDER — FENTANYL CITRATE 50 UG/ML
INJECTION, SOLUTION INTRAMUSCULAR; INTRAVENOUS AS NEEDED
Status: DISCONTINUED | OUTPATIENT
Start: 2021-01-28 | End: 2021-01-28 | Stop reason: SURG

## 2021-01-28 RX ORDER — ROCURONIUM BROMIDE 10 MG/ML
INJECTION, SOLUTION INTRAVENOUS AS NEEDED
Status: DISCONTINUED | OUTPATIENT
Start: 2021-01-28 | End: 2021-01-28 | Stop reason: SURG

## 2021-01-28 RX ORDER — PROPOFOL 10 MG/ML
VIAL (ML) INTRAVENOUS AS NEEDED
Status: DISCONTINUED | OUTPATIENT
Start: 2021-01-28 | End: 2021-01-28 | Stop reason: SURG

## 2021-01-28 RX ORDER — HYDROMORPHONE HYDROCHLORIDE 1 MG/ML
0.5 INJECTION, SOLUTION INTRAMUSCULAR; INTRAVENOUS; SUBCUTANEOUS
Status: DISCONTINUED | OUTPATIENT
Start: 2021-01-28 | End: 2021-01-28 | Stop reason: HOSPADM

## 2021-01-28 RX ORDER — SODIUM CHLORIDE 0.9 % (FLUSH) 0.9 %
10 SYRINGE (ML) INJECTION AS NEEDED
Status: DISCONTINUED | OUTPATIENT
Start: 2021-01-28 | End: 2021-01-28 | Stop reason: HOSPADM

## 2021-01-28 RX ORDER — HYDROCODONE BITARTRATE AND ACETAMINOPHEN 7.5; 325 MG/1; MG/1
1 TABLET ORAL EVERY 6 HOURS PRN
Qty: 40 TABLET | Refills: 0 | Status: SHIPPED | OUTPATIENT
Start: 2021-01-28 | End: 2021-03-31

## 2021-01-28 RX ORDER — DEXAMETHASONE SODIUM PHOSPHATE 4 MG/ML
INJECTION, SOLUTION INTRA-ARTICULAR; INTRALESIONAL; INTRAMUSCULAR; INTRAVENOUS; SOFT TISSUE AS NEEDED
Status: DISCONTINUED | OUTPATIENT
Start: 2021-01-28 | End: 2021-01-28 | Stop reason: SURG

## 2021-01-28 RX ORDER — SODIUM CHLORIDE, SODIUM LACTATE, POTASSIUM CHLORIDE, CALCIUM CHLORIDE 600; 310; 30; 20 MG/100ML; MG/100ML; MG/100ML; MG/100ML
9 INJECTION, SOLUTION INTRAVENOUS CONTINUOUS
Status: DISCONTINUED | OUTPATIENT
Start: 2021-01-28 | End: 2021-01-28 | Stop reason: HOSPADM

## 2021-01-28 RX ORDER — SODIUM CHLORIDE 0.9 % (FLUSH) 0.9 %
10 SYRINGE (ML) INJECTION EVERY 12 HOURS SCHEDULED
Status: DISCONTINUED | OUTPATIENT
Start: 2021-01-28 | End: 2021-01-28 | Stop reason: HOSPADM

## 2021-01-28 RX ORDER — SODIUM CHLORIDE 0.9 % (FLUSH) 0.9 %
3 SYRINGE (ML) INJECTION AS NEEDED
Status: DISCONTINUED | OUTPATIENT
Start: 2021-01-28 | End: 2021-01-28 | Stop reason: HOSPADM

## 2021-01-28 RX ORDER — MAGNESIUM HYDROXIDE 1200 MG/15ML
LIQUID ORAL AS NEEDED
Status: DISCONTINUED | OUTPATIENT
Start: 2021-01-28 | End: 2021-01-28 | Stop reason: HOSPADM

## 2021-01-28 RX ORDER — LIDOCAINE HYDROCHLORIDE 20 MG/ML
INJECTION, SOLUTION EPIDURAL; INFILTRATION; INTRACAUDAL; PERINEURAL AS NEEDED
Status: DISCONTINUED | OUTPATIENT
Start: 2021-01-28 | End: 2021-01-28 | Stop reason: SURG

## 2021-01-28 RX ORDER — IBUPROFEN 600 MG/1
600 TABLET ORAL ONCE AS NEEDED
Status: DISCONTINUED | OUTPATIENT
Start: 2021-01-28 | End: 2021-01-28 | Stop reason: HOSPADM

## 2021-01-28 RX ORDER — WARFARIN SODIUM 5 MG/1
5 TABLET ORAL
Qty: 90 TABLET | Refills: 1
Start: 2021-02-04 | End: 2021-12-03 | Stop reason: SDUPTHER

## 2021-01-28 RX ORDER — OXYCODONE AND ACETAMINOPHEN 10; 325 MG/1; MG/1
1 TABLET ORAL ONCE AS NEEDED
Status: COMPLETED | OUTPATIENT
Start: 2021-01-28 | End: 2021-01-28

## 2021-01-28 RX ORDER — WARFARIN SODIUM 4 MG/1
4 TABLET ORAL
Qty: 90 TABLET | Refills: 1
Start: 2021-02-04 | End: 2021-12-03 | Stop reason: SDUPTHER

## 2021-01-28 RX ORDER — SUCCINYLCHOLINE CHLORIDE 20 MG/ML
INJECTION INTRAMUSCULAR; INTRAVENOUS AS NEEDED
Status: DISCONTINUED | OUTPATIENT
Start: 2021-01-28 | End: 2021-01-28 | Stop reason: SURG

## 2021-01-28 RX ORDER — LABETALOL HYDROCHLORIDE 5 MG/ML
5 INJECTION, SOLUTION INTRAVENOUS
Status: DISCONTINUED | OUTPATIENT
Start: 2021-01-28 | End: 2021-01-28 | Stop reason: HOSPADM

## 2021-01-28 RX ORDER — LIDOCAINE HYDROCHLORIDE 10 MG/ML
0.5 INJECTION, SOLUTION EPIDURAL; INFILTRATION; INTRACAUDAL; PERINEURAL ONCE AS NEEDED
Status: DISCONTINUED | OUTPATIENT
Start: 2021-01-28 | End: 2021-01-28 | Stop reason: SDUPTHER

## 2021-01-28 RX ORDER — FLUMAZENIL 0.1 MG/ML
0.2 INJECTION INTRAVENOUS AS NEEDED
Status: DISCONTINUED | OUTPATIENT
Start: 2021-01-28 | End: 2021-01-28 | Stop reason: HOSPADM

## 2021-01-28 RX ADMIN — GLYCOPYRROLATE 0.2 MG: 0.2 INJECTION, SOLUTION INTRAMUSCULAR; INTRAVENOUS at 08:21

## 2021-01-28 RX ADMIN — OXYCODONE HYDROCHLORIDE AND ACETAMINOPHEN 1 TABLET: 10; 325 TABLET ORAL at 09:53

## 2021-01-28 RX ADMIN — METOPROLOL TARTRATE 2.5 MG: 5 INJECTION INTRAVENOUS at 09:04

## 2021-01-28 RX ADMIN — DEXAMETHASONE SODIUM PHOSPHATE 8 MG: 4 INJECTION, SOLUTION INTRAMUSCULAR; INTRAVENOUS at 08:21

## 2021-01-28 RX ADMIN — GLYCOPYRROLATE 0.2 MG: 0.2 INJECTION, SOLUTION INTRAMUSCULAR; INTRAVENOUS at 09:34

## 2021-01-28 RX ADMIN — ROCURONIUM BROMIDE 10 MG: 10 INJECTION INTRAVENOUS at 08:21

## 2021-01-28 RX ADMIN — EPHEDRINE SULFATE 10 MG: 50 INJECTION INTRAVENOUS at 08:21

## 2021-01-28 RX ADMIN — FENTANYL CITRATE 100 MCG: 50 INJECTION, SOLUTION INTRAMUSCULAR; INTRAVENOUS at 08:21

## 2021-01-28 RX ADMIN — EPHEDRINE SULFATE 10 MG: 50 INJECTION INTRAVENOUS at 08:45

## 2021-01-28 RX ADMIN — SUCCINYLCHOLINE CHLORIDE 100 MG: 20 INJECTION, SOLUTION INTRAMUSCULAR; INTRAVENOUS at 08:21

## 2021-01-28 RX ADMIN — LIDOCAINE HYDROCHLORIDE 100 MG: 20 INJECTION, SOLUTION EPIDURAL; INFILTRATION; INTRACAUDAL; PERINEURAL at 08:21

## 2021-01-28 RX ADMIN — METOPROLOL TARTRATE 2.5 MG: 5 INJECTION INTRAVENOUS at 09:15

## 2021-01-28 RX ADMIN — ROCURONIUM BROMIDE 15 MG: 10 INJECTION INTRAVENOUS at 08:30

## 2021-01-28 RX ADMIN — SODIUM CHLORIDE, POTASSIUM CHLORIDE, SODIUM LACTATE AND CALCIUM CHLORIDE 1000 ML: 600; 310; 30; 20 INJECTION, SOLUTION INTRAVENOUS at 07:27

## 2021-01-28 RX ADMIN — VANCOMYCIN HYDROCHLORIDE 1750 MG: 1 INJECTION, POWDER, LYOPHILIZED, FOR SOLUTION INTRAVENOUS at 07:50

## 2021-01-28 RX ADMIN — PROPOFOL 80 MG: 10 INJECTION, EMULSION INTRAVENOUS at 08:21

## 2021-01-28 RX ADMIN — ONDANSETRON HYDROCHLORIDE 4 MG: 2 SOLUTION INTRAMUSCULAR; INTRAVENOUS at 08:21

## 2021-01-28 RX ADMIN — Medication 3 MG: at 09:34

## 2021-01-28 NOTE — ANESTHESIA PREPROCEDURE EVALUATION
Anesthesia Evaluation     Patient summary reviewed   NPO Solid Status: > 8 hours             Airway   Mallampati: II  TM distance: >3 FB  Neck ROM: full  Dental      Pulmonary    (+) sleep apnea,   (-) COPD, asthma, not a smoker  Cardiovascular   Exercise tolerance: good (4-7 METS)    ECG reviewed    (+) pacemaker ([employer]. 56% a paced) ICD, hypertension, dysrhythmias Atrial Fib, CHF (35%) Systolic <55%, hyperlipidemia,   (-) past MI, angina, cardiac stents, CABG      Neuro/Psych  (-) seizures, TIA, CVA  GI/Hepatic/Renal/Endo    (+) obesity,  GERD,    (-) liver disease, no renal disease, diabetes    Musculoskeletal     (+) back pain, chronic pain,   Abdominal    Substance History      OB/GYN          Other                        Anesthesia Plan    ASA 3     general   (Place magnet to disable defib function.  Interrogate in recovey.)  intravenous induction     Anesthetic plan, all risks, benefits, and alternatives have been provided, discussed and informed consent has been obtained with: patient.

## 2021-01-28 NOTE — ANESTHESIA POSTPROCEDURE EVALUATION
Patient: Jonh Peacock    Procedure Summary     Date: 01/28/21 Room / Location:  [facility] OR  /  [facility]    Anesthesia Start: 0820 Anesthesia Stop: 0940    Procedure: SPINAL CORD STIMULATOR REVISION, right buttocks (Right Spine Thoracic) Diagnosis:       Chronic pain syndrome      Failed back syndrome      (Chronic pain syndrome [G89.4])      (Failed back syndrome [M96.1])    Surgeon: Noah Oliveros MD Provider: Chaz Newsome CRNA    Anesthesia Type: general ASA Status: 3          Anesthesia Type: general    Vitals  Vitals Value Taken Time   /70 01/28/21 1005   Temp 96.9 °F (36.1 °C) 01/28/21 1005   Pulse 61 01/28/21 1008   Resp 16 01/28/21 1005   SpO2 96 % 01/28/21 1008   Vitals shown include unvalidated device data.        Post Anesthesia Care and Evaluation    PONV Status: none  Comments: Patient d/c from PACU prior to anes eval based on Connie score.  Please see RN notes for details of d/c criteria.    Blood pressure 109/70, pulse 60, temperature 96.9 °F (36.1 °C), temperature source Temporal, resp. rate 16, SpO2 97 %.

## 2021-01-28 NOTE — ANESTHESIA PROCEDURE NOTES
Airway  Urgency: elective    Date/Time: 1/28/2021 8:23 AM  Airway not difficult    General Information and Staff    Patient location during procedure: OR  CRNA: Chaz Newsome CRNA    Indications and Patient Condition  Indications for airway management: airway protection    Preoxygenated: yes  MILS maintained throughout  Mask difficulty assessment: 0 - not attempted    Final Airway Details  Final airway type: endotracheal airway      Successful airway: ETT  Cuffed: yes   Successful intubation technique: direct laryngoscopy  Endotracheal tube insertion site: oral  Blade: [place]  Blade size: 2  ETT size (mm): 7.0  Cormack-Lehane Classification: grade I - full view of glottis  Placement verified by: chest auscultation and capnometry   Cuff volume (mL): 8  Measured from: lips  ETT/EBT  to lips (cm): 22  Number of attempts at approach: 1  Assessment: lips, teeth, and gum same as pre-op and atraumatic intubation

## 2021-02-05 ENCOUNTER — LAB (OUTPATIENT)
Dept: LAB | Facility: HOSPITAL | Age: 76
End: 2021-02-05

## 2021-02-05 DIAGNOSIS — Z79.01 ANTICOAGULANT LONG-TERM USE: ICD-10-CM

## 2021-02-05 DIAGNOSIS — I48.0 PAROXYSMAL ATRIAL FIBRILLATION (HCC): ICD-10-CM

## 2021-02-05 PROCEDURE — 85610 PROTHROMBIN TIME: CPT | Performed by: INTERNAL MEDICINE

## 2021-02-08 LAB
INR PPP: 1.3 (ref 0.91–1.09)
PROTHROMBIN TIME: 15.3 SECONDS (ref 10–13.8)

## 2021-02-10 ENCOUNTER — OFFICE VISIT (OUTPATIENT)
Dept: NEUROSURGERY | Facility: CLINIC | Age: 76
End: 2021-02-10

## 2021-02-10 ENCOUNTER — LAB (OUTPATIENT)
Dept: LAB | Facility: HOSPITAL | Age: 76
End: 2021-02-10

## 2021-02-10 VITALS — WEIGHT: 262.4 LBS | HEIGHT: 74 IN | BODY MASS INDEX: 33.67 KG/M2

## 2021-02-10 DIAGNOSIS — I48.0 PAROXYSMAL ATRIAL FIBRILLATION (HCC): ICD-10-CM

## 2021-02-10 DIAGNOSIS — M96.1 FAILED BACK SYNDROME: ICD-10-CM

## 2021-02-10 DIAGNOSIS — Z79.01 ANTICOAGULANT LONG-TERM USE: ICD-10-CM

## 2021-02-10 DIAGNOSIS — G89.4 CHRONIC PAIN SYNDROME: ICD-10-CM

## 2021-02-10 DIAGNOSIS — T85.192S MALFUNCTION OF SPINAL CORD STIMULATOR, SEQUELA: Primary | ICD-10-CM

## 2021-02-10 DIAGNOSIS — E66.9 OBESITY (BMI 30-39.9): ICD-10-CM

## 2021-02-10 LAB
INR PPP: 2.8 (ref 0.91–1.09)
PROTHROMBIN TIME: 33 SECONDS (ref 10–13.8)

## 2021-02-10 PROCEDURE — 85610 PROTHROMBIN TIME: CPT | Performed by: INTERNAL MEDICINE

## 2021-02-10 PROCEDURE — 99213 OFFICE O/P EST LOW 20 MIN: CPT | Performed by: NURSE PRACTITIONER

## 2021-02-10 NOTE — PROGRESS NOTES
Chief complaint:   Chief Complaint   Patient presents with   • Back Pain     Pt is here for followup from spinal cord stimulator revision.   Reports he is doing very well.   Reports pain is much better.        Subjective     HPI:   Interval History: Jonh Peacock is a 75 y.o.  male who presents today for post operative follow-up from a SPINAL CORD STIMULATOR REVISION, right buttocks - Right on 1/28/2021 per Dr. Oliveros.  Mr. Peacock has done extremely well since we last saw him.  He denies fevers fevers, chills, or concern for a postoperative incision infection.  His spinal cord stimulator is operating appropriately with better control of his back and leg pain than his prior stimulator.  Mr. Peacock has no complaints at present and is very happy with the outcome of his surgery.    Review of Systems -negative    PFSH:  Past Medical History:   Diagnosis Date   • Anemia    • Anxiety    • Arrhythmia    • Arthritis    • Atrial fibrillation (CMS/HCC)    • Cardiomyopathy (CMS/HCC)    • CHF (congestive heart failure) (CMS/HCC)    • Colon polyp    • Colostomy present (CMS/HCC)     10years   • Connective tissue disease (CMS/HCC)    • Depression    • Depression    • Diverticulitis    • Dizzy    • DVT (deep venous thrombosis) (CMS/HCC)    • Gastritis    • GERD (gastroesophageal reflux disease)    • Hiatal hernia    • History of transfusion    • Hyperlipidemia    • Hypertension    • Neuropathy    • Pneumonia      Past Surgical History:   Procedure Laterality Date   • APPENDECTOMY     • BACK SURGERY  2002, 2007    Multiple spine surgeries - laminectomy & fusion   • CARDIAC CATHETERIZATION     • CARDIAC DEFIBRILLATOR PLACEMENT     • CARDIAC ELECTROPHYSIOLOGY PROCEDURE N/A 10/31/2019    Procedure: ICD GEN CHANGE;  Surgeon: Chaz Chavez MD;  Location: [facility] CATH INVASIVE LOCATION;  Service: Cardiology   • COLON RESECTION WITH COLOSTOMY     • COLONOSCOPY  09/04/2014    diverticulosis   •  COLONOSCOPY N/A 10/29/2019    Procedure: COLONOSCOPY WITH ANESTHESIA;  Surgeon: Tenzin Abrams MD;  Location: [facility] ENDOSCOPY;  Service: Gastroenterology   • ENDOSCOPY  02/24/1999    small sliding hiatal hernia   • FOOT SURGERY Bilateral 2005   • JOINT REPLACEMENT Right 2002    KNEE   • PACEMAKER IMPLANTATION  2012   • RECTAL FISSURE INCISION AND DRAINAGE     • REPLACEMENT TOTAL KNEE     • SPINAL CORD STIMULATOR IMPLANT  08/2012    Placed by Dr. DEENA Villela, [employer]   • THORACIC LAMINECTOMY WITH PLACEMENT OF DORSAL COLUMN STIMULATOR Right 1/28/2021    Procedure: SPINAL CORD STIMULATOR REVISION, right buttocks;  Surgeon: Noah Oliveros MD;  Location: [facility] OR;  Service: Neurosurgery;  Laterality: Right;   • TOTAL HIP ARTHROPLASTY Right 2004   • TOTAL HIP ARTHROPLASTY Left 12/2008     Objective      Current Outpatient Medications   Medication Sig Dispense Refill   • Buprenorphine (BUTRANS) 10 MCG/HR patch weekly Place 10 mcg on the skin Every 7 (Seven) Days.     • CHONDROITIN SULFATE PO Take 1 tablet by mouth 2 (two) times a day.     • diazePAM (VALIUM) 2 MG tablet Take 2 mg by mouth Daily.     • diclofenac (VOLTAREN) 1 % gel gel Apply 4 g topically to the appropriate area as directed 4 (Four) Times a Day As Needed.     • dilTIAZem (Cardizem) 30 MG tablet Take 1 tablet by mouth 3 (Three) Times a Day. 270 tablet 3   • furosemide (LASIX) 20 MG tablet Take 1 tablet by mouth Daily. (Patient taking differently: Take 60 mg by mouth Daily.) 90 tablet 3   • gabapentin (NEURONTIN) 400 MG capsule Take 800 mg by mouth 3 (Three) Times a Day. 800MG IN AM / THEN 800MG AT NOON   MG AT 6 PM     • Glucosamine 750 MG tablet Take 750 mg by mouth 2 (Two) Times a Day.     • HYDROcodone-acetaminophen (NORCO) 7.5-325 MG per tablet Take 1 tablet by mouth Every 6 (Six) Hours As Needed for Moderate Pain .     • HYDROcodone-acetaminophen (NORCO) 7.5-325 MG per tablet Take 1 tablet by mouth Every 6 (Six) Hours As Needed for  "Moderate Pain . Indications: Pain, POSTOP 40 tablet 0   • losartan (COZAAR) 25 MG tablet Take 1 tablet by mouth Daily. 90 tablet 3   • metoprolol succinate XL (TOPROL-XL) 100 MG 24 hr tablet Take 1 tablet by mouth Daily. 90 tablet 3   • Multiple Vitamins-Minerals (MULTIVITAMIN ADULT PO) Take  by mouth Daily.     • pantoprazole (PROTONIX) 40 MG EC tablet Take 1 tablet by mouth Daily. 90 tablet 3   • polyethylene glycol (MIRALAX) powder Take 17 g by mouth Every Night. 1581 g 3   • simvastatin (ZOCOR) 20 MG tablet Take 1 tablet by mouth Every Night. 90 tablet 3   • tamsulosin (FLOMAX) 0.4 MG capsule 24 hr capsule Take 1 capsule by mouth Every Night. 90 capsule 3   • warfarin (COUMADIN) 2 MG tablet Take 1 tablet by mouth Daily. 90 tablet 1   • warfarin (COUMADIN) 4 MG tablet Take 1 tablet by mouth Daily. 90 tablet 1   • warfarin (COUMADIN) 5 MG tablet Take 1 tablet by mouth Daily. 90 tablet 1     No current facility-administered medications for this visit.      Vital Signs  Ht 188 cm (74\") Comment: pt reports  Wt 119 kg (262 lb 6.4 oz)   BMI 33.69 kg/m²   Physical Exam  Vitals signs and nursing note reviewed.   Constitutional:       General: He is not in acute distress.     Appearance: Normal appearance. He is well-developed and well-groomed. He is obese. He is not ill-appearing, toxic-appearing or diaphoretic.          Comments: BMI 33.69   HENT:      Head: Normocephalic and atraumatic.      Right Ear: Hearing normal.      Left Ear: Hearing normal.   Eyes:      Extraocular Movements: EOM normal.      Conjunctiva/sclera: Conjunctivae normal.      Pupils: Pupils are equal, round, and reactive to light.   Neck:      Musculoskeletal: Full passive range of motion without pain and neck supple.      Trachea: Trachea normal.   Cardiovascular:      Rate and Rhythm: Normal rate and regular rhythm.   Pulmonary:      Effort: Pulmonary effort is normal. No tachypnea, bradypnea, accessory muscle usage or respiratory distress.   "   Abdominal:      Palpations: Abdomen is soft.   Skin:     General: Skin is warm and dry.   Neurological:      Mental Status: He is alert and oriented to person, place, and time.      GCS: GCS eye subscore is 4. GCS verbal subscore is 5. GCS motor subscore is 6.      Gait: Gait is intact.      Deep Tendon Reflexes:      Reflex Scores:       Tricep reflexes are 2+ on the right side and 2+ on the left side.       Bicep reflexes are 2+ on the right side and 2+ on the left side.       Brachioradialis reflexes are 2+ on the right side and 2+ on the left side.       Patellar reflexes are 1+ on the right side and 1+ on the left side.       Achilles reflexes are 1+ on the right side and 1+ on the left side.  Psychiatric:         Speech: Speech normal.         Behavior: Behavior normal. Behavior is cooperative.       Neurologic Exam     Mental Status   Oriented to person, place, and time.   Attention: normal. Concentration: normal.   Speech: speech is normal   Level of consciousness: alert    Cranial Nerves     CN II   Visual fields full to confrontation.     CN III, IV, VI   Pupils are equal, round, and reactive to light.  Extraocular motions are normal.     CN V   Facial sensation intact.     CN VII   Facial expression full, symmetric.     CN VIII   CN VIII normal.     CN IX, X   CN IX normal.     CN XI   CN XI normal.     Motor Exam   Right arm tone: normal  Left arm tone: normal  Right leg tone: normal  Left leg tone: normal    Strength   Right deltoid: 5/5  Left deltoid: 5/5  Right biceps: 5/5  Left biceps: 5/5  Right triceps: 5/5  Left triceps: 5/5  Right wrist extension: 5/5  Left wrist extension: 5/5  Right iliopsoas: 5/5  Left iliopsoas: 4/5  Right quadriceps: 5/5  Left quadriceps: 5/5  Right anterior tibial: 5/5  Left anterior tibial: 5/5  Right gastroc: 5/5  Left gastroc: 5/5  Right EHL 5/5  Left EHL 5/5       Sensory Exam   Right arm light touch: normal  Left arm light touch: normal  Right leg light touch:  normal  Left leg light touch: normal    Gait, Coordination, and Reflexes     Gait  Gait: normal (with walker)    Tremor   Resting tremor: absent  Intention tremor: absent  Action tremor: absent    Reflexes   Right brachioradialis: 2+  Left brachioradialis: 2+  Right biceps: 2+  Left biceps: 2+  Right triceps: 2+  Left triceps: 2+  Right patellar: 1+  Left patellar: 1+  Right achilles: 1+  Left achilles: 1+  Right plantar: equivocal  Left plantar: equivocal  Right Harrell: absent  Left Harrell: absent  Right ankle clonus: absent  Left ankle clonus: absent  Right pendular knee jerk: absent  Left pendular knee jerk: absent    Incision: Scan on 2/10/2021 1126 by Nicholas Martinez, APRN: Postop stim revision  (Consent to obtain photo of postoperative site for documentation purposes only obtained verbally by Mr. Peacock)    Results Review: Xr Spine Thoracic 2 View    Result Date: 1/11/2021  Impression:  1.  Spinal stimulator leads terminating in the posterior central canal at the level of T8 to T10, unchanged from 2016 chest radiograph. No evidence of lead discontinuity. 2.  Multilevel thoracolumbar spine degenerative change. 3.  Postoperative change of instrumented fusion and posterior spinal decompression from L3 to S1. This report was finalized on 01/11/2021 15:23 by Dr. Stuart Cote MD.    Xr Spine Lumbar Ap & Lateral    Result Date: 1/11/2021  Impression:  1.  Spinal stimulator leads terminating in the posterior central canal at the level of T8 to T10, unchanged from 2016 chest radiograph. No evidence of lead discontinuity. 2.  Multilevel thoracolumbar spine degenerative change. 3.  Postoperative change of instrumented fusion and posterior spinal decompression from L3 to S1. This report was finalized on 01/11/2021 15:23 by Dr. Stuart Cote MD.        Assessment/Plan:   1. Malfunction of spinal cord stimulator, sequela    2. Chronic pain syndrome    3. Failed back syndrome    4. Obesity (BMI 30-39.9)      Status  post revision of spinal cord stimulator  Jonh Peacock is a 75 y.o. male who presents today for follow-up from a SPINAL CORD STIMULATOR REVISION, right buttocks - Right on 1/28/2021 per Dr. Oliveros.  Mr. Peacock has done well since we last saw him.  His incision is clean, dry, intact, and well approximated without signs of soft tissue infection.  His stimulator is working appropriately and controlling his lower back and leg pain.  He does express some concern for a lack of core strength and the ability to stand erect.  I recommended a dedicated course of physician directed physical therapy for core strengthening.  Mr. Peacock agreed.  Rx provided.  We will have him return for reassessment with Dr. Oliveros on 3/31/2021.  Jonh knows to call the neurosurgical clinic for any new or additional concerns.    Obese Class I: 30-34.9kg/m2  Body mass index is 33.69 kg/m².  Information on the DASH diet provided in the AVS.  We will continue to provided diet and exercise information with the goal of weight loss at each scheduled appointment.     Diagnoses and all orders for this visit:    1. Malfunction of spinal cord stimulator, sequela (Primary)    2. Chronic pain syndrome    3. Failed back syndrome    4. Obesity (BMI 30-39.9)      Return for Keep scheduled appointment with Dr. Oliveros.    I discussed the patients findings and my recommendations with patient    REYMUNDO Donato

## 2021-02-10 NOTE — PATIENT INSTRUCTIONS
"DASH Eating Plan  DASH stands for \"Dietary Approaches to Stop Hypertension.\" The DASH eating plan is a healthy eating plan that has been shown to reduce high blood pressure (hypertension). It may also reduce your risk for type 2 diabetes, heart disease, and stroke. The DASH eating plan may also help with weight loss.  What are tips for following this plan?    General guidelines  · Avoid eating more than 2,300 mg (milligrams) of salt (sodium) a day. If you have hypertension, you may need to reduce your sodium intake to 1,500 mg a day.  · Limit alcohol intake to no more than 1 drink a day for nonpregnant women and 2 drinks a day for men. One drink equals 12 oz of beer, 5 oz of wine, or 1½ oz of hard liquor.  · Work with your health care provider to maintain a healthy body weight or to lose weight. Ask what an ideal weight is for you.  · Get at least 30 minutes of exercise that causes your heart to beat faster (aerobic exercise) most days of the week. Activities may include walking, swimming, or biking.  · Work with your health care provider or diet and nutrition specialist (dietitian) to adjust your eating plan to your individual calorie needs.  Reading food labels    · Check food labels for the amount of sodium per serving. Choose foods with less than 5 percent of the Daily Value of sodium. Generally, foods with less than 300 mg of sodium per serving fit into this eating plan.  · To find whole grains, look for the word \"whole\" as the first word in the ingredient list.  Shopping  · Buy products labeled as \"low-sodium\" or \"no salt added.\"  · Buy fresh foods. Avoid canned foods and premade or frozen meals.  Cooking  · Avoid adding salt when cooking. Use salt-free seasonings or herbs instead of table salt or sea salt. Check with your health care provider or pharmacist before using salt substitutes.  · Do not kramer foods. Cook foods using healthy methods such as baking, boiling, grilling, and broiling instead.  · Cook with " heart-healthy oils, such as olive, canola, soybean, or sunflower oil.  Meal planning  · Eat a balanced diet that includes:  ? 5 or more servings of fruits and vegetables each day. At each meal, try to fill half of your plate with fruits and vegetables.  ? Up to 6-8 servings of whole grains each day.  ? Less than 6 oz of lean meat, poultry, or fish each day. A 3-oz serving of meat is about the same size as a deck of cards. One egg equals 1 oz.  ? 2 servings of low-fat dairy each day.  ? A serving of nuts, seeds, or beans 5 times each week.  ? Heart-healthy fats. Healthy fats called Omega-3 fatty acids are found in foods such as flaxseeds and coldwater fish, like sardines, salmon, and mackerel.  · Limit how much you eat of the following:  ? Canned or prepackaged foods.  ? Food that is high in trans fat, such as fried foods.  ? Food that is high in saturated fat, such as fatty meat.  ? Sweets, desserts, sugary drinks, and other foods with added sugar.  ? Full-fat dairy products.  · Do not salt foods before eating.  · Try to eat at least 2 vegetarian meals each week.  · Eat more home-cooked food and less restaurant, buffet, and fast food.  · When eating at a restaurant, ask that your food be prepared with less salt or no salt, if possible.  What foods are recommended?  The items listed may not be a complete list. Talk with your dietitian about what dietary choices are best for you.  Grains  Whole-grain or whole-wheat bread. Whole-grain or whole-wheat pasta. Brown rice. Oatmeal. Quinoa. Bulgur. Whole-grain and low-sodium cereals. Ellen bread. Low-fat, low-sodium crackers. Whole-wheat flour tortillas.  Vegetables  Fresh or frozen vegetables (raw, steamed, roasted, or grilled). Low-sodium or reduced-sodium tomato and vegetable juice. Low-sodium or reduced-sodium tomato sauce and tomato paste. Low-sodium or reduced-sodium canned vegetables.  Fruits  All fresh, dried, or frozen fruit. Canned fruit in natural juice (without  added sugar).  Meat and other protein foods  Skinless chicken or turkey. Ground chicken or turkey. Pork with fat trimmed off. Fish and seafood. Egg whites. Dried beans, peas, or lentils. Unsalted nuts, nut butters, and seeds. Unsalted canned beans. Lean cuts of beef with fat trimmed off. Low-sodium, lean deli meat.  Dairy  Low-fat (1%) or fat-free (skim) milk. Fat-free, low-fat, or reduced-fat cheeses. Nonfat, low-sodium ricotta or cottage cheese. Low-fat or nonfat yogurt. Low-fat, low-sodium cheese.  Fats and oils  Soft margarine without trans fats. Vegetable oil. Low-fat, reduced-fat, or light mayonnaise and salad dressings (reduced-sodium). Canola, safflower, olive, soybean, and sunflower oils. Avocado.  Seasoning and other foods  Herbs. Spices. Seasoning mixes without salt. Unsalted popcorn and pretzels. Fat-free sweets.  What foods are not recommended?  The items listed may not be a complete list. Talk with your dietitian about what dietary choices are best for you.  Grains  Baked goods made with fat, such as croissants, muffins, or some breads. Dry pasta or rice meal packs.  Vegetables  Creamed or fried vegetables. Vegetables in a cheese sauce. Regular canned vegetables (not low-sodium or reduced-sodium). Regular canned tomato sauce and paste (not low-sodium or reduced-sodium). Regular tomato and vegetable juice (not low-sodium or reduced-sodium). Pickles. Olives.  Fruits  Canned fruit in a light or heavy syrup. Fried fruit. Fruit in cream or butter sauce.  Meat and other protein foods  Fatty cuts of meat. Ribs. Fried meat. Elizabeth. Sausage. Bologna and other processed lunch meats. Salami. Fatback. Hotdogs. Bratwurst. Salted nuts and seeds. Canned beans with added salt. Canned or smoked fish. Whole eggs or egg yolks. Chicken or turkey with skin.  Dairy  Whole or 2% milk, cream, and half-and-half. Whole or full-fat cream cheese. Whole-fat or sweetened yogurt. Full-fat cheese. Nondairy creamers. Whipped toppings.  Processed cheese and cheese spreads.  Fats and oils  Butter. Stick margarine. Lard. Shortening. Ghee. Elizabeth fat. Tropical oils, such as coconut, palm kernel, or palm oil.  Seasoning and other foods  Salted popcorn and pretzels. Onion salt, garlic salt, seasoned salt, table salt, and sea salt. Worcestershire sauce. Tartar sauce. Barbecue sauce. Teriyaki sauce. Soy sauce, including reduced-sodium. Steak sauce. Canned and packaged gravies. Fish sauce. Oyster sauce. Cocktail sauce. Horseradish that you find on the shelf. Ketchup. Mustard. Meat flavorings and tenderizers. Bouillon cubes. Hot sauce and Tabasco sauce. Premade or packaged marinades. Premade or packaged taco seasonings. Relishes. Regular salad dressings.  Where to find more information:  · National Heart, Lung, and Blood [place]: www.nhlbi.nih.gov  · American Heart Association: www.heart.org  Summary  · The DASH eating plan is a healthy eating plan that has been shown to reduce high blood pressure (hypertension). It may also reduce your risk for type 2 diabetes, heart disease, and stroke.  · With the DASH eating plan, you should limit salt (sodium) intake to 2,300 mg a day. If you have hypertension, you may need to reduce your sodium intake to 1,500 mg a day.  · When on the DASH eating plan, aim to eat more fresh fruits and vegetables, whole grains, lean proteins, low-fat dairy, and heart-healthy fats.  · Work with your health care provider or diet and nutrition specialist (dietitian) to adjust your eating plan to your individual calorie needs.  This information is not intended to replace advice given to you by your health care provider. Make sure you discuss any questions you have with your health care provider.  Document Revised: 11/30/2018 Document Reviewed: 12/11/2017  Elsevier Patient Education © 2020 [employer]

## 2021-02-12 NOTE — PROGRESS NOTES
[facility] - PODIATRY    Today's Date: 02/18/21    Patient Name: Jonh Peacock  MRN: [id number]  CSN: [id number]  PCP: Ric Antony DO  Referring Provider: No ref. provider found     SUBJECTIVE     Chief Complaint   Patient presents with   • Follow-up     pt is here for 3 month f/u for foot and nail care-pt c/o painful  long, thickened toenails - denies pain at the time  - Antonella Curtis APRN 7/31/20 - non-diabetic        HPI: Jonh Peacock, a 75 y.o.male, comes to clinic as a(n) established patient complaining of thick fungal toenails. Pt with h/o Anxiety, Arrhythmia, Cardiomyopathy, CHF, Depression, previous DVT, HTN, Obesity, Anticoagulation with Warfarin. Relates borderline diabetes and controlling with diet. Presents with nails that are thick and irregular. He is unable to cut them himelf and his wife does not feel comfortable cutting them due to being on warfarin. Also relates that he has a limb length discrepancy which he has a lift in his left foot and custom inserts for both feet. Denies pain at the present time. Uses motorized scooter or rolling walker for mobilization. States that he has some mild numbness in his feet. Notes improvement with regular visits. Denies any constitutional symptoms. No other pedal complaints at this time.    Past Medical History:   Diagnosis Date   • Anemia    • Anxiety    • Arrhythmia    • Arthritis    • Atrial fibrillation (CMS/HCC)    • Cardiomyopathy (CMS/HCC)    • CHF (congestive heart failure) (CMS/HCC)    • Colon polyp    • Colostomy present (CMS/HCC)     10years   • Connective tissue disease (CMS/HCC)    • Depression    • Depression    • Diverticulitis    • Dizzy    • DVT (deep venous thrombosis) (CMS/HCC)    • Gastritis    • GERD (gastroesophageal reflux disease)    • Hiatal hernia    • History of transfusion    • Hyperlipidemia    • Hypertension    • Neuropathy    • Pneumonia      Past Surgical History:   Procedure Laterality Date   •  APPENDECTOMY     • BACK SURGERY  2002, 2007    Multiple spine surgeries - laminectomy & fusion   • CARDIAC CATHETERIZATION     • CARDIAC DEFIBRILLATOR PLACEMENT     • CARDIAC ELECTROPHYSIOLOGY PROCEDURE N/A 10/31/2019    Procedure: ICD GEN CHANGE;  Surgeon: Chaz Chavez MD;  Location: [facility] CATH INVASIVE LOCATION;  Service: Cardiology   • COLON RESECTION WITH COLOSTOMY     • COLONOSCOPY  09/04/2014    diverticulosis   • COLONOSCOPY N/A 10/29/2019    Procedure: COLONOSCOPY WITH ANESTHESIA;  Surgeon: Tenzin Abrams MD;  Location: [facility] ENDOSCOPY;  Service: Gastroenterology   • ENDOSCOPY  02/24/1999    small sliding hiatal hernia   • FOOT SURGERY Bilateral 2005   • JOINT REPLACEMENT Right 2002    KNEE   • PACEMAKER IMPLANTATION  2012   • RECTAL FISSURE INCISION AND DRAINAGE     • REPLACEMENT TOTAL KNEE     • SPINAL CORD STIMULATOR IMPLANT  08/2012    Placed by Dr. DEENA Villela, [email or web address]   • THORACIC LAMINECTOMY WITH PLACEMENT OF DORSAL COLUMN STIMULATOR Right 1/28/2021    Procedure: SPINAL CORD STIMULATOR REVISION, right buttocks;  Surgeon: Noah Oliveros MD;  Location: [facility] OR;  Service: Neurosurgery;  Laterality: Right;   • TOTAL HIP ARTHROPLASTY Right 2004   • TOTAL HIP ARTHROPLASTY Left 12/2008     Family History   Problem Relation Age of Onset   • Heart disease Mother    • Heart disease Brother    • Breast cancer Brother    • Colon cancer Neg Hx    • Colon polyps Neg Hx      Social History     Socioeconomic History   • Marital status:      Spouse name: Not on file   • Number of children: Not on file   • Years of education: Not on file   • Highest education level: Not on file   Tobacco Use   • Smoking status: Never Smoker   • Smokeless tobacco: Never Used   Substance and Sexual Activity   • Alcohol use: Not Currently   • Drug use: Never   • Sexual activity: Defer     Allergies   Allergen Reactions   • Percocet [Oxycodone-Acetaminophen] Urinary Retention   • Amiodarone Other (See  "Comments)     INTERFERES WITH OTHER MEDICATIONS   • Oxycontin [Oxycodone Hcl] Urinary Retention   • Penicillins Hives     All \"cillins\"   • Vioxx [Rofecoxib] GI Intolerance     Current Outpatient Medications   Medication Sig Dispense Refill   • Buprenorphine (BUTRANS) 10 MCG/HR patch weekly Place 10 mcg on the skin Every 7 (Seven) Days.     • CHONDROITIN SULFATE PO Take 1 tablet by mouth 2 (two) times a day.     • diazePAM (VALIUM) 2 MG tablet Take 2 mg by mouth Daily.     • diclofenac (VOLTAREN) 1 % gel gel Apply 4 g topically to the appropriate area as directed 4 (Four) Times a Day As Needed.     • dilTIAZem (Cardizem) 30 MG tablet Take 1 tablet by mouth 3 (Three) Times a Day. 270 tablet 3   • furosemide (LASIX) 20 MG tablet Take 1 tablet by mouth Daily. (Patient taking differently: Take 60 mg by mouth Daily.) 90 tablet 3   • gabapentin (NEURONTIN) 400 MG capsule Take 800 mg by mouth 3 (Three) Times a Day. 800MG IN AM / THEN 800MG AT NOON   MG AT 6 PM     • Glucosamine 750 MG tablet Take 750 mg by mouth 2 (Two) Times a Day.     • HYDROcodone-acetaminophen (NORCO) 7.5-325 MG per tablet Take 1 tablet by mouth Every 6 (Six) Hours As Needed for Moderate Pain .     • HYDROcodone-acetaminophen (NORCO) 7.5-325 MG per tablet Take 1 tablet by mouth Every 6 (Six) Hours As Needed for Moderate Pain . Indications: Pain, POSTOP 40 tablet 0   • losartan (COZAAR) 25 MG tablet Take 1 tablet by mouth Daily. 90 tablet 3   • metoprolol succinate XL (TOPROL-XL) 100 MG 24 hr tablet Take 1 tablet by mouth Daily. 90 tablet 3   • Multiple Vitamins-Minerals (MULTIVITAMIN ADULT PO) Take  by mouth Daily.     • pantoprazole (PROTONIX) 40 MG EC tablet Take 1 tablet by mouth Daily. 90 tablet 3   • polyethylene glycol (MIRALAX) powder Take 17 g by mouth Every Night. 1581 g 3   • simvastatin (ZOCOR) 20 MG tablet Take 1 tablet by mouth Every Night. 90 tablet 3   • tamsulosin (FLOMAX) 0.4 MG capsule 24 hr capsule Take 1 capsule by mouth " Every Night. 90 capsule 3   • warfarin (COUMADIN) 2 MG tablet Take 1 tablet by mouth Daily. 90 tablet 1   • warfarin (COUMADIN) 4 MG tablet Take 1 tablet by mouth Daily. 90 tablet 1   • warfarin (COUMADIN) 5 MG tablet Take 1 tablet by mouth Daily. 90 tablet 1     No current facility-administered medications for this visit.      Review of Systems   Constitutional: Negative for chills and fever.   HENT: Negative for congestion.    Respiratory: Negative for shortness of breath.    Cardiovascular: Positive for leg swelling. Negative for chest pain.   Gastrointestinal: Negative for constipation, diarrhea, nausea and vomiting.   Musculoskeletal: Positive for gait problem.        Foot pain   Skin: Negative for wound.        Thick Toenails   Neurological: Positive for numbness.       OBJECTIVE     Vitals:    02/18/21 1327   BP: 111/71   Pulse: 77   SpO2: 100%       PHYSICAL EXAM  GEN:   A&Ox3, NAD. Pt presents to clinic ambulating with walker and wearing Casual Shoes with custom inserts, left shoe has lift. Accompanied by wife.     NEURO:   Protective sensation intact to 9/10 sites Right foot, 9/10 site Left foot using [place] monofilament  Light touch sensation diminished  No Tinel's or Villeux sign.    VASC:  Skin temperature Warm to Warm proximal to distal carlotta  DP pulses 1/4 Right, 1/4 Left  PT pulses 1/4 Right, 1/4 Left  CFT 4 sec carlotta  Pedal hair growth absent  1+ pitting edema noted carlotta  Varicosities absent carlotta    MUSC/SKEL:  Muscle Strength Right foot Dorsiflexors 5/5, Plantarflexors 5/5, Evertors 5/5, Invertors 5/5  Muscle Strength Left foot Dorsiflexors 5/5, Plantarflexors 5/5, Evertors 5/5, Invertors 5/5  ROM of the 1st MTP is full without pain or crepitus  ROM of the MTJ is full without pain or crepitus    ROM of the STJ is full without pain or crepitus    ROM of the ankle joint is full without pain or crepitus    POP of nail plates  Planus foot type with decreased arch height and abduction of  forefoot  Right LE longer than Left LE     DERM:  Pedal nails x10 are thickened, elongated and discolored with presence of subunugual debris  Webspaces are c/d/i.  Skin is normal in turgor and texture with no open wounds or sores appreciated.  Stable cicatrix noted to dorsal 1st MTPJ carlotta and dorsal digits  Thickened HPK noted to distal left hallux.      RADIOLOGY/NUCLEAR:  No results found.    LABORATORY/CULTURE RESULTS:      PATHOLOGY RESULTS:       ASSESSMENT/PLAN     Diagnoses and all orders for this visit:    1. Onychomycosis (Primary)    2. Neuropathy    3. Borderline diabetes    4. Foot callus    5. Anticoagulant long-term use    6. Peripheral edema      Comprehensive lower extremity examination and evaluation was performed.  Discussed findings and treatment plan including risks, benefits, and treatment options with patient in detail. Patient agreed with treatment plan.  After verbal consent obtained, nail(s) x10 debrided of length and thickness with nail nipper without incidence  After verbal consent obtained, calluses x1 pared utilizing dermal curette and/or scalpel without incidence  Patient may maintain nails and calluses at home utilizing emery board or pumice stone between visits as needed  Reviewed at home diabetic foot care including daily foot checks   Continue with custom shoes with and inserts  Continue to elevate legs when possible.   Continue antifungal BID PRN  An After Visit Summary was printed and given to the patient at discharge, including (if requested) any available informative/educational handouts regarding diagnosis, treatment, or medications. All questions were answered to patient/family satisfaction. Should symptoms fail to improve or worsen they agree to call or return to clinic or to go to the Emergency Department. Discussed the importance of following up with any needed screening tests/labs/specialist appointments and any requested follow-up recommended by me today. Importance of  maintaining follow-up discussed and patient accepts that missed appointments can delay diagnosis and potentially lead to worsening of conditions.  Return in about 3 months (around 5/18/2021)., or sooner if acute issues arise.        This document has been electronically signed by Deion Avelar DPM on February 18, 2021 13:55 CST     Please note that portions of this note may have been completed with a voice recognition program. Efforts were made to edit the dictations, but occasionally words are mistranscribed.

## 2021-02-17 ENCOUNTER — TELEPHONE (OUTPATIENT)
Dept: PODIATRY | Facility: CLINIC | Age: 76
End: 2021-02-17

## 2021-02-18 ENCOUNTER — OFFICE VISIT (OUTPATIENT)
Dept: PODIATRY | Facility: CLINIC | Age: 76
End: 2021-02-18

## 2021-02-18 ENCOUNTER — LAB (OUTPATIENT)
Dept: LAB | Facility: HOSPITAL | Age: 76
End: 2021-02-18

## 2021-02-18 VITALS
OXYGEN SATURATION: 100 % | HEART RATE: 77 BPM | BODY MASS INDEX: 33.52 KG/M2 | DIASTOLIC BLOOD PRESSURE: 71 MMHG | SYSTOLIC BLOOD PRESSURE: 111 MMHG | HEIGHT: 74 IN | WEIGHT: 261.2 LBS

## 2021-02-18 DIAGNOSIS — Z79.01 ANTICOAGULANT LONG-TERM USE: ICD-10-CM

## 2021-02-18 DIAGNOSIS — R60.9 PERIPHERAL EDEMA: ICD-10-CM

## 2021-02-18 DIAGNOSIS — I48.0 PAROXYSMAL ATRIAL FIBRILLATION (HCC): ICD-10-CM

## 2021-02-18 DIAGNOSIS — G62.9 NEUROPATHY: ICD-10-CM

## 2021-02-18 DIAGNOSIS — Z79.01 ANTICOAGULANT LONG-TERM USE: Primary | ICD-10-CM

## 2021-02-18 DIAGNOSIS — R73.03 BORDERLINE DIABETES: ICD-10-CM

## 2021-02-18 DIAGNOSIS — B35.1 ONYCHOMYCOSIS: Primary | ICD-10-CM

## 2021-02-18 DIAGNOSIS — L84 FOOT CALLUS: ICD-10-CM

## 2021-02-18 LAB
INR PPP: 2.4 (ref 0.91–1.09)
PROTHROMBIN TIME: 28.5 SECONDS (ref 10–13.8)

## 2021-02-18 PROCEDURE — 11055 PARING/CUTG B9 HYPRKER LES 1: CPT | Performed by: PODIATRIST

## 2021-02-18 PROCEDURE — 99213 OFFICE O/P EST LOW 20 MIN: CPT | Performed by: PODIATRIST

## 2021-02-18 PROCEDURE — 11721 DEBRIDE NAIL 6 OR MORE: CPT | Performed by: PODIATRIST

## 2021-02-18 PROCEDURE — 85610 PROTHROMBIN TIME: CPT | Performed by: INTERNAL MEDICINE

## 2021-03-02 DIAGNOSIS — G89.4 CHRONIC PAIN SYNDROME: Primary | ICD-10-CM

## 2021-03-03 ENCOUNTER — TELEPHONE (OUTPATIENT)
Dept: NEUROSURGERY | Facility: CLINIC | Age: 76
End: 2021-03-03

## 2021-03-03 NOTE — TELEPHONE ENCOUNTER
PATIENT CALLED TO HAVE HIS PHYSICAL THERAPY REFERRAL SENT TO [facility] IN [place].  REFERRAL WAS SENT TO [facility].  PLEASE ADVISE    [phone]

## 2021-03-05 DIAGNOSIS — G89.4 CHRONIC PAIN SYNDROME: Primary | ICD-10-CM

## 2021-03-09 NOTE — TELEPHONE ENCOUNTER
Caller: GRIFFIN    Relationship:  WITH [facility]     Best call back number: [phone]  FAX: [phone]    What orders are you requesting (i.e. lab or imaging): PHYSICAL THERAPY    In what timeframe would the patient need to come in: ASAP    Where will you receive your lab/imaging services: [facility] PHYSICAL THERAPY    Additional notes: GRIFFIN CALLED STATING THEY RECEIVED AN ORDER THAT DOES NOT HAVE ANY PT INFORMATION (, ETC...) AND IS NOT SIGNED BY THE PROVIDER. GRIFFIN STATES THE ORDER IS FOR ONE VISIT WHICH WILL ONLY PROVIDE THE PT WITH AN INITIAL CONSULTATION AND NO OTHER VISITS.    PLEASE CONTACT FACILITY REGARDING THIS MATTER, QUESTIONS OR CONCERNS AS THE PT HAS CONTACT REHAB REGARDING SCHEDULING.    THANK YOU!

## 2021-03-11 ENCOUNTER — LAB (OUTPATIENT)
Dept: LAB | Facility: HOSPITAL | Age: 76
End: 2021-03-11

## 2021-03-11 DIAGNOSIS — Z79.01 ANTICOAGULANT LONG-TERM USE: ICD-10-CM

## 2021-03-11 LAB
INR PPP: 2.6 (ref 0.91–1.09)
PROTHROMBIN TIME: 31.2 SECONDS (ref 10–13.8)

## 2021-03-11 PROCEDURE — 85610 PROTHROMBIN TIME: CPT | Performed by: NURSE PRACTITIONER

## 2021-03-22 PROCEDURE — 93296 REM INTERROG EVL PM/IDS: CPT | Performed by: INTERNAL MEDICINE

## 2021-03-22 PROCEDURE — 93295 DEV INTERROG REMOTE 1/2/MLT: CPT | Performed by: INTERNAL MEDICINE

## 2021-03-31 ENCOUNTER — OFFICE VISIT (OUTPATIENT)
Dept: NEUROSURGERY | Facility: CLINIC | Age: 76
End: 2021-03-31

## 2021-03-31 DIAGNOSIS — E66.9 OBESITY (BMI 30-39.9): ICD-10-CM

## 2021-03-31 DIAGNOSIS — M96.1 FAILED BACK SYNDROME: Primary | ICD-10-CM

## 2021-03-31 DIAGNOSIS — G89.4 CHRONIC PAIN SYNDROME: ICD-10-CM

## 2021-03-31 PROBLEM — G89.29 CHRONIC MIDLINE LOW BACK PAIN WITH BILATERAL SCIATICA: Status: RESOLVED | Noted: 2017-01-30 | Resolved: 2021-03-31

## 2021-03-31 PROBLEM — M54.42 CHRONIC MIDLINE LOW BACK PAIN WITH BILATERAL SCIATICA: Status: RESOLVED | Noted: 2017-01-30 | Resolved: 2021-03-31

## 2021-03-31 PROBLEM — M54.41 CHRONIC MIDLINE LOW BACK PAIN WITH BILATERAL SCIATICA: Status: RESOLVED | Noted: 2017-01-30 | Resolved: 2021-03-31

## 2021-03-31 PROCEDURE — 99024 POSTOP FOLLOW-UP VISIT: CPT | Performed by: NEUROLOGICAL SURGERY

## 2021-03-31 NOTE — PROGRESS NOTES
Jonh has done extremely well from his SPINAL CORD STIMULATOR REVISION, right buttocks - Right on 1/28/2021.  His walking is much improved.  He also gets much higher coverage than he did with his previous stimulator.  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  His wife states that he is becoming energizer bunny and has completed many projects around the house including some picnic tables that he built for a local park.  Her only concern is that he is doing too much.  His pain today is a 3 out of 10 and this is a 50% improvement from his old stimulator.  Incision is healing well without complications., Neuro exam is stable from preoperative state. , Pain is well controlled on the following medications Butrans, Neurontin, Norco 7.5 which are managed by Dr. Rucker but of note he has been doing so well and they are considering decreasing his gabapentin to 1800 mg/day.  Recommend continued wean., PT/OT services are not needed. and Other recommendations I am happy to see Jonh back for any further revisions to his spinal cord stimulator when it reaches end-of-life.     Noah Oliveros MD

## 2021-03-31 NOTE — PATIENT INSTRUCTIONS
"PATIENT TO CONTINUE TO FOLLOW UP WITH HIS/HER PRIMARY CARE PROVIDER FOR YEARLY PHYSICAL EXAMS TO ENSURE COMPLETE HEALTH MAINTENANCE    https://www.nhlbi.nih.gov/files/docs/public/heart/dash_brief.pdf\">   DASH Eating Plan  DASH stands for Dietary Approaches to Stop Hypertension. The DASH eating plan is a healthy eating plan that has been shown to:  · Reduce high blood pressure (hypertension).  · Reduce your risk for type 2 diabetes, heart disease, and stroke.  · Help with weight loss.  What are tips for following this plan?  Reading food labels  · Check food labels for the amount of salt (sodium) per serving. Choose foods with less than 5 percent of the Daily Value of sodium. Generally, foods with less than 300 milligrams (mg) of sodium per serving fit into this eating plan.  · To find whole grains, look for the word \"whole\" as the first word in the ingredient list.  Shopping  · Buy products labeled as \"low-sodium\" or \"no salt added.\"  · Buy fresh foods. Avoid canned foods and pre-made or frozen meals.  Cooking  · Avoid adding salt when cooking. Use salt-free seasonings or herbs instead of table salt or sea salt. Check with your health care provider or pharmacist before using salt substitutes.  · Do not kramer foods. Cook foods using healthy methods such as baking, boiling, grilling, roasting, and broiling instead.  · Cook with heart-healthy oils, such as olive, canola, avocado, soybean, or sunflower oil.  Meal planning    · Eat a balanced diet that includes:  ? 4 or more servings of fruits and 4 or more servings of vegetables each day. Try to fill one-half of your plate with fruits and vegetables.  ? 6-8 servings of whole grains each day.  ? Less than 6 oz (170 g) of lean meat, poultry, or fish each day. A 3-oz (85-g) serving of meat is about the same size as a deck of cards. One egg equals 1 oz (28 g).  ? 2-3 servings of low-fat dairy each day. One serving is 1 cup (237 mL).  ? 1 serving of nuts, seeds, or beans 5 " times each week.  ? 2-3 servings of heart-healthy fats. Healthy fats called omega-3 fatty acids are found in foods such as walnuts, flaxseeds, fortified milks, and eggs. These fats are also found in cold-water fish, such as sardines, salmon, and mackerel.  · Limit how much you eat of:  ? Canned or prepackaged foods.  ? Food that is high in trans fat, such as some fried foods.  ? Food that is high in saturated fat, such as fatty meat.  ? Desserts and other sweets, sugary drinks, and other foods with added sugar.  ? Full-fat dairy products.  · Do not salt foods before eating.  · Do not eat more than 4 egg yolks a week.  · Try to eat at least 2 vegetarian meals a week.  · Eat more home-cooked food and less restaurant, buffet, and fast food.  Lifestyle  · When eating at a restaurant, ask that your food be prepared with less salt or no salt, if possible.  · If you drink alcohol:  ? Limit how much you use to:  § 0-1 drink a day for women who are not pregnant.  § 0-2 drinks a day for men.  ? Be aware of how much alcohol is in your drink. In the [place] one drink equals one 12 oz bottle of beer (355 mL), one 5 oz glass of wine (148 mL), or one 1½ oz glass of hard liquor (44 mL).  General information  · Avoid eating more than 2,300 mg of salt a day. If you have hypertension, you may need to reduce your sodium intake to 1,500 mg a day.  · Work with your health care provider to maintain a healthy body weight or to lose weight. Ask what an ideal weight is for you.  · Get at least 30 minutes of exercise that causes your heart to beat faster (aerobic exercise) most days of the week. Activities may include walking, swimming, or biking.  · Work with your health care provider or dietitian to adjust your eating plan to your individual calorie needs.  What foods should I eat?  Fruits  All fresh, dried, or frozen fruit. Canned fruit in natural juice (without added sugar).  Vegetables  Fresh or frozen vegetables (raw, steamed, roasted, or  grilled). Low-sodium or reduced-sodium tomato and vegetable juice. Low-sodium or reduced-sodium tomato sauce and tomato paste. Low-sodium or reduced-sodium canned vegetables.  Grains  Whole-grain or whole-wheat bread. Whole-grain or whole-wheat pasta. Brown rice. Oatmeal. Quinoa. Bulgur. Whole-grain and low-sodium cereals. Ellen bread. Low-fat, low-sodium crackers. Whole-wheat flour tortillas.  Meats and other proteins  Skinless chicken or turkey. Ground chicken or turkey. Pork with fat trimmed off. Fish and seafood. Egg whites. Dried beans, peas, or lentils. Unsalted nuts, nut butters, and seeds. Unsalted canned beans. Lean cuts of beef with fat trimmed off. Low-sodium, lean precooked or cured meat, such as sausages or meat loaves.  Dairy  Low-fat (1%) or fat-free (skim) milk. Reduced-fat, low-fat, or fat-free cheeses. Nonfat, low-sodium ricotta or cottage cheese. Low-fat or nonfat yogurt. Low-fat, low-sodium cheese.  Fats and oils  Soft margarine without trans fats. Vegetable oil. Reduced-fat, low-fat, or light mayonnaise and salad dressings (reduced-sodium). Canola, safflower, olive, avocado, soybean, and sunflower oils. Avocado.  Seasonings and condiments  Herbs. Spices. Seasoning mixes without salt.  Other foods  Unsalted popcorn and pretzels. Fat-free sweets.  The items listed above may not be a complete list of foods and beverages you can eat. Contact a dietitian for more information.  What foods should I avoid?  Fruits  Canned fruit in a light or heavy syrup. Fried fruit. Fruit in cream or butter sauce.  Vegetables  Creamed or fried vegetables. Vegetables in a cheese sauce. Regular canned vegetables (not low-sodium or reduced-sodium). Regular canned tomato sauce and paste (not low-sodium or reduced-sodium). Regular tomato and vegetable juice (not low-sodium or reduced-sodium). Pickles. Olives.  Grains  Baked goods made with fat, such as croissants, muffins, or some breads. Dry pasta or rice meal packs.  Meats  and other proteins  Fatty cuts of meat. Ribs. Fried meat. Elizabeth. Bologna, salami, and other precooked or cured meats, such as sausages or meat loaves. Fat from the back of a pig (fatback). Bratwurst. Salted nuts and seeds. Canned beans with added salt. Canned or smoked fish. Whole eggs or egg yolks. Chicken or turkey with skin.  Dairy  Whole or 2% milk, cream, and half-and-half. Whole or full-fat cream cheese. Whole-fat or sweetened yogurt. Full-fat cheese. Nondairy creamers. Whipped toppings. Processed cheese and cheese spreads.  Fats and oils  Butter. Stick margarine. Lard. Shortening. Ghee. Elizabeth fat. Tropical oils, such as coconut, palm kernel, or palm oil.  Seasonings and condiments  Onion salt, garlic salt, seasoned salt, table salt, and sea salt. Worcestershire sauce. Tartar sauce. Barbecue sauce. Teriyaki sauce. Soy sauce, including reduced-sodium. Steak sauce. Canned and packaged gravies. Fish sauce. Oyster sauce. Cocktail sauce. Store-bought horseradish. Ketchup. Mustard. Meat flavorings and tenderizers. Bouillon cubes. Hot sauces. Pre-made or packaged marinades. Pre-made or packaged taco seasonings. Relishes. Regular salad dressings.  Other foods  Salted popcorn and pretzels.  The items listed above may not be a complete list of foods and beverages you should avoid. Contact a dietitian for more information.  Where to find more information  · National Heart, Lung, and Blood [place]: www.nhlbi.nih.gov  · American Heart Association: www.heart.org  · Academy of Nutrition and Dietetics: www.eatright.org  · National Kidney Foundation: www.kidney.org  Summary  · The DASH eating plan is a healthy eating plan that has been shown to reduce high blood pressure (hypertension). It may also reduce your risk for type 2 diabetes, heart disease, and stroke.  · When on the DASH eating plan, aim to eat more fresh fruits and vegetables, whole grains, lean proteins, low-fat dairy, and heart-healthy fats.  · With the DASH  eating plan, you should limit salt (sodium) intake to 2,300 mg a day. If you have hypertension, you may need to reduce your sodium intake to 1,500 mg a day.  · Work with your health care provider or dietitian to adjust your eating plan to your individual calorie needs.  This information is not intended to replace advice given to you by your health care provider. Make sure you discuss any questions you have with your health care provider.  Document Revised: 11/20/2020 Document Reviewed: 11/20/2020  Elsevier Patient Education © 2021 Elsevier Inc.

## 2021-04-08 ENCOUNTER — LAB (OUTPATIENT)
Dept: LAB | Facility: HOSPITAL | Age: 76
End: 2021-04-08

## 2021-04-08 LAB
INR PPP: 2.5 (ref 0.91–1.09)
PROTHROMBIN TIME: 30.6 SECONDS (ref 10–13.8)

## 2021-04-08 PROCEDURE — 85610 PROTHROMBIN TIME: CPT | Performed by: INTERNAL MEDICINE

## 2021-04-15 ENCOUNTER — TELEPHONE (OUTPATIENT)
Dept: INTERNAL MEDICINE | Facility: CLINIC | Age: 76
End: 2021-04-15

## 2021-04-15 DIAGNOSIS — Z79.01 ANTICOAGULANT LONG-TERM USE: Primary | ICD-10-CM

## 2021-04-15 NOTE — TELEPHONE ENCOUNTER
Out patient lab called the standing order for patients INR is  and was needing a new order please.  Thank you .

## 2021-04-30 DIAGNOSIS — M96.1 FAILED BACK SYNDROME: Primary | ICD-10-CM

## 2021-05-03 ENCOUNTER — OFFICE VISIT (OUTPATIENT)
Dept: INTERNAL MEDICINE | Facility: CLINIC | Age: 76
End: 2021-05-03

## 2021-05-03 VITALS
TEMPERATURE: 98.1 F | BODY MASS INDEX: 33.19 KG/M2 | WEIGHT: 258.6 LBS | SYSTOLIC BLOOD PRESSURE: 108 MMHG | HEIGHT: 74 IN | OXYGEN SATURATION: 98 % | DIASTOLIC BLOOD PRESSURE: 72 MMHG | HEART RATE: 78 BPM | RESPIRATION RATE: 16 BRPM

## 2021-05-03 DIAGNOSIS — E66.09 CLASS 1 OBESITY DUE TO EXCESS CALORIES WITH SERIOUS COMORBIDITY AND BODY MASS INDEX (BMI) OF 33.0 TO 33.9 IN ADULT: ICD-10-CM

## 2021-05-03 DIAGNOSIS — Z93.3 COLOSTOMY IN PLACE (HCC): ICD-10-CM

## 2021-05-03 DIAGNOSIS — I50.22 CHRONIC SYSTOLIC CHF (CONGESTIVE HEART FAILURE), NYHA CLASS 3 (HCC): Chronic | ICD-10-CM

## 2021-05-03 DIAGNOSIS — Z96.89 S/P INSERTION OF SPINAL CORD STIMULATOR: ICD-10-CM

## 2021-05-03 DIAGNOSIS — E78.2 MIXED HYPERLIPIDEMIA: ICD-10-CM

## 2021-05-03 DIAGNOSIS — E11.42 TYPE 2 DIABETES MELLITUS WITH DIABETIC POLYNEUROPATHY, WITHOUT LONG-TERM CURRENT USE OF INSULIN (HCC): Primary | ICD-10-CM

## 2021-05-03 DIAGNOSIS — G89.4 CHRONIC PAIN SYNDROME: ICD-10-CM

## 2021-05-03 DIAGNOSIS — I48.0 PAROXYSMAL ATRIAL FIBRILLATION (HCC): Chronic | ICD-10-CM

## 2021-05-03 PROBLEM — T85.192A MALFUNCTION OF SPINAL CORD STIMULATOR (HCC): Status: RESOLVED | Noted: 2021-01-11 | Resolved: 2021-05-03

## 2021-05-03 PROCEDURE — 99214 OFFICE O/P EST MOD 30 MIN: CPT | Performed by: INTERNAL MEDICINE

## 2021-05-03 PROCEDURE — G0439 PPPS, SUBSEQ VISIT: HCPCS | Performed by: INTERNAL MEDICINE

## 2021-05-03 NOTE — PATIENT INSTRUCTIONS
Medicare Wellness  Personal Prevention Plan of Service     Date of Office Visit:  2021  Encounter Provider:  Ric Antony DO  Place of Service:  [facility] INTERNAL MEDICINE  Patient Name: Jonh Peacock  :  1945    As part of the Medicare Wellness portion of your visit today, we are providing you with this personalized preventive plan of services (PPPS). This plan is based upon recommendations of the [place] Preventive Services Task Force (USPSTF) and the Advisory Committee on Immunization Practices (ACIP).    This lists the preventive care services that should be considered, and provides dates of when you are due. Items listed as completed are up-to-date and do not require any further intervention.    Health Maintenance   Topic Date Due   • URINE MICROALBUMIN  Never done   • ZOSTER VACCINE (1 of 2) Never done   • DIABETIC FOOT EXAM  Never done   • LIPID PANEL  2021   • HEMOGLOBIN A1C  2021   • DIABETIC EYE EXAM  2021   • INFLUENZA VACCINE  2021   • ANNUAL WELLNESS VISIT  2022   • TDAP/TD VACCINES (2 - Td) 2022   • COLONOSCOPY  10/29/2024   • HEPATITIS C SCREENING  Completed   • COVID-19 Vaccine  Completed   • Pneumococcal Vaccine 65+  Completed       Orders Placed This Encounter   Procedures   • Comprehensive Metabolic Panel     Standing Status:   Future     Standing Expiration Date:   5/3/2022     Order Specific Question:   Release to patient     Answer:   Immediate   • Hemoglobin A1c     Standing Status:   Future     Standing Expiration Date:   5/3/2022     Order Specific Question:   Release to patient     Answer:   Immediate   • Lipid Panel     Standing Status:   Future     Standing Expiration Date:   5/3/2022   • CBC (No Diff)     Standing Status:   Future     Standing Expiration Date:   5/3/2022     Order Specific Question:   Release to patient     Answer:   Immediate   • Microalbumin / Creatinine Urine Ratio - Urine,  Clean Catch     Standing Status:   Future     Standing Expiration Date:   5/3/2022     Order Specific Question:   Release to patient     Answer:   Immediate       Return in about 6 months (around 11/3/2021) for Recheck.

## 2021-05-03 NOTE — PROGRESS NOTES
The ABCs of the Annual Wellness Visit  Subsequent Medicare Wellness Visit    No chief complaint on file.  See  note    Subjective   History of Present Illness:  Jonh Peacock is a 75 y.o. male who presents for a Subsequent Medicare Wellness Visit.    HEALTH RISK ASSESSMENT    Recent Hospitalizations:  No hospitalization(s) within the last year.    Current Medical Providers:  Patient Care Team:  Ric Antony DO as PCP - General (Internal Medicine)  Arun Banks MD as Cardiologist (Cardiology)  Celso Ellsworth MD as Consulting Physician (Urology)  Tenzin Abrams MD as Consulting Physician (Gastroenterology)  Deion Avelar DPM as Consulting Physician (Podiatry)  Chaz Chavez MD as Cardiologist (Cardiac Electrophysiology)  Jaime Vazquez OD (Optometry)  Diomedes Sterling DO as Consulting Physician (Pain Medicine)    Smoking Status:  Social History     Tobacco Use   Smoking Status Never Smoker   Smokeless Tobacco Never Used       Alcohol Consumption:  Social History     Substance and Sexual Activity   Alcohol Use Not Currently       Depression Screen:   PHQ-2/PHQ-9 Depression Screening 5/3/2021   Little interest or pleasure in doing things 0   Feeling down, depressed, or hopeless 0   Total Score 0       Fall Risk Screen:  STEADI Fall Risk Assessment was completed, and patient is at LOW risk for falls.Assessment completed on:5/3/2021    Health Habits and Functional and Cognitive Screening:  Functional & Cognitive Status 5/3/2021   Do you have difficulty preparing food and eating? No   Do you have difficulty bathing yourself, getting dressed or grooming yourself? No   Do you have difficulty using the toilet? No   Do you have difficulty moving around from place to place? No   Do you have trouble with steps or getting out of a bed or a chair? No   Current Diet Unhealthy Diet   Dental Exam Up to date        Dental Exam Comment -   Eye Exam Up to date   Exercise (times per  week) 2 times per week   Current Exercises Include Other   Current Exercise Activities Include -   Do you need help using the phone?  No   Are you deaf or do you have serious difficulty hearing?  No   Do you need help with transportation? No   Do you need help shopping? No   Do you need help preparing meals?  No   Do you need help with housework?  No   Do you need help with laundry? No   Do you need help taking your medications? No   Do you need help managing money? No   Do you ever drive or ride in a car without wearing a seat belt? No   Have you felt unusual stress, anger or loneliness in the last month? No   Who do you live with? Spouse   If you need help, do you have trouble finding someone available to you? No   Have you been bothered in the last four weeks by sexual problems? No   Do you have difficulty concentrating, remembering or making decisions? No         Does the patient have evidence of cognitive impairment? No    Asprin use counseling:Does not need ASA (and currently is not on it)    Age-appropriate Screening Schedule:  Refer to the list below for future screening recommendations based on patient's age, sex and/or medical conditions. Orders for these recommended tests are listed in the plan section. The patient has been provided with a written plan.    Health Maintenance   Topic Date Due   • URINE MICROALBUMIN  Never done   • ZOSTER VACCINE (1 of 2) Never done   • DIABETIC FOOT EXAM  Never done   • LIPID PANEL  04/14/2021   • HEMOGLOBIN A1C  06/01/2021   • DIABETIC EYE EXAM  06/18/2021   • INFLUENZA VACCINE  08/01/2021   • TDAP/TD VACCINES (2 - Td) 09/21/2022   • COLONOSCOPY  10/29/2024          The following portions of the patient's history were reviewed and updated as appropriate: allergies, current medications, past family history, past medical history, past social history, past surgical history and problem list.    Outpatient Medications Prior to Visit   Medication Sig Dispense Refill   •  Buprenorphine (BUTRANS) 10 MCG/HR patch weekly Place 10 mcg on the skin Every 7 (Seven) Days.     • CHONDROITIN SULFATE PO Take 1 tablet by mouth 2 (two) times a day.     • diazePAM (VALIUM) 2 MG tablet Take 2 mg by mouth Daily.     • diclofenac (VOLTAREN) 1 % gel gel Apply 4 g topically to the appropriate area as directed 4 (Four) Times a Day As Needed.     • dilTIAZem (Cardizem) 30 MG tablet Take 1 tablet by mouth 3 (Three) Times a Day. 270 tablet 3   • furosemide (LASIX) 20 MG tablet Take 1 tablet by mouth Daily. (Patient taking differently: Take 60 mg by mouth Daily.) 90 tablet 3   • gabapentin (NEURONTIN) 400 MG capsule Take 800 mg by mouth 3 (Three) Times a Day. 800MG IN AM / THEN 800MG AT NOON   MG AT 6 PM     • Glucosamine 750 MG tablet Take 750 mg by mouth 2 (Two) Times a Day.     • HYDROcodone-acetaminophen (NORCO) 7.5-325 MG per tablet Take 1 tablet by mouth As Needed for Moderate Pain .     • losartan (COZAAR) 25 MG tablet Take 1 tablet by mouth Daily. 90 tablet 3   • metoprolol succinate XL (TOPROL-XL) 100 MG 24 hr tablet Take 1 tablet by mouth Daily. 90 tablet 3   • Multiple Vitamins-Minerals (MULTIVITAMIN ADULT PO) Take  by mouth Daily.     • pantoprazole (PROTONIX) 40 MG EC tablet Take 1 tablet by mouth Daily. 90 tablet 3   • polyethylene glycol (MIRALAX) powder Take 17 g by mouth Every Night. 1581 g 3   • simvastatin (ZOCOR) 20 MG tablet Take 1 tablet by mouth Every Night. 90 tablet 3   • tamsulosin (FLOMAX) 0.4 MG capsule 24 hr capsule Take 1 capsule by mouth Every Night. 90 capsule 3   • warfarin (COUMADIN) 2 MG tablet Take 1 tablet by mouth Daily. 90 tablet 1   • warfarin (COUMADIN) 4 MG tablet Take 1 tablet by mouth Daily. 90 tablet 1   • warfarin (COUMADIN) 5 MG tablet Take 1 tablet by mouth Daily. 90 tablet 1     No facility-administered medications prior to visit.       Patient Active Problem List   Diagnosis   • Mixed hyperlipidemia   • Essential hypertension   • Anemia   • Neuropathy  "  • Anxiety   • Depression   • Gastritis   • Class 1 obesity due to excess calories with serious comorbidity and body mass index (BMI) of 33.0 to 33.9 in adult   • Chronic systolic CHF (congestive heart failure), NYHA class 3 (CMS/[facility])   • Non-ischemic cardiomyopathy (CMS/[facility])   • Paroxysmal atrial fibrillation (CMS/[facility])   • Anticoagulant long-term use   • Onychomycosis   • Peripheral edema   • Obstructive sleep apnea of adult   • Cardiac defibrillator in situ   • Benign prostatic hyperplasia without lower urinary tract symptoms   • Colostomy in place (CMS/[facility])   • Type 2 diabetes mellitus with diabetic polyneuropathy, without long-term current use of insulin (CMS/[facility])   • Hx of colonic polyp   • Failed back syndrome   • Chronic pain syndrome   • S/P insertion of spinal cord stimulator       Advanced Care Planning:  ACP discussion was held with the patient during this visit. Patient has an advance directive (not in EMR), copy requested.    Review of Systems See  note    Compared to one year ago, the patient feels his physical health is better.  Compared to one year ago, the patient feels his mental health is the same.    Reviewed chart for potential of high risk medication in the elderly: yes  Reviewed chart for potential of harmful drug interactions in the elderly:yes    Objective         Vitals:    05/03/21 1433   BP: 108/72   BP Location: Left arm   Patient Position: Sitting   Cuff Size: Adult   Pulse: 78   Resp: 16   Temp: 98.1 °F (36.7 °C)   SpO2: 98%   Weight: 117 kg (258 lb 9.6 oz)   Height: 188 cm (74\")       Body mass index is 33.2 kg/m².  Discussed the patient's BMI with him. The BMI is above average; BMI management plan is completed.    Physical Exam See  note          Assessment/Plan   Medicare Risks and Personalized Health Plan  CMS Preventative Services Quick Reference  Advance Directive Discussion  Cardiovascular risk  Dementia/Memory   Fall Risk  Immunizations Discussed/Encouraged " (specific immunizations; Shingrix )  Inactivity/Sedentary  Obesity/Overweight   Polypharmacy    The above risks/problems have been discussed with the patient.  Pertinent information has been shared with the patient in the After Visit Summary.  Follow up plans and orders are seen below in the Assessment/Plan Section.    Diagnoses and all orders for this visit:    1. Type 2 diabetes mellitus with diabetic polyneuropathy, without long-term current use of insulin (CMS/[facility]) (Primary)  -     Hemoglobin A1c; Future  -     CBC (No Diff); Future  -     Microalbumin / Creatinine Urine Ratio - Urine, Clean Catch; Future    2. Chronic systolic CHF (congestive heart failure), NYHA class 3 (CMS/[facility])  -     Comprehensive Metabolic Panel; Future  -     CBC (No Diff); Future    3. Paroxysmal atrial fibrillation (CMS/[facility])  -     Comprehensive Metabolic Panel; Future  -     CBC (No Diff); Future    4. Colostomy in place (CMS/[facility])    5. Class 1 obesity due to excess calories with serious comorbidity and body mass index (BMI) of 33.0 to 33.9 in adult    6. Mixed hyperlipidemia  -     Lipid Panel; Future    7. Chronic pain syndrome    8. S/P insertion of spinal cord stimulator      Follow Up:  Return in about 6 months (around 11/3/2021) for Recheck.     An After Visit Summary and PPPS were given to the patient.

## 2021-05-03 NOTE — PROGRESS NOTES
CC: Neuropathy in hands    History:  Jonh Peacock is a 75 y.o. male   He notes the neuropathy in his bilateral hands has been worsening somewhat.  His back pain has been doing remarkably well and he has been weaning off his gabapentin.  He does not feel this obviously coincides with worsening of his neuropathy, but was unaware of this possible correlation.  He continues on warfarin therapy with therapeutic INR and no bleeding.  Otherwise, he is stable from a cardiac standpoint and has no worsening edema nor shortness of breath.        ROS:  Review of Systems   Constitutional: Negative for chills and fever.   Respiratory: Negative for cough and shortness of breath.    Cardiovascular: Negative for chest pain and palpitations.   Gastrointestinal: Negative for abdominal pain and constipation.   Musculoskeletal: Negative for back pain and gait problem.   Neurological: Positive for weakness and numbness.        reports that he has never smoked. He has never used smokeless tobacco. He reports previous alcohol use. He reports that he does not use drugs.      Current Outpatient Medications:   •  Buprenorphine (BUTRANS) 10 MCG/HR patch weekly, Place 10 mcg on the skin Every 7 (Seven) Days., Disp: , Rfl:   •  CHONDROITIN SULFATE PO, Take 1 tablet by mouth 2 (two) times a day., Disp: , Rfl:   •  diazePAM (VALIUM) 2 MG tablet, Take 2 mg by mouth Daily., Disp: , Rfl:   •  diclofenac (VOLTAREN) 1 % gel gel, Apply 4 g topically to the appropriate area as directed 4 (Four) Times a Day As Needed., Disp: , Rfl:   •  dilTIAZem (Cardizem) 30 MG tablet, Take 1 tablet by mouth 3 (Three) Times a Day., Disp: 270 tablet, Rfl: 3  •  furosemide (LASIX) 20 MG tablet, Take 1 tablet by mouth Daily. (Patient taking differently: Take 60 mg by mouth Daily.), Disp: 90 tablet, Rfl: 3  •  gabapentin (NEURONTIN) 400 MG capsule, Take 800 mg by mouth 3 (Three) Times a Day. 800MG IN AM / THEN 800MG AT NOON   MG AT 6 PM, Disp: , Rfl:   •   "Glucosamine 750 MG tablet, Take 750 mg by mouth 2 (Two) Times a Day., Disp: , Rfl:   •  HYDROcodone-acetaminophen (NORCO) 7.5-325 MG per tablet, Take 1 tablet by mouth As Needed for Moderate Pain ., Disp: , Rfl:   •  losartan (COZAAR) 25 MG tablet, Take 1 tablet by mouth Daily., Disp: 90 tablet, Rfl: 3  •  metoprolol succinate XL (TOPROL-XL) 100 MG 24 hr tablet, Take 1 tablet by mouth Daily., Disp: 90 tablet, Rfl: 3  •  Multiple Vitamins-Minerals (MULTIVITAMIN ADULT PO), Take  by mouth Daily., Disp: , Rfl:   •  pantoprazole (PROTONIX) 40 MG EC tablet, Take 1 tablet by mouth Daily., Disp: 90 tablet, Rfl: 3  •  polyethylene glycol (MIRALAX) powder, Take 17 g by mouth Every Night., Disp: 1581 g, Rfl: 3  •  simvastatin (ZOCOR) 20 MG tablet, Take 1 tablet by mouth Every Night., Disp: 90 tablet, Rfl: 3  •  tamsulosin (FLOMAX) 0.4 MG capsule 24 hr capsule, Take 1 capsule by mouth Every Night., Disp: 90 capsule, Rfl: 3  •  warfarin (COUMADIN) 2 MG tablet, Take 1 tablet by mouth Daily., Disp: 90 tablet, Rfl: 1  •  warfarin (COUMADIN) 4 MG tablet, Take 1 tablet by mouth Daily., Disp: 90 tablet, Rfl: 1  •  warfarin (COUMADIN) 5 MG tablet, Take 1 tablet by mouth Daily., Disp: 90 tablet, Rfl: 1    OBJECTIVE:  /72 (BP Location: Left arm, Patient Position: Sitting, Cuff Size: Adult)   Pulse 78   Temp 98.1 °F (36.7 °C)   Resp 16   Ht 188 cm (74\")   Wt 117 kg (258 lb 9.6 oz)   SpO2 98%   BMI 33.20 kg/m²    Physical Exam  Constitutional:       General: He is not in acute distress.  Pulmonary:      Effort: Pulmonary effort is normal. No respiratory distress.   Neurological:      Mental Status: He is alert and oriented to person, place, and time.   Psychiatric:         Mood and Affect: Mood normal.         Behavior: Behavior normal.         Assessment/Plan     Diagnoses and all orders for this visit:    1. Type 2 diabetes mellitus with diabetic polyneuropathy, without long-term current use of insulin (CMS/[facility]) (Primary)  - "     Hemoglobin A1c; Future  -     CBC (No Diff); Future  -     Microalbumin / Creatinine Urine Ratio - Urine, Clean Catch; Future  A1c with next labs, though he has no symptoms of hyperglycemia nor hypoglycemia.  He continues on diet control only.    2. Chronic systolic CHF (congestive heart failure), NYHA class 3 (CMS/[facility])  -     Comprehensive Metabolic Panel; Future  -     CBC (No Diff); Future  Stable and euvolemic on exam.  Continue current medications.    3. Paroxysmal atrial fibrillation (CMS/[facility])  -     Comprehensive Metabolic Panel; Future  -     CBC (No Diff); Future  Rate controlled on diltiazem and anticoagulated on warfarin.  No current symptoms.    4. Colostomy in place (CMS/[facility])  No issues with this currently.    5. Class 1 obesity due to excess calories with serious comorbidity and body mass index (BMI) of 33.0 to 33.9 in adult  Recommended attention to portion control and being careful about the types and timing of meals for the purpose of weight management.    6. Mixed hyperlipidemia  -     Lipid Panel; Future  Stable on moderate intensity statin therapy per ACC/AHA guidelines.    7. Chronic pain syndrome back pain and ambulation much improved after  8. S/P insertion of spinal cord stimulator  New spinal stimulator.      An After Visit Summary was printed and given to the patient at discharge.  Return in about 6 months (around 11/3/2021) for Recheck.          Ric Antony D.O. 5/3/2021   Electronically signed.

## 2021-05-17 ENCOUNTER — TELEPHONE (OUTPATIENT)
Dept: VASCULAR SURGERY | Facility: CLINIC | Age: 76
End: 2021-05-17

## 2021-05-18 ENCOUNTER — OFFICE VISIT (OUTPATIENT)
Dept: PODIATRY | Facility: CLINIC | Age: 76
End: 2021-05-18

## 2021-05-18 VITALS
HEART RATE: 56 BPM | DIASTOLIC BLOOD PRESSURE: 82 MMHG | OXYGEN SATURATION: 93 % | HEIGHT: 74 IN | SYSTOLIC BLOOD PRESSURE: 126 MMHG | BODY MASS INDEX: 33.37 KG/M2 | WEIGHT: 260 LBS

## 2021-05-18 DIAGNOSIS — R73.03 BORDERLINE DIABETES: ICD-10-CM

## 2021-05-18 DIAGNOSIS — R60.9 PERIPHERAL EDEMA: ICD-10-CM

## 2021-05-18 DIAGNOSIS — Z79.01 ANTICOAGULANT LONG-TERM USE: ICD-10-CM

## 2021-05-18 DIAGNOSIS — G62.9 NEUROPATHY: ICD-10-CM

## 2021-05-18 DIAGNOSIS — B35.1 ONYCHOMYCOSIS: Primary | ICD-10-CM

## 2021-05-18 PROCEDURE — 11721 DEBRIDE NAIL 6 OR MORE: CPT | Performed by: PODIATRIST

## 2021-05-18 RX ORDER — GABAPENTIN 300 MG/1
400 CAPSULE ORAL 2 TIMES DAILY
COMMUNITY
Start: 2021-03-22

## 2021-06-09 DIAGNOSIS — I48.0 PAROXYSMAL ATRIAL FIBRILLATION (HCC): ICD-10-CM

## 2021-06-09 RX ORDER — WARFARIN SODIUM 4 MG/1
4 TABLET ORAL
Qty: 90 TABLET | Refills: 1
Start: 2021-06-09

## 2021-06-09 RX ORDER — METOPROLOL SUCCINATE 100 MG/1
100 TABLET, EXTENDED RELEASE ORAL DAILY
Qty: 90 TABLET | Refills: 3 | Status: SHIPPED | OUTPATIENT
Start: 2021-06-09 | End: 2022-06-21

## 2021-06-09 RX ORDER — WARFARIN SODIUM 2 MG/1
2 TABLET ORAL
Qty: 90 TABLET | Refills: 1
Start: 2021-06-09

## 2021-06-09 RX ORDER — WARFARIN SODIUM 5 MG/1
5 TABLET ORAL
Qty: 90 TABLET | Refills: 1
Start: 2021-06-09

## 2021-06-09 NOTE — TELEPHONE ENCOUNTER
Caller: Jonh Peacock    Relationship: Self    Best call back number: [phone]     Medication needed:   Requested Prescriptions     Pending Prescriptions Disp Refills   • metoprolol succinate XL (TOPROL-XL) 100 MG 24 hr tablet 90 tablet 3     Sig: Take 1 tablet by mouth Daily.   • warfarin (COUMADIN) 2 MG tablet 90 tablet 1     Sig: Take 1 tablet by mouth Daily.   • warfarin (COUMADIN) 4 MG tablet 90 tablet 1     Sig: Take 1 tablet by mouth Daily.   • warfarin (COUMADIN) 5 MG tablet 90 tablet 1     Sig: Take 1 tablet by mouth Daily.       When do you need the refill by: ASAP     Does the patient have less than a 3 day supply:  [x] Yes  [] No    What is the patient's preferred pharmacy: [facility] - [place], [place] - [address] AT [address] - [phone]  - [phone] FX

## 2021-06-10 RX ORDER — METOPROLOL SUCCINATE 100 MG/1
TABLET, EXTENDED RELEASE ORAL
Qty: 90 TABLET | Refills: 3 | OUTPATIENT
Start: 2021-06-10

## 2021-06-10 RX ORDER — WARFARIN SODIUM 4 MG/1
TABLET ORAL
Qty: 90 TABLET | Refills: 1 | OUTPATIENT
Start: 2021-06-10

## 2021-06-10 RX ORDER — WARFARIN SODIUM 2 MG/1
TABLET ORAL
Qty: 90 TABLET | Refills: 1 | OUTPATIENT
Start: 2021-06-10

## 2021-06-10 RX ORDER — WARFARIN SODIUM 5 MG/1
TABLET ORAL
Qty: 90 TABLET | Refills: 1 | OUTPATIENT
Start: 2021-06-10

## 2021-06-11 ENCOUNTER — LAB (OUTPATIENT)
Dept: LAB | Facility: HOSPITAL | Age: 76
End: 2021-06-11

## 2021-06-11 DIAGNOSIS — E11.42 TYPE 2 DIABETES MELLITUS WITH DIABETIC POLYNEUROPATHY, WITHOUT LONG-TERM CURRENT USE OF INSULIN (HCC): ICD-10-CM

## 2021-06-11 DIAGNOSIS — E78.2 MIXED HYPERLIPIDEMIA: ICD-10-CM

## 2021-06-11 DIAGNOSIS — I48.0 PAROXYSMAL ATRIAL FIBRILLATION (HCC): Chronic | ICD-10-CM

## 2021-06-11 DIAGNOSIS — I50.22 CHRONIC SYSTOLIC CHF (CONGESTIVE HEART FAILURE), NYHA CLASS 3 (HCC): Chronic | ICD-10-CM

## 2021-06-11 LAB
ALBUMIN SERPL-MCNC: 3.9 G/DL (ref 3.5–5.2)
ALBUMIN/GLOB SERPL: 1.3 G/DL
ALP SERPL-CCNC: 100 U/L (ref 39–117)
ALT SERPL W P-5'-P-CCNC: 21 U/L (ref 1–41)
ANION GAP SERPL CALCULATED.3IONS-SCNC: 9 MMOL/L (ref 5–15)
AST SERPL-CCNC: 30 U/L (ref 1–40)
BILIRUB SERPL-MCNC: 0.4 MG/DL (ref 0–1.2)
BUN SERPL-MCNC: 22 MG/DL (ref 8–23)
BUN/CREAT SERPL: 18.8 (ref 7–25)
CALCIUM SPEC-SCNC: 9.4 MG/DL (ref 8.6–10.5)
CHLORIDE SERPL-SCNC: 103 MMOL/L (ref 98–107)
CHOLEST SERPL-MCNC: 208 MG/DL (ref 0–200)
CO2 SERPL-SCNC: 33 MMOL/L (ref 22–29)
CREAT SERPL-MCNC: 1.17 MG/DL (ref 0.76–1.27)
DEPRECATED RDW RBC AUTO: 43.6 FL (ref 37–54)
ERYTHROCYTE [DISTWIDTH] IN BLOOD BY AUTOMATED COUNT: 13.2 % (ref 12.3–15.4)
GFR SERPL CREATININE-BSD FRML MDRD: 74 ML/MIN/1.73
GLOBULIN UR ELPH-MCNC: 3.1 GM/DL
GLUCOSE SERPL-MCNC: 140 MG/DL (ref 65–99)
HBA1C MFR BLD: 7.4 % (ref 4.8–5.6)
HCT VFR BLD AUTO: 37 % (ref 37.5–51)
HDLC SERPL-MCNC: 53 MG/DL (ref 40–60)
HGB BLD-MCNC: 12.1 G/DL (ref 13–17.7)
INR PPP: 2.7 (ref 0.91–1.09)
LDLC SERPL CALC-MCNC: 131 MG/DL (ref 0–100)
LDLC/HDLC SERPL: 2.42 {RATIO}
MCH RBC QN AUTO: 29.9 PG (ref 26.6–33)
MCHC RBC AUTO-ENTMCNC: 32.7 G/DL (ref 31.5–35.7)
MCV RBC AUTO: 91.4 FL (ref 79–97)
PLATELET # BLD AUTO: 163 10*3/MM3 (ref 140–450)
PMV BLD AUTO: 11.4 FL (ref 6–12)
POTASSIUM SERPL-SCNC: 3.8 MMOL/L (ref 3.5–5.2)
PROT SERPL-MCNC: 7 G/DL (ref 6–8.5)
PROTHROMBIN TIME: 32.7 SECONDS (ref 10–13.8)
RBC # BLD AUTO: 4.05 10*6/MM3 (ref 4.14–5.8)
SODIUM SERPL-SCNC: 145 MMOL/L (ref 136–145)
TRIGL SERPL-MCNC: 135 MG/DL (ref 0–150)
VLDLC SERPL-MCNC: 24 MG/DL (ref 5–40)
WBC # BLD AUTO: 6.64 10*3/MM3 (ref 3.4–10.8)

## 2021-06-11 PROCEDURE — 85027 COMPLETE CBC AUTOMATED: CPT

## 2021-06-11 PROCEDURE — 80053 COMPREHEN METABOLIC PANEL: CPT

## 2021-06-11 PROCEDURE — 85610 PROTHROMBIN TIME: CPT | Performed by: INTERNAL MEDICINE

## 2021-06-11 PROCEDURE — 83036 HEMOGLOBIN GLYCOSYLATED A1C: CPT

## 2021-06-11 PROCEDURE — 80061 LIPID PANEL: CPT

## 2021-06-11 PROCEDURE — 36415 COLL VENOUS BLD VENIPUNCTURE: CPT

## 2021-07-21 ENCOUNTER — OFFICE VISIT (OUTPATIENT)
Dept: CARDIOLOGY | Facility: CLINIC | Age: 76
End: 2021-07-21

## 2021-07-21 VITALS
SYSTOLIC BLOOD PRESSURE: 120 MMHG | HEIGHT: 74 IN | DIASTOLIC BLOOD PRESSURE: 72 MMHG | WEIGHT: 256 LBS | BODY MASS INDEX: 32.85 KG/M2 | OXYGEN SATURATION: 99 % | HEART RATE: 73 BPM

## 2021-07-21 DIAGNOSIS — I10 ESSENTIAL HYPERTENSION: Chronic | ICD-10-CM

## 2021-07-21 DIAGNOSIS — I48.0 PAROXYSMAL ATRIAL FIBRILLATION (HCC): Chronic | ICD-10-CM

## 2021-07-21 DIAGNOSIS — I50.42 CHF (CONGESTIVE HEART FAILURE), NYHA CLASS II, CHRONIC, COMBINED (HCC): Primary | ICD-10-CM

## 2021-07-21 PROBLEM — I50.31 ACUTE DIASTOLIC CHF (CONGESTIVE HEART FAILURE) (HCC): Status: ACTIVE | Noted: 2017-01-30

## 2021-07-21 PROCEDURE — 93000 ELECTROCARDIOGRAM COMPLETE: CPT | Performed by: INTERNAL MEDICINE

## 2021-07-21 PROCEDURE — 99214 OFFICE O/P EST MOD 30 MIN: CPT | Performed by: INTERNAL MEDICINE

## 2021-07-21 NOTE — PROGRESS NOTES
Subjective:     Encounter Date:07/21/2021      Patient ID: Jonh Peacock is a 75 y.o. male with nonischemic cardiomyopathy, history of nonsustained ventricular tachycardia, atrial fibrillation, atrial tachycardia, mitral regurgitation, hypertension and hyperlipidemia who presents today for follow-up.    Chief Complaint: Here today for routine follow-up    This patient presents today for follow-up.  He has a history of nonischemic cardiomyopathy and has an ICD in place.  He also has a history of multiple arrhythmias, including A. fib, atrial tachycardia, ventricular tachycardia.  He says that he has been doing very well since we last saw him.  He has had a new stimulator placed and he is much more physically active than he was previously.  He still does not have the stamina that he would like to have but he says that he is able to do things currently that he has been unable to do in years past.  He denies having any significant shortness of breath or dyspnea on exertion.  His weight has been stable.  No orthopnea, PND, edema.  No palpitations, lightheadedness, dizziness or syncope.  No chest pain.  No problems or side effects of any of his medications and no problems with his cardiac device.    Congestive Heart Failure  Presents for follow-up visit. Pertinent negatives include no abdominal pain, chest pain, edema, orthopnea, palpitations, paroxysmal nocturnal dyspnea, shortness of breath or unexpected weight change. The symptoms have been stable. Compliance with total regimen is %. Compliance with diet is %. Compliance with exercise is %. Compliance with medications is %.   Atrial Fibrillation  Presents for follow-up visit. Symptoms are negative for an AICD problem, chest pain, dizziness, hemodynamic instability, palpitations, shortness of breath and syncope. The symptoms have been stable. Past medical history includes atrial fibrillation and CHF.       Current Outpatient Medications:    •  Buprenorphine (BUTRANS) 10 MCG/HR patch weekly, Place 10 mcg on the skin Every 7 (Seven) Days., Disp: , Rfl:   •  CHONDROITIN SULFATE PO, Take 1 tablet by mouth 2 (two) times a day., Disp: , Rfl:   •  diazePAM (VALIUM) 2 MG tablet, Take 2 mg by mouth Daily., Disp: , Rfl:   •  diclofenac (VOLTAREN) 1 % gel gel, Apply 4 g topically to the appropriate area as directed 4 (Four) Times a Day As Needed., Disp: , Rfl:   •  dilTIAZem (Cardizem) 30 MG tablet, Take 1 tablet by mouth 3 (Three) Times a Day., Disp: 270 tablet, Rfl: 3  •  furosemide (LASIX) 20 MG tablet, Take 1 tablet by mouth Daily. (Patient taking differently: Take 60 mg by mouth Daily.), Disp: 90 tablet, Rfl: 3  •  gabapentin (NEURONTIN) 300 MG capsule, Take 300 mg by mouth 4 (Four) Times a Day., Disp: , Rfl:   •  Glucosamine 750 MG tablet, Take 750 mg by mouth 2 (Two) Times a Day., Disp: , Rfl:   •  HYDROcodone-acetaminophen (NORCO) 7.5-325 MG per tablet, Take 1 tablet by mouth As Needed for Moderate Pain ., Disp: , Rfl:   •  losartan (COZAAR) 25 MG tablet, Take 1 tablet by mouth Daily., Disp: 90 tablet, Rfl: 3  •  metoprolol succinate XL (TOPROL-XL) 100 MG 24 hr tablet, Take 1 tablet by mouth Daily., Disp: 90 tablet, Rfl: 3  •  Multiple Vitamins-Minerals (MULTIVITAMIN ADULT PO), Take 1 tablet by mouth Daily., Disp: , Rfl:   •  pantoprazole (PROTONIX) 40 MG EC tablet, Take 1 tablet by mouth Daily., Disp: 90 tablet, Rfl: 3  •  polyethylene glycol (MIRALAX) powder, Take 17 g by mouth Every Night., Disp: 1581 g, Rfl: 3  •  simvastatin (ZOCOR) 20 MG tablet, Take 1 tablet by mouth Every Night., Disp: 90 tablet, Rfl: 3  •  tamsulosin (FLOMAX) 0.4 MG capsule 24 hr capsule, Take 1 capsule by mouth Every Night., Disp: 90 capsule, Rfl: 3  •  warfarin (COUMADIN) 2 MG tablet, Take 1 tablet by mouth Daily., Disp: 90 tablet, Rfl: 1  •  warfarin (COUMADIN) 4 MG tablet, Take 1 tablet by mouth Daily., Disp: 90 tablet, Rfl: 1  •  warfarin (COUMADIN) 5 MG tablet, Take 1  "tablet by mouth Daily., Disp: 90 tablet, Rfl: 1    Allergies   Allergen Reactions   • Percocet [Oxycodone-Acetaminophen] Urinary Retention   • Amiodarone Other (See Comments)     INTERFERES WITH OTHER MEDICATIONS   • Oxycontin [Oxycodone Hcl] Urinary Retention   • Penicillins Hives     All \"cillins\"   • Vioxx [Rofecoxib] GI Intolerance     Social History     Tobacco Use   • Smoking status: Never Smoker   • Smokeless tobacco: Never Used   Substance Use Topics   • Alcohol use: Not Currently     Review of Systems   Constitutional: Negative for fever, unexpected weight change and weight loss.   Cardiovascular: Negative for chest pain, dyspnea on exertion, leg swelling, orthopnea, palpitations, paroxysmal nocturnal dyspnea and syncope.   Respiratory: Negative for cough, shortness of breath and wheezing.    Hematologic/Lymphatic: Negative for adenopathy and bleeding problem.   Gastrointestinal: Negative for abdominal pain, nausea and vomiting.   Neurological: Negative for dizziness, headaches and loss of balance.       ECG 12 Lead    Date/Time: 7/21/2021 2:06 PM  Performed by: Arun Banks MD  Authorized by: Arun Banks MD   Comparison: compared with previous ECG from 1/7/2021  Similar to previous ECG  Rhythm: paced  Rate: normal  BPM: 63  Conduction: non-specific intraventricular conduction delay  QRS axis: normal  Pacing capture: atrial paced.  Clinical impression: abnormal EKG             Objective:     Vitals reviewed.   Constitutional:       General: Not in acute distress.     Appearance: Healthy appearance. Well-developed.   Eyes:      Extraocular Movements: Extraocular movements intact.      Pupils: Pupils are equal, round, and reactive to light.   HENT:      Head: Normocephalic and atraumatic.   Neck:      Thyroid: No thyroid mass.      Vascular: No JVD.   Pulmonary:      Effort: Pulmonary effort is normal.      Breath sounds: Normal breath sounds. No wheezing. No rhonchi. No rales. " "  Cardiovascular:      Normal rate. Regular rhythm.      Murmurs: There is no murmur.      No gallop.   Pulses:     Intact distal pulses.   Edema:     Peripheral edema absent.   Abdominal:      General: Abdomen is protuberant. Bowel sounds are normal. There is no distension.      Palpations: Abdomen is soft.      Tenderness: There is no abdominal tenderness.   Musculoskeletal: Normal range of motion.      Extremities: No clubbing present.     Cervical back: Normal range of motion and neck supple. Skin:     General: Skin is warm and dry. There is no cyanosis.      Findings: No erythema or rash.   Neurological:      Mental Status: Alert and oriented to person, place, and time.      Cranial Nerves: No cranial nerve deficit.       /72   Pulse 73   Ht 188 cm (74\")   Wt 116 kg (256 lb)   SpO2 99%   BMI 32.87 kg/m²     Data/Lab Review:     I did review the patient's device interrogation from 6/21/2021 showing normal device function with adequate battery reserve.  Some high ventricular rate episodes most suggestive of either atrial flutter or SVT, otherwise normal device function.    ===============================================    Lab Results   Component Value Date    CHOL 208 (H) 06/11/2021    CHLPL 220 (H) 07/12/2016    TRIG 135 06/11/2021    HDL 53 06/11/2021     (H) 06/11/2021     ===============================================    Echocardiogram on 2/7/2019:  · Left ventricular systolic function is moderately decreased. Estimated EF appears to be in the range of 31 - 35%.  · Left ventricular diastolic dysfunction (grade I) consistent with impaired relaxation.  · The left ventricular cavity is moderately dilated.  · Trace tricuspid valve regurgitation is present. Estimated right ventricular systolic pressure from tricuspid regurgitation is mildly elevated (35-45 mmHg).    ================================================      Records from Dr. Broadbent's office indicate that this patient did undergo " cardiac catheterization on 11/19/2012 which showed no obstructive coronary artery disease, ejection fraction of 40% with 1+ mitral regurgitation        Assessment:          Diagnosis Plan   1. CHF (congestive heart failure), NYHA class II, chronic, combined (CMS/HCC)     2. Paroxysmal atrial fibrillation (CMS/[facility])  ECG 12 Lead   3. Essential hypertension            Plan:       1.  Chronic systolic congestive heart failure/nonischemic cardiomyopathy:  Overall, the patient remains clinically stable at this time.  His symptoms have improved as his exercise tolerance seems to be improving.  He is euvolemic on today's exam.  Continue all medications at this time without change.     2.  Paroxysmal atrial fibrillation/paroxysmal atrial tachycardia/ventricular tachycardia: Clinically stable.  The patient does continue to follow with Dr. Chavez.  He is chronically anticoagulated with Coumadin.  No changes at this time.     3.  Essential hypertension:  The patient's blood pressure is under good control at this time therefore no changes to medications will be recommended.    We will plan to see the patient back in 12 months unless otherwise needed sooner

## 2021-07-26 ENCOUNTER — LAB (OUTPATIENT)
Dept: LAB | Facility: HOSPITAL | Age: 76
End: 2021-07-26

## 2021-07-26 DIAGNOSIS — Z79.01 ANTICOAGULANT LONG-TERM USE: ICD-10-CM

## 2021-07-26 LAB
INR PPP: 3.5 (ref 0.91–1.09)
PROTHROMBIN TIME: 42 SECONDS (ref 10–13.8)

## 2021-07-26 PROCEDURE — 85610 PROTHROMBIN TIME: CPT | Performed by: NURSE PRACTITIONER

## 2021-07-27 NOTE — PROGRESS NOTES
Please call patient and find out his current coumadin dose. He will need to decrease is since INR over 3.

## 2021-08-09 NOTE — PROGRESS NOTES
[facility] - PODIATRY    Today's Date: 08/17/21    Patient Name: Jonh Peacock  MRN: [id number]  CSN: [id number]  PCP: Ric Antony DO  Referring Provider: No ref. provider found     SUBJECTIVE     Chief Complaint   Patient presents with   • Follow-up     pcp06/11/2021 3 month fu- nail care- pt states feet have been fine, pt denies pain- pt presents with walker       HPI: Jonh Peacokc, a 75 y.o.male, comes to clinic as a(n) established patient complaining of thick fungal toenails. Pt with h/o Anxiety, Arrhythmia, Cardiomyopathy, CHF, Depression, previous DVT, HTN, Obesity, Anticoagulation with Warfarin. Relates borderline diabetes and controlling with diet. Notes mild numbness and tingling. Presents with long, thickened, and irregular nail growth. Feels like left hallux nail has had further color changes. Denies pain today. Utilizing rollator for ambulation. Notes improvement to feet with regular visits. Denies any constitutional symptoms. No other pedal complaints at this time.    Past Medical History:   Diagnosis Date   • Anemia    • Anxiety    • Arrhythmia    • Arthritis    • Atrial fibrillation (CMS/HCC)    • Cardiomyopathy (CMS/HCC)    • CHF (congestive heart failure) (CMS/HCC)    • Colon polyp    • Colostomy present (CMS/HCC)     10years   • Connective tissue disease (CMS/HCC)    • Depression    • Depression    • Diverticulitis    • Dizzy    • DVT (deep venous thrombosis) (CMS/HCC)    • Gastritis    • GERD (gastroesophageal reflux disease)    • Hiatal hernia    • History of transfusion    • Hyperlipidemia    • Hypertension    • Neuropathy    • Pneumonia      Past Surgical History:   Procedure Laterality Date   • APPENDECTOMY     • BACK SURGERY  2002, 2007    Multiple spine surgeries - laminectomy & fusion   • CARDIAC CATHETERIZATION     • CARDIAC DEFIBRILLATOR PLACEMENT     • CARDIAC ELECTROPHYSIOLOGY PROCEDURE N/A 10/31/2019    Procedure: ICD GEN CHANGE;  Surgeon: Scott  "Chaz Olea MD;  Location: [facility] CATH INVASIVE LOCATION;  Service: Cardiology   • COLON RESECTION WITH COLOSTOMY     • COLONOSCOPY  09/04/2014    diverticulosis   • COLONOSCOPY N/A 10/29/2019    Procedure: COLONOSCOPY WITH ANESTHESIA;  Surgeon: Tenzin Abrams MD;  Location: [facility] ENDOSCOPY;  Service: Gastroenterology   • ENDOSCOPY  02/24/1999    small sliding hiatal hernia   • FOOT SURGERY Bilateral 2005   • JOINT REPLACEMENT Right 2002    KNEE   • PACEMAKER IMPLANTATION  2012   • RECTAL FISSURE INCISION AND DRAINAGE     • REPLACEMENT TOTAL KNEE     • SPINAL CORD STIMULATOR IMPLANT  08/2012    Placed by Dr. DEENA Villela, [email or web address]   • THORACIC LAMINECTOMY WITH PLACEMENT OF DORSAL COLUMN STIMULATOR Right 1/28/2021    Procedure: SPINAL CORD STIMULATOR REVISION, right buttocks;  Surgeon: Noah Oliveros MD;  Location: [facility] OR;  Service: Neurosurgery;  Laterality: Right;   • TOTAL HIP ARTHROPLASTY Right 2004   • TOTAL HIP ARTHROPLASTY Left 12/2008     Family History   Problem Relation Age of Onset   • Heart disease Mother    • Heart disease Brother    • Breast cancer Brother    • Colon cancer Neg Hx    • Colon polyps Neg Hx      Social History     Socioeconomic History   • Marital status:      Spouse name: Not on file   • Number of children: Not on file   • Years of education: Not on file   • Highest education level: Not on file   Tobacco Use   • Smoking status: Never Smoker   • Smokeless tobacco: Never Used   Vaping Use   • Vaping Use: Never used   Substance and Sexual Activity   • Alcohol use: Not Currently   • Drug use: Never   • Sexual activity: Defer     Allergies   Allergen Reactions   • Percocet [Oxycodone-Acetaminophen] Urinary Retention   • Amiodarone Other (See Comments)     INTERFERES WITH OTHER MEDICATIONS   • Oxycontin [Oxycodone Hcl] Urinary Retention   • Penicillins Hives     All \"cillins\"   • Vioxx [Rofecoxib] GI Intolerance     Current Outpatient Medications   Medication Sig " Dispense Refill   • Buprenorphine (BUTRANS) 10 MCG/HR patch weekly Place 10 mcg on the skin Every 7 (Seven) Days.     • CHONDROITIN SULFATE PO Take 1 tablet by mouth 2 (two) times a day.     • diazePAM (VALIUM) 2 MG tablet Take 2 mg by mouth Daily.     • diclofenac (VOLTAREN) 1 % gel gel Apply 4 g topically to the appropriate area as directed 4 (Four) Times a Day As Needed.     • dilTIAZem (Cardizem) 30 MG tablet Take 1 tablet by mouth 3 (Three) Times a Day. 270 tablet 3   • furosemide (LASIX) 20 MG tablet Take 1 tablet by mouth Daily. (Patient taking differently: Take 60 mg by mouth Daily.) 90 tablet 3   • gabapentin (NEURONTIN) 300 MG capsule Take 300 mg by mouth 4 (Four) Times a Day.     • Glucosamine 750 MG tablet Take 750 mg by mouth 2 (Two) Times a Day.     • losartan (COZAAR) 25 MG tablet Take 1 tablet by mouth Daily. 90 tablet 3   • metoprolol succinate XL (TOPROL-XL) 100 MG 24 hr tablet Take 1 tablet by mouth Daily. 90 tablet 3   • Multiple Vitamins-Minerals (MULTIVITAMIN ADULT PO) Take 1 tablet by mouth Daily.     • pantoprazole (PROTONIX) 40 MG EC tablet Take 1 tablet by mouth Daily. 90 tablet 3   • polyethylene glycol (MIRALAX) powder Take 17 g by mouth Every Night. 1581 g 3   • simvastatin (ZOCOR) 20 MG tablet Take 1 tablet by mouth Every Night. 90 tablet 3   • tamsulosin (FLOMAX) 0.4 MG capsule 24 hr capsule Take 1 capsule by mouth Every Night. 90 capsule 3   • warfarin (COUMADIN) 2 MG tablet Take 1 tablet by mouth Daily. 90 tablet 1   • warfarin (COUMADIN) 4 MG tablet Take 1 tablet by mouth Daily. 90 tablet 1   • warfarin (COUMADIN) 5 MG tablet Take 1 tablet by mouth Daily. 90 tablet 1   • HYDROcodone-acetaminophen (NORCO) 7.5-325 MG per tablet Take 1 tablet by mouth As Needed for Moderate Pain .       No current facility-administered medications for this visit.     Review of Systems   Constitutional: Negative for chills and fever.   HENT: Negative for congestion.    Respiratory: Negative for  shortness of breath.    Cardiovascular: Positive for leg swelling. Negative for chest pain.   Gastrointestinal: Negative for constipation, diarrhea, nausea and vomiting.   Musculoskeletal: Positive for gait problem.        Foot pain   Skin: Negative for wound.        Thick Toenails   Neurological: Positive for numbness.   Hematological: Bruises/bleeds easily.       OBJECTIVE     Vitals:    08/17/21 1453   BP: 124/58   Pulse: 75   SpO2: 95%       PHYSICAL EXAM  GEN:   A&Ox3, NAD. Accompanied by wife.    Foot/Ankle Exam:       General:   Appearance: appears stated age and healthy    Orientation: AAOx3    Affect: appropriate    Gait: unimpaired    Assistance: walker    Shoe Gear:  Casual shoes (left shoe with lift)    VASCULAR      Right Foot Vascularity   Dorsalis pedis:  1+  Posterior tibial:  1+  Skin Temperature: warm    Edema Grading:  Trace and non-pitting  CFT:  4  Pedal Hair Growth:  Absent  Varicosities: none       Left Foot Vascularity   Dorsalis pedis:  1+  Posterior tibial:  1+  Skin Temperature: warm    Edema Grading:  None, trace and non-pitting  CFT:  4  Pedal Hair Growth:  Absent  Varicosities: none        NEUROLOGIC     Right Foot Neurologic   Light touch sensation:  Diminished  Vibratory sensation:  Diminished  Hot/Cold sensation: diminished    Protective Sensation using [place] Monofilament:  7     Left Foot Neurologic   Light touch sensation:  Diminished  Vibratory sensation:  Diminished  Hot/cold sensation: diminished    Protective Sensation using [place] Monofilament:  7     MUSCULOSKELETAL      Right Foot Musculoskeletal   Ecchymosis:  None  Tenderness: toenails    Arch:  Pes planus  Hallux valgus: No       Left Foot Musculoskeletal   Ecchymosis:  None  Tenderness: toenails    Arch:  Pes planus  Hallux valgus: No       MUSCLE STRENGTH     Right Foot Muscle Strength   Foot dorsiflexion:  5  Foot plantar flexion:  5  Foot inversion:  5  Foot eversion:  5     Left Foot Muscle  Strength   Foot dorsiflexion:  5  Foot plantar flexion:  5  Foot inversion:  5  Foot eversion:  5     RANGE OF MOTION      Right Foot Range of Motion   Foot and ankle ROM within normal limits       Left Foot Range of Motion   Foot and ankle ROM within normal limits       DERMATOLOGIC     Right Foot Dermatologic   Skin: skin intact    Nails: onychomycosis, abnormally thick, subungual debris and dystrophic nails       Left Foot Dermatologic   Skin: skin intact    Nails: onychomycosis, abnormally thick, subungual debris and dystrophic nails         RADIOLOGY/NUCLEAR:  No results found.    LABORATORY/CULTURE RESULTS:      PATHOLOGY RESULTS:       ASSESSMENT/PLAN     Diagnoses and all orders for this visit:    1. Onychomycosis (Primary)    2. Neuropathy    3. Borderline diabetes    4. Anticoagulant long-term use    5. Peripheral edema      Comprehensive lower extremity examination and evaluation was performed.  Discussed findings and treatment plan including risks, benefits, and treatment options with patient in detail. Patient agreed with treatment plan.  After verbal consent obtained, nail(s) x10 debrided of length and thickness with nail nipper without incidence  Patient may maintain nails and calluses at home utilizing emery board or pumice stone between visits as needed  Reviewed at home diabetic foot care including daily foot checks   Continue shoes with custom inserts  Continue elevation of legs when possible.   An After Visit Summary was printed and given to the patient at discharge, including (if requested) any available informative/educational handouts regarding diagnosis, treatment, or medications. All questions were answered to patient/family satisfaction. Should symptoms fail to improve or worsen they agree to call or return to clinic or to go to the Emergency Department. Discussed the importance of following up with any needed screening tests/labs/specialist appointments and any requested follow-up  recommended by me today. Importance of maintaining follow-up discussed and patient accepts that missed appointments can delay diagnosis and potentially lead to worsening of conditions.  Return in about 3 months (around 11/17/2021)., or sooner if acute issues arise.        This document has been electronically signed by Deion Avelar DPM on August 17, 2021 15:07 CDT     Please note that portions of this note may have been completed with a voice recognition program. Efforts were made to edit the dictations, but occasionally words are mistranscribed.

## 2021-08-17 ENCOUNTER — OFFICE VISIT (OUTPATIENT)
Dept: PODIATRY | Facility: CLINIC | Age: 76
End: 2021-08-17

## 2021-08-17 ENCOUNTER — LAB (OUTPATIENT)
Dept: LAB | Facility: HOSPITAL | Age: 76
End: 2021-08-17

## 2021-08-17 VITALS
WEIGHT: 259.8 LBS | BODY MASS INDEX: 33.34 KG/M2 | OXYGEN SATURATION: 95 % | HEART RATE: 75 BPM | HEIGHT: 74 IN | SYSTOLIC BLOOD PRESSURE: 124 MMHG | DIASTOLIC BLOOD PRESSURE: 58 MMHG

## 2021-08-17 DIAGNOSIS — G62.9 NEUROPATHY: ICD-10-CM

## 2021-08-17 DIAGNOSIS — R73.03 BORDERLINE DIABETES: ICD-10-CM

## 2021-08-17 DIAGNOSIS — Z79.01 ANTICOAGULANT LONG-TERM USE: ICD-10-CM

## 2021-08-17 DIAGNOSIS — B35.1 ONYCHOMYCOSIS: Primary | ICD-10-CM

## 2021-08-17 DIAGNOSIS — R60.9 PERIPHERAL EDEMA: ICD-10-CM

## 2021-08-17 PROCEDURE — 11721 DEBRIDE NAIL 6 OR MORE: CPT | Performed by: PODIATRIST

## 2021-08-17 PROCEDURE — 85610 PROTHROMBIN TIME: CPT | Performed by: NURSE PRACTITIONER

## 2021-08-18 LAB
INR PPP: 2.3 (ref 0.91–1.09)
PROTHROMBIN TIME: 27 SECONDS (ref 10–13.8)

## 2021-09-14 ENCOUNTER — LAB (OUTPATIENT)
Dept: LAB | Facility: HOSPITAL | Age: 76
End: 2021-09-14

## 2021-09-14 DIAGNOSIS — Z79.01 ANTICOAGULANT LONG-TERM USE: ICD-10-CM

## 2021-09-14 LAB
INR PPP: 1.9 (ref 0.91–1.09)
PROTHROMBIN TIME: 22.4 SECONDS (ref 10–13.8)

## 2021-09-14 PROCEDURE — 85610 PROTHROMBIN TIME: CPT | Performed by: NURSE PRACTITIONER

## 2021-09-20 PROCEDURE — 93295 DEV INTERROG REMOTE 1/2/MLT: CPT | Performed by: INTERNAL MEDICINE

## 2021-09-20 PROCEDURE — 93296 REM INTERROG EVL PM/IDS: CPT | Performed by: INTERNAL MEDICINE

## 2021-10-25 ENCOUNTER — LAB (OUTPATIENT)
Dept: LAB | Facility: HOSPITAL | Age: 76
End: 2021-10-25

## 2021-10-25 DIAGNOSIS — Z79.01 ANTICOAGULANT LONG-TERM USE: Primary | ICD-10-CM

## 2021-10-25 PROCEDURE — 85610 PROTHROMBIN TIME: CPT

## 2021-10-26 DIAGNOSIS — Z79.01 ANTICOAGULANT LONG-TERM USE: Primary | ICD-10-CM

## 2021-10-26 LAB
INR PPP: 2.2 (ref 0.8–1.2)
PROTHROMBIN TIME: 26.2 SECONDS (ref 12.8–15.2)

## 2021-11-10 NOTE — PROGRESS NOTES
[facility] - PODIATRY    Today's Date: 12/19/21    Patient Name: Jonh Peacock  MRN: [id number]  CSN: [id number]  PCP: Ric Antony DO  Referring Provider: No ref. provider found     SUBJECTIVE     Chief Complaint   Patient presents with   • Follow-up     3 MONTH F/U FOOT/NAIL CARE PCP-11/11/21       HPI: Jonh Peacock, a 76 y.o.male, comes to clinic as a(n) established patient complaining of thick fungal toenails. Pt with h/o Anxiety, Arrhythmia, Cardiomyopathy, CHF, Depression, previous DVT, HTN, Obesity, Anticoagulation with Warfarin. Relates borderline diabetes and controlling with diet. Notes mild numbness and tingling. Presents with long, thickened, and irregular nail growth. Feels like left hallux nail has had further color changes. Denies pain today. Utilizing rollator for ambulation. Notes improvement to feet with regular visits. Denies any constitutional symptoms. No other pedal complaints at this time.    Past Medical History:   Diagnosis Date   • Anemia    • Anxiety    • Arrhythmia    • Arthritis    • Atrial fibrillation (HCC)    • Cardiomyopathy (HCC)    • CHF (congestive heart failure) (HCC)    • Colon polyp    • Colostomy present (HCC)     10years   • Connective tissue disease (HCC)    • Depression    • Depression    • Diverticulitis    • Dizzy    • DVT (deep venous thrombosis) (HCC)    • Gastritis    • GERD (gastroesophageal reflux disease)    • Hiatal hernia    • History of transfusion    • Hyperlipidemia    • Hypertension    • Neuropathy    • Pneumonia      Past Surgical History:   Procedure Laterality Date   • APPENDECTOMY     • BACK SURGERY  2002, 2007    Multiple spine surgeries - laminectomy & fusion   • CARDIAC CATHETERIZATION     • CARDIAC DEFIBRILLATOR PLACEMENT     • CARDIAC ELECTROPHYSIOLOGY PROCEDURE N/A 10/31/2019    Procedure: ICD GEN CHANGE;  Surgeon: Chaz Chavez MD;  Location: [facility] CATH INVASIVE LOCATION;  Service: Cardiology   • COLON  "RESECTION WITH COLOSTOMY     • COLONOSCOPY  09/04/2014    diverticulosis   • COLONOSCOPY N/A 10/29/2019    Procedure: COLONOSCOPY WITH ANESTHESIA;  Surgeon: Tenzin Abrams MD;  Location: [facility] ENDOSCOPY;  Service: Gastroenterology   • ENDOSCOPY  02/24/1999    small sliding hiatal hernia   • FOOT SURGERY Bilateral 2005   • JOINT REPLACEMENT Right 2002    KNEE   • PACEMAKER IMPLANTATION  2012   • RECTAL FISSURE INCISION AND DRAINAGE     • REPLACEMENT TOTAL KNEE     • SPINAL CORD STIMULATOR IMPLANT  08/2012    Placed by Dr. DEENA Villela, [employer]   • THORACIC LAMINECTOMY WITH PLACEMENT OF DORSAL COLUMN STIMULATOR Right 1/28/2021    Procedure: SPINAL CORD STIMULATOR REVISION, right buttocks;  Surgeon: Noah Oliveros MD;  Location: [facility] OR;  Service: Neurosurgery;  Laterality: Right;   • TOTAL HIP ARTHROPLASTY Right 2004   • TOTAL HIP ARTHROPLASTY Left 12/2008     Family History   Problem Relation Age of Onset   • Heart disease Mother    • Heart disease Brother    • Breast cancer Brother    • Colon cancer Neg Hx    • Colon polyps Neg Hx      Social History     Socioeconomic History   • Marital status:    Tobacco Use   • Smoking status: Never Smoker   • Smokeless tobacco: Never Used   Vaping Use   • Vaping Use: Never used   Substance and Sexual Activity   • Alcohol use: Not Currently   • Drug use: Never   • Sexual activity: Defer     Allergies   Allergen Reactions   • Percocet [Oxycodone-Acetaminophen] Urinary Retention   • Amiodarone Other (See Comments)     INTERFERES WITH OTHER MEDICATIONS   • Oxycontin [Oxycodone Hcl] Urinary Retention   • Penicillins Hives     All \"cillins\"   • Vioxx [Rofecoxib] GI Intolerance     Current Outpatient Medications   Medication Sig Dispense Refill   • Buprenorphine (BUTRANS) 10 MCG/HR patch weekly Place 10 mcg on the skin Every 7 (Seven) Days.     • CHONDROITIN SULFATE PO Take 1 tablet by mouth 2 (two) times a day.     • diazePAM (VALIUM) 2 MG tablet Take 2 mg by " mouth Daily.     • diclofenac (VOLTAREN) 1 % gel gel Apply 4 g topically to the appropriate area as directed 4 (Four) Times a Day As Needed.     • dilTIAZem (Cardizem) 30 MG tablet Take 1 tablet by mouth 3 (Three) Times a Day. 270 tablet 3   • furosemide (LASIX) 20 MG tablet Take 1 tablet by mouth Daily. (Patient taking differently: Take 60 mg by mouth Daily.) 90 tablet 3   • gabapentin (NEURONTIN) 300 MG capsule Take 300 mg by mouth 3 (Three) Times a Day.     • Glucosamine 750 MG tablet Take 750 mg by mouth 2 (Two) Times a Day.     • metoprolol succinate XL (TOPROL-XL) 100 MG 24 hr tablet Take 1 tablet by mouth Daily. 90 tablet 3   • Multiple Vitamins-Minerals (MULTIVITAMIN ADULT PO) Take 1 tablet by mouth Daily.     • pantoprazole (PROTONIX) 40 MG EC tablet Take 1 tablet by mouth Daily. 90 tablet 3   • polyethylene glycol (MIRALAX) powder Take 17 g by mouth Every Night. 1581 g 3   • simvastatin (ZOCOR) 20 MG tablet Take 1 tablet by mouth Every Night. 90 tablet 3   • HYDROcodone-acetaminophen (NORCO) 7.5-325 MG per tablet Take 1 tablet by mouth As Needed for Moderate Pain .     • losartan (COZAAR) 25 MG tablet TAKE 1 TABLET DAILY 90 tablet 3   • tamsulosin (FLOMAX) 0.4 MG capsule 24 hr capsule TAKE 1 CAPSULE EVERY NIGHT 90 capsule 3   • warfarin (COUMADIN) 2 MG tablet Take 1 tablet by mouth Daily. 90 tablet 1   • warfarin (COUMADIN) 4 MG tablet Take 1 tablet by mouth Daily. 90 tablet 1   • warfarin (COUMADIN) 5 MG tablet Take 1 tablet by mouth Daily. 90 tablet 1     No current facility-administered medications for this visit.     Review of Systems   Constitutional: Negative for chills and fever.   HENT: Negative for congestion.    Respiratory: Negative for shortness of breath.    Cardiovascular: Positive for leg swelling. Negative for chest pain.   Gastrointestinal: Negative for constipation, diarrhea, nausea and vomiting.   Musculoskeletal: Positive for gait problem.        Foot pain   Skin: Negative for wound.         Thick Toenails   Neurological: Positive for numbness.   Hematological: Bruises/bleeds easily.       OBJECTIVE     Vitals:    11/11/21 1414   BP: 122/75   Pulse: 51   Resp: 16   SpO2: 96%       PHYSICAL EXAM  GEN:   A&Ox3, NAD. Accompanied by wife.    Foot/Ankle Exam:       General:   Appearance: appears stated age and healthy    Orientation: AAOx3    Affect: appropriate    Gait: unimpaired    Assistance: walker    Shoe Gear:  Casual shoes (left shoe with lift)    VASCULAR      Right Foot Vascularity   Dorsalis pedis:  1+  Posterior tibial:  1+  Skin Temperature: warm    Edema Grading:  Trace and non-pitting  CFT:  4  Pedal Hair Growth:  Absent  Varicosities: none       Left Foot Vascularity   Dorsalis pedis:  1+  Posterior tibial:  1+  Skin Temperature: warm    Edema Grading:  None, trace and non-pitting  CFT:  4  Pedal Hair Growth:  Absent  Varicosities: none        NEUROLOGIC     Right Foot Neurologic   Light touch sensation:  Diminished  Vibratory sensation:  Diminished  Hot/Cold sensation: diminished    Protective Sensation using [place] Monofilament:  7     Left Foot Neurologic   Light touch sensation:  Diminished  Vibratory sensation:  Diminished  Hot/cold sensation: diminished    Protective Sensation using [place] Monofilament:  7     MUSCULOSKELETAL      Right Foot Musculoskeletal   Ecchymosis:  None  Tenderness: toenails    Arch:  Pes planus  Hallux valgus: No       Left Foot Musculoskeletal   Ecchymosis:  None  Tenderness: toenails    Arch:  Pes planus  Hallux valgus: No       MUSCLE STRENGTH     Right Foot Muscle Strength   Foot dorsiflexion:  5  Foot plantar flexion:  5  Foot inversion:  5  Foot eversion:  5     Left Foot Muscle Strength   Foot dorsiflexion:  5  Foot plantar flexion:  5  Foot inversion:  5  Foot eversion:  5     RANGE OF MOTION      Right Foot Range of Motion   Foot and ankle ROM within normal limits       Left Foot Range of Motion   Foot and ankle ROM within normal  limits       DERMATOLOGIC     Right Foot Dermatologic   Skin: skin intact    Nails: onychomycosis, abnormally thick, subungual debris and dystrophic nails       Left Foot Dermatologic   Skin: skin intact    Nails: onychomycosis, abnormally thick, subungual debris and dystrophic nails         RADIOLOGY/NUCLEAR:  No results found.    LABORATORY/CULTURE RESULTS:      PATHOLOGY RESULTS:       ASSESSMENT/PLAN     Diagnoses and all orders for this visit:    1. Onychomycosis (Primary)    2. Neuropathy    3. Borderline diabetes    4. Anticoagulant long-term use    5. Peripheral edema      Comprehensive lower extremity examination and evaluation was performed.  Discussed findings and treatment plan including risks, benefits, and treatment options with patient in detail. Patient agreed with treatment plan.  After verbal consent obtained, nail(s) x10 debrided of length and thickness with nail nipper without incidence  Patient may maintain nails and calluses at home utilizing emery board or pumice stone between visits as needed  Reviewed at home diabetic foot care including daily foot checks   Continue shoes with custom inserts  Continue elevation of legs when possible.   An After Visit Summary was printed and given to the patient at discharge, including (if requested) any available informative/educational handouts regarding diagnosis, treatment, or medications. All questions were answered to patient/family satisfaction. Should symptoms fail to improve or worsen they agree to call or return to clinic or to go to the Emergency Department. Discussed the importance of following up with any needed screening tests/labs/specialist appointments and any requested follow-up recommended by me today. Importance of maintaining follow-up discussed and patient accepts that missed appointments can delay diagnosis and potentially lead to worsening of conditions.  Return in about 3 months (around 2/11/2022) for Follow-up with Dr. Avelar., or  sooner if acute issues arise.        This document has been electronically signed by REYMUNDO Malcolm on December 19, 2021 14:08 CST     Please note that portions of this note may have been completed with a voice recognition program. Efforts were made to edit the dictations, but occasionally words are mistranscribed.

## 2021-11-11 ENCOUNTER — OFFICE VISIT (OUTPATIENT)
Dept: INTERNAL MEDICINE | Facility: CLINIC | Age: 76
End: 2021-11-11

## 2021-11-11 ENCOUNTER — OFFICE VISIT (OUTPATIENT)
Dept: PODIATRY | Facility: CLINIC | Age: 76
End: 2021-11-11

## 2021-11-11 VITALS
SYSTOLIC BLOOD PRESSURE: 102 MMHG | OXYGEN SATURATION: 98 % | TEMPERATURE: 98 F | RESPIRATION RATE: 16 BRPM | HEART RATE: 79 BPM | WEIGHT: 260 LBS | HEIGHT: 74 IN | DIASTOLIC BLOOD PRESSURE: 54 MMHG | BODY MASS INDEX: 33.37 KG/M2

## 2021-11-11 VITALS
HEIGHT: 74 IN | WEIGHT: 260 LBS | DIASTOLIC BLOOD PRESSURE: 75 MMHG | RESPIRATION RATE: 16 BRPM | SYSTOLIC BLOOD PRESSURE: 122 MMHG | OXYGEN SATURATION: 96 % | BODY MASS INDEX: 33.37 KG/M2 | HEART RATE: 51 BPM

## 2021-11-11 DIAGNOSIS — R73.03 BORDERLINE DIABETES: ICD-10-CM

## 2021-11-11 DIAGNOSIS — I50.42 CHF (CONGESTIVE HEART FAILURE), NYHA CLASS II, CHRONIC, COMBINED (HCC): ICD-10-CM

## 2021-11-11 DIAGNOSIS — R60.9 PERIPHERAL EDEMA: ICD-10-CM

## 2021-11-11 DIAGNOSIS — E78.2 MIXED HYPERLIPIDEMIA: Chronic | ICD-10-CM

## 2021-11-11 DIAGNOSIS — E11.42 TYPE 2 DIABETES MELLITUS WITH DIABETIC POLYNEUROPATHY, WITHOUT LONG-TERM CURRENT USE OF INSULIN (HCC): Primary | ICD-10-CM

## 2021-11-11 DIAGNOSIS — B35.1 ONYCHOMYCOSIS: Primary | ICD-10-CM

## 2021-11-11 DIAGNOSIS — R20.0 NUMBNESS OF FINGERS OF BOTH HANDS: ICD-10-CM

## 2021-11-11 DIAGNOSIS — Z79.01 ANTICOAGULANT LONG-TERM USE: ICD-10-CM

## 2021-11-11 DIAGNOSIS — I10 ESSENTIAL HYPERTENSION: Chronic | ICD-10-CM

## 2021-11-11 DIAGNOSIS — I48.0 PAROXYSMAL ATRIAL FIBRILLATION (HCC): Chronic | ICD-10-CM

## 2021-11-11 DIAGNOSIS — G62.9 NEUROPATHY: ICD-10-CM

## 2021-11-11 LAB — HBA1C MFR BLD: 7.5 %

## 2021-11-11 PROCEDURE — 11721 DEBRIDE NAIL 6 OR MORE: CPT | Performed by: PODIATRIST

## 2021-11-11 PROCEDURE — 3051F HG A1C>EQUAL 7.0%<8.0%: CPT | Performed by: NURSE PRACTITIONER

## 2021-11-11 PROCEDURE — 83036 HEMOGLOBIN GLYCOSYLATED A1C: CPT | Performed by: NURSE PRACTITIONER

## 2021-11-11 PROCEDURE — 99214 OFFICE O/P EST MOD 30 MIN: CPT | Performed by: NURSE PRACTITIONER

## 2021-11-11 NOTE — PROGRESS NOTES
Chief Complaint   Patient presents with   • Numbness     THUMB AND TWO FINGERS OF EACH HAND   • Diabetes   • Hypertension       History:  Jonh Peacock is a 75 y.o. male who presents today for follow-up for evaluation of the above:    HPI   Ms. Peacock presents today for six month follow-up for HTN and diabetes. He states he has been in overall good health since his last visit. He is attempting to wean off of his gabapentin per Dr. Sterling's wishes. He has noticed an increase in numbness and tingling in his hands, but it is not unbearable. Plans to also titrate his valium.   He is still taking 60mg of Lasix a day per his Cardiologist.       ROS:  Review of Systems   Constitutional: Negative.    HENT: Negative.    Eyes: Negative.    Respiratory: Negative.    Cardiovascular: Negative.    Gastrointestinal: Negative.    Endocrine: Negative.    Genitourinary: Negative.    Musculoskeletal: Negative.    Skin: Negative.    Allergic/Immunologic: Negative.    Neurological: Negative.    Hematological: Negative.    Psychiatric/Behavioral: Negative.    All other systems reviewed and are negative.      Mr. Peacock  reports that he has never smoked. He has never used smokeless tobacco. He reports previous alcohol use. He reports that he does not use drugs.      Current Outpatient Medications:   •  Buprenorphine (BUTRANS) 10 MCG/HR patch weekly, Place 10 mcg on the skin Every 7 (Seven) Days., Disp: , Rfl:   •  CHONDROITIN SULFATE PO, Take 1 tablet by mouth 2 (two) times a day., Disp: , Rfl:   •  diazePAM (VALIUM) 2 MG tablet, Take 2 mg by mouth Daily., Disp: , Rfl:   •  diclofenac (VOLTAREN) 1 % gel gel, Apply 4 g topically to the appropriate area as directed 4 (Four) Times a Day As Needed., Disp: , Rfl:   •  dilTIAZem (Cardizem) 30 MG tablet, Take 1 tablet by mouth 3 (Three) Times a Day., Disp: 270 tablet, Rfl: 3  •  furosemide (LASIX) 20 MG tablet, Take 1 tablet by mouth Daily. (Patient taking differently: Take 60 mg by  "mouth Daily.), Disp: 90 tablet, Rfl: 3  •  gabapentin (NEURONTIN) 300 MG capsule, Take 300 mg by mouth 3 (Three) Times a Day., Disp: , Rfl:   •  Glucosamine 750 MG tablet, Take 750 mg by mouth 2 (Two) Times a Day., Disp: , Rfl:   •  HYDROcodone-acetaminophen (NORCO) 7.5-325 MG per tablet, Take 1 tablet by mouth As Needed for Moderate Pain ., Disp: , Rfl:   •  losartan (COZAAR) 25 MG tablet, Take 1 tablet by mouth Daily., Disp: 90 tablet, Rfl: 3  •  metoprolol succinate XL (TOPROL-XL) 100 MG 24 hr tablet, Take 1 tablet by mouth Daily., Disp: 90 tablet, Rfl: 3  •  Multiple Vitamins-Minerals (MULTIVITAMIN ADULT PO), Take 1 tablet by mouth Daily., Disp: , Rfl:   •  pantoprazole (PROTONIX) 40 MG EC tablet, Take 1 tablet by mouth Daily., Disp: 90 tablet, Rfl: 3  •  polyethylene glycol (MIRALAX) powder, Take 17 g by mouth Every Night., Disp: 1581 g, Rfl: 3  •  simvastatin (ZOCOR) 20 MG tablet, Take 1 tablet by mouth Every Night., Disp: 90 tablet, Rfl: 3  •  tamsulosin (FLOMAX) 0.4 MG capsule 24 hr capsule, Take 1 capsule by mouth Every Night., Disp: 90 capsule, Rfl: 3  •  warfarin (COUMADIN) 2 MG tablet, Take 1 tablet by mouth Daily., Disp: 90 tablet, Rfl: 1  •  warfarin (COUMADIN) 4 MG tablet, Take 1 tablet by mouth Daily., Disp: 90 tablet, Rfl: 1  •  warfarin (COUMADIN) 5 MG tablet, Take 1 tablet by mouth Daily., Disp: 90 tablet, Rfl: 1      OBJECTIVE:  /54 (BP Location: Right arm, Patient Position: Sitting, Cuff Size: Adult)   Pulse 79   Temp 98 °F (36.7 °C) (Temporal)   Resp 16   Ht 188 cm (74\")   Wt 118 kg (260 lb)   SpO2 98%   BMI 33.38 kg/m²    Physical Exam  Vitals and nursing note reviewed.   Constitutional:       Appearance: Normal appearance. He is obese.   HENT:      Head: Normocephalic.      Nose: Nose normal.      Mouth/Throat:      Mouth: Mucous membranes are moist.   Eyes:      Pupils: Pupils are equal, round, and reactive to light.   Cardiovascular:      Rate and Rhythm: Normal rate and " regular rhythm.      Pulses: Normal pulses.      Heart sounds: Normal heart sounds.   Pulmonary:      Effort: Pulmonary effort is normal.      Breath sounds: Normal breath sounds.   Abdominal:      General: Abdomen is flat.   Musculoskeletal:         General: Normal range of motion.      Cervical back: Normal range of motion and neck supple.   Skin:     General: Skin is warm.      Capillary Refill: Capillary refill takes less than 2 seconds.   Neurological:      General: No focal deficit present.      Mental Status: He is alert.   Psychiatric:         Mood and Affect: Mood normal.         Assessment/Plan    Diagnoses and all orders for this visit:    1. Type 2 diabetes mellitus with diabetic polyneuropathy, without long-term current use of insulin (HCC) (Primary)  -     POCT glycated hemoglobin, total  -     Comprehensive metabolic panel; Future  A1c is 7.5 in office today. Continue on current regimen.    2. Essential hypertension  Stable. Continue on current regimen.    3. Mixed hyperlipidemia  Stable. Continue on current regimen.    4. Peripheral edema  Stable. No excessive signs of swelling in the office today.    5. CHF (congestive heart failure), NYHA class II, chronic, combined (HCC)  -     Comprehensive metabolic panel; Future  Stable. Will check CMP with next INR to determine kidney function and status of potassium with lasix use    6. Numbness of fingers of both hands  -     Comprehensive metabolic panel; Future  Stable. He has a follow-up with Dr. Sterling next month and will further discuss his gabapentin dosage and numbness.     7. Paroxysmal atrial fibrillation  continues on warfarin.       Parts of this note were completed by GHAZAL DWYER student.   I have reviewed this note and agree with his documentation and have made additions based on my own face to face exam and history.        An After Visit Summary was printed and given to the patient at discharge.  Return in about 6 months (around 5/11/2022)  for Medicare Wellness. Sooner if problems arise.          Linda Moya APRN. 11/11/2021   Electronically Signed

## 2021-11-28 NOTE — PROGRESS NOTES
Dual Chamber AICD Evaluation Report  REMOTE/CARELINK     September 10, 2019     Primary Cardiologist: Jocelyn   : Medtronic Model: Protecta XT  [id number]  Implant date: 9/27/2012     Reason for evaluation: battery check  Indication for AICD: nonischemic cardiomyopathy     Episodes recorded since 8/26/2019:  SVT x 1, duration 4 seconds, rate 214 bpm.  No ATP or shocks.     Battery:  2.62V, has yet to reach RRT     Follow Up:  2 week battery check via carelink  
No

## 2021-11-29 DIAGNOSIS — I48.0 PAROXYSMAL ATRIAL FIBRILLATION (HCC): ICD-10-CM

## 2021-11-30 RX ORDER — LOSARTAN POTASSIUM 25 MG/1
TABLET ORAL
Qty: 90 TABLET | Refills: 3 | Status: SHIPPED | OUTPATIENT
Start: 2021-11-30 | End: 2023-01-03 | Stop reason: SDUPTHER

## 2021-11-30 RX ORDER — WARFARIN SODIUM 4 MG/1
TABLET ORAL
Qty: 90 TABLET | Refills: 1 | OUTPATIENT
Start: 2021-11-30

## 2021-11-30 RX ORDER — WARFARIN SODIUM 5 MG/1
TABLET ORAL
Qty: 90 TABLET | Refills: 1 | OUTPATIENT
Start: 2021-11-30

## 2021-11-30 RX ORDER — TAMSULOSIN HYDROCHLORIDE 0.4 MG/1
CAPSULE ORAL
Qty: 90 CAPSULE | Refills: 3 | Status: SHIPPED | OUTPATIENT
Start: 2021-11-30 | End: 2023-01-03 | Stop reason: SDUPTHER

## 2021-11-30 RX ORDER — WARFARIN SODIUM 2 MG/1
TABLET ORAL
Qty: 90 TABLET | Refills: 1 | OUTPATIENT
Start: 2021-11-30

## 2021-12-01 ENCOUNTER — LAB (OUTPATIENT)
Dept: LAB | Facility: HOSPITAL | Age: 76
End: 2021-12-01

## 2021-12-01 DIAGNOSIS — R20.0 NUMBNESS OF FINGERS OF BOTH HANDS: ICD-10-CM

## 2021-12-01 DIAGNOSIS — N40.1 BENIGN NON-NODULAR PROSTATIC HYPERPLASIA WITH LOWER URINARY TRACT SYMPTOMS: Primary | ICD-10-CM

## 2021-12-01 DIAGNOSIS — Z79.01 ANTICOAGULANT LONG-TERM USE: ICD-10-CM

## 2021-12-01 DIAGNOSIS — I50.42 CHF (CONGESTIVE HEART FAILURE), NYHA CLASS II, CHRONIC, COMBINED (HCC): ICD-10-CM

## 2021-12-01 DIAGNOSIS — N40.1 BENIGN NON-NODULAR PROSTATIC HYPERPLASIA WITH LOWER URINARY TRACT SYMPTOMS: ICD-10-CM

## 2021-12-01 DIAGNOSIS — E11.42 TYPE 2 DIABETES MELLITUS WITH DIABETIC POLYNEUROPATHY, WITHOUT LONG-TERM CURRENT USE OF INSULIN (HCC): ICD-10-CM

## 2021-12-01 LAB
ALBUMIN SERPL-MCNC: 4 G/DL (ref 3.5–5.2)
ALBUMIN/GLOB SERPL: 1.5 G/DL
ALP SERPL-CCNC: 102 U/L (ref 39–117)
ALT SERPL W P-5'-P-CCNC: 19 U/L (ref 1–41)
ANION GAP SERPL CALCULATED.3IONS-SCNC: 11 MMOL/L (ref 5–15)
AST SERPL-CCNC: 26 U/L (ref 1–40)
BILIRUB SERPL-MCNC: 0.4 MG/DL (ref 0–1.2)
BUN SERPL-MCNC: 16 MG/DL (ref 8–23)
BUN/CREAT SERPL: 13.9 (ref 7–25)
CALCIUM SPEC-SCNC: 8.8 MG/DL (ref 8.6–10.5)
CHLORIDE SERPL-SCNC: 101 MMOL/L (ref 98–107)
CO2 SERPL-SCNC: 32 MMOL/L (ref 22–29)
CREAT SERPL-MCNC: 1.15 MG/DL (ref 0.76–1.27)
GFR SERPL CREATININE-BSD FRML MDRD: 75 ML/MIN/1.73
GLOBULIN UR ELPH-MCNC: 2.7 GM/DL
GLUCOSE SERPL-MCNC: 130 MG/DL (ref 65–99)
INR PPP: 2.3 (ref 0.91–1.09)
POTASSIUM SERPL-SCNC: 3.8 MMOL/L (ref 3.5–5.2)
PROT SERPL-MCNC: 6.7 G/DL (ref 6–8.5)
PROTHROMBIN TIME: 28.1 SECONDS (ref 10–13.8)
SODIUM SERPL-SCNC: 144 MMOL/L (ref 136–145)

## 2021-12-01 PROCEDURE — 36415 COLL VENOUS BLD VENIPUNCTURE: CPT

## 2021-12-01 PROCEDURE — 84153 ASSAY OF PSA TOTAL: CPT

## 2021-12-01 PROCEDURE — 80053 COMPREHEN METABOLIC PANEL: CPT

## 2021-12-01 PROCEDURE — 85610 PROTHROMBIN TIME: CPT | Performed by: PHYSICIAN ASSISTANT

## 2021-12-02 LAB — PSA SERPL-MCNC: 1.38 NG/ML (ref 0–4)

## 2021-12-03 DIAGNOSIS — I48.0 PAROXYSMAL ATRIAL FIBRILLATION (HCC): ICD-10-CM

## 2021-12-03 RX ORDER — WARFARIN SODIUM 2 MG/1
2 TABLET ORAL
Qty: 90 TABLET | Refills: 1 | Status: SHIPPED | OUTPATIENT
Start: 2021-12-03 | End: 2023-01-03 | Stop reason: SDUPTHER

## 2021-12-03 RX ORDER — WARFARIN SODIUM 4 MG/1
4 TABLET ORAL
Qty: 90 TABLET | Refills: 1 | Status: SHIPPED | OUTPATIENT
Start: 2021-12-03 | End: 2023-01-03 | Stop reason: SDUPTHER

## 2021-12-03 RX ORDER — WARFARIN SODIUM 5 MG/1
5 TABLET ORAL
Qty: 90 TABLET | Refills: 1 | Status: SHIPPED | OUTPATIENT
Start: 2021-12-03 | End: 2023-01-03 | Stop reason: SDUPTHER

## 2021-12-20 PROCEDURE — 93295 DEV INTERROG REMOTE 1/2/MLT: CPT | Performed by: INTERNAL MEDICINE

## 2021-12-20 PROCEDURE — 93296 REM INTERROG EVL PM/IDS: CPT | Performed by: INTERNAL MEDICINE

## 2021-12-27 ENCOUNTER — TRANSCRIBE ORDERS (OUTPATIENT)
Dept: ADMINISTRATIVE | Facility: HOSPITAL | Age: 76
End: 2021-12-27

## 2021-12-27 ENCOUNTER — HOSPITAL ENCOUNTER (OUTPATIENT)
Dept: GENERAL RADIOLOGY | Facility: HOSPITAL | Age: 76
Discharge: HOME | End: 2021-12-27

## 2021-12-27 ENCOUNTER — LAB (OUTPATIENT)
Dept: LAB | Facility: HOSPITAL | Age: 76
End: 2021-12-27

## 2021-12-27 DIAGNOSIS — M54.17 LUMBOSACRAL NEURITIS: ICD-10-CM

## 2021-12-27 DIAGNOSIS — Z79.01 ANTICOAGULANT LONG-TERM USE: ICD-10-CM

## 2021-12-27 DIAGNOSIS — M54.17 LUMBOSACRAL NEURITIS: Primary | ICD-10-CM

## 2021-12-27 LAB
INR PPP: 2.8 (ref 0.91–1.09)
PROTHROMBIN TIME: 33.6 SECONDS (ref 10–13.8)

## 2021-12-27 PROCEDURE — 72120 X-RAY BEND ONLY L-S SPINE: CPT

## 2021-12-27 PROCEDURE — 85610 PROTHROMBIN TIME: CPT | Performed by: NURSE PRACTITIONER

## 2022-01-13 ENCOUNTER — CLINICAL SUPPORT NO REQUIREMENTS (OUTPATIENT)
Dept: CARDIOLOGY | Facility: CLINIC | Age: 77
End: 2022-01-13

## 2022-01-13 DIAGNOSIS — Z95.810 CARDIAC DEFIBRILLATOR IN SITU: Chronic | ICD-10-CM

## 2022-01-13 PROCEDURE — 93289 INTERROG DEVICE EVAL HEART: CPT | Performed by: PHYSICIAN ASSISTANT

## 2022-01-14 NOTE — PROGRESS NOTES
Dual Chamber AICD Evaluation Report  Clinic Interrogation    January 14, 2022    Primary Cardiologist: Jocelyn  : Medtronic Model: Evera MRI  Implant date: 10/31/19    Reason for evaluation: routine  Indication for AICD: cardiomyopathy    Measurements  Atrial sensing - P wave: 2.1 mV  Atrial threshold: 0.5 V @ 0.4 ms  Atrial lead impedance: 399 ohms  Ventricular sensing - R wave: 4.1 mV  Ventricular threshold: 0.875 V @ 0.4 ms  Ventricular lead impedance:  342 ohms  Shock impedance:  RV- 35 ohms   SVC- 42 ohms      Diagnostic Data  Atrial paced: 56.7 %  Ventricular paced: 0.2 %  Other: Several episodes of SVT noted.  Longest is 2:20 min.  Fastest is 182 bpm.  Battery status: satisfactory        Final Parameters  Mode: AAIR+  Lower rate: 60 bpm Upper rate: 130 bpm  AV Delay: Paced- 180 ms    Sensed- 150 ms     Atrial - Amplitude: 1.5 V Pulse width: 0.4 ms Sensitivity: 0.6 mV   Ventricular - Amplitude: 2.0 V Pulse width: 0.4 ms Sensitivity: 0.3 mV   Changes made: none  Conclusions: normal AICD function    Follow up: every 3 months via [employer], annually in office

## 2022-01-28 ENCOUNTER — LAB (OUTPATIENT)
Dept: LAB | Facility: HOSPITAL | Age: 77
End: 2022-01-28

## 2022-01-28 DIAGNOSIS — Z79.01 ANTICOAGULANT LONG-TERM USE: ICD-10-CM

## 2022-01-28 LAB
INR PPP: 2.5 (ref 0.91–1.09)
PROTHROMBIN TIME: 29.9 SECONDS (ref 10–13.8)

## 2022-01-28 PROCEDURE — 85610 PROTHROMBIN TIME: CPT | Performed by: NURSE PRACTITIONER

## 2022-02-07 NOTE — PROGRESS NOTES
[facility] - PODIATRY    Today's Date: 02/10/22    Patient Name: Jonh Peacock  MRN: [id number]  CSN: [id number]  PCP: Ric Antony DO  Referring Provider: No ref. provider found     SUBJECTIVE     Chief Complaint   Patient presents with   • Follow-up     Ric Antony DO pcp11/11/2021 3 MONTH FU WITH THEA- pt states no issues, doing ok - pt denies pain - pt presents nondiabetic routine nail/foot care       HPI: Jonh Peacock, a 76 y.o.male, comes to clinic as a(n) established patient complaining of thick fungal toenails. Pt with h/o Anxiety, Arrhythmia, Cardiomyopathy, CHF, Depression, previous DVT, HTN, Obesity, Anticoagulation with Warfarin. Relates borderline diabetes and controlling with diet. Notes mild numbness and tingling. Presents with long, thickened, and irregular nail growth. Denies pain at the present time. Continues use of rollator. Notes improvement to feet with regular visits. Denies any constitutional symptoms. No other pedal complaints at this time.    Past Medical History:   Diagnosis Date   • Anemia    • Anxiety    • Arrhythmia    • Arthritis    • Atrial fibrillation (HCC)    • Cardiomyopathy (HCC)    • CHF (congestive heart failure) (HCC)    • Colon polyp    • Colostomy present (HCC)     10years   • Connective tissue disease (HCC)    • Depression    • Depression    • Diverticulitis    • Dizzy    • DVT (deep venous thrombosis) (HCC)    • Gastritis    • GERD (gastroesophageal reflux disease)    • Hiatal hernia    • History of transfusion    • Hyperlipidemia    • Hypertension    • Neuropathy    • Pneumonia      Past Surgical History:   Procedure Laterality Date   • APPENDECTOMY     • BACK SURGERY  2002, 2007    Multiple spine surgeries - laminectomy & fusion   • CARDIAC CATHETERIZATION     • CARDIAC DEFIBRILLATOR PLACEMENT     • CARDIAC ELECTROPHYSIOLOGY PROCEDURE N/A 10/31/2019    Procedure: ICD GEN CHANGE;  Surgeon: Chaz Chavez MD;  Location:  "[facility] CATH INVASIVE LOCATION;  Service: Cardiology   • COLON RESECTION WITH COLOSTOMY     • COLONOSCOPY  09/04/2014    diverticulosis   • COLONOSCOPY N/A 10/29/2019    Procedure: COLONOSCOPY WITH ANESTHESIA;  Surgeon: Tenzin Abrams MD;  Location: [facility] ENDOSCOPY;  Service: Gastroenterology   • ENDOSCOPY  02/24/1999    small sliding hiatal hernia   • FOOT SURGERY Bilateral 2005   • JOINT REPLACEMENT Right 2002    KNEE   • PACEMAKER IMPLANTATION  2012   • RECTAL FISSURE INCISION AND DRAINAGE     • REPLACEMENT TOTAL KNEE     • SPINAL CORD STIMULATOR IMPLANT  08/2012    Placed by Dr. DEENA Villela, [place]   • THORACIC LAMINECTOMY WITH PLACEMENT OF DORSAL COLUMN STIMULATOR Right 1/28/2021    Procedure: SPINAL CORD STIMULATOR REVISION, right buttocks;  Surgeon: Noah Oliveros MD;  Location: [facility] OR;  Service: Neurosurgery;  Laterality: Right;   • TOTAL HIP ARTHROPLASTY Right 2004   • TOTAL HIP ARTHROPLASTY Left 12/2008     Family History   Problem Relation Age of Onset   • Heart disease Mother    • Heart disease Brother    • Breast cancer Brother    • Colon cancer Neg Hx    • Colon polyps Neg Hx      Social History     Socioeconomic History   • Marital status:    Tobacco Use   • Smoking status: Never Smoker   • Smokeless tobacco: Never Used   Vaping Use   • Vaping Use: Never used   Substance and Sexual Activity   • Alcohol use: Not Currently   • Drug use: Never   • Sexual activity: Defer     Allergies   Allergen Reactions   • Percocet [Oxycodone-Acetaminophen] Urinary Retention   • Amiodarone Other (See Comments)     INTERFERES WITH OTHER MEDICATIONS   • Oxycontin [Oxycodone Hcl] Urinary Retention   • Penicillins Hives     All \"cillins\"   • Vioxx [Rofecoxib] GI Intolerance     Current Outpatient Medications   Medication Sig Dispense Refill   • Buprenorphine (BUTRANS) 10 MCG/HR patch weekly Place 10 mcg on the skin Every 7 (Seven) Days.     • CHONDROITIN SULFATE PO Take 1 tablet by mouth 2 (two) " times a day.     • diazePAM (VALIUM) 2 MG tablet Take 2 mg by mouth Daily.     • diclofenac (VOLTAREN) 1 % gel gel Apply 4 g topically to the appropriate area as directed 4 (Four) Times a Day As Needed.     • dilTIAZem (Cardizem) 30 MG tablet Take 1 tablet by mouth 3 (Three) Times a Day. 270 tablet 3   • furosemide (LASIX) 20 MG tablet Take 1 tablet by mouth Daily. (Patient taking differently: Take 60 mg by mouth Daily.) 90 tablet 3   • gabapentin (NEURONTIN) 300 MG capsule Take 300 mg by mouth 3 (Three) Times a Day.     • Glucosamine 750 MG tablet Take 750 mg by mouth 2 (Two) Times a Day.     • HYDROcodone-acetaminophen (NORCO) 7.5-325 MG per tablet Take 1 tablet by mouth As Needed for Moderate Pain .     • losartan (COZAAR) 25 MG tablet TAKE 1 TABLET DAILY 90 tablet 3   • metoprolol succinate XL (TOPROL-XL) 100 MG 24 hr tablet Take 1 tablet by mouth Daily. 90 tablet 3   • Multiple Vitamins-Minerals (MULTIVITAMIN ADULT PO) Take 1 tablet by mouth Daily.     • pantoprazole (PROTONIX) 40 MG EC tablet Take 1 tablet by mouth Daily. 90 tablet 3   • polyethylene glycol (MIRALAX) powder Take 17 g by mouth Every Night. 1581 g 3   • simvastatin (ZOCOR) 20 MG tablet Take 1 tablet by mouth Every Night. 90 tablet 3   • tamsulosin (FLOMAX) 0.4 MG capsule 24 hr capsule TAKE 1 CAPSULE EVERY NIGHT 90 capsule 3   • warfarin (COUMADIN) 2 MG tablet Take 1 tablet by mouth Daily. 90 tablet 1   • warfarin (COUMADIN) 4 MG tablet Take 1 tablet by mouth Daily. 90 tablet 1   • warfarin (COUMADIN) 5 MG tablet Take 1 tablet by mouth Daily. 90 tablet 1     No current facility-administered medications for this visit.     Review of Systems   Constitutional: Negative for chills and fever.   HENT: Negative for congestion.    Respiratory: Negative for shortness of breath.    Cardiovascular: Positive for leg swelling. Negative for chest pain.   Gastrointestinal: Negative for constipation, diarrhea, nausea and vomiting.   Musculoskeletal: Positive for  gait problem.        Foot pain   Skin: Negative for wound.        Thick Toenails   Neurological: Positive for numbness.   Hematological: Bruises/bleeds easily.       OBJECTIVE     Vitals:    02/10/22 1450   BP: 130/90   Pulse: 68   SpO2: 94%       PHYSICAL EXAM  GEN:   A&Ox3, NAD. Accompanied by wife.    Foot/Ankle Exam:       General:   Appearance: appears stated age and healthy    Orientation: AAOx3    Affect: appropriate    Gait: unimpaired    Assistance: walker    Shoe Gear:  Casual shoes (left shoe with lift)    VASCULAR      Right Foot Vascularity   Dorsalis pedis:  1+  Posterior tibial:  1+  Skin Temperature: warm    Edema Grading:  Trace and non-pitting  CFT:  4  Pedal Hair Growth:  Absent  Varicosities: none       Left Foot Vascularity   Dorsalis pedis:  1+  Posterior tibial:  1+  Skin Temperature: warm    Edema Grading:  None, trace and non-pitting  CFT:  4  Pedal Hair Growth:  Absent  Varicosities: none        NEUROLOGIC     Right Foot Neurologic   Light touch sensation:  Diminished  Vibratory sensation:  Diminished  Hot/Cold sensation: diminished    Protective Sensation using Francisco-Akhil Monofilament:  7     Left Foot Neurologic   Light touch sensation:  Diminished  Vibratory sensation:  Diminished  Hot/cold sensation: diminished    Protective Sensation using Francisco-Akhil Monofilament:  7     MUSCULOSKELETAL      Right Foot Musculoskeletal   Ecchymosis:  None  Tenderness: toenails    Arch:  Pes planus  Hallux valgus: No       Left Foot Musculoskeletal   Ecchymosis:  None  Tenderness: toenails    Arch:  Pes planus  Hallux valgus: No       MUSCLE STRENGTH     Right Foot Muscle Strength   Foot dorsiflexion:  5  Foot plantar flexion:  5  Foot inversion:  5  Foot eversion:  5     Left Foot Muscle Strength   Foot dorsiflexion:  5  Foot plantar flexion:  5  Foot inversion:  5  Foot eversion:  5     RANGE OF MOTION      Right Foot Range of Motion   Foot and ankle ROM within normal limits       Left Foot  Range of Motion   Foot and ankle ROM within normal limits       DERMATOLOGIC     Right Foot Dermatologic   Skin: skin intact    Nails: onychomycosis, abnormally thick, subungual debris and dystrophic nails       Left Foot Dermatologic   Skin: skin intact    Nails: onychomycosis, abnormally thick, subungual debris and dystrophic nails         RADIOLOGY/NUCLEAR:  No results found.    LABORATORY/CULTURE RESULTS:      PATHOLOGY RESULTS:       ASSESSMENT/PLAN     Diagnoses and all orders for this visit:    1. Onychomycosis (Primary)    2. Borderline diabetes    3. Anticoagulant long-term use    4. Neuropathy    5. Peripheral edema      Comprehensive lower extremity examination and evaluation was performed.  Discussed findings and treatment plan including risks, benefits, and treatment options with patient in detail. Patient agreed with treatment plan.  After verbal consent obtained, nail(s) x10 debrided of length and thickness with nail nipper without incidence  Patient may maintain nails and calluses at home utilizing emery board or pumice stone between visits as needed  Reviewed at home diabetic foot care including daily foot checks   Continue shoes with custom inserts  Continue elevation of legs when possible.   An After Visit Summary was printed and given to the patient at discharge, including (if requested) any available informative/educational handouts regarding diagnosis, treatment, or medications. All questions were answered to patient/family satisfaction. Should symptoms fail to improve or worsen they agree to call or return to clinic or to go to the Emergency Department. Discussed the importance of following up with any needed screening tests/labs/specialist appointments and any requested follow-up recommended by me today. Importance of maintaining follow-up discussed and patient accepts that missed appointments can delay diagnosis and potentially lead to worsening of conditions.  Return in about 3 months  (around 5/10/2022)., or sooner if acute issues arise.        This document has been electronically signed by Deion Avelar DPM on February 10, 2022 15:14 CST     Please note that portions of this note may have been completed with a voice recognition program. Efforts were made to edit the dictations, but occasionally words are mistranscribed.

## 2022-02-10 ENCOUNTER — OFFICE VISIT (OUTPATIENT)
Dept: PODIATRY | Facility: CLINIC | Age: 77
End: 2022-02-10

## 2022-02-10 VITALS
HEIGHT: 74 IN | HEART RATE: 68 BPM | SYSTOLIC BLOOD PRESSURE: 130 MMHG | WEIGHT: 256.8 LBS | BODY MASS INDEX: 32.96 KG/M2 | OXYGEN SATURATION: 94 % | DIASTOLIC BLOOD PRESSURE: 90 MMHG

## 2022-02-10 DIAGNOSIS — R60.9 PERIPHERAL EDEMA: ICD-10-CM

## 2022-02-10 DIAGNOSIS — G62.9 NEUROPATHY: ICD-10-CM

## 2022-02-10 DIAGNOSIS — Z79.01 ANTICOAGULANT LONG-TERM USE: ICD-10-CM

## 2022-02-10 DIAGNOSIS — R73.03 BORDERLINE DIABETES: ICD-10-CM

## 2022-02-10 DIAGNOSIS — B35.1 ONYCHOMYCOSIS: Primary | ICD-10-CM

## 2022-02-10 PROCEDURE — 99212 OFFICE O/P EST SF 10 MIN: CPT | Performed by: PODIATRIST

## 2022-02-10 PROCEDURE — 11721 DEBRIDE NAIL 6 OR MORE: CPT | Performed by: PODIATRIST

## 2022-02-28 ENCOUNTER — LAB (OUTPATIENT)
Dept: LAB | Facility: HOSPITAL | Age: 77
End: 2022-02-28

## 2022-02-28 DIAGNOSIS — Z79.01 ANTICOAGULANT LONG-TERM USE: ICD-10-CM

## 2022-02-28 PROCEDURE — 85610 PROTHROMBIN TIME: CPT | Performed by: NURSE PRACTITIONER

## 2022-03-01 LAB
INR PPP: 2.4 (ref 0.91–1.09)
PROTHROMBIN TIME: 28.6 SECONDS (ref 10–13.8)

## 2022-03-10 DIAGNOSIS — I48.0 PAROXYSMAL ATRIAL FIBRILLATION: ICD-10-CM

## 2022-03-10 RX ORDER — FUROSEMIDE 40 MG/1
TABLET ORAL
Qty: 90 TABLET | Refills: 3 | OUTPATIENT
Start: 2022-03-10

## 2022-03-10 RX ORDER — SIMVASTATIN 20 MG
20 TABLET ORAL NIGHTLY
Qty: 90 TABLET | Refills: 3 | Status: SHIPPED | OUTPATIENT
Start: 2022-03-10 | End: 2022-06-02

## 2022-03-10 RX ORDER — FUROSEMIDE 20 MG/1
20 TABLET ORAL DAILY
Qty: 90 TABLET | Refills: 3 | Status: SHIPPED | OUTPATIENT
Start: 2022-03-10 | End: 2023-01-03 | Stop reason: SDUPTHER

## 2022-03-10 RX ORDER — PANTOPRAZOLE SODIUM 40 MG/1
40 TABLET, DELAYED RELEASE ORAL DAILY
Qty: 90 TABLET | Refills: 3 | Status: SHIPPED | OUTPATIENT
Start: 2022-03-10 | End: 2023-01-03 | Stop reason: SDUPTHER

## 2022-03-11 RX ORDER — FUROSEMIDE 40 MG/1
40 TABLET ORAL DAILY
Qty: 90 TABLET | Refills: 3 | Status: SHIPPED | OUTPATIENT
Start: 2022-03-11 | End: 2023-01-03 | Stop reason: SDUPTHER

## 2022-03-17 NOTE — DISCHARGE INSTRUCTIONS
DAY OF SURGERY INSTRUCTIONS        YOUR SURGEON: ***MELISSA GUAMAN    PROCEDURE: ***SPINAL CORD STIMULATOR    DATE OF SURGERY: ***January 28,2021    ARRIVAL TIME: AS DIRECTED BY OFFICE    YOU MAY TAKE THE FOLLOWING MEDICATION(S) THE MORNING OF SURGERY WITH A SIP OF WATER: ***DILTIAZEM,METOPROLOL      ALL OTHER HOME MEDICATION CHECK WITH YOUR PHYSICIAN      DO NOT TAKE ANY ERECTILE DYSFUNCTION MEDICATIONS (EX: CIALIS, VIAGRA) 24 HOURS PRIOR TO SURGERY                      MANAGING PAIN AFTER SURGERY    We know you are probably wondering what your pain will be like after surgery.  Following surgery it is unrealistic to expect you will not have pain.   Pain is how our bodies let us know that something is wrong or cautions us to be careful.  That said, our goal is to make your pain tolerable.    Methods we may use to treat your pain include (oral or IV medications, PCAs, epidurals, nerve blocks, etc.)   While some procedures require IV pain medications for a short time after surgery, transitioning to pain medications by mouth allows for better management of pain.   Your nurse will encourage you to take oral pain medications whenever possible.  IV medications work almost immediately, but only last a short while.  Taking medications by mouth allows for a more constant level of medication in your blood stream for a longer period of time.      Once your pain is out of control it is harder to get back under control.  It is important you are aware when your next dose of pain medication is due.  If you are admitted, your nurse may write the time of your next dose on the white board in your room to help you remember.      We are interested in your pain and encourage you to inform us about aggravating factors during your visit.   Many times a simple repositioning every few hours can make a big difference.    If your physician says it is okay, do not let your pain prevent you from getting out of bed. Be sure to call your  Daily Note     Today's date: 3/17/2022  Patient name: Janki Juarez  : 1960  MRN: [id number]  Referring provider: Shi Cagle MD  Dx:   Encounter Diagnosis     ICD-10-CM    1  Low back pain, unspecified back pain laterality, unspecified chronicity, unspecified whether sciatica present  M54 51                   Subjective: I am more balanced with [place] Ex  Objective: See treatment diary below      Assessment: Tolerated treatment well  Patient demonstrated fatigue post treatment and exhibited good technique with therapeutic exercises      Plan: Continue per plan of care        Precautions: Medical History  Diagnosis Date Comment Source   Allergic rhinitis      Anxiety      Colitis      Colon polyps  + history for colon polyps    Depression      Disease of thyroid gland      Full dentures      GERD (gastroesophageal reflux disease)      Glaucoma      Glaucoma      Herpes gingivostomatitis      Herpes simplex infection      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[address] )      Hyperlipidemia      Hypertension  new onset 2019    Multiple benign polyps of large intestine      Osteopenia      Osteoporosis      RA (rheumatoid arthritis) ([facility])      Sjogren's disease ([facility] [address] )              Manuals    3/8 3/10 3/17                                                           Neuro Re-Ed             PPT    2x5 10x 3 sec hold         TA act w/marches    2x5 B (B) 2x5 (tactile cues)                                                                          Ther Ex 2/23 2/28 3/3 3/8  3/15 3/17      Neurac supine pelvic lift 2x5 2x5 2x5  ---- 2x5 2x5      Neurac Bridge 2x5 2x5 2x5 2x5 bridge 2 x 10 reps w/ hip add 2x5 2x5      Neurac SDLY hip ABD/ADD  2x5  2x5   2x5  2x5 3x5 add  2x5 abd supine --- 2x5  2x5 2x5  2x5      Nustep   5m 10m 10 mins 10 min L1 10m 10m      Lateral step up   4" 10x 4" 10x B 4" (B) 20x 4" 20x 20x      Balance reaching on cushion   2x10  -- 2x10 20x      LTR    10x10s B 10x 10sec hold         FIS    10x3 ways 10x3 ways        Ther Activity     Supine clamshells RTB  2 x 10 reps                                   Gait Training                                       Modalities nurse for assistance prior to getting up so you do not fall.      Before surgery, please decide your tolerable pain goal.  These faces help describe the pain ratings we use on a 0-10 scale.   Be prepared to tell us your goal and whether or not you take pain or anxiety medications at home.          BEFORE YOU COME TO THE HOSPITAL  (Pre-op instructions)  • Do not eat, drink, smoke or chew gum after midnight the night before surgery.  This also includes no mints.  • Morning of surgery take only the medicines you have been instructed with a sip of water unless otherwise instructed  by your physician.  • Do not shave, wear makeup or dark nail polish.  • Remove all jewelry including rings.  • Leave anything you consider valuable at home.  • Leave your suitcase in the car until after your surgery.  • Bring the following with you if applicable:  o Picture ID and insurance, Medicare or Medicaid cards  o Co-pay/deductible required by insurance (cash, check, credit card)  o Copy of advance directive, living will or power-of- documents if not brought to PAT  o CPAP or BIPAP mask and tubing  o Relaxation aids ( book, magazine), etc.  o Hearing aids                        ON THE DAY OF SURGERY  · On the day of surgery check in at registration located at the main entrance of the hospital.   ? You will be registered and given a beeper with instructions where to wait in the main lobby.  ? When your beeper lights up and vibrates a member of the Outpatient Surgery staff will meet you at the double doors under the stair steps and escort you to your preoperative room.   · You may have cloth compression devices placed on your legs. These help to prevent blood clots and reduce swelling in your legs.  · An IV may be inserted into one of your veins.  · In the operating room, you may be given one or more of the following:  ? A medicine to help you relax (sedative).  ? A medicine to numb the area (local anesthetic).  ? A medicine to  "make you fall asleep (general anesthetic).  ? A medicine that is injected into an area of your body to numb everything below the injection site (regional anesthetic).  · Your surgical site will be marked or identified.  · You may be given an antibiotic through your IV to help prevent infection.  Contact a health care provider if you:  · Develop a fever of more than 100.4°F (38°C) or other feelings of illness during the 48 hours before your surgery.  · Have symptoms that get worse.  Have questions or concerns about your surgery    General Anesthesia/Surgery, Adult  General anesthesia is the use of medicines to make a person \"go to sleep\" (unconscious) for a medical procedure. General anesthesia must be used for certain procedures, and is often recommended for procedures that:  · Last a long time.  · Require you to be still or in an unusual position.  · Are major and can cause blood loss.  The medicines used for general anesthesia are called general anesthetics. As well as making you unconscious for a certain amount of time, these medicines:  · Prevent pain.  · Control your blood pressure.  · Relax your muscles.  Tell a health care provider about:  · Any allergies you have.  · All medicines you are taking, including vitamins, herbs, eye drops, creams, and over-the-counter medicines.  · Any problems you or family members have had with anesthetic medicines.  · Types of anesthetics you have had in the past.  · Any blood disorders you have.  · Any surgeries you have had.  · Any medical conditions you have.  · Any recent upper respiratory, chest, or ear infections.  · Any history of:  ? Heart or lung conditions, such as heart failure, sleep apnea, asthma, or chronic obstructive pulmonary disease (COPD).  ?  service.  ? Depression or anxiety.  · Any tobacco or drug use, including marijuana or alcohol use.  · Whether you are pregnant or may be pregnant.  What are the risks?  Generally, this is a safe procedure. " However, problems may occur, including:  · Allergic reaction.  · Lung and heart problems.  · Inhaling food or liquid from the stomach into the lungs (aspiration).  · Nerve injury.  · Air in the bloodstream, which can lead to stroke.  · Extreme agitation or confusion (delirium) when you wake up from the anesthetic.  · Waking up during your procedure and being unable to move. This is rare.  These problems are more likely to develop if you are having a major surgery or if you have an advanced or serious medical condition. You can prevent some of these complications by answering all of your health care provider's questions thoroughly and by following all instructions before your procedure.  General anesthesia can cause side effects, including:  · Nausea or vomiting.  · A sore throat from the breathing tube.  · Hoarseness.  · Wheezing or coughing.  · Shaking chills.  · Tiredness.  · Body aches.  · Anxiety.  · Sleepiness or drowsiness.  · Confusion or agitation.  RISKS AND COMPLICATIONS OF SURGERY  Your health care provider will discuss possible risks and complications with you before surgery. Common risks and complications include:    · Problems due to the use of anesthetics.  · Blood loss and replacement (does not apply to minor surgical procedures).  · Temporary increase in pain due to surgery.  · Uncorrected pain or problems that the surgery was meant to correct.  · Infection.  · New damage.    What happens before the procedure?    Medicines  Ask your health care provider about:  · Changing or stopping your regular medicines. This is especially important if you are taking diabetes medicines or blood thinners.  · Taking medicines such as aspirin and ibuprofen. These medicines can thin your blood. Do not take these medicines unless your health care provider tells you to take them.  · Taking over-the-counter medicines, vitamins, herbs, and supplements. Do not take these during the week before your procedure unless your  health care provider approves them.  General instructions  · Starting 3-6 weeks before the procedure, do not use any products that contain nicotine or tobacco, such as cigarettes and e-cigarettes. If you need help quitting, ask your health care provider.  · If you brush your teeth on the morning of the procedure, make sure to spit out all of the toothpaste.  · Tell your health care provider if you become ill or develop a cold, cough, or fever.  · If instructed by your health care provider, bring your sleep apnea device with you on the day of your surgery (if applicable).  · Ask your health care provider if you will be going home the same day, the following day, or after a longer hospital stay.  ? Plan to have someone take you home from the hospital or clinic.  ? Plan to have a responsible adult care for you for at least 24 hours after you leave the hospital or clinic. This is important.  What happens during the procedure?  · You will be given anesthetics through both of the following:  ? A mask placed over your nose and mouth.  ? An IV in one of your veins.  · You may receive a medicine to help you relax (sedative).  · After you are unconscious, a breathing tube may be inserted down your throat to help you breathe. This will be removed before you wake up.  · An anesthesia specialist will stay with you throughout your procedure. He or she will:  ? Keep you comfortable and safe by continuing to give you medicines and adjusting the amount of medicine that you get.  ? Monitor your blood pressure, pulse, and oxygen levels to make sure that the anesthetics do not cause any problems.  The procedure may vary among health care providers and hospitals.  What happens after the procedure?  · Your blood pressure, temperature, heart rate, breathing rate, and blood oxygen level will be monitored until the medicines you were given have worn off.  · You will wake up in a recovery area. You may wake up slowly.  · If you feel anxious  or agitated, you may be given medicine to help you calm down.  · If you will be going home the same day, your health care provider may check to make sure you can walk, drink, and urinate.  · Your health care provider will treat any pain or side effects you have before you go home.  · Do not drive for 24 hours if you were given a sedative.  Summary  · General anesthesia is used to keep you still and prevent pain during a procedure.  · It is important to tell your healthcare provider about your medical history and any surgeries you have had, and previous experience with anesthesia.  · Follow your healthcare provider’s instructions about when to stop eating, drinking, or taking certain medicines before your procedure.  · Plan to have someone take you home from the hospital or clinic.  This information is not intended to replace advice given to you by your health care provider. Make sure you discuss any questions you have with your health care provider.  Document Released: 03/26/2009 Document Revised: 08/03/2018 Document Reviewed: 08/03/2018  [employer] Interactive Patient Education © 2019 [employer]       Fall Prevention in Hospitals, Adult  As a hospital patient, your condition and the treatments you receive can increase your risk for falls. Some additional risk factors for falls in a hospital include:  · Being in an unfamiliar environment.  · Being on bed rest.  · Your surgery.  · Taking certain medicines.  · Your tubing requirements, such as intravenous (IV) therapy or catheters.  It is important that you learn how to decrease fall risks while at the hospital. Below are important tips that can help prevent falls.  SAFETY TIPS FOR PREVENTING FALLS  Talk about your risk of falling.  · Ask your health care provider why you are at risk for falling. Is it your medicine, illness, tubing placement, or something else?  · Make a plan with your health care provider to keep you safe from falls.  · Ask your health care  provider or pharmacist about side effects of your medicines. Some medicines can make you dizzy or affect your coordination.  Ask for help.  · Ask for help before getting out of bed. You may need to press your call button.  · Ask for assistance in getting safely to the toilet.  · Ask for a walker or cane to be put at your bedside. Ask that most of the side rails on your bed be placed up before your health care provider leaves the room.  · Ask family or friends to sit with you.  · Ask for things that are out of your reach, such as your glasses, hearing aids, telephone, bedside table, or call button.  Follow these tips to avoid falling:  · Stay lying or seated, rather than standing, while waiting for help.  · Wear rubber-soled slippers or shoes whenever you walk in the hospital.  · Avoid quick, sudden movements.  ¨ Change positions slowly.  ¨ Sit on the side of your bed before standing.  ¨ Stand up slowly and wait before you start to walk.  · Let your health care provider know if there is a spill on the floor.  · Pay careful attention to the medical equipment, electrical cords, and tubes around you.  · When you need help, use your call button by your bed or in the bathroom. Wait for one of your health care providers to help you.  · If you feel dizzy or unsure of your footing, return to bed and wait for assistance.  · Avoid being distracted by the TV, telephone, or another person in your room.  · Do not lean or support yourself on rolling objects, such as IV poles or bedside tables.     This information is not intended to replace advice given to you by your health care provider. Make sure you discuss any questions you have with your health care provider.     Document Released: 12/15/2001 Document Revised: 01/08/2016 Document Reviewed: 08/25/2013  [employer] Interactive Patient Education ©2016 [employer]       [facility] 4% Patient Instruction Sheet    Chlorhexidine Before Surgery  Chlorhexidine  gluconate (CHG) is a germ-killing (antiseptic) solution that is used to clean the skin. It gets rid of the bacteria that normally live on the skin. Cleaning your skin with CHG before surgery helps lower the risk for infection after surgery.    How to use CHG solution  · You will take 2 showers, one shower the night before surgery, the second shower the morning of surgery before coming to the hospital.  · Use CHG only as told by your health care provider, and follow the instructions on the label.  · Use CHG solution while taking a shower. Follow these steps when using CHG solution (unless your health care provider gives you different instructions):  1. Start the shower.  2. Use your normal soap and shampoo to wash your face and hair.  3. Turn off the shower or move out of the shower stream.  4. Pour the CHG onto a clean washcloth. Do not use any type of brush or rough-edged sponge.  5. Starting at your neck, lather your body down to your toes. Make sure you:  6. Pay special attention to the part of your body where you will be having surgery. Scrub this area for at least 1 minute.  7. Use the full amount of CHG as directed. Usually, this is one half bottle for each shower.  8. Do not use CHG on your head or face. If the solution gets into your ears or eyes, rinse them well with water.  9. Avoid your genital area.  10. Avoid any areas of skin that have broken skin, cuts, or scrapes.  11. Scrub your back and under your arms. Make sure to wash skin folds.  12. Let the lather sit on your skin for 1-2 minutes or as long as told by your health care  provider.  13. Thoroughly rinse your entire body in the shower. Make sure that all body creases and crevices are rinsed well.  14. Dry off with a clean towel. Do not put any substances on your body afterward, such as powder, lotion, or perfume.  15. Put on clean clothes or pajamas.  16. If it is the night before your surgery, sleep in clean sheets.    What are the risks?  Risks  of using CHG include:  · A skin reaction.  · Hearing loss, if CHG gets in your ears.  · Eye injury, if CHG gets in your eyes and is not rinsed out.  · The CHG product catching fire.  Make sure that you avoid smoking and flames after applying CHG to your skin.  Do not use CHG:  · If you have a chlorhexidine allergy or have previously reacted to chlorhexidine.  · On babies younger than 2 months of age.      On the day of surgery, when you are taken to your room in Outpatient Surgery you will be given a CHG prepackaged cloth to wipe the site for your surgery.  How to use CHG prepackaged cloths  · Follow the instructions on the label.  · Use the CHG cloth on clean, dry skin. Follow these steps when using a CHG cloth (unless your health care provider gives you different instructions):  1. Using the CHG cloth, vigorously scrub the part of your body where you will be having surgery. Scrub using a back-and-forth motion for 3 minutes. The area on your body should be completely wet with CHG when you are finished scrubbing.  2. Do not rinse. Discard the cloth and let the area air-dry for 1 minute. Do not put any substances on your body afterward, such as powder, lotion, or perfume.  Contact a health care provider if:  · Your skin gets irritated after scrubbing.  · You have questions about using your solution or cloth.  Get help right away if:  · Your eyes become very red or swollen.  · Your eyes itch badly.  · Your skin itches badly and is red or swollen.  · Your hearing changes.  · You have trouble seeing.  · You have swelling or tingling in your mouth or throat.  · You have trouble breathing.  · You swallow any chlorhexidine.  Summary  · Chlorhexidine gluconate (CHG) is a germ-killing (antiseptic) solution that is used to clean the skin. Cleaning your skin with CHG before surgery helps lower the risk for infection after surgery.  · You may be given CHG to use at home. It may be in a bottle or in a prepackaged cloth to use on  your skin. Carefully follow your health care provider's instructions and the instructions on the product label.  · Do not use CHG if you have a chlorhexidine allergy.  · Contact your health care provider if your skin gets irritated after scrubbing.  This information is not intended to replace advice given to you by your health care provider. Make sure you discuss any questions you have with your health care provider.  Document Released: 09/11/2013 Document Revised: 11/15/2018 Document Reviewed: 11/15/2018  [employer] Interactive Patient Education © 2019 [employer]          PATIENT/FAMILY/RESPONSIBLE PARTY VERBALIZES UNDERSTANDING OF ABOVE EDUCATION.  COPY OF PAIN SCALE GIVEN AND REVIEWED WITH VERBALIZED UNDERSTANDING.

## 2022-03-21 PROCEDURE — 93296 REM INTERROG EVL PM/IDS: CPT | Performed by: INTERNAL MEDICINE

## 2022-03-21 PROCEDURE — 93295 DEV INTERROG REMOTE 1/2/MLT: CPT | Performed by: INTERNAL MEDICINE

## 2022-03-28 ENCOUNTER — LAB (OUTPATIENT)
Dept: LAB | Facility: HOSPITAL | Age: 77
End: 2022-03-28

## 2022-03-28 DIAGNOSIS — Z79.01 ANTICOAGULANT LONG-TERM USE: ICD-10-CM

## 2022-03-28 LAB
INR PPP: 2.1 (ref 0.91–1.09)
PROTHROMBIN TIME: 25.6 SECONDS (ref 10–13.8)

## 2022-03-28 PROCEDURE — 85610 PROTHROMBIN TIME: CPT | Performed by: NURSE PRACTITIONER

## 2022-04-28 ENCOUNTER — TRANSCRIBE ORDERS (OUTPATIENT)
Dept: ADMINISTRATIVE | Facility: HOSPITAL | Age: 77
End: 2022-04-28

## 2022-04-28 ENCOUNTER — LAB (OUTPATIENT)
Dept: LAB | Facility: HOSPITAL | Age: 77
End: 2022-04-28

## 2022-04-28 ENCOUNTER — TELEPHONE (OUTPATIENT)
Dept: INTERNAL MEDICINE | Facility: CLINIC | Age: 77
End: 2022-04-28

## 2022-04-28 DIAGNOSIS — I48.0 PAROXYSMAL ATRIAL FIBRILLATION: Primary | ICD-10-CM

## 2022-04-28 DIAGNOSIS — I48.0 PAROXYSMAL ATRIAL FIBRILLATION: ICD-10-CM

## 2022-04-28 PROCEDURE — 85610 PROTHROMBIN TIME: CPT | Performed by: INTERNAL MEDICINE

## 2022-04-28 NOTE — TELEPHONE ENCOUNTER
PATIENT IS AT THE LAB NOW TO GET HIS PT/INR DRAWN.    LAB HAS STATED THAT THE STANDING ORDER HAS  AND THEY ARE NEEDING A NEW ONE PLEASE.

## 2022-04-29 LAB
INR PPP: 2.6 (ref 0.91–1.09)
PROTHROMBIN TIME: 31 SECONDS (ref 10–13.8)

## 2022-05-02 NOTE — PROGRESS NOTES
QUICK REFERENCE INFORMATION:  The ABCs of the Annual Wellness Visit    Initial Medicare Wellness Visit    HEALTH RISK ASSESSMENT    : 1945    Recent Hospitalizations:  No hospitalization(s) within the last year..    Current Medical Providers:  Patient Care Team:  Ric Antony DO as PCP - General (Internal Medicine)  Kehrer, Leon Frank II, APRN as PCP - Claims Attributed  Arun Banks MD as Cardiologist (Cardiology)  Celso Ellsworth MD as Consulting Physician (Urology)  Diomedes Sterling DO as Consulting Physician (Pain Medicine)  Tenzin Abrams MD as Consulting Physician (Gastroenterology)  Deion Avelar DPM as Consulting Physician (Podiatry)  Chaz Chavez MD as Cardiologist (Cardiac Electrophysiology)        Smoking Status:  Social History     Tobacco Use   Smoking Status Never Smoker   Smokeless Tobacco Never Used       Alcohol Consumption:  Social History     Substance and Sexual Activity   Alcohol Use No       Depression Screen:   PHQ-2/PHQ-9 Depression Screening 2018   Little interest or pleasure in doing things 0   Feeling down, depressed, or hopeless 0   Total Score 0       Health Habits and Functional and Cognitive Screening:  Functional & Cognitive Status 2019   Do you have difficulty preparing food and eating? No   Do you have difficulty bathing yourself, getting dressed or grooming yourself? No   Do you have difficulty using the toilet? No   Do you have difficulty moving around from place to place? Yes   Do you have trouble with steps or getting out of a bed or a chair? Yes   In the past year have you fallen or experienced a near fall? No   Current Diet Unhealthy Diet   Dental Exam Not up to date   Eye Exam Up to date   Exercise (times per week) 0 times per week   Current Exercise Activities Include None   Do you need help using the phone?  No   Are you deaf or do you have serious difficulty hearing?  No   Do you need help with transportation?  Yes, order placed. No   Do you need help shopping? No   Do you need help preparing meals?  No   Do you need help with housework?  No   Do you need help with laundry? No   Do you need help taking your medications? No   Do you need help managing money? No   Do you ever drive or ride in a car without wearing a seat belt? No   Have you felt unusual stress, anger or loneliness in the last month? No   Who do you live with? Spouse   If you need help, do you have trouble finding someone available to you? No   Have you been bothered in the last four weeks by sexual problems? No   Do you have difficulty concentrating, remembering or making decisions? No           Does the patient have evidence of cognitive impairment? No    Asiprin use counseling: on long term anticoagulation.      Recent Lab Results:          Lab Results   Component Value Date    CHOL 194 04/15/2019    TRIG 130 04/15/2019    HDL 42 04/15/2019    VLDL 32 07/12/2016    LDLHDL 3.00 04/15/2019           Age-appropriate Screening Schedule:  Refer to the list below for future screening recommendations based on patient's age, sex and/or medical conditions. Orders for these recommended tests are listed in the plan section. The patient has been provided with a written plan.    Health Maintenance   Topic Date Due   • TDAP/TD VACCINES (1 - Tdap) 12/10/1965   • ZOSTER VACCINE (1 of 2) 12/10/1996   • PNEUMOCOCCAL VACCINES (65+ LOW/MEDIUM RISK) (2 of 2 - PCV13) 10/26/2018   • INFLUENZA VACCINE  08/01/2019   • LIPID PANEL  04/15/2020   • COLONOSCOPY  09/04/2024        Subjective   History of Present Illness    Jonh Peacock is a 73 y.o. male who presents for an Annual Wellness Visit.    The following portions of the patient's history were reviewed and updated as appropriate: allergies, current medications, past family history, past medical history, past social history, past surgical history and problem list.    Outpatient Medications Prior to Visit   Medication Sig Dispense Refill   •  Buprenorphine (BUTRANS) 10 MCG/HR patch weekly Place 10 mcg on the skin Every 7 (Seven) Days.     • diazePAM (VALIUM) 2 MG tablet Take 2 mg by mouth Daily.     • diltiaZEM (CARDIZEM) 30 MG tablet Take 1 tablet by mouth 3 (Three) Times a Day. 270 tablet 3   • furosemide (LASIX) 20 MG tablet Take 20 mg by mouth Daily.     • furosemide (LASIX) 40 MG tablet Take 40 mg by mouth Daily. TAKE WITH 20 MG TABLET FOR 60 PER MG PER DAY     • gabapentin (NEURONTIN) 600 MG tablet Take 600 mg by mouth 3 (Three) Times a Day.     • Glucosamine 750 MG tablet Take 750 mg by mouth 2 (Two) Times a Day.     • HYDROcodone-acetaminophen (NORCO) 7.5-325 MG per tablet Take 1 tablet by mouth Every 6 (Six) Hours As Needed for Moderate Pain .     • losartan (COZAAR) 25 MG tablet Take 1 tablet by mouth Daily. 90 tablet 2   • metoprolol succinate XL (TOPROL-XL) 100 MG 24 hr tablet Take 1 tablet by mouth Daily. 90 tablet 3   • Multiple Vitamins-Minerals (MULTIVITAMIN ADULT PO) Take  by mouth Daily.     • pantoprazole (PROTONIX) 40 MG EC tablet Take 40 mg by mouth Daily.     • POLYETHYLENE GLYCOL 3350 PO Take 17 granules by mouth Every Night.     • simvastatin (ZOCOR) 20 MG tablet Take 1 tablet by mouth Every Night. 90 tablet 3   • tamsulosin (FLOMAX) 0.4 MG capsule 24 hr capsule Take 1 capsule by mouth Every Night. 90 capsule 3   • warfarin (COUMADIN) 5 MG tablet Take 5 mg by mouth Daily.     • CHONDROITIN SULFATE A PO Take 600 mg by mouth 2 (Two) Times a Day.     • tiZANidine (ZANAFLEX) 4 MG tablet Take 4 mg by mouth 3 (Three) Times a Day As Needed for muscle spasms.     • warfarin (COUMADIN) 2 MG tablet Take 2 mg by mouth Daily.     • warfarin (COUMADIN) 4 MG tablet Take 4 mg by mouth Daily.       No facility-administered medications prior to visit.        Patient Active Problem List   Diagnosis   • Chronic midline low back pain with bilateral sciatica   • Mixed hyperlipidemia   • Essential hypertension   • Anemia   • Neuropathy   • Anxiety   •  Depression   • Gastritis   • Class 2 severe obesity due to excess calories with serious comorbidity and body mass index (BMI) of 36.0 to 36.9 in adult (CMS/[facility])   • Chronic systolic congestive heart failure (CMS/[facility])   • Non-ischemic cardiomyopathy (CMS/[facility])   • Status post placement of cardiac pacemaker   • Paroxysmal atrial fibrillation (CMS/[facility])   • Anticoagulant long-term use   • Onychomycosis   • Ventricular bigeminy   • Peripheral edema   • Obstructive sleep apnea of adult   • Cardiac defibrillator in situ   • Benign prostatic hyperplasia without lower urinary tract symptoms   • Colostomy in place (CMS/[facility])       Advance Care Planning:  Patient does not have an advance directive - information provided to the patient today    Identification of Risk Factors:  Risk factors include: weight , cardiovascular risk, increased fall risk, chronic pain, depression and polypharmacy.    Review of Systems   Respiratory: Positive for shortness of breath.    Cardiovascular: Negative for chest pain.   Gastrointestinal:        Colostomy   Genitourinary: Negative for dysuria.   Neurological: Positive for dizziness (occasional - with quick movement).   Psychiatric/Behavioral: Negative for dysphoric mood. The patient is not nervous/anxious.        Compared to one year ago, the patient feels his physical health is the same.  Compared to one year ago, the patient feels his mental health is the same.    Objective     Physical Exam   Constitutional: He is oriented to person, place, and time. He appears well-developed and well-nourished.   Cardiovascular: Normal rate and regular rhythm.   Pulmonary/Chest: Effort normal and breath sounds normal.   Abdominal: Soft.   Neurological: He is alert and oriented to person, place, and time.   Skin: Skin is warm and dry.   Psychiatric: He has a normal mood and affect. His behavior is normal.   Vitals reviewed.      Vitals:    04/18/19 1445   BP: 108/64   BP Location: Left arm   Patient Position:  "Sitting   Cuff Size: Large Adult   Pulse: 51   Resp: 16   Temp: 97.6 °F (36.4 °C)   TempSrc: Temporal   SpO2: 97%   Weight: 128 kg (283 lb)   Height: 188 cm (74.02\")       Patient's Body mass index is 36.32 kg/m². BMI is above normal parameters. Recommendations include: educational material.      Assessment/Plan   Patient Self-Management and Personalized Health Advice  The patient has been provided with information about: diet, exercise, weight management, fall prevention and designing advance directives and preventive services including:   · Advance directive, Nutrition counseling provided.    Visit Diagnoses:    ICD-10-CM ICD-9-CM   1. Medicare annual wellness visit, subsequent Z00.00 V70.0       No orders of the defined types were placed in this encounter.      Outpatient Encounter Medications as of 4/18/2019   Medication Sig Dispense Refill   • Buprenorphine (BUTRANS) 10 MCG/HR patch weekly Place 10 mcg on the skin Every 7 (Seven) Days.     • diazePAM (VALIUM) 2 MG tablet Take 2 mg by mouth Daily.     • diltiaZEM (CARDIZEM) 30 MG tablet Take 1 tablet by mouth 3 (Three) Times a Day. 270 tablet 3   • furosemide (LASIX) 20 MG tablet Take 20 mg by mouth Daily.     • furosemide (LASIX) 40 MG tablet Take 40 mg by mouth Daily. TAKE WITH 20 MG TABLET FOR 60 PER MG PER DAY     • gabapentin (NEURONTIN) 600 MG tablet Take 600 mg by mouth 3 (Three) Times a Day.     • Glucosamine 750 MG tablet Take 750 mg by mouth 2 (Two) Times a Day.     • HYDROcodone-acetaminophen (NORCO) 7.5-325 MG per tablet Take 1 tablet by mouth Every 6 (Six) Hours As Needed for Moderate Pain .     • losartan (COZAAR) 25 MG tablet Take 1 tablet by mouth Daily. 90 tablet 2   • metoprolol succinate XL (TOPROL-XL) 100 MG 24 hr tablet Take 1 tablet by mouth Daily. 90 tablet 3   • Multiple Vitamins-Minerals (MULTIVITAMIN ADULT PO) Take  by mouth Daily.     • pantoprazole (PROTONIX) 40 MG EC tablet Take 40 mg by mouth Daily.     • POLYETHYLENE GLYCOL 3350 PO " Take 17 granules by mouth Every Night.     • simvastatin (ZOCOR) 20 MG tablet Take 1 tablet by mouth Every Night. 90 tablet 3   • tamsulosin (FLOMAX) 0.4 MG capsule 24 hr capsule Take 1 capsule by mouth Every Night. 90 capsule 3   • warfarin (COUMADIN) 5 MG tablet Take 5 mg by mouth Daily.     • [DISCONTINUED] CHONDROITIN SULFATE A PO Take 600 mg by mouth 2 (Two) Times a Day.     • tiZANidine (ZANAFLEX) 4 MG tablet Take 4 mg by mouth 3 (Three) Times a Day As Needed for muscle spasms.     • warfarin (COUMADIN) 2 MG tablet Take 2 mg by mouth Daily.     • warfarin (COUMADIN) 4 MG tablet Take 4 mg by mouth Daily.       No facility-administered encounter medications on file as of 4/18/2019.        Reviewed use of high risk medication in the elderly: yes  Reviewed for potential of harmful drug interactions in the elderly: yes    Follow Up:  Return in about 4 months (around 8/18/2019).     An After Visit Summary and PPPS with all of these plans were given to the patient.

## 2022-05-04 NOTE — PROGRESS NOTES
[facility] - PODIATRY    Today's Date: 05/10/22    Patient Name: Jonh Peacock  MRN: [id number]  CSN: [id number]  PCP: Ric Antony DO  Referring Provider: No ref. provider found     SUBJECTIVE     Chief Complaint   Patient presents with   • Follow-up     Jesus Manuel Guevara MD PCP11/29/2021 3 month fu nondiabetic foot/nail care- pt states doing great- pt denies pain- pt presents with long thick nails       HPI: Jonh Peacock, a 76 y.o.male, comes to clinic as a(n) established patient complaining of thick fungal toenails. Pt with h/o Anxiety, Arrhythmia, Cardiomyopathy, CHF, Depression, previous DVT, HTN, Obesity, Anticoagulation with Warfarin. Relates borderline diabetes and controlling with diet. Notes mild numbness and tingling. Presents with long, thickened, and irregular nail growth. Denies pain at the present time. Continues use of rollator. Continues daily coumadin therapy. Notes improvement to feet with regular visits. Denies any constitutional symptoms. No other pedal complaints at this time.    Past Medical History:   Diagnosis Date   • Anemia    • Anxiety    • Arrhythmia    • Arthritis    • Atrial fibrillation (HCC)    • Cardiomyopathy (HCC)    • CHF (congestive heart failure) (HCC)    • Colon polyp    • Colostomy present (HCC)     10years   • Connective tissue disease (HCC)    • Depression    • Depression    • Diverticulitis    • Dizzy    • DVT (deep venous thrombosis) (HCC)    • Gastritis    • GERD (gastroesophageal reflux disease)    • Hiatal hernia    • History of transfusion    • Hyperlipidemia    • Hypertension    • Neuropathy    • Pneumonia      Past Surgical History:   Procedure Laterality Date   • APPENDECTOMY     • BACK SURGERY  2002, 2007    Multiple spine surgeries - laminectomy & fusion   • CARDIAC CATHETERIZATION     • CARDIAC DEFIBRILLATOR PLACEMENT     • CARDIAC ELECTROPHYSIOLOGY PROCEDURE N/A 10/31/2019    Procedure: ICD GEN CHANGE;  Surgeon: Chaz Chavez  "MD Lefty;  Location: [facility] CATH INVASIVE LOCATION;  Service: Cardiology   • COLON RESECTION WITH COLOSTOMY     • COLONOSCOPY  09/04/2014    diverticulosis   • COLONOSCOPY N/A 10/29/2019    Procedure: COLONOSCOPY WITH ANESTHESIA;  Surgeon: Tenzin Abrams MD;  Location: [facility] ENDOSCOPY;  Service: Gastroenterology   • ENDOSCOPY  02/24/1999    small sliding hiatal hernia   • FOOT SURGERY Bilateral 2005   • JOINT REPLACEMENT Right 2002    KNEE   • PACEMAKER IMPLANTATION  2012   • RECTAL FISSURE INCISION AND DRAINAGE     • REPLACEMENT TOTAL KNEE     • SPINAL CORD STIMULATOR IMPLANT  08/2012    Placed by Dr. DEENA Villela, [employer]   • THORACIC LAMINECTOMY WITH PLACEMENT OF DORSAL COLUMN STIMULATOR Right 1/28/2021    Procedure: SPINAL CORD STIMULATOR REVISION, right buttocks;  Surgeon: Noah Oliveros MD;  Location: [facility] OR;  Service: Neurosurgery;  Laterality: Right;   • TOTAL HIP ARTHROPLASTY Right 2004   • TOTAL HIP ARTHROPLASTY Left 12/2008     Family History   Problem Relation Age of Onset   • Heart disease Mother    • Heart disease Brother    • Breast cancer Brother    • Colon cancer Neg Hx    • Colon polyps Neg Hx      Social History     Socioeconomic History   • Marital status:    Tobacco Use   • Smoking status: Never Smoker   • Smokeless tobacco: Never Used   Vaping Use   • Vaping Use: Never used   Substance and Sexual Activity   • Alcohol use: Not Currently   • Drug use: Never   • Sexual activity: Defer     Allergies   Allergen Reactions   • Percocet [Oxycodone-Acetaminophen] Urinary Retention   • Amiodarone Other (See Comments)     INTERFERES WITH OTHER MEDICATIONS   • Oxycontin [Oxycodone Hcl] Urinary Retention   • Penicillins Hives     All \"cillins\"   • Vioxx [Rofecoxib] GI Intolerance     Current Outpatient Medications   Medication Sig Dispense Refill   • Buprenorphine 10 MCG/HR patch weekly Place 10 mcg on the skin Every 7 (Seven) Days.     • CHONDROITIN SULFATE PO Take 1 tablet by " mouth 2 (two) times a day.     • diazePAM (VALIUM) 2 MG tablet Take 1 mg by mouth Daily.     • diclofenac (VOLTAREN) 1 % gel gel Apply 4 g topically to the appropriate area as directed 4 (Four) Times a Day As Needed.     • dilTIAZem (CARDIZEM) 30 MG tablet Take 1 tablet by mouth 3 (Three) Times a Day. 270 tablet 3   • furosemide (LASIX) 20 MG tablet Take 1 tablet by mouth Daily. 90 tablet 3   • furosemide (LASIX) 40 MG tablet Take 1 tablet by mouth Daily. TAKE WITH 20 MG TABLET FOR 60 PER MG PER DAY 90 tablet 3   • gabapentin (NEURONTIN) 300 MG capsule Take 300 mg by mouth 3 (Three) Times a Day.     • Glucosamine 750 MG tablet Take 750 mg by mouth 2 (Two) Times a Day.     • HYDROcodone-acetaminophen (NORCO) 7.5-325 MG per tablet Take 1 tablet by mouth As Needed for Moderate Pain .     • losartan (COZAAR) 25 MG tablet TAKE 1 TABLET DAILY 90 tablet 3   • metoprolol succinate XL (TOPROL-XL) 100 MG 24 hr tablet Take 1 tablet by mouth Daily. 90 tablet 3   • Multiple Vitamins-Minerals (MULTIVITAMIN ADULT PO) Take 1 tablet by mouth Daily.     • pantoprazole (PROTONIX) 40 MG EC tablet Take 1 tablet by mouth Daily. 90 tablet 3   • polyethylene glycol (MIRALAX) powder Take 17 g by mouth Every Night. 1581 g 3   • simvastatin (ZOCOR) 20 MG tablet Take 1 tablet by mouth Every Night. 90 tablet 3   • tamsulosin (FLOMAX) 0.4 MG capsule 24 hr capsule TAKE 1 CAPSULE EVERY NIGHT 90 capsule 3   • warfarin (COUMADIN) 2 MG tablet Take 1 tablet by mouth Daily. 90 tablet 1   • warfarin (COUMADIN) 4 MG tablet Take 1 tablet by mouth Daily. 90 tablet 1   • warfarin (COUMADIN) 5 MG tablet Take 1 tablet by mouth Daily. 90 tablet 1     No current facility-administered medications for this visit.     Review of Systems   Constitutional: Negative for chills and fever.   HENT: Negative for congestion.    Respiratory: Negative for shortness of breath.    Cardiovascular: Positive for leg swelling. Negative for chest pain.   Gastrointestinal: Negative  for constipation, diarrhea, nausea and vomiting.   Musculoskeletal: Positive for gait problem.        Foot pain   Skin: Negative for wound.        Thick Toenails   Neurological: Positive for numbness.   Hematological: Bruises/bleeds easily.       OBJECTIVE     Vitals:    05/10/22 1553   Pulse: 76   SpO2: 96%       PHYSICAL EXAM  GEN:   Accompanied by wife.    Foot/Ankle Exam:       General:   Appearance: appears stated age and healthy    Orientation: AAOx3    Affect: appropriate    Gait: unimpaired    Assistance: walker    Shoe Gear:  Casual shoes (left shoe with lift)    VASCULAR      Right Foot Vascularity   Dorsalis pedis:  1+  Posterior tibial:  1+  Skin Temperature: warm    Edema Grading:  Trace and non-pitting  CFT:  4  Pedal Hair Growth:  Absent  Varicosities: none       Left Foot Vascularity   Dorsalis pedis:  1+  Posterior tibial:  1+  Skin Temperature: warm    Edema Grading:  None, trace and non-pitting  CFT:  4  Pedal Hair Growth:  Absent  Varicosities: none        NEUROLOGIC     Right Foot Neurologic   Light touch sensation:  Diminished  Vibratory sensation:  Diminished  Hot/Cold sensation: diminished    Protective Sensation using [place] Monofilament:  7     Left Foot Neurologic   Light touch sensation:  Diminished  Vibratory sensation:  Diminished  Hot/cold sensation: diminished    Protective Sensation using [place] Monofilament:  7     MUSCULOSKELETAL      Right Foot Musculoskeletal   Ecchymosis:  None  Tenderness: toenails    Arch:  Pes planus  Hallux valgus: No       Left Foot Musculoskeletal   Ecchymosis:  None  Tenderness: toenails    Arch:  Pes planus  Hallux valgus: No       MUSCLE STRENGTH     Right Foot Muscle Strength   Foot dorsiflexion:  5  Foot plantar flexion:  5  Foot inversion:  5  Foot eversion:  5     Left Foot Muscle Strength   Foot dorsiflexion:  5  Foot plantar flexion:  5  Foot inversion:  5  Foot eversion:  5     RANGE OF MOTION      Right Foot Range of Motion    Foot and ankle ROM within normal limits       Left Foot Range of Motion   Foot and ankle ROM within normal limits       DERMATOLOGIC     Right Foot Dermatologic   Skin: skin intact    Nails: onychomycosis, abnormally thick, subungual debris and dystrophic nails       Left Foot Dermatologic   Skin: skin intact    Nails: onychomycosis, abnormally thick, subungual debris and dystrophic nails         RADIOLOGY/NUCLEAR:  No results found.    LABORATORY/CULTURE RESULTS:      PATHOLOGY RESULTS:       ASSESSMENT/PLAN     Diagnoses and all orders for this visit:    1. Onychomycosis (Primary)    2. Borderline diabetes    3. Anticoagulant long-term use    4. Neuropathy    5. Peripheral edema      Comprehensive lower extremity examination and evaluation was performed.  Discussed findings and treatment plan including risks, benefits, and treatment options with patient in detail. Patient agreed with treatment plan.  After verbal consent obtained, nail(s) x10 debrided of length and thickness with nail nipper without incidence  Patient may maintain nails and calluses at home utilizing emery board or pumice stone between visits as needed  Reviewed at home diabetic foot care including daily foot checks   Continue shoes with custom inserts  Elevate legs when possible.   An After Visit Summary was printed and given to the patient at discharge, including (if requested) any available informative/educational handouts regarding diagnosis, treatment, or medications. All questions were answered to patient/family satisfaction. Should symptoms fail to improve or worsen they agree to call or return to clinic or to go to the Emergency Department. Discussed the importance of following up with any needed screening tests/labs/specialist appointments and any requested follow-up recommended by me today. Importance of maintaining follow-up discussed and patient accepts that missed appointments can delay diagnosis and potentially lead to worsening of  conditions.  Return in about 3 months (around 8/10/2022)., or sooner if acute issues arise.        This document has been electronically signed by Deion Avelar DPM on May 10, 2022 16:03 CDT

## 2022-05-10 ENCOUNTER — OFFICE VISIT (OUTPATIENT)
Dept: PODIATRY | Facility: CLINIC | Age: 77
End: 2022-05-10

## 2022-05-10 VITALS — HEIGHT: 74 IN | WEIGHT: 259.4 LBS | HEART RATE: 76 BPM | BODY MASS INDEX: 33.29 KG/M2 | OXYGEN SATURATION: 96 %

## 2022-05-10 DIAGNOSIS — R73.03 BORDERLINE DIABETES: ICD-10-CM

## 2022-05-10 DIAGNOSIS — G62.9 NEUROPATHY: ICD-10-CM

## 2022-05-10 DIAGNOSIS — Z79.01 ANTICOAGULANT LONG-TERM USE: ICD-10-CM

## 2022-05-10 DIAGNOSIS — R60.9 PERIPHERAL EDEMA: ICD-10-CM

## 2022-05-10 DIAGNOSIS — B35.1 ONYCHOMYCOSIS: Primary | ICD-10-CM

## 2022-05-10 PROCEDURE — 11721 DEBRIDE NAIL 6 OR MORE: CPT | Performed by: PODIATRIST

## 2022-05-12 ENCOUNTER — OFFICE VISIT (OUTPATIENT)
Dept: INTERNAL MEDICINE | Facility: CLINIC | Age: 77
End: 2022-05-12

## 2022-05-12 VITALS
BODY MASS INDEX: 33.05 KG/M2 | SYSTOLIC BLOOD PRESSURE: 110 MMHG | WEIGHT: 257.5 LBS | TEMPERATURE: 97.8 F | HEART RATE: 65 BPM | RESPIRATION RATE: 16 BRPM | DIASTOLIC BLOOD PRESSURE: 70 MMHG | HEIGHT: 74 IN | OXYGEN SATURATION: 96 %

## 2022-05-12 DIAGNOSIS — G56.03 BILATERAL CARPAL TUNNEL SYNDROME: ICD-10-CM

## 2022-05-12 DIAGNOSIS — Z96.89 SPINAL CORD STIMULATOR STATUS: ICD-10-CM

## 2022-05-12 DIAGNOSIS — D73.4 SPLENIC CYST: ICD-10-CM

## 2022-05-12 DIAGNOSIS — Z76.89 ENCOUNTER FOR POWER MOBILITY DEVICE ASSESSMENT: ICD-10-CM

## 2022-05-12 DIAGNOSIS — I50.42 CHF (CONGESTIVE HEART FAILURE), NYHA CLASS II, CHRONIC, COMBINED: Primary | ICD-10-CM

## 2022-05-12 DIAGNOSIS — Z93.3 COLOSTOMY IN PLACE: ICD-10-CM

## 2022-05-12 DIAGNOSIS — E11.42 TYPE 2 DIABETES MELLITUS WITH DIABETIC POLYNEUROPATHY, WITHOUT LONG-TERM CURRENT USE OF INSULIN: ICD-10-CM

## 2022-05-12 DIAGNOSIS — I48.0 PAROXYSMAL ATRIAL FIBRILLATION: ICD-10-CM

## 2022-05-12 DIAGNOSIS — E78.2 MIXED HYPERLIPIDEMIA: ICD-10-CM

## 2022-05-12 DIAGNOSIS — G89.4 CHRONIC PAIN SYNDROME: ICD-10-CM

## 2022-05-12 DIAGNOSIS — N28.1 RENAL CYST: ICD-10-CM

## 2022-05-12 DIAGNOSIS — E66.09 CLASS 1 OBESITY DUE TO EXCESS CALORIES WITH SERIOUS COMORBIDITY AND BODY MASS INDEX (BMI) OF 33.0 TO 33.9 IN ADULT: ICD-10-CM

## 2022-05-12 DIAGNOSIS — I10 ESSENTIAL HYPERTENSION: ICD-10-CM

## 2022-05-12 PROCEDURE — 1125F AMNT PAIN NOTED PAIN PRSNT: CPT | Performed by: INTERNAL MEDICINE

## 2022-05-12 PROCEDURE — 1159F MED LIST DOCD IN RCRD: CPT | Performed by: INTERNAL MEDICINE

## 2022-05-12 PROCEDURE — 1170F FXNL STATUS ASSESSED: CPT | Performed by: INTERNAL MEDICINE

## 2022-05-12 PROCEDURE — 99214 OFFICE O/P EST MOD 30 MIN: CPT | Performed by: INTERNAL MEDICINE

## 2022-05-12 PROCEDURE — G0439 PPPS, SUBSEQ VISIT: HCPCS | Performed by: INTERNAL MEDICINE

## 2022-05-12 NOTE — PROGRESS NOTES
The ABCs of the Annual Wellness Visit  Subsequent Medicare Wellness Visit    No chief complaint on file.   See  note  Subjective    History of Present Illness:  Jonh Peacock is a 76 y.o. male who presents for a Subsequent Medicare Wellness Visit.    The following portions of the patient's history were reviewed and   updated as appropriate: allergies, current medications, past family history, past medical history, past social history, past surgical history and problem list.    Compared to one year ago, the patient feels his physical   health is the same.    Compared to one year ago, the patient feels his mental   health is the same.    Recent Hospitalizations:  He was not admitted to the hospital during the last year.       Current Medical Providers:  Patient Care Team:  Ric Antony DO as PCP - General (Internal Medicine)  Arun Banks MD as Cardiologist (Cardiology)  Celso Ellsworth MD as Consulting Physician (Urology)  Tenzin Abrams MD as Consulting Physician (Gastroenterology)  Deion Avelar DPM as Consulting Physician (Podiatry)  Chaz Chavez MD as Cardiologist (Cardiac Electrophysiology)  Jaime Vazquez OD (Optometry)  Diomedes Sterling DO as Consulting Physician (Pain Medicine)    Outpatient Medications Prior to Visit   Medication Sig Dispense Refill   • Buprenorphine 10 MCG/HR patch weekly Place 10 mcg on the skin Every 7 (Seven) Days.     • CHONDROITIN SULFATE PO Take 1 tablet by mouth 2 (two) times a day.     • diazePAM (VALIUM) 2 MG tablet Take 1 mg by mouth Daily. 1/2 qam 1/2 qpm     • diclofenac (VOLTAREN) 1 % gel gel Apply 4 g topically to the appropriate area as directed 4 (Four) Times a Day As Needed.     • dilTIAZem (CARDIZEM) 30 MG tablet Take 1 tablet by mouth 3 (Three) Times a Day. 270 tablet 3   • furosemide (LASIX) 20 MG tablet Take 1 tablet by mouth Daily. 90 tablet 3   • furosemide (LASIX) 40 MG tablet Take 1 tablet by mouth Daily. TAKE  WITH 20 MG TABLET FOR 60 PER MG PER DAY 90 tablet 3   • gabapentin (NEURONTIN) 300 MG capsule Take 300 mg by mouth 3 (Three) Times a Day.     • Glucosamine 750 MG tablet Take 750 mg by mouth 2 (Two) Times a Day.     • HYDROcodone-acetaminophen (NORCO) 7.5-325 MG per tablet Take 1 tablet by mouth As Needed for Moderate Pain .     • losartan (COZAAR) 25 MG tablet TAKE 1 TABLET DAILY 90 tablet 3   • metoprolol succinate XL (TOPROL-XL) 100 MG 24 hr tablet Take 1 tablet by mouth Daily. 90 tablet 3   • Multiple Vitamins-Minerals (MULTIVITAMIN ADULT PO) Take 1 tablet by mouth Daily.     • pantoprazole (PROTONIX) 40 MG EC tablet Take 1 tablet by mouth Daily. 90 tablet 3   • polyethylene glycol (MIRALAX) powder Take 17 g by mouth Every Night. 1581 g 3   • simvastatin (ZOCOR) 20 MG tablet Take 1 tablet by mouth Every Night. 90 tablet 3   • tamsulosin (FLOMAX) 0.4 MG capsule 24 hr capsule TAKE 1 CAPSULE EVERY NIGHT 90 capsule 3   • warfarin (COUMADIN) 2 MG tablet Take 1 tablet by mouth Daily. 90 tablet 1   • warfarin (COUMADIN) 4 MG tablet Take 1 tablet by mouth Daily. 90 tablet 1   • warfarin (COUMADIN) 5 MG tablet Take 1 tablet by mouth Daily. 90 tablet 1     No facility-administered medications prior to visit.       Opioid medication/s are on active medication list.  and I have evaluated his active treatment plan and pain score trends (see table).  There were no vitals filed for this visit.  I have reviewed the chart for potential of high risk medication and harmful drug interactions in the elderly.            Aspirin is not on active medication list.  Aspirin use is not indicated based on review of current medical condition/s. Risk of harm outweighs potential benefits.  .    Patient Active Problem List   Diagnosis   • Mixed hyperlipidemia   • Essential hypertension   • Anemia   • Neuropathy   • Anxiety   • Depression   • Gastritis   • Class 1 obesity due to excess calories with serious comorbidity and body mass index (BMI)  "of 33.0 to 33.9 in adult   • CHF (congestive heart failure), NYHA class II, chronic, combined (HCC)   • Non-ischemic cardiomyopathy (HCC)   • Paroxysmal atrial fibrillation (HCC)   • Anticoagulant long-term use   • Onychomycosis   • Peripheral edema   • Obstructive sleep apnea of adult   • Cardiac defibrillator in situ   • Benign prostatic hyperplasia without lower urinary tract symptoms   • Colostomy in place (HCC)   • Type 2 diabetes mellitus with diabetic polyneuropathy, without long-term current use of insulin (HCC)   • Hx of colonic polyp   • Failed back syndrome   • Chronic pain syndrome   • S/P insertion of spinal cord stimulator   • Bilateral carpal tunnel syndrome     Advance Care Planning  Advance Directive is not on file.  ACP discussion was held with the patient during this visit. Patient does not have an advance directive, information provided.          Objective    Vitals:    05/12/22 1348   BP: 110/70   BP Location: Left arm   Patient Position: Sitting   Cuff Size: Adult   Pulse: 65   Resp: 16   Temp: 97.8 °F (36.6 °C)   SpO2: 96%   Weight: 117 kg (257 lb 8 oz)   Height: 188 cm (74\")     BMI Readings from Last 1 Encounters:   05/12/22 33.06 kg/m²   BMI is above normal parameters. Recommendations include: exercise counseling and nutrition counseling    Does the patient have evidence of cognitive impairment? No    Physical Exam            HEALTH RISK ASSESSMENT    Smoking Status:  Social History     Tobacco Use   Smoking Status Never Smoker   Smokeless Tobacco Never Used     Alcohol Consumption:  Social History     Substance and Sexual Activity   Alcohol Use Not Currently     Fall Risk Screen:    STEADI Fall Risk Assessment was completed, and patient is at LOW risk for falls.Assessment completed on:5/12/2022    Depression Screening:  PHQ-2/PHQ-9 Depression Screening 5/12/2022   Retired PHQ-9 Total Score -   Retired Total Score -   Little Interest or Pleasure in Doing Things 0-->not at all   Feeling " Down, Depressed or Hopeless 0-->not at all   PHQ-9: Brief Depression Severity Measure Score 0       Health Habits and Functional and Cognitive Screening:  Functional & Cognitive Status 5/12/2022   Do you have difficulty preparing food and eating? No   Do you have difficulty bathing yourself, getting dressed or grooming yourself? No   Do you have difficulty using the toilet? No   Do you have difficulty moving around from place to place? No   Do you have trouble with steps or getting out of a bed or a chair? No   Current Diet Unhealthy Diet   Dental Exam Up to date        Dental Exam Comment -   Eye Exam Up to date   Exercise (times per week) 3 times per week   Current Exercises Include Yard Work   Current Exercise Activities Include -   Do you need help using the phone?  No   Are you deaf or do you have serious difficulty hearing?  No   Do you need help with transportation? No   Do you need help shopping? No   Do you need help preparing meals?  No   Do you need help with housework?  No   Do you need help with laundry? No   Do you need help taking your medications? No   Do you need help managing money? No   Do you ever drive or ride in a car without wearing a seat belt? No   Have you felt unusual stress, anger or loneliness in the last month? No   Who do you live with? Spouse   If you need help, do you have trouble finding someone available to you? No   Have you been bothered in the last four weeks by sexual problems? No   Do you have difficulty concentrating, remembering or making decisions? No       Age-appropriate Screening Schedule:  Refer to the list below for future screening recommendations based on patient's age, sex and/or medical conditions. Orders for these recommended tests are listed in the plan section. The patient has been provided with a written plan.    Health Maintenance   Topic Date Due   • URINE MICROALBUMIN  Never done   • ZOSTER VACCINE (1 of 2) Never done   • HEMOGLOBIN A1C  05/11/2022   • LIPID  PANEL  06/11/2022   • DIABETIC EYE EXAM  06/24/2022   • INFLUENZA VACCINE  08/01/2022   • DIABETIC FOOT EXAM  08/17/2022   • TDAP/TD VACCINES (2 - Td or Tdap) 09/21/2022              Assessment & Plan   CMS Preventative Services Quick Reference  Risk Factors Identified During Encounter  Cardiovascular Disease  Fall Risk-High or Moderate  Immunizations Discussed/Encouraged (specific Immunizations; Shingrix  Inadequate Social Support, Isolation, Loneliness, Lack of Transportation, Financial Difficulties, or Caregiver Stress   Inactivity/Sedentary  Obesity/Overweight   The above risks/problems have been discussed with the patient.  Follow up actions/plans if indicated are seen below in the Assessment/Plan Section.  Pertinent information has been shared with the patient in the After Visit Summary.    Diagnoses and all orders for this visit:    1. CHF (congestive heart failure), NYHA class II, chronic, combined (HCC) (Primary)  -     Comprehensive Metabolic Panel; Future  -     CBC (No Diff); Future  -     Miscellaneous DME    2. Paroxysmal atrial fibrillation (HCC)    3. Chronic pain syndrome  -     Miscellaneous DME    4. Spinal cord stimulator status    5. Encounter for power mobility device assessment  -     Miscellaneous DME    6. Type 2 diabetes mellitus with diabetic polyneuropathy, without long-term current use of insulin ([facility])  -     Comprehensive Metabolic Panel; Future  -     Hemoglobin A1c; Future  -     Lipid Panel; Future  -     Microalbumin / Creatinine Urine Ratio - Urine, Clean Catch; Future    7. Mixed hyperlipidemia  -     Lipid Panel; Future    8. Essential hypertension  -     CBC (No Diff); Future    9. Renal cyst  -     US Abdomen Complete; Future    10. Splenic cyst  -     US Abdomen Complete; Future    11. Bilateral carpal tunnel syndrome  -     Miscellaneous DME    12. Colostomy in place ([facility])    13. Class 1 obesity due to excess calories with serious comorbidity and body mass index (BMI) of 33.0  to 33.9 in adult        Follow Up:   Return for Next scheduled follow up.     An After Visit Summary and PPPS were made available to the patient.

## 2022-05-12 NOTE — PROGRESS NOTES
CC: f/u heart failure & face to face for power mobility    History:  Jonh Peacock is a 76 y.o. male   He notes he has been doing well without acute illness. He reports he has been able to ambulate some, but does still use his power chair in the home in order to maintain independence with his ADLs, which still cause him too much fatigue to do without the assistance of his wheelchair. He continues to have dyspnea on exertion related to his heart failure, but has not had changes in baseline dyspnea nor swelling to suggest decompensation.       ROS:  Review of Systems   Constitutional: Positive for fatigue.   Respiratory: Positive for shortness of breath.    Cardiovascular: Positive for leg swelling. Negative for chest pain and palpitations.   Musculoskeletal: Positive for back pain and gait problem.        reports that he has never smoked. He has never used smokeless tobacco. He reports previous alcohol use. He reports that he does not use drugs.      Current Outpatient Medications:   •  Buprenorphine 10 MCG/HR patch weekly, Place 10 mcg on the skin Every 7 (Seven) Days., Disp: , Rfl:   •  CHONDROITIN SULFATE PO, Take 1 tablet by mouth 2 (two) times a day., Disp: , Rfl:   •  diazePAM (VALIUM) 2 MG tablet, Take 1 mg by mouth Daily. 1/2 qam 1/2 qpm, Disp: , Rfl:   •  diclofenac (VOLTAREN) 1 % gel gel, Apply 4 g topically to the appropriate area as directed 4 (Four) Times a Day As Needed., Disp: , Rfl:   •  dilTIAZem (CARDIZEM) 30 MG tablet, Take 1 tablet by mouth 3 (Three) Times a Day., Disp: 270 tablet, Rfl: 3  •  furosemide (LASIX) 20 MG tablet, Take 1 tablet by mouth Daily., Disp: 90 tablet, Rfl: 3  •  furosemide (LASIX) 40 MG tablet, Take 1 tablet by mouth Daily. TAKE WITH 20 MG TABLET FOR 60 PER MG PER DAY, Disp: 90 tablet, Rfl: 3  •  gabapentin (NEURONTIN) 300 MG capsule, Take 300 mg by mouth 3 (Three) Times a Day., Disp: , Rfl:   •  Glucosamine 750 MG tablet, Take 750 mg by mouth 2 (Two) Times a Day., Disp: ,  "Rfl:   •  HYDROcodone-acetaminophen (NORCO) 7.5-325 MG per tablet, Take 1 tablet by mouth As Needed for Moderate Pain ., Disp: , Rfl:   •  losartan (COZAAR) 25 MG tablet, TAKE 1 TABLET DAILY, Disp: 90 tablet, Rfl: 3  •  metoprolol succinate XL (TOPROL-XL) 100 MG 24 hr tablet, Take 1 tablet by mouth Daily., Disp: 90 tablet, Rfl: 3  •  Multiple Vitamins-Minerals (MULTIVITAMIN ADULT PO), Take 1 tablet by mouth Daily., Disp: , Rfl:   •  pantoprazole (PROTONIX) 40 MG EC tablet, Take 1 tablet by mouth Daily., Disp: 90 tablet, Rfl: 3  •  polyethylene glycol (MIRALAX) powder, Take 17 g by mouth Every Night., Disp: 1581 g, Rfl: 3  •  simvastatin (ZOCOR) 20 MG tablet, Take 1 tablet by mouth Every Night., Disp: 90 tablet, Rfl: 3  •  tamsulosin (FLOMAX) 0.4 MG capsule 24 hr capsule, TAKE 1 CAPSULE EVERY NIGHT, Disp: 90 capsule, Rfl: 3  •  warfarin (COUMADIN) 2 MG tablet, Take 1 tablet by mouth Daily., Disp: 90 tablet, Rfl: 1  •  warfarin (COUMADIN) 4 MG tablet, Take 1 tablet by mouth Daily., Disp: 90 tablet, Rfl: 1  •  warfarin (COUMADIN) 5 MG tablet, Take 1 tablet by mouth Daily., Disp: 90 tablet, Rfl: 1    OBJECTIVE:  /70 (BP Location: Left arm, Patient Position: Sitting, Cuff Size: Adult)   Pulse 65   Temp 97.8 °F (36.6 °C)   Resp 16   Ht 188 cm (74\")   Wt 117 kg (257 lb 8 oz)   SpO2 96%   BMI 33.06 kg/m²    Physical Exam  Constitutional:       General: He is not in acute distress.  Pulmonary:      Effort: Pulmonary effort is normal. No respiratory distress.   Neurological:      Mental Status: He is alert and oriented to person, place, and time.      Comments: Negative tinel's test over the median nerve bilaterally. Positive phalens with numbness in the thumb, second and third digit bilaterally.    Psychiatric:         Mood and Affect: Mood normal.         Behavior: Behavior normal.         Assessment/Plan     Diagnoses and all orders for this visit:    1. CHF (congestive heart failure), NYHA class II, chronic, " combined (HCC) (Primary)  -     Comprehensive Metabolic Panel; Future  -     CBC (No Diff); Future  -     Miscellaneous DME  He has no decompensation on current meds. [facility]     2. Paroxysmal atrial fibrillation (HCC)  Stable on rate control and anticoagulated on warfarin.     3. Chronic pain syndrome  4. Spinal cord stimulator status  5. Encounter for power mobility device assessment  -     Miscellaneous DME  He has improvement in pain, but still has functional impairment related to his CHF, A fib, and back pain with marked dyspnea on exertion. Recommend repairs to his power chair in order to maintain his mobility and independence within the home as he is unable to perform all of his ADLs without severe dyspnea and decreased exercise capacity that precludes use of a walker, standard wheelchair and makes the power chair uniquely positioned to improve his ability to maintain independence and quality of life. He maintains capacity, desire and physical capability to use this chair safely and effectively.     6. Type 2 diabetes mellitus with diabetic polyneuropathy, without long-term current use of insulin (HCC)  -     Comprehensive Metabolic Panel; Future  -     Hemoglobin A1c; Future  -     Lipid Panel; Future  -     Microalbumin / Creatinine Urine Ratio - Urine, Clean Catch; Future  Labs for monitoring and adjust meds as needed.     7. Mixed hyperlipidemia  -     Lipid Panel; Future  Stable on moderate intensity statin therapy per ACC/AHA guidelines.    8. Essential hypertension  -     CBC (No Diff); Future  Well controlled, BP goal for age is <140/90 per JNC 8 guidelines and continue current medications    9. Renal cyst  10. Splenic cyst  -     US Abdomen Complete; Future  US abdomen to reevaluate these, which have been stable as recently as 2018. If still stable, consider no further monitoring vs 3-5 year interval.     11. Bilateral carpal tunnel syndrome  -      Miscellaneous DME  DME Rx for splints to be worn at night.     12. Colostomy in place (HCC)  No new issues and he has supplies that are doing well.     13. Class 1 obesity due to excess calories with serious comorbidity and body mass index (BMI) of 33.0 to 33.9 in adult  BMI is >= 30 and <= 34.9 (Class 1 obesity). The following options were offered after discussion: exercise counseling/recommendations and nutrition counseling/recommendations    An After Visit Summary was printed and given to the patient at discharge.  Return for Next scheduled follow up.          Ric Antony D.O. 5/15/2022   Electronically signed.

## 2022-05-16 NOTE — PATIENT INSTRUCTIONS
Medicare Wellness  Personal Prevention Plan of Service     Date of Office Visit:    Encounter Provider:  Ric Antony DO  Place of Service:  [facility] INTERNAL MEDICINE  Patient Name: Jonh Peacock  :  1945    As part of the Medicare Wellness portion of your visit today, we are providing you with this personalized preventive plan of services (PPPS). This plan is based upon recommendations of the [place] Preventive Services Task Force (USPSTF) and the Advisory Committee on Immunization Practices (ACIP).    This lists the preventive care services that should be considered, and provides dates of when you are due. Items listed as completed are up-to-date and do not require any further intervention.    Health Maintenance   Topic Date Due    URINE MICROALBUMIN  Never done    ZOSTER VACCINE (1 of 2) Never done    HEMOGLOBIN A1C  2022    LIPID PANEL  2022    DIABETIC EYE EXAM  2022    INFLUENZA VACCINE  2022    DIABETIC FOOT EXAM  2022    TDAP/TD VACCINES (2 - Td or Tdap) 2022    ANNUAL WELLNESS VISIT  2023    COLORECTAL CANCER SCREENING  10/29/2024    HEPATITIS C SCREENING  Completed    COVID-19 Vaccine  Completed    Pneumococcal Vaccine 65+  Completed       Orders Placed This Encounter   Procedures    Miscellaneous DME     Order Specific Question:   Type of DME     Answer:   Bilateral carpal tunnel     Order Specific Question:   Length of Need (99 Months = Lifetime)     Answer:   99 Months = Lifetime    Miscellaneous DME     Order Specific Question:   Type of DME     Answer:   Power mobility chair repairs     Order Specific Question:   Length of Need (99 Months = Lifetime)     Answer:   99 Months = Lifetime    US Abdomen Complete     Standing Status:   Future     Standing Expiration Date:   2023     Order Specific Question:   Reason for Exam:     Answer:   splenic and right renal cyst    Comprehensive Metabolic Panel      Standing Status:   Future     Standing Expiration Date:   5/12/2023     Order Specific Question:   Release to patient     Answer:   Immediate    Hemoglobin A1c     Standing Status:   Future     Standing Expiration Date:   5/12/2023     Order Specific Question:   Release to patient     Answer:   Immediate    Lipid Panel     Standing Status:   Future     Standing Expiration Date:   5/12/2023    CBC (No Diff)     Standing Status:   Future     Standing Expiration Date:   5/12/2023     Order Specific Question:   Release to patient     Answer:   Immediate    Microalbumin / Creatinine Urine Ratio - Urine, Clean Catch     Standing Status:   Future     Standing Expiration Date:   5/12/2023     Order Specific Question:   Release to patient     Answer:   Immediate       Return for Next scheduled follow up.

## 2022-05-27 ENCOUNTER — LAB (OUTPATIENT)
Dept: LAB | Facility: HOSPITAL | Age: 77
End: 2022-05-27

## 2022-05-27 ENCOUNTER — HOSPITAL ENCOUNTER (OUTPATIENT)
Dept: ULTRASOUND IMAGING | Facility: HOSPITAL | Age: 77
Discharge: HOME | End: 2022-05-27

## 2022-05-27 DIAGNOSIS — E11.42 TYPE 2 DIABETES MELLITUS WITH DIABETIC POLYNEUROPATHY, WITHOUT LONG-TERM CURRENT USE OF INSULIN: ICD-10-CM

## 2022-05-27 DIAGNOSIS — I50.42 CHF (CONGESTIVE HEART FAILURE), NYHA CLASS II, CHRONIC, COMBINED: ICD-10-CM

## 2022-05-27 DIAGNOSIS — N28.1 RENAL CYST: ICD-10-CM

## 2022-05-27 DIAGNOSIS — E78.2 MIXED HYPERLIPIDEMIA: ICD-10-CM

## 2022-05-27 DIAGNOSIS — I10 ESSENTIAL HYPERTENSION: ICD-10-CM

## 2022-05-27 DIAGNOSIS — D73.4 SPLENIC CYST: ICD-10-CM

## 2022-05-27 LAB
ALBUMIN SERPL-MCNC: 3.9 G/DL (ref 3.5–5.2)
ALBUMIN/GLOB SERPL: 1.3 G/DL
ALP SERPL-CCNC: 100 U/L (ref 39–117)
ALT SERPL W P-5'-P-CCNC: 24 U/L (ref 1–41)
ANION GAP SERPL CALCULATED.3IONS-SCNC: 5 MMOL/L (ref 5–15)
AST SERPL-CCNC: 26 U/L (ref 1–40)
BILIRUB SERPL-MCNC: 0.4 MG/DL (ref 0–1.2)
BUN SERPL-MCNC: 18 MG/DL (ref 8–23)
BUN/CREAT SERPL: 15.5 (ref 7–25)
CALCIUM SPEC-SCNC: 9.1 MG/DL (ref 8.6–10.5)
CHLORIDE SERPL-SCNC: 103 MMOL/L (ref 98–107)
CHOLEST SERPL-MCNC: 186 MG/DL (ref 0–200)
CO2 SERPL-SCNC: 32 MMOL/L (ref 22–29)
CREAT SERPL-MCNC: 1.16 MG/DL (ref 0.76–1.27)
DEPRECATED RDW RBC AUTO: 46.2 FL (ref 37–54)
EGFRCR SERPLBLD CKD-EPI 2021: 65.7 ML/MIN/1.73
ERYTHROCYTE [DISTWIDTH] IN BLOOD BY AUTOMATED COUNT: 13.5 % (ref 12.3–15.4)
GLOBULIN UR ELPH-MCNC: 3 GM/DL
GLUCOSE SERPL-MCNC: 142 MG/DL (ref 65–99)
HBA1C MFR BLD: 7.3 % (ref 4.8–5.6)
HCT VFR BLD AUTO: 36.6 % (ref 37.5–51)
HDLC SERPL-MCNC: 49 MG/DL (ref 40–60)
HGB BLD-MCNC: 11.9 G/DL (ref 13–17.7)
INR PPP: 2.7 (ref 0.91–1.09)
LDLC SERPL CALC-MCNC: 115 MG/DL (ref 0–100)
LDLC/HDLC SERPL: 2.3 {RATIO}
MCH RBC QN AUTO: 30.3 PG (ref 26.6–33)
MCHC RBC AUTO-ENTMCNC: 32.5 G/DL (ref 31.5–35.7)
MCV RBC AUTO: 93.1 FL (ref 79–97)
PLATELET # BLD AUTO: 159 10*3/MM3 (ref 140–450)
PMV BLD AUTO: 12 FL (ref 6–12)
POTASSIUM SERPL-SCNC: 4.3 MMOL/L (ref 3.5–5.2)
PROT SERPL-MCNC: 6.9 G/DL (ref 6–8.5)
PROTHROMBIN TIME: 32.6 SECONDS (ref 10–13.8)
RBC # BLD AUTO: 3.93 10*6/MM3 (ref 4.14–5.8)
SODIUM SERPL-SCNC: 140 MMOL/L (ref 136–145)
TRIGL SERPL-MCNC: 122 MG/DL (ref 0–150)
VLDLC SERPL-MCNC: 22 MG/DL (ref 5–40)
WBC NRBC COR # BLD: 5.3 10*3/MM3 (ref 3.4–10.8)

## 2022-05-27 PROCEDURE — 82043 UR ALBUMIN QUANTITATIVE: CPT

## 2022-05-27 PROCEDURE — 36415 COLL VENOUS BLD VENIPUNCTURE: CPT

## 2022-05-27 PROCEDURE — 76700 US EXAM ABDOM COMPLETE: CPT

## 2022-05-27 PROCEDURE — 83036 HEMOGLOBIN GLYCOSYLATED A1C: CPT

## 2022-05-27 PROCEDURE — 82570 ASSAY OF URINE CREATININE: CPT

## 2022-05-27 PROCEDURE — 80061 LIPID PANEL: CPT

## 2022-05-27 PROCEDURE — 85027 COMPLETE CBC AUTOMATED: CPT

## 2022-05-27 PROCEDURE — 80053 COMPREHEN METABOLIC PANEL: CPT

## 2022-05-27 PROCEDURE — 85610 PROTHROMBIN TIME: CPT | Performed by: INTERNAL MEDICINE

## 2022-05-28 LAB
ALBUMIN UR-MCNC: 2.1 MG/DL
CREAT UR-MCNC: 213.4 MG/DL
MICROALBUMIN/CREAT UR: 9.8 MG/G

## 2022-06-02 RX ORDER — ATORVASTATIN CALCIUM 20 MG/1
20 TABLET, FILM COATED ORAL DAILY
Qty: 90 TABLET | Refills: 3 | Status: SHIPPED | OUTPATIENT
Start: 2022-06-02 | End: 2023-01-03 | Stop reason: SDUPTHER

## 2022-06-03 ENCOUNTER — TELEPHONE (OUTPATIENT)
Dept: INTERNAL MEDICINE | Facility: CLINIC | Age: 77
End: 2022-06-03

## 2022-06-03 NOTE — TELEPHONE ENCOUNTER
Caller: Jonh Peacock    Relationship: Self    Best call back number: [phone]    What form or medical record are you requesting: CMN FORM FAXED    Who is requesting this form or medical record from you: [facility] [place]    How would you like to receive the form or medical records (pick-up, mail, fax):    PATIENT REQUESTS IT TO BE FAXED TO [facility] AND THAT YOU HAVE FAX NUMBER ON FILE    Timeframe paperwork needed: AS SOON AS POSSIBLE    Additional notes: NO FURTHER NOTES

## 2022-06-21 RX ORDER — METOPROLOL SUCCINATE 100 MG/1
100 TABLET, EXTENDED RELEASE ORAL DAILY
Qty: 90 TABLET | Refills: 3 | Status: SHIPPED | OUTPATIENT
Start: 2022-06-21 | End: 2023-01-03 | Stop reason: SDUPTHER

## 2022-06-29 ENCOUNTER — LAB (OUTPATIENT)
Dept: LAB | Facility: HOSPITAL | Age: 77
End: 2022-06-29

## 2022-06-29 DIAGNOSIS — I48.0 PAROXYSMAL ATRIAL FIBRILLATION: ICD-10-CM

## 2022-06-29 LAB
INR PPP: 2.8 (ref 0.91–1.09)
PROTHROMBIN TIME: 33.3 SECONDS (ref 10–13.8)

## 2022-06-29 PROCEDURE — 85610 PROTHROMBIN TIME: CPT | Performed by: INTERNAL MEDICINE

## 2022-07-21 ENCOUNTER — OFFICE VISIT (OUTPATIENT)
Dept: CARDIOLOGY | Facility: CLINIC | Age: 77
End: 2022-07-21

## 2022-07-21 VITALS
OXYGEN SATURATION: 98 % | WEIGHT: 246 LBS | DIASTOLIC BLOOD PRESSURE: 64 MMHG | HEART RATE: 72 BPM | BODY MASS INDEX: 31.57 KG/M2 | SYSTOLIC BLOOD PRESSURE: 102 MMHG | HEIGHT: 74 IN

## 2022-07-21 DIAGNOSIS — I48.0 PAROXYSMAL ATRIAL FIBRILLATION: Chronic | ICD-10-CM

## 2022-07-21 DIAGNOSIS — I10 ESSENTIAL HYPERTENSION: Chronic | ICD-10-CM

## 2022-07-21 DIAGNOSIS — I42.8 NON-ISCHEMIC CARDIOMYOPATHY: Primary | Chronic | ICD-10-CM

## 2022-07-21 PROCEDURE — 93000 ELECTROCARDIOGRAM COMPLETE: CPT | Performed by: INTERNAL MEDICINE

## 2022-07-21 PROCEDURE — 99214 OFFICE O/P EST MOD 30 MIN: CPT | Performed by: INTERNAL MEDICINE

## 2022-07-21 NOTE — PROGRESS NOTES
Subjective:     Encounter Date:07/21/2022      Patient ID: Jonh Peacock is a 76 y.o. male with nonischemic cardiomyopathy, nonsustained ventricular tachycardia, atrial fibrillation, atrial tachycardia, mitral regurgitation, hypertension and hyperlipidemia who presents today for follow-up.    Chief Complaint: Here for routine follow-up of nonischemic cardiomyopathy/chronic systolic heart failure    HPI: This patient presents today for routine follow-up.  He has a longstanding history of nonischemic cardiomyopathy.  He reports being relatively stable.  He has mild shortness of breath and dyspnea on exertion which are chronic symptoms and unchanged.  Edema is minimized with diuretic therapy.  This does wax and wane to some degree but in general has been well controlled.  The patient denies orthopnea, PND.  His weight has been stable.  The patient denies having any chest pain.  No significant palpitations.  He does have a history of atrial fibrillation.  He remains chronically anticoagulated with Coumadin.  No significant bleeding issues have been noted.  Blood pressure is generally well controlled.  He denies having lightheadedness, dizziness or syncope.  Overall, he says that he seems to be doing well at this time.    Congestive Heart Failure  Presents for follow-up visit. Associated symptoms include shortness of breath. Pertinent negatives include no abdominal pain, chest pain, chest pressure, edema, fatigue, orthopnea, palpitations, paroxysmal nocturnal dyspnea or unexpected weight change. The symptoms have been stable.   Atrial Fibrillation  Presents for follow-up visit. Symptoms include shortness of breath. Symptoms are negative for an AICD problem, chest pain, dizziness, hemodynamic instability, palpitations and syncope. The symptoms have been stable. Past medical history includes atrial fibrillation and CHF.       Current Outpatient Medications:   •  atorvastatin (LIPITOR) 20 MG tablet, Take 1 tablet by  mouth Daily., Disp: 90 tablet, Rfl: 3  •  Buprenorphine 10 MCG/HR patch weekly, Place 10 mcg on the skin Every 7 (Seven) Days., Disp: , Rfl:   •  CHONDROITIN SULFATE PO, Take 1 tablet by mouth 2 (two) times a day., Disp: , Rfl:   •  diazePAM (VALIUM) 2 MG tablet, Take 1 mg by mouth Daily. 1/2 qam 1/2 qpm, Disp: , Rfl:   •  diclofenac (VOLTAREN) 1 % gel gel, Apply 4 g topically to the appropriate area as directed 4 (Four) Times a Day As Needed., Disp: , Rfl:   •  dilTIAZem (CARDIZEM) 30 MG tablet, Take 1 tablet by mouth 3 (Three) Times a Day., Disp: 270 tablet, Rfl: 3  •  furosemide (LASIX) 20 MG tablet, Take 1 tablet by mouth Daily., Disp: 90 tablet, Rfl: 3  •  furosemide (LASIX) 40 MG tablet, Take 1 tablet by mouth Daily. TAKE WITH 20 MG TABLET FOR 60 PER MG PER DAY (Patient taking differently: Take 60 mg by mouth Daily. TAKE WITH 20 MG TABLET FOR 60 PER MG PER DAY), Disp: 90 tablet, Rfl: 3  •  gabapentin (NEURONTIN) 300 MG capsule, Take 400 mg by mouth 2 (Two) Times a Day., Disp: , Rfl:   •  Glucosamine 750 MG tablet, Take 750 mg by mouth 2 (Two) Times a Day., Disp: , Rfl:   •  losartan (COZAAR) 25 MG tablet, TAKE 1 TABLET DAILY, Disp: 90 tablet, Rfl: 3  •  metoprolol succinate XL (TOPROL-XL) 100 MG 24 hr tablet, Take 1 tablet by mouth Daily., Disp: 90 tablet, Rfl: 3  •  Multiple Vitamins-Minerals (MULTIVITAMIN ADULT PO), Take 1 tablet by mouth Daily., Disp: , Rfl:   •  pantoprazole (PROTONIX) 40 MG EC tablet, Take 1 tablet by mouth Daily., Disp: 90 tablet, Rfl: 3  •  polyethylene glycol (MIRALAX) powder, Take 17 g by mouth Every Night., Disp: 1581 g, Rfl: 3  •  tamsulosin (FLOMAX) 0.4 MG capsule 24 hr capsule, TAKE 1 CAPSULE EVERY NIGHT, Disp: 90 capsule, Rfl: 3  •  warfarin (COUMADIN) 2 MG tablet, Take 1 tablet by mouth Daily., Disp: 90 tablet, Rfl: 1  •  warfarin (COUMADIN) 4 MG tablet, Take 1 tablet by mouth Daily., Disp: 90 tablet, Rfl: 1  •  warfarin (COUMADIN) 5 MG tablet, Take 1 tablet by mouth Daily.,  "Disp: 90 tablet, Rfl: 1    Allergies   Allergen Reactions   • Percocet [Oxycodone-Acetaminophen] Urinary Retention   • Amiodarone Other (See Comments)     INTERFERES WITH OTHER MEDICATIONS   • Oxycontin [Oxycodone Hcl] Urinary Retention   • Penicillins Hives     All \"cillins\"   • Vioxx [Rofecoxib] GI Intolerance     Social History     Tobacco Use   • Smoking status: Never Smoker   • Smokeless tobacco: Never Used   Substance Use Topics   • Alcohol use: Not Currently     Review of Systems   Constitutional: Negative for fatigue, fever, unexpected weight change and weight loss.   Cardiovascular: Positive for dyspnea on exertion. Negative for chest pain, leg swelling, orthopnea, palpitations, paroxysmal nocturnal dyspnea and syncope.   Respiratory: Positive for shortness of breath. Negative for cough and wheezing.    Hematologic/Lymphatic: Negative for adenopathy and bleeding problem.   Musculoskeletal: Positive for arthritis and back pain.   Gastrointestinal: Negative for abdominal pain, hematemesis, hematochezia, melena, nausea and vomiting.   Neurological: Negative for dizziness, headaches and loss of balance.       ECG 12 Lead    Date/Time: 7/21/2022 3:19 PM  Performed by: Arun Banks MD  Authorized by: Arun Banks MD   Comparison: compared with previous ECG from 7/21/2021  Similar to previous ECG  Rhythm: paced  Ectopy: unifocal PVCs and atrial premature contractions  Rate: normal  BPM: 79  Other findings: non-specific ST-T wave changes  Pacing capture: atrial paced.  Clinical impression: abnormal EKG             Objective:     Vitals reviewed.   Constitutional:       General: Not in acute distress.     Appearance: Well-developed and not in distress.   Eyes:      Extraocular Movements: Extraocular movements intact.   HENT:      Head: Normocephalic and atraumatic.   Pulmonary:      Effort: Pulmonary effort is normal.      Breath sounds: Normal breath sounds. No wheezing. No rhonchi. No rales. " "  Cardiovascular:      Normal rate. Occasional ectopic beats. Regular rhythm.      Murmurs: There is no murmur.      No gallop.   Pulses:     Intact distal pulses.   Edema:     Peripheral edema absent.   Abdominal:      General: Bowel sounds are normal. There is no distension.      Palpations: Abdomen is soft.      Tenderness: There is no abdominal tenderness.   Musculoskeletal:      Extremities: No clubbing present.Skin:     General: Skin is warm and dry. There is no cyanosis.      Findings: No erythema or rash.   Neurological:      Mental Status: Alert and oriented to person, place, and time.      Cranial Nerves: No cranial nerve deficit.       /64   Pulse 72   Ht 188 cm (74\")   Wt 112 kg (246 lb)   SpO2 98%   BMI 31.58 kg/m²     Data/Lab Review:     I reviewed the patient's most recent device interrogation from January of this year showing that the patient does have a [employer] dual-chamber ICD.  A few short episodes of SVT noted.  No therapies delivered from the device.  Satisfactory battery reserve noted.    Lab Results   Component Value Date    WBC 5.30 05/27/2022    HGB 11.9 (L) 05/27/2022    HCT 36.6 (L) 05/27/2022    MCV 93.1 05/27/2022     05/27/2022     Lab Results   Component Value Date    GLUCOSE 142 (H) 05/27/2022    CALCIUM 9.1 05/27/2022     05/27/2022    K 4.3 05/27/2022    CO2 32.0 (H) 05/27/2022     05/27/2022    BUN 18 05/27/2022    CREATININE 1.16 05/27/2022    EGFRIFAFRI 75 12/01/2021    BCR 15.5 05/27/2022    ANIONGAP 5.0 05/27/2022     Lab Results   Component Value Date    CHOL 186 05/27/2022    CHLPL 220 (H) 07/12/2016    TRIG 122 05/27/2022    HDL 49 05/27/2022     (H) 05/27/2022     ===================================================    Results for orders placed during the hospital encounter of 02/07/19    Adult Transthoracic Echo Complete W/ Cont if Necessary Per Protocol    Interpretation Summary  · Left ventricular systolic function is moderately " decreased. Estimated EF appears to be in the range of 31 - 35%.  · Left ventricular diastolic dysfunction (grade I) consistent with impaired relaxation.  · The left ventricular cavity is moderately dilated.  · Trace tricuspid valve regurgitation is present. Estimated right ventricular systolic pressure from tricuspid regurgitation is mildly elevated (35-45 mmHg).        Assessment:          Diagnosis Plan   1. Non-ischemic cardiomyopathy (HCC)  ECG 12 Lead   2. Paroxysmal atrial fibrillation (HCC)     3. Essential hypertension            Plan:       1.  Chronic systolic congestive heart failure/nonischemic cardiomyopathy: Stable at this time.  The patient shows no evidence of volume overload.  Symptoms are basically New York Heart Association class II.  We continue to follow his ICD.  Medications will be unchanged at this time.     2.  Paroxysmal atrial fibrillation/paroxysmal atrial tachycardia/ventricular tachycardia: Clinically stable at this time with no significant arrhythmias noted.  The patient remains chronically anticoagulated due to a history of atrial fibrillation.  He has followed with Dr. Chavez.     3.  Essential hypertension: Blood pressure remains well controlled at this time.  Continue current medications.     As the patient is quite stable at this time, we will continue to follow him at 12-month intervals unless otherwise needed sooner.

## 2022-07-28 ENCOUNTER — LAB (OUTPATIENT)
Dept: LAB | Facility: HOSPITAL | Age: 77
End: 2022-07-28

## 2022-07-28 DIAGNOSIS — I48.0 PAROXYSMAL ATRIAL FIBRILLATION: ICD-10-CM

## 2022-07-28 LAB
INR PPP: 2.6 (ref 0.91–1.09)
PROTHROMBIN TIME: 30.6 SECONDS (ref 10–13.8)

## 2022-07-28 PROCEDURE — 85610 PROTHROMBIN TIME: CPT | Performed by: INTERNAL MEDICINE

## 2022-08-09 NOTE — PROGRESS NOTES
[facility] - PODIATRY    Today's Date: 08/11/22    Patient Name: Jonh Peacock  MRN: [id number]  CSN: [id number]  PCP: Ric Antony DO  Referring Provider: No ref. provider found     SUBJECTIVE     Chief Complaint   Patient presents with   • Follow-up     Jesus Manuel Guevara MD PCP11/29/2021 Return in about 3 months diabetic nail care- pt states feet doing good, needs nail care- pt denies pain- pt presents with long nails   • Diabetes     Was just diagnosed and hasnt started checking       HPI: Jonh Peacock, a 76 y.o.male, comes to clinic as a(n) established patient complaining of thick fungal toenails. Pt with h/o Anxiety, Arrhythmia, Cardiomyopathy, CHF, Depression, previous DVT, HTN, Obesity, Anticoagulation with Warfarin. States that was recently diagnosed as diabetic but has not started checking blood glucose levels routinely. Last A1C was 7.3%. Denies pain today. Continues use of rollator for ambulation. Continues daily coumadin therapy. Notes improvement to feet with regular visits. Denies any constitutional symptoms. No other pedal complaints at this time.    Past Medical History:   Diagnosis Date   • Anemia    • Anxiety    • Arrhythmia    • Arthritis    • Atrial fibrillation (HCC)    • Cardiomyopathy (HCC)    • CHF (congestive heart failure) (HCC)    • Colon polyp    • Colostomy present (HCC)     10years   • Connective tissue disease (HCC)    • Depression    • Depression    • Diverticulitis    • Dizzy    • DVT (deep venous thrombosis) (HCC)    • Gastritis    • GERD (gastroesophageal reflux disease)    • Hiatal hernia    • History of transfusion    • Hyperlipidemia    • Hypertension    • Neuropathy    • Pneumonia      Past Surgical History:   Procedure Laterality Date   • APPENDECTOMY     • BACK SURGERY  2002, 2007    Multiple spine surgeries - laminectomy & fusion   • CARDIAC CATHETERIZATION     • CARDIAC DEFIBRILLATOR PLACEMENT     • CARDIAC ELECTROPHYSIOLOGY PROCEDURE N/A  "10/31/2019    Procedure: ICD GEN CHANGE;  Surgeon: Chaz Chavez MD;  Location: [facility] CATH INVASIVE LOCATION;  Service: Cardiology   • COLON RESECTION WITH COLOSTOMY     • COLONOSCOPY  09/04/2014    diverticulosis   • COLONOSCOPY N/A 10/29/2019    Procedure: COLONOSCOPY WITH ANESTHESIA;  Surgeon: Tenzin Abrams MD;  Location: [facility] ENDOSCOPY;  Service: Gastroenterology   • ENDOSCOPY  02/24/1999    small sliding hiatal hernia   • FOOT SURGERY Bilateral 2005   • JOINT REPLACEMENT Right 2002    KNEE   • PACEMAKER IMPLANTATION  2012   • RECTAL FISSURE INCISION AND DRAINAGE     • REPLACEMENT TOTAL KNEE     • SPINAL CORD STIMULATOR IMPLANT  08/2012    Placed by Dr. DEENA Villela, [employer]   • THORACIC LAMINECTOMY WITH PLACEMENT OF DORSAL COLUMN STIMULATOR Right 1/28/2021    Procedure: SPINAL CORD STIMULATOR REVISION, right buttocks;  Surgeon: Noah Oliveros MD;  Location: [facility] OR;  Service: Neurosurgery;  Laterality: Right;   • TOTAL HIP ARTHROPLASTY Right 2004   • TOTAL HIP ARTHROPLASTY Left 12/2008     Family History   Problem Relation Age of Onset   • Heart disease Mother    • Heart disease Brother    • Breast cancer Brother    • Colon cancer Neg Hx    • Colon polyps Neg Hx      Social History     Socioeconomic History   • Marital status:    Tobacco Use   • Smoking status: Never Smoker   • Smokeless tobacco: Never Used   Vaping Use   • Vaping Use: Never used   Substance and Sexual Activity   • Alcohol use: Not Currently   • Drug use: Never   • Sexual activity: Defer     Allergies   Allergen Reactions   • Percocet [Oxycodone-Acetaminophen] Urinary Retention   • Amiodarone Other (See Comments)     INTERFERES WITH OTHER MEDICATIONS   • Oxycontin [Oxycodone Hcl] Urinary Retention   • Penicillins Hives     All \"cillins\"   • Vioxx [Rofecoxib] GI Intolerance     Current Outpatient Medications   Medication Sig Dispense Refill   • atorvastatin (LIPITOR) 20 MG tablet Take 1 tablet by mouth Daily. 90 " tablet 3   • Buprenorphine 10 MCG/HR patch weekly Place 10 mcg on the skin Every 7 (Seven) Days.     • CHONDROITIN SULFATE PO Take 1 tablet by mouth 2 (two) times a day.     • diazePAM (VALIUM) 2 MG tablet Take 1 mg by mouth Daily. 1/2 qam 1/2 qpm     • diclofenac (VOLTAREN) 1 % gel gel Apply 4 g topically to the appropriate area as directed 4 (Four) Times a Day As Needed.     • dilTIAZem (CARDIZEM) 30 MG tablet Take 1 tablet by mouth 3 (Three) Times a Day. 270 tablet 3   • furosemide (LASIX) 20 MG tablet Take 1 tablet by mouth Daily. 90 tablet 3   • furosemide (LASIX) 40 MG tablet Take 1 tablet by mouth Daily. TAKE WITH 20 MG TABLET FOR 60 PER MG PER DAY (Patient taking differently: Take 60 mg by mouth Daily. TAKE WITH 20 MG TABLET FOR 60 PER MG PER DAY) 90 tablet 3   • gabapentin (NEURONTIN) 300 MG capsule Take 400 mg by mouth 2 (Two) Times a Day.     • Glucosamine 750 MG tablet Take 750 mg by mouth 2 (Two) Times a Day.     • losartan (COZAAR) 25 MG tablet TAKE 1 TABLET DAILY 90 tablet 3   • metoprolol succinate XL (TOPROL-XL) 100 MG 24 hr tablet Take 1 tablet by mouth Daily. 90 tablet 3   • Multiple Vitamins-Minerals (MULTIVITAMIN ADULT PO) Take 1 tablet by mouth Daily.     • pantoprazole (PROTONIX) 40 MG EC tablet Take 1 tablet by mouth Daily. 90 tablet 3   • polyethylene glycol (MIRALAX) powder Take 17 g by mouth Every Night. 1581 g 3   • tamsulosin (FLOMAX) 0.4 MG capsule 24 hr capsule TAKE 1 CAPSULE EVERY NIGHT 90 capsule 3   • warfarin (COUMADIN) 2 MG tablet Take 1 tablet by mouth Daily. 90 tablet 1   • warfarin (COUMADIN) 4 MG tablet Take 1 tablet by mouth Daily. 90 tablet 1   • warfarin (COUMADIN) 5 MG tablet Take 1 tablet by mouth Daily. 90 tablet 1     No current facility-administered medications for this visit.     Review of Systems   Constitutional: Negative for chills and fever.   HENT: Negative for congestion.    Respiratory: Negative for shortness of breath.    Cardiovascular: Positive for leg  swelling. Negative for chest pain.   Gastrointestinal: Negative for constipation, diarrhea, nausea and vomiting.   Musculoskeletal: Positive for gait problem.        Foot pain   Skin: Negative for wound.        Thick Toenails   Neurological: Positive for numbness.   Hematological: Bruises/bleeds easily.       OBJECTIVE     Vitals:    08/11/22 1538   BP: 128/62   Pulse: 68   SpO2: 97%       PHYSICAL EXAM  GEN:   Accompanied by wife.    Foot/Ankle Exam:       General:   Appearance: appears stated age and healthy    Orientation: AAOx3    Affect: appropriate    Gait: unimpaired    Assistance: walker    Shoe Gear:  Casual shoes (left shoe with lift)    VASCULAR      Right Foot Vascularity   Dorsalis pedis:  1+  Posterior tibial:  1+  Skin Temperature: warm    Edema Grading:  Trace and non-pitting  CFT:  4  Pedal Hair Growth:  Absent  Varicosities: none       Left Foot Vascularity   Dorsalis pedis:  1+  Posterior tibial:  1+  Skin Temperature: warm    Edema Grading:  None, trace and non-pitting  CFT:  4  Pedal Hair Growth:  Absent  Varicosities: none        NEUROLOGIC     Right Foot Neurologic   Light touch sensation:  Diminished  Vibratory sensation:  Diminished  Hot/Cold sensation: diminished    Protective Sensation using Lewellen-Akhil Monofilament:  7     Left Foot Neurologic   Light touch sensation:  Diminished  Vibratory sensation:  Diminished  Hot/cold sensation: diminished    Protective Sensation using Lewellen-Akhil Monofilament:  7     MUSCULOSKELETAL      Right Foot Musculoskeletal   Ecchymosis:  None  Tenderness: toenails    Arch:  Pes planus  Hallux valgus: No       Left Foot Musculoskeletal   Ecchymosis:  None  Tenderness: toenails    Arch:  Pes planus  Hallux valgus: No       MUSCLE STRENGTH     Right Foot Muscle Strength   Foot dorsiflexion:  5  Foot plantar flexion:  5  Foot inversion:  5  Foot eversion:  5     Left Foot Muscle Strength   Foot dorsiflexion:  5  Foot plantar flexion:  5  Foot  inversion:  5  Foot eversion:  5     RANGE OF MOTION      Right Foot Range of Motion   Foot and ankle ROM within normal limits       Left Foot Range of Motion   Foot and ankle ROM within normal limits       DERMATOLOGIC     Right Foot Dermatologic   Skin: skin intact    Nails: onychomycosis, abnormally thick, subungual debris and dystrophic nails       Left Foot Dermatologic   Skin: skin intact    Nails: onychomycosis, abnormally thick, subungual debris and dystrophic nails         RADIOLOGY/NUCLEAR:  No results found.    LABORATORY/CULTURE RESULTS:      PATHOLOGY RESULTS:       ASSESSMENT/PLAN     Diagnoses and all orders for this visit:    1. Onychomycosis (Primary)    2. Type 2 diabetes mellitus with diabetic neuropathy, without long-term current use of insulin (HCC)    3. Anticoagulant long-term use    4. Peripheral edema      Comprehensive lower extremity examination and evaluation was performed.  Discussed findings and treatment plan including risks, benefits, and treatment options with patient in detail. Patient agreed with treatment plan.  After verbal consent obtained, nail(s) x10 debrided of length and thickness with nail nipper without incidence  Patient may maintain nails and calluses at home utilizing emery board or pumice stone between visits as needed  Reviewed at home diabetic foot care including daily foot checks   Continue shoes with custom inserts  Elevate legs when possible.  Follow with PCP for management of DM.    An After Visit Summary was printed and given to the patient at discharge, including (if requested) any available informative/educational handouts regarding diagnosis, treatment, or medications. All questions were answered to patient/family satisfaction. Should symptoms fail to improve or worsen they agree to call or return to clinic or to go to the Emergency Department. Discussed the importance of following up with any needed screening tests/labs/specialist appointments and any  requested follow-up recommended by me today. Importance of maintaining follow-up discussed and patient accepts that missed appointments can delay diagnosis and potentially lead to worsening of conditions.  Return in about 3 months (around 11/11/2022)., or sooner if acute issues arise.        This document has been electronically signed by Deion Avelar DPM on August 11, 2022 15:59 CDT

## 2022-08-11 ENCOUNTER — OFFICE VISIT (OUTPATIENT)
Dept: PODIATRY | Facility: CLINIC | Age: 77
End: 2022-08-11

## 2022-08-11 VITALS
OXYGEN SATURATION: 97 % | HEIGHT: 74 IN | WEIGHT: 250 LBS | DIASTOLIC BLOOD PRESSURE: 62 MMHG | SYSTOLIC BLOOD PRESSURE: 128 MMHG | HEART RATE: 68 BPM | BODY MASS INDEX: 32.08 KG/M2

## 2022-08-11 DIAGNOSIS — R60.9 PERIPHERAL EDEMA: ICD-10-CM

## 2022-08-11 DIAGNOSIS — B35.1 ONYCHOMYCOSIS: Primary | ICD-10-CM

## 2022-08-11 DIAGNOSIS — Z79.01 ANTICOAGULANT LONG-TERM USE: ICD-10-CM

## 2022-08-11 DIAGNOSIS — E11.40 TYPE 2 DIABETES MELLITUS WITH DIABETIC NEUROPATHY, WITHOUT LONG-TERM CURRENT USE OF INSULIN: ICD-10-CM

## 2022-08-11 PROCEDURE — 11721 DEBRIDE NAIL 6 OR MORE: CPT | Performed by: PODIATRIST

## 2022-08-29 ENCOUNTER — LAB (OUTPATIENT)
Dept: LAB | Facility: HOSPITAL | Age: 77
End: 2022-08-29

## 2022-08-29 LAB
INR PPP: 2.3 (ref 0.91–1.09)
PROTHROMBIN TIME: 27.7 SECONDS (ref 10–13.8)

## 2022-08-29 PROCEDURE — 85610 PROTHROMBIN TIME: CPT | Performed by: INTERNAL MEDICINE

## 2022-09-19 PROCEDURE — 93296 REM INTERROG EVL PM/IDS: CPT | Performed by: INTERNAL MEDICINE

## 2022-09-19 PROCEDURE — 93295 DEV INTERROG REMOTE 1/2/MLT: CPT | Performed by: INTERNAL MEDICINE

## 2022-09-28 ENCOUNTER — LAB (OUTPATIENT)
Dept: LAB | Facility: HOSPITAL | Age: 77
End: 2022-09-28

## 2022-09-28 DIAGNOSIS — I48.0 PAROXYSMAL ATRIAL FIBRILLATION: ICD-10-CM

## 2022-09-28 DIAGNOSIS — Z79.01 ANTICOAGULANT LONG-TERM USE: Primary | ICD-10-CM

## 2022-09-28 LAB
INR PPP: 3.1 (ref 0.91–1.09)
PROTHROMBIN TIME: 37 SECONDS (ref 10–13.8)

## 2022-09-28 PROCEDURE — 85610 PROTHROMBIN TIME: CPT | Performed by: INTERNAL MEDICINE

## 2022-10-17 ENCOUNTER — TELEPHONE (OUTPATIENT)
Dept: INTERNAL MEDICINE | Facility: CLINIC | Age: 77
End: 2022-10-17

## 2022-10-27 ENCOUNTER — TELEPHONE (OUTPATIENT)
Dept: UROLOGY | Facility: CLINIC | Age: 77
End: 2022-10-27

## 2022-10-27 ENCOUNTER — LAB (OUTPATIENT)
Dept: LAB | Facility: HOSPITAL | Age: 77
End: 2022-10-27

## 2022-10-27 DIAGNOSIS — I48.0 PAROXYSMAL ATRIAL FIBRILLATION: ICD-10-CM

## 2022-10-27 LAB
INR PPP: 2.6 (ref 0.91–1.09)
PROTHROMBIN TIME: 31.5 SECONDS (ref 10–13.8)

## 2022-10-27 PROCEDURE — 85610 PROTHROMBIN TIME: CPT | Performed by: INTERNAL MEDICINE

## 2022-11-01 DIAGNOSIS — N40.1 BENIGN NON-NODULAR PROSTATIC HYPERPLASIA WITH LOWER URINARY TRACT SYMPTOMS: Primary | ICD-10-CM

## 2022-11-14 ENCOUNTER — TELEPHONE (OUTPATIENT)
Dept: PODIATRY | Facility: CLINIC | Age: 77
End: 2022-11-14

## 2022-11-15 ENCOUNTER — OFFICE VISIT (OUTPATIENT)
Dept: PODIATRY | Facility: CLINIC | Age: 77
End: 2022-11-15

## 2022-11-15 VITALS
OXYGEN SATURATION: 98 % | WEIGHT: 252.8 LBS | SYSTOLIC BLOOD PRESSURE: 117 MMHG | DIASTOLIC BLOOD PRESSURE: 77 MMHG | HEART RATE: 73 BPM | BODY MASS INDEX: 32.44 KG/M2 | HEIGHT: 74 IN

## 2022-11-15 DIAGNOSIS — R60.9 PERIPHERAL EDEMA: ICD-10-CM

## 2022-11-15 DIAGNOSIS — B35.1 ONYCHOMYCOSIS: Primary | ICD-10-CM

## 2022-11-15 DIAGNOSIS — Z79.01 ANTICOAGULANT LONG-TERM USE: ICD-10-CM

## 2022-11-15 DIAGNOSIS — E11.40 TYPE 2 DIABETES MELLITUS WITH DIABETIC NEUROPATHY, WITHOUT LONG-TERM CURRENT USE OF INSULIN: ICD-10-CM

## 2022-11-15 PROCEDURE — 11721 DEBRIDE NAIL 6 OR MORE: CPT | Performed by: PODIATRIST

## 2022-11-17 ENCOUNTER — OFFICE VISIT (OUTPATIENT)
Dept: INTERNAL MEDICINE | Facility: CLINIC | Age: 77
End: 2022-11-17

## 2022-11-17 VITALS
HEIGHT: 74 IN | RESPIRATION RATE: 16 BRPM | OXYGEN SATURATION: 98 % | BODY MASS INDEX: 32.4 KG/M2 | WEIGHT: 252.5 LBS | SYSTOLIC BLOOD PRESSURE: 118 MMHG | DIASTOLIC BLOOD PRESSURE: 74 MMHG | HEART RATE: 68 BPM | TEMPERATURE: 97.6 F

## 2022-11-17 DIAGNOSIS — I10 ESSENTIAL HYPERTENSION: Chronic | ICD-10-CM

## 2022-11-17 DIAGNOSIS — N40.0 BENIGN PROSTATIC HYPERPLASIA WITHOUT LOWER URINARY TRACT SYMPTOMS: ICD-10-CM

## 2022-11-17 DIAGNOSIS — E11.42 TYPE 2 DIABETES MELLITUS WITH DIABETIC POLYNEUROPATHY, WITHOUT LONG-TERM CURRENT USE OF INSULIN: Primary | ICD-10-CM

## 2022-11-17 DIAGNOSIS — E66.09 CLASS 1 OBESITY DUE TO EXCESS CALORIES WITH SERIOUS COMORBIDITY AND BODY MASS INDEX (BMI) OF 32.0 TO 32.9 IN ADULT: ICD-10-CM

## 2022-11-17 DIAGNOSIS — G56.93 BILATERAL NEUROPATHY OF UPPER EXTREMITIES: ICD-10-CM

## 2022-11-17 DIAGNOSIS — Z12.5 ENCOUNTER FOR PROSTATE CANCER SCREENING: ICD-10-CM

## 2022-11-17 LAB — HBA1C MFR BLD: 7.3 %

## 2022-11-17 PROCEDURE — 3051F HG A1C>EQUAL 7.0%<8.0%: CPT | Performed by: INTERNAL MEDICINE

## 2022-11-17 PROCEDURE — 99214 OFFICE O/P EST MOD 30 MIN: CPT | Performed by: INTERNAL MEDICINE

## 2022-11-17 PROCEDURE — 83036 HEMOGLOBIN GLYCOSYLATED A1C: CPT | Performed by: INTERNAL MEDICINE

## 2022-11-17 NOTE — PROGRESS NOTES
CC: f/u diabetes    History:  Jonh Peacock is a 76 y.o. male   He notes he has been doing well without any acute illness.  He does still does have some intermittent symptoms of neuropathy in his hands especially, but this is not constant.       ROS:  Review of Systems   Constitutional: Negative for chills and fever.   Respiratory: Negative for cough and shortness of breath.    Cardiovascular: Negative for chest pain and palpitations.   Gastrointestinal: Negative for abdominal pain and constipation.   Genitourinary: Negative for difficulty urinating and dysuria.   Neurological: Positive for numbness.        reports that he has never smoked. He has never been exposed to tobacco smoke. He has never used smokeless tobacco. He reports that he does not currently use alcohol. He reports that he does not use drugs.      Current Outpatient Medications:   •  atorvastatin (LIPITOR) 20 MG tablet, Take 1 tablet by mouth Daily., Disp: 90 tablet, Rfl: 3  •  Buprenorphine 10 MCG/HR patch weekly, Place 10 mcg on the skin Every 7 (Seven) Days., Disp: , Rfl:   •  CHONDROITIN SULFATE PO, Take 1 tablet by mouth 2 (two) times a day., Disp: , Rfl:   •  diazePAM (VALIUM) 2 MG tablet, Take 1 mg by mouth Daily. 1/2 qam 1/2 qpm, Disp: , Rfl:   •  diclofenac (VOLTAREN) 1 % gel gel, Apply 4 g topically to the appropriate area as directed 4 (Four) Times a Day As Needed., Disp: , Rfl:   •  dilTIAZem (CARDIZEM) 30 MG tablet, Take 1 tablet by mouth 3 (Three) Times a Day., Disp: 270 tablet, Rfl: 3  •  furosemide (LASIX) 20 MG tablet, Take 1 tablet by mouth Daily., Disp: 90 tablet, Rfl: 3  •  furosemide (LASIX) 40 MG tablet, Take 1 tablet by mouth Daily. TAKE WITH 20 MG TABLET FOR 60 PER MG PER DAY (Patient taking differently: Take 60 mg by mouth Daily. TAKE WITH 20 MG TABLET FOR 60 PER MG PER DAY), Disp: 90 tablet, Rfl: 3  •  gabapentin (NEURONTIN) 300 MG capsule, Take 400 mg by mouth 2 (Two) Times a Day., Disp: , Rfl:   •  Glucosamine 750 MG  "tablet, Take 750 mg by mouth 2 (Two) Times a Day., Disp: , Rfl:   •  losartan (COZAAR) 25 MG tablet, TAKE 1 TABLET DAILY, Disp: 90 tablet, Rfl: 3  •  metoprolol succinate XL (TOPROL-XL) 100 MG 24 hr tablet, Take 1 tablet by mouth Daily., Disp: 90 tablet, Rfl: 3  •  Multiple Vitamins-Minerals (MULTIVITAMIN ADULT PO), Take 1 tablet by mouth Daily., Disp: , Rfl:   •  pantoprazole (PROTONIX) 40 MG EC tablet, Take 1 tablet by mouth Daily., Disp: 90 tablet, Rfl: 3  •  polyethylene glycol (MIRALAX) powder, Take 17 g by mouth Every Night., Disp: 1581 g, Rfl: 3  •  tamsulosin (FLOMAX) 0.4 MG capsule 24 hr capsule, TAKE 1 CAPSULE EVERY NIGHT, Disp: 90 capsule, Rfl: 3  •  warfarin (COUMADIN) 2 MG tablet, Take 1 tablet by mouth Daily., Disp: 90 tablet, Rfl: 1  •  warfarin (COUMADIN) 4 MG tablet, Take 1 tablet by mouth Daily., Disp: 90 tablet, Rfl: 1  •  warfarin (COUMADIN) 5 MG tablet, Take 1 tablet by mouth Daily., Disp: 90 tablet, Rfl: 1    OBJECTIVE:  /74 (BP Location: Left arm, Patient Position: Sitting, Cuff Size: Adult)   Pulse 68   Temp 97.6 °F (36.4 °C)   Resp 16   Ht 188 cm (74\")   Wt 115 kg (252 lb 8 oz)   SpO2 98%   BMI 32.42 kg/m²    Physical Exam  Constitutional:       General: He is not in acute distress.  Cardiovascular:      Rate and Rhythm: Normal rate and regular rhythm.      Heart sounds: Normal heart sounds. No murmur heard.  Pulmonary:      Effort: Pulmonary effort is normal.      Breath sounds: Normal breath sounds. No wheezing.   Neurological:      Mental Status: He is alert and oriented to person, place, and time.      Gait: Gait normal.      Comments: Negative Tinel's over the medial nerve and negative Phalen's bilaterally.    Psychiatric:         Mood and Affect: Mood normal.         Behavior: Behavior normal.         Assessment/Plan     Diagnoses and all orders for this visit:    1. Type 2 diabetes mellitus with diabetic polyneuropathy, without long-term current use of insulin (HCC) " (Primary)  -     POCT glycated hemoglobin, total  -     CBC (No Diff); Future  -     Comprehensive Metabolic Panel; Future  -     Hemoglobin A1c; Future  -     Lipid Panel; Future  -     Microalbumin / Creatinine Urine Ratio - Urine, Clean Catch; Future  A1c well controlled at 7.3% in the office.  We will plan on labs per vein prior to his next visit.  He may continue on diet management only.    2. Essential hypertension  -     CBC (No Diff); Future  Well controlled, BP goal for age is <140/90 per JNC 8 guidelines and continue current medications    3. Class 1 obesity due to excess calories with serious comorbidity and body mass index (BMI) of 32.0 to 32.9 in adult  BMI is >= 30 and <35. (Class 1 Obesity). The following options were offered after discussion;: exercise counseling/recommendations and nutrition counseling/recommendations    4. Encounter for prostate cancer screening  5. Benign prostatic hyperplasia without lower urinary tract symptoms  -     PSA DIAGNOSTIC; Future  PSA monitoring.  We did discuss that after the age of 76, things could be discontinued and he understands this for the future.    6. Bilateral neuropathy of upper extremities  He does not feel it is bad enough to warrant next steps of EMG/NCS, but will notify us if worsening. No apparent evidence on exam of local compression of the median nerve, so could suspect some more proximal causes.      An After Visit Summary was printed and given to the patient at discharge.  Return in about 6 months (around 5/17/2023) for Medicare Wellness.          Ric Antony D.O. 11/17/2022   Electronically signed.

## 2022-11-22 ENCOUNTER — TELEPHONE (OUTPATIENT)
Dept: INTERNAL MEDICINE | Facility: CLINIC | Age: 77
End: 2022-11-22

## 2022-11-22 DIAGNOSIS — Z20.828 EXPOSURE TO THE FLU: Primary | ICD-10-CM

## 2022-11-22 RX ORDER — OSELTAMIVIR PHOSPHATE 75 MG/1
75 CAPSULE ORAL DAILY
Qty: 7 CAPSULE | Refills: 0 | Status: SHIPPED | OUTPATIENT
Start: 2022-11-22 | End: 2022-11-29

## 2022-11-22 RX ORDER — OSELTAMIVIR PHOSPHATE 75 MG/1
75 CAPSULE ORAL DAILY
Qty: 7 CAPSULE | Refills: 0 | Status: SHIPPED | OUTPATIENT
Start: 2022-11-22 | End: 2022-11-22 | Stop reason: SDUPTHER

## 2022-11-22 NOTE — TELEPHONE ENCOUNTER
Patient called he states that his wife has Flu A & was wanting Tamiflu to help prevent him from getting the flu with condition please.

## 2022-11-23 ENCOUNTER — TELEPHONE (OUTPATIENT)
Dept: INTERNAL MEDICINE | Facility: CLINIC | Age: 77
End: 2022-11-23

## 2022-11-23 DIAGNOSIS — Z20.828 EXPOSURE TO THE FLU: Primary | ICD-10-CM

## 2022-11-23 RX ORDER — OSELTAMIVIR PHOSPHATE 75 MG/1
75 CAPSULE ORAL DAILY
Qty: 10 CAPSULE | Refills: 0 | Status: SHIPPED | OUTPATIENT
Start: 2022-11-23 | End: 2022-12-03

## 2022-11-30 ENCOUNTER — LAB (OUTPATIENT)
Dept: LAB | Facility: HOSPITAL | Age: 77
End: 2022-11-30

## 2022-11-30 DIAGNOSIS — I48.0 PAROXYSMAL ATRIAL FIBRILLATION: Primary | ICD-10-CM

## 2022-11-30 LAB
INR PPP: 2.8 (ref 0.91–1.09)
PROTHROMBIN TIME: 34 SECONDS (ref 10–13.8)

## 2022-11-30 PROCEDURE — 85610 PROTHROMBIN TIME: CPT | Performed by: INTERNAL MEDICINE

## 2022-11-30 PROCEDURE — 84153 ASSAY OF PSA TOTAL: CPT | Performed by: PHYSICIAN ASSISTANT

## 2022-12-07 ENCOUNTER — OFFICE VISIT (OUTPATIENT)
Dept: INTERNAL MEDICINE | Facility: CLINIC | Age: 77
End: 2022-12-07

## 2022-12-07 ENCOUNTER — HOSPITAL ENCOUNTER (OUTPATIENT)
Dept: GENERAL RADIOLOGY | Facility: HOSPITAL | Age: 77
Discharge: HOME | End: 2022-12-07

## 2022-12-07 ENCOUNTER — TELEPHONE (OUTPATIENT)
Dept: INTERNAL MEDICINE | Facility: CLINIC | Age: 77
End: 2022-12-07

## 2022-12-07 VITALS
OXYGEN SATURATION: 97 % | DIASTOLIC BLOOD PRESSURE: 52 MMHG | HEIGHT: 74 IN | WEIGHT: 252 LBS | RESPIRATION RATE: 16 BRPM | BODY MASS INDEX: 32.34 KG/M2 | SYSTOLIC BLOOD PRESSURE: 102 MMHG | TEMPERATURE: 98 F | HEART RATE: 61 BPM

## 2022-12-07 DIAGNOSIS — M54.2 NECK PAIN: ICD-10-CM

## 2022-12-07 DIAGNOSIS — M54.2 NECK PAIN: Primary | ICD-10-CM

## 2022-12-07 DIAGNOSIS — M79.12 STERNOCLEIDOMASTOID MUSCLE TENDERNESS: ICD-10-CM

## 2022-12-07 DIAGNOSIS — G44.209 ACUTE NON INTRACTABLE TENSION-TYPE HEADACHE: ICD-10-CM

## 2022-12-07 PROCEDURE — 99213 OFFICE O/P EST LOW 20 MIN: CPT | Performed by: NURSE PRACTITIONER

## 2022-12-07 PROCEDURE — 72072 X-RAY EXAM THORAC SPINE 3VWS: CPT

## 2022-12-07 PROCEDURE — 72040 X-RAY EXAM NECK SPINE 2-3 VW: CPT

## 2022-12-07 RX ORDER — METHOCARBAMOL 500 MG/1
500 TABLET, FILM COATED ORAL 4 TIMES DAILY
Qty: 20 TABLET | Refills: 1 | Status: SHIPPED | OUTPATIENT
Start: 2022-12-07 | End: 2022-12-07 | Stop reason: SDUPTHER

## 2022-12-07 RX ORDER — NAPROXEN 500 MG/1
500 TABLET ORAL 2 TIMES DAILY WITH MEALS
Qty: 10 TABLET | Refills: 0 | Status: SHIPPED | OUTPATIENT
Start: 2022-12-07 | End: 2022-12-14 | Stop reason: SDUPTHER

## 2022-12-07 RX ORDER — HYDROCODONE BITARTRATE AND ACETAMINOPHEN 7.5; 325 MG/1; MG/1
1 TABLET ORAL EVERY 6 HOURS PRN
COMMUNITY

## 2022-12-07 RX ORDER — METHOCARBAMOL 500 MG/1
500 TABLET, FILM COATED ORAL 4 TIMES DAILY
Qty: 20 TABLET | Refills: 1 | Status: SHIPPED | OUTPATIENT
Start: 2022-12-07 | End: 2022-12-13 | Stop reason: SDUPTHER

## 2022-12-07 NOTE — PROGRESS NOTES
CC: neck pain and headache     HPI     Drew C Vanderford is a 76 y.o. male presents for the above problem. About 10 days ago, he helped his wife take a bath while she was sick. Since this day, he has had worsening neck pain that radiates to his head. He is unable to raise his head without having pain. This is making it difficult for him to swallow fluids due to positioning. The pain causes right sided headaches. He is in his wheel chair today as he feels unsteady. He does has some mild tingling to his hands but this is not new since this injury. He denies fever or infectious symptoms.         ROS:  Review of Systems   Musculoskeletal: Positive for myalgias and neck pain.   Neurological: Positive for headaches.          reports that he has never smoked. He has never been exposed to tobacco smoke. He has never used smokeless tobacco. He reports that he does not currently use alcohol. He reports that he does not use drugs.    Current Outpatient Medications:   •  atorvastatin (LIPITOR) 20 MG tablet, Take 1 tablet by mouth Daily., Disp: 90 tablet, Rfl: 3  •  Buprenorphine 10 MCG/HR patch weekly, Place 10 mcg on the skin Every 7 (Seven) Days., Disp: , Rfl:   •  CHONDROITIN SULFATE PO, Take 1 tablet by mouth 2 (two) times a day., Disp: , Rfl:   •  diazePAM (VALIUM) 2 MG tablet, Take 1 mg by mouth Daily. 1/2 qam 1/2 qpm, Disp: , Rfl:   •  diclofenac (VOLTAREN) 1 % gel gel, Apply 4 g topically to the appropriate area as directed 4 (Four) Times a Day As Needed., Disp: , Rfl:   •  dilTIAZem (CARDIZEM) 30 MG tablet, Take 1 tablet by mouth 3 (Three) Times a Day., Disp: 270 tablet, Rfl: 3  •  furosemide (LASIX) 20 MG tablet, Take 1 tablet by mouth Daily., Disp: 90 tablet, Rfl: 3  •  furosemide (LASIX) 40 MG tablet, Take 1 tablet by mouth Daily. TAKE WITH 20 MG TABLET FOR 60 PER MG PER DAY (Patient taking differently: Take 60 mg by mouth Daily. TAKE WITH 20 MG TABLET FOR 60 PER MG PER DAY), Disp: 90 tablet, Rfl: 3  •  gabapentin  "(NEURONTIN) 300 MG capsule, Take 400 mg by mouth 2 (Two) Times a Day., Disp: , Rfl:   •  Glucosamine 750 MG tablet, Take 750 mg by mouth 2 (Two) Times a Day., Disp: , Rfl:   •  HYDROcodone-acetaminophen (NORCO) 7.5-325 MG per tablet, Take 1 tablet by mouth Every 6 (Six) Hours As Needed for Moderate Pain., Disp: , Rfl:   •  losartan (COZAAR) 25 MG tablet, TAKE 1 TABLET DAILY, Disp: 90 tablet, Rfl: 3  •  methocarbamol (Robaxin) 500 MG tablet, Take 1 tablet by mouth 4 (Four) Times a Day., Disp: 20 tablet, Rfl: 1  •  metoprolol succinate XL (TOPROL-XL) 100 MG 24 hr tablet, Take 1 tablet by mouth Daily., Disp: 90 tablet, Rfl: 3  •  Multiple Vitamins-Minerals (MULTIVITAMIN ADULT PO), Take 1 tablet by mouth Daily., Disp: , Rfl:   •  pantoprazole (PROTONIX) 40 MG EC tablet, Take 1 tablet by mouth Daily., Disp: 90 tablet, Rfl: 3  •  polyethylene glycol (MIRALAX) powder, Take 17 g by mouth Every Night., Disp: 1581 g, Rfl: 3  •  tamsulosin (FLOMAX) 0.4 MG capsule 24 hr capsule, TAKE 1 CAPSULE EVERY NIGHT, Disp: 90 capsule, Rfl: 3  •  warfarin (COUMADIN) 2 MG tablet, Take 1 tablet by mouth Daily., Disp: 90 tablet, Rfl: 1  •  warfarin (COUMADIN) 4 MG tablet, Take 1 tablet by mouth Daily., Disp: 90 tablet, Rfl: 1  •  warfarin (COUMADIN) 5 MG tablet, Take 1 tablet by mouth Daily., Disp: 90 tablet, Rfl: 1    OBJECTIVE:  /52 (BP Location: Left arm, Patient Position: Sitting, Cuff Size: Small Adult)   Pulse 61   Temp 98 °F (36.7 °C)   Resp 16   Ht 188 cm (74\")   Wt 114 kg (252 lb)   SpO2 97%   BMI 32.35 kg/m²    Physical Exam  Constitutional:       General: He is not in acute distress.  Neck:        Comments: Prominent SCM to right neck, tender to touch   Cardiovascular:      Rate and Rhythm: Normal rate and regular rhythm.      Heart sounds: Normal heart sounds.   Pulmonary:      Effort: Pulmonary effort is normal.      Breath sounds: Normal breath sounds.   Musculoskeletal:         General: Tenderness and signs of " injury present.      Cervical back: Signs of trauma present. No rigidity. Muscular tenderness present. Decreased range of motion (unable to left head without pain).         ASSESSMENT/PLAN:     Diagnoses and all orders for this visit:    1. Neck pain (Primary)  -     XR Spine Cervical 2 or 3 View; Future  -     XR Spine Thoracic 3 View (In Office)  -     methocarbamol (Robaxin) 500 MG tablet; Take 1 tablet by mouth 4 (Four) Times a Day.  Dispense: 20 tablet; Refill: 1    2. Sternocleidomastoid muscle tenderness    3. Acute non intractable tension-type headache    Will obtain x-ray imaging to evaluate for compression fracture. Muscle relaxer to assist with pain. If no acute changes, consider additional imaging given worsening of symptoms.    An After Visit Summary was printed and given to the patient at discharge.  Return if symptoms worsen or fail to improve.          REYMUNDO Lundberg 12/7/2022   Electronically signed.

## 2022-12-07 NOTE — TELEPHONE ENCOUNTER
Patient informed of xray results and informed Jessy will send instructions for stretching exercises to his My Chart account.  Patient informed Jessy has prescribed an anti-inflammatory medication for him to take along with the muscle relaxer.  Patient advised to call the office on Monday to give us an update on his condition.     Patient advised to go to the ED if symptoms worsen over the weekend.  Patient verbalized understanding.

## 2022-12-07 NOTE — TELEPHONE ENCOUNTER
----- Message from REYMUNDO Case sent at 12/7/2022  3:48 PM CST -----  We see some mild arthritic changes in your cervical spine but no signs of acute fracture which is good. I would encourage you to take the muscle relaxer as we discussed, and I have sent in an antiinflammatory for you to take over the next few days to the pharmacy. I have also included some neck stretches in your chart and would encourage you to slowly try to incorporate these as tolerated. Let's try this over the weekend and please call us on Monday with an update if not improving. If symptoms suddenly worsen over the weekend would advise presenting to ED for urgent evaluation.

## 2022-12-08 DIAGNOSIS — M54.2 NECK PAIN: Primary | ICD-10-CM

## 2022-12-08 RX ORDER — TIZANIDINE HYDROCHLORIDE 2 MG/1
4 CAPSULE, GELATIN COATED ORAL 3 TIMES DAILY
Qty: 42 CAPSULE | Refills: 0 | Status: SHIPPED | OUTPATIENT
Start: 2022-12-08 | End: 2022-12-15

## 2022-12-12 ENCOUNTER — TELEPHONE (OUTPATIENT)
Dept: INTERNAL MEDICINE | Facility: CLINIC | Age: 77
End: 2022-12-12

## 2022-12-12 NOTE — TELEPHONE ENCOUNTER
Caller: Jonh Peacock    Relationship: Self    Best call back number: [phone]     What is the best time to reach you: ANY    Who are you requesting to speak with (clinical staff, provider,  specific staff member):  JUVENAL EDUARDO MA    What was the call regarding: THE PATIENT STATES THAT HE WOULD LIKE JUVENAL TO CALL HIM AS SOON AS POSSIBLE. NO ADDITIONAL INFORMATION.    UNABLE TO WARM TRANSFER    Do you require a callback: YES

## 2022-12-12 NOTE — TELEPHONE ENCOUNTER
Patient called he states that he is still having a headache, but the robaxin and the Naproxen has helped and has had some relief today, still has a headache ,was wondering if he can continue the medication he is almost out.

## 2022-12-13 DIAGNOSIS — M54.2 NECK PAIN: ICD-10-CM

## 2022-12-13 RX ORDER — METHOCARBAMOL 500 MG/1
500 TABLET, FILM COATED ORAL 4 TIMES DAILY
Qty: 20 TABLET | Refills: 1 | Status: SHIPPED | OUTPATIENT
Start: 2022-12-13

## 2022-12-14 DIAGNOSIS — M54.2 NECK PAIN: ICD-10-CM

## 2022-12-14 RX ORDER — NAPROXEN 500 MG/1
500 TABLET ORAL 2 TIMES DAILY WITH MEALS
Qty: 10 TABLET | Refills: 1 | Status: SHIPPED | OUTPATIENT
Start: 2022-12-14

## 2022-12-19 PROCEDURE — 93296 REM INTERROG EVL PM/IDS: CPT | Performed by: INTERNAL MEDICINE

## 2022-12-19 PROCEDURE — 93295 DEV INTERROG REMOTE 1/2/MLT: CPT | Performed by: INTERNAL MEDICINE

## 2022-12-28 ENCOUNTER — LAB (OUTPATIENT)
Dept: LAB | Facility: HOSPITAL | Age: 77
End: 2022-12-28
Payer: MEDICARE

## 2022-12-28 DIAGNOSIS — I48.0 PAROXYSMAL ATRIAL FIBRILLATION: ICD-10-CM

## 2022-12-28 LAB
INR PPP: 2.7 (ref 0.91–1.09)
PROTHROMBIN TIME: 32 SECONDS (ref 10–13.8)

## 2022-12-28 PROCEDURE — 85610 PROTHROMBIN TIME: CPT | Performed by: INTERNAL MEDICINE

## 2023-01-03 ENCOUNTER — TELEPHONE (OUTPATIENT)
Dept: INTERNAL MEDICINE | Facility: CLINIC | Age: 78
End: 2023-01-03
Payer: MEDICARE

## 2023-01-03 ENCOUNTER — TELEPHONE (OUTPATIENT)
Dept: CARDIOLOGY | Facility: CLINIC | Age: 78
End: 2023-01-03
Payer: MEDICARE

## 2023-01-03 DIAGNOSIS — E78.2 MIXED HYPERLIPIDEMIA: ICD-10-CM

## 2023-01-03 DIAGNOSIS — E11.42 TYPE 2 DIABETES MELLITUS WITH DIABETIC POLYNEUROPATHY, WITHOUT LONG-TERM CURRENT USE OF INSULIN: ICD-10-CM

## 2023-01-03 DIAGNOSIS — I48.0 PAROXYSMAL ATRIAL FIBRILLATION: ICD-10-CM

## 2023-01-03 RX ORDER — WARFARIN SODIUM 5 MG/1
5 TABLET ORAL
Qty: 90 TABLET | Refills: 1 | Status: SHIPPED | OUTPATIENT
Start: 2023-01-03

## 2023-01-03 RX ORDER — ATORVASTATIN CALCIUM 20 MG/1
20 TABLET, FILM COATED ORAL DAILY
Qty: 90 TABLET | Refills: 3 | Status: SHIPPED | OUTPATIENT
Start: 2023-01-03

## 2023-01-03 RX ORDER — LOSARTAN POTASSIUM 25 MG/1
25 TABLET ORAL DAILY
Qty: 90 TABLET | Refills: 3 | Status: SHIPPED | OUTPATIENT
Start: 2023-01-03

## 2023-01-03 RX ORDER — FUROSEMIDE 20 MG/1
20 TABLET ORAL DAILY
Qty: 90 TABLET | Refills: 3 | Status: SHIPPED | OUTPATIENT
Start: 2023-01-03

## 2023-01-03 RX ORDER — FUROSEMIDE 40 MG/1
40 TABLET ORAL DAILY
Qty: 90 TABLET | Refills: 3 | Status: SHIPPED | OUTPATIENT
Start: 2023-01-03

## 2023-01-03 RX ORDER — WARFARIN SODIUM 4 MG/1
4 TABLET ORAL
Qty: 90 TABLET | Refills: 1 | Status: SHIPPED | OUTPATIENT
Start: 2023-01-03

## 2023-01-03 RX ORDER — WARFARIN SODIUM 2 MG/1
2 TABLET ORAL
Qty: 90 TABLET | Refills: 1 | Status: SHIPPED | OUTPATIENT
Start: 2023-01-03

## 2023-01-03 RX ORDER — PANTOPRAZOLE SODIUM 40 MG/1
40 TABLET, DELAYED RELEASE ORAL DAILY
Qty: 90 TABLET | Refills: 3 | Status: SHIPPED | OUTPATIENT
Start: 2023-01-03

## 2023-01-03 RX ORDER — METOPROLOL SUCCINATE 100 MG/1
100 TABLET, EXTENDED RELEASE ORAL DAILY
Qty: 90 TABLET | Refills: 3 | Status: SHIPPED | OUTPATIENT
Start: 2023-01-03

## 2023-01-03 RX ORDER — TAMSULOSIN HYDROCHLORIDE 0.4 MG/1
1 CAPSULE ORAL NIGHTLY
Qty: 90 CAPSULE | Refills: 3 | Status: SHIPPED | OUTPATIENT
Start: 2023-01-03

## 2023-01-03 NOTE — TELEPHONE ENCOUNTER
PATIENT IS NEEDING A REFILL OF DICLOFENAC SENT TO [facility].      I COULD NOT SEND THE REFILL REQUEST BACK.

## 2023-01-03 NOTE — TELEPHONE ENCOUNTER
Patient has historically seen Dr. Chavez but would like to switch to someone local for his electrophysiologist.  RN explained that Dr. Horne is taking new patients here at [facility] and patient is amenable to transferring care from Dr. Chavez to Dr. Horne.  RN will notify Dr. Banks and request a referral to Dr. Horne be ordered.  RN explained to patient that a  would contact him with the date/time of appointment.  Understanding verbalized.

## 2023-01-03 NOTE — TELEPHONE ENCOUNTER
PATIENT HAS REQUESTED THAT THESE MEDICATIONS BE SENT TO [facility].   HE IS NO LONGER USING HIS MAIL ORDER PHARMACY.

## 2023-01-09 DIAGNOSIS — I48.0 PAROXYSMAL ATRIAL FIBRILLATION: Primary | ICD-10-CM

## 2023-01-27 ENCOUNTER — LAB (OUTPATIENT)
Dept: LAB | Facility: HOSPITAL | Age: 78
End: 2023-01-27
Payer: MEDICARE

## 2023-01-27 LAB
INR PPP: 2.2 (ref 0.91–1.09)
PROTHROMBIN TIME: 25.9 SECONDS (ref 10–13.8)

## 2023-01-27 PROCEDURE — 85610 PROTHROMBIN TIME: CPT | Performed by: INTERNAL MEDICINE

## 2023-02-08 NOTE — PROGRESS NOTES
[facility] - PODIATRY    Today's Date: 02/17/23    Patient Name: Jonh Peacock  MRN: [id number]  CSN: [id number]  PCP: Ric Antony DO  Referring Provider: No ref. provider found     SUBJECTIVE     Chief Complaint   Patient presents with   • Follow-up     Ric Antony DO-05/12/2022 3 month fu Valentino-pt states he is here today for diabetic nail care-pt denies pain    • Diabetes     Didn't check       HPI: Jnoh Peacock, a 77 y.o.male, comes to clinic as a(n) established patient presenting for diabetic foot exam and complaining of thick fungal toenails. Pt with h/o Anxiety, Arrhythmia, Cardiomyopathy, CHF, Depression, previous DVT, HTN, Obesity, Anticoagulation with Warfarin. Patient is NIDDM and unsure of last BG level. Does not routinely check BG levels. Denies pain today. Continues use of rollator for ambulation. Continues daily coumadin therapy. Notes improvement to feet with regular visits. Notes numbness in his feet. Denies any constitutional symptoms. No other pedal complaints at this time.    Past Medical History:   Diagnosis Date   • Anemia    • Anxiety    • Arrhythmia    • Arthritis    • Atrial fibrillation (HCC)    • Cardiomyopathy (HCC)    • CHF (congestive heart failure) (HCC)    • Colon polyp    • Colostomy present (HCC)     10years   • Connective tissue disease (HCC)    • Depression    • Depression    • Diverticulitis    • Dizzy    • DVT (deep venous thrombosis) (HCC)    • Gastritis    • GERD (gastroesophageal reflux disease)    • Hiatal hernia    • History of transfusion    • Hyperlipidemia    • Hypertension    • Neuropathy    • Pneumonia      Past Surgical History:   Procedure Laterality Date   • APPENDECTOMY     • BACK SURGERY  2002, 2007    Multiple spine surgeries - laminectomy & fusion   • CARDIAC CATHETERIZATION     • CARDIAC DEFIBRILLATOR PLACEMENT     • CARDIAC ELECTROPHYSIOLOGY PROCEDURE N/A 10/31/2019    Procedure: ICD GEN CHANGE;  Surgeon: Scott  "Chaz Olea MD;  Location: [facility] CATH INVASIVE LOCATION;  Service: Cardiology   • COLON RESECTION WITH COLOSTOMY     • COLONOSCOPY  09/04/2014    diverticulosis   • COLONOSCOPY N/A 10/29/2019    Procedure: COLONOSCOPY WITH ANESTHESIA;  Surgeon: Tenzin Abrams MD;  Location: [facility] ENDOSCOPY;  Service: Gastroenterology   • ENDOSCOPY  02/24/1999    small sliding hiatal hernia   • FOOT SURGERY Bilateral 2005   • JOINT REPLACEMENT Right 2002    KNEE   • PACEMAKER IMPLANTATION  2012   • RECTAL FISSURE INCISION AND DRAINAGE     • REPLACEMENT TOTAL KNEE     • SPINAL CORD STIMULATOR IMPLANT  08/2012    Placed by Dr. DEENA Villela, [employer]   • THORACIC LAMINECTOMY WITH PLACEMENT OF DORSAL COLUMN STIMULATOR Right 1/28/2021    Procedure: SPINAL CORD STIMULATOR REVISION, right buttocks;  Surgeon: Noah Oliveros MD;  Location: [facility] OR;  Service: Neurosurgery;  Laterality: Right;   • TOTAL HIP ARTHROPLASTY Right 2004   • TOTAL HIP ARTHROPLASTY Left 12/2008     Family History   Problem Relation Age of Onset   • Heart disease Mother    • Heart disease Brother    • Breast cancer Brother    • Colon cancer Neg Hx    • Colon polyps Neg Hx      Social History     Socioeconomic History   • Marital status:    Tobacco Use   • Smoking status: Never     Passive exposure: Never   • Smokeless tobacco: Never   Vaping Use   • Vaping Use: Never used   Substance and Sexual Activity   • Alcohol use: Not Currently   • Drug use: Never   • Sexual activity: Defer     Allergies   Allergen Reactions   • Percocet [Oxycodone-Acetaminophen] Urinary Retention   • Amiodarone Other (See Comments)     INTERFERES WITH OTHER MEDICATIONS   • Oxycontin [Oxycodone Hcl] Urinary Retention   • Penicillins Hives     All \"cillins\"   • Vioxx [Rofecoxib] GI Intolerance     Current Outpatient Medications   Medication Sig Dispense Refill   • atorvastatin (LIPITOR) 20 MG tablet Take 1 tablet by mouth Daily. 90 tablet 3   • Buprenorphine 10 MCG/HR patch " weekly Place 10 mcg on the skin Every 7 (Seven) Days.     • CHONDROITIN SULFATE PO Take 1 tablet by mouth 2 (two) times a day.     • diazePAM (VALIUM) 2 MG tablet Take 1 mg by mouth Daily. 1/2 qam 1/2 qpm     • diclofenac (VOLTAREN) 1 % gel gel Apply 4 g topically to the appropriate area as directed 4 (Four) Times a Day As Needed.     • Diclofenac Sodium (VOLTAREN) 1 % gel gel Apply 4 g topically to the appropriate area as directed 4 (Four) Times a Day As Needed (joint pain). 150 g 0   • dilTIAZem (CARDIZEM) 30 MG tablet Take 1 tablet by mouth 3 (Three) Times a Day. 270 tablet 3   • furosemide (LASIX) 20 MG tablet Take 1 tablet by mouth Daily. 90 tablet 3   • furosemide (LASIX) 40 MG tablet Take 1 tablet by mouth Daily. TAKE WITH 20 MG TABLET FOR 60 PER MG PER DAY 90 tablet 3   • gabapentin (NEURONTIN) 300 MG capsule Take 400 mg by mouth 2 (Two) Times a Day.     • Glucosamine 750 MG tablet Take 750 mg by mouth 2 (Two) Times a Day.     • HYDROcodone-acetaminophen (NORCO) 7.5-325 MG per tablet Take 1 tablet by mouth Every 6 (Six) Hours As Needed for Moderate Pain.     • losartan (COZAAR) 25 MG tablet Take 1 tablet by mouth Daily. 90 tablet 3   • methocarbamol (Robaxin) 500 MG tablet Take 1 tablet by mouth 4 (Four) Times a Day. 20 tablet 1   • metoprolol succinate XL (TOPROL-XL) 100 MG 24 hr tablet Take 1 tablet by mouth Daily. 90 tablet 3   • Multiple Vitamins-Minerals (MULTIVITAMIN ADULT PO) Take 1 tablet by mouth Daily.     • naproxen (Naprosyn) 500 MG tablet Take 1 tablet by mouth 2 (Two) Times a Day With Meals. 10 tablet 1   • pantoprazole (PROTONIX) 40 MG EC tablet Take 1 tablet by mouth Daily. 90 tablet 3   • polyethylene glycol (MIRALAX) powder Take 17 g by mouth Every Night. 1581 g 3   • tamsulosin (FLOMAX) 0.4 MG capsule 24 hr capsule Take 1 capsule by mouth Every Night. 90 capsule 3   • warfarin (COUMADIN) 2 MG tablet Take 1 tablet by mouth Daily. 90 tablet 1   • warfarin (COUMADIN) 4 MG tablet Take 1 tablet  by mouth Daily. 90 tablet 1   • warfarin (COUMADIN) 5 MG tablet Take 1 tablet by mouth Daily. 90 tablet 1     No current facility-administered medications for this visit.     Review of Systems   Constitutional: Negative for chills and fever.   HENT: Negative for congestion.    Respiratory: Negative for shortness of breath.    Cardiovascular: Positive for leg swelling. Negative for chest pain.   Gastrointestinal: Negative for constipation, diarrhea, nausea and vomiting.   Musculoskeletal: Positive for gait problem.        Foot pain   Skin: Negative for wound.        Thick Toenails   Neurological: Positive for numbness.   Hematological: Bruises/bleeds easily.       OBJECTIVE     Vitals:    02/17/23 1445   BP: 118/74   Pulse: 76   SpO2: 98%       PHYSICAL EXAM  GEN:   Accompanied by wife.    Foot/Ankle Exam:       General:   Diabetic Foot Exam Performed    Appearance: appears stated age and healthy    Orientation: AAOx3    Affect: appropriate    Gait comment:  Unsteady  Assistance: walker    Shoe Gear:  Casual shoes (left shoe with lift)    VASCULAR      Right Foot Vascularity   Dorsalis pedis:  1+  Posterior tibial:  1+  Skin Temperature: warm    Edema Grading:  Trace and non-pitting  CFT:  4  Pedal Hair Growth:  Absent  Varicosities: none       Left Foot Vascularity   Dorsalis pedis:  1+  Posterior tibial:  1+  Skin Temperature: warm    Edema Grading:  Trace and non-pitting  CFT:  4  Pedal Hair Growth:  Absent  Varicosities: none        NEUROLOGIC     Right Foot Neurologic   Light touch sensation:  Diminished  Vibratory sensation:  Diminished  Hot/Cold sensation: diminished    Protective Sensation using Carson-Akhil Monofilament:  7     Left Foot Neurologic   Light touch sensation:  Diminished  Vibratory sensation:  Diminished  Hot/cold sensation: diminished    Protective Sensation using Carson-Akhil Monofilament:  7     MUSCULOSKELETAL      Right Foot Musculoskeletal   Ecchymosis:  None  Tenderness: toenails     Arch:  Pes planus  Hallux valgus: No       Left Foot Musculoskeletal   Ecchymosis:  None  Tenderness: toenails    Arch:  Pes planus  Hallux valgus: No       MUSCLE STRENGTH     Right Foot Muscle Strength   Foot dorsiflexion:  5  Foot plantar flexion:  5  Foot inversion:  5  Foot eversion:  5     Left Foot Muscle Strength   Foot dorsiflexion:  5  Foot plantar flexion:  5  Foot inversion:  5  Foot eversion:  5     RANGE OF MOTION      Right Foot Range of Motion   Foot and ankle ROM within normal limits       Left Foot Range of Motion   Foot and ankle ROM within normal limits       DERMATOLOGIC     Right Foot Dermatologic   Skin: skin intact    Nails: onychomycosis, abnormally thick, subungual debris and dystrophic nails    Nails comment:  1-5     Left Foot Dermatologic   Skin: skin intact    Nails: onychomycosis, abnormally thick, subungual debris and dystrophic nails    Nails comment:  1-5       RADIOLOGY/NUCLEAR:  No results found.    LABORATORY/CULTURE RESULTS:      PATHOLOGY RESULTS:       ASSESSMENT/PLAN     Diagnoses and all orders for this visit:    1. Onychomycosis (Primary)    2. Type 2 diabetes mellitus with diabetic neuropathy, without long-term current use of insulin (HCC)    3. Anticoagulant long-term use    4. Peripheral edema    5. Encounter for diabetic foot exam (HCC)      Comprehensive lower extremity examination and evaluation was performed.  Discussed findings and treatment plan including risks, benefits, and treatment options with patient in detail. Patient agreed with treatment plan.  Diabetic foot exam performed.  After verbal consent obtained, nail(s) x10 debrided of length and thickness with nail nipper without incidence  Patient may maintain nails and calluses at home utilizing emery board or pumice stone between visits as needed  Reviewed at home diabetic foot care including daily foot checks   Continue shoes with custom inserts  Elevate legs when possible.  Follow with PCP for management  of DM.    An After Visit Summary was printed and given to the patient at discharge, including (if requested) any available informative/educational handouts regarding diagnosis, treatment, or medications. All questions were answered to patient/family satisfaction. Should symptoms fail to improve or worsen they agree to call or return to clinic or to go to the Emergency Department. Discussed the importance of following up with any needed screening tests/labs/specialist appointments and any requested follow-up recommended by me today. Importance of maintaining follow-up discussed and patient accepts that missed appointments can delay diagnosis and potentially lead to worsening of conditions.  Return in about 3 months (around 5/17/2023)., or sooner if acute issues arise.        This document has been electronically signed by Deion Avelar DPM on February 17, 2023 15:09 CST

## 2023-02-16 ENCOUNTER — TELEPHONE (OUTPATIENT)
Dept: PODIATRY | Facility: CLINIC | Age: 78
End: 2023-02-16
Payer: MEDICARE

## 2023-02-17 ENCOUNTER — OFFICE VISIT (OUTPATIENT)
Dept: PODIATRY | Facility: CLINIC | Age: 78
End: 2023-02-17
Payer: MEDICARE

## 2023-02-17 VITALS
OXYGEN SATURATION: 98 % | BODY MASS INDEX: 32.34 KG/M2 | DIASTOLIC BLOOD PRESSURE: 74 MMHG | HEIGHT: 74 IN | HEART RATE: 76 BPM | WEIGHT: 252 LBS | SYSTOLIC BLOOD PRESSURE: 118 MMHG

## 2023-02-17 DIAGNOSIS — E11.9 ENCOUNTER FOR DIABETIC FOOT EXAM: ICD-10-CM

## 2023-02-17 DIAGNOSIS — R60.9 PERIPHERAL EDEMA: ICD-10-CM

## 2023-02-17 DIAGNOSIS — Z79.01 ANTICOAGULANT LONG-TERM USE: ICD-10-CM

## 2023-02-17 DIAGNOSIS — E11.40 TYPE 2 DIABETES MELLITUS WITH DIABETIC NEUROPATHY, WITHOUT LONG-TERM CURRENT USE OF INSULIN: ICD-10-CM

## 2023-02-17 DIAGNOSIS — B35.1 ONYCHOMYCOSIS: Primary | ICD-10-CM

## 2023-02-17 PROCEDURE — 99213 OFFICE O/P EST LOW 20 MIN: CPT | Performed by: PODIATRIST

## 2023-02-17 PROCEDURE — 11721 DEBRIDE NAIL 6 OR MORE: CPT | Performed by: PODIATRIST

## 2023-02-28 ENCOUNTER — LAB (OUTPATIENT)
Dept: LAB | Facility: HOSPITAL | Age: 78
End: 2023-02-28
Payer: MEDICARE

## 2023-02-28 DIAGNOSIS — I48.0 PAROXYSMAL ATRIAL FIBRILLATION: ICD-10-CM

## 2023-02-28 DIAGNOSIS — I48.0 PAROXYSMAL ATRIAL FIBRILLATION: Primary | ICD-10-CM

## 2023-03-01 ENCOUNTER — LAB (OUTPATIENT)
Dept: LAB | Facility: HOSPITAL | Age: 78
End: 2023-03-01
Payer: MEDICARE

## 2023-03-01 LAB
INR PPP: 2.1 (ref 0.91–1.09)
PROTHROMBIN TIME: 24.7 SECONDS (ref 10–13.8)

## 2023-03-01 PROCEDURE — 85610 PROTHROMBIN TIME: CPT | Performed by: INTERNAL MEDICINE

## 2023-03-17 ENCOUNTER — OFFICE VISIT (OUTPATIENT)
Dept: CARDIOLOGY | Facility: CLINIC | Age: 78
End: 2023-03-17
Payer: MEDICARE

## 2023-03-17 VITALS
DIASTOLIC BLOOD PRESSURE: 60 MMHG | HEART RATE: 76 BPM | RESPIRATION RATE: 18 BRPM | WEIGHT: 250 LBS | HEIGHT: 74 IN | BODY MASS INDEX: 32.08 KG/M2 | OXYGEN SATURATION: 98 % | SYSTOLIC BLOOD PRESSURE: 110 MMHG

## 2023-03-17 DIAGNOSIS — I47.1 SUSTAINED SVT: ICD-10-CM

## 2023-03-17 DIAGNOSIS — I50.42 CHF (CONGESTIVE HEART FAILURE), NYHA CLASS II, CHRONIC, COMBINED: ICD-10-CM

## 2023-03-17 DIAGNOSIS — I48.3 TYPICAL ATRIAL FLUTTER: ICD-10-CM

## 2023-03-17 DIAGNOSIS — I48.0 PAROXYSMAL ATRIAL FIBRILLATION: Primary | Chronic | ICD-10-CM

## 2023-03-17 DIAGNOSIS — I49.3 FREQUENT PVCS: ICD-10-CM

## 2023-03-17 PROBLEM — I47.10 SUSTAINED SVT: Status: ACTIVE | Noted: 2023-03-17

## 2023-03-17 PROCEDURE — 93283 PRGRMG EVAL IMPLANTABLE DFB: CPT | Performed by: STUDENT IN AN ORGANIZED HEALTH CARE EDUCATION/TRAINING PROGRAM

## 2023-03-17 PROCEDURE — 99205 OFFICE O/P NEW HI 60 MIN: CPT | Performed by: STUDENT IN AN ORGANIZED HEALTH CARE EDUCATION/TRAINING PROGRAM

## 2023-03-17 PROCEDURE — 3078F DIAST BP <80 MM HG: CPT | Performed by: STUDENT IN AN ORGANIZED HEALTH CARE EDUCATION/TRAINING PROGRAM

## 2023-03-17 PROCEDURE — 1159F MED LIST DOCD IN RCRD: CPT | Performed by: STUDENT IN AN ORGANIZED HEALTH CARE EDUCATION/TRAINING PROGRAM

## 2023-03-17 PROCEDURE — 1160F RVW MEDS BY RX/DR IN RCRD: CPT | Performed by: STUDENT IN AN ORGANIZED HEALTH CARE EDUCATION/TRAINING PROGRAM

## 2023-03-17 PROCEDURE — 93000 ELECTROCARDIOGRAM COMPLETE: CPT | Performed by: STUDENT IN AN ORGANIZED HEALTH CARE EDUCATION/TRAINING PROGRAM

## 2023-03-17 PROCEDURE — 3074F SYST BP LT 130 MM HG: CPT | Performed by: STUDENT IN AN ORGANIZED HEALTH CARE EDUCATION/TRAINING PROGRAM

## 2023-03-17 NOTE — PROGRESS NOTES
==========================================    Device Interrogation    Reason for Interrogation: Atrial fibrillation    Type of Device: Medtronic dual-chamber ICD (Evera)  Date of Implant: 10/31/2019    Device Parameters  Battery Voltage / Estimated Longevity -7.1 years  Mode -AAIR/DDDR  Nadeem Mode / Lower rate limit -60  Upper Tracking Limit -130    Device Leads  Manual testing of lead thresholds and sensing was performed as follows:    Thresholds -  A: 0.25 V @0.4 ms  RV: 0.75 V @0.4 ms    Impedances -  A: 361 ohms  RV: 342 ohms  HV: 38/46 ohms    Sensing -  A: 1.9 mV  RV: 4.6 mV    Therapeutic Zones  VF (Zones and Therapy) - > 200 bmp ; ATP during charging, 35 J x6    Episodes / Alerts  8 episodes of AF with overall 0.2% AF burden, longest episode of AF 87 minutes, single episode of nonsustained high ventricular rates suggestive of AF with RVR    Final Impression:  Normal functioning dual-chamber ICD.  Paroxysmal atrial fibrillation.    ==========================================

## 2023-03-17 NOTE — PROGRESS NOTES
"EP NEW PATIENT VISIT    Chief Complaint  Atrial Fibrillation (NP)    Subjective        History of Present Illness    EP Problems:  1.  Paroxysmal atrial fibrillation  2.  Atrial flutter  3.  Sustained SVT / atrial tachycardia  4.  Sinus node dysfunction  5.  Presence of a cardiac defibrillator  -9/27/2012: HUEY, Medtronic  -10/31/2019: Generator change, Brenda Chavez  6.  Acquired long QT syndrome  7.  Frequent PVCs    Cardiology Problems:  1.  Chronic systolic heart failure  -2/5/2019: EF 31 to 35%  2.  Hypertension  3.  Hyperlipidemia  4.  Prior DVT    Medical Problems:  1.  Obesity, BMI 32  2.  Type 2 diabetes  3.  BPH  4.  Chronic pain  5.  Obstructive sleep apnea      Jonh Peacock is a 77 y.o. male with problem list as above who presents to the clinic for evaluation of SVT, paroxysmal atrial fibrillation, atrial flutter.  He has had an ICD since 2012 for prevention of ventricular tachycardia.  He denies any history of ICD shocks from his defibrillator recently, but did have multiple shocks from his device years ago which were thought to be secondary to SVT and inappropriate shocks.  He has had a noted history of paroxysmal atrial fibrillation, atrial flutter, as well as episodes of sustained SVT seen on his device recordings.  He has been on warfarin which has been well managed by his PCP.    Objective   Vital Signs:  /60 (BP Location: Right arm, Patient Position: Sitting)   Pulse 76   Resp 18   Ht 188 cm (74\")   Wt 113 kg (250 lb)   SpO2 98%   BMI 32.10 kg/m²   Estimated body mass index is 32.1 kg/m² as calculated from the following:    Height as of this encounter: 188 cm (74\").    Weight as of this encounter: 113 kg (250 lb).      Physical Exam  Vitals reviewed.   Constitutional:       Appearance: He is obese.   HENT:      Head: Normocephalic and atraumatic.   Eyes:      Extraocular Movements: Extraocular movements intact.      Conjunctiva/sclera: Conjunctivae normal.   Cardiovascular:      " Rate and Rhythm: Normal rate and regular rhythm.      Pulses: Normal pulses.      Heart sounds: Normal heart sounds.      Comments: Pulse generator site is clean, dry, intact  Pulmonary:      Effort: Pulmonary effort is normal.      Breath sounds: Normal breath sounds.   Musculoskeletal:         General: No swelling.   Neurological:      General: No focal deficit present.      Mental Status: He is alert and oriented to person, place, and time.   Psychiatric:         Mood and Affect: Mood normal.         Judgment: Judgment normal.        Result Review :  The following data was reviewed by: Rossana Horne MD on 03/17/2023:  CMP    CMP 5/27/22   Glucose 142 (A)   BUN 18   Creatinine 1.16   eGFR 65.7   Sodium 140   Potassium 4.3   Chloride 103   Calcium 9.1   Total Protein 6.9   Albumin 3.90   Globulin 3.0   Total Bilirubin 0.4   Alkaline Phosphatase 100   AST (SGOT) 26   ALT (SGPT) 24   Albumin/Globulin Ratio 1.3   BUN/Creatinine Ratio 15.5   Anion Gap 5.0   (A) Abnormal value       Comments are available for some flowsheets but are not being displayed.           CBC    CBC 5/27/22   WBC 5.30   RBC 3.93 (A)   Hemoglobin 11.9 (A)   Hematocrit 36.6 (A)   MCV 93.1   MCH 30.3   MCHC 32.5   RDW 13.5   Platelets 159   (A) Abnormal value                Prior ECG previously performed on 7/21/2022 was directly visualized and independently interpreted with the following findings: Atrial paced rhythm, frequent PACs and a single PVC, prolonged QTc    [id number] SCORE   [id number] Score: 7 (3/17/2023  9:04 AM)          ECG 12 Lead    Date/Time: 3/17/2023 1:58 PM  Performed by: Rossana Horne MD  Authorized by: Rossana Horne MD   Comparison: compared with previous ECG from 7/21/2022  Comparison to previous ECG: PVCs have become more frequent  Rhythm: sinus rhythm and paced  Ectopy: unifocal PVCs  Rate: normal  Conduction: conduction normal  QRS axis: normal  Other findings: non-specific ST-T wave changes and poor R wave  progression    Clinical impression: abnormal EKG                Assessment and Plan   Diagnoses and all orders for this visit:    1. Paroxysmal atrial fibrillation (HCC) (Primary)    2. CHF (congestive heart failure), NYHA class II, chronic, combined (HCC)    3. Typical atrial flutter (HCC)    4. Sustained SVT (HCC)    5. Frequent PVCs  -     Holter Monitor - 72 Hour Up To 15 Days; Future        Jonh Peacock is a 77 y.o. male with problem list as above who presents to the clinic for evaluation of paroxysmal atrial fibrillation, typical atrial flutter, sustained SVT, frequent PVCs, chronic systolic heart failure, acquired long QT syndrome.  He has multiple rhythm issues going on at this time.  His device interrogation reveals reasonably frequent PVCs then exact burden is not available.  I think quantifying his PVC burden is probably the most important first action.  Depending on the findings, we can discuss what if anything needs to be done about this.  His PVCs do appear to be outflow tract mediated, likely from the [facility] [place] [place].    Otherwise, in regards to his atrial fibrillation, he has a relatively low overall burden 0.2% on his device.  Some of the episodes can be prolonged however with episodes lasting up to 30 minutes in duration during the month of February.  I think for now, continued observation is reasonable as he is relatively asymptomatic with these.  Alternatively, should another indication for ablation become present, pulmonary vein isolation would be reasonable to the point given the CASTLE-AF data.  He is not a good candidate for antiarrhythmic medications given his prolonged QTc.    Plan:  -7-day Holter monitor  -No medication changes for now  -Return to clinic in 6 weeks           I spent 60 minutes caring for Jonh on this date of service. This time includes time spent by me in the following activities:preparing for the visit, reviewing tests, obtaining and/or reviewing a separately obtained history,  performing a medically appropriate examination and/or evaluation , counseling and educating the patient/family/caregiver, ordering medications, tests, or procedures, referring and communicating with other health care professionals , documenting information in the medical record, independently interpreting results and communicating that information with the patient/family/caregiver and care coordination  Follow Up   Return in about 6 weeks (around 4/28/2023).  Patient was given instructions and counseling regarding his condition or for health maintenance advice. Please see specific information pulled into the AVS if appropriate.     Part of this note may be an electronic transcription/translation of spoken language to printed text using the Dragon Dictation System.

## 2023-03-21 ENCOUNTER — TELEPHONE (OUTPATIENT)
Dept: CARDIOLOGY | Facility: CLINIC | Age: 78
End: 2023-03-21
Payer: MEDICARE

## 2023-03-21 NOTE — TELEPHONE ENCOUNTER
----- Message from Rossana Horne MD sent at 3/17/2023  2:03 PM CDT -----  Can you transfer his remote monitoring to me?

## 2023-03-21 NOTE — TELEPHONE ENCOUNTER
Patient's profile updated in [employer] and following physician changed to Dr. Horne in [facility].

## 2023-03-27 ENCOUNTER — TELEPHONE (OUTPATIENT)
Dept: INTERNAL MEDICINE | Facility: CLINIC | Age: 78
End: 2023-03-27

## 2023-03-27 DIAGNOSIS — M25.50 ARTHRALGIA, UNSPECIFIED JOINT: ICD-10-CM

## 2023-03-27 DIAGNOSIS — M54.2 NECK PAIN: ICD-10-CM

## 2023-03-27 DIAGNOSIS — G89.4 CHRONIC PAIN SYNDROME: Primary | ICD-10-CM

## 2023-03-27 NOTE — TELEPHONE ENCOUNTER
Caller: Jonh Peacock    Relationship: Self    Best call back number: [phone]    Requested Prescriptions: DICLOFENAC TOPICAL HAND GEL  Requested Prescriptions      No prescriptions requested or ordered in this encounter        Pharmacy where request should be sent: [facility] [address] [place] [address] - [address] - [phone]  - [phone]     Last office visit with prescribing clinician: 11/17/2022   Last telemedicine visit with prescribing clinician: 5/15/2023   Next office visit with prescribing clinician: Visit date not found     Additional details provided by patient: 3 MONTH SUPPLY. 6 TUBES  GRAMS EACH. NEEDS IT TO BE REFILLED TWICE TO TAKE CARE OF HIM FOR THE YEAR. COMPLETELY OUT.     Does the patient have less than a 3 day supply:  [x] Yes  [] No    Would you like a call back once the refill request has been completed: [] Yes [x] No    If the office needs to give you a call back, can they leave a voicemail: [] Yes [x] No    Nancy Kennedy Rep   03/27/23 11:32 CDT

## 2023-03-29 ENCOUNTER — LAB (OUTPATIENT)
Dept: LAB | Facility: HOSPITAL | Age: 78
End: 2023-03-29
Payer: MEDICARE

## 2023-03-29 LAB
INR PPP: 2.4 (ref 0.91–1.09)
PROTHROMBIN TIME: 28.2 SECONDS (ref 10–13.8)

## 2023-03-29 PROCEDURE — 85610 PROTHROMBIN TIME: CPT | Performed by: INTERNAL MEDICINE

## 2023-04-24 ENCOUNTER — TELEPHONE (OUTPATIENT)
Dept: PODIATRY | Facility: CLINIC | Age: 78
End: 2023-04-24
Payer: MEDICARE

## 2023-05-01 DIAGNOSIS — I48.0 PAROXYSMAL ATRIAL FIBRILLATION: Primary | Chronic | ICD-10-CM

## 2023-05-02 ENCOUNTER — LAB (OUTPATIENT)
Dept: LAB | Facility: HOSPITAL | Age: 78
End: 2023-05-02
Payer: MEDICARE

## 2023-05-02 DIAGNOSIS — I48.0 PAROXYSMAL ATRIAL FIBRILLATION: Chronic | ICD-10-CM

## 2023-05-02 LAB
INR PPP: 2.6 (ref 0.91–1.09)
PROTHROMBIN TIME: 31.3 SECONDS (ref 10–13.8)

## 2023-05-02 PROCEDURE — 85610 PROTHROMBIN TIME: CPT | Performed by: INTERNAL MEDICINE

## 2023-05-03 ENCOUNTER — TELEPHONE (OUTPATIENT)
Dept: INTERNAL MEDICINE | Facility: CLINIC | Age: 78
End: 2023-05-03

## 2023-05-03 NOTE — TELEPHONE ENCOUNTER
PATIENT HAS BEEN CALLED, HE STATED THAT HE TAKES AT NOON TIME AND STATED THAT IT FEELS LIKE THESE MEDICATION DRAG HIM DOWN.    HE STATED HE HAS LOST 60lbs AND WONDERS IF ONE OF THE DOSES IS TO MUCH?   HE STATED THAT ON THE METOPROLOL HE HAS BEEN CUTTING IT IN HALF  AND CAN TELL A SLIGHT DIFFERENCE.   METOPROLOL  MULTIVITAMIN  DELTIAZEM

## 2023-05-03 NOTE — TELEPHONE ENCOUNTER
Caller: Jonh Peacock    Relationship: Self    Best call back number: [phone]    What is the best time to reach you: SOON PLEASE    Who are you requesting to speak with (clinical staff, provider,  specific staff member): PROVIDER OR CLINICAL STAFF      What was the call regarding: PATIENT REQUESTING A CALL BACK TO DISCUSS SOME PROBLEMS HE IS HAVING WITH HIS MEDICATION   PATIENT WOULD NOT PROVIDE ANY FURTHER INFORMATION     Do you require a callback YES

## 2023-05-05 ENCOUNTER — OFFICE VISIT (OUTPATIENT)
Dept: INTERNAL MEDICINE | Facility: CLINIC | Age: 78
End: 2023-05-05
Payer: MEDICARE

## 2023-05-05 VITALS
DIASTOLIC BLOOD PRESSURE: 66 MMHG | RESPIRATION RATE: 16 BRPM | BODY MASS INDEX: 31.83 KG/M2 | WEIGHT: 248 LBS | OXYGEN SATURATION: 100 % | SYSTOLIC BLOOD PRESSURE: 108 MMHG | TEMPERATURE: 97.3 F | HEART RATE: 60 BPM | HEIGHT: 74 IN

## 2023-05-05 DIAGNOSIS — R53.83 OTHER FATIGUE: ICD-10-CM

## 2023-05-05 DIAGNOSIS — I48.0 PAROXYSMAL ATRIAL FIBRILLATION: Primary | ICD-10-CM

## 2023-05-05 RX ORDER — GABAPENTIN 400 MG/1
400 CAPSULE ORAL 2 TIMES DAILY
COMMUNITY
Start: 2023-04-04

## 2023-05-05 RX ORDER — METOPROLOL SUCCINATE 25 MG/1
25 TABLET, EXTENDED RELEASE ORAL DAILY
Qty: 30 TABLET | Refills: 5 | Status: SHIPPED | OUTPATIENT
Start: 2023-05-05

## 2023-05-05 NOTE — PROGRESS NOTES
Chief Complaint   Patient presents with   • Medication Problem     Metoprolol possibly causing him to feel sluggish   • Follow-up     Pt states it is time for him to have another motorized chair, the last chair came from [facility]        HPI     Drew C Vanderford is a 77 y.o. male presents for the above problems. He notes feeling very sluggish for about an hour to an hour and a half after taking metoprolol dose. He was previously taking 100 mg daily and had decreased to 50 mg recently due to this. This has helped some but he notes he still feels down. This feeling does improve with time and he is able to return to his activities. He has no dizziness or other symptoms associated with this other than fatigue. His history is significant for A fib, A flutter and SVT. He has cardiac defribrillator. His heart rate today is 60.          ROS:  Review of Systems   Constitutional: Positive for fatigue. Negative for fever.   Respiratory: Negative.    Cardiovascular: Negative.           reports that he has never smoked. He has never been exposed to tobacco smoke. He has never used smokeless tobacco. He reports that he does not currently use alcohol. He reports that he does not use drugs.    Current Outpatient Medications:   •  atorvastatin (LIPITOR) 20 MG tablet, Take 1 tablet by mouth Daily., Disp: 90 tablet, Rfl: 3  •  Buprenorphine 10 MCG/HR patch weekly, Place 15 mcg on the skin as directed by provider Every 7 (Seven) Days., Disp: , Rfl:   •  diazePAM (VALIUM) 2 MG tablet, Take 1 mg by mouth Daily. 1/2 qam 1/2 qpm, Disp: , Rfl:   •  Diclofenac Sodium (VOLTAREN) 1 % gel gel, Apply 4 g topically to the appropriate area as directed 4 (Four) Times a Day As Needed (joint pain) for up to 90 days., Disp: 600 g, Rfl: 2  •  dilTIAZem (CARDIZEM) 30 MG tablet, Take 1 tablet by mouth 3 (Three) Times a Day., Disp: 270 tablet, Rfl: 3  •  furosemide (LASIX) 20 MG tablet, Take 1 tablet by mouth Daily., Disp: 90 tablet, Rfl: 3  •   "furosemide (LASIX) 40 MG tablet, Take 1 tablet by mouth Daily. TAKE WITH 20 MG TABLET FOR 60 PER MG PER DAY, Disp: 90 tablet, Rfl: 3  •  gabapentin (NEURONTIN) 400 MG capsule, Take 1 capsule by mouth 2 (Two) Times a Day., Disp: , Rfl:   •  Glucosamine 750 MG tablet, Take 1 tablet by mouth 2 (Two) Times a Day., Disp: , Rfl:   •  HYDROcodone-acetaminophen (NORCO) 7.5-325 MG per tablet, Take 1 tablet by mouth Every 6 (Six) Hours As Needed for Moderate Pain., Disp: , Rfl:   •  losartan (COZAAR) 25 MG tablet, Take 1 tablet by mouth Daily., Disp: 90 tablet, Rfl: 3  •  methocarbamol (Robaxin) 500 MG tablet, Take 1 tablet by mouth 4 (Four) Times a Day., Disp: 20 tablet, Rfl: 1  •  metoprolol succinate XL (TOPROL-XL) 100 MG 24 hr tablet, Take 1 tablet by mouth Daily., Disp: 90 tablet, Rfl: 3  •  Multiple Vitamins-Minerals (MULTIVITAMIN ADULT PO), Take 1 tablet by mouth Daily., Disp: , Rfl:   •  pantoprazole (PROTONIX) 40 MG EC tablet, Take 1 tablet by mouth Daily., Disp: 90 tablet, Rfl: 3  •  polyethylene glycol (MIRALAX) powder, Take 17 g by mouth Every Night., Disp: 1581 g, Rfl: 3  •  tamsulosin (FLOMAX) 0.4 MG capsule 24 hr capsule, Take 1 capsule by mouth Every Night., Disp: 90 capsule, Rfl: 3  •  warfarin (COUMADIN) 2 MG tablet, Take 1 tablet by mouth Daily., Disp: 90 tablet, Rfl: 1  •  warfarin (COUMADIN) 4 MG tablet, Take 1 tablet by mouth Daily., Disp: 90 tablet, Rfl: 1  •  warfarin (COUMADIN) 5 MG tablet, Take 1 tablet by mouth Daily., Disp: 90 tablet, Rfl: 1  •  metoprolol succinate XL (Toprol XL) 25 MG 24 hr tablet, Take 1 tablet by mouth Daily., Disp: 30 tablet, Rfl: 5    OBJECTIVE:  /66 (BP Location: Left arm, Patient Position: Sitting, Cuff Size: Large Adult)   Pulse 60   Temp 97.3 °F (36.3 °C) (Temporal)   Resp 16   Ht 188 cm (74\")   Wt 112 kg (248 lb)   SpO2 100%   BMI 31.84 kg/m²    Physical Exam  Constitutional:       General: He is not in acute distress.  Cardiovascular:      Rate and Rhythm: " Normal rate. Rhythm irregular.      Heart sounds: Normal heart sounds.   Pulmonary:      Effort: Pulmonary effort is normal.      Breath sounds: Normal breath sounds.   Psychiatric:         Mood and Affect: Mood normal.         Behavior: Behavior normal.         Thought Content: Thought content normal.         Judgment: Judgment normal.         ASSESSMENT/PLAN:     Diagnoses and all orders for this visit:    1. Paroxysmal atrial fibrillation (Primary)  2. Other fatigue  Discussed changing timing of medication to evening and monitoring symptoms for now. If this continues to be a problem for him we could consider decreasing dose to 25 mg daily. For now continue 50 mg once daily. He will keep upcoming follow up in 2 weeks and will plan to reassess at that time.       An After Visit Summary was printed and given to the patient at discharge.  Return if symptoms worsen or fail to improve.          REYMUNDO Lundberg 5/10/2023   Electronically signed.

## 2023-05-11 NOTE — PROGRESS NOTES
[facility] - PODIATRY    Today's Date: 05/22/23    Patient Name: Jonh Peacock  MRN: [id number]  CSN: [id number]  PCP: Ric Antony DO  Referring Provider: No ref. provider found     SUBJECTIVE     Chief Complaint   Patient presents with    Follow-up     Ric Antony,  05/15/2023 Return in about 3 months- pt states doing good- pt denies pain     Diabetes     Hasnt checked       HPI: Jonh Peacock, a 77 y.o.male, comes to clinic as a(n) established patient presenting for diabetic foot exam and complaining of thick fungal toenails. Pt with h/o Anxiety, Arrhythmia, Cardiomyopathy, CHF, Depression, previous DVT, HTN, Obesity, Anticoagulation with Warfarin. Patient is NIDDM and unsure of last BG level. Does not routinely check BG levels. Denies pain today. Uses rollator for ambulation. Continues daily coumadin therapy. Notes improvement to feet with regular visits. Reports numbness in his feet. Denies any constitutional symptoms. No other pedal complaints at this time.    Past Medical History:   Diagnosis Date    Anemia     Anxiety     Arrhythmia     Arthritis     Atrial fibrillation     Cardiomyopathy     CHF (congestive heart failure)     Colon polyp     Colostomy present     10years    Connective tissue disease     Depression     Depression     Diverticulitis     Dizzy     DVT (deep venous thrombosis)     Gastritis     GERD (gastroesophageal reflux disease)     Hiatal hernia     History of transfusion     Hyperlipidemia     Hypertension     Neuropathy     Pneumonia      Past Surgical History:   Procedure Laterality Date    APPENDECTOMY      BACK SURGERY  2002, 2007    Multiple spine surgeries - laminectomy & fusion    CARDIAC CATHETERIZATION      CARDIAC DEFIBRILLATOR PLACEMENT      CARDIAC ELECTROPHYSIOLOGY PROCEDURE N/A 10/31/2019    Procedure: ICD GEN CHANGE;  Surgeon: Chaz Chavez MD;  Location: [facility] CATH INVASIVE [facility];  Service: Cardiology    COLON  "RESECTION WITH COLOSTOMY      COLONOSCOPY  09/04/2014    diverticulosis    COLONOSCOPY N/A 10/29/2019    Procedure: COLONOSCOPY WITH ANESTHESIA;  Surgeon: Tenzin Abrams MD;  Location:  [facility] ENDOSCOPY;  Service: Gastroenterology    ENDOSCOPY  02/24/1999    small sliding hiatal hernia    FOOT SURGERY Bilateral 2005    JOINT REPLACEMENT Right 2002    KNEE    PACEMAKER IMPLANTATION  2012    RECTAL FISSURE INCISION AND DRAINAGE      REPLACEMENT TOTAL KNEE      SPINAL CORD STIMULATOR IMPLANT  08/2012    Placed by Dr. DEENA Villela, [employer]    THORACIC LAMINECTOMY WITH PLACEMENT OF DORSAL COLUMN STIMULATOR Right 1/28/2021    Procedure: SPINAL CORD STIMULATOR REVISION, right buttocks;  Surgeon: Noah Oliveros MD;  Location:  [facility] OR;  Service: Neurosurgery;  Laterality: Right;    TOTAL HIP ARTHROPLASTY Right 2004    TOTAL HIP ARTHROPLASTY Left 12/2008     Family History   Problem Relation Age of Onset    Heart disease Mother     Heart disease Brother     Breast cancer Brother     Colon cancer Neg Hx     Colon polyps Neg Hx      Social History     Socioeconomic History    Marital status:    Tobacco Use    Smoking status: Never     Passive exposure: Never    Smokeless tobacco: Never   Vaping Use    Vaping Use: Never used   Substance and Sexual Activity    Alcohol use: Not Currently    Drug use: Never    Sexual activity: Defer     Allergies   Allergen Reactions    Percocet [Oxycodone-Acetaminophen] Urinary Retention    Amiodarone Other (See Comments)     INTERFERES WITH OTHER MEDICATIONS    Oxycontin [Oxycodone Hcl] Urinary Retention    Penicillins Hives     All \"cillins\"    Vioxx [Rofecoxib] GI Intolerance     Current Outpatient Medications   Medication Sig Dispense Refill    atorvastatin (LIPITOR) 20 MG tablet Take 1 tablet by mouth Daily. 90 tablet 3    Buprenorphine 10 MCG/HR patch weekly Place 15 mcg on the skin as directed by provider Every 7 (Seven) Days.      capsaicin 0.1 % topical cream Apply 1 " application topically to the appropriate area as directed 3 (Three) Times a Day. 42.5 g 1    diazePAM (VALIUM) 2 MG tablet Take 1 mg by mouth Daily. 1/2 qam 1/2 qpm      Diclofenac Sodium (VOLTAREN) 1 % gel gel Apply 4 g topically to the appropriate area as directed 4 (Four) Times a Day As Needed (joint pain) for up to 90 days. 600 g 2    dilTIAZem (CARDIZEM) 30 MG tablet Take 1 tablet by mouth 3 (Three) Times a Day. 270 tablet 3    furosemide (LASIX) 20 MG tablet Take 1 tablet by mouth Daily. 90 tablet 3    furosemide (LASIX) 40 MG tablet Take 1 tablet by mouth Daily. TAKE WITH 20 MG TABLET FOR 60 PER MG PER DAY 90 tablet 3    gabapentin (NEURONTIN) 400 MG capsule Take 1 capsule by mouth 2 (Two) Times a Day.      Glucosamine 750 MG tablet Take 1 tablet by mouth 2 (Two) Times a Day.      HYDROcodone-acetaminophen (NORCO) 7.5-325 MG per tablet Take 1 tablet by mouth Every 6 (Six) Hours As Needed for Moderate Pain.      losartan (COZAAR) 25 MG tablet Take 1 tablet by mouth Daily. 90 tablet 3    methocarbamol (Robaxin) 500 MG tablet Take 1 tablet by mouth 4 (Four) Times a Day. 20 tablet 1    metoprolol succinate XL (Toprol XL) 25 MG 24 hr tablet Take 1 tablet by mouth Daily. 30 tablet 5    metoprolol succinate XL (TOPROL-XL) 100 MG 24 hr tablet Take 1 tablet by mouth Daily. 90 tablet 3    Multiple Vitamins-Minerals (MULTIVITAMIN ADULT PO) Take 1 tablet by mouth Daily.      pantoprazole (PROTONIX) 40 MG EC tablet Take 1 tablet by mouth Daily. 90 tablet 3    polyethylene glycol (MIRALAX) powder Take 17 g by mouth Every Night. 1581 g 3    tamsulosin (FLOMAX) 0.4 MG capsule 24 hr capsule Take 1 capsule by mouth Every Night. 90 capsule 3    warfarin (COUMADIN) 2 MG tablet Take 1 tablet by mouth Daily. 90 tablet 1    warfarin (COUMADIN) 4 MG tablet Take 1 tablet by mouth Daily. 90 tablet 1    warfarin (COUMADIN) 5 MG tablet Take 1 tablet by mouth Daily. 90 tablet 1     No current facility-administered medications for this  visit.     Review of Systems   Constitutional:  Negative for chills and fever.   HENT:  Negative for congestion.    Respiratory:  Negative for shortness of breath.    Cardiovascular:  Positive for leg swelling. Negative for chest pain.   Gastrointestinal:  Negative for constipation, diarrhea, nausea and vomiting.   Musculoskeletal:  Positive for gait problem.        Foot pain   Skin:  Negative for wound.        Thick Toenails   Neurological:  Positive for numbness.   Hematological:  Bruises/bleeds easily.     OBJECTIVE     Vitals:    05/19/23 1502   BP: 116/62   Pulse: 70   SpO2: 97%       PHYSICAL EXAM  GEN:   Accompanied by wife.    Foot/Ankle Exam    GENERAL  Diabetic foot exam performed    Appearance:  appears stated age  Orientation:  AAOx3  Affect:  appropriate  Gait:  (unsteady)  Assistance:  walker  Right shoe gear: casual shoe  Left shoe gear: casual shoe (with lift)    VASCULAR     Right Foot Vascularity   Dorsalis pedis:  1+  Posterior tibial:  1+  Skin temperature:  warm  Edema grading:  Trace and non-pitting  CFT:  4  Pedal hair growth:  Absent  Varicosities:  none     Left Foot Vascularity   Dorsalis pedis:  1+  Posterior tibial:  1+  Skin temperature:  warm  Edema grading:  Trace and non-pitting  CFT:  4  Pedal hair growth:  Absent  Varicosities:  none     NEUROLOGIC     Right Foot Neurologic   Light touch sensation: diminished  Vibratory sensation: diminished  Hot/Cold sensation: diminished  Protective Sensation using [place] Monofilament:   Sites intact: 7  Sites tested: 10     Left Foot Neurologic   Light touch sensation: diminished  Vibratory sensation: diminished  Hot/Cold sensation:  diminished  Protective Sensation using [place] Monofilament:   Sites intact: 7  Sites tested: 10    MUSCULOSKELETAL     Right Foot Musculoskeletal   Tenderness:  toenail problem    Arch:  Pes planus  Hallux valgus: No       Left Foot Musculoskeletal   Tenderness:  toenail problem  Arch:  Pes  planus  Hallux valgus: No      MUSCLE STRENGTH     Right Foot Muscle Strength   Foot dorsiflexion:  5  Foot plantar flexion:  5  Foot inversion:  5  Foot eversion:  5     Left Foot Muscle Strength   Foot dorsiflexion:  5  Foot plantar flexion:  5  Foot inversion:  5  Foot eversion:  5    RANGE OF MOTION     Right Foot Range of Motion   Foot and ankle ROM within normal limits       Left Foot Range of Motion   Foot and ankle ROM within normal limits      DERMATOLOGIC      Right Foot Dermatologic   Skin  Right foot skin is intact.   Nails  1.  Positive for elongated, onychomycosis, abnormal thickness and dystrophic nail.  2.  Positive for elongated, onychomycosis, abnormal thickness and dystrophic nail.  3.  Positive for elongated, onychomycosis, abnormal thickness and dystrophic nail.  4.  Positive for elongated, onychomycosis, abnormal thickness and dystrophic nail.  5.  Positive for elongated, onychomycosis, abnormal thickness and dystrophic nail.     Left Foot Dermatologic   Skin  Left foot skin is intact.   Nails  1.  Positive for elongated, onychomycosis, abnormal thickness and dystrophic nail.  2.  Positive for elongated, onychomycosis, abnormal thickness and dystrophic nail.  3.  Positive for elongated, onychomycosis, abnormal thickness and dystrophic nail.  4.  Positive for elongated, onychomycosis, abnormally thick and dystrophic nail.  5.  Positive for elongated, onychomycosis, abnormally thick and dystrophic nail.     RADIOLOGY/NUCLEAR:  No results found.    LABORATORY/CULTURE RESULTS:      PATHOLOGY RESULTS:       ASSESSMENT/PLAN     Diagnoses and all orders for this visit:    1. Onychomycosis (Primary)    2. Lower limb length difference    3. Type 2 diabetes mellitus with diabetic neuropathy, without long-term current use of insulin    4. Anticoagulant long-term use    5. Peripheral edema    6. Gait abnormality      Comprehensive lower extremity examination and evaluation was performed.  Discussed  findings and treatment plan including risks, benefits, and treatment options with patient in detail. Patient agreed with treatment plan.  After verbal consent obtained, nail(s) x10 debrided of length and thickness with nail nipper without incidence  Patient may maintain nails and calluses at home utilizing emery board or pumice stone between visits as needed  Reviewed at home diabetic foot care including daily foot checks   Continue shoes with custom inserts  New RX for shoe lift for new shoes.  Elevate legs when possible.  Follow with PCP for management of DM.    An After Visit Summary was printed and given to the patient at discharge, including (if requested) any available informative/educational handouts regarding diagnosis, treatment, or medications. All questions were answered to patient/family satisfaction. Should symptoms fail to improve or worsen they agree to call or return to clinic or to go to the Emergency Department. Discussed the importance of following up with any needed screening tests/labs/specialist appointments and any requested follow-up recommended by me today. Importance of maintaining follow-up discussed and patient accepts that missed appointments can delay diagnosis and potentially lead to worsening of conditions.  Return in about 3 months (around 8/19/2023)., or sooner if acute issues arise.        This document has been electronically signed by REYMUNDO Malcolm on May 22, 2023 07:35 CDT

## 2023-05-15 ENCOUNTER — OFFICE VISIT (OUTPATIENT)
Dept: INTERNAL MEDICINE | Facility: CLINIC | Age: 78
End: 2023-05-15
Payer: MEDICARE

## 2023-05-15 VITALS
WEIGHT: 244 LBS | HEIGHT: 74 IN | DIASTOLIC BLOOD PRESSURE: 64 MMHG | OXYGEN SATURATION: 96 % | BODY MASS INDEX: 31.32 KG/M2 | HEART RATE: 64 BPM | SYSTOLIC BLOOD PRESSURE: 96 MMHG | RESPIRATION RATE: 16 BRPM

## 2023-05-15 DIAGNOSIS — E78.2 MIXED HYPERLIPIDEMIA: Chronic | ICD-10-CM

## 2023-05-15 DIAGNOSIS — Z91.81 HISTORY OF FALL: ICD-10-CM

## 2023-05-15 DIAGNOSIS — R26.81 GAIT INSTABILITY: ICD-10-CM

## 2023-05-15 DIAGNOSIS — I42.8 NON-ISCHEMIC CARDIOMYOPATHY: Chronic | ICD-10-CM

## 2023-05-15 DIAGNOSIS — I10 ESSENTIAL HYPERTENSION: Chronic | ICD-10-CM

## 2023-05-15 DIAGNOSIS — G62.9 NEUROPATHY: ICD-10-CM

## 2023-05-15 DIAGNOSIS — I48.0 PAROXYSMAL ATRIAL FIBRILLATION: Chronic | ICD-10-CM

## 2023-05-15 DIAGNOSIS — Z93.3 COLOSTOMY IN PLACE: ICD-10-CM

## 2023-05-15 DIAGNOSIS — E66.09 CLASS 1 OBESITY DUE TO EXCESS CALORIES WITH SERIOUS COMORBIDITY AND BODY MASS INDEX (BMI) OF 32.0 TO 32.9 IN ADULT: ICD-10-CM

## 2023-05-15 DIAGNOSIS — E11.42 TYPE 2 DIABETES MELLITUS WITH DIABETIC POLYNEUROPATHY, WITHOUT LONG-TERM CURRENT USE OF INSULIN: ICD-10-CM

## 2023-05-15 DIAGNOSIS — Z95.810 CARDIAC DEFIBRILLATOR IN SITU: Chronic | ICD-10-CM

## 2023-05-15 DIAGNOSIS — Z00.00 MEDICARE ANNUAL WELLNESS VISIT, SUBSEQUENT: Primary | ICD-10-CM

## 2023-05-15 DIAGNOSIS — I50.42 CHF (CONGESTIVE HEART FAILURE), NYHA CLASS II, CHRONIC, COMBINED: ICD-10-CM

## 2023-05-15 RX ORDER — ASPIRIN 81 MG
1 TABLET,CHEWABLE ORAL 3 TIMES DAILY
Qty: 42.5 G | Refills: 1 | Status: SHIPPED | OUTPATIENT
Start: 2023-05-15

## 2023-05-15 NOTE — PROGRESS NOTES
The ABCs of the Annual Wellness Visit  Subsequent Medicare Wellness Visit    Subjective    Jonh Peacock is a 77 y.o. male who presents for a Subsequent Medicare Wellness Visit.    The following portions of the patient's history were reviewed and   updated as appropriate: problem list.    Compared to one year ago, the patient feels his physical   health is the same.    Compared to one year ago, the patient feels his mental   health is the same.    Recent Hospitalizations:  He was not admitted to the hospital during the last year.       Current Medical Providers:  Patient Care Team:  Ric Antony DO as PCP - General (Internal Medicine)  Arun Banks MD as Cardiologist (Cardiology)  Celso Ellsworth MD as Consulting Physician (Urology)  Tenzin Abrams MD as Consulting Physician (Gastroenterology)  Deion Avelar DPM as Consulting Physician (Podiatry)  Chaz Chavez MD as Cardiologist (Cardiac Electrophysiology)  Jaime Vazquez OD (Optometry)  Diomedes Sterling DO as Consulting Physician (Pain Medicine)    Outpatient Medications Prior to Visit   Medication Sig Dispense Refill   • atorvastatin (LIPITOR) 20 MG tablet Take 1 tablet by mouth Daily. 90 tablet 3   • Buprenorphine 10 MCG/HR patch weekly Place 15 mcg on the skin as directed by provider Every 7 (Seven) Days.     • diazePAM (VALIUM) 2 MG tablet Take 1 mg by mouth Daily. 1/2 qam 1/2 qpm     • Diclofenac Sodium (VOLTAREN) 1 % gel gel Apply 4 g topically to the appropriate area as directed 4 (Four) Times a Day As Needed (joint pain) for up to 90 days. 600 g 2   • dilTIAZem (CARDIZEM) 30 MG tablet Take 1 tablet by mouth 3 (Three) Times a Day. 270 tablet 3   • furosemide (LASIX) 20 MG tablet Take 1 tablet by mouth Daily. 90 tablet 3   • furosemide (LASIX) 40 MG tablet Take 1 tablet by mouth Daily. TAKE WITH 20 MG TABLET FOR 60 PER MG PER DAY 90 tablet 3   • gabapentin (NEURONTIN) 400 MG capsule Take 1 capsule by mouth 2  (Two) Times a Day.     • Glucosamine 750 MG tablet Take 1 tablet by mouth 2 (Two) Times a Day.     • HYDROcodone-acetaminophen (NORCO) 7.5-325 MG per tablet Take 1 tablet by mouth Every 6 (Six) Hours As Needed for Moderate Pain.     • losartan (COZAAR) 25 MG tablet Take 1 tablet by mouth Daily. 90 tablet 3   • methocarbamol (Robaxin) 500 MG tablet Take 1 tablet by mouth 4 (Four) Times a Day. 20 tablet 1   • metoprolol succinate XL (Toprol XL) 25 MG 24 hr tablet Take 1 tablet by mouth Daily. 30 tablet 5   • Multiple Vitamins-Minerals (MULTIVITAMIN ADULT PO) Take 1 tablet by mouth Daily.     • pantoprazole (PROTONIX) 40 MG EC tablet Take 1 tablet by mouth Daily. 90 tablet 3   • polyethylene glycol (MIRALAX) powder Take 17 g by mouth Every Night. 1581 g 3   • tamsulosin (FLOMAX) 0.4 MG capsule 24 hr capsule Take 1 capsule by mouth Every Night. 90 capsule 3   • warfarin (COUMADIN) 2 MG tablet Take 1 tablet by mouth Daily. 90 tablet 1   • warfarin (COUMADIN) 4 MG tablet Take 1 tablet by mouth Daily. 90 tablet 1   • warfarin (COUMADIN) 5 MG tablet Take 1 tablet by mouth Daily. 90 tablet 1   • metoprolol succinate XL (TOPROL-XL) 100 MG 24 hr tablet Take 1 tablet by mouth Daily. (Patient not taking: Reported on 5/15/2023) 90 tablet 3     No facility-administered medications prior to visit.       Opioid medication/s are on active medication list.  and I have evaluated his active treatment plan and pain score trends (see table).  There were no vitals filed for this visit.  I have reviewed the chart for potential of high risk medication and harmful drug interactions in the elderly.            Aspirin is not on active medication list.  Aspirin use is not indicated based on review of current medical condition/s. Risk of harm outweighs potential benefits.  .    Patient Active Problem List   Diagnosis   • Mixed hyperlipidemia   • Essential hypertension   • Anemia   • Neuropathy   • Anxiety   • Depression   • Gastritis   • Class 1  "obesity due to excess calories with serious comorbidity and body mass index (BMI) of 32.0 to 32.9 in adult   • CHF (congestive heart failure), NYHA class II, chronic, combined   • Non-ischemic cardiomyopathy   • Paroxysmal atrial fibrillation   • Anticoagulant long-term use   • Onychomycosis   • Peripheral edema   • Obstructive sleep apnea of adult   • Cardiac defibrillator in situ   • Benign prostatic hyperplasia without lower urinary tract symptoms   • Colostomy in place   • Type 2 diabetes mellitus with diabetic polyneuropathy, without long-term current use of insulin   • Hx of colonic polyp   • Failed back syndrome   • Chronic pain syndrome   • S/P insertion of spinal cord stimulator   • Bilateral carpal tunnel syndrome   • Typical atrial flutter   • Sustained SVT     Advance Care Planning   Advance Care Planning     Advance Directive is not on file.  ACP discussion was declined by the patient. Patient does not have an advance directive, information provided.     Objective    Vitals:    05/15/23 1356   BP: 96/64   BP Location: Left arm   Patient Position: Sitting   Cuff Size: Large Adult   Pulse: 64   Resp: 16   SpO2: 96%   Weight: 111 kg (244 lb)   Height: 188 cm (74\")     Estimated body mass index is 31.33 kg/m² as calculated from the following:    Height as of this encounter: 188 cm (74\").    Weight as of this encounter: 111 kg (244 lb).    BMI is >= 30 and <35. (Class 1 Obesity). The following options were offered after discussion;: exercise counseling/recommendations and nutrition counseling/recommendations      Does the patient have evidence of cognitive impairment? No          HEALTH RISK ASSESSMENT    Smoking Status:  Social History     Tobacco Use   Smoking Status Never   • Passive exposure: Never   Smokeless Tobacco Never     Alcohol Consumption:  Social History     Substance and Sexual Activity   Alcohol Use Not Currently     Fall Risk Screen:    MALLY Fall Risk Assessment has not been " completed.    Depression Screenin/15/2023     1:59 PM   PHQ-2/PHQ-9 Depression Screening   Little Interest or Pleasure in Doing Things 0-->not at all   Feeling Down, Depressed or Hopeless 0-->not at all   PHQ-9: Brief Depression Severity Measure Score 0       Health Habits and Functional and Cognitive Screenin/15/2023     2:00 PM   Functional & Cognitive Status   Do you have difficulty preparing food and eating? No   Do you have difficulty bathing yourself, getting dressed or grooming yourself? No   Do you have difficulty using the toilet? No   Do you have difficulty moving around from place to place? Yes   Do you have trouble with steps or getting out of a bed or a chair? Yes   Current Diet Well Balanced Diet   Dental Exam Not up to date   Eye Exam Up to date   Exercise (times per week) 7 times per week   Current Exercises Include Other        Exercise Comment works around the home   Do you need help using the phone?  No   Are you deaf or do you have serious difficulty hearing?  Yes   Do you need help with transportation? No   Do you need help shopping? Yes   Do you need help preparing meals?  No   Do you need help with housework?  No   Do you need help with laundry? No   Do you need help taking your medications? No   Do you need help managing money? No   Do you ever drive or ride in a car without wearing a seat belt? No   Have you felt unusual stress, anger or loneliness in the last month? No   Who do you live with? Spouse   If you need help, do you have trouble finding someone available to you? No   Have you been bothered in the last four weeks by sexual problems? No   Do you have difficulty concentrating, remembering or making decisions? No       Age-appropriate Screening Schedule:  Refer to the list below for future screening recommendations based on patient's age, sex and/or medical conditions. Orders for these recommended tests are listed in the plan section. The patient has been provided  with a written plan.    Health Maintenance   Topic Date Due   • DIABETIC EYE EXAM  06/24/2022   • TDAP/TD VACCINES (2 - Td or Tdap) 09/21/2022   • HEMOGLOBIN A1C  05/17/2023   • LIPID PANEL  05/27/2023   • URINE MICROALBUMIN  05/27/2023   • INFLUENZA VACCINE  08/01/2023   • ANNUAL WELLNESS VISIT  05/15/2024   • COLORECTAL CANCER SCREENING  10/29/2024   • HEPATITIS C SCREENING  Completed   • COVID-19 Vaccine  Completed   • Pneumococcal Vaccine 65+  Completed   • ZOSTER VACCINE  Completed                  CMS Preventative Services Quick Reference  Risk Factors Identified During Encounter  Hearing Problem: hearing aids  Immunizations Discussed/Encouraged: Tdap  Inactivity/Sedentary: Patient was advised to exercise at least 150 minutes a week per CDC recommendations.  The above risks/problems have been discussed with the patient.  Pertinent information has been shared with the patient in the After Visit Summary.  An After Visit Summary and PPPS were made available to the patient.    Follow Up:   Next Medicare Wellness visit to be scheduled in 1 year.       Additional E&M Note during same encounter follows:  Patient has multiple medical problems which are significant and separately identifiable that require additional work above and beyond the Medicare Wellness Visit.      Chief Complaint  Medicare Wellness-subsequent (Pt would like paperwork filled out for motorized wheelchair) and Numbness (Numbness in fingers on both hands)    Subjective        HPI  Drew C Vanderford is also being seen today for evaluation for power wheelchair. He is here for face to face evaluation for power mobility. His mobility is limited due to his history of congestive heart failure and multiple joint replacements.  He has had falls in the past. He is unable to walk 25 feet without assistance. He is unstable when ambulating and requires assistance with walker for transfers.     He also mentions numbness to fingers that wakes him up at times. He  "has tried carpal tunnel braces without improvement.     Review of Systems   Constitutional: Negative for fever.   HENT: Negative.    Respiratory: Positive for shortness of breath (with exertion). Negative for cough.    Cardiovascular: Negative.    Gastrointestinal: Negative.    Genitourinary: Negative.    Musculoskeletal: Positive for arthralgias.   Neurological: Positive for numbness.   Psychiatric/Behavioral: Negative.        Objective   Vital Signs:  BP 96/64 (BP Location: Left arm, Patient Position: Sitting, Cuff Size: Large Adult)   Pulse 64   Resp 16   Ht 188 cm (74\")   Wt 111 kg (244 lb)   SpO2 96%   BMI 31.33 kg/m²     Physical Exam  Constitutional:       General: He is not in acute distress.  HENT:      Head: Normocephalic.      Right Ear: Tympanic membrane normal.      Left Ear: Tympanic membrane normal.      Nose: Nose normal.      Mouth/Throat:      Mouth: Mucous membranes are moist.   Eyes:      Conjunctiva/sclera: Conjunctivae normal.   Cardiovascular:      Rate and Rhythm: Normal rate. Rhythm irregular.      Heart sounds: Normal heart sounds.   Pulmonary:      Effort: Pulmonary effort is normal.      Breath sounds: Normal breath sounds.   Abdominal:      General: Bowel sounds are normal.      Tenderness: There is no abdominal tenderness.   Musculoskeletal:      Cervical back: Normal range of motion.      Comments: Limited mobility, unsteady on feet, must use walker to ambulate.    Skin:     General: Skin is warm and dry.   Neurological:      General: No focal deficit present.      Motor: Weakness present.      Gait: Gait abnormal.   Psychiatric:         Mood and Affect: Mood normal.         Behavior: Behavior normal.         Thought Content: Thought content normal.         Judgment: Judgment normal.                 Assessment and Plan   Diagnoses and all orders for this visit:    1. Medicare annual wellness visit, subsequent (Primary)    2. Type 2 diabetes mellitus with diabetic polyneuropathy, " without long-term current use of insulin    3. Neuropathy  -     capsaicin 0.1 % topical cream; Apply 1 application topically to the appropriate area as directed 3 (Three) Times a Day.  Dispense: 42.5 g; Refill: 1    4. Colostomy in place    5. Mixed hyperlipidemia    6. Class 1 obesity due to excess calories with serious comorbidity and body mass index (BMI) of 32.0 to 32.9 in adult    7. CHF (congestive heart failure), NYHA class II, chronic, combined  8. Non-ischemic cardiomyopathy  9. Paroxysmal atrial fibrillation  10. Cardiac defibrillator in situ  11. Gait instability  12. History of falls  His home is unable to accomodate scooter. He requires help with transfers due to risk for falls. Use of a power wheelchair will improve this patient's ability to participate in activities of daily living and give him independence of mobility within his home. A power mobility chair will allow him to toilet, groom, cook, clean, and perform household tasks with less assistance. The patient desires to use and has the mental and physical capabilties to safely use the power wheelchair within their home.     13. Essential hypertension  Blood pressure lower today than normal. Plan to return in a couple of weeks when he gets labs to recheck. May consider further adjustments if remains low.        Follow Up   Return in about 6 months (around 11/15/2023) for Recheck with Dr. Antony, BP recheck on 5/30 .  Patient was given instructions and counseling regarding his condition or for health maintenance advice. Please see specific information pulled into the AVS if appropriate.

## 2023-05-19 ENCOUNTER — OFFICE VISIT (OUTPATIENT)
Dept: PODIATRY | Facility: CLINIC | Age: 78
End: 2023-05-19
Payer: MEDICARE

## 2023-05-19 VITALS
HEIGHT: 74 IN | HEART RATE: 70 BPM | SYSTOLIC BLOOD PRESSURE: 116 MMHG | DIASTOLIC BLOOD PRESSURE: 62 MMHG | BODY MASS INDEX: 31.32 KG/M2 | WEIGHT: 244 LBS | OXYGEN SATURATION: 97 %

## 2023-05-19 DIAGNOSIS — M21.70 LOWER LIMB LENGTH DIFFERENCE: ICD-10-CM

## 2023-05-19 DIAGNOSIS — R60.9 PERIPHERAL EDEMA: ICD-10-CM

## 2023-05-19 DIAGNOSIS — E11.40 TYPE 2 DIABETES MELLITUS WITH DIABETIC NEUROPATHY, WITHOUT LONG-TERM CURRENT USE OF INSULIN: ICD-10-CM

## 2023-05-19 DIAGNOSIS — R26.9 GAIT ABNORMALITY: ICD-10-CM

## 2023-05-19 DIAGNOSIS — Z79.01 ANTICOAGULANT LONG-TERM USE: ICD-10-CM

## 2023-05-19 DIAGNOSIS — B35.1 ONYCHOMYCOSIS: Primary | ICD-10-CM

## 2023-05-25 ENCOUNTER — TELEPHONE (OUTPATIENT)
Dept: INTERNAL MEDICINE | Facility: CLINIC | Age: 78
End: 2023-05-25
Payer: MEDICARE

## 2023-05-25 NOTE — TELEPHONE ENCOUNTER
If the tick remained flat and is not engorged, a local reaction is not uncommon and may be treated with ice or a warm compress if painful. If the area enlarges or develops a central clearing, he should let us know. Typically if the tick is removed within 24 hours, no further treatment is needed.

## 2023-05-30 ENCOUNTER — CLINICAL SUPPORT (OUTPATIENT)
Dept: INTERNAL MEDICINE | Facility: CLINIC | Age: 78
End: 2023-05-30

## 2023-05-30 ENCOUNTER — LAB (OUTPATIENT)
Dept: LAB | Facility: HOSPITAL | Age: 78
End: 2023-05-30

## 2023-05-30 VITALS
SYSTOLIC BLOOD PRESSURE: 112 MMHG | OXYGEN SATURATION: 98 % | DIASTOLIC BLOOD PRESSURE: 70 MMHG | RESPIRATION RATE: 16 BRPM | HEART RATE: 70 BPM

## 2023-05-30 DIAGNOSIS — N40.0 BENIGN PROSTATIC HYPERPLASIA WITHOUT LOWER URINARY TRACT SYMPTOMS: ICD-10-CM

## 2023-05-30 DIAGNOSIS — I48.0 PAROXYSMAL ATRIAL FIBRILLATION: Chronic | ICD-10-CM

## 2023-05-30 DIAGNOSIS — I10 ESSENTIAL HYPERTENSION: Chronic | ICD-10-CM

## 2023-05-30 DIAGNOSIS — Z12.5 ENCOUNTER FOR PROSTATE CANCER SCREENING: ICD-10-CM

## 2023-05-30 DIAGNOSIS — E11.42 TYPE 2 DIABETES MELLITUS WITH DIABETIC POLYNEUROPATHY, WITHOUT LONG-TERM CURRENT USE OF INSULIN: ICD-10-CM

## 2023-05-30 DIAGNOSIS — E78.2 MIXED HYPERLIPIDEMIA: Primary | Chronic | ICD-10-CM

## 2023-05-30 LAB
ALBUMIN SERPL-MCNC: 3.9 G/DL (ref 3.5–5.2)
ALBUMIN UR-MCNC: 1.5 MG/DL
ALBUMIN/GLOB SERPL: 1.6 G/DL
ALP SERPL-CCNC: 80 U/L (ref 39–117)
ALT SERPL W P-5'-P-CCNC: 22 U/L (ref 1–41)
ANION GAP SERPL CALCULATED.3IONS-SCNC: 8 MMOL/L (ref 5–15)
AST SERPL-CCNC: 28 U/L (ref 1–40)
BILIRUB SERPL-MCNC: 0.4 MG/DL (ref 0–1.2)
BUN SERPL-MCNC: 17 MG/DL (ref 8–23)
BUN/CREAT SERPL: 14 (ref 7–25)
CALCIUM SPEC-SCNC: 8.7 MG/DL (ref 8.6–10.5)
CHLORIDE SERPL-SCNC: 104 MMOL/L (ref 98–107)
CHOLEST SERPL-MCNC: 159 MG/DL (ref 0–200)
CO2 SERPL-SCNC: 31 MMOL/L (ref 22–29)
CREAT SERPL-MCNC: 1.21 MG/DL (ref 0.76–1.27)
CREAT UR-MCNC: 98.1 MG/DL
DEPRECATED RDW RBC AUTO: 42.3 FL (ref 37–54)
EGFRCR SERPLBLD CKD-EPI 2021: 62.1 ML/MIN/1.73
ERYTHROCYTE [DISTWIDTH] IN BLOOD BY AUTOMATED COUNT: 12.9 % (ref 12.3–15.4)
GLOBULIN UR ELPH-MCNC: 2.5 GM/DL
GLUCOSE SERPL-MCNC: 123 MG/DL (ref 65–99)
HBA1C MFR BLD: 7 % (ref 4.8–5.6)
HCT VFR BLD AUTO: 32.8 % (ref 37.5–51)
HDLC SERPL-MCNC: 47 MG/DL (ref 40–60)
HGB BLD-MCNC: 10.9 G/DL (ref 13–17.7)
INR PPP: 2.2 (ref 0.91–1.09)
LDLC SERPL CALC-MCNC: 93 MG/DL (ref 0–100)
LDLC/HDLC SERPL: 1.94 {RATIO}
MCH RBC QN AUTO: 30.3 PG (ref 26.6–33)
MCHC RBC AUTO-ENTMCNC: 33.2 G/DL (ref 31.5–35.7)
MCV RBC AUTO: 91.1 FL (ref 79–97)
MICROALBUMIN/CREAT UR: 15.3 MG/G
PLATELET # BLD AUTO: 135 10*3/MM3 (ref 140–450)
PMV BLD AUTO: 12.7 FL (ref 6–12)
POTASSIUM SERPL-SCNC: 3.8 MMOL/L (ref 3.5–5.2)
PROT SERPL-MCNC: 6.4 G/DL (ref 6–8.5)
PROTHROMBIN TIME: 26.1 SECONDS (ref 10–13.8)
PSA SERPL-MCNC: 1.54 NG/ML (ref 0–4)
RBC # BLD AUTO: 3.6 10*6/MM3 (ref 4.14–5.8)
SODIUM SERPL-SCNC: 143 MMOL/L (ref 136–145)
TRIGL SERPL-MCNC: 103 MG/DL (ref 0–150)
VLDLC SERPL-MCNC: 19 MG/DL (ref 5–40)
WBC NRBC COR # BLD: 4.87 10*3/MM3 (ref 3.4–10.8)

## 2023-05-30 PROCEDURE — 82043 UR ALBUMIN QUANTITATIVE: CPT

## 2023-05-30 PROCEDURE — 36415 COLL VENOUS BLD VENIPUNCTURE: CPT

## 2023-05-30 PROCEDURE — 80053 COMPREHEN METABOLIC PANEL: CPT

## 2023-05-30 PROCEDURE — 80061 LIPID PANEL: CPT

## 2023-05-30 PROCEDURE — 84153 ASSAY OF PSA TOTAL: CPT

## 2023-05-30 PROCEDURE — 85027 COMPLETE CBC AUTOMATED: CPT

## 2023-05-30 PROCEDURE — 83036 HEMOGLOBIN GLYCOSYLATED A1C: CPT

## 2023-05-30 PROCEDURE — 82570 ASSAY OF URINE CREATININE: CPT

## 2023-05-30 PROCEDURE — 85610 PROTHROMBIN TIME: CPT | Performed by: INTERNAL MEDICINE

## 2023-05-30 NOTE — PROGRESS NOTES
Chief complaint: F/u hypertension with BP Check    History:  Jonh Peacock is a 77 y.o. male who presents today for BP check for hypertension.    OBJECTIVE:  /70 (BP Location: Right arm, Patient Position: Sitting, Cuff Size: Adult)   Pulse 70   Resp 16   SpO2 98%     Assessment/Plan    Diagnoses and all orders for this visit:    1. Mixed hyperlipidemia (Primary)      Patient was advised to remain on the same medication and to keep follow up appointment.       Jessy DWYER 5/30/2023

## 2023-05-30 NOTE — PROGRESS NOTES
I have reviewed the notes, assessments, and/or procedures performed by Orion Lacy, I concur with her/his documentation of Jonh Peacock.

## 2023-06-02 ENCOUNTER — OFFICE VISIT (OUTPATIENT)
Dept: CARDIOLOGY | Facility: CLINIC | Age: 78
End: 2023-06-02

## 2023-06-02 VITALS
SYSTOLIC BLOOD PRESSURE: 122 MMHG | BODY MASS INDEX: 31.83 KG/M2 | WEIGHT: 248 LBS | HEART RATE: 77 BPM | DIASTOLIC BLOOD PRESSURE: 74 MMHG | OXYGEN SATURATION: 98 % | HEIGHT: 74 IN

## 2023-06-02 DIAGNOSIS — Z95.810 CARDIAC DEFIBRILLATOR IN SITU: Chronic | ICD-10-CM

## 2023-06-02 DIAGNOSIS — I48.3 TYPICAL ATRIAL FLUTTER: ICD-10-CM

## 2023-06-02 DIAGNOSIS — I49.3 PVC'S (PREMATURE VENTRICULAR CONTRACTIONS): Primary | ICD-10-CM

## 2023-06-02 NOTE — PROGRESS NOTES
[facility] -  CLINIC FOLLOW UP     Patient Care Team:  Ric Antony DO as PCP - General (Internal Medicine)  Arun Banks MD as Cardiologist (Cardiology)  Celso Ellsworth MD as Consulting Physician (Urology)  Tenzin Abrams MD as Consulting Physician (Gastroenterology)  Deion Avelar DPM as Consulting Physician (Podiatry)  Chaz Chavez MD as Cardiologist (Cardiac Electrophysiology)  Jaime Vazquez OD (Optometry)  Diomedes Sterling DO as Consulting Physician (Pain Medicine)    Chief Complaint: follow up to Holter    Subjective   EP Problems:  1.  Paroxysmal atrial fibrillation  2.  Atrial flutter  3.  Sustained SVT / atrial tachycardia  4.  Sinus node dysfunction  5.  Presence of a cardiac defibrillator  -9/27/2012: DOI, Medtronic  -10/31/2019: Generator change, Brenda Chavez  6.  Acquired long QT syndrome  7.  Frequent PVCs     Cardiology Problems:  1.  Chronic systolic heart failure  -2/5/2019: EF 31 to 35%  2.  Hypertension  3.  Hyperlipidemia  4.  Prior DVT     Medical Problems:  1.  Obesity, BMI 32  2.  Type 2 diabetes  3.  BPH  4.  Chronic pain  5.  Obstructive sleep apnea      HPI: Today I had the pleasure of seeing Jonh Peacock in the cardiology clinic for follow up. He is a pleasant 77 year old male with a history of PAF, SVT and atrial flutter. He has had an Medtronic ICD since 2012 implanted for ventricular tachycardia. Years ago, he had ICD shocks that were thought to be inappropriate due to SVT. On device interrogations, he continues to have episodes of sustained SVT, PAF and atrial flutter.     At his last visit, Dr. Horne noted frequent PVCs that appeared to be outflow tract mediated, likely the AMC. The patient wore a holter monitor to obtain an accurate burden. Overall, his afib burden was low at <1%.     On review of the monitor, his PVC burden is 16% and the majority of those are of a single morphology (12%). Still has some  "short runs of pSVT up to 14 seconds. and NSVT. Baseline EKG does show a longer Qtc at 443ms. Dr. Horne previously did not think he would be a good candidate for antiarrhythmic meds.     He denies any palpitations or knowing that he has PVCs. He's been told in the past he had skipped beats, but is unaware of them. From a functional standpoint, he volunteers, does housework and tries to stay busy. However, he recently started noticing some increasing fatigue about an hour after his Toprol dose,  for which PCP recommended to decrease dose to 50mg and changing dose to evening only. He states this made the fatigue improve and he feels more functional now.     Objective     Visit Vitals  /74   Pulse 77   Ht 188 cm (74.02\")   Wt 112 kg (248 lb)   SpO2 98%   BMI 31.83 kg/m²           Vitals reviewed.   Constitutional:       Appearance: Healthy appearance. Not in distress.   Eyes:      Extraocular Movements: Extraocular movements intact.      Conjunctiva/sclera: Conjunctivae normal.      Pupils: Pupils are equal, round, and reactive to light.   HENT:      Head: Normocephalic and atraumatic.      Nose: Nose normal.    Mouth/Throat:      Lips: Pink.      Mouth: Mucous membranes are moist.      Pharynx: Oropharynx is clear.   Neck:      Vascular: No carotid bruit or JVD. JVD normal.   Pulmonary:      Effort: Pulmonary effort is normal.      Breath sounds: Normal breath sounds.   Chest:      Chest wall: Not tender to palpatation.   Cardiovascular:      PMI at left midclavicular line. Normal rate. Occasional ectopic beats. Irregular rhythm. Normal S1. Normal S2.       Murmurs: There is no murmur.      No gallop.  No rub.   Pulses:     Radial: 2+ bilaterally.     Posterior tibial: 2+ bilaterally.  Edema:     Peripheral edema absent.   Abdominal:      General: Bowel sounds are normal.      Palpations: Abdomen is soft.   Musculoskeletal: Normal range of motion.      Extremities: No clubbing present.     Cervical back: Normal " range of motion. Skin:     General: Skin is warm and dry.   Neurological:      General: No focal deficit present.      Mental Status: Alert and oriented to person, place, and time.      Cranial Nerves: Cranial nerves are intact.   Psychiatric:         Attention and Perception: Attention normal.         Mood and Affect: Affect normal.         Speech: Speech normal.         Behavior: Behavior normal.         Cognition and Memory: Cognition normal.           The following portions of the patient's history were reviewed and updated as appropriate: allergies, current medications, past medical history, past social history, past and problem list.     Review of Systems   Constitutional: Negative.    HENT: Negative.     Eyes: Negative.    Respiratory: Negative.     Cardiovascular: Negative.    Gastrointestinal: Negative.    Endocrine: Negative.    Genitourinary: Negative.    Musculoskeletal: Negative.    Skin: Negative.    Allergic/Immunologic: Negative.    Neurological: Negative.    Hematological: Negative.    Psychiatric/Behavioral: Negative.              ECG 12 Lead    Date/Time: 6/2/2023 2:03 PM  Performed by: Radha Leary PA  Authorized by: Radha Leary PA   Comparison: compared with previous ECG   Rhythm: sinus rhythm          Medication Review: yes    Current Outpatient Medications:     atorvastatin (LIPITOR) 20 MG tablet, Take 1 tablet by mouth Daily., Disp: 90 tablet, Rfl: 3    Buprenorphine 10 MCG/HR patch weekly, Place 15 mcg on the skin as directed by provider Every 7 (Seven) Days., Disp: , Rfl:     capsaicin 0.1 % topical cream, Apply 1 application topically to the appropriate area as directed 3 (Three) Times a Day., Disp: 42.5 g, Rfl: 1    diazePAM (VALIUM) 2 MG tablet, Take 1 mg by mouth Daily. 1/2 qam 1/2 qpm, Disp: , Rfl:     Diclofenac Sodium (VOLTAREN) 1 % gel gel, Apply 4 g topically to the appropriate area as directed 4 (Four) Times a Day As Needed (joint pain) for up to 90 days., Disp: 600 g,  Rfl: 2    dilTIAZem (CARDIZEM) 30 MG tablet, Take 1 tablet by mouth 3 (Three) Times a Day., Disp: 270 tablet, Rfl: 3    furosemide (LASIX) 20 MG tablet, Take 1 tablet by mouth Daily., Disp: 90 tablet, Rfl: 3    furosemide (LASIX) 40 MG tablet, Take 1 tablet by mouth Daily. TAKE WITH 20 MG TABLET FOR 60 PER MG PER DAY, Disp: 90 tablet, Rfl: 3    gabapentin (NEURONTIN) 400 MG capsule, Take 1 capsule by mouth 2 (Two) Times a Day., Disp: , Rfl:     Glucosamine 750 MG tablet, Take 1 tablet by mouth 2 (Two) Times a Day., Disp: , Rfl:     HYDROcodone-acetaminophen (NORCO) 7.5-325 MG per tablet, Take 1 tablet by mouth Every 6 (Six) Hours As Needed for Moderate Pain., Disp: , Rfl:     losartan (COZAAR) 25 MG tablet, Take 1 tablet by mouth Daily., Disp: 90 tablet, Rfl: 3    methocarbamol (Robaxin) 500 MG tablet, Take 1 tablet by mouth 4 (Four) Times a Day., Disp: 20 tablet, Rfl: 1    metoprolol succinate XL (TOPROL-XL) 100 MG 24 hr tablet, Take 1 tablet by mouth Daily. (Patient taking differently: Take 50 mg by mouth Every Evening.), Disp: 90 tablet, Rfl: 3    Multiple Vitamins-Minerals (MULTIVITAMIN ADULT PO), Take 1 tablet by mouth Daily., Disp: , Rfl:     pantoprazole (PROTONIX) 40 MG EC tablet, Take 1 tablet by mouth Daily., Disp: 90 tablet, Rfl: 3    polyethylene glycol (MIRALAX) powder, Take 17 g by mouth Every Night., Disp: 1581 g, Rfl: 3    tamsulosin (FLOMAX) 0.4 MG capsule 24 hr capsule, Take 1 capsule by mouth Every Night., Disp: 90 capsule, Rfl: 3    warfarin (COUMADIN) 2 MG tablet, Take 1 tablet by mouth Daily., Disp: 90 tablet, Rfl: 1    warfarin (COUMADIN) 4 MG tablet, Take 1 tablet by mouth Daily., Disp: 90 tablet, Rfl: 1    warfarin (COUMADIN) 5 MG tablet, Take 1 tablet by mouth Daily., Disp: 90 tablet, Rfl: 1   Allergies   Allergen Reactions    Percocet [Oxycodone-Acetaminophen] Urinary Retention    Amiodarone Other (See Comments)     INTERFERES WITH OTHER MEDICATIONS    Oxycontin [Oxycodone Hcl] Urinary  "Retention    Penicillins Hives     All \"cillins\"    Vioxx [Rofecoxib] GI Intolerance       I have reviewed       CBC:  Lab Results - Last 18 Months   Lab Units 05/30/23  1241   WBC 10*3/mm3 4.87   HEMOGLOBIN g/dL 10.9*   HEMATOCRIT % 32.8*   PLATELETS 10*3/mm3 135*      BMP/CMP:  Lab Results - Last 18 Months   Lab Units 05/30/23  1241   SODIUM mmol/L 143   POTASSIUM mmol/L 3.8   CHLORIDE mmol/L 104   CO2 mmol/L 31.0*   GLUCOSE mg/dL 123*   BUN mg/dL 17   CREATININE mg/dL 1.21   CALCIUM mg/dL 8.7     BNP: No results for input(s): PROBNP in the last 57716 hours.   THYROID:No results for input(s): TSH, FREET4, FTI in the last 08140 hours.    Invalid input(s): FREET3, T3, T4, TEUP,  TOTALT4    Results for orders placed during the hospital encounter of 02/07/19    Adult Transthoracic Echo Complete W/ Cont if Necessary Per Protocol    Interpretation Summary  · Left ventricular systolic function is moderately decreased. Estimated EF appears to be in the range of 31 - 35%.  · Left ventricular diastolic dysfunction (grade I) consistent with impaired relaxation.  · The left ventricular cavity is moderately dilated.  · Trace tricuspid valve regurgitation is present. Estimated right ventricular systolic pressure from tricuspid regurgitation is mildly elevated (35-45 mmHg).     Assessment:   Diagnoses and all orders for this visit:    1. PVC's (premature ventricular contractions) (Primary)  -     Adult Transthoracic Echo Complete w/ Color, Spectral and Contrast if necessary per protocol; Future  -     ECG 12 Lead    2. Cardiac defibrillator in situ    3. Typical atrial flutter    PVCs: Reviewed monitor with patient. PVC burden was 16%, minimal afib/SVT. He is now on reduced toprol dose due to fatigue, which may increase the PVC burden now. However, he's asymptomatic. Dr. Horne did not recommend antiarrhythmic therapy due to prolonged QT at baseline. Discussed potential PVC ablation given that the majority of his PVCs were felt to " originate in one location and since increased doses of beta blocker affect him as well as AADs could increase QT. Mr. Mauro does not want to make any decisions today and would prefer to think about it, which is reasonable. Will have him follow up with Dr. Horne at next visit.     HFrEF: Last echo performed in 2019. Appears compensated today. However, 16% burden of PVC could possibly affect heart function. He is due to follow up in July with Dr. Banks and will have him obtain echo prior to that visit.   -Given his EF was 31-35%, diltiazem is not ideal with reduced EF. Potentially could increase Toprol. Will defer to Dr. Banks.   -Currently on Toprol, Losartan, Lasix for GDMT.     Dual chamber Medtronic ICD: Reviewed remote transmission. No VT or shocks. <1 v-pacing.     PAF/Atrial flutter; Chronically anticoagulated with warfarin. He states he had an issues with xarelto before. Not a high burden of afib on monitor.       I spent 30 minutes caring for Jonh on this date of service. This time includes time spent by me in the following activities:preparing for the visit, reviewing tests, obtaining and/or reviewing a separately obtained history, performing a medically appropriate examination and/or evaluation , counseling and educating the patient/family/caregiver, ordering medications, tests, or procedures, documenting information in the medical record, and independently interpreting results and communicating that information with the patient/family/caregiver        Electronically signed by JOCELYN Florian

## 2023-06-14 ENCOUNTER — HOSPITAL ENCOUNTER (OUTPATIENT)
Dept: CARDIOLOGY | Facility: HOSPITAL | Age: 78
Discharge: HOME | End: 2023-06-14
Admitting: PHYSICIAN ASSISTANT
Payer: MEDICARE

## 2023-06-14 VITALS
BODY MASS INDEX: 31.69 KG/M2 | WEIGHT: 246.91 LBS | HEIGHT: 74 IN | DIASTOLIC BLOOD PRESSURE: 74 MMHG | SYSTOLIC BLOOD PRESSURE: 122 MMHG

## 2023-06-14 DIAGNOSIS — I49.3 PVC'S (PREMATURE VENTRICULAR CONTRACTIONS): ICD-10-CM

## 2023-06-14 PROCEDURE — 93306 TTE W/DOPPLER COMPLETE: CPT

## 2023-06-14 PROCEDURE — 25510000001 PERFLUTREN 6.52 MG/ML SUSPENSION: Performed by: INTERNAL MEDICINE

## 2023-06-14 PROCEDURE — 93306 TTE W/DOPPLER COMPLETE: CPT | Performed by: INTERNAL MEDICINE

## 2023-06-14 RX ADMIN — PERFLUTREN 1.17 MG: 6.52 INJECTION, SUSPENSION INTRAVENOUS at 14:59

## 2023-06-15 LAB
BH CV ECHO MEAS - AO MAX PG: 8 MMHG
BH CV ECHO MEAS - AO MEAN PG: 3.5 MMHG
BH CV ECHO MEAS - AO ROOT DIAM: 3.9 CM
BH CV ECHO MEAS - AO V2 MAX: 141 CM/SEC
BH CV ECHO MEAS - AO V2 VTI: 22.1 CM
BH CV ECHO MEAS - AVA(I,D): 2.7 CM2
BH CV ECHO MEAS - EDV(CUBED): 118 ML
BH CV ECHO MEAS - EDV(MOD-SP2): 269 ML
BH CV ECHO MEAS - EDV(MOD-SP4): 323 ML
BH CV ECHO MEAS - EF(MOD-SP2): 45.7 %
BH CV ECHO MEAS - EF(MOD-SP4): 33.4 %
BH CV ECHO MEAS - ESV(CUBED): 82.6 ML
BH CV ECHO MEAS - ESV(MOD-SP2): 146 ML
BH CV ECHO MEAS - ESV(MOD-SP4): 215 ML
BH CV ECHO MEAS - FS: 11.2 %
BH CV ECHO MEAS - IVS/LVPW: 1.08 CM
BH CV ECHO MEAS - IVSD: 1.15 CM
BH CV ECHO MEAS - LA DIMENSION: 4.1 CM
BH CV ECHO MEAS - LAT PEAK E' VEL: 8.3 CM/SEC
BH CV ECHO MEAS - LV DIASTOLIC VOL/BSA (35-75): 128.2 CM2
BH CV ECHO MEAS - LV MASS(C)D: 202.8 GRAMS
BH CV ECHO MEAS - LV MAX PG: 2.6 MMHG
BH CV ECHO MEAS - LV MEAN PG: 1 MMHG
BH CV ECHO MEAS - LV SYSTOLIC VOL/BSA (12-30): 85.3 CM2
BH CV ECHO MEAS - LV V1 MAX: 78.7 CM/SEC
BH CV ECHO MEAS - LV V1 VTI: 14.3 CM
BH CV ECHO MEAS - LVIDD: 4.9 CM
BH CV ECHO MEAS - LVIDS: 4.4 CM
BH CV ECHO MEAS - LVOT AREA: 4.2 CM2
BH CV ECHO MEAS - LVOT DIAM: 2.3 CM
BH CV ECHO MEAS - LVPWD: 1.07 CM
BH CV ECHO MEAS - MED PEAK E' VEL: 4.8 CM/SEC
BH CV ECHO MEAS - MR MAX PG: 67.9 MMHG
BH CV ECHO MEAS - MR MAX VEL: 412 CM/SEC
BH CV ECHO MEAS - MR MEAN PG: 42.3 MMHG
BH CV ECHO MEAS - MR MEAN VEL: 305.3 CM/SEC
BH CV ECHO MEAS - MR VTI: 163.7 CM
BH CV ECHO MEAS - MV A MAX VEL: 86.2 CM/SEC
BH CV ECHO MEAS - MV DEC TIME: 0.38 MSEC
BH CV ECHO MEAS - MV E MAX VEL: 56 CM/SEC
BH CV ECHO MEAS - MV E/A: 0.65
BH CV ECHO MEAS - PI END-D VEL: 137 CM/SEC
BH CV ECHO MEAS - SI(MOD-SP2): 48.8 ML/M2
BH CV ECHO MEAS - SI(MOD-SP4): 42.9 ML/M2
BH CV ECHO MEAS - SV(LVOT): 59.4 ML
BH CV ECHO MEAS - SV(MOD-SP2): 123 ML
BH CV ECHO MEAS - SV(MOD-SP4): 108 ML
BH CV ECHO MEAS - TR MAX PG: 22.8 MMHG
BH CV ECHO MEAS - TR MAX VEL: 239 CM/SEC
BH CV ECHO MEASUREMENTS AVERAGE E/E' RATIO: 8.55
LEFT ATRIUM VOLUME INDEX: 40.1 ML/M2
LEFT ATRIUM VOLUME: 101 ML
MAXIMAL PREDICTED HEART RATE: 144 BPM
STRESS TARGET HR: 122 BPM

## 2023-07-27 ENCOUNTER — LAB (OUTPATIENT)
Dept: LAB | Facility: HOSPITAL | Age: 78
End: 2023-07-27
Payer: MEDICARE

## 2023-07-27 DIAGNOSIS — I48.0 PAROXYSMAL ATRIAL FIBRILLATION: Chronic | ICD-10-CM

## 2023-07-27 LAB
INR PPP: 2.6 (ref 0.91–1.09)
PROTHROMBIN TIME: 30.9 SECONDS (ref 10–13.8)

## 2023-07-27 PROCEDURE — 85610 PROTHROMBIN TIME: CPT | Performed by: INTERNAL MEDICINE

## 2023-07-30 ENCOUNTER — NURSE TRIAGE (OUTPATIENT)
Dept: CALL CENTER | Facility: HOSPITAL | Age: 78
End: 2023-07-30
Payer: MEDICARE

## 2023-07-30 ENCOUNTER — DOCUMENTATION (OUTPATIENT)
Dept: INTERNAL MEDICINE | Facility: CLINIC | Age: 78
End: 2023-07-30
Payer: MEDICARE

## 2023-07-30 NOTE — TELEPHONE ENCOUNTER
Caller with reported positive in home Covid test. Multiple co morbidities and high risk with c/o cough, low grade fever and sore throat. Symptoms began yesterday. Requesting Rx for paxlovid to [facility] [place], [place].    This RN spoke with REYMUNDO Benitez on call for Dr. Antony. Information reviewed. Stated she will send in Rx for Paxlovid after reviewing meds and dosaging with pharmacist. Stated to let patient know will send in Rx.    Caller notified , verbalized understanding.    Reason for Disposition   [1] HIGH RISK patient AND [2] influenza is widespread in the community AND [3] ONE OR MORE respiratory symptoms: cough, sore throat, runny or stuffy nose    Additional Information   Negative: SEVERE difficulty breathing (e.g., struggling for each breath, speaks in single words)   Negative: Difficult to awaken or acting confused (e.g., disoriented, slurred speech)   Negative: Bluish (or gray) lips or face now   Negative: Shock suspected (e.g., cold/pale/clammy skin, too weak to stand, low BP, rapid pulse)   Negative: Sounds like a life-threatening emergency to the triager   Negative: [1] Diagnosed or suspected COVID-19 AND [2] symptoms lasting 3 or more weeks   Negative: [1] COVID-19 exposure AND [2] no symptoms   Negative: COVID-19 vaccine reaction suspected (e.g., fever, headache, muscle aches) occurring 1 to 3 days after getting vaccine   Negative: COVID-19 vaccine, questions about   Negative: [1] Lives with someone known to have influenza (flu test positive) AND [2] flu-like symptoms (e.g., cough, runny nose, sore throat, SOB; with or without fever)   Negative: [1] Possible COVID-19 symptoms AND [2] triager concerned about severity of symptoms or other causes   Negative: COVID-19 and breastfeeding, questions about   Negative: SEVERE or constant chest pain or pressure  (Exception: Mild central chest pain, present only when coughing.)   Negative: MODERATE difficulty breathing (e.g., speaks in phrases,  "SOB even at rest, pulse 100-120)   Negative: [1] Headache AND [2] stiff neck (can't touch chin to chest)   Negative: Oxygen level (e.g., pulse oximetry) 90 percent or lower   Negative: Chest pain or pressure  (Exception: MILD central chest pain, present only when coughing.)   Negative: [1] Drinking very little AND [2] dehydration suspected (e.g., no urine > 12 hours, very dry mouth, very lightheaded)   Negative: Patient sounds very sick or weak to the triager   Negative: MILD difficulty breathing (e.g., minimal/no SOB at rest, SOB with walking, pulse <100)   Negative: Fever > 103 F (39.4 C)   Negative: [1] Fever > 101 F (38.3 C) AND [2] age > 60 years   Negative: [1] Fever > 100.0 F (37.8 C) AND [2] bedridden (e.g., CVA, chronic illness, recovering from surgery)   Negative: Oxygen level (e.g., pulse oximetry) 91 to 94 percent   Negative: [1] HIGH RISK patient (e.g., weak immune system, age > 64 years, obesity with BMI 30 or higher, pregnant, chronic lung disease or other chronic medical condition) AND [2] COVID symptoms (e.g., cough, fever)  (Exceptions: Already seen by PCP and no new or worsening symptoms.)    Answer Assessment - Initial Assessment Questions  1. COVID-19 DIAGNOSIS: \"How do you know that you have COVID?\" (e.g., positive lab test or self-test, diagnosed by doctor or NP/PA, symptoms after exposure).      In home Covid kylie positive  2. COVID-19 EXPOSURE: \"Was there any known exposure to COVID before the symptoms began?\" CDC Definition of close contact: within 6 feet (2 meters) for a total of 15 minutes or more over a 24-hour period.       Yes  3. ONSET: \"When did the COVID-19 symptoms start?\"       Yesterday  4. WORST SYMPTOM: \"What is your worst symptom?\" (e.g., cough, fever, shortness of breath, muscle aches)      Cough  5. COUGH: \"Do you have a cough?\" If Yes, ask: \"How bad is the cough?\"        Yes. Moderate  6. FEVER: \"Do you have a fever?\" If Yes, ask: \"What is your temperature, how was it " "measured, and when did it start?\"      Low grade  7. RESPIRATORY STATUS: \"Describe your breathing?\" (e.g., normal; shortness of breath, wheezing, unable to speak)       Denies any unuasual SOA  8. BETTER-SAME-WORSE: \"Are you getting better, staying the same or getting worse compared to yesterday?\"  If getting worse, ask, \"In what way?\"      Worse  9. OTHER SYMPTOMS: \"Do you have any other symptoms?\"  (e.g., chills, fatigue, headache, loss of smell or taste, muscle pain, sore throat)      Low grade fever, sore throat  10. HIGH RISK DISEASE: \"Do you have any chronic medical problems?\" (e.g., asthma, heart or lung disease, weak immune system, obesity, etc.)        Yes  11. VACCINE: \"Have you had the COVID-19 vaccine?\" If Yes, ask: \"Which one, how many shots, when did you get it?\"        N/A  12. PREGNANCY: \"Is there any chance you are pregnant?\" \"When was your last menstrual period?\"        N/A  13. O2 SATURATION MONITOR:  \"Do you use an oxygen saturation monitor (pulse oximeter) at home?\" If Yes, ask \"What is your reading (oxygen level) today?\" \"What is your usual oxygen saturation reading?\" (e.g., 95%)        Does not have capability to check.    Protocols used: Coronavirus (COVID-19) Diagnosed or Suspected-ADULT-AH    "

## 2023-07-31 ENCOUNTER — TELEPHONE (OUTPATIENT)
Dept: INTERNAL MEDICINE | Facility: CLINIC | Age: 78
End: 2023-07-31
Payer: MEDICARE

## 2023-08-14 NOTE — PROGRESS NOTES
[facility] - PODIATRY    Today's Date: 08/22/23    Patient Name: Jonh Peacock  MRN: [id number]  CSN: [id number]  PCP: Ric Antony DO  Referring Provider: No ref. provider found     SUBJECTIVE     Chief Complaint   Patient presents with    Follow-up     Ric Antony, 05/15/2023 3 MTH FU DIABETIC- pt states feet doing good- pt denies pain     Diabetes       HPI: Jonh Peacock, a 77 y.o.male, comes to clinic as a(n) established patient presenting for diabetic foot exam and complaining of thick fungal toenails. Pt with h/o Anxiety, Arrhythmia, Cardiomyopathy, CHF, Depression, previous DVT, HTN, Obesity, Anticoagulation with Warfarin. Patient is NIDDM and unsure of last BG level. Does not routinely check BG levels. Denies pain today. Uses rollator for ambulation. Continues daily coumadin therapy. Notes improvement to feet with regular visits. Reports numbness in his feet. Denies any constitutional symptoms. No other pedal complaints at this time.    Past Medical History:   Diagnosis Date    Anemia     Anxiety     Arrhythmia     Arthritis     Atrial fibrillation     Cardiomyopathy     CHF (congestive heart failure)     Colon polyp     Colostomy present     10years    Connective tissue disease     Depression     Depression     Diverticulitis     Dizzy     DVT (deep venous thrombosis)     Gastritis     GERD (gastroesophageal reflux disease)     Hiatal hernia     History of transfusion     Hyperlipidemia     Hypertension     Neuropathy     Pneumonia      Past Surgical History:   Procedure Laterality Date    APPENDECTOMY      BACK SURGERY  2002, 2007    Multiple spine surgeries - laminectomy & fusion    CARDIAC CATHETERIZATION      CARDIAC DEFIBRILLATOR PLACEMENT      CARDIAC ELECTROPHYSIOLOGY PROCEDURE N/A 10/31/2019    Procedure: ICD GEN CHANGE;  Surgeon: Chaz Chavez MD;  Location:  [facility] CATH INVASIVE [facility]ATION;  Service: Cardiology    COLON RESECTION WITH COLOSTOMY       "COLONOSCOPY  09/04/2014    diverticulosis    COLONOSCOPY N/A 10/29/2019    Procedure: COLONOSCOPY WITH ANESTHESIA;  Surgeon: Tenzin Abrams MD;  Location:  [facility] ENDOSCOPY;  Service: Gastroenterology    ENDOSCOPY  02/24/1999    small sliding hiatal hernia    FOOT SURGERY Bilateral 2005    JOINT REPLACEMENT Right 2002    KNEE    PACEMAKER IMPLANTATION  2012    RECTAL FISSURE INCISION AND DRAINAGE      REPLACEMENT TOTAL KNEE      SPINAL CORD STIMULATOR IMPLANT  08/2012    Placed by Dr. DEENA Villela, [employer]    THORACIC LAMINECTOMY WITH PLACEMENT OF DORSAL COLUMN STIMULATOR Right 1/28/2021    Procedure: SPINAL CORD STIMULATOR REVISION, right buttocks;  Surgeon: Noah Oliveros MD;  Location:  [facility] OR;  Service: Neurosurgery;  Laterality: Right;    TOTAL HIP ARTHROPLASTY Right 2004    TOTAL HIP ARTHROPLASTY Left 12/2008     Family History   Problem Relation Age of Onset    Heart disease Mother     Heart disease Brother     Breast cancer Brother     Colon cancer Neg Hx     Colon polyps Neg Hx      Social History     Socioeconomic History    Marital status:    Tobacco Use    Smoking status: Never     Passive exposure: Never    Smokeless tobacco: Never   Vaping Use    Vaping Use: Never used   Substance and Sexual Activity    Alcohol use: Not Currently    Drug use: Never    Sexual activity: Defer     Allergies   Allergen Reactions    Percocet [Oxycodone-Acetaminophen] Urinary Retention    Amiodarone Other (See Comments)     INTERFERES WITH OTHER MEDICATIONS    Oxycontin [Oxycodone Hcl] Urinary Retention    Penicillins Hives     All \"cillins\"    Vioxx [Rofecoxib] GI Intolerance     Current Outpatient Medications   Medication Sig Dispense Refill    atorvastatin (LIPITOR) 20 MG tablet Take 1 tablet by mouth Daily. 90 tablet 3    Buprenorphine 10 MCG/HR patch weekly Place 15 mcg on the skin as directed by provider Every 7 (Seven) Days.      capsaicin 0.1 % topical cream Apply 1 application topically to the " appropriate area as directed 3 (Three) Times a Day. 42.5 g 1    diazePAM (VALIUM) 2 MG tablet Take 0.5 tablets by mouth Daily. 1/2 qam 1/2 qpm      dilTIAZem (CARDIZEM) 30 MG tablet Take 1 tablet by mouth 3 (Three) Times a Day. 270 tablet 3    furosemide (LASIX) 20 MG tablet Take 1 tablet by mouth Daily. 90 tablet 3    furosemide (LASIX) 40 MG tablet Take 1 tablet by mouth Daily. TAKE WITH 20 MG TABLET FOR 60 PER MG PER DAY 90 tablet 3    gabapentin (NEURONTIN) 400 MG capsule Take 1 capsule by mouth 2 (Two) Times a Day.      Glucosamine 750 MG tablet Take 1 tablet by mouth 2 (Two) Times a Day.      losartan (COZAAR) 25 MG tablet Take 1 tablet by mouth Daily. 90 tablet 3    methocarbamol (Robaxin) 500 MG tablet Take 1 tablet by mouth 4 (Four) Times a Day. 20 tablet 1    metoprolol succinate XL (TOPROL-XL) 100 MG 24 hr tablet Take 1 tablet by mouth Daily. (Patient taking differently: Take 0.5 tablets by mouth Every Evening.) 90 tablet 3    Multiple Vitamins-Minerals (MULTIVITAMIN ADULT PO) Take 1 tablet by mouth Daily.      pantoprazole (PROTONIX) 40 MG EC tablet Take 1 tablet by mouth Daily. 90 tablet 3    polyethylene glycol (MIRALAX) powder Take 17 g by mouth Every Night. 1581 g 3    tamsulosin (FLOMAX) 0.4 MG capsule 24 hr capsule Take 1 capsule by mouth Every Night. 90 capsule 3    warfarin (COUMADIN) 2 MG tablet Take 1 tablet by mouth Daily. 90 tablet 1    warfarin (COUMADIN) 4 MG tablet Take 1 tablet by mouth Daily. 90 tablet 1    warfarin (COUMADIN) 5 MG tablet Take 1 tablet by mouth Daily. 90 tablet 1     No current facility-administered medications for this visit.     Review of Systems   Constitutional:  Negative for chills and fever.   HENT:  Negative for congestion.    Respiratory:  Negative for shortness of breath.    Cardiovascular:  Positive for leg swelling. Negative for chest pain.   Gastrointestinal:  Negative for constipation, diarrhea, nausea and vomiting.   Musculoskeletal:  Positive for gait  problem.        Foot pain   Skin:  Negative for wound.        Thick Toenails   Neurological:  Positive for numbness.   Hematological:  Bruises/bleeds easily.     OBJECTIVE     Vitals:    08/22/23 1541   BP: 118/58   Pulse: 55   SpO2: 94%       PHYSICAL EXAM  GEN:   Accompanied by wife.    Foot/Ankle Exam    GENERAL  Diabetic foot exam performed    Appearance:  appears stated age  Orientation:  AAOx3  Affect:  appropriate  Gait:  (unsteady)  Assistance:  walker  Right shoe gear: casual shoe  Left shoe gear: casual shoe (with lift)    VASCULAR     Right Foot Vascularity   Dorsalis pedis:  1+  Posterior tibial:  1+  Skin temperature:  warm  Edema grading:  Trace and non-pitting  CFT:  4  Pedal hair growth:  Absent  Varicosities:  none     Left Foot Vascularity   Dorsalis pedis:  1+  Posterior tibial:  1+  Skin temperature:  warm  Edema grading:  Trace and non-pitting  CFT:  4  Pedal hair growth:  Absent  Varicosities:  none     NEUROLOGIC     Right Foot Neurologic   Light touch sensation: diminished  Vibratory sensation: diminished  Hot/Cold sensation: diminished  Protective Sensation using Sledge-Akhil Monofilament:   Sites intact: 7  Sites tested: 10     Left Foot Neurologic   Light touch sensation: diminished  Vibratory sensation: diminished  Hot/Cold sensation:  diminished  Protective Sensation using Sledge-Akhil Monofilament:   Sites intact: 7  Sites tested: 10    MUSCULOSKELETAL     Right Foot Musculoskeletal   Ecchymosis:  none  Tenderness:  toenail problem    Arch:  Pes planus  Hallux valgus: No       Left Foot Musculoskeletal   Ecchymosis:  none  Tenderness:  toenail problem  Arch:  Pes planus  Hallux valgus: No      MUSCLE STRENGTH     Right Foot Muscle Strength   Foot dorsiflexion:  5  Foot plantar flexion:  5  Foot inversion:  5  Foot eversion:  5     Left Foot Muscle Strength   Foot dorsiflexion:  5  Foot plantar flexion:  5  Foot inversion:  5  Foot eversion:  5    RANGE OF MOTION     Right Foot  Range of Motion   Foot and ankle ROM within normal limits       Left Foot Range of Motion   Foot and ankle ROM within normal limits      DERMATOLOGIC      Right Foot Dermatologic   Skin  Right foot skin is intact.   Nails  1.  Positive for elongated, onychomycosis, abnormal thickness and dystrophic nail.  2.  Positive for elongated, onychomycosis, abnormal thickness and dystrophic nail.  3.  Positive for elongated, onychomycosis, abnormal thickness and dystrophic nail.  4.  Positive for elongated, onychomycosis, abnormal thickness and dystrophic nail.  5.  Positive for elongated, onychomycosis, abnormal thickness and dystrophic nail.     Left Foot Dermatologic   Skin  Left foot skin is intact.   Nails  1.  Positive for elongated, onychomycosis, abnormal thickness and dystrophic nail.  2.  Positive for elongated, onychomycosis, abnormal thickness and dystrophic nail.  3.  Positive for elongated, onychomycosis, abnormal thickness and dystrophic nail.  4.  Positive for elongated, onychomycosis, abnormally thick and dystrophic nail.  5.  Positive for elongated, onychomycosis, abnormally thick and dystrophic nail.     RADIOLOGY/NUCLEAR:  No results found.    LABORATORY/CULTURE RESULTS:      PATHOLOGY RESULTS:       ASSESSMENT/PLAN     Diagnoses and all orders for this visit:    1. Onychomycosis (Primary)    2. Type 2 diabetes mellitus with diabetic neuropathy, without long-term current use of insulin    3. Lower limb length difference    4. Anticoagulant long-term use    5. Peripheral edema    6. Gait abnormality      Comprehensive lower extremity examination and evaluation was performed.  Discussed findings and treatment plan including risks, benefits, and treatment options with patient in detail. Patient agreed with treatment plan.  After verbal consent obtained, nail(s) x10 debrided of length and thickness with nail nipper without incidence  Patient may maintain nails and calluses at home utilizing emery board or  pumice stone between visits as needed  Reviewed at home diabetic foot care including daily foot checks   Continue shoes with custom inserts  Elevate legs when possible.  Follow with PCP for management of DM.    An After Visit Summary was printed and given to the patient at discharge, including (if requested) any available informative/educational handouts regarding diagnosis, treatment, or medications. All questions were answered to patient/family satisfaction. Should symptoms fail to improve or worsen they agree to call or return to clinic or to go to the Emergency Department. Discussed the importance of following up with any needed screening tests/labs/specialist appointments and any requested follow-up recommended by me today. Importance of maintaining follow-up discussed and patient accepts that missed appointments can delay diagnosis and potentially lead to worsening of conditions.  Return in about 3 months (around 11/22/2023) for Recheck, Follow-up with Dr. Avelar., or sooner if acute issues arise.        This document has been electronically signed by Deion Avelar DPM on August 22, 2023 16:15 CDT

## 2023-08-21 ENCOUNTER — TELEPHONE (OUTPATIENT)
Dept: PODIATRY | Facility: CLINIC | Age: 78
End: 2023-08-21
Payer: MEDICARE

## 2023-08-21 NOTE — TELEPHONE ENCOUNTER
Spoke with patient and reminded them of their appt with Dr. Avelar tomorrow. Pt was able to confirm appt with me.

## 2023-08-22 ENCOUNTER — OFFICE VISIT (OUTPATIENT)
Dept: PODIATRY | Facility: CLINIC | Age: 78
End: 2023-08-22
Payer: MEDICARE

## 2023-08-22 VITALS
SYSTOLIC BLOOD PRESSURE: 118 MMHG | BODY MASS INDEX: 30.93 KG/M2 | WEIGHT: 241 LBS | HEART RATE: 55 BPM | OXYGEN SATURATION: 94 % | HEIGHT: 74 IN | DIASTOLIC BLOOD PRESSURE: 58 MMHG

## 2023-08-22 DIAGNOSIS — Z79.01 ANTICOAGULANT LONG-TERM USE: ICD-10-CM

## 2023-08-22 DIAGNOSIS — M21.70 LOWER LIMB LENGTH DIFFERENCE: ICD-10-CM

## 2023-08-22 DIAGNOSIS — R60.9 PERIPHERAL EDEMA: ICD-10-CM

## 2023-08-22 DIAGNOSIS — E11.40 TYPE 2 DIABETES MELLITUS WITH DIABETIC NEUROPATHY, WITHOUT LONG-TERM CURRENT USE OF INSULIN: ICD-10-CM

## 2023-08-22 DIAGNOSIS — B35.1 ONYCHOMYCOSIS: Primary | ICD-10-CM

## 2023-08-22 DIAGNOSIS — R26.9 GAIT ABNORMALITY: ICD-10-CM

## 2023-08-22 PROCEDURE — 3078F DIAST BP <80 MM HG: CPT | Performed by: PODIATRIST

## 2023-08-22 PROCEDURE — 1160F RVW MEDS BY RX/DR IN RCRD: CPT | Performed by: PODIATRIST

## 2023-08-22 PROCEDURE — 1159F MED LIST DOCD IN RCRD: CPT | Performed by: PODIATRIST

## 2023-08-22 PROCEDURE — 11721 DEBRIDE NAIL 6 OR MORE: CPT | Performed by: PODIATRIST

## 2023-08-22 PROCEDURE — 3074F SYST BP LT 130 MM HG: CPT | Performed by: PODIATRIST

## 2023-08-28 ENCOUNTER — LAB (OUTPATIENT)
Dept: LAB | Facility: HOSPITAL | Age: 78
End: 2023-08-28
Payer: MEDICARE

## 2023-08-28 DIAGNOSIS — I48.0 PAROXYSMAL ATRIAL FIBRILLATION: Chronic | ICD-10-CM

## 2023-08-28 LAB
INR PPP: 2.4 (ref 0.91–1.09)
PROTHROMBIN TIME: 28.8 SECONDS (ref 10–13.8)

## 2023-08-28 PROCEDURE — 85610 PROTHROMBIN TIME: CPT | Performed by: INTERNAL MEDICINE

## 2023-09-27 ENCOUNTER — OFFICE VISIT (OUTPATIENT)
Dept: CARDIOLOGY | Facility: CLINIC | Age: 78
End: 2023-09-27
Payer: MEDICARE

## 2023-09-27 ENCOUNTER — LAB (OUTPATIENT)
Dept: LAB | Facility: HOSPITAL | Age: 78
End: 2023-09-27
Payer: MEDICARE

## 2023-09-27 VITALS
HEIGHT: 74 IN | WEIGHT: 242 LBS | DIASTOLIC BLOOD PRESSURE: 64 MMHG | BODY MASS INDEX: 31.06 KG/M2 | HEART RATE: 73 BPM | SYSTOLIC BLOOD PRESSURE: 100 MMHG | OXYGEN SATURATION: 98 %

## 2023-09-27 DIAGNOSIS — I42.8 NON-ISCHEMIC CARDIOMYOPATHY: Primary | Chronic | ICD-10-CM

## 2023-09-27 DIAGNOSIS — I48.0 PAROXYSMAL ATRIAL FIBRILLATION: Chronic | ICD-10-CM

## 2023-09-27 DIAGNOSIS — I49.3 PVC'S (PREMATURE VENTRICULAR CONTRACTIONS): ICD-10-CM

## 2023-09-27 LAB
INR PPP: 1.7 (ref 0.91–1.09)
PROTHROMBIN TIME: 20.7 SECONDS (ref 10–13.8)

## 2023-09-27 PROCEDURE — 99214 OFFICE O/P EST MOD 30 MIN: CPT | Performed by: INTERNAL MEDICINE

## 2023-09-27 PROCEDURE — 3078F DIAST BP <80 MM HG: CPT | Performed by: INTERNAL MEDICINE

## 2023-09-27 PROCEDURE — 1160F RVW MEDS BY RX/DR IN RCRD: CPT | Performed by: INTERNAL MEDICINE

## 2023-09-27 PROCEDURE — 85610 PROTHROMBIN TIME: CPT | Performed by: INTERNAL MEDICINE

## 2023-09-27 PROCEDURE — 1159F MED LIST DOCD IN RCRD: CPT | Performed by: INTERNAL MEDICINE

## 2023-09-27 PROCEDURE — 93000 ELECTROCARDIOGRAM COMPLETE: CPT | Performed by: INTERNAL MEDICINE

## 2023-09-27 PROCEDURE — 3074F SYST BP LT 130 MM HG: CPT | Performed by: INTERNAL MEDICINE

## 2023-09-27 NOTE — PROGRESS NOTES
Subjective:     Encounter Date:09/27/2023      Patient ID: Jonh Peacock is a 77 y.o. male with nonischemic cardiomyopathy, nonsustained ventricular tachycardia, paroxysmal atrial fibrillation, hypertension, hyperlipidemia, frequent PVCs, presenting today for follow-up.    Chief Complaint: Here for follow-up, nonischemic cardiomyopathy    History of Present Illness    This patient presents today for routine follow-up.  He has a history of nonischemic cardiomyopathy.  He notes that he has been doing reasonably well.  He describes chronic shortness of breath, dyspnea with exertion.  Symptoms are relatively stable.  Some mild lower extremity edema is noted.  No palpitations have been reported.  He was previously evaluated by electrophysiology and found to have frequent PVCs.  He has follow-up scheduled later this year.  He denies having any chest discomfort.  No orthopnea, PND.  The patient's blood pressure has been well controlled.  He does have an ICD in place and has had no issues with the device.  No side effects from current medications.  He previously was on an increased dose of metoprolol but did not tolerate this due to fatigue.  He has since decreased the dose and change the timing at which she takes the medication and is tolerating this medication well at this point time when taking it in the evening.      Current Outpatient Medications:     atorvastatin (LIPITOR) 20 MG tablet, Take 1 tablet by mouth Daily., Disp: 90 tablet, Rfl: 3    Buprenorphine 10 MCG/HR patch weekly, Place 15 mcg on the skin as directed by provider Every 7 (Seven) Days., Disp: , Rfl:     capsaicin 0.1 % topical cream, Apply 1 application topically to the appropriate area as directed 3 (Three) Times a Day., Disp: 42.5 g, Rfl: 1    diazePAM (VALIUM) 2 MG tablet, Take 0.5 tablets by mouth Daily. 1/2 qam 1/2 qpm, Disp: , Rfl:     dilTIAZem (CARDIZEM) 30 MG tablet, Take 1 tablet by mouth 3 (Three) Times a Day., Disp: 270 tablet, Rfl:  "3    furosemide (LASIX) 20 MG tablet, Take 1 tablet by mouth Daily., Disp: 90 tablet, Rfl: 3    furosemide (LASIX) 40 MG tablet, Take 1 tablet by mouth Daily. TAKE WITH 20 MG TABLET FOR 60 PER MG PER DAY, Disp: 90 tablet, Rfl: 3    gabapentin (NEURONTIN) 400 MG capsule, Take 1 capsule by mouth 2 (Two) Times a Day., Disp: , Rfl:     Glucosamine 750 MG tablet, Take 1 tablet by mouth 2 (Two) Times a Day., Disp: , Rfl:     losartan (COZAAR) 25 MG tablet, Take 1 tablet by mouth Daily., Disp: 90 tablet, Rfl: 3    methocarbamol (Robaxin) 500 MG tablet, Take 1 tablet by mouth 4 (Four) Times a Day., Disp: 20 tablet, Rfl: 1    metoprolol succinate XL (TOPROL-XL) 100 MG 24 hr tablet, Take 1 tablet by mouth Daily. (Patient taking differently: Take 0.5 tablets by mouth Every Evening.), Disp: 90 tablet, Rfl: 3    Multiple Vitamins-Minerals (MULTIVITAMIN ADULT PO), Take 1 tablet by mouth Daily., Disp: , Rfl:     pantoprazole (PROTONIX) 40 MG EC tablet, Take 1 tablet by mouth Daily., Disp: 90 tablet, Rfl: 3    polyethylene glycol (MIRALAX) powder, Take 17 g by mouth Every Night., Disp: 1581 g, Rfl: 3    tamsulosin (FLOMAX) 0.4 MG capsule 24 hr capsule, Take 1 capsule by mouth Every Night., Disp: 90 capsule, Rfl: 3    warfarin (COUMADIN) 2 MG tablet, Take 1 tablet by mouth Daily., Disp: 90 tablet, Rfl: 1    warfarin (COUMADIN) 4 MG tablet, Take 1 tablet by mouth Daily., Disp: 90 tablet, Rfl: 1    warfarin (COUMADIN) 5 MG tablet, Take 1 tablet by mouth Daily., Disp: 90 tablet, Rfl: 1    Allergies   Allergen Reactions    Percocet [Oxycodone-Acetaminophen] Urinary Retention    Amiodarone Other (See Comments)     INTERFERES WITH OTHER MEDICATIONS    Oxycontin [Oxycodone Hcl] Urinary Retention    Penicillins Hives     All \"cillins\"    Vioxx [Rofecoxib] GI Intolerance     Social History     Tobacco Use    Smoking status: Never     Passive exposure: Never    Smokeless tobacco: Never   Substance Use Topics    Alcohol use: Not Currently "     Review of Systems   Constitutional: Positive for malaise/fatigue. Negative for fever and weight loss.   Cardiovascular:  Positive for dyspnea on exertion. Negative for chest pain, leg swelling, orthopnea, palpitations, paroxysmal nocturnal dyspnea and syncope.   Respiratory:  Positive for shortness of breath. Negative for cough and wheezing.    Hematologic/Lymphatic: Negative for bleeding problem. Does not bruise/bleed easily.   Gastrointestinal:  Negative for abdominal pain, hematemesis, hematochezia, melena, nausea and vomiting.   Neurological:  Negative for dizziness, headaches and loss of balance.       ECG 12 Lead    Date/Time: 9/27/2023 4:14 PM  Performed by: Arun Banks MD  Authorized by: Arun Banks MD   Comparison: compared with previous ECG from 6/2/2023  Similar to previous ECG  Rhythm: sinus rhythm and paced  Ectopy: unifocal PVCs  Rate: normal  BPM: 71  Conduction: conduction normal  QRS axis: left  Other findings: non-specific ST-T wave changes  Pacing capture: Atrial pacing with intermittent sinus rhythm.  Clinical impression: abnormal EKG         Objective:     Vitals reviewed.   Constitutional:       General: Not in acute distress.     Appearance: Well-developed and not in distress.   Eyes:      Extraocular Movements: Extraocular movements intact.   HENT:      Head: Normocephalic and atraumatic.   Pulmonary:      Effort: Pulmonary effort is normal.      Breath sounds: Normal breath sounds. No wheezing. No rhonchi. No rales.   Cardiovascular:      Normal rate. Regular rhythm.      Murmurs: There is no murmur.      No gallop.    Pulses:     Intact distal pulses.   Edema:     Peripheral edema present.     Pretibial: bilateral trace edema of the pretibial area.     Ankle: bilateral trace edema of the ankle.  Abdominal:      General: Bowel sounds are normal. There is no distension.      Palpations: Abdomen is soft.      Tenderness: There is no abdominal tenderness.   Skin:      "General: Skin is warm and dry. There is no cyanosis.      Findings: No erythema or rash.   Neurological:      Mental Status: Alert and oriented to person, place, and time.      Cranial Nerves: No cranial nerve deficit.     /64   Pulse 73   Ht 188 cm (74\")   Wt 110 kg (242 lb)   SpO2 98%   BMI 31.07 kg/m²     Data/Lab Review:     Results for orders placed during the hospital encounter of 06/14/23    Adult Transthoracic Echo Complete w/ Color, Spectral and Contrast if necessary per protocol    Interpretation Summary    Left ventricular systolic function is severely decreased. Left ventricular ejection fraction appears to be 31 - 35%.    The left ventricular cavity is moderately dilated.    Left ventricular wall thickness is consistent with mild concentric hypertrophy.    Normal right ventricular cavity size noted. Borderline low right ventricular systolic function noted.    No significant valvular abnormalities identified on this study.    7-day cardiac monitor from 3/70/23: The predominant rhythm is sinus rhythm with average heart rate of 68.  Frequent PVCs noted with overall burden of 16%, predominantly of a single morphology.  Otherwise, no prolonged pathologic rhythm abnormalities.    Lab Results   Component Value Date    INR 2.4 (H) 08/28/2023    INR 2.6 (H) 07/27/2023    INR 2.4 (H) 06/28/2023    PROTIME 28.8 (H) 08/28/2023    PROTIME 30.9 (H) 07/27/2023    PROTIME 28.6 (H) 06/28/2023         Assessment:          Diagnosis Plan   1. Non-ischemic cardiomyopathy  ECG 12 Lead      2. Paroxysmal atrial fibrillation        3. PVC's (premature ventricular contractions)             Plan:       1.  Nonischemic cardiomyopathy: Currently with New York Heart Association class II symptoms.  Euvolemic on today's exam with exception of trace peripheral edema.  The patient remains on Toprol-XL, losartan.  He has been stable on these medications for a number of years.  He also takes Lasix daily.  No changes " recommended at this time.  He does have an ICD in place that has been appropriately functioning.    2.  Paroxysmal atrial fibrillation: Stable with no symptoms reported.  The patient remains anticoagulated with warfarin.  He is tolerating the medication well.    [id number] SCORE   [id number] Score: 7 (9/27/2023  4:20 PM)    3.  PVCs: Patient is follow-up with electrophysiology at the end of this month.  Previously, it was discussed about a possible ablation for the patient.  I am uncertain whether the patient is truly symptomatic from the PVCs but whether or not the PVCs have affected his cardiac performance over time is unclear to me.  We did discuss that if an ablation is recommended, it may be that this might lead to an improved quality of life or may be some improvement in ventricular function.  Therefore, he will discuss further with electrophysiology later this month.    We will see the patient back in our office in 1 year unless otherwise needed sooner.

## 2023-09-28 DIAGNOSIS — I48.0 PAROXYSMAL ATRIAL FIBRILLATION: ICD-10-CM

## 2023-09-28 RX ORDER — WARFARIN SODIUM 5 MG/1
5 TABLET ORAL
Qty: 90 TABLET | Refills: 3 | Status: SHIPPED | OUTPATIENT
Start: 2023-09-28

## 2023-10-16 ENCOUNTER — LAB (OUTPATIENT)
Dept: LAB | Facility: HOSPITAL | Age: 78
End: 2023-10-16
Payer: MEDICARE

## 2023-10-16 DIAGNOSIS — I48.0 PAROXYSMAL ATRIAL FIBRILLATION: Chronic | ICD-10-CM

## 2023-10-16 LAB
INR PPP: 2.5 (ref 0.91–1.09)
PROTHROMBIN TIME: 29.4 SECONDS (ref 10–13.8)

## 2023-10-16 PROCEDURE — 85610 PROTHROMBIN TIME: CPT | Performed by: INTERNAL MEDICINE

## 2023-10-31 ENCOUNTER — OFFICE VISIT (OUTPATIENT)
Dept: CARDIOLOGY | Facility: CLINIC | Age: 78
End: 2023-10-31
Payer: MEDICARE

## 2023-10-31 VITALS
BODY MASS INDEX: 31.42 KG/M2 | SYSTOLIC BLOOD PRESSURE: 114 MMHG | RESPIRATION RATE: 18 BRPM | HEIGHT: 74 IN | DIASTOLIC BLOOD PRESSURE: 80 MMHG | WEIGHT: 244.8 LBS | HEART RATE: 74 BPM

## 2023-10-31 DIAGNOSIS — I48.0 PAROXYSMAL ATRIAL FIBRILLATION: Chronic | ICD-10-CM

## 2023-10-31 DIAGNOSIS — I49.3 PVC'S (PREMATURE VENTRICULAR CONTRACTIONS): Primary | ICD-10-CM

## 2023-10-31 NOTE — PROGRESS NOTES
"Chief Complaint  Atrial Fibrillation (4 MO FU )    Subjective        History of Present Illness    EP Problems:  1.  Paroxysmal atrial fibrillation  2.  Atrial flutter  3.  Sustained SVT / atrial tachycardia  4.  Sinus node dysfunction  5.  Presence of a cardiac defibrillator  -9/27/2012: HUEY, [employer]  -10/31/2019: Generator change, Brenda Chavez  6.  Acquired long QT syndrome  7.  Frequent PVCs  -3/2023: 16% burden (12% single morphology)     Cardiology Problems:  1.  Chronic systolic heart failure  -2/5/2019: EF 31 to 35%  2.  Hypertension  3.  Hyperlipidemia  4.  Prior DVT     Medical Problems:  1.  Obesity, BMI 32  2.  Type 2 diabetes  3.  BPH  4.  Chronic pain  5.  Obstructive sleep apnea      Jonh Peacock is a 77 y.o. male with problem list as above who presents to the clinic for follow up of frequent PVCs, chronic systolic heart failure, paroxysmal atrial fibrillation, atrial flutter.    He has done well since the last clinic visit without hospitalizations or ER visits.  He overall states that his symptoms are generally unchanged.    Objective   Vital Signs:  /80 (BP Location: Right arm, Patient Position: Sitting, Cuff Size: Adult)   Pulse 74   Resp 18   Ht 188 cm (74\")   Wt 111 kg (244 lb 12.8 oz)   BMI 31.43 kg/m²   Estimated body mass index is 31.43 kg/m² as calculated from the following:    Height as of this encounter: 188 cm (74\").    Weight as of this encounter: 111 kg (244 lb 12.8 oz).      Physical Exam  Vitals reviewed.   Constitutional:       Appearance: Normal appearance.   HENT:      Head: Normocephalic and atraumatic.   Eyes:      Extraocular Movements: Extraocular movements intact.      Conjunctiva/sclera: Conjunctivae normal.   Cardiovascular:      Rate and Rhythm: Normal rate. Rhythm irregular.      Pulses: Normal pulses.      Heart sounds: Normal heart sounds.   Pulmonary:      Effort: Pulmonary effort is normal.      Breath sounds: Normal breath sounds. "   Musculoskeletal:         General: No swelling.   Neurological:      General: No focal deficit present.      Mental Status: He is alert and oriented to person, place, and time.   Psychiatric:         Mood and Affect: Mood normal.         Judgment: Judgment normal.        Result Review :  The following data was reviewed by: Rossana Horne MD on 10/31/2023:    [id number] SCORE   [id number] Score: 7 (10/31/2023  2:58 PM)          ECG 12 Lead    Date/Time: 10/31/2023 2:58 PM  Performed by: Rossana Horne MD    Authorized by: Rossana Horne MD  Comparison: compared with previous ECG from 9/27/2023  Similar to previous ECG  Rhythm: paced  Ectopy: multifocal PVCs  Rate: normal  Conduction: conduction normal  QRS axis: normal  Other findings: non-specific ST-T wave changes    Clinical impression: abnormal EKG              Assessment and Plan   Diagnoses and all orders for this visit:    1. PVC's (premature ventricular contractions) (Primary)    2. Paroxysmal atrial fibrillation    Other orders  -     ECG 12 Lead        Jonh Peacock is a 77 y.o. male with problem list as above who presents to the clinic for follow up of frequent PVCs, paroxysmal atrial fibrillation.  He has a 16% PVC burden on his most recent Holter monitor.  Given his nonischemic cardiomyopathy, I do think that trying to suppress these PVCs may be a very reasonable goal.  We discussed available treatment options including ablation or medication treatment including the risks and benefits of both.  He would like more time to think about what to decide regarding procedural or medication options.    I have discussed risks, benefits, and alternatives of an electrophysiology study and ablation for premature ventricular contractions with the patient.  Alternatives discussed include continued observation and medical management.  An electrophysiology study with ablation is an inherently high risk procedure with possible complications that include but are not  limited to vascular access complications, internal bleeding, tamponade requiring pericardiocentesis or cardiac surgery, stroke, MI, embolism, myocardial injury, injury to the normal conduction system requiring a pacemaker, and ultimately death.  We also discussed that given the nature of the procedure, therapeutic efficacy can be variable.  Possibilities of recurrent arrhythmias and the possible need for additional procedures and/or medical therapy was discussed. Questions asked were appropriately answered.  No guarantees were made or implied.     Despite this, they would still like to proceed.    Plan:  -No medication changes at this time  -He will think about ablation versus initiation of sotalol and let us know what he decides  -Continue remote monitoring for his ICD         Follow Up   Return in about 3 months (around 1/31/2024).  Patient was given instructions and counseling regarding his condition or for health maintenance advice. Please see specific information pulled into the AVS if appropriate.     Part of this note may be an electronic transcription/translation of spoken language to printed text using the Dragon Dictation System.

## 2023-11-17 ENCOUNTER — OFFICE VISIT (OUTPATIENT)
Dept: INTERNAL MEDICINE | Facility: CLINIC | Age: 78
End: 2023-11-17
Payer: MEDICARE

## 2023-11-17 VITALS
WEIGHT: 245.5 LBS | HEIGHT: 74 IN | HEART RATE: 70 BPM | RESPIRATION RATE: 16 BRPM | OXYGEN SATURATION: 98 % | BODY MASS INDEX: 31.51 KG/M2 | SYSTOLIC BLOOD PRESSURE: 112 MMHG | DIASTOLIC BLOOD PRESSURE: 68 MMHG

## 2023-11-17 DIAGNOSIS — R20.0 BILATERAL HAND NUMBNESS: ICD-10-CM

## 2023-11-17 DIAGNOSIS — I50.42 CHF (CONGESTIVE HEART FAILURE), NYHA CLASS II, CHRONIC, COMBINED: ICD-10-CM

## 2023-11-17 DIAGNOSIS — I42.8 NON-ISCHEMIC CARDIOMYOPATHY: Chronic | ICD-10-CM

## 2023-11-17 DIAGNOSIS — I48.0 PAROXYSMAL ATRIAL FIBRILLATION: Chronic | ICD-10-CM

## 2023-11-17 DIAGNOSIS — E66.09 CLASS 1 OBESITY DUE TO EXCESS CALORIES WITH SERIOUS COMORBIDITY AND BODY MASS INDEX (BMI) OF 31.0 TO 31.9 IN ADULT: ICD-10-CM

## 2023-11-17 DIAGNOSIS — I49.3 PVC'S (PREMATURE VENTRICULAR CONTRACTIONS): ICD-10-CM

## 2023-11-17 DIAGNOSIS — E11.42 TYPE 2 DIABETES MELLITUS WITH DIABETIC POLYNEUROPATHY, WITHOUT LONG-TERM CURRENT USE OF INSULIN: ICD-10-CM

## 2023-11-17 DIAGNOSIS — G56.03 BILATERAL CARPAL TUNNEL SYNDROME: Primary | ICD-10-CM

## 2023-11-17 PROBLEM — I48.3 TYPICAL ATRIAL FLUTTER: Status: RESOLVED | Noted: 2023-03-17 | Resolved: 2023-11-17

## 2023-11-17 PROCEDURE — 1159F MED LIST DOCD IN RCRD: CPT | Performed by: INTERNAL MEDICINE

## 2023-11-17 PROCEDURE — 99214 OFFICE O/P EST MOD 30 MIN: CPT | Performed by: INTERNAL MEDICINE

## 2023-11-17 PROCEDURE — 1160F RVW MEDS BY RX/DR IN RCRD: CPT | Performed by: INTERNAL MEDICINE

## 2023-11-17 PROCEDURE — 3078F DIAST BP <80 MM HG: CPT | Performed by: INTERNAL MEDICINE

## 2023-11-17 PROCEDURE — 3074F SYST BP LT 130 MM HG: CPT | Performed by: INTERNAL MEDICINE

## 2023-11-17 NOTE — PROGRESS NOTES
CC: hand numbness    History:  Jonh Peacock is a 77 y.o. male   He notes numbness in his hands that has improved since losing weight, but is still quite bothersome to him during the day and even more so at night. He wore braces previously without much relief. He notes his thumb and first 2 fingers are most affected. He has tried capsaicin cream without improvement, but does get relief from Voltaren gel.       ROS:  Review of Systems   Constitutional:  Negative for chills and fever.   Respiratory:  Negative for cough and shortness of breath.    Cardiovascular:  Negative for chest pain and palpitations.   Gastrointestinal:  Negative for abdominal pain and constipation.        reports that he has never smoked. He has never been exposed to tobacco smoke. He has never used smokeless tobacco. He reports that he does not currently use alcohol. He reports that he does not use drugs.      Current Outpatient Medications:     atorvastatin (LIPITOR) 20 MG tablet, Take 1 tablet by mouth Daily., Disp: 90 tablet, Rfl: 3    Buprenorphine 10 MCG/HR patch weekly, Place 15 mcg on the skin as directed by provider Every 7 (Seven) Days., Disp: , Rfl:     capsaicin 0.1 % topical cream, Apply 1 application topically to the appropriate area as directed 3 (Three) Times a Day., Disp: 42.5 g, Rfl: 1    dilTIAZem (CARDIZEM) 30 MG tablet, Take 1 tablet by mouth 3 (Three) Times a Day., Disp: 270 tablet, Rfl: 3    furosemide (LASIX) 20 MG tablet, Take 1 tablet by mouth Daily., Disp: 90 tablet, Rfl: 3    furosemide (LASIX) 40 MG tablet, Take 1 tablet by mouth Daily. TAKE WITH 20 MG TABLET FOR 60 PER MG PER DAY, Disp: 90 tablet, Rfl: 3    gabapentin (NEURONTIN) 400 MG capsule, Take 1 capsule by mouth 2 (Two) Times a Day., Disp: , Rfl:     Glucosamine 750 MG tablet, Take 1 tablet by mouth 2 (Two) Times a Day., Disp: , Rfl:     losartan (COZAAR) 25 MG tablet, Take 1 tablet by mouth Daily., Disp: 90 tablet, Rfl: 3    methocarbamol (Robaxin) 500 MG  "tablet, Take 1 tablet by mouth 4 (Four) Times a Day., Disp: 20 tablet, Rfl: 1    metoprolol succinate XL (TOPROL-XL) 100 MG 24 hr tablet, Take 1 tablet by mouth Daily. (Patient taking differently: Take 0.5 tablets by mouth Every Evening.), Disp: 90 tablet, Rfl: 3    Multiple Vitamins-Minerals (MULTIVITAMIN ADULT PO), Take 1 tablet by mouth Daily., Disp: , Rfl:     pantoprazole (PROTONIX) 40 MG EC tablet, Take 1 tablet by mouth Daily., Disp: 90 tablet, Rfl: 3    polyethylene glycol (MIRALAX) powder, Take 17 g by mouth Every Night., Disp: 1581 g, Rfl: 3    tamsulosin (FLOMAX) 0.4 MG capsule 24 hr capsule, Take 1 capsule by mouth Every Night., Disp: 90 capsule, Rfl: 3    warfarin (COUMADIN) 2 MG tablet, Take 1 tablet by mouth Daily., Disp: 90 tablet, Rfl: 1    warfarin (COUMADIN) 4 MG tablet, Take 1 tablet by mouth Daily., Disp: 90 tablet, Rfl: 1    warfarin (COUMADIN) 5 MG tablet, Take 1 tablet by mouth Daily., Disp: 90 tablet, Rfl: 3    OBJECTIVE:  /68 (BP Location: Left arm, Patient Position: Sitting, Cuff Size: Adult)   Pulse 70   Resp 16   Ht 188 cm (74\")   Wt 111 kg (245 lb 8 oz)   SpO2 98%   BMI 31.52 kg/m²    Physical Exam  Constitutional:       General: He is not in acute distress.  Pulmonary:      Effort: Pulmonary effort is normal. No respiratory distress.   Neurological:      Mental Status: He is alert and oriented to person, place, and time.      Comments: Negative Tinel's over median nerve bilaterally and negative Phalen's bilaterally.   Psychiatric:         Mood and Affect: Mood normal.         Behavior: Behavior normal.         Assessment/Plan     Diagnoses and all orders for this visit:    1. Bilateral carpal tunnel syndrome (Primary)  4. Bilateral hand numbness  -     EMG & Nerve Conduction Test; Future  -     Diclofenac Sodium (VOLTAREN) 1 % gel gel; Apply 4 g topically to the appropriate area as directed 4 (Four) Times a Day As Needed (bilatearl hand pain).  Dispense: 350 g; Refill: " 6  EMG/NCS to assist with diagnosing a possible location for impingement. Still suspicious of carpal tunnel despite negative physical exam given the distribution.     2. CHF (congestive heart failure), NYHA class II, chronic, combined  3. Non-ischemic cardiomyopathy  Stable without decompensation and on Toprol XL, lasix and ARB.     5. Type 2 diabetes mellitus with diabetic polyneuropathy, without long-term current use of insulin  Last A1c well controlled with diet control only.     6. Paroxysmal atrial fibrillation  Anticoagulated on warfarin without bleeding and with therapeutic level on last check. Continue rate control.     7. PVC's (premature ventricular contractions)  Seeing EP and considering medication trial versus ablation. He continues thinking about what he would want to try for this.     8. Class 1 obesity due to excess calories with serious comorbidity and body mass index (BMI) of 31.0 to 31.9 in adult     32 minutes spent in precharting, examination with the patient, and documentation on the date of service.     An After Visit Summary was printed and given to the patient at discharge.  Return in about 6 months (around 5/17/2024).      Ric Antony D.O. 11/17/2023   Electronically signed.

## 2023-11-28 ENCOUNTER — LAB (OUTPATIENT)
Dept: LAB | Facility: HOSPITAL | Age: 78
End: 2023-11-28
Payer: MEDICARE

## 2023-11-28 DIAGNOSIS — I48.0 PAROXYSMAL ATRIAL FIBRILLATION: Chronic | ICD-10-CM

## 2023-11-28 PROCEDURE — 85610 PROTHROMBIN TIME: CPT | Performed by: INTERNAL MEDICINE

## 2023-11-29 LAB
INR PPP: 1.8 (ref 0.91–1.09)
PROTHROMBIN TIME: 22.1 SECONDS (ref 10–13.8)

## 2023-12-18 ENCOUNTER — LAB (OUTPATIENT)
Dept: LAB | Facility: HOSPITAL | Age: 78
End: 2023-12-18
Payer: MEDICARE

## 2023-12-18 DIAGNOSIS — I48.0 PAROXYSMAL ATRIAL FIBRILLATION: Chronic | ICD-10-CM

## 2023-12-18 LAB
INR PPP: 2.2 (ref 0.91–1.09)
PROTHROMBIN TIME: 26.4 SECONDS (ref 10–13.8)

## 2023-12-18 PROCEDURE — 85610 PROTHROMBIN TIME: CPT | Performed by: INTERNAL MEDICINE

## 2023-12-28 DIAGNOSIS — I48.0 PAROXYSMAL ATRIAL FIBRILLATION: ICD-10-CM

## 2023-12-28 DIAGNOSIS — M54.2 NECK PAIN: ICD-10-CM

## 2023-12-28 RX ORDER — METOPROLOL SUCCINATE 100 MG/1
100 TABLET, EXTENDED RELEASE ORAL DAILY
Qty: 90 TABLET | Refills: 3 | Status: SHIPPED | OUTPATIENT
Start: 2023-12-28

## 2023-12-28 RX ORDER — LOSARTAN POTASSIUM 25 MG/1
25 TABLET ORAL DAILY
Qty: 90 TABLET | Refills: 3 | Status: SHIPPED | OUTPATIENT
Start: 2023-12-28

## 2023-12-28 RX ORDER — PANTOPRAZOLE SODIUM 40 MG/1
40 TABLET, DELAYED RELEASE ORAL DAILY
Qty: 90 TABLET | Refills: 3 | Status: SHIPPED | OUTPATIENT
Start: 2023-12-28

## 2023-12-28 RX ORDER — TAMSULOSIN HYDROCHLORIDE 0.4 MG/1
1 CAPSULE ORAL NIGHTLY
Qty: 90 CAPSULE | Refills: 3 | Status: SHIPPED | OUTPATIENT
Start: 2023-12-28

## 2023-12-28 RX ORDER — METHOCARBAMOL 500 MG/1
500 TABLET, FILM COATED ORAL 4 TIMES DAILY
Qty: 20 TABLET | Refills: 1 | Status: SHIPPED | OUTPATIENT
Start: 2023-12-28

## 2024-01-04 NOTE — PROGRESS NOTES
[facility] - PODIATRY    Today's Date: 01/09/24    Patient Name: Jonh Peacock  MRN: [id number]  CSN: [id number]  PCP: Ric Antony DO  Referring Provider: No ref. provider found     SUBJECTIVE     Chief Complaint   Patient presents with    Follow-up     Ric Antony, 05/15/2023 3 MTH FU DIABETIC- pt states feet doing good, need some attention- pt denies pain     Diabetes       HPI: Jonh Peacock, a 78 y.o.male, comes to clinic as a(n) established patient presenting for diabetic foot exam and complaining of thick fungal toenails. Pt with h/o Anxiety, Arrhythmia, Cardiomyopathy, CHF, Depression, previous DVT, HTN, Obesity, Anticoagulation with Warfarin. Patient is NIDDM and unsure of last BG level. Does not routinely check BG levels. Denies pain today. Uses rollator for ambulation. Notes improvement to feet with regular visits. Reports numbness in his feet. Denies any constitutional symptoms. No other pedal complaints at this time.    Past Medical History:   Diagnosis Date    Anemia     Anxiety     Arrhythmia     Arthritis     Atrial fibrillation     Cardiomyopathy     CHF (congestive heart failure)     Colon polyp     Colostomy present     10years    Connective tissue disease     Depression     Depression     Diverticulitis     Dizzy     DVT (deep venous thrombosis)     Gastritis     GERD (gastroesophageal reflux disease)     Hiatal hernia     History of transfusion     Hyperlipidemia     Hypertension     Neuropathy     Pneumonia      Past Surgical History:   Procedure Laterality Date    APPENDECTOMY      BACK SURGERY  2002, 2007    Multiple spine surgeries - laminectomy & fusion    CARDIAC CATHETERIZATION      CARDIAC DEFIBRILLATOR PLACEMENT      CARDIAC ELECTROPHYSIOLOGY PROCEDURE N/A 10/31/2019    Procedure: ICD GEN CHANGE;  Surgeon: Chaz Chavez MD;  Location:  [facility] CATH [facility]AS[facility];  Service: Cardiology    COLON RESECTION WITH COLOSTOMY      COLONOSCOPY   "09/04/2014    diverticulosis    COLONOSCOPY N/A 10/29/2019    Procedure: COLONOSCOPY WITH ANESTHESIA;  Surgeon: Tenzin Abrams MD;  Location:  [facility] ENDOSCOPY;  Service: Gastroenterology    ENDOSCOPY  02/24/1999    small sliding hiatal hernia    FOOT SURGERY Bilateral 2005    JOINT REPLACEMENT Right 2002    KNEE    PACEMAKER IMPLANTATION  2012    RECTAL FISSURE INCISION AND DRAINAGE      REPLACEMENT TOTAL KNEE      SPINAL CORD STIMULATOR IMPLANT  08/2012    Placed by Dr. DEENA Villela, [employer]    THORACIC LAMINECTOMY WITH PLACEMENT OF DORSAL COLUMN STIMULATOR Right 1/28/2021    Procedure: SPINAL CORD STIMULATOR REVISION, right buttocks;  Surgeon: Noah Oliveros MD;  Location:  [facility] OR;  Service: Neurosurgery;  Laterality: Right;    TOTAL HIP ARTHROPLASTY Right 2004    TOTAL HIP ARTHROPLASTY Left 12/2008     Family History   Problem Relation Age of Onset    Heart disease Mother     Heart disease Brother     Breast cancer Brother     Colon cancer Neg Hx     Colon polyps Neg Hx      Social History     Socioeconomic History    Marital status:    Tobacco Use    Smoking status: Never     Passive exposure: Never    Smokeless tobacco: Never   Vaping Use    Vaping Use: Never used   Substance and Sexual Activity    Alcohol use: Not Currently    Drug use: Never    Sexual activity: Defer     Allergies   Allergen Reactions    Percocet [Oxycodone-Acetaminophen] Urinary Retention    Amiodarone Other (See Comments)     INTERFERES WITH OTHER MEDICATIONS    Oxycontin [Oxycodone Hcl] Urinary Retention    Penicillins Hives     All \"cillins\"    Vioxx [Rofecoxib] GI Intolerance     Current Outpatient Medications   Medication Sig Dispense Refill    atorvastatin (LIPITOR) 20 MG tablet Take 1 tablet by mouth Daily. 90 tablet 3    Buprenorphine 10 MCG/HR patch weekly Place 15 mcg on the skin as directed by provider Every 7 (Seven) Days.      Diclofenac Sodium (VOLTAREN) 1 % gel gel Apply 4 g topically to the appropriate " area as directed 4 (Four) Times a Day As Needed (bilatearl hand pain). 350 g 6    dilTIAZem (CARDIZEM) 30 MG tablet Take 1 tablet by mouth 3 (Three) Times a Day. 270 tablet 3    furosemide (LASIX) 20 MG tablet Take 1 tablet by mouth Daily. 90 tablet 3    furosemide (LASIX) 40 MG tablet Take 1 tablet by mouth Daily. TAKE WITH 20 MG TABLET FOR 60 PER MG PER DAY 90 tablet 3    gabapentin (NEURONTIN) 400 MG capsule Take 1 capsule by mouth 2 (Two) Times a Day.      Glucosamine 750 MG tablet Take 1 tablet by mouth 2 (Two) Times a Day.      losartan (COZAAR) 25 MG tablet Take 1 tablet by mouth Daily. 90 tablet 3    methocarbamol (Robaxin) 500 MG tablet Take 1 tablet by mouth 4 (Four) Times a Day. 20 tablet 1    metoprolol succinate XL (TOPROL-XL) 100 MG 24 hr tablet Take 1 tablet by mouth Daily. 90 tablet 3    Multiple Vitamins-Minerals (MULTIVITAMIN ADULT PO) Take 1 tablet by mouth Daily.      pantoprazole (PROTONIX) 40 MG EC tablet Take 1 tablet by mouth Daily. 90 tablet 3    polyethylene glycol (MIRALAX) powder Take 17 g by mouth Every Night. 1581 g 3    tamsulosin (FLOMAX) 0.4 MG capsule 24 hr capsule Take 1 capsule by mouth Every Night. 90 capsule 3    warfarin (COUMADIN) 2 MG tablet Take 1 tablet by mouth Daily. 90 tablet 1    warfarin (COUMADIN) 4 MG tablet Take 1 tablet by mouth Daily. 90 tablet 1    warfarin (COUMADIN) 5 MG tablet Take 1 tablet by mouth Daily. 90 tablet 3     No current facility-administered medications for this visit.     Review of Systems   Constitutional:  Negative for chills and fever.   HENT:  Negative for congestion.    Respiratory:  Negative for shortness of breath.    Cardiovascular:  Positive for leg swelling. Negative for chest pain.   Gastrointestinal:  Negative for constipation, diarrhea, nausea and vomiting.   Musculoskeletal:  Positive for gait problem.        Foot pain   Skin:  Negative for wound.        Thick Toenails   Neurological:  Positive for numbness.   Hematological:   Bruises/bleeds easily.       OBJECTIVE     Vitals:    01/09/24 1547   Pulse: 68   SpO2: 97%         PHYSICAL EXAM  GEN:   Accompanied by wife.    Foot/Ankle Exam    GENERAL  Diabetic foot exam performed    Appearance:  appears stated age  Orientation:  AAOx3  Affect:  appropriate  Gait:  (unsteady)  Assistance:  walker  Right shoe gear: casual shoe  Left shoe gear: casual shoe (with lift)    VASCULAR     Right Foot Vascularity   Dorsalis pedis:  1+  Posterior tibial:  1+  Skin temperature:  warm  Edema grading:  Trace and non-pitting  CFT:  4  Pedal hair growth:  Absent  Varicosities:  none     Left Foot Vascularity   Dorsalis pedis:  1+  Posterior tibial:  1+  Skin temperature:  warm  Edema grading:  Trace and non-pitting  CFT:  4  Pedal hair growth:  Absent  Varicosities:  none     NEUROLOGIC     Right Foot Neurologic   Light touch sensation: diminished  Vibratory sensation: diminished  Hot/Cold sensation: diminished  Protective Sensation using [place] Monofilament:   Sites intact: 7  Sites tested: 10     Left Foot Neurologic   Light touch sensation: diminished  Vibratory sensation: diminished  Hot/Cold sensation:  diminished  Protective Sensation using [place] Monofilament:   Sites intact: 7  Sites tested: 10    MUSCULOSKELETAL     Right Foot Musculoskeletal   Ecchymosis:  none  Tenderness:  toenail problem    Arch:  Pes planus  Hallux valgus: No       Left Foot Musculoskeletal   Ecchymosis:  none  Tenderness:  toenail problem  Arch:  Pes planus  Hallux valgus: No      MUSCLE STRENGTH     Right Foot Muscle Strength   Foot dorsiflexion:  5  Foot plantar flexion:  5  Foot inversion:  5  Foot eversion:  5     Left Foot Muscle Strength   Foot dorsiflexion:  5  Foot plantar flexion:  5  Foot inversion:  5  Foot eversion:  5    RANGE OF MOTION     Right Foot Range of Motion   Foot and ankle ROM within normal limits       Left Foot Range of Motion   Foot and ankle ROM within normal limits       DERMATOLOGIC      Right Foot Dermatologic   Skin  Right foot skin is intact.   Nails  1.  Positive for elongated, onychomycosis, abnormal thickness and dystrophic nail.  2.  Positive for elongated, onychomycosis, abnormal thickness and dystrophic nail.  3.  Positive for elongated, onychomycosis, abnormal thickness and dystrophic nail.  4.  Positive for elongated, onychomycosis, abnormal thickness and dystrophic nail.  5.  Positive for elongated, onychomycosis, abnormal thickness and dystrophic nail.     Left Foot Dermatologic   Skin  Left foot skin is intact.   Nails  1.  Positive for elongated, onychomycosis, abnormal thickness and dystrophic nail.  2.  Positive for elongated, onychomycosis, abnormal thickness and dystrophic nail.  3.  Positive for elongated, onychomycosis, abnormal thickness and dystrophic nail.  4.  Positive for elongated, onychomycosis, abnormally thick and dystrophic nail.  5.  Positive for elongated, onychomycosis, abnormally thick and dystrophic nail.       RADIOLOGY/NUCLEAR:  No results found.    LABORATORY/CULTURE RESULTS:      PATHOLOGY RESULTS:       ASSESSMENT/PLAN     Diagnoses and all orders for this visit:    1. Onychomycosis (Primary)    2. Type 2 diabetes mellitus with diabetic neuropathy, without long-term current use of insulin    3. Lower limb length difference    4. Anticoagulant long-term use    5. Peripheral edema    6. Gait abnormality    7. Encounter for diabetic foot exam        Comprehensive lower extremity examination and evaluation was performed.  Discussed findings and treatment plan including risks, benefits, and treatment options with patient in detail. Patient agreed with treatment plan.  DFE performed  After verbal consent obtained, nail(s) x10 debrided of length and thickness with nail nipper without incidence  Patient may maintain nails and calluses at home utilizing emery board or pumice stone between visits as needed  Reviewed at home diabetic foot care  including daily foot checks   Continue shoes with custom inserts  Continue use of rollator for ambulation support.  Elevate legs when possible.  Follow with PCP for management of DM.    An After Visit Summary was printed and given to the patient at discharge, including (if requested) any available informative/educational handouts regarding diagnosis, treatment, or medications. All questions were answered to patient/family satisfaction. Should symptoms fail to improve or worsen they agree to call or return to clinic or to go to the Emergency Department. Discussed the importance of following up with any needed screening tests/labs/specialist appointments and any requested follow-up recommended by me today. Importance of maintaining follow-up discussed and patient accepts that missed appointments can delay diagnosis and potentially lead to worsening of conditions.  Return in about 3 months (around 4/9/2024) for Follow-up with Podiatry APRN, Schedule [facility]., or sooner if acute issues arise.        This document has been electronically signed by Deion Avelar DPM on January 9, 2024 17:38 CST

## 2024-01-08 ENCOUNTER — TELEPHONE (OUTPATIENT)
Dept: INTERNAL MEDICINE | Facility: CLINIC | Age: 79
End: 2024-01-08
Payer: MEDICARE

## 2024-01-09 ENCOUNTER — OFFICE VISIT (OUTPATIENT)
Dept: PODIATRY | Facility: CLINIC | Age: 79
End: 2024-01-09
Payer: MEDICARE

## 2024-01-09 VITALS — HEART RATE: 68 BPM | WEIGHT: 247 LBS | OXYGEN SATURATION: 97 % | HEIGHT: 74 IN | BODY MASS INDEX: 31.7 KG/M2

## 2024-01-09 DIAGNOSIS — M21.70 LOWER LIMB LENGTH DIFFERENCE: ICD-10-CM

## 2024-01-09 DIAGNOSIS — B35.1 ONYCHOMYCOSIS: Primary | ICD-10-CM

## 2024-01-09 DIAGNOSIS — Z79.01 ANTICOAGULANT LONG-TERM USE: ICD-10-CM

## 2024-01-09 DIAGNOSIS — R60.9 PERIPHERAL EDEMA: ICD-10-CM

## 2024-01-09 DIAGNOSIS — E11.40 TYPE 2 DIABETES MELLITUS WITH DIABETIC NEUROPATHY, WITHOUT LONG-TERM CURRENT USE OF INSULIN: ICD-10-CM

## 2024-01-09 DIAGNOSIS — R26.9 GAIT ABNORMALITY: ICD-10-CM

## 2024-01-09 DIAGNOSIS — E11.9 ENCOUNTER FOR DIABETIC FOOT EXAM: ICD-10-CM

## 2024-01-09 PROCEDURE — 1159F MED LIST DOCD IN RCRD: CPT | Performed by: PODIATRIST

## 2024-01-09 PROCEDURE — 99213 OFFICE O/P EST LOW 20 MIN: CPT | Performed by: PODIATRIST

## 2024-01-09 PROCEDURE — 1160F RVW MEDS BY RX/DR IN RCRD: CPT | Performed by: PODIATRIST

## 2024-01-09 PROCEDURE — 11721 DEBRIDE NAIL 6 OR MORE: CPT | Performed by: PODIATRIST

## 2024-02-19 ENCOUNTER — HOSPITAL ENCOUNTER (OUTPATIENT)
Dept: NEUROLOGY | Facility: HOSPITAL | Age: 79
Discharge: HOME | End: 2024-02-19
Admitting: INTERNAL MEDICINE
Payer: MEDICARE

## 2024-02-19 DIAGNOSIS — G56.03 BILATERAL CARPAL TUNNEL SYNDROME: ICD-10-CM

## 2024-02-19 DIAGNOSIS — R20.0 BILATERAL HAND NUMBNESS: ICD-10-CM

## 2024-02-19 PROCEDURE — 95886 MUSC TEST DONE W/N TEST COMP: CPT

## 2024-02-19 PROCEDURE — 95912 NRV CNDJ TEST 11-12 STUDIES: CPT

## 2024-02-29 ENCOUNTER — LAB (OUTPATIENT)
Dept: LAB | Facility: HOSPITAL | Age: 79
End: 2024-02-29
Payer: MEDICARE

## 2024-02-29 DIAGNOSIS — I48.0 PAROXYSMAL ATRIAL FIBRILLATION: Chronic | ICD-10-CM

## 2024-02-29 LAB
INR PPP: 1.9 (ref 0.91–1.09)
PROTHROMBIN TIME: 22.3 SECONDS (ref 10–13.8)

## 2024-02-29 PROCEDURE — 85610 PROTHROMBIN TIME: CPT | Performed by: INTERNAL MEDICINE

## 2024-03-04 ENCOUNTER — TELEPHONE (OUTPATIENT)
Dept: INTERNAL MEDICINE | Facility: CLINIC | Age: 79
End: 2024-03-04
Payer: COMMERCIAL

## 2024-03-05 DIAGNOSIS — G56.03 BILATERAL CARPAL TUNNEL SYNDROME: Primary | ICD-10-CM

## 2024-03-15 ENCOUNTER — LAB (OUTPATIENT)
Dept: LAB | Facility: HOSPITAL | Age: 79
End: 2024-03-15
Payer: MEDICARE

## 2024-03-15 ENCOUNTER — PREP FOR SURGERY (OUTPATIENT)
Dept: OTHER | Facility: HOSPITAL | Age: 79
End: 2024-03-15
Payer: COMMERCIAL

## 2024-03-15 ENCOUNTER — OFFICE VISIT (OUTPATIENT)
Dept: CARDIOLOGY | Facility: CLINIC | Age: 79
End: 2024-03-15
Payer: MEDICARE

## 2024-03-15 VITALS
OXYGEN SATURATION: 96 % | SYSTOLIC BLOOD PRESSURE: 118 MMHG | BODY MASS INDEX: 31.44 KG/M2 | HEIGHT: 74 IN | WEIGHT: 245 LBS | HEART RATE: 57 BPM | DIASTOLIC BLOOD PRESSURE: 62 MMHG

## 2024-03-15 DIAGNOSIS — I48.0 PAROXYSMAL ATRIAL FIBRILLATION: Chronic | ICD-10-CM

## 2024-03-15 DIAGNOSIS — I49.3 PVC'S (PREMATURE VENTRICULAR CONTRACTIONS): Primary | ICD-10-CM

## 2024-03-15 DIAGNOSIS — Z95.810 CARDIAC DEFIBRILLATOR IN SITU: Chronic | ICD-10-CM

## 2024-03-15 DIAGNOSIS — Z79.01 ANTICOAGULANT LONG-TERM USE: Chronic | ICD-10-CM

## 2024-03-15 LAB
INR PPP: 1.7 (ref 0.91–1.09)
PROTHROMBIN TIME: 20.6 SECONDS (ref 10–13.8)

## 2024-03-15 PROCEDURE — 85610 PROTHROMBIN TIME: CPT | Performed by: INTERNAL MEDICINE

## 2024-03-15 RX ORDER — SODIUM CHLORIDE 0.9 % (FLUSH) 0.9 %
10 SYRINGE (ML) INJECTION EVERY 12 HOURS SCHEDULED
OUTPATIENT
Start: 2024-03-15

## 2024-03-15 RX ORDER — SODIUM CHLORIDE 0.9 % (FLUSH) 0.9 %
10 SYRINGE (ML) INJECTION AS NEEDED
OUTPATIENT
Start: 2024-03-15

## 2024-03-15 RX ORDER — SODIUM CHLORIDE 9 MG/ML
40 INJECTION, SOLUTION INTRAVENOUS AS NEEDED
OUTPATIENT
Start: 2024-03-15

## 2024-03-15 NOTE — PROGRESS NOTES
[facility] -  CLINIC FOLLOW UP     Patient Care Team:  Ric Antony DO as PCP - General (Internal Medicine)  Arun Banks MD as Cardiologist (Cardiology)  Celso Ellsworth MD as Consulting Physician (Urology)  Tenzin Abrams MD as Consulting Physician (Gastroenterology)  Deion Avelar DPM as Consulting Physician (Podiatry)  Chaz Chavez MD as Cardiologist (Cardiac Electrophysiology)  Jaime Vazquez OD (Optometry)  Diomedes Sterling DO as Consulting Physician (Pain Medicine)    Chief Complaint: follow up to PVCs    Subjective   P Problems:  1.  Paroxysmal atrial fibrillation  2.  Atrial flutter  3.  Sustained SVT / atrial tachycardia  4.  Sinus node dysfunction  5.  Presence of a cardiac defibrillator  -9/27/2012: DOI, [employer]  -10/31/2019: Generator change, Brenda Chavez  6.  Acquired long QT syndrome  7.  Frequent PVCs     Cardiology Problems:  1.  Chronic systolic heart failure  -2/5/2019: EF 31 to 35%  2.  Hypertension  3.  Hyperlipidemia  4.  Prior DVT     Medical Problems:  1.  Obesity, BMI 32  2.  Type 2 diabetes  3.  BPH  4.  Chronic pain  5.  Obstructive sleep apnea     HPI: Today I had the pleasure of seeing Jonh Peacock in the cardiology clinic for follow up. He is a pleasant 77 year old male with a history of PAF, SVT and atrial flutter. He has had an Medtronic ICD since 2012 implanted for ventricular tachycardia. Years ago, he had ICD shocks that were thought to be inappropriate due to SVT. On device interrogations, he continues to have episodes of sustained SVT, PAF and atrial flutter.     He was found to have 16% PVC burden on a previous monitor and given his NICM, treatment is recommended with EPS or medication. However, he wanted to continue to think about ablation vs. Initiation of sotalol. Today he presents back and states that he is going to require carpal tunnel surgery because he has persistent numbness and has started to  "lose function, which is quite distressing for him.     He was in afib for a few minutes during his device interrogation, but continues to have a paroxsymal episodes and remains anticoagulated with warfarin. He thinks he would like to proceed with PVC ablation and just treat those effectively without rhythm medications.        Objective     Visit Vitals  /62   Pulse 57   Ht 188 cm (74.02\")   Wt 111 kg (245 lb)   SpO2 96%   BMI 31.44 kg/m²           Vitals reviewed.   Constitutional:       Appearance: Not in distress.   Eyes:      Extraocular Movements: Extraocular movements intact.      Conjunctiva/sclera: Conjunctivae normal.      Pupils: Pupils are equal, round, and reactive to light.   HENT:      Head: Normocephalic and atraumatic.      Nose: Nose normal.    Mouth/Throat:      Lips: Pink.      Mouth: Mucous membranes are moist.      Pharynx: Oropharynx is clear.   Neck:      Vascular: No carotid bruit or JVD. JVD normal.   Pulmonary:      Effort: Pulmonary effort is normal.      Breath sounds: Normal breath sounds.   Chest:      Chest wall: Not tender to palpatation.   Cardiovascular:      PMI at left midclavicular line. Normal rate. Regular rhythm. Normal S1. Normal S2.       Murmurs: There is no murmur.      No gallop.  No rub.   Pulses:     Radial: 2+ bilaterally.  Edema:     Peripheral edema absent.   Abdominal:      Palpations: Abdomen is soft.   Musculoskeletal: Normal range of motion.      Extremities: No clubbing present.     Cervical back: Normal range of motion. Skin:     General: Skin is warm and dry.   Neurological:      General: No focal deficit present.      Mental Status: Alert and oriented to person, place, and time.      Gait: Gait abnormal.   Psychiatric:         Attention and Perception: Attention normal.         Mood and Affect: Affect normal.         Speech: Speech normal.         Behavior: Behavior normal.         Cognition and Memory: Cognition normal.             The following " portions of the patient's history were reviewed and updated as appropriate: allergies, current medications, past medical history, past social history, past and problem list.     Review of Systems   Constitutional: Negative.    HENT: Negative.     Eyes: Negative.    Respiratory: Negative.     Cardiovascular: Negative.    Gastrointestinal: Negative.    Endocrine: Negative.    Genitourinary: Negative.    Musculoskeletal: Negative.    Skin: Negative.    Allergic/Immunologic: Negative.    Neurological:  Positive for numbness.        Carpal tunnel   Hand weakness   Hematological: Negative.    Psychiatric/Behavioral: Negative.                ECG 12 Lead    Date/Time: 3/15/2024 4:41 PM  Performed by: Radha Leary PA    Authorized by: Radha Leary PA  Comparison: compared with previous ECG from 10/31/2023  Rhythm: sinus rhythm  Ectopy: infrequent PVCs  Rate: normal  QRS axis: left    Clinical impression: abnormal EKG            Medication Review: yes    Current Outpatient Medications:     atorvastatin (LIPITOR) 20 MG tablet, Take 1 tablet by mouth Daily., Disp: 90 tablet, Rfl: 3    Buprenorphine 10 MCG/HR patch weekly, Place 15 mcg on the skin as directed by provider Every 7 (Seven) Days., Disp: , Rfl:     Diclofenac Sodium (VOLTAREN) 1 % gel gel, Apply 4 g topically to the appropriate area as directed 4 (Four) Times a Day As Needed (bilatearl hand pain)., Disp: 350 g, Rfl: 6    dilTIAZem (CARDIZEM) 30 MG tablet, Take 1 tablet by mouth 3 (Three) Times a Day., Disp: 270 tablet, Rfl: 3    furosemide (LASIX) 20 MG tablet, Take 1 tablet by mouth Daily., Disp: 90 tablet, Rfl: 3    furosemide (LASIX) 40 MG tablet, Take 1 tablet by mouth Daily. TAKE WITH 20 MG TABLET FOR 60 PER MG PER DAY, Disp: 90 tablet, Rfl: 3    gabapentin (NEURONTIN) 400 MG capsule, Take 1 capsule by mouth 2 (Two) Times a Day., Disp: , Rfl:     Glucosamine 750 MG tablet, Take 1 tablet by mouth 2 (Two) Times a Day., Disp: , Rfl:     losartan (COZAAR)  "25 MG tablet, Take 1 tablet by mouth Daily., Disp: 90 tablet, Rfl: 3    methocarbamol (Robaxin) 500 MG tablet, Take 1 tablet by mouth 4 (Four) Times a Day., Disp: 20 tablet, Rfl: 1    metoprolol succinate XL (TOPROL-XL) 100 MG 24 hr tablet, Take 1 tablet by mouth Daily., Disp: 90 tablet, Rfl: 3    Multiple Vitamins-Minerals (MULTIVITAMIN ADULT PO), Take 1 tablet by mouth Daily., Disp: , Rfl:     pantoprazole (PROTONIX) 40 MG EC tablet, Take 1 tablet by mouth Daily., Disp: 90 tablet, Rfl: 3    polyethylene glycol (MIRALAX) powder, Take 17 g by mouth Every Night., Disp: 1581 g, Rfl: 3    tamsulosin (FLOMAX) 0.4 MG capsule 24 hr capsule, Take 1 capsule by mouth Every Night., Disp: 90 capsule, Rfl: 3    warfarin (COUMADIN) 2 MG tablet, Take 1 tablet by mouth Daily., Disp: 90 tablet, Rfl: 1    warfarin (COUMADIN) 4 MG tablet, Take 1 tablet by mouth Daily., Disp: 90 tablet, Rfl: 1    warfarin (COUMADIN) 5 MG tablet, Take 1 tablet by mouth Daily., Disp: 90 tablet, Rfl: 3   Allergies   Allergen Reactions    Percocet [Oxycodone-Acetaminophen] Urinary Retention    Amiodarone Other (See Comments)     INTERFERES WITH OTHER MEDICATIONS    Oxycontin [Oxycodone Hcl] Urinary Retention    Penicillins Hives     All \"cillins\"    Vioxx [Rofecoxib] GI Intolerance       I have reviewed           CBC:  Lab Results - Last 18 Months   Lab Units 05/30/23  1241   WBC 10*3/mm3 4.87   HEMOGLOBIN g/dL 10.9*   HEMATOCRIT % 32.8*   PLATELETS 10*3/mm3 135*      BMP/CMP:  Lab Results - Last 18 Months   Lab Units 05/30/23  1241   SODIUM mmol/L 143   POTASSIUM mmol/L 3.8   CHLORIDE mmol/L 104   CO2 mmol/L 31.0*   GLUCOSE mg/dL 123*   BUN mg/dL 17   CREATININE mg/dL 1.21   CALCIUM mg/dL 8.7         Results for orders placed during the hospital encounter of 06/14/23    Adult Transthoracic Echo Complete w/ Color, Spectral and Contrast if necessary per protocol    Interpretation Summary    Left ventricular systolic function is severely decreased. Left " ventricular ejection fraction appears to be 31 - 35%.    The left ventricular cavity is moderately dilated.    Left ventricular wall thickness is consistent with mild concentric hypertrophy.    Normal right ventricular cavity size noted. Borderline low right ventricular systolic function noted.    No significant valvular abnormalities identified on this study.     Assessment:   Diagnoses and all orders for this visit:    1. PVC's (premature ventricular contractions) (Primary)  -     ECG 12 Lead; Future    2. Paroxysmal atrial fibrillation    3. Anticoagulant long-term use    4. Cardiac defibrillator in situ    Other orders  -     ECG 12 Lead    PVCs: He is willing to be scheduled for PVC ablation. It appears his PVCs may be LVOT in origin, but difficult to say.   -Follow up after ablation    PAF: Intermittent episodes, had a short episode prior to clinic. He is anticoagulated with Warfarin and would be acceptable to hold his warfarin prior to surgery for 5 days if needed. Therapeutic INRs for afib are range of 2-3.   -[id number] score 7    Dual chamber ICD: Device check today shows normal functioning device without any recent shocks or VT.     NICM/HfrEF: EF 31-35% per echo June 2023. He is compliant with GDMT and denies any ongoing ischemic symptoms of angina. He has not had any interventions in the past year. Typically has NYHA Class II symptoms.   -He is a Class II risk based off of Revised Cardiac Risk index with a 6% risk of major cardiac event. However, he is hemodynamically stable without recent acute CHF exacerbation and has no modifiable risk factors at this time.       I spent 30 minutes caring for Jonh on this date of service. This time includes time spent by me in the following activities:preparing for the visit, reviewing tests, obtaining and/or reviewing a separately obtained history, performing a medically appropriate examination and/or evaluation , counseling and educating the  patient/family/caregiver, ordering medications, tests, or procedures, referring and communicating with other health care professionals , documenting information in the medical record, and independently interpreting results and communicating that information with the patient/family/caregiver        Electronically signed by JOCELYN Florian

## 2024-03-18 ENCOUNTER — OFFICE VISIT (OUTPATIENT)
Dept: NEUROSURGERY | Facility: CLINIC | Age: 79
End: 2024-03-18
Payer: MEDICARE

## 2024-03-18 ENCOUNTER — HOSPITAL ENCOUNTER (OUTPATIENT)
Dept: GENERAL RADIOLOGY | Facility: HOSPITAL | Age: 79
Discharge: HOME | End: 2024-03-18
Admitting: NURSE PRACTITIONER
Payer: MEDICARE

## 2024-03-18 VITALS — HEIGHT: 74 IN | WEIGHT: 245 LBS | BODY MASS INDEX: 31.44 KG/M2

## 2024-03-18 DIAGNOSIS — M62.58 MUSCLE ATROPHY OF UPPER EXTREMITY: ICD-10-CM

## 2024-03-18 DIAGNOSIS — R20.0 NUMBNESS AND TINGLING OF UPPER EXTREMITY: ICD-10-CM

## 2024-03-18 DIAGNOSIS — M54.2 CERVICALGIA: ICD-10-CM

## 2024-03-18 DIAGNOSIS — E66.09 CLASS 1 OBESITY DUE TO EXCESS CALORIES WITH SERIOUS COMORBIDITY AND BODY MASS INDEX (BMI) OF 31.0 TO 31.9 IN ADULT: ICD-10-CM

## 2024-03-18 DIAGNOSIS — M62.81 MUSCLE WEAKNESS OF UPPER EXTREMITY: ICD-10-CM

## 2024-03-18 DIAGNOSIS — G56.03 BILATERAL CARPAL TUNNEL SYNDROME: Primary | ICD-10-CM

## 2024-03-18 DIAGNOSIS — R20.2 NUMBNESS AND TINGLING OF UPPER EXTREMITY: ICD-10-CM

## 2024-03-18 PROCEDURE — 72040 X-RAY EXAM NECK SPINE 2-3 VW: CPT

## 2024-03-18 PROCEDURE — 1160F RVW MEDS BY RX/DR IN RCRD: CPT | Performed by: NURSE PRACTITIONER

## 2024-03-18 PROCEDURE — 1159F MED LIST DOCD IN RCRD: CPT | Performed by: NURSE PRACTITIONER

## 2024-03-18 PROCEDURE — 99214 OFFICE O/P EST MOD 30 MIN: CPT | Performed by: NURSE PRACTITIONER

## 2024-03-18 NOTE — PROGRESS NOTES
Chief complaint:   Chief Complaint   Patient presents with    Hand Pain     Pt is here for hand pain.     Subjective     HPI:   Last encounter: 2/10/2021    Interval History: Jonh Peacock is a 78 y.o.  male who presents today with his wife with a complaint of bilateral hand pain, weakness, numbness, and tingling. History of lumbar discectomy by Dr. Sandhu in 2002, L3-S1 fusion for bilateral leg pain 2007, cord stimulator placement by Dr. Villela 2012 and revised in 2021.    Mr. Peacock currently denies neck pain.  He does endorse pain to the right deltoid, as well as progressively worsening bilateral hand pain, weakness, numbness, and tingling resulting in a progressive decline in fine motor skills and frequently dropped items, right > left.  His bilateral hand numbness and tingling is predominantly located to digits 1-3.  He currently ambulates without walker.  He denies falls.  His upper extremity symptoms are worse at night and with use.  He has attempted conservative management with bracing, however states he was unable to tolerate wearing the braces throughout the night.  No significant alleviating factors.  He denies fevers, chills, night sweats, unexplained weight loss, saddle anesthesia, or new bowel or bladder abnormalities.  He currently rates the severity of his symptoms 2/10.  No additional concerns at this time.    Oswestry Disability Index = 50%   Score   Pain Intensity Moderate pain - 2   Personal Care Need help but manage most personal care-3   Lifting I can only lift very light weights-4   Reading Read with slight pain-1   Headaches Slight infrequent headaches-1   Concentration Concentrate fully slight difficulty-1   Work I can hardly do any work-4   Driving I can drive-0   Sleeping 3 to 5 hours of sleepiness-4   Recreation Cannot do any recreational activities-5     SCORE INTERPRETATION OF THE OSWESTRY NECK DISABILITY QUESTIONNAIRE  50-68: Severe disability   (Azul et  al, 1980)    Modified [employer] (mJOA) score  CATEGORY SCORE   Upper extremity Motor Subscore Mild difficulty buttoning shirt-4   Lower Extremity Subscore Walk on flat ground with walking cane or walker-3   Upper Extremity Sensory Score Mild loss of hand sensation-2   Urinary Function Subscore Normal urination-3   TOTAL = 12/18    The mJOA is an 18 point score of functional disability specific to cervical myelopathy.   12-14: Moderate Myelopathy  Herrera et al. (1991). Mariana et al.     The mJOA is an 18 point score of functional disability specific to cervical myelopathy. Herrera et al. (1991).[2]    ROS  Review of Systems   HENT: Negative.     Eyes: Negative.    Respiratory: Negative.     Cardiovascular: Negative.    Gastrointestinal: Negative.    Endocrine: Negative.    Genitourinary: Negative.    Skin: Negative.    Allergic/Immunologic: Negative.    Neurological:  Positive for weakness and numbness.   Hematological: Negative.    Psychiatric/Behavioral: Negative.     All other systems reviewed and are negative.    PFSH:  Past Medical History:   Diagnosis Date    Anemia     Anxiety     Arrhythmia     Arthritis     Atrial fibrillation     Cardiomyopathy     CHF (congestive heart failure)     Colon polyp     Colostomy present     10years    Connective tissue disease     Depression     Depression     Diverticulitis     Dizzy     DVT (deep venous thrombosis)     Gastritis     GERD (gastroesophageal reflux disease)     Hiatal hernia     History of transfusion     Hyperlipidemia     Hypertension     Low back pain     Neuropathy     Pneumonia      Past Surgical History:   Procedure Laterality Date    APPENDECTOMY      BACK SURGERY  2002, 2007    Multiple spine surgeries - laminectomy & fusion    CARDIAC CATHETERIZATION      CARDIAC DEFIBRILLATOR PLACEMENT      CARDIAC ELECTROPHYSIOLOGY PROCEDURE N/A 10/31/2019    Procedure: ICD GEN CHANGE;  Surgeon: Chaz Chavez MD;  Location:  [facility] CATH  INVASIVE LOCATION;  Service: Cardiology    COLON RESECTION WITH COLOSTOMY      COLONOSCOPY  09/04/2014    diverticulosis    COLONOSCOPY N/A 10/29/2019    Procedure: COLONOSCOPY WITH ANESTHESIA;  Surgeon: Tenzin Abrams MD;  Location: [facility] ENDOSCOPY;  Service: Gastroenterology    ENDOSCOPY  02/24/1999    small sliding hiatal hernia    FOOT SURGERY Bilateral 2005    JOINT REPLACEMENT Right 2002    KNEE    PACEMAKER IMPLANTATION  2012    RECTAL FISSURE INCISION AND DRAINAGE      REPLACEMENT TOTAL KNEE      SPINAL CORD STIMULATOR IMPLANT  08/2012    Placed by Dr. DEENA Villela, [employer]    THORACIC LAMINECTOMY WITH PLACEMENT OF DORSAL COLUMN STIMULATOR Right 1/28/2021    Procedure: SPINAL CORD STIMULATOR REVISION, right buttocks;  Surgeon: Noah Oliveros MD;  Location: [facility] OR;  Service: Neurosurgery;  Laterality: Right;    TOTAL HIP ARTHROPLASTY Right 2004    TOTAL HIP ARTHROPLASTY Left 12/2008     Objective      Current Outpatient Medications   Medication Sig Dispense Refill    atorvastatin (LIPITOR) 20 MG tablet Take 1 tablet by mouth Daily. 90 tablet 3    Buprenorphine 10 MCG/HR patch weekly Place 15 mcg on the skin as directed by provider Every 7 (Seven) Days.      Diclofenac Sodium (VOLTAREN) 1 % gel gel Apply 4 g topically to the appropriate area as directed 4 (Four) Times a Day As Needed (bilatearl hand pain). 350 g 6    dilTIAZem (CARDIZEM) 30 MG tablet Take 1 tablet by mouth 3 (Three) Times a Day. 270 tablet 3    furosemide (LASIX) 20 MG tablet Take 1 tablet by mouth Daily. 90 tablet 3    furosemide (LASIX) 40 MG tablet Take 1 tablet by mouth Daily. TAKE WITH 20 MG TABLET FOR 60 PER MG PER DAY 90 tablet 3    gabapentin (NEURONTIN) 400 MG capsule Take 1 capsule by mouth 2 (Two) Times a Day.      Glucosamine 750 MG tablet Take 1 tablet by mouth 2 (Two) Times a Day.      losartan (COZAAR) 25 MG tablet Take 1 tablet by mouth Daily. 90 tablet 3    methocarbamol (Robaxin) 500 MG tablet Take 1 tablet by  "mouth 4 (Four) Times a Day. 20 tablet 1    metoprolol succinate XL (TOPROL-XL) 100 MG 24 hr tablet Take 1 tablet by mouth Daily. 90 tablet 3    Multiple Vitamins-Minerals (MULTIVITAMIN ADULT PO) Take 1 tablet by mouth Daily.      pantoprazole (PROTONIX) 40 MG EC tablet Take 1 tablet by mouth Daily. 90 tablet 3    polyethylene glycol (MIRALAX) powder Take 17 g by mouth Every Night. 1581 g 3    tamsulosin (FLOMAX) 0.4 MG capsule 24 hr capsule Take 1 capsule by mouth Every Night. 90 capsule 3    warfarin (COUMADIN) 2 MG tablet Take 1 tablet by mouth Daily. 90 tablet 1    warfarin (COUMADIN) 4 MG tablet Take 1 tablet by mouth Daily. 90 tablet 1    warfarin (COUMADIN) 5 MG tablet Take 1 tablet by mouth Daily. 90 tablet 3     No current facility-administered medications for this visit.     Vital Signs  Ht 188 cm (74.02\")   Wt 111 kg (245 lb)   BMI 31.44 kg/m²   Physical Exam  Vitals and nursing note reviewed.   Constitutional:       General: He is not in acute distress.     Appearance: Normal appearance. He is well-developed and well-groomed. He is obese. He is not ill-appearing, toxic-appearing or diaphoretic.      Comments: BMI 31.44   HENT:      Head: Normocephalic and atraumatic.      Right Ear: Hearing normal.      Left Ear: Hearing normal.   Eyes:      Extraocular Movements: EOM normal.      Conjunctiva/sclera: Conjunctivae normal.      Pupils: Pupils are equal, round, and reactive to light.   Neck:      Trachea: Trachea normal.   Cardiovascular:      Rate and Rhythm: Normal rate and regular rhythm.   Pulmonary:      Effort: Pulmonary effort is normal. No tachypnea, bradypnea, accessory muscle usage or respiratory distress.   Abdominal:      Palpations: Abdomen is soft.   Musculoskeletal:      Cervical back: Full passive range of motion without pain and neck supple.   Skin:     General: Skin is warm and dry.   Neurological:      Mental Status: He is alert and oriented to person, place, and time.      GCS: GCS eye " subscore is 4. GCS verbal subscore is 5. GCS motor subscore is 6.      Gait: Gait is intact.      Deep Tendon Reflexes:      Reflex Scores:       Tricep reflexes are 0 on the right side and 0 on the left side.       Bicep reflexes are 0 on the right side and 0 on the left side.       Brachioradialis reflexes are 0 on the right side and 0 on the left side.       Patellar reflexes are 0 on the right side and 0 on the left side.       Achilles reflexes are 0 on the right side and 0 on the left side.  Psychiatric:         Speech: Speech normal.         Behavior: Behavior normal. Behavior is cooperative.       Neurologic Exam     Mental Status   Oriented to person, place, and time.   Attention: normal. Concentration: normal.   Speech: speech is normal   Level of consciousness: alert    Cranial Nerves     CN II   Visual fields full to confrontation.     CN III, IV, VI   Pupils are equal, round, and reactive to light.  Extraocular motions are normal.     CN V   Facial sensation intact.     CN VII   Facial expression full, symmetric.     CN VIII   CN VIII normal.     CN IX, X   CN IX normal.     CN XI   CN XI normal.     Motor Exam   Right arm tone: normal  Left arm tone: normal  Right leg tone: normal  Left leg tone: normal    Strength   Right deltoid: 4/5  Left deltoid: 4/5  Right biceps: 5/5  Left biceps: 5/5  Right triceps: 5/5  Left triceps: 5/5  Right wrist extension: 5/5  Left wrist extension: 5/5  Right iliopsoas: 5/5  Left iliopsoas: 5/5  Right quadriceps: 5/5  Left quadriceps: 5/5  Right anterior tibial: 5/5  Left anterior tibial: 5/5  Right gastroc: 5/5  Left gastroc: 5/5  Bilateral thenar and hypothenar atrophy  Right EHL 5/5  Left EHL 5/5       Sensory Exam   Right arm light touch: decreased from wrist  Left arm light touch: decreased from wrist  Right leg light touch: normal  Left leg light touch: normal    Gait, Coordination, and Reflexes     Gait  Gait: normal    Tremor   Resting tremor: absent  Intention  tremor: absent  Action tremor: absent    Reflexes   Right brachioradialis: 0  Left brachioradialis: 0  Right biceps: 0  Left biceps: 0  Right triceps: 0  Left triceps: 0  Right patellar: 0  Left patellar: 0  Right achilles: 0  Left achilles: 0  Right plantar: normal  Left plantar: normal  Right Harrell: absent  Left Harrell: absent  Right ankle clonus: absent  Left ankle clonus: absent  Right pendular knee jerk: absent  Left pendular knee jerk: absent  (12 bullet pts)    Male  strength (pounds)  AGE Right Hand RH Norms Left Hand LH Norms   20-24  121±20.6  104±21.8   25-29  120±23.0  110±16.2   30-34  121±22.4  110±21.7   35-39  119±24  113±21.7   40-44  117±20.7  112±18.7   45-49  110±23.0  101±22.8   50-54  113±18.1  102±17   55-59  101±26.7  83±23.4   60-64  90±20.4  77±20.3   65-69  91±20.6  76.8±19.8   70-74  75±21.5  65±18.1   75+ *20 66±21.0 10 55±17.0   (RIAN Ellis et al; Hand Dynometer: Effects of trials and sessions.  Perpetual and Motor Skills 61:195-8, 1985)  * = Dominant hand  > = Intervention     Results Review:   No radiology results for the last 30 days.    2/19/2024        Assessment/Plan:   Jonh Peacock is a 78 y.o. male with a significant medical history of a lumbar discectomy by Dr. Sandhu in 2002, L3-S1 fusion for bilateral leg pain 2007, cord stimulator placement by Dr. Villela 2012 and revised in 2021; A-fib and DVT currently on Coumadin, CHF, cardiomyopathy, hypertension, hyperlipidemia, connective tissue disease, and obesity.  He presents today with a new problem of progressively worsening bilateral hand pain, weakness, numbness, and tingling.  TIMUR: 50.  mJOA: 12.  Physical exam findings of bilateral hand atrophy, right  strength is approximately 2 standard deviations below age-matched controls, left  strength is approximately 4 standard deviations below age-matched controls, poor dexterity, and grossly muted reflexes. No recent imaging cervical spine.  EMG and nerve  conduction studies of bilateral upper extremities show peripheral polyneuropathy, as well as severe bilateral carpal tunnel syndrome.    Recommendations:  Bilateral upper extremity atrophy, weakness, numbness, tingling  Differential diagnosis include, but are not limited to Cervical Spondylosis WITH myelopathy and bilateral carpal tunnel    For further evaluation we will send Jonh for 2-3 view lateral x-rays of the cervical spine complete with flexion-extension to assess for areas of instability, as well as a noncontrasted MRI of the cervical spine to rule out cervical stenosis and need for surgical intervention.  I would like him to return for reassessment with Dr. Oliveros after all studies been completed.    Bilateral Carpal Tunnel Syndrome   Differential diagnosis: Carpal tunnel syndrome, diabetic neuropathy, thoracic outlet syndrome, complex regional pain syndrome, cervical stenosis with radiculopathy.     I would first like Jonh to completed a rigorous course of conservative therapy  including rest and bracing.  We will see him back in the office for re-evaluation following advanced imaging of the cervical spine.    Class 1 obesity (BMI 30.0-34.9) due to excessive calories with serious comorbidities BMI of 31.0-31.9 in adult  Body mass index is 31.44 kg/m². Information on the DASH diet provided in the AVS.  We will continue to provided diet and exercise information with the goal of weight loss at each scheduled appointment.     ADVANCED CARE PLANNING  Information on advance directives provided in AVS.    MALLY Fall Risk Assessment was completed, and patient is at MODERATE risk for falls. Assessment completed on:3/18/2024  Fall risk information provided in AVS    Diagnoses and all orders for this visit:    1. Bilateral carpal tunnel syndrome (Primary)    2. Muscle weakness of upper extremity  -     XR Spine Cervical Flex & Ext Only; Future  -     MRI Cervical Spine Without Contrast; Future    3. Muscle  atrophy of upper extremity  -     XR Spine Cervical Flex & Ext Only; Future  -     MRI Cervical Spine Without Contrast; Future    4. Numbness and tingling of upper extremity  -     XR Spine Cervical Flex & Ext Only; Future  -     MRI Cervical Spine Without Contrast; Future    5. Class 1 obesity due to excess calories with serious comorbidity and body mass index (BMI) of 31.0 to 31.9 in adult      Return for Follow-up with Dr. Oliveros at next avaliable WITH MRI..    Thank you, for allowing me to continue to participate in the care of this patient.    Sincerely,  REYMUNDO Donato

## 2024-03-18 NOTE — PATIENT INSTRUCTIONS
"DASH Eating Plan  DASH stands for Dietary Approaches to Stop Hypertension. The DASH eating plan is a healthy eating plan that has been shown to:  Reduce high blood pressure (hypertension).  Reduce your risk for type 2 diabetes, heart disease, and stroke.  Help with weight loss.  What are tips for following this plan?  Reading food labels  Check food labels for the amount of salt (sodium) per serving. Choose foods with less than 5 percent of the Daily Value of sodium. Generally, foods with less than 300 milligrams (mg) of sodium per serving fit into this eating plan.  To find whole grains, look for the word \"whole\" as the first word in the ingredient list.  Shopping  Buy products labeled as \"low-sodium\" or \"no salt added.\"  Buy fresh foods. Avoid canned foods and pre-made or frozen meals.  Cooking  Avoid adding salt when cooking. Use salt-free seasonings or herbs instead of table salt or sea salt. Check with your health care provider or pharmacist before using salt substitutes.  Do not kramer foods. Cook foods using healthy methods such as baking, boiling, grilling, roasting, and broiling instead.  Cook with heart-healthy oils, such as olive, canola, avocado, soybean, or sunflower oil.  Meal planning    Eat a balanced diet that includes:  4 or more servings of fruits and 4 or more servings of vegetables each day. Try to fill one-half of your plate with fruits and vegetables.  6-8 servings of whole grains each day.  Less than 6 oz (170 g) of lean meat, poultry, or fish each day. A 3-oz (85-g) serving of meat is about the same size as a deck of cards. One egg equals 1 oz (28 g).  2-3 servings of low-fat dairy each day. One serving is 1 cup (237 mL).  1 serving of nuts, seeds, or beans 5 times each week.  2-3 servings of heart-healthy fats. Healthy fats called omega-3 fatty acids are found in foods such as walnuts, flaxseeds, fortified milks, and eggs. These fats are also found in cold-water fish, such as sardines, salmon, " and mackerel.  Limit how much you eat of:  Canned or prepackaged foods.  Food that is high in trans fat, such as some fried foods.  Food that is high in saturated fat, such as fatty meat.  Desserts and other sweets, sugary drinks, and other foods with added sugar.  Full-fat dairy products.  Do not salt foods before eating.  Do not eat more than 4 egg yolks a week.  Try to eat at least 2 vegetarian meals a week.  Eat more home-cooked food and less restaurant, buffet, and fast food.  Lifestyle  When eating at a restaurant, ask that your food be prepared with less salt or no salt, if possible.  If you drink alcohol:  Limit how much you use to:  0-1 drink a day for women who are not pregnant.  0-2 drinks a day for men.  Be aware of how much alcohol is in your drink. In the [place] one drink equals one 12 oz bottle of beer (355 mL), one 5 oz glass of wine (148 mL), or one 1½ oz glass of hard liquor (44 mL).  General information  Avoid eating more than 2,300 mg of salt a day. If you have hypertension, you may need to reduce your sodium intake to 1,500 mg a day.  Work with your health care provider to maintain a healthy body weight or to lose weight. Ask what an ideal weight is for you.  Get at least 30 minutes of exercise that causes your heart to beat faster (aerobic exercise) most days of the week. Activities may include walking, swimming, or biking.  Work with your health care provider or dietitian to adjust your eating plan to your individual calorie needs.  What foods should I eat?  Fruits  All fresh, dried, or frozen fruit. Canned fruit in natural juice (without added sugar).  Vegetables  Fresh or frozen vegetables (raw, steamed, roasted, or grilled). Low-sodium or reduced-sodium tomato and vegetable juice. Low-sodium or reduced-sodium tomato sauce and tomato paste. Low-sodium or reduced-sodium canned vegetables.  Grains  Whole-grain or whole-wheat bread. Whole-grain or whole-wheat pasta. Brown rice. Oatmeal. Quinoa.  Bulgur. Whole-grain and low-sodium cereals. Ellen bread. Low-fat, low-sodium crackers. Whole-wheat flour tortillas.  Meats and other proteins  Skinless chicken or turkey. Ground chicken or turkey. Pork with fat trimmed off. Fish and seafood. Egg whites. Dried beans, peas, or lentils. Unsalted nuts, nut butters, and seeds. Unsalted canned beans. Lean cuts of beef with fat trimmed off. Low-sodium, lean precooked or cured meat, such as sausages or meat loaves.  Dairy  Low-fat (1%) or fat-free (skim) milk. Reduced-fat, low-fat, or fat-free cheeses. Nonfat, low-sodium ricotta or cottage cheese. Low-fat or nonfat yogurt. Low-fat, low-sodium cheese.  Fats and oils  Soft margarine without trans fats. Vegetable oil. Reduced-fat, low-fat, or light mayonnaise and salad dressings (reduced-sodium). Canola, safflower, olive, avocado, soybean, and sunflower oils. Avocado.  Seasonings and condiments  Herbs. Spices. Seasoning mixes without salt.  Other foods  Unsalted popcorn and pretzels. Fat-free sweets.  The items listed above may not be a complete list of foods and beverages you can eat. Contact a dietitian for more information.  What foods should I avoid?  Fruits  Canned fruit in a light or heavy syrup. Fried fruit. Fruit in cream or butter sauce.  Vegetables  Creamed or fried vegetables. Vegetables in a cheese sauce. Regular canned vegetables (not low-sodium or reduced-sodium). Regular canned tomato sauce and paste (not low-sodium or reduced-sodium). Regular tomato and vegetable juice (not low-sodium or reduced-sodium). Pickles. Olives.  Grains  Baked goods made with fat, such as croissants, muffins, or some breads. Dry pasta or rice meal packs.  Meats and other proteins  Fatty cuts of meat. Ribs. Fried meat. Elizabeth. Bologna, salami, and other precooked or cured meats, such as sausages or meat loaves. Fat from the back of a pig (fatback). Bratwurst. Salted nuts and seeds. Canned beans with added salt. Canned or smoked fish.  Whole eggs or egg yolks. Chicken or turkey with skin.  Dairy  Whole or 2% milk, cream, and half-and-half. Whole or full-fat cream cheese. Whole-fat or sweetened yogurt. Full-fat cheese. Nondairy creamers. Whipped toppings. Processed cheese and cheese spreads.  Fats and oils  Butter. Stick margarine. Lard. Shortening. Ghee. Elizabeth fat. Tropical oils, such as coconut, palm kernel, or palm oil.  Seasonings and condiments  Onion salt, garlic salt, seasoned salt, table salt, and sea salt. Worcestershire sauce. Tartar sauce. Barbecue sauce. Teriyaki sauce. Soy sauce, including reduced-sodium. Steak sauce. Canned and packaged gravies. Fish sauce. Oyster sauce. Cocktail sauce. Store-bought horseradish. Ketchup. Mustard. Meat flavorings and tenderizers. Bouillon cubes. Hot sauces. Pre-made or packaged marinades. Pre-made or packaged taco seasonings. Relishes. Regular salad dressings.  Other foods  Salted popcorn and pretzels.  The items listed above may not be a complete list of foods and beverages you should avoid. Contact a dietitian for more information.  Where to find more information  National Heart, Lung, and Blood [place]: www.nhlbi.nih.gov  American Heart Association: www.heart.org  Academy of Nutrition and Dietetics: www.eatright.org  National Kidney Foundation: www.kidney.org  Summary  The DASH eating plan is a healthy eating plan that has been shown to reduce high blood pressure (hypertension). It may also reduce your risk for type 2 diabetes, heart disease, and stroke.  When on the DASH eating plan, aim to eat more fresh fruits and vegetables, whole grains, lean proteins, low-fat dairy, and heart-healthy fats.  With the DASH eating plan, you should limit salt (sodium) intake to 2,300 mg a day. If you have hypertension, you may need to reduce your sodium intake to 1,500 mg a day.  Work with your health care provider or dietitian to adjust your eating plan to your individual calorie needs.  This information is not  intended to replace advice given to you by your health care provider. Make sure you discuss any questions you have with your health care provider.  Document Revised: 11/20/2020 Document Reviewed: 11/20/2020  Elsevier Patient Education © 2023 [employer]    Advance Care Planning and Advance Directives     You make decisions on a daily basis - decisions about where you want to live, your career, your home, your life. Perhaps one of the most important decisions you face is your choice for future medical care. Take time to talk with your family and your healthcare team and start planning today.  Advance Care Planning is a process that can help you:  Understand possible future healthcare decisions in light of your own experiences  Reflect on those decision in light of your goals and values  Discuss your decisions with those closest to you and the healthcare professionals that care for you  Make a plan by creating a document that reflects your wishes    Surrogate Decision Maker  In the event of a medical emergency, which has left you unable to communicate or to make your own decisions, you would need someone to make decisions for you.  It is important to discuss your preferences for medical treatment with this person while you are in good health.     Qualities of a surrogate decision maker:  Willing to take on this role and responsibility  Knows what you want for future medical care  Willing to follow your wishes even if they don't agree with them  Able to make difficult medical decisions under stressful circumstances    Advance Directives  These are legal documents you can create that will guide your healthcare team and decision maker(s) when needed. These documents can be stored in the electronic medical record.    Living Will - a legal document to guide your care if you have a terminal condition or a serious illness and are unable to communicate. States vary by statute in document names/types, but most forms may  include one or more of the following:        -  Directions regarding life-prolonging treatments        -  Directions regarding artificially provided nutrition/hydration        -  Choosing a healthcare decision maker        -  Direction regarding organ/tissue donation    Durable Power of  for Healthcare - this document names an -in-fact to make medical decisions for you, but it may also allow this person to make personal and financial decisions for you. Please seek the advice of an  if you need this type of document.    **Advance Directives are not required and no one may discriminate against you if you do not sign one.    Medical Orders  Many states allow specific forms/orders signed by your physician to record your wishes for medical treatment in your current state of health. This form, signed in personal communication with your physician, addresses resuscitation and other medical interventions that you may or may not want.      For more information or to schedule a time with a [facility] Advance Care Planning Facilitator contact: [facility] or call [phone] and someone will contact you directly.Fall Prevention in the Home, Adult  Falls can cause injuries and can happen to people of all ages. There are many things you can do to make your home safer and to help prevent falls.  What actions can I take to prevent falls?  General information  Use good lighting in all rooms. Make sure to:  Replace any light bulbs that burn out.  Turn on the lights in dark areas and use night-lights.  Keep items that you use often in easy-to-reach places. Lower the shelves around your home if needed.  Move furniture so that there are clear paths around it.  Do not use throw rugs or other things on the floor that can make you trip.  If any of your floors are uneven, fix them.  Add color or contrast paint or tape to clearly alisa and help you see:  Grab bars or handrails.  First and last steps of  staircases.  Where the edge of each step is.  If you use a ladder or stepladder:  Make sure that it is fully opened. Do not climb a closed ladder.  Make sure the sides of the ladder are locked in place.  Have someone hold the ladder while you use it.  Know where your pets are as you move through your home.  What can I do in the bathroom?         Keep the floor dry. Clean up any water on the floor right away.  Remove soap buildup in the bathtub or shower. Buildup makes bathtubs and showers slippery.  Use non-skid mats or decals on the floor of the bathtub or shower.  Attach bath mats securely with double-sided, non-slip rug tape.  If you need to sit down in the shower, use a non-slip stool.  Install grab bars by the toilet and in the bathtub and shower. Do not use towel bars as grab bars.  What can I do in the bedroom?  Make sure that you have a light by your bed that is easy to reach.  Do not use any sheets or blankets on your bed that hang to the floor.  Have a firm chair or bench with side arms that you can use for support when you get dressed.  What can I do in the kitchen?  Clean up any spills right away.  If you need to reach something above you, use a step stool with a grab bar.  Keep electrical cords out of the way.  Do not use floor polish or wax that makes floors slippery.  What can I do with my stairs?  Do not leave anything on the stairs.  Make sure that you have a light switch at the top and the bottom of the stairs.  Make sure that there are handrails on both sides of the stairs. Fix handrails that are broken or loose.  Install non-slip stair treads on all your stairs if they do not have carpet.  Avoid having throw rugs at the top or bottom of the stairs.  Choose a carpet that does not hide the edge of the steps on the stairs. Make sure that the carpet is firmly attached to the stairs. Fix carpet that is loose or worn.  What can I do on the outside of my home?  Use bright outdoor lighting.  Fix the  edges of walkways and driveways and fix any cracks. Clear paths of anything that can make you trip, such as tools or rocks.  Add color or contrast paint or tape to clearly alisa and help you see anything that might make you trip as you walk through a door, such as a raised step or threshold.  Trim any bushes or trees on paths to your home.  Check to see if handrails are loose or broken and that both sides of all steps have handrails. Install guardrails along the edges of any raised decks and porches.  Have leaves, snow, or ice cleared regularly. Use sand, salt, or ice melter on paths if you live where there is ice and snow during the winter.  Clean up any spills in your garage right away. This includes grease or oil spills.  What other actions can I take?  Review your medicines with your doctor. Some medicines can cause dizziness or changes in blood pressure, which increase your risk of falling.  Wear shoes that:  Have a low heel. Do not wear high heels.  Have rubber bottoms and are closed at the toe.  Feel good on your feet and fit well.  Use tools that help you move around if needed. These include:  Canes.  Walkers.  Scooters.  Crutches.  Ask your doctor what else you can do to help prevent falls. This may include seeing a physical therapist to learn to do exercises to move better and get stronger.  Where to find more information  Centers for Disease Control and PreventionMALLY: cdc.gov  National [place] on Aging: ming.nih.gov  National [place] on Aging: ming.nih.gov  Contact a doctor if:  You are afraid of falling at home.  You feel weak, drowsy, or dizzy at home.  You fall at home.  Get help right away if you:  Lose consciousness or have trouble moving after a fall.  Have a fall that causes a head injury.  These symptoms may be an emergency. Get help right away. Call 911.  Do not wait to see if the symptoms will go away.  Do not drive yourself to the hospital.  This information is not intended to replace  advice given to you by your health care provider. Make sure you discuss any questions you have with your health care provider.  Document Revised: 08/21/2023 Document Reviewed: 08/21/2023  Elsevier Patient Education © 2023 [employer]vier Inc.

## 2024-03-27 ENCOUNTER — TELEPHONE (OUTPATIENT)
Dept: CARDIOLOGY | Facility: CLINIC | Age: 79
End: 2024-03-27

## 2024-03-27 DIAGNOSIS — G56.03 BILATERAL CARPAL TUNNEL SYNDROME: ICD-10-CM

## 2024-03-27 DIAGNOSIS — E11.42 TYPE 2 DIABETES MELLITUS WITH DIABETIC POLYNEUROPATHY, WITHOUT LONG-TERM CURRENT USE OF INSULIN: ICD-10-CM

## 2024-03-27 DIAGNOSIS — I48.0 PAROXYSMAL ATRIAL FIBRILLATION: ICD-10-CM

## 2024-03-27 DIAGNOSIS — E78.2 MIXED HYPERLIPIDEMIA: ICD-10-CM

## 2024-03-27 DIAGNOSIS — R20.0 BILATERAL HAND NUMBNESS: ICD-10-CM

## 2024-03-27 NOTE — TELEPHONE ENCOUNTER
Pt is changing pharmacies and needs 90 day refills on all of his medications switched to [facility] mail order. He wants the order placed for the 1st day of April. He would like to have the metoprolol in 50 mg if he can rather than the 100.     He will also need the 3 warfarins sent there as well and the two flomax.

## 2024-03-27 NOTE — TELEPHONE ENCOUNTER
Caller: Jonh Peacock    Relationship: Self    Best call back number: [phone]    What is the best time to reach you: ANYTIME    Who are you requesting to speak with (clinical staff, provider,  specific staff member): BIRGIT     Do you know the name of the person who called: BIRGIT     What was the call regarding: PATIENT HAS QUESTIONS ABOUT HIS ABLATION PROCEDURE SCHEDULED FOR 5/29/24.     Is it okay if the provider responds through MyChart: NO

## 2024-03-29 ENCOUNTER — LAB (OUTPATIENT)
Dept: LAB | Facility: HOSPITAL | Age: 79
End: 2024-03-29
Payer: MEDICARE

## 2024-03-29 DIAGNOSIS — I48.0 PAROXYSMAL ATRIAL FIBRILLATION: Chronic | ICD-10-CM

## 2024-03-29 LAB
INR PPP: 2.6 (ref 0.91–1.09)
PROTHROMBIN TIME: 30.6 SECONDS (ref 10–13.8)

## 2024-03-29 PROCEDURE — 85610 PROTHROMBIN TIME: CPT | Performed by: INTERNAL MEDICINE

## 2024-04-01 RX ORDER — ATORVASTATIN CALCIUM 20 MG/1
20 TABLET, FILM COATED ORAL DAILY
Qty: 90 TABLET | Refills: 3 | Status: SHIPPED | OUTPATIENT
Start: 2024-04-01

## 2024-04-01 RX ORDER — PANTOPRAZOLE SODIUM 40 MG/1
40 TABLET, DELAYED RELEASE ORAL DAILY
Qty: 90 TABLET | Refills: 3 | Status: SHIPPED | OUTPATIENT
Start: 2024-04-01

## 2024-04-01 RX ORDER — TAMSULOSIN HYDROCHLORIDE 0.4 MG/1
1 CAPSULE ORAL NIGHTLY
Qty: 90 CAPSULE | Refills: 3 | Status: SHIPPED | OUTPATIENT
Start: 2024-04-01

## 2024-04-01 RX ORDER — LOSARTAN POTASSIUM 25 MG/1
25 TABLET ORAL DAILY
Qty: 90 TABLET | Refills: 3 | Status: SHIPPED | OUTPATIENT
Start: 2024-04-01

## 2024-04-01 RX ORDER — METOPROLOL SUCCINATE 100 MG/1
100 TABLET, EXTENDED RELEASE ORAL DAILY
Qty: 90 TABLET | Refills: 3 | Status: SHIPPED | OUTPATIENT
Start: 2024-04-01

## 2024-04-03 DIAGNOSIS — I48.0 PAROXYSMAL ATRIAL FIBRILLATION: ICD-10-CM

## 2024-04-03 RX ORDER — WARFARIN SODIUM 5 MG/1
5 TABLET ORAL
Qty: 90 TABLET | Refills: 3 | Status: SHIPPED | OUTPATIENT
Start: 2024-04-03

## 2024-04-03 RX ORDER — WARFARIN SODIUM 4 MG/1
4 TABLET ORAL
Qty: 90 TABLET | Refills: 1 | Status: SHIPPED | OUTPATIENT
Start: 2024-04-03

## 2024-04-03 RX ORDER — FUROSEMIDE 40 MG/1
40 TABLET ORAL DAILY
Qty: 90 TABLET | Refills: 3 | Status: SHIPPED | OUTPATIENT
Start: 2024-04-03

## 2024-04-03 RX ORDER — WARFARIN SODIUM 2 MG/1
2 TABLET ORAL
Qty: 90 TABLET | Refills: 1 | Status: SHIPPED | OUTPATIENT
Start: 2024-04-03

## 2024-04-03 RX ORDER — FUROSEMIDE 20 MG/1
20 TABLET ORAL DAILY
Qty: 90 TABLET | Refills: 3 | Status: SHIPPED | OUTPATIENT
Start: 2024-04-03

## 2024-04-03 NOTE — TELEPHONE ENCOUNTER
Rx Refill Note  Requested Prescriptions     Pending Prescriptions Disp Refills    warfarin (COUMADIN) 2 MG tablet 90 tablet 1     Sig: Take 1 tablet by mouth Daily.    warfarin (COUMADIN) 4 MG tablet 90 tablet 1     Sig: Take 1 tablet by mouth Daily.    warfarin (COUMADIN) 5 MG tablet 90 tablet 3     Sig: Take 1 tablet by mouth Daily.    furosemide (LASIX) 20 MG tablet 90 tablet 3     Sig: Take 1 tablet by mouth Daily.    furosemide (LASIX) 40 MG tablet 90 tablet 3     Sig: Take 1 tablet by mouth Daily. TAKE WITH 20 MG TABLET FOR 60 PER MG PER DAY      Last office visit with prescribing clinician: 11/17/2023   Last telemedicine visit with prescribing clinician: Visit date not found   Next office visit with prescribing clinician: 5/20/2024                         Would you like a call back once the refill request has been completed: [] Yes [] No    If the office needs to give you a call back, can they leave a voicemail: [] Yes [] No    Maegn Bueno MA  04/03/24, 14:56 CDT

## 2024-04-05 ENCOUNTER — HOSPITAL ENCOUNTER (OUTPATIENT)
Dept: MRI IMAGING | Facility: HOSPITAL | Age: 79
Discharge: HOME | End: 2024-04-05
Payer: MEDICARE

## 2024-04-05 DIAGNOSIS — R20.2 NUMBNESS AND TINGLING OF UPPER EXTREMITY: ICD-10-CM

## 2024-04-05 DIAGNOSIS — M62.81 MUSCLE WEAKNESS OF UPPER EXTREMITY: ICD-10-CM

## 2024-04-05 DIAGNOSIS — M62.58 MUSCLE ATROPHY OF UPPER EXTREMITY: ICD-10-CM

## 2024-04-05 DIAGNOSIS — R20.0 NUMBNESS AND TINGLING OF UPPER EXTREMITY: ICD-10-CM

## 2024-04-05 PROCEDURE — 72141 MRI NECK SPINE W/O DYE: CPT

## 2024-04-09 ENCOUNTER — DOCUMENTATION (OUTPATIENT)
Dept: NEUROSURGERY | Facility: CLINIC | Age: 79
End: 2024-04-09
Payer: COMMERCIAL

## 2024-04-09 NOTE — PROGRESS NOTES
[facility] - PODIATRY    Today's Date: 04/17/24    Patient Name: Jonh Peacock  MRN: [id number]  CSN: [id number]  PCP: Ric Antony DO  Referring Provider: No ref. provider found     SUBJECTIVE     Chief Complaint   Patient presents with    Follow-up     Ric Antony, 05/15/2023 3 MTH FU DIABETIC- pt states feet doing good- pt denies pain     Diabetes     A1C 7.3 last check        HPI: Jonh Peacock, a 78 y.o.male, comes to clinic as a(n) established patient presenting for diabetic foot exam and complaining of thick fungal toenails. Pt with h/o Anxiety, Arrhythmia, Cardiomyopathy, CHF, Depression, previous DVT, HTN, Obesity, Anticoagulation with Warfarin. Patient is NIDDM and unsure of last BG level. Last A1C of 7.3%.  Denies pain today. Uses rollator for ambulation. Continues Coumadin daily. Notes improvement to feet with regular visits. Reports numbness in his feet. Denies any constitutional symptoms. No other pedal complaints at this time.    Past Medical History:   Diagnosis Date    Anemia     Anxiety     Arrhythmia     Arthritis     Atrial fibrillation     Cardiomyopathy     CHF (congestive heart failure)     Colon polyp     Colostomy present     10years    Connective tissue disease     Depression     Depression     Diabetes mellitus     Diverticulitis     Dizzy     DVT (deep venous thrombosis)     Gastritis     GERD (gastroesophageal reflux disease)     Hiatal hernia     History of transfusion     Hyperlipidemia     Hypertension     Low back pain     Neuropathy     Pneumonia      Past Surgical History:   Procedure Laterality Date    APPENDECTOMY      BACK SURGERY  2002, 2007    Multiple spine surgeries - laminectomy & fusion    CARDIAC CATHETERIZATION      CARDIAC DEFIBRILLATOR PLACEMENT      CARDIAC ELECTROPHYSIOLOGY PROCEDURE N/A 10/31/2019    Procedure: ICD GEN CHANGE;  Surgeon: Chaz Chavez MD;  Location:  [facility];  Service: Cardiology  "   COLON RESECTION WITH COLOSTOMY      COLONOSCOPY  09/04/2014    diverticulosis    COLONOSCOPY N/A 10/29/2019    Procedure: COLONOSCOPY WITH ANESTHESIA;  Surgeon: Tenzin Abrams MD;  Location:  [facility] ENDOSCOPY;  Service: Gastroenterology    ENDOSCOPY  02/24/1999    small sliding hiatal hernia    FOOT SURGERY Bilateral 2005    JOINT REPLACEMENT Right 2002    KNEE    PACEMAKER IMPLANTATION  2012    RECTAL FISSURE INCISION AND DRAINAGE      REPLACEMENT TOTAL KNEE      SPINAL CORD STIMULATOR IMPLANT  08/2012    Placed by Dr. DEENA Vlilela, [employer]    THORACIC LAMINECTOMY WITH PLACEMENT OF DORSAL COLUMN STIMULATOR Right 1/28/2021    Procedure: SPINAL CORD STIMULATOR REVISION, right buttocks;  Surgeon: Noah Oliveros MD;  Location:  [facility] OR;  Service: Neurosurgery;  Laterality: Right;    TOTAL HIP ARTHROPLASTY Right 2004    TOTAL HIP ARTHROPLASTY Left 12/2008     Family History   Problem Relation Age of Onset    Heart disease Mother     Heart disease Brother     Breast cancer Brother     Colon cancer Neg Hx     Colon polyps Neg Hx      Social History     Socioeconomic History    Marital status:    Tobacco Use    Smoking status: Never     Passive exposure: Never    Smokeless tobacco: Never   Vaping Use    Vaping status: Never Used   Substance and Sexual Activity    Alcohol use: Not Currently    Drug use: Never    Sexual activity: Defer     Allergies   Allergen Reactions    Percocet [Oxycodone-Acetaminophen] Urinary Retention    Amiodarone Other (See Comments)     INTERFERES WITH OTHER MEDICATIONS    Oxycontin [Oxycodone Hcl] Urinary Retention    Penicillins Hives     All \"cillins\"    Vioxx [Rofecoxib] GI Intolerance     Current Outpatient Medications   Medication Sig Dispense Refill    atorvastatin (LIPITOR) 20 MG tablet Take 1 tablet by mouth Daily. 90 tablet 3    Buprenorphine 10 MCG/HR patch weekly Place 15 mcg on the skin as directed by provider Every 7 (Seven) Days.      Diclofenac Sodium " (VOLTAREN) 1 % gel gel Apply 4 g topically to the appropriate area as directed 4 (Four) Times a Day As Needed (bilatearl hand pain). 350 g 6    dilTIAZem (CARDIZEM) 30 MG tablet Take 1 tablet by mouth 3 (Three) Times a Day. 270 tablet 3    furosemide (LASIX) 20 MG tablet Take 1 tablet by mouth Daily. 90 tablet 3    furosemide (LASIX) 40 MG tablet Take 1 tablet by mouth Daily. TAKE WITH 20 MG TABLET FOR 60 PER MG PER DAY 90 tablet 3    gabapentin (NEURONTIN) 400 MG capsule Take 1 capsule by mouth 2 (Two) Times a Day.      Glucosamine 750 MG tablet Take 1 tablet by mouth 2 (Two) Times a Day.      losartan (COZAAR) 25 MG tablet Take 1 tablet by mouth Daily. 90 tablet 3    methocarbamol (Robaxin) 500 MG tablet Take 1 tablet by mouth 4 (Four) Times a Day. 20 tablet 1    metoprolol succinate XL (TOPROL-XL) 100 MG 24 hr tablet Take 1 tablet by mouth Daily. 90 tablet 3    Multiple Vitamins-Minerals (MULTIVITAMIN ADULT PO) Take 1 tablet by mouth Daily.      pantoprazole (PROTONIX) 40 MG EC tablet Take 1 tablet by mouth Daily. 90 tablet 3    polyethylene glycol (MIRALAX) powder Take 17 g by mouth Every Night. 1581 g 3    tamsulosin (FLOMAX) 0.4 MG capsule 24 hr capsule Take 1 capsule by mouth Every Night. 90 capsule 3    warfarin (COUMADIN) 2 MG tablet Take 1 tablet by mouth Daily. 90 tablet 1    warfarin (COUMADIN) 4 MG tablet Take 1 tablet by mouth Daily. 90 tablet 1    warfarin (COUMADIN) 5 MG tablet Take 1 tablet by mouth Daily. 90 tablet 3     No current facility-administered medications for this visit.     Review of Systems   Constitutional:  Negative for chills and fever.   HENT:  Negative for congestion.    Respiratory:  Negative for shortness of breath.    Cardiovascular:  Positive for leg swelling. Negative for chest pain.   Gastrointestinal:  Negative for constipation, diarrhea, nausea and vomiting.   Musculoskeletal:  Positive for arthralgias, back pain and gait problem.        Foot pain   Skin:  Negative for  wound.        Thick Toenails   Neurological:  Positive for numbness.   Hematological:  Bruises/bleeds easily.       OBJECTIVE     Vitals:    04/17/24 1546   BP: 116/60   Pulse: 62   SpO2: 98%           PHYSICAL EXAM  GEN:   Accompanied by wife.    Foot/Ankle Exam    GENERAL  Diabetic foot exam performed    Appearance:  appears stated age  Orientation:  AAOx3  Affect:  appropriate  Gait:  (unsteady)  Assistance:  walker  Right shoe gear: casual shoe  Left shoe gear: casual shoe (with lift)    VASCULAR     Right Foot Vascularity   Dorsalis pedis:  1+  Posterior tibial:  1+  Skin temperature:  warm  Edema grading:  Trace and non-pitting  CFT:  4  Pedal hair growth:  Absent  Varicosities:  none     Left Foot Vascularity   Dorsalis pedis:  1+  Posterior tibial:  1+  Skin temperature:  warm  Edema grading:  Trace and non-pitting  CFT:  4  Pedal hair growth:  Absent  Varicosities:  none     NEUROLOGIC     Right Foot Neurologic   Light touch sensation: diminished  Vibratory sensation: diminished  Hot/Cold sensation: diminished  Protective Sensation using Hope-[place]il Monofilament:   Sites intact: 7  Sites tested: 10     Left Foot Neurologic   Light touch sensation: diminished  Vibratory sensation: diminished  Hot/Cold sensation:  diminished  Protective Sensation using Hope-Akhil Monofilament:   Sites intact: 7  Sites tested: 10    MUSCULOSKELETAL     Right Foot Musculoskeletal   Ecchymosis:  none  Tenderness:  toenail problem    Arch:  Pes planus  Hallux valgus: No       Left Foot Musculoskeletal   Ecchymosis:  none  Tenderness:  toenail problem  Arch:  Pes planus  Hallux valgus: No      MUSCLE STRENGTH     Right Foot Muscle Strength   Foot dorsiflexion:  5  Foot plantar flexion:  5  Foot inversion:  5  Foot eversion:  5     Left Foot Muscle Strength   Foot dorsiflexion:  5  Foot plantar flexion:  5  Foot inversion:  5  Foot eversion:  5    RANGE OF MOTION     Right Foot Range of Motion   Foot and ankle ROM within  normal limits       Left Foot Range of Motion   Foot and ankle ROM within normal limits      DERMATOLOGIC      Right Foot Dermatologic   Skin  Right foot skin is intact.   Nails  1.  Positive for elongated, onychomycosis, abnormal thickness and dystrophic nail.  2.  Positive for elongated, onychomycosis, abnormal thickness and dystrophic nail.  3.  Positive for elongated, onychomycosis, abnormal thickness and dystrophic nail.  4.  Positive for elongated, onychomycosis, abnormal thickness and dystrophic nail.  5.  Positive for elongated, onychomycosis, abnormal thickness and dystrophic nail.     Left Foot Dermatologic   Skin  Left foot skin is intact.   Nails  1.  Positive for elongated, onychomycosis, abnormal thickness and dystrophic nail.  2.  Positive for elongated, onychomycosis, abnormal thickness and dystrophic nail.  3.  Positive for elongated, onychomycosis, abnormal thickness and dystrophic nail.  4.  Positive for elongated, onychomycosis, abnormally thick and dystrophic nail.  5.  Positive for elongated, onychomycosis, abnormally thick and dystrophic nail.    Image:        RADIOLOGY/NUCLEAR:  MRI Cervical Spine Without Contrast    Result Date: 4/5/2024  Narrative: EXAMINATION: MRI CERVICAL SPINE WO CONTRAST- 4/5/2024 2:04 PM  HISTORY: cervicalgia; M62.81-Muscle weakness (generalized); M62.58-Muscle wasting and atrophy, not elsewhere classified, other site; R20.0-Anesthesia of skin; R20.2-Paresthesia of skin.  REPORT: Multiplanar multisequence MR imaging of the cervical spine was performed without contrast.  COMPARISON: Cervical spine x-rays 3/18/2024.  Sagittal images demonstrate grade 1 retrolisthesis of C5 relative to C4 and C6, there is disc space collapse at C5-6 with endplate spurring and mild to moderate disc space narrowing is present at the other cervical levels. There is mild multilevel spinal canal stenosis C3-4, C4-5 and C5-6, with mild contouring of the canal and ventral surface of the cord. No  abnormal spinal cord signal or compression is identified. Sagittal STIR images demonstrate motion artifact, but marrow signal appears homogeneous except for loss of normal fat signal within the odontoid process, probably related to sclerosis as seen on x-rays, this is probably chronic. No acute fracture is identified. The prevertebral soft tissues are within normal limits.  C2-3: Unremarkable.  C3-4: Bilateral facet overgrowth greater on the right and there is moderate bulging of the disc, asymmetric to the right. No disc herniation is identified. Uncovertebral spurring is also present and these findings result in moderate to severe right-sided neural foraminal narrowing. There is mild spinal canal stenosis.  C4-5: Grade 1 spondylolisthesis, with mild spinal canal stenosis, there is broad-based bulging of the disc which is partially calcified and is contiguous with uncovertebral spurs. There is bilateral facet overgrowth, greater on the right, resulting in severe right and moderate left neural foraminal narrowing. No focal disc herniation.  C5-6: Grade 1 spondylolisthesis with mild spinal canal stenosis, there is a moderate-sized disc osteophyte complex which is contiguous with uncovertebral spurs. Facet overgrowth is also present greater on the left. This results in severe left and moderate right neural foraminal narrowing.  C6-7: Left greater than right facet overgrowth with uncinate spurring, which results in severe left-sided neural foraminal narrowing. There is no significant spinal stenosis at this level. Mild bulging of the disc is present without a disc herniation.  C7-T1: There is a moderate sized asymmetric disc bulge, which extends to the right lateral recess and neural foramen, with no focal disc herniation. This results in moderate to severe right-sided neural foraminal narrowing.      Impression: Advanced multilevel degenerative disc disease as detailed above and including mild spinal canal stenosis  C3-4, C4-5 and C5-6, grade 1 spondylolisthesis C4-5 and C5-6. No cord compression is identified. Multiple levels demonstrate severe neural foraminal narrowing.  This report was signed and finalized on 4/5/2024 2:15 PM by Dr. Chaz Cadena MD.       LABORATORY/CULTURE RESULTS:      PATHOLOGY RESULTS:       ASSESSMENT/PLAN     Diagnoses and all orders for this visit:    1. Onychomycosis (Primary)    2. Type 2 diabetes mellitus with diabetic neuropathy, without long-term current use of insulin    3. Lower limb length difference    4. Anticoagulant long-term use    5. Peripheral edema    6. Gait abnormality    7. Foot callus      Comprehensive lower extremity examination and evaluation was performed.  Discussed findings and treatment plan including risks, benefits, and treatment options with patient in detail. Patient agreed with treatment plan.   After verbal consent obtained, nail(s) x10 debrided of length and thickness with nail nipper without incidence  After verbal consent obtained, calluses x1 pared utilizing dermal curette and/or scalpel without incidence  Patient may maintain nails and calluses at home utilizing emery board or pumice stone between visits as needed  Reviewed at home diabetic foot care including daily foot checks   Continue shoes with custom inserts  Continue use of rollator for ambulation support.  Elevate legs when possible.  Follow with PCP for management of DM.    An After Visit Summary was printed and given to the patient at discharge, including (if requested) any available informative/educational handouts regarding diagnosis, treatment, or medications. All questions were answered to patient/family satisfaction. Should symptoms fail to improve or worsen they agree to call or return to clinic or to go to the Emergency Department. Discussed the importance of following up with any needed screening tests/labs/specialist appointments and any requested follow-up recommended by me today.  Importance of maintaining follow-up discussed and patient accepts that missed appointments can delay diagnosis and potentially lead to worsening of conditions.  Return in about 10 weeks (around 6/26/2024) for Follow-up with Podiatry APRN, Schedule [facility]., or sooner if acute issues arise.        This document has been electronically signed by REYMUNDO Santana on April 17, 2024 16:55 CDT

## 2024-04-17 ENCOUNTER — OFFICE VISIT (OUTPATIENT)
Dept: PODIATRY | Facility: CLINIC | Age: 79
End: 2024-04-17
Payer: MEDICARE

## 2024-04-17 ENCOUNTER — LAB (OUTPATIENT)
Dept: LAB | Facility: HOSPITAL | Age: 79
End: 2024-04-17
Payer: MEDICARE

## 2024-04-17 VITALS
DIASTOLIC BLOOD PRESSURE: 60 MMHG | HEART RATE: 62 BPM | WEIGHT: 245 LBS | HEIGHT: 74 IN | OXYGEN SATURATION: 98 % | BODY MASS INDEX: 31.44 KG/M2 | SYSTOLIC BLOOD PRESSURE: 116 MMHG

## 2024-04-17 DIAGNOSIS — Z79.01 ANTICOAGULANT LONG-TERM USE: Chronic | ICD-10-CM

## 2024-04-17 DIAGNOSIS — M21.70 LOWER LIMB LENGTH DIFFERENCE: ICD-10-CM

## 2024-04-17 DIAGNOSIS — B35.1 ONYCHOMYCOSIS: Primary | ICD-10-CM

## 2024-04-17 DIAGNOSIS — L84 FOOT CALLUS: ICD-10-CM

## 2024-04-17 DIAGNOSIS — I48.0 PAROXYSMAL ATRIAL FIBRILLATION: Chronic | ICD-10-CM

## 2024-04-17 DIAGNOSIS — R60.0 PERIPHERAL EDEMA: ICD-10-CM

## 2024-04-17 DIAGNOSIS — R26.9 GAIT ABNORMALITY: ICD-10-CM

## 2024-04-17 DIAGNOSIS — I48.0 PAROXYSMAL ATRIAL FIBRILLATION: Primary | Chronic | ICD-10-CM

## 2024-04-17 DIAGNOSIS — E11.40 TYPE 2 DIABETES MELLITUS WITH DIABETIC NEUROPATHY, WITHOUT LONG-TERM CURRENT USE OF INSULIN: ICD-10-CM

## 2024-04-17 DIAGNOSIS — Z79.01 ANTICOAGULANT LONG-TERM USE: ICD-10-CM

## 2024-04-17 LAB
INR PPP: 2.1 (ref 0.91–1.09)
PROTHROMBIN TIME: 25.1 SECONDS (ref 10–13.8)

## 2024-04-17 PROCEDURE — 85610 PROTHROMBIN TIME: CPT | Performed by: INTERNAL MEDICINE

## 2024-05-14 ENCOUNTER — LAB (OUTPATIENT)
Dept: LAB | Facility: HOSPITAL | Age: 79
End: 2024-05-14
Payer: MEDICARE

## 2024-05-14 DIAGNOSIS — Z79.01 ANTICOAGULANT LONG-TERM USE: Chronic | ICD-10-CM

## 2024-05-14 DIAGNOSIS — I48.0 PAROXYSMAL ATRIAL FIBRILLATION: Chronic | ICD-10-CM

## 2024-05-14 LAB
INR PPP: 2 (ref 0.91–1.09)
PROTHROMBIN TIME: 24 SECONDS (ref 10–13.8)

## 2024-05-14 PROCEDURE — 85610 PROTHROMBIN TIME: CPT | Performed by: INTERNAL MEDICINE

## 2024-05-20 ENCOUNTER — OFFICE VISIT (OUTPATIENT)
Dept: INTERNAL MEDICINE | Facility: CLINIC | Age: 79
End: 2024-05-20
Payer: MEDICARE

## 2024-05-20 VITALS
OXYGEN SATURATION: 98 % | DIASTOLIC BLOOD PRESSURE: 66 MMHG | HEIGHT: 74 IN | RESPIRATION RATE: 16 BRPM | BODY MASS INDEX: 30.99 KG/M2 | SYSTOLIC BLOOD PRESSURE: 109 MMHG | HEART RATE: 68 BPM | WEIGHT: 241.5 LBS

## 2024-05-20 DIAGNOSIS — G56.03 SEVERE CARPAL TUNNEL SYNDROME OF BOTH WRISTS: Primary | ICD-10-CM

## 2024-05-20 DIAGNOSIS — I47.10 SUSTAINED SVT: ICD-10-CM

## 2024-05-20 DIAGNOSIS — E11.42 TYPE 2 DIABETES MELLITUS WITH DIABETIC POLYNEUROPATHY, WITHOUT LONG-TERM CURRENT USE OF INSULIN: ICD-10-CM

## 2024-05-20 DIAGNOSIS — I48.0 PAROXYSMAL ATRIAL FIBRILLATION: Chronic | ICD-10-CM

## 2024-05-20 DIAGNOSIS — M62.549: ICD-10-CM

## 2024-05-20 PROCEDURE — 1170F FXNL STATUS ASSESSED: CPT | Performed by: INTERNAL MEDICINE

## 2024-05-20 PROCEDURE — 3078F DIAST BP <80 MM HG: CPT | Performed by: INTERNAL MEDICINE

## 2024-05-20 PROCEDURE — G0439 PPPS, SUBSEQ VISIT: HCPCS | Performed by: INTERNAL MEDICINE

## 2024-05-20 PROCEDURE — 1159F MED LIST DOCD IN RCRD: CPT | Performed by: INTERNAL MEDICINE

## 2024-05-20 PROCEDURE — 99214 OFFICE O/P EST MOD 30 MIN: CPT | Performed by: INTERNAL MEDICINE

## 2024-05-20 PROCEDURE — 1160F RVW MEDS BY RX/DR IN RCRD: CPT | Performed by: INTERNAL MEDICINE

## 2024-05-20 PROCEDURE — 3074F SYST BP LT 130 MM HG: CPT | Performed by: INTERNAL MEDICINE

## 2024-05-20 PROCEDURE — 1125F AMNT PAIN NOTED PAIN PRSNT: CPT | Performed by: INTERNAL MEDICINE

## 2024-05-20 NOTE — PROGRESS NOTES
The ABCs of the Annual Wellness Visit  Subsequent Medicare Wellness Visit    Subjective    Jonh Peacock is a 78 y.o. male who presents for a Subsequent Medicare Wellness Visit.    The following portions of the patient's history were reviewed and   updated as appropriate: allergies, current medications, past family history, past medical history, past social history, past surgical history, and problem list.    Compared to one year ago, the patient feels his physical   health is worse.    Compared to one year ago, the patient feels his mental   health is worse.    Recent Hospitalizations:  He was not admitted to the hospital during the last year.       Current Medical Providers:  Patient Care Team:  Ric Antony DO as PCP - General (Internal Medicine)  Arun Banks MD as Cardiologist (Cardiology)  Celso Ellsworth MD as Consulting Physician (Urology)  Tenzin Abrams MD as Consulting Physician (Gastroenterology)  Deion Avelar DPM as Consulting Physician (Podiatry)  Chaz Chavez MD as Cardiologist (Cardiac Electrophysiology)  Jaime Vazquez OD (Optometry)  Diomedes Sterling DO as Consulting Physician (Pain Medicine)  Nicholas Martinez APRN as Nurse Practitioner (Nurse Practitioner)    Outpatient Medications Prior to Visit   Medication Sig Dispense Refill    atorvastatin (LIPITOR) 20 MG tablet Take 1 tablet by mouth Daily. 90 tablet 3    Buprenorphine 10 MCG/HR patch weekly Place 15 mcg on the skin as directed by provider Every 7 (Seven) Days.      Diclofenac Sodium (VOLTAREN) 1 % gel gel Apply 4 g topically to the appropriate area as directed 4 (Four) Times a Day As Needed (bilatearl hand pain). 350 g 6    dilTIAZem (CARDIZEM) 30 MG tablet Take 1 tablet by mouth 3 (Three) Times a Day. 270 tablet 3    furosemide (LASIX) 20 MG tablet Take 1 tablet by mouth Daily. 90 tablet 3    furosemide (LASIX) 40 MG tablet Take 1 tablet by mouth Daily. TAKE WITH 20 MG TABLET FOR 60 PER MG PER  DAY 90 tablet 3    gabapentin (NEURONTIN) 400 MG capsule Take 1 capsule by mouth 2 (Two) Times a Day.      Glucosamine 750 MG tablet Take 1 tablet by mouth 2 (Two) Times a Day.      losartan (COZAAR) 25 MG tablet Take 1 tablet by mouth Daily. 90 tablet 3    methocarbamol (Robaxin) 500 MG tablet Take 1 tablet by mouth 4 (Four) Times a Day. 20 tablet 1    metoprolol succinate XL (TOPROL-XL) 100 MG 24 hr tablet Take 1 tablet by mouth Daily. 90 tablet 3    Multiple Vitamins-Minerals (MULTIVITAMIN ADULT PO) Take 1 tablet by mouth Daily.      pantoprazole (PROTONIX) 40 MG EC tablet Take 1 tablet by mouth Daily. 90 tablet 3    polyethylene glycol (MIRALAX) powder Take 17 g by mouth Every Night. 1581 g 3    tamsulosin (FLOMAX) 0.4 MG capsule 24 hr capsule Take 1 capsule by mouth Every Night. 90 capsule 3    warfarin (COUMADIN) 2 MG tablet Take 1 tablet by mouth Daily. 90 tablet 1    warfarin (COUMADIN) 4 MG tablet Take 1 tablet by mouth Daily. 90 tablet 1    warfarin (COUMADIN) 5 MG tablet Take 1 tablet by mouth Daily. 90 tablet 3     No facility-administered medications prior to visit.       Opioid medication/s are on active medication list.  and I have evaluated his active treatment plan and pain score trends (see table).  There were no vitals filed for this visit.  I have reviewed the chart for potential of high risk medication and harmful drug interactions in the elderly.          Aspirin is not on active medication list.  Aspirin use is not indicated based on review of current medical condition/s. Risk of harm outweighs potential benefits.  .    Patient Active Problem List   Diagnosis    Mixed hyperlipidemia    Essential hypertension    Anemia    Neuropathy    Anxiety    Depression    Gastritis    Class 1 obesity due to excess calories with serious comorbidity and body mass index (BMI) of 31.0 to 31.9 in adult    CHF (congestive heart failure), NYHA class II, chronic, combined    Non-ischemic cardiomyopathy     "Paroxysmal atrial fibrillation    Anticoagulant long-term use    Onychomycosis    Peripheral edema    Obstructive sleep apnea of adult    Cardiac defibrillator in situ    Benign prostatic hyperplasia without lower urinary tract symptoms    Colostomy in place    Type 2 diabetes mellitus with diabetic polyneuropathy, without long-term current use of insulin    Hx of colonic polyp    Failed back syndrome    Chronic pain syndrome    S/P insertion of spinal cord stimulator    Bilateral carpal tunnel syndrome    Sustained SVT    PVC's (premature ventricular contractions)     Advance Care Planning   Advance Care Planning     Advance Directive is not on file.  ACP discussion was held with the patient during this visit. Patient does not have an advance directive, declines further assistance.     Objective    Vitals:    24 1532   BP: 109/66   BP Location: Left arm   Patient Position: Sitting   Cuff Size: Adult   Pulse: 68   Resp: 16   SpO2: 98%   Weight: 110 kg (241 lb 8 oz)   Height: 188 cm (74.02\")     Estimated body mass index is 30.99 kg/m² as calculated from the following:    Height as of this encounter: 188 cm (74.02\").    Weight as of this encounter: 110 kg (241 lb 8 oz).    BMI is >= 30 and <35. (Class 1 Obesity). The following options were offered after discussion;: exercise counseling/recommendations and nutrition counseling/recommendations      Does the patient have evidence of cognitive impairment? No          HEALTH RISK ASSESSMENT    Smoking Status:  Social History     Tobacco Use   Smoking Status Never    Passive exposure: Never   Smokeless Tobacco Never     Alcohol Consumption:  Social History     Substance and Sexual Activity   Alcohol Use Not Currently     Fall Risk Screen:    MITCHADI Fall Risk Assessment was completed, and patient is at MODERATE risk for falls. Assessment completed on:3/18/2024    Depression Screenin/20/2024     3:39 PM   PHQ-2/PHQ-9 Depression Screening   Little Interest or " Pleasure in Doing Things 0-->not at all   Feeling Down, Depressed or Hopeless 1-->several days   PHQ-9: Brief Depression Severity Measure Score 1       Health Habits and Functional and Cognitive Screenin/20/2024     3:00 PM   Functional & Cognitive Status   Do you have difficulty preparing food and eating? No   Do you have difficulty bathing yourself, getting dressed or grooming yourself? No   Do you have difficulty using the toilet? No   Do you have difficulty moving around from place to place? Yes   Do you have trouble with steps or getting out of a bed or a chair? Yes   Current Diet Unhealthy Diet   Dental Exam Not up to date   Eye Exam Up to date   Exercise (times per week) 0 times per week   Current Exercises Include No Regular Exercise   Do you need help using the phone?  No   Are you deaf or do you have serious difficulty hearing?  Yes   Do you need help to go to places out of walking distance? Yes   Do you need help shopping? No   Do you need help preparing meals?  No   Do you need help with housework?  No   Do you need help with laundry? No   Do you need help taking your medications? No   Do you need help managing money? No   Do you ever drive or ride in a car without wearing a seat belt? No   Have you felt unusual stress, anger or loneliness in the last month? No   Who do you live with? Spouse   If you need help, do you have trouble finding someone available to you? No   Have you been bothered in the last four weeks by sexual problems? No   Do you have difficulty concentrating, remembering or making decisions? No       Age-appropriate Screening Schedule:  Refer to the list below for future screening recommendations based on patient's age, sex and/or medical conditions. Orders for these recommended tests are listed in the plan section. The patient has been provided with a written plan.    Health Maintenance   Topic Date Due    RSV Vaccine - Adults (1 - 1-dose 60+ series) Never done    TDAP/TD  VACCINES (2 - Td or Tdap) 09/21/2022    HEMOGLOBIN A1C  11/30/2023    COVID-19 Vaccine (7 - 2023-24 season) 01/29/2024    LIPID PANEL  05/30/2024    COLORECTAL CANCER SCREENING  10/29/2024    URINE MICROALBUMIN  05/30/2024    INFLUENZA VACCINE  08/01/2024    DIABETIC EYE EXAM  10/11/2024    ANNUAL WELLNESS VISIT  05/20/2025    BMI FOLLOWUP  05/20/2025    HEPATITIS C SCREENING  Completed    Pneumococcal Vaccine 65+  Completed    ZOSTER VACCINE  Completed                  CMS Preventative Services Quick Reference  Risk Factors Identified During Encounter  Fall Risk-High or Moderate: Discussed Fall Prevention in the home  Immunizations Discussed/Encouraged: Tdap and RSV (Respiratory Syncytial Virus)  Dental Screening Recommended  Vision Screening Recommended  The above risks/problems have been discussed with the patient.  Pertinent information has been shared with the patient in the After Visit Summary.  An After Visit Summary and PPPS were made available to the patient.    Follow Up:   Next Medicare Wellness visit to be scheduled in 1 year.       Additional E&M Note during same encounter follows:  Patient has multiple medical problems which are significant and separately identifiable that require additional work above and beyond the Medicare Wellness Visit.      Chief Complaint  Carpal Tunnel    Subjective        HPI  Drew C Vanderford is also being seen today for follow-up of diabetes.  He reports no symptoms of hyperglycemia nor hypoglycemia.  He has been having severe numbness, weakness, and atrophy in his hands bilaterally, but worse on the right.  He has ongoing chronic pain, but has been limited more so related to his hands than his back of late.    Review of Systems   Respiratory:  Negative for shortness of breath.    Cardiovascular:  Negative for chest pain.   Musculoskeletal:  Positive for arthralgias, back pain and gait problem.   Neurological:  Positive for weakness and numbness.       Objective   Vital  "Signs:  /66 (BP Location: Left arm, Patient Position: Sitting, Cuff Size: Adult)   Pulse 68   Resp 16   Ht 188 cm (74.02\")   Wt 110 kg (241 lb 8 oz)   SpO2 98%   BMI 30.99 kg/m²     Physical Exam  Constitutional:       General: He is not in acute distress.  Cardiovascular:      Rate and Rhythm: Normal rate and regular rhythm.      Heart sounds: Normal heart sounds. No murmur heard.  Pulmonary:      Effort: Pulmonary effort is normal.      Breath sounds: Normal breath sounds. No wheezing.   Musculoskeletal:      Comments: He has numbness of the bilateral hands in the median nerve distribution.  He has atrophy of the thenar eminence bilaterally, but there is significant atrophy of the right dorsal webbing between the first and second digits.   Neurological:      Mental Status: He is alert and oriented to person, place, and time.      Gait: Gait normal.   Psychiatric:         Mood and Affect: Mood normal.         Behavior: Behavior normal.                         Assessment and Plan   Diagnoses and all orders for this visit:    1. Severe carpal tunnel syndrome of both wrists (Primary)  2. Thenar muscle atrophy, unspecified laterality  Communication sent to Dr. Oliveros and his APRN.  Mr. Peacock has had MRIs showing degenerative changes of the cervical spine, but his EMG suggests a severe median neuropathy at the level of the carpal tunnel.  He does not have follow-up scheduled until July, but given his degree of atrophy, severe numbness, and impaired muscle function, he may require intervention sooner than that.    3. Sustained SVT  4. Paroxysmal atrial fibrillation  -     CBC (No Diff); Future  He is scheduled for ablation next week.    5. Type 2 diabetes mellitus with diabetic polyneuropathy, without long-term current use of insulin  -     Comprehensive Metabolic Panel; Future  -     Hemoglobin A1c; Future  -     Lipid Panel; Future  -     CBC (No Diff); Future  Laboratories for surveillance.  We " will adjust medications as indicated.       Follow Up   Return in about 6 months (around 11/20/2024) for Recheck.  Patient was given instructions and counseling regarding his condition or for health maintenance advice. Please see specific information pulled into the AVS if appropriate.

## 2024-05-20 NOTE — PATIENT INSTRUCTIONS
Medicare Wellness  Personal Prevention Plan of Service     Date of Office Visit:    Encounter Provider:  Ric Antony DO  Place of Service:  [facility] INTERNAL MEDICINE  Patient Name: Jonh Peacock  :  1945    As part of the Medicare Wellness portion of your visit today, we are providing you with this personalized preventive plan of services (PPPS). This plan is based upon recommendations of the [place] Preventive Services Task Force (USPSTF) and the Advisory Committee on Immunization Practices (ACIP).    This lists the preventive care services that should be considered, and provides dates of when you are due. Items listed as completed are up-to-date and do not require any further intervention.    Health Maintenance   Topic Date Due    RSV Vaccine - Adults (1 - 1-dose 60+ series) Never done    TDAP/TD VACCINES (2 - Td or Tdap) 2022    HEMOGLOBIN A1C  2023    COVID-19 Vaccine (2023-24 season) 2024    ANNUAL WELLNESS VISIT  05/15/2024    BMI FOLLOWUP  05/15/2024    LIPID PANEL  2024    COLORECTAL CANCER SCREENING  10/29/2024    URINE MICROALBUMIN  2024    INFLUENZA VACCINE  2024    DIABETIC EYE EXAM  10/11/2024    HEPATITIS C SCREENING  Completed    Pneumococcal Vaccine 65+  Completed    ZOSTER VACCINE  Completed       Orders Placed This Encounter   Procedures    Comprehensive Metabolic Panel     Standing Status:   Future     Standing Expiration Date:   2025     Order Specific Question:   Release to patient     Answer:   Routine Release [[id number]]    Hemoglobin A1c     Standing Status:   Future     Standing Expiration Date:   2025     Order Specific Question:   Release to patient     Answer:   Routine Release [[id number]]    Lipid Panel     Standing Status:   Future     Standing Expiration Date:   2025     Order Specific Question:   Release to patient     Answer:   Routine Release [[id number]]    CBC (No Diff)      Standing Status:   Future     Standing Expiration Date:   5/20/2025     Order Specific Question:   Release to patient     Answer:   Routine Release [[id number]]       Return in about 6 months (around 11/20/2024) for Recheck.

## 2024-05-20 NOTE — LETTER
May 20, 2024     Noah Oliveros MD  [address] [facility]  [address]  [place] [address]    Patient: Jonh Peacock   YOB: 1945   Date of Visit: 5/20/2024       Dear Noah Oliveros MD    Jonh Peacock was in my office today. Below is a copy of my note.    If you have questions, please do not hesitate to call me. I look forward to following Jonh along with you.         Sincerely,        Ric Antony DO        CC: No Recipients    The ABCs of the Annual Wellness Visit  Subsequent Medicare Wellness Visit    Subjective   Jonh Peacock is a 78 y.o. male who presents for a Subsequent Medicare Wellness Visit.    The following portions of the patient's history were reviewed and   updated as appropriate: allergies, current medications, past family history, past medical history, past social history, past surgical history, and problem list.    Compared to one year ago, the patient feels his physical   health is worse.    Compared to one year ago, the patient feels his mental   health is worse.    Recent Hospitalizations:  He was not admitted to the hospital during the last year.       Current Medical Providers:  Patient Care Team:  Ric Antony DO as PCP - General (Internal Medicine)  Arun Banks MD as Cardiologist (Cardiology)  Celso Ellsworth MD as Consulting Physician (Urology)  Tenzin Abrasm MD as Consulting Physician (Gastroenterology)  Deion Avelar DPM as Consulting Physician (Podiatry)  Chaz Chavez MD as Cardiologist (Cardiac Electrophysiology)  Jaime Vazquez OD (Optometry)  Diomedes Sterling DO as Consulting Physician (Pain Medicine)  Nicholas Martinez APRN as Nurse Practitioner (Nurse Practitioner)    Outpatient Medications Prior to Visit   Medication Sig Dispense Refill   • atorvastatin (LIPITOR) 20 MG tablet Take 1 tablet by mouth Daily. 90 tablet 3   • Buprenorphine 10 MCG/HR patch weekly Place 15 mcg on the skin as directed by  provider Every 7 (Seven) Days.     • Diclofenac Sodium (VOLTAREN) 1 % gel gel Apply 4 g topically to the appropriate area as directed 4 (Four) Times a Day As Needed (bilatearl hand pain). 350 g 6   • dilTIAZem (CARDIZEM) 30 MG tablet Take 1 tablet by mouth 3 (Three) Times a Day. 270 tablet 3   • furosemide (LASIX) 20 MG tablet Take 1 tablet by mouth Daily. 90 tablet 3   • furosemide (LASIX) 40 MG tablet Take 1 tablet by mouth Daily. TAKE WITH 20 MG TABLET FOR 60 PER MG PER DAY 90 tablet 3   • gabapentin (NEURONTIN) 400 MG capsule Take 1 capsule by mouth 2 (Two) Times a Day.     • Glucosamine 750 MG tablet Take 1 tablet by mouth 2 (Two) Times a Day.     • losartan (COZAAR) 25 MG tablet Take 1 tablet by mouth Daily. 90 tablet 3   • methocarbamol (Robaxin) 500 MG tablet Take 1 tablet by mouth 4 (Four) Times a Day. 20 tablet 1   • metoprolol succinate XL (TOPROL-XL) 100 MG 24 hr tablet Take 1 tablet by mouth Daily. 90 tablet 3   • Multiple Vitamins-Minerals (MULTIVITAMIN ADULT PO) Take 1 tablet by mouth Daily.     • pantoprazole (PROTONIX) 40 MG EC tablet Take 1 tablet by mouth Daily. 90 tablet 3   • polyethylene glycol (MIRALAX) powder Take 17 g by mouth Every Night. 1581 g 3   • tamsulosin (FLOMAX) 0.4 MG capsule 24 hr capsule Take 1 capsule by mouth Every Night. 90 capsule 3   • warfarin (COUMADIN) 2 MG tablet Take 1 tablet by mouth Daily. 90 tablet 1   • warfarin (COUMADIN) 4 MG tablet Take 1 tablet by mouth Daily. 90 tablet 1   • warfarin (COUMADIN) 5 MG tablet Take 1 tablet by mouth Daily. 90 tablet 3     No facility-administered medications prior to visit.       Opioid medication/s are on active medication list.  and I have evaluated his active treatment plan and pain score trends (see table).  There were no vitals filed for this visit.  I have reviewed the chart for potential of high risk medication and harmful drug interactions in the elderly.          Aspirin is not on active medication list.  Aspirin use is  "not indicated based on review of current medical condition/s. Risk of harm outweighs potential benefits.  .    Patient Active Problem List   Diagnosis   • Mixed hyperlipidemia   • Essential hypertension   • Anemia   • Neuropathy   • Anxiety   • Depression   • Gastritis   • Class 1 obesity due to excess calories with serious comorbidity and body mass index (BMI) of 31.0 to 31.9 in adult   • CHF (congestive heart failure), NYHA class II, chronic, combined   • Non-ischemic cardiomyopathy   • Paroxysmal atrial fibrillation   • Anticoagulant long-term use   • Onychomycosis   • Peripheral edema   • Obstructive sleep apnea of adult   • Cardiac defibrillator in situ   • Benign prostatic hyperplasia without lower urinary tract symptoms   • Colostomy in place   • Type 2 diabetes mellitus with diabetic polyneuropathy, without long-term current use of insulin   • Hx of colonic polyp   • Failed back syndrome   • Chronic pain syndrome   • S/P insertion of spinal cord stimulator   • Bilateral carpal tunnel syndrome   • Sustained SVT   • PVC's (premature ventricular contractions)     Advance Care Planning  Advance Care Planning     Advance Directive is not on file.  ACP discussion was held with the patient during this visit. Patient does not have an advance directive, declines further assistance.     Objective   Vitals:    05/20/24 1532   BP: 109/66   BP Location: Left arm   Patient Position: Sitting   Cuff Size: Adult   Pulse: 68   Resp: 16   SpO2: 98%   Weight: 110 kg (241 lb 8 oz)   Height: 188 cm (74.02\")     Estimated body mass index is 30.99 kg/m² as calculated from the following:    Height as of this encounter: 188 cm (74.02\").    Weight as of this encounter: 110 kg (241 lb 8 oz).    BMI is >= 30 and <35. (Class 1 Obesity). The following options were offered after discussion;: exercise counseling/recommendations and nutrition counseling/recommendations      Does the patient have evidence of cognitive impairment? No        "   HEALTH RISK ASSESSMENT    Smoking Status:  Social History     Tobacco Use   Smoking Status Never   • Passive exposure: Never   Smokeless Tobacco Never     Alcohol Consumption:  Social History     Substance and Sexual Activity   Alcohol Use Not Currently     Fall Risk Screen:    MALLY Fall Risk Assessment was completed, and patient is at MODERATE risk for falls. Assessment completed on:3/18/2024    Depression Screenin/20/2024     3:39 PM   PHQ-2/PHQ-9 Depression Screening   Little Interest or Pleasure in Doing Things 0-->not at all   Feeling Down, Depressed or Hopeless 1-->several days   PHQ-9: Brief Depression Severity Measure Score 1       Health Habits and Functional and Cognitive Screenin/20/2024     3:00 PM   Functional & Cognitive Status   Do you have difficulty preparing food and eating? No   Do you have difficulty bathing yourself, getting dressed or grooming yourself? No   Do you have difficulty using the toilet? No   Do you have difficulty moving around from place to place? Yes   Do you have trouble with steps or getting out of a bed or a chair? Yes   Current Diet Unhealthy Diet   Dental Exam Not up to date   Eye Exam Up to date   Exercise (times per week) 0 times per week   Current Exercises Include No Regular Exercise   Do you need help using the phone?  No   Are you deaf or do you have serious difficulty hearing?  Yes   Do you need help to go to places out of walking distance? Yes   Do you need help shopping? No   Do you need help preparing meals?  No   Do you need help with housework?  No   Do you need help with laundry? No   Do you need help taking your medications? No   Do you need help managing money? No   Do you ever drive or ride in a car without wearing a seat belt? No   Have you felt unusual stress, anger or loneliness in the last month? No   Who do you live with? Spouse   If you need help, do you have trouble finding someone available to you? No   Have you been bothered in  the last four weeks by sexual problems? No   Do you have difficulty concentrating, remembering or making decisions? No       Age-appropriate Screening Schedule:  Refer to the list below for future screening recommendations based on patient's age, sex and/or medical conditions. Orders for these recommended tests are listed in the plan section. The patient has been provided with a written plan.    Health Maintenance   Topic Date Due   • RSV Vaccine - Adults (1 - 1-dose 60+ series) Never done   • TDAP/TD VACCINES (2 - Td or Tdap) 09/21/2022   • HEMOGLOBIN A1C  11/30/2023   • COVID-19 Vaccine (7 - 2023-24 season) 01/29/2024   • LIPID PANEL  05/30/2024   • COLORECTAL CANCER SCREENING  10/29/2024   • URINE MICROALBUMIN  05/30/2024   • INFLUENZA VACCINE  08/01/2024   • DIABETIC EYE EXAM  10/11/2024   • ANNUAL WELLNESS VISIT  05/20/2025   • BMI FOLLOWUP  05/20/2025   • HEPATITIS C SCREENING  Completed   • Pneumococcal Vaccine 65+  Completed   • ZOSTER VACCINE  Completed                  CMS Preventative Services Quick Reference  Risk Factors Identified During Encounter  Fall Risk-High or Moderate: Discussed Fall Prevention in the home  Immunizations Discussed/Encouraged: Tdap and RSV (Respiratory Syncytial Virus)  Dental Screening Recommended  Vision Screening Recommended  The above risks/problems have been discussed with the patient.  Pertinent information has been shared with the patient in the After Visit Summary.  An After Visit Summary and PPPS were made available to the patient.    Follow Up:   Next Medicare Wellness visit to be scheduled in 1 year.       Additional E&M Note during same encounter follows:  Patient has multiple medical problems which are significant and separately identifiable that require additional work above and beyond the Medicare Wellness Visit.      Chief Complaint  Carpal Tunnel    Subjective       HPI  Drew C Vanderford is also being seen today for follow-up of diabetes.  He reports no symptoms  "of hyperglycemia nor hypoglycemia.  He has been having severe numbness, weakness, and atrophy in his hands bilaterally, but worse on the right.  He has ongoing chronic pain, but has been limited more so related to his hands than his back of late.    Review of Systems   Respiratory:  Negative for shortness of breath.    Cardiovascular:  Negative for chest pain.   Musculoskeletal:  Positive for arthralgias, back pain and gait problem.   Neurological:  Positive for weakness and numbness.       Objective  Vital Signs:  /66 (BP Location: Left arm, Patient Position: Sitting, Cuff Size: Adult)   Pulse 68   Resp 16   Ht 188 cm (74.02\")   Wt 110 kg (241 lb 8 oz)   SpO2 98%   BMI 30.99 kg/m²     Physical Exam  Constitutional:       General: He is not in acute distress.  Cardiovascular:      Rate and Rhythm: Normal rate and regular rhythm.      Heart sounds: Normal heart sounds. No murmur heard.  Pulmonary:      Effort: Pulmonary effort is normal.      Breath sounds: Normal breath sounds. No wheezing.   Musculoskeletal:      Comments: He has numbness of the bilateral hands in the median nerve distribution.  He has atrophy of the thenar eminence bilaterally, but there is significant atrophy of the right dorsal webbing between the first and second digits.   Neurological:      Mental Status: He is alert and oriented to person, place, and time.      Gait: Gait normal.   Psychiatric:         Mood and Affect: Mood normal.         Behavior: Behavior normal.                         Assessment and Plan   Diagnoses and all orders for this visit:    1. Severe carpal tunnel syndrome of both wrists (Primary)  2. Thenar muscle atrophy, unspecified laterality  Communication sent to Dr. Oliveros and his APRN.  Mr. Peacock has had MRIs showing degenerative changes of the cervical spine, but his EMG suggests a severe median neuropathy at the level of the carpal tunnel.  He does not have follow-up scheduled until July, but given " his degree of atrophy, severe numbness, and impaired muscle function, he may require intervention sooner than that.    3. Sustained SVT  4. Paroxysmal atrial fibrillation  -     CBC (No Diff); Future  He is scheduled for ablation next week.    5. Type 2 diabetes mellitus with diabetic polyneuropathy, without long-term current use of insulin  -     Comprehensive Metabolic Panel; Future  -     Hemoglobin A1c; Future  -     Lipid Panel; Future  -     CBC (No Diff); Future  Laboratories for surveillance.  We will adjust medications as indicated.       Follow Up   Return in about 6 months (around 11/20/2024) for Recheck.  Patient was given instructions and counseling regarding his condition or for health maintenance advice. Please see specific information pulled into the AVS if appropriate.

## 2024-05-21 NOTE — DISCHARGE INSTRUCTIONS
Post-PVC Ablation Instructions     Start sotalol 80mg twice daily  Repeat ECG in 5 days  Holter monitor in 2 weeks    ACTIVITY    Avoid driving, operating machinery, or alcohol consumption for 24 hours after receiving sedation. In addition, avoid signing legal documents or participating in legal proceedings.    You may resume normal activities after 24 hours except for the following:    Avoid heavy lifting (no more than 10 pounds) and vigorous activities for a minimum of 7 days. This includes activities such as jogging, exercise classes, sports activities, etc.    You may resume walking and other normal activities.    Avoid sitting more than 2 consecutive hours during waking hours for the first 3 days. If you are traveling, stop for brief (5 minutes) walks every two hours.    MEDICATIONS  Continue Coumadin (warfarin) at your usual dose this evening. Do not stop this medication without consulting with your cardiologist.  Have your PT/INR checked in 7-10 days.     MEDICAL FOLLOW UP   EP clinic follow up with Dr. Horne on 7/19/2024..    PLEASE NOTIFY US IF YOU DEVELOP    Fever of 101 or greater during your first few days at home    The occurrence of a new, persistent cough or unexplained shortness of breath.    A groin bruise may be expected. Initial soreness and discomfort should improve over the first few days. If you continue to have discomfort or if bruising is excessive, please call us.    Patients often experience palpitations during the healing period.    Please call if you have an episode of palpitations accompanied by fast heart rate greater than 120 beats per minute for extended period that last longer than 1-2 days.    Mild chest soreness is common and may be treated with Tylenol, Advil, or Motrin.  This soreness typically worsens when taking deep breaths.  If you notice these symptoms, start one of these medications according to the package directions and continue for 2-3 days. If your symptoms are not  relieved or become severe, please call us.      REASONS TO GO TO THE EMERGENCY ROOM FOR EVALUATION:   Severe chest pain  Significant shortness of breath at rest  Near passing out or passing out episodes soon after your ablation  Symptoms of chest pain or shortness of breath associated with low blood pressure (reading less than 90 for the top number / systolic blood pressure)  Brisk bleeding or significant swelling from the groin where IVs were placed  Any concerns that an emergent or life threatening complication is occurring    If you have a clinical questions between the hours of 8am and 4pm, Monday - Friday please call the office and let us know your concern. Please leave a message if your call is not an emergency.    For emergencies, please go for evaluation at your nearest emergency department.    If you have a [employer] account, this an excellent way to communicate.  [employer] messages are checked Monday through Friday. Please allow 24-36 hours for your message to be answered.

## 2024-05-22 ENCOUNTER — TELEPHONE (OUTPATIENT)
Dept: INTERNAL MEDICINE | Facility: CLINIC | Age: 79
End: 2024-05-22
Payer: MEDICARE

## 2024-05-22 RX ORDER — GABAPENTIN 300 MG/1
300 CAPSULE ORAL 3 TIMES DAILY
COMMUNITY

## 2024-05-22 NOTE — TELEPHONE ENCOUNTER
"PATIENT HAS CALLED, HE STATED THAT UNDER \"CARE TEAM\" DR. KIM IS STILL LISTED  ON HIS CARE TEAM.   HE SEES DR. KIRBY.       JASVIR LOPEZ OPTJANES  HAS RETIRED AND HE NOW SEES   ZAIN EDUARDO AT [facility].      HE ALSO STATED THAT DR. GUAMAN NEEDS TO BE ADDED TO HIS CARE TEAM.      PATIENT STATED THAT HIS GABAPETIN  300mg  HAS BEEN CHANGED TO 1 CAPSULE 3 TIMES DAILY       "

## 2024-05-23 ENCOUNTER — TELEPHONE (OUTPATIENT)
Dept: CARDIOLOGY | Facility: CLINIC | Age: 79
End: 2024-05-23
Payer: MEDICARE

## 2024-05-23 NOTE — TELEPHONE ENCOUNTER
I spoke with Mr. Peaocck about his upcoming ablation.  He was well informed about the procedure from prior discussion with Dr. Horne.  We discussed the procedure at length including risks, anesthesia, intra-op procedures, recovery, bedrest, sheath removal, discharge criteria, and normal post-procedure expectations.  Mr. Peacock also mentioned having a spinal cord stimulator. He was instructed to bring in the equipment for turning off the device the day of the procedure. I answered a few remaining questions. Mr. Peacock verbalized understanding and he is ready to proceed.

## 2024-05-28 ENCOUNTER — LAB (OUTPATIENT)
Dept: LAB | Facility: HOSPITAL | Age: 79
End: 2024-05-28
Payer: MEDICARE

## 2024-05-28 DIAGNOSIS — I48.0 PAROXYSMAL ATRIAL FIBRILLATION: Chronic | ICD-10-CM

## 2024-05-28 DIAGNOSIS — E11.42 TYPE 2 DIABETES MELLITUS WITH DIABETIC POLYNEUROPATHY, WITHOUT LONG-TERM CURRENT USE OF INSULIN: ICD-10-CM

## 2024-05-28 LAB
ALBUMIN SERPL-MCNC: 4.2 G/DL (ref 3.5–5.2)
ALBUMIN/GLOB SERPL: 1.6 G/DL
ALP SERPL-CCNC: 84 U/L (ref 39–117)
ALT SERPL W P-5'-P-CCNC: 19 U/L (ref 1–41)
ANION GAP SERPL CALCULATED.3IONS-SCNC: 7 MMOL/L (ref 5–15)
AST SERPL-CCNC: 23 U/L (ref 1–40)
BILIRUB SERPL-MCNC: 0.5 MG/DL (ref 0–1.2)
BUN SERPL-MCNC: 22 MG/DL (ref 8–23)
BUN/CREAT SERPL: 16.8 (ref 7–25)
CALCIUM SPEC-SCNC: 9 MG/DL (ref 8.6–10.5)
CHLORIDE SERPL-SCNC: 104 MMOL/L (ref 98–107)
CHOLEST SERPL-MCNC: 186 MG/DL (ref 0–200)
CO2 SERPL-SCNC: 33 MMOL/L (ref 22–29)
CREAT SERPL-MCNC: 1.31 MG/DL (ref 0.76–1.27)
DEPRECATED RDW RBC AUTO: 45.3 FL (ref 37–54)
EGFRCR SERPLBLD CKD-EPI 2021: 56.1 ML/MIN/1.73
ERYTHROCYTE [DISTWIDTH] IN BLOOD BY AUTOMATED COUNT: 13.3 % (ref 12.3–15.4)
GLOBULIN UR ELPH-MCNC: 2.6 GM/DL
GLUCOSE SERPL-MCNC: 115 MG/DL (ref 65–99)
HBA1C MFR BLD: 6.3 % (ref 4.8–5.6)
HCT VFR BLD AUTO: 33.5 % (ref 37.5–51)
HDLC SERPL-MCNC: 49 MG/DL (ref 40–60)
HGB BLD-MCNC: 10.9 G/DL (ref 13–17.7)
INR PPP: 2.37 (ref 0.91–1.09)
LDLC SERPL CALC-MCNC: 120 MG/DL (ref 0–100)
LDLC/HDLC SERPL: 2.42 {RATIO}
MCH RBC QN AUTO: 30.1 PG (ref 26.6–33)
MCHC RBC AUTO-ENTMCNC: 32.5 G/DL (ref 31.5–35.7)
MCV RBC AUTO: 92.5 FL (ref 79–97)
PLATELET # BLD AUTO: 137 10*3/MM3 (ref 140–450)
PMV BLD AUTO: 10.9 FL (ref 6–12)
POTASSIUM SERPL-SCNC: 3.8 MMOL/L (ref 3.5–5.2)
PROT SERPL-MCNC: 6.8 G/DL (ref 6–8.5)
PROTHROMBIN TIME: 26.9 SECONDS (ref 11.8–14.8)
RBC # BLD AUTO: 3.62 10*6/MM3 (ref 4.14–5.8)
SODIUM SERPL-SCNC: 144 MMOL/L (ref 136–145)
TRIGL SERPL-MCNC: 91 MG/DL (ref 0–150)
VLDLC SERPL-MCNC: 17 MG/DL (ref 5–40)
WBC NRBC COR # BLD AUTO: 4.99 10*3/MM3 (ref 3.4–10.8)

## 2024-05-28 PROCEDURE — 83036 HEMOGLOBIN GLYCOSYLATED A1C: CPT

## 2024-05-28 PROCEDURE — 85027 COMPLETE CBC AUTOMATED: CPT

## 2024-05-28 PROCEDURE — 36415 COLL VENOUS BLD VENIPUNCTURE: CPT

## 2024-05-28 PROCEDURE — 80061 LIPID PANEL: CPT

## 2024-05-28 PROCEDURE — 80053 COMPREHEN METABOLIC PANEL: CPT

## 2024-05-28 PROCEDURE — 85610 PROTHROMBIN TIME: CPT

## 2024-05-29 ENCOUNTER — ANESTHESIA EVENT (OUTPATIENT)
Dept: CARDIOLOGY | Facility: HOSPITAL | Age: 79
End: 2024-05-29
Payer: MEDICARE

## 2024-05-29 ENCOUNTER — ANESTHESIA (OUTPATIENT)
Dept: CARDIOLOGY | Facility: HOSPITAL | Age: 79
End: 2024-05-29
Payer: MEDICARE

## 2024-05-29 ENCOUNTER — HOSPITAL ENCOUNTER (OUTPATIENT)
Facility: HOSPITAL | Age: 79
Setting detail: HOSPITAL OUTPATIENT SURGERY
Discharge: HOME | End: 2024-05-29
Attending: STUDENT IN AN ORGANIZED HEALTH CARE EDUCATION/TRAINING PROGRAM | Admitting: STUDENT IN AN ORGANIZED HEALTH CARE EDUCATION/TRAINING PROGRAM
Payer: MEDICARE

## 2024-05-29 VITALS
TEMPERATURE: 97.6 F | HEART RATE: 80 BPM | HEIGHT: 74 IN | OXYGEN SATURATION: 99 % | WEIGHT: 244 LBS | RESPIRATION RATE: 15 BRPM | DIASTOLIC BLOOD PRESSURE: 85 MMHG | BODY MASS INDEX: 31.32 KG/M2 | SYSTOLIC BLOOD PRESSURE: 116 MMHG

## 2024-05-29 DIAGNOSIS — I49.3 PVC'S (PREMATURE VENTRICULAR CONTRACTIONS): ICD-10-CM

## 2024-05-29 LAB
ACT BLD: 255 SECONDS (ref 82–152)
ACT BLD: 336 SECONDS (ref 82–152)
ANION GAP SERPL CALCULATED.3IONS-SCNC: 10 MMOL/L (ref 5–15)
BASOPHILS # BLD AUTO: 0.05 10*3/MM3 (ref 0–0.2)
BASOPHILS NFR BLD AUTO: 0.9 % (ref 0–1.5)
BUN SERPL-MCNC: 25 MG/DL (ref 8–23)
BUN/CREAT SERPL: 17.2 (ref 7–25)
CALCIUM SPEC-SCNC: 8.9 MG/DL (ref 8.6–10.5)
CHLORIDE SERPL-SCNC: 102 MMOL/L (ref 98–107)
CO2 SERPL-SCNC: 31 MMOL/L (ref 22–29)
CREAT SERPL-MCNC: 1.45 MG/DL (ref 0.76–1.27)
DEPRECATED RDW RBC AUTO: 45 FL (ref 37–54)
EGFRCR SERPLBLD CKD-EPI 2021: 49.6 ML/MIN/1.73
EOSINOPHIL # BLD AUTO: 0.29 10*3/MM3 (ref 0–0.4)
EOSINOPHIL NFR BLD AUTO: 5.3 % (ref 0.3–6.2)
ERYTHROCYTE [DISTWIDTH] IN BLOOD BY AUTOMATED COUNT: 13.4 % (ref 12.3–15.4)
GLUCOSE SERPL-MCNC: 125 MG/DL (ref 65–99)
HCT VFR BLD AUTO: 32.5 % (ref 37.5–51)
HGB BLD-MCNC: 10.7 G/DL (ref 13–17.7)
IMM GRANULOCYTES # BLD AUTO: 0 10*3/MM3 (ref 0–0.05)
IMM GRANULOCYTES NFR BLD AUTO: 0 % (ref 0–0.5)
INR PPP: 2.31 (ref 0.91–1.09)
LYMPHOCYTES # BLD AUTO: 1.44 10*3/MM3 (ref 0.7–3.1)
LYMPHOCYTES NFR BLD AUTO: 26.3 % (ref 19.6–45.3)
MCH RBC QN AUTO: 30.4 PG (ref 26.6–33)
MCHC RBC AUTO-ENTMCNC: 32.9 G/DL (ref 31.5–35.7)
MCV RBC AUTO: 92.3 FL (ref 79–97)
MONOCYTES # BLD AUTO: 0.41 10*3/MM3 (ref 0.1–0.9)
MONOCYTES NFR BLD AUTO: 7.5 % (ref 5–12)
NEUTROPHILS NFR BLD AUTO: 3.28 10*3/MM3 (ref 1.7–7)
NEUTROPHILS NFR BLD AUTO: 60 % (ref 42.7–76)
PLATELET # BLD AUTO: 139 10*3/MM3 (ref 140–450)
PMV BLD AUTO: 11.4 FL (ref 6–12)
POTASSIUM SERPL-SCNC: 3.5 MMOL/L (ref 3.5–5.2)
PROTHROMBIN TIME: 26.3 SECONDS (ref 11.8–14.8)
RBC # BLD AUTO: 3.52 10*6/MM3 (ref 4.14–5.8)
SODIUM SERPL-SCNC: 143 MMOL/L (ref 136–145)
WBC NRBC COR # BLD AUTO: 5.47 10*3/MM3 (ref 3.4–10.8)

## 2024-05-29 PROCEDURE — C1730 CATH, EP, 19 OR FEW ELECT: HCPCS | Performed by: STUDENT IN AN ORGANIZED HEALTH CARE EDUCATION/TRAINING PROGRAM

## 2024-05-29 PROCEDURE — S0260 H&P FOR SURGERY: HCPCS | Performed by: STUDENT IN AN ORGANIZED HEALTH CARE EDUCATION/TRAINING PROGRAM

## 2024-05-29 PROCEDURE — C1732 CATH, EP, DIAG/ABL, 3D/VECT: HCPCS | Performed by: STUDENT IN AN ORGANIZED HEALTH CARE EDUCATION/TRAINING PROGRAM

## 2024-05-29 PROCEDURE — 93654 COMPRE EP EVAL TX VT: CPT | Performed by: STUDENT IN AN ORGANIZED HEALTH CARE EDUCATION/TRAINING PROGRAM

## 2024-05-29 PROCEDURE — 93662 INTRACARDIAC ECG (ICE): CPT | Performed by: STUDENT IN AN ORGANIZED HEALTH CARE EDUCATION/TRAINING PROGRAM

## 2024-05-29 PROCEDURE — C1894 INTRO/SHEATH, NON-LASER: HCPCS | Performed by: STUDENT IN AN ORGANIZED HEALTH CARE EDUCATION/TRAINING PROGRAM

## 2024-05-29 PROCEDURE — 85025 COMPLETE CBC W/AUTO DIFF WBC: CPT | Performed by: PHYSICIAN ASSISTANT

## 2024-05-29 PROCEDURE — 93005 ELECTROCARDIOGRAM TRACING: CPT | Performed by: STUDENT IN AN ORGANIZED HEALTH CARE EDUCATION/TRAINING PROGRAM

## 2024-05-29 PROCEDURE — C1760 CLOSURE DEV, VASC: HCPCS | Performed by: STUDENT IN AN ORGANIZED HEALTH CARE EDUCATION/TRAINING PROGRAM

## 2024-05-29 PROCEDURE — C1759 CATH, INTRA ECHOCARDIOGRAPHY: HCPCS | Performed by: STUDENT IN AN ORGANIZED HEALTH CARE EDUCATION/TRAINING PROGRAM

## 2024-05-29 PROCEDURE — 80048 BASIC METABOLIC PNL TOTAL CA: CPT | Performed by: PHYSICIAN ASSISTANT

## 2024-05-29 PROCEDURE — 93010 ELECTROCARDIOGRAM REPORT: CPT | Performed by: INTERNAL MEDICINE

## 2024-05-29 PROCEDURE — 25010000002 HEPARIN (PORCINE) 1000-0.9 UT/500ML-% SOLUTION: Performed by: STUDENT IN AN ORGANIZED HEALTH CARE EDUCATION/TRAINING PROGRAM

## 2024-05-29 PROCEDURE — 85610 PROTHROMBIN TIME: CPT | Performed by: PHYSICIAN ASSISTANT

## 2024-05-29 PROCEDURE — 25010000002 HEPARIN (PORCINE) PER 1000 UNITS

## 2024-05-29 PROCEDURE — 25810000003 SODIUM CHLORIDE 0.9 % SOLUTION

## 2024-05-29 PROCEDURE — 93005 ELECTROCARDIOGRAM TRACING: CPT | Performed by: PHYSICIAN ASSISTANT

## 2024-05-29 PROCEDURE — 25010000002 PROPOFOL 1000 MG/100ML EMULSION

## 2024-05-29 PROCEDURE — 25010000002 HEPARIN (PORCINE) 2000-0.9 UNIT/L-% SOLUTION: Performed by: STUDENT IN AN ORGANIZED HEALTH CARE EDUCATION/TRAINING PROGRAM

## 2024-05-29 PROCEDURE — 85347 COAGULATION TIME ACTIVATED: CPT

## 2024-05-29 PROCEDURE — 25010000002 FENTANYL CITRATE (PF) 100 MCG/2ML SOLUTION

## 2024-05-29 PROCEDURE — 25010000002 PROTAMINE SULFATE PER 10 MG

## 2024-05-29 RX ORDER — SODIUM CHLORIDE 9 MG/ML
INJECTION, SOLUTION INTRAVENOUS CONTINUOUS PRN
Status: DISCONTINUED | OUTPATIENT
Start: 2024-05-29 | End: 2024-05-29 | Stop reason: SURG

## 2024-05-29 RX ORDER — HEPARIN SODIUM 200 [USP'U]/100ML
INJECTION, SOLUTION INTRAVENOUS
Status: DISCONTINUED | OUTPATIENT
Start: 2024-05-29 | End: 2024-05-29 | Stop reason: HOSPADM

## 2024-05-29 RX ORDER — EPHEDRINE SULFATE 50 MG/ML
INJECTION, SOLUTION INTRAVENOUS AS NEEDED
Status: DISCONTINUED | OUTPATIENT
Start: 2024-05-29 | End: 2024-05-29 | Stop reason: SURG

## 2024-05-29 RX ORDER — MAGNESIUM HYDROXIDE 1200 MG/15ML
LIQUID ORAL
Status: DISCONTINUED | OUTPATIENT
Start: 2024-05-29 | End: 2024-05-29 | Stop reason: HOSPADM

## 2024-05-29 RX ORDER — FENTANYL CITRATE 50 UG/ML
INJECTION, SOLUTION INTRAMUSCULAR; INTRAVENOUS AS NEEDED
Status: DISCONTINUED | OUTPATIENT
Start: 2024-05-29 | End: 2024-05-29 | Stop reason: SURG

## 2024-05-29 RX ORDER — HEPARIN SODIUM 1000 [USP'U]/ML
INJECTION, SOLUTION INTRAVENOUS; SUBCUTANEOUS AS NEEDED
Status: DISCONTINUED | OUTPATIENT
Start: 2024-05-29 | End: 2024-05-29 | Stop reason: SURG

## 2024-05-29 RX ORDER — PROPOFOL 10 MG/ML
INJECTION, EMULSION INTRAVENOUS CONTINUOUS PRN
Status: DISCONTINUED | OUTPATIENT
Start: 2024-05-29 | End: 2024-05-29 | Stop reason: SURG

## 2024-05-29 RX ORDER — SODIUM CHLORIDE 0.9 % (FLUSH) 0.9 %
10 SYRINGE (ML) INJECTION AS NEEDED
Status: DISCONTINUED | OUTPATIENT
Start: 2024-05-29 | End: 2024-05-29 | Stop reason: HOSPADM

## 2024-05-29 RX ORDER — PROTAMINE SULFATE 10 MG/ML
INJECTION, SOLUTION INTRAVENOUS AS NEEDED
Status: DISCONTINUED | OUTPATIENT
Start: 2024-05-29 | End: 2024-05-29 | Stop reason: SURG

## 2024-05-29 RX ORDER — LIDOCAINE HYDROCHLORIDE 20 MG/ML
INJECTION, SOLUTION INFILTRATION; PERINEURAL
Status: DISCONTINUED | OUTPATIENT
Start: 2024-05-29 | End: 2024-05-29 | Stop reason: HOSPADM

## 2024-05-29 RX ORDER — SODIUM CHLORIDE 0.9 % (FLUSH) 0.9 %
10 SYRINGE (ML) INJECTION EVERY 12 HOURS SCHEDULED
Status: DISCONTINUED | OUTPATIENT
Start: 2024-05-29 | End: 2024-05-29 | Stop reason: HOSPADM

## 2024-05-29 RX ADMIN — PROPOFOL 75 MCG/KG/MIN: 10 INJECTION, EMULSION INTRAVENOUS at 13:52

## 2024-05-29 RX ADMIN — FENTANYL CITRATE 50 MCG: 50 INJECTION INTRAMUSCULAR; INTRAVENOUS at 12:43

## 2024-05-29 RX ADMIN — PROPOFOL 120 MCG/KG/MIN: 10 INJECTION, EMULSION INTRAVENOUS at 12:42

## 2024-05-29 RX ADMIN — HEPARIN SODIUM 10000 UNITS: 1000 INJECTION, SOLUTION INTRAVENOUS; SUBCUTANEOUS at 13:24

## 2024-05-29 RX ADMIN — HEPARIN SODIUM 10000 UNITS: 1000 INJECTION, SOLUTION INTRAVENOUS; SUBCUTANEOUS at 13:52

## 2024-05-29 RX ADMIN — EPHEDRINE SULFATE 20 MG: 50 INJECTION INTRAVENOUS at 12:48

## 2024-05-29 RX ADMIN — FENTANYL CITRATE 50 MCG: 50 INJECTION INTRAMUSCULAR; INTRAVENOUS at 13:02

## 2024-05-29 RX ADMIN — PROTAMINE SULFATE 50 MG: 10 INJECTION, SOLUTION INTRAVENOUS at 14:16

## 2024-05-29 RX ADMIN — SODIUM CHLORIDE: 9 INJECTION, SOLUTION INTRAVENOUS at 12:42

## 2024-05-29 RX ADMIN — SODIUM CHLORIDE: 9 INJECTION, SOLUTION INTRAVENOUS at 13:54

## 2024-05-29 NOTE — H&P
Chief Complaint  Frequent PVCs    Subjective        History of Present Illness    EP Problems:  1.  Paroxysmal atrial fibrillation  2.  Atrial flutter  3.  Sustained SVT / atrial tachycardia  4.  Sinus node dysfunction  5.  Presence of a cardiac defibrillator  -9/27/2012: HUEY, [employer]  -10/31/2019: Generator change, Brenda Cahvez  6.  Acquired long QT syndrome  7.  Frequent PVCs  -3/2023: 16% burden (12% single morphology)     Cardiology Problems:  1.  Chronic systolic heart failure  -2/5/2019: EF 31 to 35%  2.  Hypertension  3.  Hyperlipidemia  4.  Prior DVT     Medical Problems:  1.  Obesity, BMI 32  2.  Type 2 diabetes  3.  BPH  4.  Chronic pain  5.  Obstructive sleep apnea    Jonh Peacock is a 78 y.o. male with past medical history as above who presents to the hospital for outpatient EP study and ablation for treatment of frequent PVCs, chronic systolic heart failure.  He has a history of chronic systolic heart failure secondary to nonischemic cardiomyopathy.  He wore a Holter monitor last year which revealed a 16% PVC burden (12% with a single morphology).  We discussed available treatment options with him at that time and he went on to decide that he wanted to try an ablation to treat the PVCs.    Since the time of the last clinic visit, denies significant change in symptoms.  Denies missing any doses of his medications.  No new ER visits or hospitalizations.        Family History:  family history includes Breast cancer in his brother; Heart disease in his brother and mother.    Social History:  Social History     Tobacco Use    Smoking status: Never     Passive exposure: Never    Smokeless tobacco: Never   Vaping Use    Vaping status: Never Used   Substance Use Topics    Alcohol use: Not Currently    Drug use: Never       Allergies:  Percocet [oxycodone-acetaminophen], Amiodarone, Oxycontin [oxycodone hcl], Penicillins, and Vioxx [rofecoxib]      Objective   Vital Signs:  /71   Pulse 92    "Temp 97.6 °F (36.4 °C) (Tympanic)   Resp 14   Ht 188 cm (74\")   Wt 111 kg (244 lb)   SpO2 99%   BMI 31.33 kg/m²   Estimated body mass index is 31.33 kg/m² as calculated from the following:    Height as of this encounter: 188 cm (74\").    Weight as of this encounter: 111 kg (244 lb).      Physical Exam  Vitals reviewed.   Constitutional:       Appearance: Normal appearance.   Cardiovascular:      Rate and Rhythm: Normal rate. Rhythm irregular.      Pulses: Normal pulses.      Heart sounds: Normal heart sounds.   Pulmonary:      Effort: Pulmonary effort is normal.      Breath sounds: Normal breath sounds.   Musculoskeletal:         General: No swelling.   Neurological:      Mental Status: He is alert and oriented to person, place, and time.   Psychiatric:         Mood and Affect: Mood normal.         Judgment: Judgment normal.              Result Review :  Labs were reviewed with relevant findings as follows: No relevant findings               Assessment and Plan   Assessment/plan:  Jonh Peacock is a 78 y.o. male who presents to the hospital for outpatient EP study and ablation for treatment of frequent PVCs.  There are no apparent contraindications to proceeding and he is medically appropriate for outpatient management with this procedure.      I have discussed risks, benefits, and alternatives of an electrophysiology study and ablation for premature ventricular contractions with the patient.  Alternatives discussed include continued observation and medical management.  An electrophysiology study with ablation is an inherently high risk procedure with possible complications that include but are not limited to vascular access complications, internal bleeding, tamponade requiring pericardiocentesis or cardiac surgery, stroke, MI, embolism, myocardial injury, injury to the normal conduction system requiring a pacemaker, and ultimately death.  We also discussed that given the nature of the procedure, " therapeutic efficacy can be variable.  Possibilities of recurrent arrhythmias and the possible need for additional procedures and/or medical therapy was discussed. Questions asked were appropriately answered.  No guarantees were made or implied.     Despite this, they would still like to proceed.    Plan:   - Proceed with EP study and ablation for treatment of frequent PVCs           Part of this note may be an electronic transcription/translation of spoken language to printed text using the Dragon Dictation System.

## 2024-05-29 NOTE — Clinical Note
A sheath was successfully inserted with ultrasound guidance into the right femoral vein. Sheath insertion not delayed.

## 2024-05-29 NOTE — ANESTHESIA PREPROCEDURE EVALUATION
Anesthesia Evaluation     Patient summary reviewed and Nursing notes reviewed   NPO Solid Status: > 8 hours  NPO Liquid Status: > 8 hours           Airway   Mallampati: II  TM distance: >3 FB  No difficulty expected  Dental      Pulmonary    (+) pneumonia ,sleep apnea  Cardiovascular   Exercise tolerance: good (4-7 METS)    ECG reviewed  PT is on anticoagulation therapy    (+) hypertension, dysrhythmias, CHF Systolic <55%, DVT, hyperlipidemia    ROS comment:   Left ventricular systolic function is severely decreased. Left ventricular ejection fraction appears to be 31 - 35%.  ·  The left ventricular cavity is moderately dilated.  ·  Left ventricular wall thickness is consistent with mild concentric hypertrophy.  ·  Normal right ventricular cavity size noted. Borderline low right ventricular systolic function noted.  ·  No significant valvular abnormalities identified on this study.      Neuro/Psych  (+) dizziness/light headedness, numbness, psychiatric history  (-) seizures, CVA  GI/Hepatic/Renal/Endo    (+) obesity, morbid obesity, hiatal hernia, GERD, diabetes mellitus    Musculoskeletal     Abdominal    Substance History      OB/GYN          Other   arthritis,                 Anesthesia Plan    ASA 3     MAC         CODE STATUS:

## 2024-05-30 ENCOUNTER — TELEPHONE (OUTPATIENT)
Dept: CARDIOLOGY | Facility: CLINIC | Age: 79
End: 2024-05-30
Payer: MEDICARE

## 2024-05-30 ENCOUNTER — TELEPHONE (OUTPATIENT)
Dept: NEUROSURGERY | Facility: CLINIC | Age: 79
End: 2024-05-30
Payer: MEDICARE

## 2024-05-30 NOTE — ANESTHESIA POSTPROCEDURE EVALUATION
"Patient: Jonh Peacock    Procedure Summary       Date: 05/29/24 Room / Location: [facility] CATH [facility]  /  [facility] CATH INVASIVE LOCATION    Anesthesia Start: 1232 Anesthesia Stop: 1440    Procedure: Ablation PVC Diagnosis:       PVC's (premature ventricular contractions)      (PVC 38347)    Providers: Rossana Horne MD Provider: Marcus Storm CRNA    Anesthesia Type: general ASA Status: 3            Anesthesia Type: general    Vitals  Vitals Value Taken Time   /85 05/29/24 1631   Temp     Pulse 91 05/29/24 1637   Resp 15 05/29/24 1630   SpO2 99 % 05/29/24 1636   Vitals shown include unfiled device data.        Post Anesthesia Care and Evaluation    Patient location during evaluation: PACU  Patient participation: complete - patient participated  Level of consciousness: awake and alert  Pain management: adequate    Airway patency: patent  Anesthetic complications: No anesthetic complications    Cardiovascular status: acceptable  Respiratory status: acceptable  Hydration status: acceptable    Comments: Blood pressure 116/85, pulse 80, temperature 97.6 °F (36.4 °C), temperature source Tympanic, resp. rate 15, height 188 cm (74\"), weight 111 kg (244 lb), SpO2 99%.    Pt discharged from PACU based on yoseph score >8    "

## 2024-05-30 NOTE — TELEPHONE ENCOUNTER
Caller: DAVID FERGUSON    Relationship to patient: SELF    Best call back number: [phone]        Additional notes:PATIENT HAS BEEN MOVED UP TO 6-17-24 BUT HE IS NOT AVAILABLE THAT WEEK.  PLEASE CALL PT TO RESCHEDULE.

## 2024-05-30 NOTE — TELEPHONE ENCOUNTER
Rossana Horne MD Cooper, Shelby R, MA; Suzette Prieto RN  He needs an ECG in 5 days and a Holter in 2 weeks.    Thanks,  Rossana      Patient notified of the above. Advised him to come in by Tuesday 6/4/24 to have ECG done. I explained to him that Shreyas would be calling to set up a date and time for him to come in for his monitor in 2 weeks.

## 2024-05-31 LAB
QT INTERVAL: 402 MS
QT INTERVAL: 428 MS
QTC INTERVAL: 442 MS
QTC INTERVAL: 500 MS

## 2024-06-04 ENCOUNTER — HOSPITAL ENCOUNTER (OUTPATIENT)
Dept: CARDIOLOGY | Facility: HOSPITAL | Age: 79
Discharge: HOME | End: 2024-06-04
Admitting: STUDENT IN AN ORGANIZED HEALTH CARE EDUCATION/TRAINING PROGRAM
Payer: MEDICARE

## 2024-06-04 DIAGNOSIS — I49.3 PVC'S (PREMATURE VENTRICULAR CONTRACTIONS): ICD-10-CM

## 2024-06-04 PROCEDURE — 93005 ELECTROCARDIOGRAM TRACING: CPT | Performed by: STUDENT IN AN ORGANIZED HEALTH CARE EDUCATION/TRAINING PROGRAM

## 2024-06-05 LAB
QT INTERVAL: 400 MS
QTC INTERVAL: 470 MS

## 2024-06-07 ENCOUNTER — CLINICAL SUPPORT NO REQUIREMENTS (OUTPATIENT)
Dept: CARDIOLOGY | Facility: CLINIC | Age: 79
End: 2024-06-07
Payer: MEDICARE

## 2024-06-07 ENCOUNTER — OFFICE VISIT (OUTPATIENT)
Dept: NEUROSURGERY | Facility: CLINIC | Age: 79
End: 2024-06-07
Payer: MEDICARE

## 2024-06-07 VITALS — HEIGHT: 74 IN | WEIGHT: 244 LBS | BODY MASS INDEX: 31.32 KG/M2

## 2024-06-07 DIAGNOSIS — E66.09 CLASS 1 OBESITY DUE TO EXCESS CALORIES WITH SERIOUS COMORBIDITY AND BODY MASS INDEX (BMI) OF 31.0 TO 31.9 IN ADULT: ICD-10-CM

## 2024-06-07 DIAGNOSIS — G56.03 BILATERAL CARPAL TUNNEL SYNDROME: Primary | ICD-10-CM

## 2024-06-07 DIAGNOSIS — T85.192A FAILURE OF SPINAL CORD STIMULATOR, INITIAL ENCOUNTER: ICD-10-CM

## 2024-06-07 DIAGNOSIS — Z95.810 CARDIAC DEFIBRILLATOR IN SITU: Primary | Chronic | ICD-10-CM

## 2024-06-07 DIAGNOSIS — G60.9 HEREDITARY AND IDIOPATHIC PERIPHERAL NEUROPATHY: ICD-10-CM

## 2024-06-07 DIAGNOSIS — M47.12 CERVICAL SPONDYLOSIS WITH MYELOPATHY: ICD-10-CM

## 2024-06-07 DIAGNOSIS — Z98.1 S/P LUMBAR FUSION: ICD-10-CM

## 2024-06-07 DIAGNOSIS — I42.8 NON-ISCHEMIC CARDIOMYOPATHY: Chronic | ICD-10-CM

## 2024-06-07 PROCEDURE — 99214 OFFICE O/P EST MOD 30 MIN: CPT | Performed by: NEUROLOGICAL SURGERY

## 2024-06-07 PROCEDURE — 1160F RVW MEDS BY RX/DR IN RCRD: CPT | Performed by: NEUROLOGICAL SURGERY

## 2024-06-07 PROCEDURE — 1159F MED LIST DOCD IN RCRD: CPT | Performed by: NEUROLOGICAL SURGERY

## 2024-06-07 NOTE — Clinical Note
AICD check.  Patient scheduled for 6.11.24 to reassess PVC counters and symptoms.  Please review and sign.

## 2024-06-07 NOTE — PROGRESS NOTES
Chief complaint:   Chief Complaint   Patient presents with    Neck Pain     Patient here to discuss findings of MRI and EMG/NCS.     Subjective     HPI:     Interval History: Jonh Peacock is a 78 y.o.  male who presents today with his wife with a complaint of bilateral hand pain, weakness, numbness, and tingling. History of lumbar discectomy by Dr. Sandhu in 2002, L3-S1 fusion for bilateral leg pain 2007, cord stimulator placement by Dr. Villela 2012 and revised in 2021.    Mr. Peacock currently denies neck pain.  He does endorse progressively worsening bilateral hand pain, weakness, numbness, and tingling resulting in a progressive decline in fine motor skills and frequently dropped items, right > left.  His bilateral hand numbness and tingling is predominantly located to digits 1-3.  He also has some numbness and tingling in his feet.  Difficulty with tying shoes.  Buttons.  Zippers and pole taps.  He currently ambulates without walker, but does use 1 intermittently.  He denies falls.  His upper extremity symptoms are worse at night and with use.  He has attempted conservative management with bracing, however states he was unable to tolerate wearing the braces throughout the night.  No significant alleviating factors.  He denies fevers, chills, night sweats, unexplained weight loss, saddle anesthesia, or new bowel or bladder abnormalities.  He currently rates the severity of his symptoms 3/10.  No additional concerns at this time.    Oswestry Disability Index = 50%   Score   Pain Intensity Moderate pain - 2   Personal Care Need help but manage most personal care-3   Lifting I can only lift very light weights-4   Reading Read with slight pain-1   Headaches Slight infrequent headaches-1   Concentration Concentrate fully slight difficulty-1   Work I can hardly do any work-4   Driving I can drive-0   Sleeping 3 to 5 hours of sleepiness-4   Recreation Cannot do any recreational activities-5      SCORE INTERPRETATION OF THE OSWESTRY NECK DISABILITY QUESTIONNAIRE  50-68: Severe disability   (Azul et al, 1980)    Modified [employer] [employer] (mJOA) score  CATEGORY SCORE   Upper extremity Motor Subscore Mild difficulty buttoning shirt-4   Lower Extremity Subscore Walk on flat ground with walking cane or walker-3   Upper Extremity Sensory Score Mild loss of hand sensation-2   Urinary Function Subscore Normal urination-3   TOTAL = 12/18    The mJOA is an 18 point score of functional disability specific to cervical myelopathy.   12-14: Moderate Myelopathy  Herrera et al. (1991). Mariana et al.     The mJOA is an 18 point score of functional disability specific to cervical myelopathy. Herrera et al. (1991).[2]    ROS  Review of Systems   HENT: Negative.     Eyes: Negative.    Respiratory: Negative.     Cardiovascular: Negative.    Gastrointestinal: Negative.    Endocrine: Negative.    Genitourinary: Negative.    Skin: Negative.    Allergic/Immunologic: Negative.    Neurological:  Positive for weakness and numbness.   Hematological: Negative.    Psychiatric/Behavioral: Negative.     All other systems reviewed and are negative.    PFSH:  Past Medical History:   Diagnosis Date    Anemia     Anxiety     Arrhythmia     Arthritis     Atrial fibrillation     Cardiomyopathy     CHF (congestive heart failure)     Colon polyp     Colostomy present     10years    Connective tissue disease     Depression     Depression     Diabetes mellitus     Diverticulitis     Dizzy     DVT (deep venous thrombosis)     Gastritis     GERD (gastroesophageal reflux disease)     Hiatal hernia     History of transfusion     Hyperlipidemia     Hypertension     Low back pain     Neuropathy     Pneumonia      Past Surgical History:   Procedure Laterality Date    APPENDECTOMY      BACK SURGERY  2002, 2007    Multiple spine surgeries - laminectomy & fusion    CARDIAC CATHETERIZATION      CARDIAC DEFIBRILLATOR PLACEMENT       CARDIAC ELECTROPHYSIOLOGY PROCEDURE N/A 10/31/2019    Procedure: ICD GEN CHANGE;  Surgeon: Chaz Chavez MD;  Location:  [facility]ASIVE [facility];  Service: Cardiology    CARDIAC ELECTROPHYSIOLOGY PROCEDURE N/A 5/29/2024    Procedure: Ablation PVC;  Surgeon: Rossana Horne MD;  Location:  [facility];  Service: Cardiovascular;  Laterality: N/A;    COLON RESECTION WITH COLOSTOMY      COLONOSCOPY  09/04/2014    diverticulosis    COLONOSCOPY N/A 10/29/2019    Procedure: COLONOSCOPY WITH ANESTHESIA;  Surgeon: Tenzin Abrams MD;  Location: [facility] ENDOSCOPY;  Service: Gastroenterology    ENDOSCOPY  02/24/1999    small sliding hiatal hernia    FOOT SURGERY Bilateral 2005    JOINT REPLACEMENT Right 2002    KNEE    PACEMAKER IMPLANTATION  2012    RECTAL FISSURE INCISION AND DRAINAGE      REPLACEMENT TOTAL KNEE      SPINAL CORD STIMULATOR IMPLANT  08/2012    Placed by Dr. DEENA Villela, [employer]    THORACIC LAMINECTOMY WITH PLACEMENT OF DORSAL COLUMN STIMULATOR Right 1/28/2021    Procedure: SPINAL CORD STIMULATOR REVISION, right buttocks;  Surgeon: Noah Oliveros MD;  Location: [facility] OR;  Service: Neurosurgery;  Laterality: Right;    TOTAL HIP ARTHROPLASTY Right 2004    TOTAL HIP ARTHROPLASTY Left 12/2008     Objective      Current Outpatient Medications   Medication Sig Dispense Refill    atorvastatin (LIPITOR) 20 MG tablet Take 1 tablet by mouth Daily. 90 tablet 3    Buprenorphine 10 MCG/HR patch weekly Place 15 mcg on the skin as directed by provider Every 7 (Seven) Days.      Chlorhexidine Gluconate 4 % solution Shower each day with solution for 5 days beginning 5 days before surgery. 120 mL 0    Diclofenac Sodium (VOLTAREN) 1 % gel gel Apply 4 g topically to the appropriate area as directed 4 (Four) Times a Day As Needed (bilatearl hand pain). 350 g 6    dilTIAZem (CARDIZEM) 30 MG tablet Take 1 tablet by mouth 3 (Three) Times a Day. 270 tablet 3    furosemide (LASIX) 20 MG tablet  "Take 1 tablet by mouth Daily. 90 tablet 3    furosemide (LASIX) 40 MG tablet Take 1 tablet by mouth Daily. TAKE WITH 20 MG TABLET FOR 60 PER MG PER DAY 90 tablet 3    gabapentin (NEURONTIN) 300 MG capsule Take 1 capsule by mouth 3 (Three) Times a Day.      Glucosamine 750 MG tablet Take 1 tablet by mouth 2 (Two) Times a Day.      losartan (COZAAR) 25 MG tablet Take 1 tablet by mouth Daily. 90 tablet 3    methocarbamol (Robaxin) 500 MG tablet Take 1 tablet by mouth 4 (Four) Times a Day. 20 tablet 1    metoprolol succinate XL (TOPROL-XL) 100 MG 24 hr tablet Take 1 tablet by mouth Daily. 90 tablet 3    Multiple Vitamins-Minerals (MULTIVITAMIN ADULT PO) Take 1 tablet by mouth Daily.      polyethylene glycol (MIRALAX) powder Take 17 g by mouth Every Night. 1581 g 3    Sotalol HCl AF 80 MG tablet Take 1 tablet by mouth 2 (Two) Times a Day. 60 tablet 11    tamsulosin (FLOMAX) 0.4 MG capsule 24 hr capsule Take 1 capsule by mouth Every Night. 90 capsule 3    warfarin (COUMADIN) 2 MG tablet Take 1 tablet by mouth Daily. 90 tablet 1    warfarin (COUMADIN) 4 MG tablet Take 1 tablet by mouth Daily. 90 tablet 1    warfarin (COUMADIN) 5 MG tablet Take 1 tablet by mouth Daily. 90 tablet 3     No current facility-administered medications for this visit.     Vital Signs  Ht 188 cm (74\")   Wt 111 kg (244 lb)   BMI 31.33 kg/m²   Physical Exam  Vitals and nursing note reviewed.   Constitutional:       General: He is not in acute distress.     Appearance: Normal appearance. He is well-developed and well-groomed. He is obese. He is not ill-appearing, toxic-appearing or diaphoretic.      Comments: BMI 31.44   HENT:      Head: Normocephalic and atraumatic.      Right Ear: Hearing normal.      Left Ear: Hearing normal.   Eyes:      Extraocular Movements: EOM normal.      Conjunctiva/sclera: Conjunctivae normal.      Pupils: Pupils are equal, round, and reactive to light.   Neck:      Trachea: Trachea normal.   Cardiovascular:      Rate and " Rhythm: Normal rate and regular rhythm.   Pulmonary:      Effort: Pulmonary effort is normal. No tachypnea, bradypnea, accessory muscle usage or respiratory distress.   Abdominal:      Palpations: Abdomen is soft.   Musculoskeletal:      Cervical back: Full passive range of motion without pain and neck supple.   Skin:     General: Skin is warm and dry.   Neurological:      Mental Status: He is alert and oriented to person, place, and time.      GCS: GCS eye subscore is 4. GCS verbal subscore is 5. GCS motor subscore is 6.      Gait: Gait is intact.      Deep Tendon Reflexes:      Reflex Scores:       Tricep reflexes are 0 on the right side and 0 on the left side.       Bicep reflexes are 0 on the right side and 0 on the left side.       Brachioradialis reflexes are 0 on the right side and 0 on the left side.       Patellar reflexes are 0 on the right side and 0 on the left side.       Achilles reflexes are 0 on the right side and 0 on the left side.  Psychiatric:         Speech: Speech normal.         Behavior: Behavior normal. Behavior is cooperative.       Neurologic Exam     Mental Status   Oriented to person, place, and time.   Attention: normal. Concentration: normal.   Speech: speech is normal   Level of consciousness: alert    Cranial Nerves     CN II   Visual fields full to confrontation.     CN III, IV, VI   Pupils are equal, round, and reactive to light.  Extraocular motions are normal.     CN V   Facial sensation intact.     CN VII   Facial expression full, symmetric.     CN VIII   CN VIII normal.     CN IX, X   CN IX normal.     CN XI   CN XI normal.     Motor Exam   Right arm tone: normal  Left arm tone: normal  Right leg tone: normal  Left leg tone: normal    Strength   Right deltoid: 4/5  Left deltoid: 4/5  Right biceps: 5/5  Left biceps: 5/5  Right triceps: 5/5  Left triceps: 5/5  Right wrist extension: 5/5  Left wrist extension: 5/5  Right iliopsoas: 5/5  Left iliopsoas: 5/5  Right quadriceps:  5/5  Left quadriceps: 5/5  Right anterior tibial: 5/5  Left anterior tibial: 5/5  Right gastroc: 5/5  Left gastroc: 5/5  Bilateral thenar and hypothenar atrophy  Right EHL 5/5  Left EHL 5/5       Sensory Exam   Right arm light touch: decreased from wrist  Left arm light touch: decreased from wrist  Right leg light touch: normal  Left leg light touch: normal    Gait, Coordination, and Reflexes     Gait  Gait: normal    Tremor   Resting tremor: absent  Intention tremor: absent  Action tremor: absent    Reflexes   Right brachioradialis: 0  Left brachioradialis: 0  Right biceps: 0  Left biceps: 0  Right triceps: 0  Left triceps: 0  Right patellar: 0  Left patellar: 0  Right achilles: 0  Left achilles: 0  Right plantar: normal  Left plantar: normal  Right Harrell: absent  Left Harrell: absent  Right ankle clonus: absent  Left ankle clonus: absent  Right pendular knee jerk: absent  Left pendular knee jerk: absent  (12 bullet pts)    Male  strength (pounds)  AGE Right Hand RH Norms Left Hand LH Norms   20-24  121±20.6  104±21.8   25-29  120±23.0  110±16.2   30-34  121±22.4  110±21.7   35-39  119±24  113±21.7   40-44  117±20.7  112±18.7   45-49  110±23.0  101±22.8   50-54  113±18.1  102±17   55-59  101±26.7  83±23.4   60-64  90±20.4  77±20.3   65-69  91±20.6  76.8±19.8   70-74  75±21.5  65±18.1   75+ *20,37 66±21.0 10,25 55±17.0   (RIAN Ellis et al; Hand Dynometer: Effects of trials and sessions.  Perpetual and Motor Skills 61:195-8, 1985)  * = Dominant hand  > = Intervention     Results Review:   No radiology results for the last 30 days.    2/19/2024                    Assessment/Plan:   Jonh Peacock is a 78 y.o. male with a significant medical history of a lumbar discectomy by Dr. Sandhu in 2002, L3-S1 fusion for bilateral leg pain 2007, cord stimulator placement by Dr. Villela 2012 and revised in 2021; A-fib and DVT currently on Coumadin, CHF, cardiomyopathy, hypertension, hyperlipidemia, connective tissue  disease, and obesity.  He presents today with a new problem of progressively worsening bilateral hand pain, weakness, numbness, and tingling.  TIMUR: 50.  mJOA: 12.  Physical exam findings of bilateral hand atrophy, right  strength is approximately 2 standard deviations below age-matched controls, left  strength is approximately 4 standard deviations below age-matched controls, poor dexterity, and grossly muted reflexes. No recent imaging cervical spine.  EMG and nerve conduction studies of bilateral upper extremities show peripheral polyneuropathy, as well as severe bilateral carpal tunnel syndrome.    Recommendations:  Cervicalgia  Mild cervical stenosis at 3 levels  Jonh and I discussed his history presenting illness, physical exam and imaging findings.  While his MRI does show three-level cervical stenosis this is relatively mild.  At this point his main symptoms are related to his hands.  Therefore I like to address his carpal tunnel syndrome as this is a low risk surgery with relatively good benefit.  If he gets substantial benefit from this then we will not need to address his neck.  If he does not get significant improvement then we will consider three-level anterior cervical discectomy and fusion.    Bilateral Carpal Tunnel Syndrome, severe   In the setting of a peripheral neuropathy  Differential diagnosis: Carpal tunnel syndrome, diabetic neuropathy, thoracic outlet syndrome, complex regional pain syndrome, cervical stenosis with radiculopathy.     At this time I favor carpal tunnel syndrome as a cause for the patient's numbness. He has EMG and nerve conduction studies that confirm entrapment at the bilateral wrist.      Risks of the procedure, including but not limited to infection (3%), pain, bleeding at the operative site, damage to local structures such as tendons, persistent pain, or generalized complications of anesthesia including stroke, coma, MI, or death.    Jonh has completed a rigorous  course of conservative therapy which includes rest and bracing.  We discussed risk, benefits, and complications of surgery and at this point the patient would like proceed with right carpal tunnel release.      Class 1 obesity (BMI 30.0-34.9) due to excessive calories with serious comorbidities BMI of 31.0-31.9 in adult  Body mass index is 31.33 kg/m². Information on the DASH diet provided in the AVS.  We will continue to provided diet and exercise information with the goal of weight loss at each scheduled appointment.     ADVANCED CARE PLANNING  Information on advance directives provided in AVS.    STEELYSE Fall Risk Assessment was completed, and patient is at MODERATE risk for falls. Assessment completed on:3/18/2024  Fall risk information provided in AVS    Diagnoses and all orders for this visit:    1. Bilateral carpal tunnel syndrome (Primary)  -     Case Request; Standing  -     CBC (No Diff); Future  -     Comprehensive Metabolic Panel; Future  -     MRSA Screen Culture (Outpatient) - Swab, Nares; Future  -     Type & Screen; Future  -     ECG 12 Lead; Future  -     XR Chest 1 View; Future  -     ceFAZolin (ANCEF) 2 g in sodium chloride 0.9 % 100 mL IVPB    2. Failure of spinal cord stimulator, initial encounter    3. Cervical spondylosis with myelopathy    4. Hereditary and idiopathic peripheral neuropathy    5. S/P lumbar fusion    6. Class 1 obesity due to excess calories with serious comorbidity and body mass index (BMI) of 31.0 to 31.9 in adult    Other orders  -     Outpatient In A Bed; Standing  -     Follow Anesthesia Guidelines / Protocol; Future  -     Follow Anesthesia Guidelines / Protocol; Standing  -     Verify NPO Status; Standing  -     Obtain Informed Consent; Standing  -     SCD (Sequential Compression Device) - Place on Patient in Pre-Op; Standing  -     Have Patient Void Prior to Procedure; Standing  -     Verify / Perform Chlorhexidine Skin Prep; Standing  -     Obtain Informed Consent;  Future  -     Provide NPO Instructions to Patient; Future  -     Chlorhexidine Skin Prep; Future  -     Provide Patient with Enhanced Recovery Booklet(s) or Handout; Future  -     CBC & Differential; Standing  -     Basic Metabolic Panel; Standing  -     Chlorhexidine Gluconate 4 % solution; Shower each day with solution for 5 days beginning 5 days before surgery.  Dispense: 120 mL; Refill: 0        Return for POSTOPERATIVELY.    Thank you, for allowing me to continue to participate in the care of this patient.      Medical Decision Making (2/3)  Problem Points (2,3,4 or more)  Undiagnosed new problem (Mod)  Chronic Stable, 2 or more (Mod)  Data Points (2,3,4 or more)  CATEGORY 1  INDEPENDENT INTERPRETATION Imaging = 1  REVIEWED other tests (EMG, NCS, SYLVIA, echo, ekg, PFT) = 1.  ORDERED: Imaging (X-ray,CT, MRI) = 2.  ORDERED Lab ordered = 2  CATEGORY 2  Independent interpretation of test performed by another physician/NPP (Cat 2)  CATEGORY 3  Risk (Low, Mod, High)  Surgery: Minor with risk factors (Moderate)    E/M = MDM 2 out of 3   or  Time  Est Level 4 - 88806 = Mod + (Cat1=3pts or Cat2 or Cat3) + Mod Risk   or   45-59 min      Sincerely,  Noah Oliveros MD

## 2024-06-07 NOTE — PROGRESS NOTES
Dual Chamber AICD Interrogation Report  IN OFFICE    June 7, 2024    Primary Cardiologist: Ozzie  : Medtronic Model: Evera MRI XT  [id number]  Implant date: 10.31.2019    Reason for evaluation:  Patient Reported Symptoms - Increased fatigue/sleepiness since 5.29.2024 (PVC ablation, started sotalol, LRL increased from 60 bpm to 80 bpm for PVC suppression by Dr. Horne)  Indication for AICD: Nonischemic Cardiomyopathy    Interrogation performed by:  Elisa Morris RN    Measurements  Atrial sensing - P wave: 1.5 mV  Atrial threshold: 0.75 V @ 0.4 ms  Atrial lead impedance: 361 ohms  Ventricular sensing - R wave: 3.9 mV  Ventricular threshold: 0.75 V @ 0.4 ms  Ventricular lead impedance:  361 ohms  Shock impedance:  RV- 38 ohms   SVC- 42 ohms    Manual sensing and threshold testing performed:  Yes      Diagnostic Data  Atrial paced: 88.9 % (Historically, patient AP approximately 40-65% when LRL was 60 bpm) Ventricular paced: 0.7 %    Episodes/Alerts: None.  PVC counters show improvement.        Battery status:  Satisfactory , estimated 5.1 years remaining      Final Parameters  Mode: AAIR <=> DDDR  Lower rate: 60 bpm Upper rate: 130 bpm  AV Delay: Paced- 180 ms    Sensed- 150 ms     Atrial - Amplitude: 1.5 V Pulse width: 0.4 ms Sensitivity: 0.6 mV   Ventricular - Amplitude: 2 V Pulse width: 0.4 ms Sensitivity: 0.3 mV     Changes made: Lower rate decreased from 80 bpm to 60 bpm per Dr. Horne; Dr. Horne's information added to device.    Conclusions: Normal AICD Function, No Therapy Delivered, Stable Pacing and Sensing Thresholds, and Adequate Battery Reserve    Remote Monitor:  Yes, connected.    Follow up: 6.11.24 in office to assess PVC Counters and patient's symptoms.        Final impression:  Data above reviewed.  Agree with findings and conclusions as per the above note.

## 2024-06-11 ENCOUNTER — CLINICAL SUPPORT NO REQUIREMENTS (OUTPATIENT)
Dept: CARDIOLOGY | Facility: CLINIC | Age: 79
End: 2024-06-11
Payer: MEDICARE

## 2024-06-11 DIAGNOSIS — Z95.810 CARDIAC DEFIBRILLATOR IN SITU: Primary | Chronic | ICD-10-CM

## 2024-06-11 DIAGNOSIS — I49.3 PVC'S (PREMATURE VENTRICULAR CONTRACTIONS): ICD-10-CM

## 2024-06-11 DIAGNOSIS — I42.8 NON-ISCHEMIC CARDIOMYOPATHY: Chronic | ICD-10-CM

## 2024-06-11 NOTE — PROGRESS NOTES
"Dual Chamber AICD Interrogation Report  IN OFFICE    June 11, 2024    Primary Cardiologist: Ozzie  : Medtronic Model: Evera MRI XT  [id number]  Implant date: 10.31.2019    Reason for evaluation:  Provider Requested to check PVC counters and assess symptoms after lower rate decreased from 80 bpm to 60 bpm on 6.7.24.  Indication for AICD: Nonischemic Cardiomyopathy    Interrogation performed by:  Elisa Morris RN    Measurements  Atrial sensing - P wave: 1.5 mV  Atrial threshold: 0.5 V @ 0.4 ms  Atrial lead impedance: 361 ohms  Ventricular sensing - R wave: 3.4 mV  Ventricular threshold: 0.75 V @ 0.4 ms  Ventricular lead impedance:  361 ohms  Shock impedance:  RV- 37 ohms   SVC- 45 ohms    Manual sensing and threshold testing performed:  No      Diagnostic Data  Atrial paced: 85 %  Ventricular paced: 0.2 %    Episodes/Alerts since 6.7.2024: No episodes.  Patient reports that he \"doesn't feel any better but isn't worse\" than he was since the programming change.  PVC burden has increased since LRL was decreased to 60 bpm.  Discussed with Dr. Horne - Per his order, LRL increased back to 80 bpm and patient advised to stop sotalol (no tapering required).  Patient verbalized understanding.             Battery status:  Satisfactory , estimated 5.1 years remaining      Final Parameters  Mode: AAIR <=> DDDR  Lower rate: 80 bpm Upper rate: 130 bpm  AV Delay: Paced- 180 ms    Sensed- 150 ms     Atrial - Amplitude: 1.5 V Pulse width: 0.4 ms Sensitivity: 0.6 mV   Ventricular - Amplitude: 2 V Pulse width: 0.4 ms Sensitivity: 0.3 mV     Changes made: Lower rate limit increased from 60 bpm to 80 bpm per Dr. Horne.    Conclusions: Normal AICD Function, No Therapy Delivered, Stable Pacing and Sensing Thresholds, and Adequate Battery Reserve    Remote Monitor:  Yes, connected.    Follow up: 6.14.24 in office to assess PVC counters and patient's symptoms.       Final impression:  Data above reviewed.  Agree with findings and " conclusions as per the above note.

## 2024-06-11 NOTE — Clinical Note
AICD adjust per your request - Appointment scheduled to reassess on Friday. Please review and sign.

## 2024-06-14 ENCOUNTER — CLINICAL SUPPORT NO REQUIREMENTS (OUTPATIENT)
Dept: CARDIOLOGY | Facility: CLINIC | Age: 79
End: 2024-06-14
Payer: MEDICARE

## 2024-06-14 ENCOUNTER — LAB (OUTPATIENT)
Dept: LAB | Facility: HOSPITAL | Age: 79
End: 2024-06-14
Payer: MEDICARE

## 2024-06-14 DIAGNOSIS — I48.0 PAROXYSMAL ATRIAL FIBRILLATION: Chronic | ICD-10-CM

## 2024-06-14 DIAGNOSIS — I50.42 CHF (CONGESTIVE HEART FAILURE), NYHA CLASS II, CHRONIC, COMBINED: ICD-10-CM

## 2024-06-14 DIAGNOSIS — I42.8 NON-ISCHEMIC CARDIOMYOPATHY: Chronic | ICD-10-CM

## 2024-06-14 DIAGNOSIS — Z79.01 ANTICOAGULANT LONG-TERM USE: Chronic | ICD-10-CM

## 2024-06-14 DIAGNOSIS — Z95.810 CARDIAC DEFIBRILLATOR IN SITU: Primary | Chronic | ICD-10-CM

## 2024-06-14 LAB
INR PPP: 1.8 (ref 0.91–1.09)
PROTHROMBIN TIME: 21.8 SECONDS (ref 10–13.8)

## 2024-06-14 PROCEDURE — 85610 PROTHROMBIN TIME: CPT | Performed by: INTERNAL MEDICINE

## 2024-06-17 PROBLEM — G56.03 CARPAL TUNNEL SYNDROME, BILATERAL: Status: ACTIVE | Noted: 2024-06-17

## 2024-06-17 RX ORDER — CHLORHEXIDINE GLUCONATE 40 MG/ML
SOLUTION TOPICAL
Qty: 120 ML | Refills: 0 | Status: SHIPPED | OUTPATIENT
Start: 2024-06-17

## 2024-06-25 NOTE — PROGRESS NOTES
[facility] - PODIATRY    Today's Date: 07/02/24    Patient Name: Jonh Peacock  MRN: [id number]  CSN: [id number]  PCP: Ric Antony DO  Referring Provider: No ref. provider found     SUBJECTIVE     Chief Complaint   Patient presents with    Follow-up     Ric Antony 05/20/2024 10 [facility] DIABETIC    Diabetes     Patient states he is not on any medications nor checks his sugar       HPI: Jonh Peacock, a 78 y.o.male, comes to clinic as a(n) established patient presenting for diabetic foot exam and complaining of thick fungal toenails. Pt with h/o Anxiety, Arrhythmia, Cardiomyopathy, CHF, Depression, previous DVT, HTN, Obesity, Anticoagulation with Warfarin. Patient presents with thickened nails that are difficult to care for due to shape of nail and anticoagulation therapy.  Patient is NIDDM and unsure of last BG level. Last A1C of 6.3%.  Denies pain today. Uses rollator for ambulation. Continues Coumadin daily. Notes improvement to feet with regular visits. Reports numbness in his feet. Denies any constitutional symptoms. No other pedal complaints at this time.    Past Medical History:   Diagnosis Date    Anemia     Anxiety     Arrhythmia     Arthritis     Atrial fibrillation     Cardiomyopathy     CHF (congestive heart failure)     Colon polyp     Colostomy present     10years    Connective tissue disease     Depression     Depression     Diabetes mellitus     Diverticulitis     Dizzy     DVT (deep venous thrombosis)     Gastritis     GERD (gastroesophageal reflux disease)     Hiatal hernia     History of transfusion     Hyperlipidemia     Hypertension     Low back pain     Neuropathy     Pneumonia      Past Surgical History:   Procedure Laterality Date    APPENDECTOMY      BACK SURGERY  2002, 2007    Multiple spine surgeries - laminectomy & fusion    CARDIAC CATHETERIZATION      CARDIAC DEFIBRILLATOR PLACEMENT      CARDIAC ELECTROPHYSIOLOGY PROCEDURE N/A 10/31/2019     "Procedure: ICD GEN CHANGE;  Surgeon: Chaz Chavez MD;  Location:  [facility];  Service: Cardiology    CARDIAC ELECTROPHYSIOLOGY PROCEDURE N/A 5/29/2024    Procedure: Ablation PVC;  Surgeon: Rossana Horne MD;  Location:  [facility];  Service: Cardiovascular;  Laterality: N/A;    COLON RESECTION WITH COLOSTOMY      COLONOSCOPY  09/04/2014    diverticulosis    COLONOSCOPY N/A 10/29/2019    Procedure: COLONOSCOPY WITH ANESTHESIA;  Surgeon: Tenzin Abrams MD;  Location: [facility] ENDOSCOPY;  Service: Gastroenterology    ENDOSCOPY  02/24/1999    small sliding hiatal hernia    FOOT SURGERY Bilateral 2005    JOINT REPLACEMENT Right 2002    KNEE    PACEMAKER IMPLANTATION  2012    RECTAL FISSURE INCISION AND DRAINAGE      REPLACEMENT TOTAL KNEE      SPINAL CORD STIMULATOR IMPLANT  08/2012    Placed by Dr. DEENA Villela, [employer]    THORACIC LAMINECTOMY WITH PLACEMENT OF DORSAL COLUMN STIMULATOR Right 1/28/2021    Procedure: SPINAL CORD STIMULATOR REVISION, right buttocks;  Surgeon: Noah Oliveros MD;  Location: [facility] OR;  Service: Neurosurgery;  Laterality: Right;    TOTAL HIP ARTHROPLASTY Right 2004    TOTAL HIP ARTHROPLASTY Left 12/2008     Family History   Problem Relation Age of Onset    Heart disease Mother     Heart disease Brother     Breast cancer Brother     Colon cancer Neg Hx     Colon polyps Neg Hx      Social History     Socioeconomic History    Marital status:    Tobacco Use    Smoking status: Never     Passive exposure: Never    Smokeless tobacco: Never   Vaping Use    Vaping status: Never Used   Substance and Sexual Activity    Alcohol use: Not Currently    Drug use: Never    Sexual activity: Defer     Allergies   Allergen Reactions    Percocet [Oxycodone-Acetaminophen] Urinary Retention    Amiodarone Other (See Comments)     INTERFERES WITH OTHER MEDICATIONS    Oxycontin [Oxycodone Hcl] Urinary Retention    Penicillins Hives     All \"cillins\"    " Vioxx [Rofecoxib] GI Intolerance     Current Outpatient Medications   Medication Sig Dispense Refill    atorvastatin (LIPITOR) 20 MG tablet Take 1 tablet by mouth Daily. 90 tablet 3    Buprenorphine 10 MCG/HR patch weekly Place 15 mcg on the skin as directed by provider Every 7 (Seven) Days.      Chlorhexidine Gluconate 4 % solution Shower each day with solution for 5 days beginning 5 days before surgery. 120 mL 0    Diclofenac Sodium (VOLTAREN) 1 % gel gel Apply 4 g topically to the appropriate area as directed 4 (Four) Times a Day As Needed (bilatearl hand pain). 350 g 6    dilTIAZem (CARDIZEM) 30 MG tablet Take 1 tablet by mouth 3 (Three) Times a Day. 270 tablet 3    furosemide (LASIX) 20 MG tablet Take 1 tablet by mouth Daily. 90 tablet 3    furosemide (LASIX) 40 MG tablet Take 1 tablet by mouth Daily. TAKE WITH 20 MG TABLET FOR 60 PER MG PER DAY 90 tablet 3    gabapentin (NEURONTIN) 300 MG capsule Take 1 capsule by mouth 4 (Four) Times a Day.      Glucosamine 750 MG tablet Take 1 tablet by mouth 2 (Two) Times a Day.      losartan (COZAAR) 25 MG tablet Take 1 tablet by mouth Daily. 90 tablet 3    methocarbamol (Robaxin) 500 MG tablet Take 1 tablet by mouth 4 (Four) Times a Day. (Patient taking differently: Take 1 tablet by mouth As Needed for Muscle Spasms.) 20 tablet 1    metoprolol succinate XL (TOPROL-XL) 100 MG 24 hr tablet Take 1 tablet by mouth Daily. 90 tablet 3    Multiple Vitamins-Minerals (MULTIVITAMIN ADULT PO) Take 1 tablet by mouth Daily.      polyethylene glycol (MIRALAX) powder Take 17 g by mouth Every Night. 1581 g 3    Sotalol HCl AF 80 MG tablet Take 1 tablet by mouth 2 (Two) Times a Day. 60 tablet 11    tamsulosin (FLOMAX) 0.4 MG capsule 24 hr capsule Take 1 capsule by mouth Every Night. 90 capsule 3    warfarin (COUMADIN) 2 MG tablet Take 1 tablet by mouth Daily. 90 tablet 1    warfarin (COUMADIN) 4 MG tablet Take 1 tablet by mouth Daily. 90 tablet 1    warfarin (COUMADIN) 5 MG tablet Take 1  tablet by mouth Daily. 90 tablet 3     No current facility-administered medications for this visit.     Review of Systems   Constitutional:  Negative for chills and fever.   HENT:  Negative for congestion.    Respiratory:  Negative for shortness of breath.    Cardiovascular:  Positive for leg swelling. Negative for chest pain.   Gastrointestinal:  Negative for constipation, diarrhea, nausea and vomiting.   Musculoskeletal:  Positive for arthralgias, back pain and gait problem.        Foot pain   Skin:  Negative for wound.        Thick Toenails   Neurological:  Positive for numbness.   Hematological:  Bruises/bleeds easily.       OBJECTIVE     Vitals:    07/01/24 1513   BP: 122/84   Pulse: 84   Resp: 20   SpO2: 96%     PHYSICAL EXAM  GEN:   Accompanied by wife.    Foot/Ankle Exam    GENERAL  Diabetic foot exam performed    Appearance:  appears stated age  Orientation:  AAOx3  Affect:  appropriate  Gait:  (unsteady)  Assistance:  walker  Right shoe gear: casual shoe  Left shoe gear: casual shoe (with lift)    VASCULAR     Right Foot Vascularity   Dorsalis pedis:  1+  Posterior tibial:  1+  Skin temperature:  warm  Edema grading:  Trace and non-pitting  CFT:  4  Pedal hair growth:  Absent  Varicosities:  none     Left Foot Vascularity   Dorsalis pedis:  1+  Posterior tibial:  1+  Skin temperature:  warm  Edema grading:  Trace and non-pitting  CFT:  4  Pedal hair growth:  Absent  Varicosities:  none     NEUROLOGIC     Right Foot Neurologic   Light touch sensation: diminished  Vibratory sensation: diminished  Hot/Cold sensation: diminished  Protective Sensation using Delray-Akhil Monofilament:   Sites intact: 7  Sites tested: 10     Left Foot Neurologic   Light touch sensation: diminished  Vibratory sensation: diminished  Hot/Cold sensation:  diminished  Protective Sensation using Delray-Akhil Monofilament:   Sites intact: 7  Sites tested: 10    MUSCULOSKELETAL     Right Foot Musculoskeletal   Ecchymosis:   none  Tenderness:  toenail problem    Arch:  Pes planus  Hallux valgus: No       Left Foot Musculoskeletal   Ecchymosis:  none  Tenderness:  toenail problem  Arch:  Pes planus  Hallux valgus: No      MUSCLE STRENGTH     Right Foot Muscle Strength   Foot dorsiflexion:  5  Foot plantar flexion:  5  Foot inversion:  5  Foot eversion:  5     Left Foot Muscle Strength   Foot dorsiflexion:  5  Foot plantar flexion:  5  Foot inversion:  5  Foot eversion:  5    RANGE OF MOTION     Right Foot Range of Motion   Foot and ankle ROM within normal limits       Left Foot Range of Motion   Foot and ankle ROM within normal limits      DERMATOLOGIC      Right Foot Dermatologic   Skin  Right foot skin is intact.   Nails  1.  Positive for elongated, onychomycosis, abnormal thickness and dystrophic nail.  2.  Positive for elongated, onychomycosis, abnormal thickness and dystrophic nail.  3.  Positive for elongated, onychomycosis, abnormal thickness and dystrophic nail.  4.  Positive for elongated, onychomycosis, abnormal thickness and dystrophic nail.  5.  Positive for elongated, onychomycosis, abnormal thickness and dystrophic nail.     Left Foot Dermatologic   Skin  Left foot skin is intact.   Nails  1.  Positive for elongated, onychomycosis, abnormal thickness and dystrophic nail.  2.  Positive for elongated, onychomycosis, abnormal thickness and dystrophic nail.  3.  Positive for elongated, onychomycosis, abnormal thickness and dystrophic nail.  4.  Positive for elongated, onychomycosis, abnormally thick and dystrophic nail.  5.  Positive for elongated, onychomycosis, abnormally thick and dystrophic nail.    Image:        RADIOLOGY/NUCLEAR:  Holter Monitor - 72 Hour Up To 15 Days    Result Date: 6/24/2024  Narrative:   An abnormal monitor study.   1.  Predominantly sinus rhythm and atrially paced rhythm 2.  Frequent episodes of NSVT 3.  Frequent episodes of paroxysmal SVT 4.  Single episode of AF versus atrial flutter lasting 28  minutes in duration 5.  Occasional PACs with frequent PVCs (occurring 8% of the time; multiple morphologies) 6.  Marked increase in PVC frequency on 6/11/2024 with stopping sotalol      LABORATORY/CULTURE RESULTS:      PATHOLOGY RESULTS:       ASSESSMENT/PLAN     Diagnoses and all orders for this visit:    1. Onychomycosis (Primary)    2. Type 2 diabetes mellitus with diabetic neuropathy, without long-term current use of insulin    3. Lower limb length difference    4. Anticoagulant long-term use    5. Peripheral edema    6. Gait abnormality    7. Foot callus    Comprehensive lower extremity examination and evaluation was performed.  Discussed findings and treatment plan including risks, benefits, and treatment options with patient in detail. Patient agreed with treatment plan.   After verbal consent obtained, nail(s) x10 debrided of length and thickness with nail nipper without incidence  After verbal consent obtained, calluses x1 pared utilizing dermal curette and/or scalpel without incidence  Patient may maintain nails and calluses at home utilizing emery board or pumice stone between visits as needed  Reviewed at home diabetic foot care including daily foot checks   Continue shoes with custom inserts  Continue use of rollator for ambulation support.  Elevate legs when possible.  Follow with PCP for management of DM.    An After Visit Summary was printed and given to the patient at discharge, including (if requested) any available informative/educational handouts regarding diagnosis, treatment, or medications. All questions were answered to patient/family satisfaction. Should symptoms fail to improve or worsen they agree to call or return to clinic or to go to the Emergency Department. Discussed the importance of following up with any needed screening tests/labs/specialist appointments and any requested follow-up recommended by me today. Importance of maintaining follow-up discussed and patient accepts that  missed appointments can delay diagnosis and potentially lead to worsening of conditions.  Return in about 10 weeks (around 9/9/2024) for Follow-up with Podiatry APRN, Schedule [facility]., or sooner if acute issues arise.        This document has been electronically signed by REYMUNDO Santana on July 2, 2024 08:29 CDT

## 2024-06-26 ENCOUNTER — TELEPHONE (OUTPATIENT)
Dept: NEUROSURGERY | Facility: CLINIC | Age: 79
End: 2024-06-26
Payer: MEDICARE

## 2024-06-26 NOTE — TELEPHONE ENCOUNTER
CALLED PT NOTIFY OF SURGERY AND PRE OP APPOINTMENT. NO ANSWER- LEFT INFORMATION ON VOICEMAIL.     IF PT CALLS BACK OK TO TRANSFER TO [facility]

## 2024-06-26 NOTE — TELEPHONE ENCOUNTER
He should go have his labs done as scheduled at his preop on 7/3.     Please ask about the following questions:   Any chest pain  Heart racing or skipping beats that is new or worse  Any new swelling  Any new or changing shortness of breath

## 2024-06-26 NOTE — TELEPHONE ENCOUNTER
PATIENT HAS BEEN CALLED, GIVEN MESSAGE AND STATED    1)  HE IS NOT HAVING ANY CHEST PAIN  2) NO HEART RACING AND NO SKIPPING BEATS THAT HE KNOWS OF.   3) NO NEW SWELLING.  HE STATED THAT THE LASIX 60mg SEEMS TO KEEP THE FLUID DOWN IN HIS LEGS AND FEET   4) NO NEW OR CHANGING SHORTNESS OF BREATH.        PATIENT IS ASKING IF THERE IS SOMETHING THAT HE COULD HAVE FOR THE PAIN IN HIS HANDS?  HE STATED THAT   THE DICLOFENAC IS NOT HELPING.

## 2024-06-26 NOTE — TELEPHONE ENCOUNTER
[address], [address]  [facility] [address]  Phone:[phone]  Fax: [phone]                       Dear DR JUANPABLO FERGUSON is scheduled for surgery on 7/11/24 with Dr MELISSA GUAMAN.        Surgery name: RIGHT CARPAL TUNNEL     In order to do this procedure, we are requesting surgical clearance from you. We request documentation stating patient clearance status. This can be an addendum to this encounter, an office note stating patient is cleared or a letter.            Thank you,           Patricia Amaya  Surgery Scheduling Coordinator   [facility]  [address]  [address]  [place] [address]  P: [phone]  F: [phone]

## 2024-06-27 ENCOUNTER — TELEPHONE (OUTPATIENT)
Dept: CARDIOLOGY | Facility: CLINIC | Age: 79
End: 2024-06-27
Payer: MEDICARE

## 2024-06-27 NOTE — TELEPHONE ENCOUNTER
Rossana Horne MD Cooper, Shelby R, MA  Please let him know that his Holter monitor showed that the overall PVC frequency was lower than it had been before (now 8% from 16% in the past).  It appears as though there was a marked increase in the frequency on 6/11/2024 when we stopped the sotalol with a much better rate before that.  I do think that the sotalol appears to be working reasonably well for him.  He should continue this medication.    Patient notified.

## 2024-07-01 ENCOUNTER — OFFICE VISIT (OUTPATIENT)
Age: 79
End: 2024-07-01
Payer: MEDICARE

## 2024-07-01 VITALS
SYSTOLIC BLOOD PRESSURE: 122 MMHG | WEIGHT: 233.8 LBS | DIASTOLIC BLOOD PRESSURE: 84 MMHG | OXYGEN SATURATION: 96 % | BODY MASS INDEX: 30.01 KG/M2 | HEIGHT: 74 IN | HEART RATE: 84 BPM | RESPIRATION RATE: 20 BRPM

## 2024-07-01 DIAGNOSIS — E11.40 TYPE 2 DIABETES MELLITUS WITH DIABETIC NEUROPATHY, WITHOUT LONG-TERM CURRENT USE OF INSULIN: ICD-10-CM

## 2024-07-01 DIAGNOSIS — B35.1 ONYCHOMYCOSIS: Primary | ICD-10-CM

## 2024-07-01 DIAGNOSIS — R60.0 PERIPHERAL EDEMA: ICD-10-CM

## 2024-07-01 DIAGNOSIS — Z79.01 ANTICOAGULANT LONG-TERM USE: ICD-10-CM

## 2024-07-01 DIAGNOSIS — M21.70 LOWER LIMB LENGTH DIFFERENCE: ICD-10-CM

## 2024-07-01 DIAGNOSIS — R26.9 GAIT ABNORMALITY: ICD-10-CM

## 2024-07-01 DIAGNOSIS — L84 FOOT CALLUS: ICD-10-CM

## 2024-07-01 PROCEDURE — 11721 DEBRIDE NAIL 6 OR MORE: CPT | Performed by: NURSE PRACTITIONER

## 2024-07-01 PROCEDURE — 1159F MED LIST DOCD IN RCRD: CPT | Performed by: NURSE PRACTITIONER

## 2024-07-01 PROCEDURE — 3079F DIAST BP 80-89 MM HG: CPT | Performed by: NURSE PRACTITIONER

## 2024-07-01 PROCEDURE — 3074F SYST BP LT 130 MM HG: CPT | Performed by: NURSE PRACTITIONER

## 2024-07-01 PROCEDURE — 11055 PARING/CUTG B9 HYPRKER LES 1: CPT | Performed by: NURSE PRACTITIONER

## 2024-07-01 PROCEDURE — 1160F RVW MEDS BY RX/DR IN RCRD: CPT | Performed by: NURSE PRACTITIONER

## 2024-07-03 ENCOUNTER — PRE-ADMISSION TESTING (OUTPATIENT)
Dept: PREADMISSION TESTING | Facility: HOSPITAL | Age: 79
End: 2024-07-03
Payer: MEDICARE

## 2024-07-03 ENCOUNTER — HOSPITAL ENCOUNTER (OUTPATIENT)
Dept: GENERAL RADIOLOGY | Facility: HOSPITAL | Age: 79
Discharge: HOME | End: 2024-07-03
Payer: MEDICARE

## 2024-07-03 VITALS
OXYGEN SATURATION: 97 % | RESPIRATION RATE: 18 BRPM | WEIGHT: 234.57 LBS | HEIGHT: 72 IN | BODY MASS INDEX: 31.77 KG/M2 | SYSTOLIC BLOOD PRESSURE: 109 MMHG | DIASTOLIC BLOOD PRESSURE: 69 MMHG | HEART RATE: 87 BPM

## 2024-07-03 DIAGNOSIS — G56.03 BILATERAL CARPAL TUNNEL SYNDROME: ICD-10-CM

## 2024-07-03 LAB
ALBUMIN SERPL-MCNC: 4.1 G/DL (ref 3.5–5.2)
ALBUMIN/GLOB SERPL: 1.5 G/DL
ALP SERPL-CCNC: 83 U/L (ref 39–117)
ALT SERPL W P-5'-P-CCNC: 22 U/L (ref 1–41)
ANION GAP SERPL CALCULATED.3IONS-SCNC: 9 MMOL/L (ref 5–15)
AST SERPL-CCNC: 22 U/L (ref 1–40)
BILIRUB SERPL-MCNC: 0.3 MG/DL (ref 0–1.2)
BUN SERPL-MCNC: 29 MG/DL (ref 8–23)
BUN/CREAT SERPL: 20.6 (ref 7–25)
CALCIUM SPEC-SCNC: 9.2 MG/DL (ref 8.6–10.5)
CHLORIDE SERPL-SCNC: 102 MMOL/L (ref 98–107)
CO2 SERPL-SCNC: 30 MMOL/L (ref 22–29)
CREAT SERPL-MCNC: 1.41 MG/DL (ref 0.76–1.27)
DEPRECATED RDW RBC AUTO: 46.1 FL (ref 37–54)
EGFRCR SERPLBLD CKD-EPI 2021: 51.3 ML/MIN/1.73
ERYTHROCYTE [DISTWIDTH] IN BLOOD BY AUTOMATED COUNT: 13.6 % (ref 12.3–15.4)
GLOBULIN UR ELPH-MCNC: 2.8 GM/DL
GLUCOSE SERPL-MCNC: 133 MG/DL (ref 65–99)
HCT VFR BLD AUTO: 32.3 % (ref 37.5–51)
HGB BLD-MCNC: 10.7 G/DL (ref 13–17.7)
INR PPP: 1.8 (ref 0.91–1.09)
MCH RBC QN AUTO: 30.5 PG (ref 26.6–33)
MCHC RBC AUTO-ENTMCNC: 33.1 G/DL (ref 31.5–35.7)
MCV RBC AUTO: 92 FL (ref 79–97)
PLATELET # BLD AUTO: 144 10*3/MM3 (ref 140–450)
PMV BLD AUTO: 11 FL (ref 6–12)
POTASSIUM SERPL-SCNC: 4.1 MMOL/L (ref 3.5–5.2)
PROT SERPL-MCNC: 6.9 G/DL (ref 6–8.5)
PROTHROMBIN TIME: 21.6 SECONDS (ref 11.8–14.8)
RBC # BLD AUTO: 3.51 10*6/MM3 (ref 4.14–5.8)
SODIUM SERPL-SCNC: 141 MMOL/L (ref 136–145)
WBC NRBC COR # BLD AUTO: 4.49 10*3/MM3 (ref 3.4–10.8)

## 2024-07-03 PROCEDURE — 87081 CULTURE SCREEN ONLY: CPT

## 2024-07-03 PROCEDURE — 85027 COMPLETE CBC AUTOMATED: CPT

## 2024-07-03 PROCEDURE — 71045 X-RAY EXAM CHEST 1 VIEW: CPT

## 2024-07-03 PROCEDURE — 93005 ELECTROCARDIOGRAM TRACING: CPT

## 2024-07-03 PROCEDURE — 36415 COLL VENOUS BLD VENIPUNCTURE: CPT

## 2024-07-03 PROCEDURE — 80053 COMPREHEN METABOLIC PANEL: CPT

## 2024-07-03 PROCEDURE — 85610 PROTHROMBIN TIME: CPT

## 2024-07-03 RX ORDER — HYDROCODONE BITARTRATE AND ACETAMINOPHEN 7.5; 325 MG/1; MG/1
1 TABLET ORAL EVERY 6 HOURS PRN
COMMUNITY

## 2024-07-03 NOTE — DISCHARGE INSTRUCTIONS
Preparing for Surgery  Follow these instructions before the procedure:  Several days or weeks before your procedure  PLEASE CHECK ON YOUR WARFARIN THERAPY ACCORDING TO YOUR DOCTOR     Ask your health care provider about:  Changing or stopping your regular medicines. This is especially important if you are taking diabetes medicines or blood thinners.  Taking medicines such as aspirin and ibuprofen. These medicines can thin your blood. Do not take these medicines unless your health care provider tells you to take them.  Taking over-the-counter medicines, vitamins, herbs, and supplements.    Contact your surgeon if you:  Develop a fever of more than 100.4°F (38°C) or other feelings of illness during the 48 hours before your surgery.  Have symptoms that get worse.  Have questions or concerns about your surgery.  If you are going home the same day of your surgery you will need to arrange for a responsible adult, age 18 years old or older, to drive you home from the hospital and stay with you for 24 hours. Verification of the  will be made prior to any procedure requiring sedation. You may not go home in a taxi or any form of public transportation by yourself.     Day before your procedure  Medication(s) you need to stop the day before your surgery:LOSARTAN     24 hours before your procedure DO NOT drink alcoholic beverages or smoke.  24 hours before your procedure STOP taking Erectile Dysfunction medication (i.e.,Cialis, Viagra)   You may be asked to shower with a germ-killing soap.  Day of your procedure   You may take the following medication(s) the morning of surgery with a sip of water: GABAPENTIN, METOPROLOL, SOTALOL      8 hours before your procedure STOP all food, any dairy products, and full liquids. This includes hard candy, chewing gum or mints. This is extremely important to prevent serious complications.     Up to 2 hours before your scheduled arrival time, you may have clear liquids no cream, powder,  or pulp of any kind. Safe options are water, black coffee, plain tea, soda, Gatorade/Powerade, clear broth, apple juice.    2 hours before your scheduled arrival time, STOP drinking clear liquids.    You may need to take another shower with a germ-killing soap before you leave home in the morning. Do not use perfumes, colognes, or body lotions.  Wear comfortable loose-fitting clothing.  Remove all jewelry including body piercing and rings, dark colored nail polish, and make up prior to arrival at the hospital. Leave all valuables at home.   Bring your hearing aids if you rely on them.  Do not wear contact lenses. If you wear eyeglasses remember to bring a case to store them in while you are in surgery.  Do not use denture adhesives since you will be asked to remove them during your surgery.    You do not need to bring your home medications into the hospital.   Bring your sleep apnea device with you on the day of your surgery (if this applies to you).  If you wear portable oxygen, bring it with you.   If you are staying overnight, you may bring a bag of items you may need such as slippers, robe and a change of clothes for your discharge. You may want to leave these items in the car until you are ready for them since your family will take your belongings when you leave the pre-operative area.  Arrive at the hospital as scheduled by the office. You will be asked to arrive 2 hours prior to your surgery time in order to prepare for your procedure.  When you arrive at the hospital  Go to the registration desk located at the main entrance of the hospital.  After registration is completed, you will be given a beeper and a sticker sheet. Take the stickers to Outpatient Surgery and place in the tray at the end of the desk to notify the staff that you have arrived and registered.   Return to the lobby to wait. You are not always called back according to the time of arrival but rather the time your doctor will be ready.  When  your beeper lights up and vibrates proceed through the double doors, under the stairs, and a member of the Outpatient Surgery staff will escort you to your preoperative room.   How to Use Chlorhexidine Before Surgery  Chlorhexidine gluconate (CHG) is a germ-killing (antiseptic) solution that is used to clean the skin. It can get rid of the bacteria that normally live on the skin and can keep them away for about 24 hours. To clean your skin with CHG, you may be given:  A CHG solution to use in the shower or as part of a sponge bath.  A prepackaged cloth that contains CHG.  Cleaning your skin with CHG may help lower the risk for infection:  While you are staying in the intensive care unit of the hospital.  If you have a vascular access, such as a central line, to provide short-term or long-term access to your veins.  If you have a catheter to drain urine from your bladder.  If you are on a ventilator. A ventilator is a machine that helps you breathe by moving air in and out of your lungs.  After surgery.  What are the risks?  Risks of using CHG include:  A skin reaction.  Hearing loss, if CHG gets in your ears and you have a perforated eardrum.  Eye injury, if CHG gets in your eyes and is not rinsed out.  The CHG product catching fire.  Make sure that you avoid smoking and flames after applying CHG to your skin.  Do not use CHG:  If you have a chlorhexidine allergy or have previously reacted to chlorhexidine.  On babies younger than 2 months of age.  How to use CHG solution  Use CHG only as told by your health care provider, and follow the instructions on the label.  Use the full amount of CHG as directed. Usually, this is one bottle.  During a shower    Follow these steps when using CHG solution during a shower (unless your health care provider gives you different instructions):  Start the shower.  Use your normal soap and shampoo to wash your face and hair.  Turn off the shower or move out of the shower  stream.  Pour the CHG onto a clean washcloth. Do not use any type of brush or rough-edged sponge.  Starting at your neck, lather your body down to your toes. Make sure you follow these instructions:  If you will be having surgery, pay special attention to the part of your body where you will be having surgery. Scrub this area for at least 1 minute.  Do not use CHG on your head or face. If the solution gets into your ears or eyes, rinse them well with water.  Avoid your genital area.  Avoid any areas of skin that have broken skin, cuts, or scrapes.  Scrub your back and under your arms. Make sure to wash skin folds.  Let the lather sit on your skin for 1-2 minutes or as long as told by your health care provider.  Thoroughly rinse your entire body in the shower. Make sure that all body creases and crevices are rinsed well.  Dry off with a clean towel. Do not put any substances on your body afterward--such as powder, lotion, or perfume--unless you are told to do so by your health care provider. Only use lotions that are recommended by the .  Put on clean clothes or pajamas.  If it is the night before your surgery, sleep in clean sheets.     During a sponge bath  Follow these steps when using CHG solution during a sponge bath (unless your health care provider gives you different instructions):  Use your normal soap and shampoo to wash your face and hair.  Pour the CHG onto a clean washcloth.  Starting at your neck, lather your body down to your toes. Make sure you follow these instructions:  If you will be having surgery, pay special attention to the part of your body where you will be having surgery. Scrub this area for at least 1 minute.  Do not use CHG on your head or face. If the solution gets into your ears or eyes, rinse them well with water.  Avoid your genital area.  Avoid any areas of skin that have broken skin, cuts, or scrapes.  Scrub your back and under your arms. Make sure to wash skin  folds.  Let the lather sit on your skin for 1-2 minutes or as long as told by your health care provider.  Using a different clean, wet washcloth, thoroughly rinse your entire body. Make sure that all body creases and crevices are rinsed well.  Dry off with a clean towel. Do not put any substances on your body afterward--such as powder, lotion, or perfume--unless you are told to do so by your health care provider. Only use lotions that are recommended by the .  Put on clean clothes or pajamas.  If it is the night before your surgery, sleep in clean sheets.  How to use CHG prepackaged cloths  Only use CHG cloths as told by your health care provider, and follow the instructions on the label.  Use the CHG cloth on clean, dry skin.  Do not use the CHG cloth on your head or face unless your health care provider tells you to.  When washing with the CHG cloth:  Avoid your genital area.  Avoid any areas of skin that have broken skin, cuts, or scrapes.  Before surgery    Follow these steps when using a CHG cloth to clean before surgery (unless your health care provider gives you different instructions):  Using the CHG cloth, vigorously scrub the part of your body where you will be having surgery. Scrub using a back-and-forth motion for 3 minutes. The area on your body should be completely wet with CHG when you are done scrubbing.  Do not rinse. Discard the cloth and let the area air-dry. Do not put any substances on the area afterward, such as powder, lotion, or perfume.  Put on clean clothes or pajamas.  If it is the night before your surgery, sleep in clean sheets.     For general bathing  Follow these steps when using CHG cloths for general bathing (unless your health care provider gives you different instructions).  Use a separate CHG cloth for each area of your body. Make sure you wash between any folds of skin and between your fingers and toes. Wash your body in the following order, switching to a new cloth  after each step:  The front of your neck, shoulders, and chest.  Both of your arms, under your arms, and your hands.  Your stomach and groin area, avoiding the genitals.  Your right leg and foot.  Your left leg and foot.  The back of your neck, your back, and your buttocks.  Do not rinse. Discard the cloth and let the area air-dry. Do not put any substances on your body afterward--such as powder, lotion, or perfume--unless you are told to do so by your health care provider. Only use lotions that are recommended by the .  Put on clean clothes or pajamas.  Contact a health care provider if:  Your skin gets irritated after scrubbing.  You have questions about using your solution or cloth.  You swallow any chlorhexidine. Call your local poison control center (1-943.418.9132 in the [place]  Get help right away if:  Your eyes itch badly, or they become very red or swollen.  Your skin itches badly and is red or swollen.  Your hearing changes.  You have trouble seeing.  You have swelling or tingling in your mouth or throat.  You have trouble breathing.  These symptoms may represent a serious problem that is an emergency. Do not wait to see if the symptoms will go away. Get medical help right away. Call your local emergency services (392 in the [place] Do not drive yourself to the hospital.  Summary  Chlorhexidine gluconate (CHG) is a germ-killing (antiseptic) solution that is used to clean the skin. Cleaning your skin with CHG may help to lower your risk for infection.  You may be given CHG to use for bathing. It may be in a bottle or in a prepackaged cloth to use on your skin. Carefully follow your health care provider's instructions and the instructions on the product label.  Do not use CHG if you have a chlorhexidine allergy.  Contact your health care provider if your skin gets irritated after scrubbing.  This information is not intended to replace advice given to you by your health care provider. Make sure you  discuss any questions you have with your health care provider.  Document Revised: 04/17/2023 Document Reviewed: 02/28/2022  [employer] Patient Education © 2023 [employer]

## 2024-07-04 LAB
MRSA SPEC QL CULT: NORMAL
QT INTERVAL: 454 MS
QTC INTERVAL: 527 MS

## 2024-07-11 ENCOUNTER — ANESTHESIA (OUTPATIENT)
Dept: PERIOP | Facility: HOSPITAL | Age: 79
End: 2024-07-11

## 2024-07-11 ENCOUNTER — TELEPHONE (OUTPATIENT)
Dept: INTERNAL MEDICINE | Facility: CLINIC | Age: 79
End: 2024-07-11
Payer: MEDICARE

## 2024-07-11 ENCOUNTER — ANESTHESIA EVENT (OUTPATIENT)
Dept: PERIOP | Facility: HOSPITAL | Age: 79
End: 2024-07-11

## 2024-07-11 ENCOUNTER — HOSPITAL ENCOUNTER (OUTPATIENT)
Facility: HOSPITAL | Age: 79
Setting detail: HOSPITAL OUTPATIENT SURGERY
Discharge: HOME | End: 2024-07-11
Attending: NEUROLOGICAL SURGERY | Admitting: NEUROLOGICAL SURGERY
Payer: MEDICARE

## 2024-07-11 VITALS
TEMPERATURE: 97.1 F | SYSTOLIC BLOOD PRESSURE: 117 MMHG | HEART RATE: 87 BPM | OXYGEN SATURATION: 97 % | DIASTOLIC BLOOD PRESSURE: 81 MMHG | RESPIRATION RATE: 16 BRPM

## 2024-07-11 DIAGNOSIS — G56.03 CARPAL TUNNEL SYNDROME, BILATERAL: Primary | ICD-10-CM

## 2024-07-11 DIAGNOSIS — G56.03 BILATERAL CARPAL TUNNEL SYNDROME: ICD-10-CM

## 2024-07-11 LAB
ABO GROUP BLD: NORMAL
BLD GP AB SCN SERPL QL: NEGATIVE
GLUCOSE BLDC GLUCOMTR-MCNC: 125 MG/DL (ref 70–130)
INR PPP: 2.2 (ref 0.91–1.09)
PROTHROMBIN TIME: 25.3 SECONDS (ref 11.8–14.8)
RH BLD: POSITIVE
T&S EXPIRATION DATE: NORMAL

## 2024-07-11 PROCEDURE — 85610 PROTHROMBIN TIME: CPT | Performed by: NEUROLOGICAL SURGERY

## 2024-07-11 PROCEDURE — 86850 RBC ANTIBODY SCREEN: CPT | Performed by: NEUROLOGICAL SURGERY

## 2024-07-11 PROCEDURE — 82948 REAGENT STRIP/BLOOD GLUCOSE: CPT

## 2024-07-11 PROCEDURE — S0260 H&P FOR SURGERY: HCPCS | Performed by: NEUROLOGICAL SURGERY

## 2024-07-11 PROCEDURE — 86900 BLOOD TYPING SEROLOGIC ABO: CPT | Performed by: NEUROLOGICAL SURGERY

## 2024-07-11 PROCEDURE — 86901 BLOOD TYPING SEROLOGIC RH(D): CPT | Performed by: NEUROLOGICAL SURGERY

## 2024-07-11 PROCEDURE — G0463 HOSPITAL OUTPT CLINIC VISIT: HCPCS | Performed by: NEUROLOGICAL SURGERY

## 2024-07-11 RX ORDER — SODIUM CHLORIDE 0.9 % (FLUSH) 0.9 %
3 SYRINGE (ML) INJECTION AS NEEDED
Status: DISCONTINUED | OUTPATIENT
Start: 2024-07-11 | End: 2024-07-11 | Stop reason: HOSPADM

## 2024-07-11 RX ORDER — CHLORHEXIDINE GLUCONATE 40 MG/ML
SOLUTION TOPICAL
Qty: 120 ML | Refills: 0 | Status: SHIPPED | OUTPATIENT
Start: 2024-07-11

## 2024-07-11 RX ORDER — LIDOCAINE HYDROCHLORIDE 10 MG/ML
0.5 INJECTION, SOLUTION EPIDURAL; INFILTRATION; INTRACAUDAL; PERINEURAL ONCE AS NEEDED
Status: DISCONTINUED | OUTPATIENT
Start: 2024-07-11 | End: 2024-07-11 | Stop reason: HOSPADM

## 2024-07-11 RX ORDER — SODIUM CHLORIDE, SODIUM LACTATE, POTASSIUM CHLORIDE, CALCIUM CHLORIDE 600; 310; 30; 20 MG/100ML; MG/100ML; MG/100ML; MG/100ML
1000 INJECTION, SOLUTION INTRAVENOUS CONTINUOUS
Status: DISCONTINUED | OUTPATIENT
Start: 2024-07-11 | End: 2024-07-11 | Stop reason: HOSPADM

## 2024-07-11 NOTE — H&P (VIEW-ONLY)
Chief complaint:   No chief complaint on file.    Subjective     HPI:     Interval History: Jonh Peacock is a 78 y.o.  male who presents today with his wife with a complaint of bilateral hand pain, weakness, numbness, and tingling. History of lumbar discectomy by Dr. Sandhu in 2002, L3-S1 fusion for bilateral leg pain 2007, cord stimulator placement by Dr. Villela 2012 and revised in 2021.    Mr. Peacock currently denies neck pain.  He does endorse progressively worsening bilateral hand pain, weakness, numbness, and tingling resulting in a progressive decline in fine motor skills and frequently dropped items, right > left.  His bilateral hand numbness and tingling is predominantly located to digits 1-3.  He also has some numbness and tingling in his feet.  Difficulty with tying shoes.  Buttons.  Zippers and pole taps.  He currently ambulates without walker, but does use 1 intermittently.  He denies falls.  His upper extremity symptoms are worse at night and with use.  He has attempted conservative management with bracing, however states he was unable to tolerate wearing the braces throughout the night.  No significant alleviating factors.  He denies fevers, chills, night sweats, unexplained weight loss, saddle anesthesia, or new bowel or bladder abnormalities.  He currently rates the severity of his symptoms 3/10.  No additional concerns at this time.    Oswestry Disability Index = 50%   Score   Pain Intensity Moderate pain - 2   Personal Care Need help but manage most personal care-3   Lifting I can only lift very light weights-4   Reading Read with slight pain-1   Headaches Slight infrequent headaches-1   Concentration Concentrate fully slight difficulty-1   Work I can hardly do any work-4   Driving I can drive-0   Sleeping 3 to 5 hours of sleepiness-4   Recreation Cannot do any recreational activities-5     SCORE INTERPRETATION OF THE OSWESTRY NECK DISABILITY QUESTIONNAIRE  50-68: Severe  disability   (Glidden et al, 1980)    Modified [place] [employer] (mJOA) score  CATEGORY SCORE   Upper extremity Motor Subscore Mild difficulty buttoning shirt-4   Lower Extremity Subscore Walk on flat ground with walking cane or walker-3   Upper Extremity Sensory Score Mild loss of hand sensation-2   Urinary Function Subscore Normal urination-3   TOTAL = 12/18    The mJOA is an 18 point score of functional disability specific to cervical myelopathy.   12-14: Moderate Myelopathy  Herrera et al. (1991). Mariana et al.     The mJOA is an 18 point score of functional disability specific to cervical myelopathy. Herrera et al. (1991).[2]    ROS  Review of Systems   HENT: Negative.     Eyes: Negative.    Respiratory: Negative.     Cardiovascular: Negative.    Gastrointestinal: Negative.    Endocrine: Negative.    Genitourinary: Negative.    Skin: Negative.    Allergic/Immunologic: Negative.    Neurological:  Positive for weakness and numbness.   Hematological: Negative.    Psychiatric/Behavioral: Negative.     All other systems reviewed and are negative.    PFSH:  Past Medical History:   Diagnosis Date    Anemia     Anxiety     Arrhythmia     Arthritis     Atrial fibrillation     Cardiomyopathy     CHF (congestive heart failure)     Colon polyp     Colostomy present     10years    Connective tissue disease     Depression     Diabetes mellitus     Diverticulitis     Dizzy     DVT (deep venous thrombosis)     Gastritis     GERD (gastroesophageal reflux disease)     Hiatal hernia     History of transfusion     Hyperlipidemia     Hypertension     Low back pain     Neuropathy     Pneumonia      Past Surgical History:   Procedure Laterality Date    APPENDECTOMY      BACK SURGERY  2002, 2007    Multiple spine surgeries - laminectomy & fusion    CARDIAC CATHETERIZATION      CARDIAC DEFIBRILLATOR PLACEMENT      CARDIAC ELECTROPHYSIOLOGY PROCEDURE N/A 10/31/2019    Procedure: ICD GEN CHANGE;  Surgeon: Chaz Chavez  MD Lefty;  Location:  [facility];  Service: Cardiology    CARDIAC ELECTROPHYSIOLOGY PROCEDURE N/A 05/29/2024    Procedure: Ablation PVC;  Surgeon: Rossana Horne MD;  Location:  [facility];  Service: Cardiovascular;  Laterality: N/A;    COLON RESECTION WITH COLOSTOMY      COLONOSCOPY  09/04/2014    diverticulosis    COLONOSCOPY N/A 10/29/2019    Procedure: COLONOSCOPY WITH ANESTHESIA;  Surgeon: Tenzin Abrams MD;  Location: [facility] ENDOSCOPY;  Service: Gastroenterology    ENDOSCOPY  02/24/1999    small sliding hiatal hernia    FOOT SURGERY Bilateral 2005    JOINT REPLACEMENT Right 2002    KNEE    PACEMAKER IMPLANTATION  2012    RECTAL FISSURE INCISION AND DRAINAGE      REPLACEMENT TOTAL KNEE Bilateral     SPINAL CORD STIMULATOR IMPLANT  08/2012    Placed by Dr. DEENA Villela, [employer]    THORACIC LAMINECTOMY WITH PLACEMENT OF DORSAL COLUMN STIMULATOR Right 01/28/2021    Procedure: SPINAL CORD STIMULATOR REVISION, right buttocks;  Surgeon: Noah Oliveros MD;  Location: [facility] OR;  Service: Neurosurgery;  Laterality: Right;    TOTAL HIP ARTHROPLASTY Right 2004    TOTAL HIP ARTHROPLASTY Left 12/2008     Objective      Current Facility-Administered Medications   Medication Dose Route Frequency Provider Last Rate Last Admin    ceFAZolin 2000 mg IVPB in 100 mL NS (MBP)  2 g Intravenous Once Noah Oliveros MD        lactated ringers infusion 1,000 mL  1,000 mL Intravenous Continuous Noah Oliveros MD        lidocaine PF 1% (XYLOCAINE) injection 0.5 mL  0.5 mL Intradermal Once PRN Noah Oliveros MD        sodium chloride 0.9 % flush 3 mL  3 mL Intravenous PRN Noah Oliveros MD         Vital Signs  /81 (BP Location: Left arm, Patient Position: Sitting)   Pulse 87   Temp 97.1 °F (36.2 °C) (Temporal)   Resp 16   SpO2 97%   Physical Exam  Vitals and nursing note reviewed.   Constitutional:       General: He is not in acute distress.      Appearance: Normal appearance. He is well-developed and well-groomed. He is obese. He is not ill-appearing, toxic-appearing or diaphoretic.      Comments: BMI 31.44   HENT:      Head: Normocephalic and atraumatic.      Right Ear: Hearing normal.      Left Ear: Hearing normal.   Eyes:      Extraocular Movements: EOM normal.      Conjunctiva/sclera: Conjunctivae normal.      Pupils: Pupils are equal, round, and reactive to light.   Neck:      Trachea: Trachea normal.   Cardiovascular:      Rate and Rhythm: Normal rate and regular rhythm.   Pulmonary:      Effort: Pulmonary effort is normal. No tachypnea, bradypnea, accessory muscle usage or respiratory distress.   Abdominal:      Palpations: Abdomen is soft.   Musculoskeletal:      Cervical back: Full passive range of motion without pain and neck supple.   Skin:     General: Skin is warm and dry.   Neurological:      Mental Status: He is alert and oriented to person, place, and time.      GCS: GCS eye subscore is 4. GCS verbal subscore is 5. GCS motor subscore is 6.      Gait: Gait is intact.      Deep Tendon Reflexes:      Reflex Scores:       Tricep reflexes are 0 on the right side and 0 on the left side.       Bicep reflexes are 0 on the right side and 0 on the left side.       Brachioradialis reflexes are 0 on the right side and 0 on the left side.       Patellar reflexes are 0 on the right side and 0 on the left side.       Achilles reflexes are 0 on the right side and 0 on the left side.  Psychiatric:         Speech: Speech normal.         Behavior: Behavior normal. Behavior is cooperative.       Neurologic Exam     Mental Status   Oriented to person, place, and time.   Attention: normal. Concentration: normal.   Speech: speech is normal   Level of consciousness: alert    Cranial Nerves     CN II   Visual fields full to confrontation.     CN III, IV, VI   Pupils are equal, round, and reactive to light.  Extraocular motions are normal.     CN V   Facial sensation  intact.     CN VII   Facial expression full, symmetric.     CN VIII   CN VIII normal.     CN IX, X   CN IX normal.     CN XI   CN XI normal.     Motor Exam   Right arm tone: normal  Left arm tone: normal  Right leg tone: normal  Left leg tone: normal    Strength   Right deltoid: 4/5  Left deltoid: 4/5  Right biceps: 5/5  Left biceps: 5/5  Right triceps: 5/5  Left triceps: 5/5  Right wrist extension: 5/5  Left wrist extension: 5/5  Right iliopsoas: 5/5  Left iliopsoas: 5/5  Right quadriceps: 5/5  Left quadriceps: 5/5  Right anterior tibial: 5/5  Left anterior tibial: 5/5  Right gastroc: 5/5  Left gastroc: 5/5  Bilateral thenar and hypothenar atrophy  Right EHL 5/5  Left EHL 5/5       Sensory Exam   Right arm light touch: decreased from wrist  Left arm light touch: decreased from wrist  Right leg light touch: normal  Left leg light touch: normal    Gait, Coordination, and Reflexes     Gait  Gait: normal    Tremor   Resting tremor: absent  Intention tremor: absent  Action tremor: absent    Reflexes   Right brachioradialis: 0  Left brachioradialis: 0  Right biceps: 0  Left biceps: 0  Right triceps: 0  Left triceps: 0  Right patellar: 0  Left patellar: 0  Right achilles: 0  Left achilles: 0  Right plantar: normal  Left plantar: normal  Right Harrell: absent  Left Harrell: absent  Right ankle clonus: absent  Left ankle clonus: absent  Right pendular knee jerk: absent  Left pendular knee jerk: absent  (12 bullet pts)    Male  strength (pounds)  AGE Right Hand RH Norms Left Hand LH Norms   20-24  121±20.6  104±21.8   25-29  120±23.0  110±16.2   30-34  121±22.4  110±21.7   35-39  119±24  113±21.7   40-44  117±20.7  112±18.7   45-49  110±23.0  101±22.8   50-54  113±18.1  102±17   55-59  101±26.7  83±23.4   60-64  90±20.4  77±20.3   65-69  91±20.6  76.8±19.8   70-74  75±21.5  65±18.1   75+ *20,37 66±21.0 10,25 55±17.0   (RIAN Ellis et al; Hand Dynometer: Effects of trials and sessions.  Perpetual and Motor Skills 61:195-8,  1985)  * = Dominant hand  > = Intervention     Results Review:   XR Chest 1 View    Result Date: 7/3/2024       No acute abnormality.                                                             This report was signed and finalized on 7/3/2024 11:31 AM by Dr. Chaz Cadena MD.       2/19/2024                    Assessment/Plan:   Jonh Peacock is a 78 y.o. male with a significant medical history of a lumbar discectomy by Dr. Sandhu in 2002, L3-S1 fusion for bilateral leg pain 2007, cord stimulator placement by Dr. Villela 2012 and revised in 2021; A-fib and DVT currently on Coumadin, CHF, cardiomyopathy, hypertension, hyperlipidemia, connective tissue disease, and obesity.  He presents today with a new problem of progressively worsening bilateral hand pain, weakness, numbness, and tingling.  TIMUR: 50.  mJOA: 12.  Physical exam findings of bilateral hand atrophy, right  strength is approximately 2 standard deviations below age-matched controls, left  strength is approximately 4 standard deviations below age-matched controls, poor dexterity, and grossly muted reflexes. No recent imaging cervical spine.  EMG and nerve conduction studies of bilateral upper extremities show peripheral polyneuropathy, as well as severe bilateral carpal tunnel syndrome.    Recommendations:  Cervicalgia  Mild cervical stenosis at 3 levels  Jonh and I discussed his history presenting illness, physical exam and imaging findings.  While his MRI does show three-level cervical stenosis this is relatively mild.  At this point his main symptoms are related to his hands.  Therefore I like to address his carpal tunnel syndrome as this is a low risk surgery with relatively good benefit.  If he gets substantial benefit from this then we will not need to address his neck.  If he does not get significant improvement then we will consider three-level anterior cervical discectomy and fusion.    Bilateral Carpal Tunnel Syndrome, severe   In  the setting of a peripheral neuropathy  Differential diagnosis: Carpal tunnel syndrome, diabetic neuropathy, thoracic outlet syndrome, complex regional pain syndrome, cervical stenosis with radiculopathy.     At this time I favor carpal tunnel syndrome as a cause for the patient's numbness. He has EMG and nerve conduction studies that confirm entrapment at the bilateral wrist.      Risks of the procedure, including but not limited to infection (3%), pain, bleeding at the operative site, damage to local structures such as tendons, persistent pain, or generalized complications of anesthesia including stroke, coma, MI, or death.    Jonh has completed a rigorous course of conservative therapy which includes rest and bracing.  We discussed risk, benefits, and complications of surgery and at this point the patient would like proceed with right carpal tunnel release.    Addendum: Patient seen.  He is still on his Coumadin.  Cardiac clearance has not been obtained.  INR today is 2.2.  At this point I believe the most prudent pathway would be to cancel his elective case.  Obtain cardiac clearance.  Have him off of Coumadin fully reversed prior to elective surgery.  Communicated with patient.  Request made to cardiology.  Cardiologist is Dr. Arun Banks.      Class 1 obesity (BMI 30.0-34.9) due to excessive calories with serious comorbidities BMI of 31.0-31.9 in adult  There is no height or weight on file to calculate BMI. Information on the DASH diet provided in the AVS.  We will continue to provided diet and exercise information with the goal of weight loss at each scheduled appointment.     ADVANCED CARE PLANNING  Information on advance directives provided in AVS.    MALLY Fall Risk Assessment was completed, and patient is at MODERATE risk for falls. Assessment completed on:3/18/2024  Fall risk information provided in AVS    Diagnoses and all orders for this visit:    1. Bilateral carpal tunnel syndrome  -      ceFAZolin 2000 mg IVPB in 100 mL NS (MBP)  -     Type & Screen; Standing  -     Type & Screen    Other orders  -     Follow Anesthesia Guidelines / Protocol; Standing  -     Verify NPO Status; Standing  -     Obtain Informed Consent; Standing  -     SCD (Sequential Compression Device) - Place on Patient in Pre-Op; Standing  -     Have Patient Void Prior to Procedure; Standing  -     Verify / Perform Chlorhexidine Skin Prep; Standing  -     Follow Anesthesia Guidelines / Protocol; Standing  -     Follow Anesthesia Guidelines / Protocol  -     Verify NPO Status  -     Obtain Informed Consent  -     SCD (Sequential Compression Device) - Place on Patient in Pre-Op  -     Have Patient Void Prior to Procedure  -     Verify / Perform Chlorhexidine Skin Prep  -     Follow Anesthesia Guidelines / Protocol  -     Cancel: CBC & Differential; Standing  -     Cancel: Basic Metabolic Panel; Standing  -     Cancel: CBC & Differential  -     Cancel: Basic Metabolic Panel  -     Insert Peripheral IV; Standing  -     lidocaine PF 1% (XYLOCAINE) injection 0.5 mL  -     Maintain IV Access; Standing  -     sodium chloride 0.9 % flush 3 mL  -     lactated ringers infusion 1,000 mL  -     POC Glucose STAT; Standing  -     Notify Anesthesia if Blood Sugar Greater Than 180; Standing  -     Insert Peripheral IV  -     Maintain IV Access  -     POC Glucose STAT  -     Notify Anesthesia if Blood Sugar Greater Than 180  -     Protime-INR; Standing  -     Protime-INR  -     POC Glucose Once; Standing  -     POC Glucose Once        No follow-ups on file.    Thank you, for allowing me to continue to participate in the care of this patient.      Medical Decision Making (2/3)  Problem Points (2,3,4 or more)  Undiagnosed new problem (Mod)  Chronic Stable, 2 or more (Mod)  Data Points (2,3,4 or more)  CATEGORY 1  INDEPENDENT INTERPRETATION Imaging = 1  REVIEWED other tests (EMG, NCS, SYLVIA, echo, ekg, PFT) = 1.  ORDERED: Imaging (X-ray,CT, MRI) =  2.  ORDERED Lab ordered = 2  CATEGORY 2  Independent interpretation of test performed by another physician/NPP (Cat 2)  CATEGORY 3  Risk (Low, Mod, High)  Surgery: Minor with risk factors (Moderate)    E/M = MDM 2 out of 3   or  Time  Est Level 4 - 58610 = Mod + (Cat1=3pts or Cat2 or Cat3) + Mod Risk   or   45-59 min      Sincerely,  Noah Oliveros MD

## 2024-07-11 NOTE — H&P
Chief complaint:   No chief complaint on file.    Subjective     HPI:     Interval History: Jonh Peacock is a 78 y.o.  male who presents today with his wife with a complaint of bilateral hand pain, weakness, numbness, and tingling. History of lumbar discectomy by Dr. Sandhu in 2002, L3-S1 fusion for bilateral leg pain 2007, cord stimulator placement by Dr. Villela 2012 and revised in 2021.    Mr. Peacock currently denies neck pain.  He does endorse progressively worsening bilateral hand pain, weakness, numbness, and tingling resulting in a progressive decline in fine motor skills and frequently dropped items, right > left.  His bilateral hand numbness and tingling is predominantly located to digits 1-3.  He also has some numbness and tingling in his feet.  Difficulty with tying shoes.  Buttons.  Zippers and pole taps.  He currently ambulates without walker, but does use 1 intermittently.  He denies falls.  His upper extremity symptoms are worse at night and with use.  He has attempted conservative management with bracing, however states he was unable to tolerate wearing the braces throughout the night.  No significant alleviating factors.  He denies fevers, chills, night sweats, unexplained weight loss, saddle anesthesia, or new bowel or bladder abnormalities.  He currently rates the severity of his symptoms 3/10.  No additional concerns at this time.    Oswestry Disability Index = 50%   Score   Pain Intensity Moderate pain - 2   Personal Care Need help but manage most personal care-3   Lifting I can only lift very light weights-4   Reading Read with slight pain-1   Headaches Slight infrequent headaches-1   Concentration Concentrate fully slight difficulty-1   Work I can hardly do any work-4   Driving I can drive-0   Sleeping 3 to 5 hours of sleepiness-4   Recreation Cannot do any recreational activities-5     SCORE INTERPRETATION OF THE OSWESTRY NECK DISABILITY QUESTIONNAIRE  50-68: Severe  disability   (Ogema et al, 1980)    Modified [employer] (mJOA) score  CATEGORY SCORE   Upper extremity Motor Subscore Mild difficulty buttoning shirt-4   Lower Extremity Subscore Walk on flat ground with walking cane or walker-3   Upper Extremity Sensory Score Mild loss of hand sensation-2   Urinary Function Subscore Normal urination-3   TOTAL = 12/18    The mJOA is an 18 point score of functional disability specific to cervical myelopathy.   12-14: Moderate Myelopathy  Herrera et al. (1991). Mariana et al.     The mJOA is an 18 point score of functional disability specific to cervical myelopathy. Herrera et al. (1991).[2]    ROS  Review of Systems   HENT: Negative.     Eyes: Negative.    Respiratory: Negative.     Cardiovascular: Negative.    Gastrointestinal: Negative.    Endocrine: Negative.    Genitourinary: Negative.    Skin: Negative.    Allergic/Immunologic: Negative.    Neurological:  Positive for weakness and numbness.   Hematological: Negative.    Psychiatric/Behavioral: Negative.     All other systems reviewed and are negative.    PFSH:  Past Medical History:   Diagnosis Date    Anemia     Anxiety     Arrhythmia     Arthritis     Atrial fibrillation     Cardiomyopathy     CHF (congestive heart failure)     Colon polyp     Colostomy present     10years    Connective tissue disease     Depression     Diabetes mellitus     Diverticulitis     Dizzy     DVT (deep venous thrombosis)     Gastritis     GERD (gastroesophageal reflux disease)     Hiatal hernia     History of transfusion     Hyperlipidemia     Hypertension     Low back pain     Neuropathy     Pneumonia      Past Surgical History:   Procedure Laterality Date    APPENDECTOMY      BACK SURGERY  2002, 2007    Multiple spine surgeries - laminectomy & fusion    CARDIAC CATHETERIZATION      CARDIAC DEFIBRILLATOR PLACEMENT      CARDIAC ELECTROPHYSIOLOGY PROCEDURE N/A 10/31/2019    Procedure: ICD GEN CHANGE;  Surgeon: Chaz Chavez  MD Lefyt;  Location:  [facility];  Service: Cardiology    CARDIAC ELECTROPHYSIOLOGY PROCEDURE N/A 05/29/2024    Procedure: Ablation PVC;  Surgeon: Rossana Horne MD;  Location:  [facility];  Service: Cardiovascular;  Laterality: N/A;    COLON RESECTION WITH COLOSTOMY      COLONOSCOPY  09/04/2014    diverticulosis    COLONOSCOPY N/A 10/29/2019    Procedure: COLONOSCOPY WITH ANESTHESIA;  Surgeon: Tenzin Abrams MD;  Location: [facility] ENDOSCOPY;  Service: Gastroenterology    ENDOSCOPY  02/24/1999    small sliding hiatal hernia    FOOT SURGERY Bilateral 2005    JOINT REPLACEMENT Right 2002    KNEE    PACEMAKER IMPLANTATION  2012    RECTAL FISSURE INCISION AND DRAINAGE      REPLACEMENT TOTAL KNEE Bilateral     SPINAL CORD STIMULATOR IMPLANT  08/2012    Placed by Dr. DEENA Villela, [employer]    THORACIC LAMINECTOMY WITH PLACEMENT OF DORSAL COLUMN STIMULATOR Right 01/28/2021    Procedure: SPINAL CORD STIMULATOR REVISION, right buttocks;  Surgeon: Noah Oliveros MD;  Location: [facility] OR;  Service: Neurosurgery;  Laterality: Right;    TOTAL HIP ARTHROPLASTY Right 2004    TOTAL HIP ARTHROPLASTY Left 12/2008     Objective      Current Facility-Administered Medications   Medication Dose Route Frequency Provider Last Rate Last Admin    ceFAZolin 2000 mg IVPB in 100 mL NS (MBP)  2 g Intravenous Once Noah Oliveros MD        lactated ringers infusion 1,000 mL  1,000 mL Intravenous Continuous Noah Oliveros MD        lidocaine PF 1% (XYLOCAINE) injection 0.5 mL  0.5 mL Intradermal Once PRN Noah Oliveros MD        sodium chloride 0.9 % flush 3 mL  3 mL Intravenous PRN Noah Oliveros MD         Vital Signs  /81 (BP Location: Left arm, Patient Position: Sitting)   Pulse 87   Temp 97.1 °F (36.2 °C) (Temporal)   Resp 16   SpO2 97%   Physical Exam  Vitals and nursing note reviewed.   Constitutional:       General: He is not in acute distress.      Appearance: Normal appearance. He is well-developed and well-groomed. He is obese. He is not ill-appearing, toxic-appearing or diaphoretic.      Comments: BMI 31.44   HENT:      Head: Normocephalic and atraumatic.      Right Ear: Hearing normal.      Left Ear: Hearing normal.   Eyes:      Extraocular Movements: EOM normal.      Conjunctiva/sclera: Conjunctivae normal.      Pupils: Pupils are equal, round, and reactive to light.   Neck:      Trachea: Trachea normal.   Cardiovascular:      Rate and Rhythm: Normal rate and regular rhythm.   Pulmonary:      Effort: Pulmonary effort is normal. No tachypnea, bradypnea, accessory muscle usage or respiratory distress.   Abdominal:      Palpations: Abdomen is soft.   Musculoskeletal:      Cervical back: Full passive range of motion without pain and neck supple.   Skin:     General: Skin is warm and dry.   Neurological:      Mental Status: He is alert and oriented to person, place, and time.      GCS: GCS eye subscore is 4. GCS verbal subscore is 5. GCS motor subscore is 6.      Gait: Gait is intact.      Deep Tendon Reflexes:      Reflex Scores:       Tricep reflexes are 0 on the right side and 0 on the left side.       Bicep reflexes are 0 on the right side and 0 on the left side.       Brachioradialis reflexes are 0 on the right side and 0 on the left side.       Patellar reflexes are 0 on the right side and 0 on the left side.       Achilles reflexes are 0 on the right side and 0 on the left side.  Psychiatric:         Speech: Speech normal.         Behavior: Behavior normal. Behavior is cooperative.       Neurologic Exam     Mental Status   Oriented to person, place, and time.   Attention: normal. Concentration: normal.   Speech: speech is normal   Level of consciousness: alert    Cranial Nerves     CN II   Visual fields full to confrontation.     CN III, IV, VI   Pupils are equal, round, and reactive to light.  Extraocular motions are normal.     CN V   Facial sensation  intact.     CN VII   Facial expression full, symmetric.     CN VIII   CN VIII normal.     CN IX, X   CN IX normal.     CN XI   CN XI normal.     Motor Exam   Right arm tone: normal  Left arm tone: normal  Right leg tone: normal  Left leg tone: normal    Strength   Right deltoid: 4/5  Left deltoid: 4/5  Right biceps: 5/5  Left biceps: 5/5  Right triceps: 5/5  Left triceps: 5/5  Right wrist extension: 5/5  Left wrist extension: 5/5  Right iliopsoas: 5/5  Left iliopsoas: 5/5  Right quadriceps: 5/5  Left quadriceps: 5/5  Right anterior tibial: 5/5  Left anterior tibial: 5/5  Right gastroc: 5/5  Left gastroc: 5/5  Bilateral thenar and hypothenar atrophy  Right EHL 5/5  Left EHL 5/5       Sensory Exam   Right arm light touch: decreased from wrist  Left arm light touch: decreased from wrist  Right leg light touch: normal  Left leg light touch: normal    Gait, Coordination, and Reflexes     Gait  Gait: normal    Tremor   Resting tremor: absent  Intention tremor: absent  Action tremor: absent    Reflexes   Right brachioradialis: 0  Left brachioradialis: 0  Right biceps: 0  Left biceps: 0  Right triceps: 0  Left triceps: 0  Right patellar: 0  Left patellar: 0  Right achilles: 0  Left achilles: 0  Right plantar: normal  Left plantar: normal  Right Harrell: absent  Left Harrell: absent  Right ankle clonus: absent  Left ankle clonus: absent  Right pendular knee jerk: absent  Left pendular knee jerk: absent  (12 bullet pts)    Male  strength (pounds)  AGE Right Hand RH Norms Left Hand LH Norms   20-24  121±20.6  104±21.8   25-29  120±23.0  110±16.2   30-34  121±22.4  110±21.7   35-39  119±24  113±21.7   40-44  117±20.7  112±18.7   45-49  110±23.0  101±22.8   50-54  113±18.1  102±17   55-59  101±26.7  83±23.4   60-64  90±20.4  77±20.3   65-69  91±20.6  76.8±19.8   70-74  75±21.5  65±18.1   75+ *20,37 66±21.0 10,25 55±17.0   (RIAN Ellis et al; Hand Dynometer: Effects of trials and sessions.  Perpetual and Motor Skills 61:195-8,  1985)  * = Dominant hand  > = Intervention     Results Review:   XR Chest 1 View    Result Date: 7/3/2024       No acute abnormality.                                                             This report was signed and finalized on 7/3/2024 11:31 AM by Dr. Chaz Cadena MD.       2/19/2024                    Assessment/Plan:   Jonh Peacock is a 78 y.o. male with a significant medical history of a lumbar discectomy by Dr. Sandhu in 2002, L3-S1 fusion for bilateral leg pain 2007, cord stimulator placement by Dr. Villela 2012 and revised in 2021; A-fib and DVT currently on Coumadin, CHF, cardiomyopathy, hypertension, hyperlipidemia, connective tissue disease, and obesity.  He presents today with a new problem of progressively worsening bilateral hand pain, weakness, numbness, and tingling.  TIMUR: 50.  mJOA: 12.  Physical exam findings of bilateral hand atrophy, right  strength is approximately 2 standard deviations below age-matched controls, left  strength is approximately 4 standard deviations below age-matched controls, poor dexterity, and grossly muted reflexes. No recent imaging cervical spine.  EMG and nerve conduction studies of bilateral upper extremities show peripheral polyneuropathy, as well as severe bilateral carpal tunnel syndrome.    Recommendations:  Cervicalgia  Mild cervical stenosis at 3 levels  Jonh and I discussed his history presenting illness, physical exam and imaging findings.  While his MRI does show three-level cervical stenosis this is relatively mild.  At this point his main symptoms are related to his hands.  Therefore I like to address his carpal tunnel syndrome as this is a low risk surgery with relatively good benefit.  If he gets substantial benefit from this then we will not need to address his neck.  If he does not get significant improvement then we will consider three-level anterior cervical discectomy and fusion.    Bilateral Carpal Tunnel Syndrome, severe   In  the setting of a peripheral neuropathy  Differential diagnosis: Carpal tunnel syndrome, diabetic neuropathy, thoracic outlet syndrome, complex regional pain syndrome, cervical stenosis with radiculopathy.     At this time I favor carpal tunnel syndrome as a cause for the patient's numbness. He has EMG and nerve conduction studies that confirm entrapment at the bilateral wrist.      Risks of the procedure, including but not limited to infection (3%), pain, bleeding at the operative site, damage to local structures such as tendons, persistent pain, or generalized complications of anesthesia including stroke, coma, MI, or death.    Jonh has completed a rigorous course of conservative therapy which includes rest and bracing.  We discussed risk, benefits, and complications of surgery and at this point the patient would like proceed with right carpal tunnel release.    Addendum: Patient seen.  He is still on his Coumadin.  Cardiac clearance has not been obtained.  INR today is 2.2.  At this point I believe the most prudent pathway would be to cancel his elective case.  Obtain cardiac clearance.  Have him off of Coumadin fully reversed prior to elective surgery.  Communicated with patient.  Request made to cardiology.  Cardiologist is Dr. Arun Banks.      Class 1 obesity (BMI 30.0-34.9) due to excessive calories with serious comorbidities BMI of 31.0-31.9 in adult  There is no height or weight on file to calculate BMI. Information on the DASH diet provided in the AVS.  We will continue to provided diet and exercise information with the goal of weight loss at each scheduled appointment.     ADVANCED CARE PLANNING  Information on advance directives provided in AVS.    MALLY Fall Risk Assessment was completed, and patient is at MODERATE risk for falls. Assessment completed on:3/18/2024  Fall risk information provided in AVS    Diagnoses and all orders for this visit:    1. Bilateral carpal tunnel syndrome  -      ceFAZolin 2000 mg IVPB in 100 mL NS (MBP)  -     Type & Screen; Standing  -     Type & Screen    Other orders  -     Follow Anesthesia Guidelines / Protocol; Standing  -     Verify NPO Status; Standing  -     Obtain Informed Consent; Standing  -     SCD (Sequential Compression Device) - Place on Patient in Pre-Op; Standing  -     Have Patient Void Prior to Procedure; Standing  -     Verify / Perform Chlorhexidine Skin Prep; Standing  -     Follow Anesthesia Guidelines / Protocol; Standing  -     Follow Anesthesia Guidelines / Protocol  -     Verify NPO Status  -     Obtain Informed Consent  -     SCD (Sequential Compression Device) - Place on Patient in Pre-Op  -     Have Patient Void Prior to Procedure  -     Verify / Perform Chlorhexidine Skin Prep  -     Follow Anesthesia Guidelines / Protocol  -     Cancel: CBC & Differential; Standing  -     Cancel: Basic Metabolic Panel; Standing  -     Cancel: CBC & Differential  -     Cancel: Basic Metabolic Panel  -     Insert Peripheral IV; Standing  -     lidocaine PF 1% (XYLOCAINE) injection 0.5 mL  -     Maintain IV Access; Standing  -     sodium chloride 0.9 % flush 3 mL  -     lactated ringers infusion 1,000 mL  -     POC Glucose STAT; Standing  -     Notify Anesthesia if Blood Sugar Greater Than 180; Standing  -     Insert Peripheral IV  -     Maintain IV Access  -     POC Glucose STAT  -     Notify Anesthesia if Blood Sugar Greater Than 180  -     Protime-INR; Standing  -     Protime-INR  -     POC Glucose Once; Standing  -     POC Glucose Once        No follow-ups on file.    Thank you, for allowing me to continue to participate in the care of this patient.      Medical Decision Making (2/3)  Problem Points (2,3,4 or more)  Undiagnosed new problem (Mod)  Chronic Stable, 2 or more (Mod)  Data Points (2,3,4 or more)  CATEGORY 1  INDEPENDENT INTERPRETATION Imaging = 1  REVIEWED other tests (EMG, NCS, SYLVIA, echo, ekg, PFT) = 1.  ORDERED: Imaging (X-ray,CT, MRI) =  2.  ORDERED Lab ordered = 2  CATEGORY 2  Independent interpretation of test performed by another physician/NPP (Cat 2)  CATEGORY 3  Risk (Low, Mod, High)  Surgery: Minor with risk factors (Moderate)    E/M = MDM 2 out of 3   or  Time  Est Level 4 - 01945 = Mod + (Cat1=3pts or Cat2 or Cat3) + Mod Risk   or   45-59 min      Sincerely,  Noah Oliveros MD

## 2024-07-11 NOTE — TELEPHONE ENCOUNTER
Patient called to inform Dr. Antony his carpal tunnel surgery has been cancelled.  He said he was told Dr. Oliveros's office will need to consult with his cardiologist before the surgery can be rescheduled.

## 2024-07-15 ENCOUNTER — TELEPHONE (OUTPATIENT)
Dept: NEUROSURGERY | Facility: CLINIC | Age: 79
End: 2024-07-15
Payer: MEDICARE

## 2024-07-15 NOTE — TELEPHONE ENCOUNTER
CALLED PT TO NOTIFY OF SURGERY WHICH HAS BEEN SCHEDULED FOR 7/30/24. HE WOULD LIKE TO KNOW IF THERE IS SOMETHING HE CAN TAKE OR WEAR IN THE INTERIM OF NOW TO HIS SURGERY DATE THAT WOULD HELP HIS HANDS.     PLEASE CALL PT BACK WITH ANY ADVICE.

## 2024-07-16 NOTE — TELEPHONE ENCOUNTER
Called and spoke with patient. Advised only thing I could recommend would the the cock up splints, or something like tylenol, aleve etc. He states he was offered an earlier surgery date than 7/30 but had a prior obligation so had to schedule for 7/30.

## 2024-07-23 ENCOUNTER — TELEPHONE (OUTPATIENT)
Dept: CARDIOLOGY | Facility: CLINIC | Age: 79
End: 2024-07-23
Payer: MEDICARE

## 2024-07-23 NOTE — TELEPHONE ENCOUNTER
Patient called in wanting to confirm appt with Radha Leary PA-C. He also asked if we were aware that he was having a procedure with Dr. Oliveros on Tuesday 7/30/24. I advised I was not aware, and had not received a Surgical Clearance from their office. Advised I would check with Dr. Horne and call him back. Patient verbalized understanding and states it was scheduled for last week but was cancelled due to protocols not being met. I spoke with Dr. Horne and he was not aware of this information, but was ok from his standpoint to proceed with procedure and to discontinue his Coumadin 5 days prior as they have asked patient to do. I have called patient and advised him. He asked that I call Dr. Oliveros's office and let them know so he didn't get cancelled again. I placed call to Dr. Oliveros's office and was unable to speak to his MA/Nurse. I spoke to Dianne and advised of the situation and asked that in the future they fax over a request or call to make sure its safe for patients to discontinue blood thinners before advising them to do so.

## 2024-07-26 ENCOUNTER — OFFICE VISIT (OUTPATIENT)
Dept: CARDIOLOGY | Facility: CLINIC | Age: 79
End: 2024-07-26
Payer: MEDICARE

## 2024-07-26 VITALS
HEIGHT: 72 IN | WEIGHT: 237 LBS | BODY MASS INDEX: 32.1 KG/M2 | OXYGEN SATURATION: 98 % | DIASTOLIC BLOOD PRESSURE: 72 MMHG | SYSTOLIC BLOOD PRESSURE: 102 MMHG | HEART RATE: 83 BPM

## 2024-07-26 DIAGNOSIS — Z95.810 CARDIAC DEFIBRILLATOR IN SITU: Chronic | ICD-10-CM

## 2024-07-26 DIAGNOSIS — I49.3 PVC (PREMATURE VENTRICULAR CONTRACTION): Primary | ICD-10-CM

## 2024-07-26 DIAGNOSIS — I49.3 PVC'S (PREMATURE VENTRICULAR CONTRACTIONS): ICD-10-CM

## 2024-07-26 DIAGNOSIS — I48.0 PAROXYSMAL ATRIAL FIBRILLATION: Chronic | ICD-10-CM

## 2024-07-26 NOTE — PROGRESS NOTES
Dual Chamber AICD Interrogation Report  IN OFFICE    June 14, 2024    Primary Cardiologist: Ozzie  : [employer] Model: Evera MRI XT  [id number]  Implant date: 10.31.2019    Reason for evaluation:  Provider Requested - Follow up on symptoms and PVC burden after LRL increased to 80 bpm and patient advised to discontinue sotalol on 6.11.24.  Symptoms are unchanged, per patient.  Indication for AICD: Nonischemic Cardiomyopathy    Interrogation performed by:  Elisa Morris RN    Measurements  Atrial sensing - P wave: 2 mV  Atrial threshold: 0.5 V @ 0.4 ms  Atrial lead impedance: 342 ohms  Ventricular sensing - R wave: 3.4 mV  Ventricular threshold: 0.875 V @ 0.4 ms  Ventricular lead impedance:  361 ohms  Shock impedance:  RV- 32 ohms   SVC- 39 ohms    Manual sensing and threshold testing performed:  No      Diagnostic Data  Atrial paced: 73.7 %  Ventricular paced: 3 %    Episodes/Alerts since 6.11.24:  P-AF x 2, longest 22.5 minutes, average ventricular rates  bpm, burden 0.7%, anticoagulated with warfarin.  PVC counters not printed but were discussed with Dr. Horne, who recommended patient restart sotalol and that device LRL remain at 80 bpm.    Battery status:  Satisfactory , estimated 5 years remaining      Final Parameters  Mode: AAIR <=> DDDR  Lower rate: 80 bpm Upper rate: 130 bpm  AV Delay: Paced- 180 ms    Sensed- 150 ms     Atrial - Amplitude: 1.5 V Pulse width: 0.4 ms Sensitivity: 0.6 mV   Ventricular - Amplitude: 2 V Pulse width: 0.4 ms Sensitivity: 0.3 mV     Changes made: None.    Conclusions: Normal AICD Function, No Therapy Delivered, Stable Pacing and Sensing Thresholds, and Adequate Battery Reserve    Remote Monitor:  Yes, connected.    Follow up: Every 3 months via carelink, annually in office.     Final impression:  Data above reviewed.  Agree with findings and conclusions as per the above note.

## 2024-07-26 NOTE — PROGRESS NOTES
[facility] -  CLINIC FOLLOW UP     Patient Care Team:  Ric Antony DO as PCP - General (Internal Medicine)  Arun Banks MD as Cardiologist (Cardiology)  Celso Ellsworth MD as Consulting Physician (Urology)  Tenzin Abrams MD as Consulting Physician (Gastroenterology)  Deion Avelar DPM as Consulting Physician (Podiatry)  Diomedes Sterling DO as Consulting Physician (Pain Medicine)  Nicholas Martinez APRN as Nurse Practitioner (Nurse Practitioner)    Chief Complaint: follow up to PVCs     Subjective   EP Problems:  1.  Paroxysmal atrial fibrillation  2.  Atrial flutter  3.  Sustained SVT / atrial tachycardia  4.  Sinus node dysfunction  5.  Presence of a cardiac defibrillator  -9/27/2012: DOI, [employer]  -10/31/2019: Generator change, Brenda Chavez  6.  Acquired long QT syndrome  7.  Frequent PVCs  -3/2023: 16% burden (12% single morphology)  -5/29/24: LV summit PVC ablation, but recurrent  6/7/24: Holter 8%, multiple morphologies      Cardiology Problems:  1.  Chronic systolic heart failure  -2/5/2019: EF 31 to 35%  2.  Hypertension  3.  Hyperlipidemia  4.  Prior DVT     Medical Problems:  1.  Obesity, BMI 32  2.  Type 2 diabetes  3.  BPH  4.  Chronic pain  5.  Obstructive sleep apnea      HPI: Today I had the pleasure of seeing Jonh Peacock in the cardiology clinic for follow up. He has a history of chronic systolic heart failure secondary to nonischemic cardiomyopathy.  He wore a Holter monitor last year which revealed a 16% PVC burden (12% with a single morphology). He opted to have PVC ablation in May which were occurring from the RVOT, GCV/AIV junction, AMC, and LCC. Initially, PVCs went away but they returned during recovery. The most common PVC was from the LV summit.     He wore a monitor in June following that and this demonstrated about 8% PVC with multiple morphologies. Single episode of AF versus atrial flutter lasting 28 minutes in duration  "as well.     In follow up after the ablation, his base rate was originally at 80 bpm on his device. This was then lowered to 60 bpm on June 7th. This caused his PVC burden to actually increase as well and his LRL was increased back up to 80 bpm and he stopped the sotalol.     However, he was then recommended to restart it and continues to take sotalol. He is also planning to have carpal tunnel surgery with Dr. Oliveros in the near future.     Objective     Visit Vitals  /72   Pulse 83   Ht 183.5 cm (72.24\")   Wt 108 kg (237 lb)   SpO2 98%   BMI 31.93 kg/m²           Vitals reviewed.   Constitutional:       Appearance: Not in distress.   Eyes:      Extraocular Movements: Extraocular movements intact.      Conjunctiva/sclera: Conjunctivae normal.      Pupils: Pupils are equal, round, and reactive to light.   HENT:      Head: Normocephalic and atraumatic.      Nose: Nose normal.    Mouth/Throat:      Lips: Pink.      Mouth: Mucous membranes are moist.      Pharynx: Oropharynx is clear.   Neck:      Vascular: No carotid bruit or JVD. JVD normal.   Pulmonary:      Effort: Pulmonary effort is normal.      Breath sounds: Normal breath sounds.   Chest:      Chest wall: Not tender to palpatation.   Cardiovascular:      PMI at left midclavicular line. Normal rate. Regular rhythm. Normal S1. Normal S2.       Murmurs: There is no murmur.      No gallop.  No rub.   Edema:     Peripheral edema absent.   Musculoskeletal: Normal range of motion.      Extremities: No clubbing present.Skin:     General: Skin is warm and dry.   Neurological:      General: No focal deficit present.      Mental Status: Alert and oriented to person, place, and time.   Psychiatric:         Attention and Perception: Attention normal.         Mood and Affect: Affect normal.         Speech: Speech normal.         Behavior: Behavior normal.         Cognition and Memory: Cognition normal.             The following portions of the patient's history were " reviewed and updated as appropriate: allergies, current medications, past medical history, past social history, past and problem list.     Review of Systems   Constitutional: Negative.    HENT: Negative.     Eyes: Negative.    Respiratory: Negative.     Cardiovascular: Negative.    Gastrointestinal: Negative.    Endocrine: Negative.    Genitourinary: Negative.    Musculoskeletal: Negative.    Skin: Negative.    Allergic/Immunologic: Negative.    Neurological: Negative.    Hematological: Negative.    Psychiatric/Behavioral: Negative.              ECG 12 Lead    Date/Time: 8/2/2024 4:36 PM  Performed by: Radha Leary PA    Authorized by: Radha Leary PA  Comparison: compared with previous ECG from 7/4/2024  Rhythm: sinus rhythm and paced  Rate: normal  BPM: 83            Medication Review: yes    Current Outpatient Medications:     atorvastatin (LIPITOR) 20 MG tablet, Take 1 tablet by mouth Daily., Disp: 90 tablet, Rfl: 3    Buprenorphine 10 MCG/HR patch weekly, Place 15 mcg on the skin as directed by provider Every 7 (Seven) Days. POSTERIOR RIGHT SHOULDER, Disp: , Rfl:     Diclofenac Sodium (VOLTAREN) 1 % gel gel, Apply 4 g topically to the appropriate area as directed 4 (Four) Times a Day As Needed (bilatearl hand pain)., Disp: 350 g, Rfl: 6    dilTIAZem (CARDIZEM) 30 MG tablet, Take 1 tablet by mouth 3 (Three) Times a Day., Disp: 270 tablet, Rfl: 3    furosemide (LASIX) 20 MG tablet, Take 1 tablet by mouth Daily., Disp: 90 tablet, Rfl: 3    furosemide (LASIX) 40 MG tablet, Take 1 tablet by mouth Daily. TAKE WITH 20 MG TABLET FOR 60 PER MG PER DAY, Disp: 90 tablet, Rfl: 3    gabapentin (NEURONTIN) 300 MG capsule, Take 1 capsule by mouth 4 (Four) Times a Day., Disp: , Rfl:     Glucosamine 750 MG tablet, Take 1 tablet by mouth 2 (Two) Times a Day., Disp: , Rfl:     losartan (COZAAR) 25 MG tablet, Take 1 tablet by mouth Daily., Disp: 90 tablet, Rfl: 3    methocarbamol (Robaxin) 500 MG tablet, Take 1 tablet by  mouth 4 (Four) Times a Day. (Patient taking differently: Take 1 tablet by mouth As Needed for Muscle Spasms.), Disp: 20 tablet, Rfl: 1    metoprolol succinate XL (TOPROL-XL) 100 MG 24 hr tablet, Take 1 tablet by mouth Daily., Disp: 90 tablet, Rfl: 3    Multiple Vitamins-Minerals (MULTIVITAMIN ADULT PO), Take 1 tablet by mouth Daily., Disp: , Rfl:     polyethylene glycol (MIRALAX) powder, Take 17 g by mouth Every Night., Disp: 1581 g, Rfl: 3    Sotalol HCl AF 80 MG tablet, Take 1 tablet by mouth 2 (Two) Times a Day., Disp: 60 tablet, Rfl: 11    tamsulosin (FLOMAX) 0.4 MG capsule 24 hr capsule, Take 1 capsule by mouth Every Night., Disp: 90 capsule, Rfl: 3    warfarin (COUMADIN) 2 MG tablet, Take 1 tablet by mouth Daily., Disp: 90 tablet, Rfl: 1    warfarin (COUMADIN) 4 MG tablet, Take 1 tablet by mouth Daily., Disp: 90 tablet, Rfl: 1    warfarin (COUMADIN) 5 MG tablet, Take 1 tablet by mouth Daily., Disp: 90 tablet, Rfl: 3    HYDROcodone-acetaminophen (NORCO)  MG per tablet, Take 1 tablet by mouth Every 6 (Six) Hours As Needed for Moderate Pain for up to 3 days., Disp: 12 tablet, Rfl: 0    HYDROcodone-acetaminophen (NORCO) 7.5-325 MG per tablet, Take 1 tablet by mouth Every 6 (Six) Hours As Needed for Moderate Pain., Disp: , Rfl:     naloxone (NARCAN) 4 MG/0.1ML nasal spray, Call 911. Don't prime. Lawrence in 1 nostril for overdose. Repeat in 2-3 minutes in other nostril if no or minimal breathing/responsiveness., Disp: 2 each, Rfl: 0    sennosides-docusate (senna-docusate sodium) 8.6-50 MG per tablet, Take 2 tablets by mouth 2 (Two) Times a Day., Disp: 60 tablet, Rfl: 0    Tentative Restart Date for Anticoagulation, Restart Warfarin (Coumadin) on August 13, 2024, Disp: , Rfl:    Allergies   Allergen Reactions    Percocet [Oxycodone-Acetaminophen] Urinary Retention    Amiodarone Other (See Comments)     INTERFERES WITH OTHER MEDICATIONS    Oxycontin [Oxycodone Hcl] Urinary Retention    Penicillins Hives     All  "\"cillins\"    Vioxx [Rofecoxib] GI Intolerance       I have reviewed       CBC:  Lab Results - Last 18 Months   Lab Units 07/03/24  1027   WBC 10*3/mm3 4.49   HEMOGLOBIN g/dL 10.7*   HEMATOCRIT % 32.3*   PLATELETS 10*3/mm3 144      BMP/CMP:  Lab Results - Last 18 Months   Lab Units 07/03/24  1027   SODIUM mmol/L 141   POTASSIUM mmol/L 4.1   CHLORIDE mmol/L 102   CO2 mmol/L 30.0*   GLUCOSE mg/dL 133*   BUN mg/dL 29*   CREATININE mg/dL 1.41*   CALCIUM mg/dL 9.2         Results for orders placed during the hospital encounter of 06/14/23    Adult Transthoracic Echo Complete w/ Color, Spectral and Contrast if necessary per protocol    Interpretation Summary    Left ventricular systolic function is severely decreased. Left ventricular ejection fraction appears to be 31 - 35%.    The left ventricular cavity is moderately dilated.    Left ventricular wall thickness is consistent with mild concentric hypertrophy.    Normal right ventricular cavity size noted. Borderline low right ventricular systolic function noted.    No significant valvular abnormalities identified on this study.     Assessment:   Diagnoses and all orders for this visit:    1. PVC (premature ventricular contraction) (Primary)  -     ECG 12 Lead; Future    2. Cardiac defibrillator in situ    3. Paroxysmal atrial fibrillation    4. PVC's (premature ventricular contractions)    PVCs: S/p ablation but was not as effective as hoped to be. He remains on sotalol and diltiazem.   -Follow up in 6 months for routine sotalol monitoring.   -Recent Cr 1.4    PAF: Anticoagulated with warfarin. He has had 1:1 SVT episodes of short duration.   -Currently holding his warfarin for upcoming carpal tunnel surgery.      Dual chamber ICD: No recent afib on July 11th remote. Atrially paced 94%.     I spent 30 minutes caring for Jonh on this date of service. This time includes time spent by me in the following activities:preparing for the visit, reviewing tests, obtaining and/or " reviewing a separately obtained history, performing a medically appropriate examination and/or evaluation , counseling and educating the patient/family/caregiver, ordering medications, tests, or procedures, referring and communicating with other health care professionals , documenting information in the medical record, and independently interpreting results and communicating that information with the patient/family/caregiver        Electronically signed by JOCELYN Florian

## 2024-07-29 ENCOUNTER — TELEPHONE (OUTPATIENT)
Dept: NEUROSURGERY | Facility: CLINIC | Age: 79
End: 2024-07-29
Payer: MEDICARE

## 2024-07-30 ENCOUNTER — HOSPITAL ENCOUNTER (OUTPATIENT)
Facility: HOSPITAL | Age: 79
Setting detail: HOSPITAL OUTPATIENT SURGERY
Discharge: HOME | End: 2024-07-30
Attending: NEUROLOGICAL SURGERY | Admitting: NEUROLOGICAL SURGERY
Payer: MEDICARE

## 2024-07-30 ENCOUNTER — ANESTHESIA (OUTPATIENT)
Dept: PERIOP | Facility: HOSPITAL | Age: 79
End: 2024-07-30
Payer: MEDICARE

## 2024-07-30 ENCOUNTER — ANESTHESIA EVENT (OUTPATIENT)
Dept: PERIOP | Facility: HOSPITAL | Age: 79
End: 2024-07-30
Payer: MEDICARE

## 2024-07-30 VITALS
DIASTOLIC BLOOD PRESSURE: 53 MMHG | RESPIRATION RATE: 12 BRPM | HEART RATE: 88 BPM | SYSTOLIC BLOOD PRESSURE: 101 MMHG | WEIGHT: 237.88 LBS | TEMPERATURE: 97 F | OXYGEN SATURATION: 100 % | BODY MASS INDEX: 31.53 KG/M2 | HEIGHT: 73 IN

## 2024-07-30 DIAGNOSIS — Z98.890 STATUS POST CARPAL TUNNEL RELEASE: ICD-10-CM

## 2024-07-30 DIAGNOSIS — M96.1 FAILED BACK SYNDROME: ICD-10-CM

## 2024-07-30 DIAGNOSIS — Z91.81 AT RISK FOR FALLS: ICD-10-CM

## 2024-07-30 DIAGNOSIS — Z74.09 IMPAIRED FUNCTIONAL MOBILITY, BALANCE, AND ENDURANCE: Primary | ICD-10-CM

## 2024-07-30 DIAGNOSIS — G56.03 CARPAL TUNNEL SYNDROME, BILATERAL: ICD-10-CM

## 2024-07-30 LAB
ABO GROUP BLD: NORMAL
APTT PPP: 27.3 SECONDS (ref 24.5–36)
BLD GP AB SCN SERPL QL: NEGATIVE
GLUCOSE BLDC GLUCOMTR-MCNC: 110 MG/DL (ref 70–130)
GLUCOSE BLDC GLUCOMTR-MCNC: 125 MG/DL (ref 70–130)
INR PPP: 1.14 (ref 0.91–1.09)
PROTHROMBIN TIME: 15.1 SECONDS (ref 11.8–14.8)
RH BLD: POSITIVE
T&S EXPIRATION DATE: NORMAL

## 2024-07-30 PROCEDURE — 25010000002 FENTANYL CITRATE (PF) 100 MCG/2ML SOLUTION: Performed by: NURSE ANESTHETIST, CERTIFIED REGISTERED

## 2024-07-30 PROCEDURE — 86901 BLOOD TYPING SEROLOGIC RH(D): CPT | Performed by: NEUROLOGICAL SURGERY

## 2024-07-30 PROCEDURE — 64721 CARPAL TUNNEL SURGERY: CPT | Performed by: NEUROLOGICAL SURGERY

## 2024-07-30 PROCEDURE — 86900 BLOOD TYPING SEROLOGIC ABO: CPT | Performed by: NEUROLOGICAL SURGERY

## 2024-07-30 PROCEDURE — 25010000002 ONDANSETRON PER 1 MG: Performed by: NURSE ANESTHETIST, CERTIFIED REGISTERED

## 2024-07-30 PROCEDURE — 25810000003 LACTATED RINGERS PER 1000 ML: Performed by: NEUROLOGICAL SURGERY

## 2024-07-30 PROCEDURE — 82948 REAGENT STRIP/BLOOD GLUCOSE: CPT

## 2024-07-30 PROCEDURE — 85610 PROTHROMBIN TIME: CPT | Performed by: NEUROLOGICAL SURGERY

## 2024-07-30 PROCEDURE — 86850 RBC ANTIBODY SCREEN: CPT | Performed by: NEUROLOGICAL SURGERY

## 2024-07-30 PROCEDURE — 25010000002 PROPOFOL 10 MG/ML EMULSION: Performed by: NURSE ANESTHETIST, CERTIFIED REGISTERED

## 2024-07-30 PROCEDURE — 85730 THROMBOPLASTIN TIME PARTIAL: CPT | Performed by: NEUROLOGICAL SURGERY

## 2024-07-30 RX ORDER — SODIUM CHLORIDE, SODIUM LACTATE, POTASSIUM CHLORIDE, CALCIUM CHLORIDE 600; 310; 30; 20 MG/100ML; MG/100ML; MG/100ML; MG/100ML
9 INJECTION, SOLUTION INTRAVENOUS CONTINUOUS
Status: DISCONTINUED | OUTPATIENT
Start: 2024-07-30 | End: 2024-07-30 | Stop reason: HOSPADM

## 2024-07-30 RX ORDER — SODIUM CHLORIDE, SODIUM LACTATE, POTASSIUM CHLORIDE, CALCIUM CHLORIDE 600; 310; 30; 20 MG/100ML; MG/100ML; MG/100ML; MG/100ML
1000 INJECTION, SOLUTION INTRAVENOUS CONTINUOUS
Status: DISCONTINUED | OUTPATIENT
Start: 2024-07-30 | End: 2024-07-30 | Stop reason: HOSPADM

## 2024-07-30 RX ORDER — SODIUM CHLORIDE 0.9 % (FLUSH) 0.9 %
10 SYRINGE (ML) INJECTION EVERY 12 HOURS SCHEDULED
Status: DISCONTINUED | OUTPATIENT
Start: 2024-07-30 | End: 2024-07-30 | Stop reason: HOSPADM

## 2024-07-30 RX ORDER — FENTANYL CITRATE 50 UG/ML
50 INJECTION, SOLUTION INTRAMUSCULAR; INTRAVENOUS
Status: DISCONTINUED | OUTPATIENT
Start: 2024-07-30 | End: 2024-07-30 | Stop reason: HOSPADM

## 2024-07-30 RX ORDER — MAGNESIUM HYDROXIDE 1200 MG/15ML
LIQUID ORAL AS NEEDED
Status: DISCONTINUED | OUTPATIENT
Start: 2024-07-30 | End: 2024-07-30 | Stop reason: HOSPADM

## 2024-07-30 RX ORDER — DEXTROSE MONOHYDRATE 25 G/50ML
12.5 INJECTION, SOLUTION INTRAVENOUS AS NEEDED
Status: DISCONTINUED | OUTPATIENT
Start: 2024-07-30 | End: 2024-07-30 | Stop reason: HOSPADM

## 2024-07-30 RX ORDER — NALOXONE HCL 0.4 MG/ML
0.4 VIAL (ML) INJECTION AS NEEDED
Status: DISCONTINUED | OUTPATIENT
Start: 2024-07-30 | End: 2024-07-30 | Stop reason: HOSPADM

## 2024-07-30 RX ORDER — ONDANSETRON 2 MG/ML
INJECTION INTRAMUSCULAR; INTRAVENOUS AS NEEDED
Status: DISCONTINUED | OUTPATIENT
Start: 2024-07-30 | End: 2024-07-30 | Stop reason: SURG

## 2024-07-30 RX ORDER — BUPIVACAINE HYDROCHLORIDE AND EPINEPHRINE 2.5; 5 MG/ML; UG/ML
INJECTION, SOLUTION INFILTRATION; PERINEURAL AS NEEDED
Status: DISCONTINUED | OUTPATIENT
Start: 2024-07-30 | End: 2024-07-30 | Stop reason: HOSPADM

## 2024-07-30 RX ORDER — LIDOCAINE HYDROCHLORIDE 20 MG/ML
INJECTION, SOLUTION EPIDURAL; INFILTRATION; INTRACAUDAL; PERINEURAL AS NEEDED
Status: DISCONTINUED | OUTPATIENT
Start: 2024-07-30 | End: 2024-07-30 | Stop reason: SURG

## 2024-07-30 RX ORDER — FENTANYL CITRATE 50 UG/ML
25 INJECTION, SOLUTION INTRAMUSCULAR; INTRAVENOUS
Status: DISCONTINUED | OUTPATIENT
Start: 2024-07-30 | End: 2024-07-30 | Stop reason: HOSPADM

## 2024-07-30 RX ORDER — HYDROCODONE BITARTRATE AND ACETAMINOPHEN 10; 325 MG/1; MG/1
1 TABLET ORAL EVERY 6 HOURS PRN
Qty: 12 TABLET | Refills: 0 | Status: SHIPPED | OUTPATIENT
Start: 2024-07-30 | End: 2024-08-02

## 2024-07-30 RX ORDER — HYDROCODONE BITARTRATE AND ACETAMINOPHEN 5; 325 MG/1; MG/1
1 TABLET ORAL EVERY 4 HOURS PRN
Status: DISCONTINUED | OUTPATIENT
Start: 2024-07-30 | End: 2024-07-30 | Stop reason: HOSPADM

## 2024-07-30 RX ORDER — DROPERIDOL 2.5 MG/ML
0.62 INJECTION, SOLUTION INTRAMUSCULAR; INTRAVENOUS ONCE AS NEEDED
Status: DISCONTINUED | OUTPATIENT
Start: 2024-07-30 | End: 2024-07-30 | Stop reason: HOSPADM

## 2024-07-30 RX ORDER — IBUPROFEN 600 MG/1
600 TABLET ORAL EVERY 6 HOURS PRN
Status: DISCONTINUED | OUTPATIENT
Start: 2024-07-30 | End: 2024-07-30 | Stop reason: HOSPADM

## 2024-07-30 RX ORDER — HYDROCODONE BITARTRATE AND ACETAMINOPHEN 7.5; 325 MG/1; MG/1
1 TABLET ORAL EVERY 6 HOURS PRN
Start: 2024-08-02

## 2024-07-30 RX ORDER — FENTANYL CITRATE 50 UG/ML
INJECTION, SOLUTION INTRAMUSCULAR; INTRAVENOUS AS NEEDED
Status: DISCONTINUED | OUTPATIENT
Start: 2024-07-30 | End: 2024-07-30 | Stop reason: SURG

## 2024-07-30 RX ORDER — LABETALOL HYDROCHLORIDE 5 MG/ML
5 INJECTION, SOLUTION INTRAVENOUS
Status: DISCONTINUED | OUTPATIENT
Start: 2024-07-30 | End: 2024-07-30 | Stop reason: HOSPADM

## 2024-07-30 RX ORDER — ONDANSETRON 2 MG/ML
4 INJECTION INTRAMUSCULAR; INTRAVENOUS ONCE AS NEEDED
Status: DISCONTINUED | OUTPATIENT
Start: 2024-07-30 | End: 2024-07-30 | Stop reason: HOSPADM

## 2024-07-30 RX ORDER — SODIUM CHLORIDE 0.9 % (FLUSH) 0.9 %
3 SYRINGE (ML) INJECTION AS NEEDED
Status: DISCONTINUED | OUTPATIENT
Start: 2024-07-30 | End: 2024-07-30 | Stop reason: HOSPADM

## 2024-07-30 RX ORDER — PROPOFOL 10 MG/ML
VIAL (ML) INTRAVENOUS AS NEEDED
Status: DISCONTINUED | OUTPATIENT
Start: 2024-07-30 | End: 2024-07-30 | Stop reason: SURG

## 2024-07-30 RX ORDER — HYDROCODONE BITARTRATE AND ACETAMINOPHEN 10; 325 MG/1; MG/1
1 TABLET ORAL EVERY 4 HOURS PRN
Status: DISCONTINUED | OUTPATIENT
Start: 2024-07-30 | End: 2024-07-30 | Stop reason: HOSPADM

## 2024-07-30 RX ORDER — FLUMAZENIL 0.1 MG/ML
0.2 INJECTION INTRAVENOUS AS NEEDED
Status: DISCONTINUED | OUTPATIENT
Start: 2024-07-30 | End: 2024-07-30 | Stop reason: HOSPADM

## 2024-07-30 RX ORDER — SODIUM CHLORIDE 0.9 % (FLUSH) 0.9 %
10 SYRINGE (ML) INJECTION AS NEEDED
Status: DISCONTINUED | OUTPATIENT
Start: 2024-07-30 | End: 2024-07-30 | Stop reason: HOSPADM

## 2024-07-30 RX ORDER — AMOXICILLIN 250 MG
2 CAPSULE ORAL 2 TIMES DAILY
Qty: 60 TABLET | Refills: 0 | Status: SHIPPED | OUTPATIENT
Start: 2024-07-30

## 2024-07-30 RX ADMIN — ONDANSETRON 4 MG: 2 INJECTION INTRAMUSCULAR; INTRAVENOUS at 09:42

## 2024-07-30 RX ADMIN — PROPOFOL 150 MG: 10 INJECTION, EMULSION INTRAVENOUS at 09:37

## 2024-07-30 RX ADMIN — SODIUM CHLORIDE, POTASSIUM CHLORIDE, SODIUM LACTATE AND CALCIUM CHLORIDE 1000 ML: 600; 310; 30; 20 INJECTION, SOLUTION INTRAVENOUS at 08:58

## 2024-07-30 RX ADMIN — LIDOCAINE HYDROCHLORIDE 80 MG: 20 INJECTION, SOLUTION EPIDURAL; INFILTRATION; INTRACAUDAL; PERINEURAL at 09:37

## 2024-07-30 RX ADMIN — FENTANYL CITRATE 100 MCG: 50 INJECTION, SOLUTION INTRAMUSCULAR; INTRAVENOUS at 09:37

## 2024-07-30 NOTE — OP NOTE
NEUROSURGERY OPERATIVE NOTE    Jonh Peacock  OR Date: 7/30/2024    Pre-op Diagnosis:   Carpal tunnel syndrome, bilateral [G56.03]    Post-op Diagnosis:     Post-Op Diagnosis Codes:     * Carpal tunnel syndrome, bilateral [G56.03]          Surgeon(s):  Noah Oliveros MD    Anesthesia: General    Staff:   Circulator: Dwayne Gallo RN; Marlene Watts RN  Scrub Person: Luis Antonio Horner; Ayanna Snyder    Procedure(s):  CARPAL TUNNEL RELEASE, right    INDICATIONS:  Jonh Peacock is a 78 y.o. male with bilateral carpal tunnel syndrome.  This was confirmed by both clinical examination and EMG nerve conduction studies.  He failed a trial of conservative therapy which included bracing.      We discussed the risk benefits and possible complications of the above-mentioned procedure which included bleeding, infection, nerve damage, failure to heal, possible need for reoperation, possible recurrence, or any associated risk of the anesthesia. He voiced understanding and agreed to proceed as planned.    DESCRIPTION OF PROCEDURE: The patient was identified in the holding area and correct operative site was alisa.  IV access was obtained and preoperative antibiotics were begun.  He was then brought to the operating room and transferred to the operating table in supine position.  A WHO time-out was then performed with the signed consent being reviewed at which point I, nursing staff, and anesthesia staff all confirmed the correct identification and there is no contraindication to proceeding .    After adequate general anesthesia was obtained. The right upper extremity was prepped and draped in the usual sterile fashion with Chloraprep and Ioban. Planned skin incision was marked along the base of the patient's right palm in-line with the ring finger. The right palm was then infiltrated with quarter percent Marcaine with epinephrine.  A 2 cm skin incision was then made and dissection was carried down with  scalpel to the level of the palmar fascia which was sharply divided by the skin incision. Hemostasis was identified by  Bipolar electrocautery. Retractors were then placed to allow visualization of the distal extent of the transverse carpal ligament, and this was then divided longitudinally under direct vision. Tenotomy scissors were used to dissect distal to this area to confirm the absence of any remaining crossing obstructing fibrous band. Retractors were then replaced proximally to allow visualization of proximal extent of the transverse carpal ligament and the release was continued proximally until complete release was performed. This was confirmed by visually and passing a Penfield #4 freely into the wrist. The carpal canal was then inspected. The median nerve was flattened and dusky. No other abnormalities were noted. Wounds were then irrigated with normal saline with Bacitracin. Skin incision was then closed with interrupted 3-0 Vicryl suture followed by mattress 3-0 nylon suture. The wound was then dressed with xeroform, 4 x 4s, Aly, and Coban.  The patient was then awakened, extubated, and transferred over to his hospital bed. He was transported to recovery room in stable condition. There were no intraoperative or immediate postoperative complications. All counts were reported as correct.     Estimated Blood Loss: minimal      Estimated Blood Loss: minimal    Complications: None    Implants: * No implants in log *    Specimens:                None      Drains:   Colostomy LLQ (Active)   Stomal Appliance Clean;Intact;Other (Comment) 07/30/24 0734   Stoma Appearance round;moist;pink 07/30/24 0734   Peristomal Assessment Clean 07/30/24 0734   Stool Consistency other (see comment) 07/30/24 0734

## 2024-07-30 NOTE — DISCHARGE INSTRUCTIONS
[facility] Neurosurgery    Postoperative care following spine surgery  Dear Patient,  You have recently undergone peripheral nerve surgery (right carpal tunnel release) and are now ready to go home. These written instructions are intended to help you to recover quickly.  If you have ANY QUESTIONS about your condition prior to discharge please ask Dr. Oliveros. In particular, if you have concerns about going home discuss them now. We do not want you to go until you are completely comfortable leaving the hospital.   If you have ANY QUESTIONS about your condition after you go home call your doctor. The number is [phone] which is answered 24 hours a day. During regular working hours a  will connect you to your doctor, one of his partners, or one of our nurses. At night or on weekends the answering service will connect you with the physician on call. DO NOT HESITATE to call. We want to help you with any problems.     Neurological Deficit  Neurological deficits are problems with nerve function like weakness or numbness, etc. These deficits may be present before or after peripheral nerve surgery. Prior to discharge the nursing staff will also make sure that these deficits are stable or improving. After you go home, if you think any of your numbness or pain are are getting worse, not better, it may be a sign of bleeding, infection, or other problems. Call your doctor. He will order tests and prescribe treatment as needed.    Activity Restrictions  In general after surgery you will be on restricted activities for several weeks to several months depending on the nature of your operation. For six weeks you should avoid heavy lifting (over 8 lbs or roughly a gallon of milk). You may be released by Dr. Oliveros earlier. You may return to driving when you feel comfortable enough that you can safely handle your vehicle AND you are not taking narcotic pain medications for 6 hours. This may be as early as 10 - 14  days, but could be longer as instructed by your neurosurgeon. After surgery you may gradually increase your activities, as you are able to tolerate. Walking is an excellent low impact exercise to begin after surgery. You should try to make regular walks of 15 to 30 minutes part of your postoperative recovery as you become able to do so. It typically requires a period of days to a few weeks to reach this level of activity and should be done in a gradual fashion. Your return to work will depend on your job requirements. In general, you may return to light duty work as soon as you feel comfortable and are not taking routine narcotic pain medications.    Medication  It is important to take your medication EXACTLY as prescribed. Some patients are reluctant to take pain medication. It is perfectly fine to take pain medication for several weeks after surgery. We want to eliminate pain whenever possible. Many pain medications can cause nausea (sick to your stomach), constipation (inability to poop), or itching. Nausea may be minimized by taking the medication with food. Constipation can be relieved by taking stool softeners and/ or laxatives that you can purchase over the counter as needed.    It is important to realize that no pain after surgery is an unrealistic expectation.  Pain medication will never reduce your pain score to zero.  The goal of pain medicine is to reduce your pain to the point you can move, take care of yourself, and participate in therapy.  Make sure to work with your caregiver to determine what is an adequate level of pain control to promote healthy movement and then take your medication to reach this goal.      You have undergone a relatively minor surgery which you are expected to recover from rapidly.  Examples of these surgeries include     Peripheral nerve decompression/release (1)    Use of opioids immediately following surgery is often necessary.  Pain after surgery results in decreased quality  of life, surgical complications, and prolonged rehabilitation.  Thus in certain situations, the benefits of a limited course of opioids may outweigh the risk.      However, multiple studies have found that patients of all ages frequently take fewer opioid pills than the amount prescribed after common surgeries.  In some cases they do not take any of the prescribed medications at all.  This results in excess opioid pills that are accessible to others, raise a concern for misuse, can be stolen by family members, can result in later addiction, or can often be used by overdose.  Therefore we are going to make every effort to only prescribe as much pain medication as necessary to limit the amount of pills that are left over after you recover.      First-line treatment should include nonopioid analgesics.  Examples of these would be Tylenol or nonsteroidal anti-inflammatories such as ibuprofen or Aleve.  - Tylenol 1000 mg every six hours as needed  - Naproxen Sodium (Aleve) 250 mg twice a day.     Second-line treatment. If these are insufficient to control your pain you have also been provided a small dose of opioids.  In order to avoid addiction you should take the lowest amount possible.    Type I: Opioid prescription for 3 days or less (every 6 hours).  May consider an additional 3-day course (every 8 hours)    EXAMPLE:  Please taper your pain medications to avoid long term addiction.  Week 1: Take your pain medication no more than ever 6 hours as needed for severe pain.  By Week 2 you should be off of all opioid pain medication.     Wound Care  Your incision is held together with sutures that need to be removed in 2 weeks.     Please make all attempts to leave your incision bandage on for 2 weeks.  The bandage will be removed in the office at your 2 week appointment.      Please make every attempt to keep your bandage dry during this time.      You do not need to put any medication (like Neosporin or Vitamin E) on the  wound. Scrubbing the wound should be avoided until the staples nondissolvable sutures come out.     Heating pads have the potential to cause very serious burns, especially in patients using narcotic pain medications (e.g. Oxycodone, Oxycontin, etc.) Do not use heating pads during your recovery.      No nicotine products, including second-hand smoke, gum or patches. Nicotine will delay healing and increase the likelihood of a surgical complication. For help quitting, call the Quitline: 1-388.271.9986     Potential wound problems include the following:  Infection--If the wound becomes red, tender, swollen, or warm it may be infected. Infection is often accompanied by fever. If you think your wound might be infected you should call your doctor. Often you can send us a picture of the wound so we can better evaluate it.   Drainage--Fluid should not drain from your wound. If it does, call your doctor. Colored fluid may indicate infection. Clear fluid may indicate leakage of spinal fluid.   Dehiscence--If the wound does not heal properly it may open up along the staple line. This is called dehiscence. Call us immediately.   Sutures--Occasionally, one of the buried threads (sutures) may work through the skin. If you think this has happened call your doctor.   Swelling--Spinal fluid or blood may collect under the skin. This is usually harmless, but needs to be evaluated. Call your doctor.     How to contact your doctor  Dr. Oliveros and his team did your surgery and, therefore, are likely to know more about your condition than any other physicians. We are immediately available to help you with any problems after surgery. Please call us for any concerns at the following numbers:  Doctor’s office:  [phone] (answered 24 hours a day)   [facility] : [phone] (alternative emergency number for on-call neurosurgeon)     Specific instructions related to your surgery  Diet: no restrictions, eat a heart  healthy diet. If you are diabetic, tightly controlling your blood sugar over the next 2 weeks is crucial in controlling infection.     Activity: as tolerated, no heavy lifting or strenuous exercise for at least 2 weeks.    Brace/collar instructions: You have been provided a sling for your arms.  Please use this as you see fit for comfort.  It does not have to be worn.    Please use over the counter medicines like NSAIDs (Ibuprofen or naproxen sodium) for additional pain control. Tylenol/acetaminophen is acceptable as an over the counter pain management as well, but is included in percocet.  Please avoid taking more than 4000 mg of Tylenol per day.     Follow up:   Follow up with Nicholas DWYER on 3/13/2024 for post op wound check and with Dr. Oliveros in the neurosurgery clinic ([phone]) on 9/11/2024.  We have scheduled these appointment(s) for you.            Sincerely,        Tian Oliveros MD, PhD, MPH

## 2024-07-30 NOTE — ANESTHESIA POSTPROCEDURE EVALUATION
"Patient: Jonh Peacock    Procedure Summary       Date: 07/30/24 Room / Location: [facility] OR  /  [facility] OR    Anesthesia Start: 0933 Anesthesia Stop:     Procedure: CARPAL TUNNEL RELEASE, right (Right: Wrist) Diagnosis:       Carpal tunnel syndrome, bilateral      (Carpal tunnel syndrome, bilateral [G56.03])    Surgeons: Noah Oliveros MD Provider: Diomedes Woods CRNA    Anesthesia Type: general ASA Status: 3            Anesthesia Type: general    Vitals  Vitals Value Taken Time   /67 07/30/24 1050   Temp 97 °F (36.1 °C) 07/30/24 1045   Pulse 80 07/30/24 1049   Resp 12 07/30/24 1045   SpO2 98 % 07/30/24 1046   Vitals shown include unfiled device data.        Post Anesthesia Care and Evaluation    Patient location during evaluation: PACU  Patient participation: complete - patient participated  Level of consciousness: awake and alert  Pain management: adequate    Airway patency: patent  Anesthetic complications: No anesthetic complications  PONV Status: none  Cardiovascular status: acceptable and hemodynamically stable  Respiratory status: acceptable  Hydration status: acceptable    Comments: Blood pressure 112/69, pulse 80, temperature 97 °F (36.1 °C), temperature source Temporal, resp. rate 12, height 185 cm (72.84\"), weight 108 kg (237 lb 14 oz), SpO2 98%.    Patient discharged from PACU based upon Connie score. Please see RN notes for further details    "

## 2024-07-30 NOTE — ANESTHESIA PREPROCEDURE EVALUATION
Anesthesia Evaluation     Patient summary reviewed   NPO Solid Status: > 8 hours             Airway   Mallampati: II  Dental      Pulmonary    (-) COPD, asthma, sleep apnea, not a smoker  Cardiovascular   Exercise tolerance: poor (<4 METS)    (+) pacemaker (medtronic) ICD, hypertension, dysrhythmias Atrial Fib, CHF Systolic <55%, hyperlipidemia  (-) past MI, angina, cardiac stents      Neuro/Psych  (-) seizures, TIA, CVA  GI/Hepatic/Renal/Endo    (+) obesity, GERD, renal disease- CRI, diabetes mellitus  (-) liver disease    Musculoskeletal     Abdominal    Substance History       Comment: Buprenorphine patch     OB/GYN          Other                      Anesthesia Plan    ASA 3     general     intravenous induction     Anesthetic plan, risks, benefits, and alternatives have been provided, discussed and informed consent has been obtained with: patient.    CODE STATUS:

## 2024-07-30 NOTE — ANESTHESIA PROCEDURE NOTES
Airway  Urgency: elective    Date/Time: 7/30/2024 9:37 AM  Airway not difficult    General Information and Staff    Patient location during procedure: OR  CRNA/CAA: Diomedes Woods CRNA    Indications and Patient Condition    Preoxygenated: yes  Mask difficulty assessment: 0 - not attempted    Final Airway Details  Final airway type: supraglottic airway      Successful airway: I-gel  Size 5     Number of attempts at approach: 1  Assessment: lips, teeth, and gum same as pre-op

## 2024-07-31 DIAGNOSIS — G89.4 CHRONIC PAIN SYNDROME: Primary | ICD-10-CM

## 2024-07-31 DIAGNOSIS — M96.1 FAILED BACK SYNDROME: ICD-10-CM

## 2024-08-13 ENCOUNTER — OFFICE VISIT (OUTPATIENT)
Dept: NEUROSURGERY | Facility: CLINIC | Age: 79
End: 2024-08-13
Payer: MEDICARE

## 2024-08-13 ENCOUNTER — LAB (OUTPATIENT)
Dept: LAB | Facility: HOSPITAL | Age: 79
End: 2024-08-13
Payer: MEDICARE

## 2024-08-13 VITALS — WEIGHT: 237 LBS | BODY MASS INDEX: 31.41 KG/M2 | HEIGHT: 73 IN

## 2024-08-13 DIAGNOSIS — E66.09 CLASS 1 OBESITY DUE TO EXCESS CALORIES WITH SERIOUS COMORBIDITY AND BODY MASS INDEX (BMI) OF 31.0 TO 31.9 IN ADULT: ICD-10-CM

## 2024-08-13 DIAGNOSIS — G56.01 RIGHT CARPAL TUNNEL SYNDROME: Primary | ICD-10-CM

## 2024-08-13 DIAGNOSIS — I48.0 PAROXYSMAL ATRIAL FIBRILLATION: Chronic | ICD-10-CM

## 2024-08-13 DIAGNOSIS — Z79.01 ANTICOAGULANT LONG-TERM USE: Chronic | ICD-10-CM

## 2024-08-13 DIAGNOSIS — Z98.890 S/P CARPAL TUNNEL RELEASE: ICD-10-CM

## 2024-08-13 DIAGNOSIS — Z91.81 AT RISK FOR FALLS: ICD-10-CM

## 2024-08-13 DIAGNOSIS — G56.02 LEFT CARPAL TUNNEL SYNDROME: ICD-10-CM

## 2024-08-13 LAB
INR PPP: 1 (ref 0.91–1.09)
PROTHROMBIN TIME: 12.2 SECONDS (ref 10–13.8)

## 2024-08-13 PROCEDURE — 99214 OFFICE O/P EST MOD 30 MIN: CPT | Performed by: NURSE PRACTITIONER

## 2024-08-13 PROCEDURE — 1160F RVW MEDS BY RX/DR IN RCRD: CPT | Performed by: NURSE PRACTITIONER

## 2024-08-13 PROCEDURE — 85610 PROTHROMBIN TIME: CPT | Performed by: INTERNAL MEDICINE

## 2024-08-13 PROCEDURE — 1159F MED LIST DOCD IN RCRD: CPT | Performed by: NURSE PRACTITIONER

## 2024-08-13 NOTE — PATIENT INSTRUCTIONS

## 2024-08-13 NOTE — PROGRESS NOTES
Chief complaint:   Chief Complaint   Patient presents with    Post-op     Pt is here for 2 wk PO visit.        Subjective     HPI:   Interval History: Jonh Peacock is a 78 y.o.  male who presents today for post operative follow-up from a CARPAL TUNNEL RELEASE, right - Right on 7/30/2024.  Mr. Peacock has done well since we last saw him.  Compliant with bandaging to date.  He currently denies right hand/wrist pain and states that his right hand numbness and tingling has significantly improved postoperatively.  He denies a concern for postoperative incision infection.  He currently rates the severity of her symptoms are/10.    PFSH:  Past Medical History:   Diagnosis Date    Anemia     Anxiety     Arrhythmia     Arthritis     Atrial fibrillation     Cardiomyopathy     CHF (congestive heart failure)     Colon polyp     Colostomy present     10years    Connective tissue disease     Depression     Diabetes mellitus     Diverticulitis     Dizzy     DVT (deep venous thrombosis)     Gastritis     GERD (gastroesophageal reflux disease)     Hiatal hernia     History of transfusion     Hyperlipidemia     Hypertension     Low back pain     Neuropathy     Pneumonia      Past Surgical History:   Procedure Laterality Date    APPENDECTOMY      BACK SURGERY  2002, 2007    Multiple spine surgeries - laminectomy & fusion    CARDIAC CATHETERIZATION      CARDIAC DEFIBRILLATOR PLACEMENT      CARDIAC ELECTROPHYSIOLOGY PROCEDURE N/A 10/31/2019    Procedure: ICD GEN CHANGE;  Surgeon: Chaz Chavez MD;  Location:  [facility] CATH INVASIVE LOCATION;  Service: Cardiology    CARDIAC ELECTROPHYSIOLOGY PROCEDURE N/A 05/29/2024    Procedure: Ablation PVC;  Surgeon: Rossana Horne MD;  Location:  [facility] CATH INVASIVE LOCATION;  Service: Cardiovascular;  Laterality: N/A;    CARPAL TUNNEL RELEASE Right 7/30/2024    Procedure: CARPAL TUNNEL RELEASE, right;  Surgeon: Noah Oliveros MD;  Location:  [facility] OR;  Service:  Neurosurgery;  Laterality: Right;    COLON RESECTION WITH COLOSTOMY      COLONOSCOPY  09/04/2014    diverticulosis    COLONOSCOPY N/A 10/29/2019    Procedure: COLONOSCOPY WITH ANESTHESIA;  Surgeon: Tenzin Abrams MD;  Location: [facility] ENDOSCOPY;  Service: Gastroenterology    ENDOSCOPY  02/24/1999    small sliding hiatal hernia    FOOT SURGERY Bilateral 2005    JOINT REPLACEMENT Right 2002    KNEE    PACEMAKER IMPLANTATION  2012    RECTAL FISSURE INCISION AND DRAINAGE      REPLACEMENT TOTAL KNEE Bilateral     SPINAL CORD STIMULATOR IMPLANT  08/2012    Placed by Dr. DEENA Villela, Medtronic    THORACIC LAMINECTOMY WITH PLACEMENT OF DORSAL COLUMN STIMULATOR Right 01/28/2021    Procedure: SPINAL CORD STIMULATOR REVISION, right buttocks;  Surgeon: Noah Oliveros MD;  Location: [facility] OR;  Service: Neurosurgery;  Laterality: Right;    TOTAL HIP ARTHROPLASTY Right 2004    TOTAL HIP ARTHROPLASTY Left 12/2008     Objective      Current Outpatient Medications   Medication Sig Dispense Refill    atorvastatin (LIPITOR) 20 MG tablet Take 1 tablet by mouth Daily. 90 tablet 3    Buprenorphine 10 MCG/HR patch weekly Place 15 mcg on the skin as directed by provider Every 7 (Seven) Days. POSTERIOR RIGHT SHOULDER      Diclofenac Sodium (VOLTAREN) 1 % gel gel Apply 4 g topically to the appropriate area as directed 4 (Four) Times a Day As Needed (bilatearl hand pain). 350 g 6    dilTIAZem (CARDIZEM) 30 MG tablet Take 1 tablet by mouth 3 (Three) Times a Day. 270 tablet 3    furosemide (LASIX) 20 MG tablet Take 1 tablet by mouth Daily. 90 tablet 3    furosemide (LASIX) 40 MG tablet Take 1 tablet by mouth Daily. TAKE WITH 20 MG TABLET FOR 60 PER MG PER DAY 90 tablet 3    gabapentin (NEURONTIN) 300 MG capsule Take 1 capsule by mouth 4 (Four) Times a Day.      Glucosamine 750 MG tablet Take 1 tablet by mouth 2 (Two) Times a Day.      HYDROcodone-acetaminophen (NORCO) 7.5-325 MG per tablet Take 1 tablet by mouth Every 6 (Six) Hours  "As Needed for Moderate Pain.      losartan (COZAAR) 25 MG tablet Take 1 tablet by mouth Daily. 90 tablet 3    methocarbamol (Robaxin) 500 MG tablet Take 1 tablet by mouth 4 (Four) Times a Day. (Patient taking differently: Take 1 tablet by mouth As Needed for Muscle Spasms.) 20 tablet 1    metoprolol succinate XL (TOPROL-XL) 100 MG 24 hr tablet Take 1 tablet by mouth Daily. 90 tablet 3    Multiple Vitamins-Minerals (MULTIVITAMIN ADULT PO) Take 1 tablet by mouth Daily.      naloxone (NARCAN) 4 MG/0.1ML nasal spray Call 911. Don't prime. [place] in 1 nostril for overdose. Repeat in 2-3 minutes in other nostril if no or minimal breathing/responsiveness. 2 each 0    polyethylene glycol (MIRALAX) powder Take 17 g by mouth Every Night. 1581 g 3    sennosides-docusate (senna-docusate sodium) 8.6-50 MG per tablet Take 2 tablets by mouth 2 (Two) Times a Day. 60 tablet 0    Sotalol HCl AF 80 MG tablet Take 1 tablet by mouth 2 (Two) Times a Day. 60 tablet 11    tamsulosin (FLOMAX) 0.4 MG capsule 24 hr capsule Take 1 capsule by mouth Every Night. 90 capsule 3    Tentative Restart Date for Anticoagulation Restart Warfarin (Coumadin) on August 13, 2024      warfarin (COUMADIN) 2 MG tablet Take 1 tablet by mouth Daily. 90 tablet 1    warfarin (COUMADIN) 4 MG tablet Take 1 tablet by mouth Daily. 90 tablet 1    warfarin (COUMADIN) 5 MG tablet Take 1 tablet by mouth Daily. 90 tablet 3     No current facility-administered medications for this visit.       Vital Signs  Ht 185 cm (72.84\")   Wt 108 kg (237 lb)   BMI 31.41 kg/m²   Physical Exam  Vitals and nursing note reviewed.   Constitutional:       General: He is not in acute distress.     Appearance: Normal appearance. He is well-developed and well-groomed. He is obese. He is not ill-appearing, toxic-appearing or diaphoretic.          Comments: BMI 31.41   HENT:      Head: Normocephalic and atraumatic.      Right Ear: Hearing normal.      Left Ear: Hearing normal.   Eyes:      " Extraocular Movements: EOM normal.      Conjunctiva/sclera: Conjunctivae normal.      Pupils: Pupils are equal, round, and reactive to light.   Neck:      Trachea: Trachea normal.   Cardiovascular:      Rate and Rhythm: Normal rate and regular rhythm.   Pulmonary:      Effort: Pulmonary effort is normal. No tachypnea, bradypnea, accessory muscle usage or respiratory distress.   Abdominal:      Palpations: Abdomen is soft.   Musculoskeletal:      Cervical back: Full passive range of motion without pain and neck supple.   Skin:     General: Skin is warm and dry.   Neurological:      Mental Status: He is alert and oriented to person, place, and time.      GCS: GCS eye subscore is 4. GCS verbal subscore is 5. GCS motor subscore is 6.      Gait: Gait is intact.      Deep Tendon Reflexes:      Reflex Scores:       Tricep reflexes are 0 on the right side and 0 on the left side.       Bicep reflexes are 0 on the right side and 0 on the left side.       Brachioradialis reflexes are 0 on the right side and 0 on the left side.       Patellar reflexes are 0 on the right side and 0 on the left side.       Achilles reflexes are 0 on the right side and 0 on the left side.  Psychiatric:         Speech: Speech normal.         Behavior: Behavior normal. Behavior is cooperative.       Neurologic Exam     Mental Status   Oriented to person, place, and time.   Attention: normal. Concentration: normal.   Speech: speech is normal   Level of consciousness: alert    Cranial Nerves     CN II   Visual fields full to confrontation.     CN III, IV, VI   Pupils are equal, round, and reactive to light.  Extraocular motions are normal.     CN V   Facial sensation intact.     CN VII   Facial expression full, symmetric.     CN VIII   CN VIII normal.     CN IX, X   CN IX normal.     CN XI   CN XI normal.     Motor Exam   Right arm tone: normal  Left arm tone: normal  Right leg tone: normal  Left leg tone: normal    Strength   Right deltoid: 4/5  Left  deltoid: 4/5  Right biceps: 5/5  Left biceps: 5/5  Right triceps: 5/5  Left triceps: 5/5  Right wrist extension: 5/5  Left wrist extension: 5/5  Right iliopsoas: 5/5  Left iliopsoas: 5/5  Right quadriceps: 5/5  Left quadriceps: 5/5  Right anterior tibial: 5/5  Left anterior tibial: 5/5  Right gastroc: 5/5  Left gastroc: 5/5  Bilateral thenar and hypothenar atrophy  Right EHL 5/5  Left EHL 5/5       Sensory Exam   Right arm light touch: decreased from wrist  Left arm light touch: decreased from wrist  Right leg light touch: normal  Left leg light touch: normal    Gait, Coordination, and Reflexes     Gait  Gait: normal    Tremor   Resting tremor: absent  Intention tremor: absent  Action tremor: absent    Reflexes   Right brachioradialis: 0  Left brachioradialis: 0  Right biceps: 0  Left biceps: 0  Right triceps: 0  Left triceps: 0  Right patellar: 0  Left patellar: 0  Right achilles: 0  Left achilles: 0  Right plantar: normal  Left plantar: normal  Right Harrell: absent  Left Harrell: absent  Right ankle clonus: absent  Left ankle clonus: absent  Right pendular knee jerk: absent  Left pendular knee jerk: absent    Male  strength (pounds)  AGE Right Hand RH Norms Left Hand LH Norms   20-24   121±20.6   104±21.8   25-29   120±23.0   110±16.2   30-34   121±22.4   110±21.7   35-39   119±24   113±21.7   40-44   117±20.7   112±18.7   45-49   110±23.0   101±22.8   50-54   113±18.1   102±17   55-59   101±26.7   83±23.4   60-64   90±20.4   77±20.3   65-69   91±20.6   76.8±19.8   70-74   75±21.5   65±18.1   75+ *20,37 66±21.0 10,25 55±17.0   (RIAN Ellis et al; Hand Dynometer: Effects of trials and sessions.  Perpetual and Motor Skills 61:195-8, 1985)  * = Dominant hand  > = Intervention     Incision:   WOUND IMAGE - SP right carpal tunnel release (08/13/2024)  WOUND IMAGE - SP right carpal tunnel release (08/13/2024)  (Consent to obtain photo of postoperative site for documentation purposes only obtained verbally by   Madonna)    Results Review: No new imaging    2/19/2024        Assessment/Plan:   Right carpal tunnel syndrome  Status post right carpal tunnel release  Jonh Peacock is a 78 y.o. male whom presents today for post operative wound check following a CARPAL TUNNEL RELEASE, right - Right on 7/30/2024.  Mr. Peacock has done well since we last saw him.  Compliant with post-op bandaging to date.  His postoperative incision is clean, dry, intact, and well approximated without signs of soft tissue infection.   3 sutures removed.   Mr. Peacock tolerated this procedure well.  We discussed the signs and symptoms of a soft tissue infection and I recommended they call immediately for any concerns.  He may continue, if needed, current pain medication regimen as needed with tapering dosage instructions as previously provided.  B/R/AE discussed.  I advised the patient to keep his incision clean and dry.  Avoid excessive use of right hand/ wrist with steady but slow progression back to normal daily activities and use of extremity.  I advised the patient to keep scheduled appointment with Dr. Oliveros for reassessment on 9/30/2024.   Jonh known to call the neurosurgical clinic to return sooner for any new or additional concerns.     Left carpal tunnel syndrome  Mr. Peacock would like to proceed with left carpal tunnel release which was confirmed by EMG and nerve conduction study.   He failed a trial of conservative therapy which included bracing.  We discussed treatment options such as watchful waiting with close observation/ follow-up, as well as proceeding with an elective surgical intervention left carpal tunnel release.   Mr. Peacock verbally acknowledged his understanding of the surgical benefits, as well and the potential surgical risks and complications, to include, but not limited bleeding, infection, nerve damage, failure to heal, possible need for reoperation, possible recurrence, or any associated risk  of the anesthesia.  After considering options, Mr. Peacock is requesting that we proceed with the elective procedure left carpal tunnel release.  He knows to call the neurosurgical clinic to return sooner for any new or additional concerns.  Mr. Peacockagrees with this plan of care.    Class 1 obesity (BMI 30.0-34.9) due to excessive calories with serious comorbidities BMI of 31.0-31.9 in adult  Body mass index is 31.41 kg/m². Information on the DASH diet provided in the AVS.  We will continue to provided diet and exercise information with the goal of weight loss at each scheduled appointment.     STEELYSE Fall Risk Assessment was completed, and patient is at MODERATE risk for falls. Assessment completed on:8/13/2024  Fall risk information provided in AVS.     Diagnoses and all orders for this visit:    1. Right carpal tunnel syndrome (Primary)    2. S/P carpal tunnel release    3. Left carpal tunnel syndrome  -     Case Request; Standing  -     Follow Anesthesia Guidelines / Protocol; Standing  -     Verify NPO Status; Standing  -     SCD (Sequential Compression Device) - Place on Patient in Pre-Op; Standing  -     Verify / Perform Chlorhexidine Skin Prep; Standing  -     Obtain Informed Consent; Standing  -     vancomycin (VANCOCIN) 1,500 mg in sodium chloride 0.9 % 250 mL IVPB  -     Case Request    4. Class 1 obesity due to excess calories with serious comorbidity and body mass index (BMI) of 31.0 to 31.9 in adult    5. At risk for falls      Return for KEEP SCHEDULED APPT WITH DR. GUAMAN.    I discussed the patients findings and my recommendations with patient    REYMUNDO Donato; 8/13/2024; 17:59 CDT

## 2024-08-19 ENCOUNTER — LAB (OUTPATIENT)
Dept: LAB | Facility: HOSPITAL | Age: 79
End: 2024-08-19
Payer: MEDICARE

## 2024-08-19 DIAGNOSIS — Z79.01 ANTICOAGULANT LONG-TERM USE: Chronic | ICD-10-CM

## 2024-08-19 DIAGNOSIS — I48.0 PAROXYSMAL ATRIAL FIBRILLATION: Chronic | ICD-10-CM

## 2024-08-19 LAB
INR PPP: 1.3 (ref 0.91–1.09)
PROTHROMBIN TIME: 15.6 SECONDS (ref 10–13.8)

## 2024-08-19 PROCEDURE — 85610 PROTHROMBIN TIME: CPT | Performed by: INTERNAL MEDICINE

## 2024-08-22 ENCOUNTER — TELEPHONE (OUTPATIENT)
Dept: NEUROSURGERY | Facility: CLINIC | Age: 79
End: 2024-08-22
Payer: MEDICARE

## 2024-08-22 PROBLEM — G56.02 LEFT CARPAL TUNNEL SYNDROME: Status: ACTIVE | Noted: 2024-08-13

## 2024-08-22 NOTE — TELEPHONE ENCOUNTER
ERROR . PT DOES NOT NEED A SECOND CLEARANCE. THIS IS FOLLOWING HIS FIRST CARPAL TUNNEL SURGERY.     CREATED IN ERROR

## 2024-08-26 ENCOUNTER — LAB (OUTPATIENT)
Dept: LAB | Facility: HOSPITAL | Age: 79
End: 2024-08-26
Payer: MEDICARE

## 2024-08-26 DIAGNOSIS — Z79.01 ANTICOAGULANT LONG-TERM USE: Chronic | ICD-10-CM

## 2024-08-26 DIAGNOSIS — I48.0 PAROXYSMAL ATRIAL FIBRILLATION: Chronic | ICD-10-CM

## 2024-08-26 LAB
INR PPP: 2.4 (ref 0.91–1.09)
PROTHROMBIN TIME: 29.1 SECONDS (ref 10–13.8)

## 2024-08-26 PROCEDURE — 85610 PROTHROMBIN TIME: CPT | Performed by: INTERNAL MEDICINE

## 2024-09-03 ENCOUNTER — LAB (OUTPATIENT)
Dept: LAB | Facility: HOSPITAL | Age: 79
End: 2024-09-03
Payer: MEDICARE

## 2024-09-03 DIAGNOSIS — Z79.01 ANTICOAGULANT LONG-TERM USE: Chronic | ICD-10-CM

## 2024-09-03 DIAGNOSIS — I48.0 PAROXYSMAL ATRIAL FIBRILLATION: Chronic | ICD-10-CM

## 2024-09-03 LAB
INR PPP: 3.1 (ref 0.91–1.09)
PROTHROMBIN TIME: 37.5 SECONDS (ref 10–13.8)

## 2024-09-03 PROCEDURE — 85610 PROTHROMBIN TIME: CPT | Performed by: INTERNAL MEDICINE

## 2024-09-10 NOTE — PROGRESS NOTES
[facility] - PODIATRY    Today's Date: 09/12/24    Patient Name: Jonh Peacock  MRN: [id number]  CSN: [id number]  PCP: Ric Antony DO  Referring Provider: No ref. provider found     SUBJECTIVE     Chief Complaint   Patient presents with    Follow-up     Ric Antony 05/20/2024 [facility]-pt states he is here today for diabetic nail/foot care-pt denies pain     Diabetes       HPI: Jonh Peacock, a 78 y.o.male, comes to clinic as a(n) established patient presenting for diabetic foot exam and complaining of thick fungal toenails. Pt with h/o Anxiety, Arrhythmia, Cardiomyopathy, CHF, Depression, previous DVT, HTN, Obesity, Anticoagulation with Warfarin. Patient presents with thickened nails that are difficult to care for due to shape of nail and anticoagulation therapy.  Patient is NIDDM and unsure of last BG level. Last A1C of 6.3%.  Denies pain today. Uses rollator for ambulation. Continues Coumadin daily. Notes improvement to feet with regular visits. Reports numbness in his feet. Denies any constitutional symptoms. No other pedal complaints at this time.    Past Medical History:   Diagnosis Date    Anemia     Anxiety     Arrhythmia     Arthritis     Atrial fibrillation     Cardiomyopathy     CHF (congestive heart failure)     Colon polyp     Colostomy present     10years    Connective tissue disease     Depression     Diabetes mellitus     Diverticulitis     Dizzy     DVT (deep venous thrombosis)     Gastritis     GERD (gastroesophageal reflux disease)     Hiatal hernia     History of transfusion     Hyperlipidemia     Hypertension     Low back pain     Neuropathy     Pneumonia      Past Surgical History:   Procedure Laterality Date    APPENDECTOMY      BACK SURGERY  2002, 2007    Multiple spine surgeries - laminectomy & fusion    CARDIAC CATHETERIZATION      CARDIAC DEFIBRILLATOR PLACEMENT      CARDIAC ELECTROPHYSIOLOGY PROCEDURE N/A 10/31/2019     Procedure: ICD GEN CHANGE;  Surgeon: Chaz Chavez MD;  Location:  [facility];  Service: Cardiology    CARDIAC ELECTROPHYSIOLOGY PROCEDURE N/A 05/29/2024    Procedure: Ablation PVC;  Surgeon: Rossana Horne MD;  Location:  [facility];  Service: Cardiovascular;  Laterality: N/A;    CARPAL TUNNEL RELEASE Right 7/30/2024    Procedure: CARPAL TUNNEL RELEASE, right;  Surgeon: Noah Oliveros MD;  Location: [facility] OR;  Service: Neurosurgery;  Laterality: Right;    COLON RESECTION WITH COLOSTOMY      COLONOSCOPY  09/04/2014    diverticulosis    COLONOSCOPY N/A 10/29/2019    Procedure: COLONOSCOPY WITH ANESTHESIA;  Surgeon: Tenzin Abrams MD;  Location: [facility] ENDOSCOPY;  Service: Gastroenterology    ENDOSCOPY  02/24/1999    small sliding hiatal hernia    FOOT SURGERY Bilateral 2005    JOINT REPLACEMENT Right 2002    KNEE    PACEMAKER IMPLANTATION  2012    RECTAL FISSURE INCISION AND DRAINAGE      REPLACEMENT TOTAL KNEE Bilateral     SPINAL CORD STIMULATOR IMPLANT  08/2012    Placed by Dr. DEENA Villela, [employer]    THORACIC LAMINECTOMY WITH PLACEMENT OF DORSAL COLUMN STIMULATOR Right 01/28/2021    Procedure: SPINAL CORD STIMULATOR REVISION, right buttocks;  Surgeon: Noah Oliveros MD;  Location: [facility] OR;  Service: Neurosurgery;  Laterality: Right;    TOTAL HIP ARTHROPLASTY Right 2004    TOTAL HIP ARTHROPLASTY Left 12/2008     Family History   Problem Relation Age of Onset    Heart disease Mother     Heart disease Brother     Breast cancer Brother     Colon cancer Neg Hx     Colon polyps Neg Hx      Social History     Socioeconomic History    Marital status:    Tobacco Use    Smoking status: Never     Passive exposure: Never    Smokeless tobacco: Never   Vaping Use    Vaping status: Never Used   Substance and Sexual Activity    Alcohol use: Not Currently    Drug use: Never    Sexual activity: Defer     Allergies   Allergen Reactions    Percocet  "[Oxycodone-Acetaminophen] Urinary Retention    Amiodarone Other (See Comments)     INTERFERES WITH OTHER MEDICATIONS    Oxycontin [Oxycodone Hcl] Urinary Retention    Penicillins Hives     All \"cillins\"    Vioxx [Rofecoxib] GI Intolerance     Current Outpatient Medications   Medication Sig Dispense Refill    atorvastatin (LIPITOR) 20 MG tablet Take 1 tablet by mouth Daily. 90 tablet 3    Buprenorphine 10 MCG/HR patch weekly Place 15 mcg on the skin as directed by provider Every 7 (Seven) Days. POSTERIOR RIGHT SHOULDER      Diclofenac Sodium (VOLTAREN) 1 % gel gel Apply 4 g topically to the appropriate area as directed 4 (Four) Times a Day As Needed (bilatearl hand pain). 350 g 6    dilTIAZem (CARDIZEM) 30 MG tablet Take 1 tablet by mouth 3 (Three) Times a Day. 270 tablet 3    furosemide (LASIX) 20 MG tablet Take 1 tablet by mouth Daily. 90 tablet 3    furosemide (LASIX) 40 MG tablet Take 1 tablet by mouth Daily. TAKE WITH 20 MG TABLET FOR 60 PER MG PER DAY 90 tablet 3    gabapentin (NEURONTIN) 300 MG capsule Take 1 capsule by mouth 4 (Four) Times a Day.      Glucosamine 750 MG tablet Take 1 tablet by mouth 2 (Two) Times a Day.      losartan (COZAAR) 25 MG tablet Take 1 tablet by mouth Daily. 90 tablet 3    methocarbamol (Robaxin) 500 MG tablet Take 1 tablet by mouth 4 (Four) Times a Day. (Patient taking differently: Take 1 tablet by mouth As Needed for Muscle Spasms.) 20 tablet 1    metoprolol succinate XL (TOPROL-XL) 100 MG 24 hr tablet Take 1 tablet by mouth Daily. 90 tablet 3    Multiple Vitamins-Minerals (MULTIVITAMIN ADULT PO) Take 1 tablet by mouth Daily.      naloxone (NARCAN) 4 MG/0.1ML nasal spray Call 911. Don't prime. [place] in 1 nostril for overdose. Repeat in 2-3 minutes in other nostril if no or minimal breathing/responsiveness. 2 each 0    polyethylene glycol (MIRALAX) powder Take 17 g by mouth Every Night. 1581 g 3    Sotalol HCl AF 80 MG tablet Take 1 tablet by mouth 2 (Two) Times a Day. 60 tablet 11 "    tamsulosin (FLOMAX) 0.4 MG capsule 24 hr capsule Take 1 capsule by mouth Every Night. 90 capsule 3    Tentative Restart Date for Anticoagulation Restart Warfarin (Coumadin) on August 13, 2024      warfarin (COUMADIN) 2 MG tablet Take 1 tablet by mouth Daily. 90 tablet 1    warfarin (COUMADIN) 4 MG tablet Take 1 tablet by mouth Daily. 90 tablet 1    warfarin (COUMADIN) 5 MG tablet Take 1 tablet by mouth Daily. 90 tablet 3    HYDROcodone-acetaminophen (NORCO) 7.5-325 MG per tablet Take 1 tablet by mouth Every 6 (Six) Hours As Needed for Moderate Pain. (Patient not taking: Reported on 9/12/2024)      metoprolol succinate XL (TOPROL-XL) 50 MG 24 hr tablet Take 1 tablet by mouth Daily.       No current facility-administered medications for this visit.     Review of Systems   Constitutional:  Negative for chills and fever.   HENT:  Negative for congestion.    Respiratory:  Negative for shortness of breath.    Cardiovascular:  Positive for leg swelling. Negative for chest pain.   Gastrointestinal:  Negative for constipation, diarrhea, nausea and vomiting.   Musculoskeletal:  Positive for arthralgias, back pain and gait problem.        Foot pain   Skin:  Negative for wound.        Thick Toenails   Neurological:  Positive for numbness.   Hematological:  Bruises/bleeds easily.       OBJECTIVE     Vitals:    09/12/24 1522   BP: 102/66   Pulse: 82   SpO2: 94%       PHYSICAL EXAM  GEN:   Accompanied by wife.    Foot/Ankle Exam    GENERAL  Diabetic foot exam performed    Appearance:  appears stated age  Orientation:  AAOx3  Affect:  appropriate  Gait:  (unsteady)  Assistance:  walker  Right shoe gear: casual shoe  Left shoe gear: casual shoe (with lift)    VASCULAR     Right Foot Vascularity   Dorsalis pedis:  1+  Posterior tibial:  1+  Skin temperature:  warm  Edema grading:  Trace and non-pitting  CFT:  4  Pedal hair growth:  Absent  Varicosities:  none     Left Foot Vascularity   Dorsalis pedis:  1+  Posterior tibial:   1+  Skin temperature:  warm  Edema grading:  Trace and non-pitting  CFT:  4  Pedal hair growth:  Absent  Varicosities:  none     NEUROLOGIC     Right Foot Neurologic   Light touch sensation: diminished  Vibratory sensation: diminished  Hot/Cold sensation: diminished  Protective Sensation using Toponas-Akhil Monofilament:   Sites intact: 7  Sites tested: 10     Left Foot Neurologic   Light touch sensation: diminished  Vibratory sensation: diminished  Hot/Cold sensation:  diminished  Protective Sensation using Toponas-Akhil Monofilament:   Sites intact: 7  Sites tested: 10    MUSCULOSKELETAL     Right Foot Musculoskeletal   Ecchymosis:  none  Tenderness:  toenail problem    Arch:  Pes planus  Hallux valgus: No       Left Foot Musculoskeletal   Ecchymosis:  none  Tenderness:  toenail problem  Arch:  Pes planus  Hallux valgus: No      MUSCLE STRENGTH     Right Foot Muscle Strength   Foot dorsiflexion:  5  Foot plantar flexion:  5  Foot inversion:  5  Foot eversion:  5     Left Foot Muscle Strength   Foot dorsiflexion:  5  Foot plantar flexion:  5  Foot inversion:  5  Foot eversion:  5    RANGE OF MOTION     Right Foot Range of Motion   Foot and ankle ROM within normal limits       Left Foot Range of Motion   Foot and ankle ROM within normal limits      DERMATOLOGIC      Right Foot Dermatologic   Skin  Right foot skin is intact.   Nails  1.  Positive for elongated, onychomycosis, abnormal thickness and dystrophic nail.  2.  Positive for elongated, onychomycosis, abnormal thickness and dystrophic nail.  3.  Positive for elongated, onychomycosis, abnormal thickness and dystrophic nail.  4.  Positive for elongated, onychomycosis, abnormal thickness and dystrophic nail.  5.  Positive for elongated, onychomycosis, abnormal thickness and dystrophic nail.     Left Foot Dermatologic   Skin  Left foot skin is intact.   Nails  1.  Positive for elongated, onychomycosis, abnormal thickness and dystrophic nail.  2.  Positive  for elongated, onychomycosis, abnormal thickness and dystrophic nail.  3.  Positive for elongated, onychomycosis, abnormal thickness and dystrophic nail.  4.  Positive for elongated, onychomycosis, abnormally thick and dystrophic nail.  5.  Positive for elongated, onychomycosis, abnormally thick and dystrophic nail.    Image:        RADIOLOGY/NUCLEAR:  No results found.    LABORATORY/CULTURE RESULTS:      PATHOLOGY RESULTS:       ASSESSMENT/PLAN     Diagnoses and all orders for this visit:    1. Onychomycosis (Primary)    2. Type 2 diabetes mellitus with diabetic neuropathy, without long-term current use of insulin    3. Lower limb length difference    4. Anticoagulant long-term use    5. Peripheral edema    6. Foot callus    7. Gait abnormality      Comprehensive lower extremity examination and evaluation was performed.  Discussed findings and treatment plan including risks, benefits, and treatment options with patient in detail. Patient agreed with treatment plan.   After verbal consent obtained, nail(s) x10 debrided of length and thickness with nail nipper without incidence  After verbal consent obtained, calluses x1 pared utilizing dermal curette and/or scalpel without incidence  Patient may maintain nails and calluses at home utilizing emery board or pumice stone between visits as needed  Reviewed at home diabetic foot care including daily foot checks   Continue shoes with custom inserts  Continue use of rollator for ambulation assistance.  Elevate legs when possible.  Follow with PCP for management of DM.    An After Visit Summary was printed and given to the patient at discharge, including (if requested) any available informative/educational handouts regarding diagnosis, treatment, or medications. All questions were answered to patient/family satisfaction. Should symptoms fail to improve or worsen they agree to call or return to clinic or to go to the Emergency Department. Discussed the importance of  following up with any needed screening tests/labs/specialist appointments and any requested follow-up recommended by me today. Importance of maintaining follow-up discussed and patient accepts that missed appointments can delay diagnosis and potentially lead to worsening of conditions.  No follow-ups on file., or sooner if acute issues arise.        This document has been electronically signed by REYMUNDO Santana on September 12, 2024 16:48 CDT

## 2024-09-11 ENCOUNTER — TELEPHONE (OUTPATIENT)
Age: 79
End: 2024-09-11
Payer: MEDICARE

## 2024-09-12 ENCOUNTER — OFFICE VISIT (OUTPATIENT)
Age: 79
End: 2024-09-12
Payer: MEDICARE

## 2024-09-12 ENCOUNTER — PRE-ADMISSION TESTING (OUTPATIENT)
Dept: PREADMISSION TESTING | Facility: HOSPITAL | Age: 79
End: 2024-09-12
Payer: MEDICARE

## 2024-09-12 VITALS
RESPIRATION RATE: 16 BRPM | WEIGHT: 235.89 LBS | DIASTOLIC BLOOD PRESSURE: 86 MMHG | HEART RATE: 91 BPM | SYSTOLIC BLOOD PRESSURE: 126 MMHG | OXYGEN SATURATION: 99 % | BODY MASS INDEX: 31.26 KG/M2 | HEIGHT: 73 IN

## 2024-09-12 VITALS
SYSTOLIC BLOOD PRESSURE: 102 MMHG | HEIGHT: 73 IN | WEIGHT: 236 LBS | HEART RATE: 82 BPM | BODY MASS INDEX: 31.28 KG/M2 | OXYGEN SATURATION: 94 % | DIASTOLIC BLOOD PRESSURE: 66 MMHG

## 2024-09-12 DIAGNOSIS — M21.70 LOWER LIMB LENGTH DIFFERENCE: ICD-10-CM

## 2024-09-12 DIAGNOSIS — R26.9 GAIT ABNORMALITY: ICD-10-CM

## 2024-09-12 DIAGNOSIS — B35.1 ONYCHOMYCOSIS: Primary | ICD-10-CM

## 2024-09-12 DIAGNOSIS — L84 FOOT CALLUS: ICD-10-CM

## 2024-09-12 DIAGNOSIS — R60.0 PERIPHERAL EDEMA: ICD-10-CM

## 2024-09-12 DIAGNOSIS — E11.40 TYPE 2 DIABETES MELLITUS WITH DIABETIC NEUROPATHY, WITHOUT LONG-TERM CURRENT USE OF INSULIN: ICD-10-CM

## 2024-09-12 DIAGNOSIS — Z79.01 ANTICOAGULANT LONG-TERM USE: ICD-10-CM

## 2024-09-12 LAB
ANION GAP SERPL CALCULATED.3IONS-SCNC: 8 MMOL/L (ref 5–15)
BUN SERPL-MCNC: 23 MG/DL (ref 8–23)
BUN/CREAT SERPL: 17.4 (ref 7–25)
CALCIUM SPEC-SCNC: 9.3 MG/DL (ref 8.6–10.5)
CHLORIDE SERPL-SCNC: 104 MMOL/L (ref 98–107)
CO2 SERPL-SCNC: 30 MMOL/L (ref 22–29)
CREAT SERPL-MCNC: 1.32 MG/DL (ref 0.76–1.27)
DEPRECATED RDW RBC AUTO: 44.3 FL (ref 37–54)
EGFRCR SERPLBLD CKD-EPI 2021: 55.6 ML/MIN/1.73
ERYTHROCYTE [DISTWIDTH] IN BLOOD BY AUTOMATED COUNT: 13.2 % (ref 12.3–15.4)
GLUCOSE SERPL-MCNC: 111 MG/DL (ref 65–99)
HCT VFR BLD AUTO: 35.6 % (ref 37.5–51)
HGB BLD-MCNC: 11.9 G/DL (ref 13–17.7)
INR PPP: 2.72 (ref 0.91–1.09)
MCH RBC QN AUTO: 30.7 PG (ref 26.6–33)
MCHC RBC AUTO-ENTMCNC: 33.4 G/DL (ref 31.5–35.7)
MCV RBC AUTO: 92 FL (ref 79–97)
PLATELET # BLD AUTO: 143 10*3/MM3 (ref 140–450)
PMV BLD AUTO: 11.1 FL (ref 6–12)
POTASSIUM SERPL-SCNC: 4.1 MMOL/L (ref 3.5–5.2)
PROTHROMBIN TIME: 29.9 SECONDS (ref 11.8–14.8)
RBC # BLD AUTO: 3.87 10*6/MM3 (ref 4.14–5.8)
SODIUM SERPL-SCNC: 142 MMOL/L (ref 136–145)
WBC NRBC COR # BLD AUTO: 5.89 10*3/MM3 (ref 3.4–10.8)

## 2024-09-12 PROCEDURE — 85027 COMPLETE CBC AUTOMATED: CPT

## 2024-09-12 PROCEDURE — 80048 BASIC METABOLIC PNL TOTAL CA: CPT

## 2024-09-12 PROCEDURE — 36415 COLL VENOUS BLD VENIPUNCTURE: CPT

## 2024-09-12 PROCEDURE — 85610 PROTHROMBIN TIME: CPT

## 2024-09-12 RX ORDER — METOPROLOL SUCCINATE 50 MG/1
50 TABLET, EXTENDED RELEASE ORAL DAILY
COMMUNITY

## 2024-09-12 NOTE — DISCHARGE INSTRUCTIONS
Preparing for Surgery  Follow these instructions before the procedure:  Several days or weeks before your procedure      Ask your health care provider about:  Changing or stopping your regular medicines. This is especially important if you are taking diabetes medicines or blood thinners.  Taking medicines such as aspirin and ibuprofen. These medicines can thin your blood. Do not take these medicines unless your health care provider tells you to take them.  Taking over-the-counter medicines, vitamins, herbs, and supplements.    Contact your surgeon if you:  Develop a fever of more than 100.4°F (38°C) or other feelings of illness during the 48 hours before your surgery.  Have symptoms that get worse.  Have questions or concerns about your surgery.  If you are going home the same day of your surgery you will need to arrange for a responsible adult, age 18 years old or older, to drive you home from the hospital and stay with you for 24 hours. Verification of the  will be made prior to any procedure requiring sedation. You may not go home in a taxi or any form of public transportation by yourself.     Day before your procedure  Medication(s) you need to stop the day before your surgery: LOSARTAN    24 hours before your procedure DO NOT drink alcoholic beverages or smoke.  24 hours before your procedure STOP taking Erectile Dysfunction medication (i.e.,Cialis, Viagra)   You may be asked to shower with a germ-killing soap.  Day of your procedure   You may take the following medication(s) the morning of surgery with a sip of water: METOPROLOL, SOTALOL, GABAPENTIN      8 hours before your scheduled arrival time, STOP all food, any dairy products, and full liquids. This includes hard candy, chewing gum or mints. This is extremely important to prevent serious complications.     Up to 2 hours before your scheduled arrival time, you may have clear liquids no cream, powder, or pulp of any kind. Safe options are water, black  coffee, plain tea, soda, Gatorade/Powerade, clear broth, apple juice.    2 hours before your scheduled arrival time, STOP drinking clear liquids.    You may need to take another shower with a germ-killing soap before you leave home in the morning. Do not use perfumes, colognes, or body lotions.  Wear comfortable loose-fitting clothing.  Remove all jewelry including body piercing and rings, dark colored nail polish, and make up prior to arrival at the hospital. Leave all valuables at home.   Bring your hearing aids if you rely on them.  Do not wear contact lenses. If you wear eyeglasses remember to bring a case to store them in while you are in surgery.  Do not use denture adhesives since you will be asked to remove them during your surgery.    You do not need to bring your home medications into the hospital.   Bring your sleep apnea device with you on the day of your surgery (if this applies to you).  If you wear portable oxygen, bring it with you.   If you are staying overnight, you may bring a bag of items you may need such as slippers, robe and a change of clothes for your discharge. You may want to leave these items in the car until you are ready for them since your family will take your belongings when you leave the pre-operative area.  Arrive at the hospital as scheduled by the office. You will be asked to arrive 2 hours prior to your surgery time in order to prepare for your procedure.  When you arrive at the hospital  Go to the registration desk located at the main entrance of the hospital.  After registration is completed, you will be given a beeper and a sticker sheet. Take the stickers to Outpatient Surgery and place in the tray at the end of the desk to notify the staff that you have arrived and registered.   Return to the lobby to wait. You are not always called back according to the time of arrival but rather the time your doctor will be ready.  When your beeper lights up and vibrates proceed through  the double doors, under the stairs, and a member of the Outpatient Surgery staff will escort you to your preoperative room.     How to Use Chlorhexidine Before Surgery  Chlorhexidine gluconate (CHG) is a germ-killing (antiseptic) solution that is used to clean the skin. It can get rid of the bacteria that normally live on the skin and can keep them away for about 24 hours. To clean your skin with CHG, you may be given:  A CHG solution to use in the shower or as part of a sponge bath.  A prepackaged cloth that contains CHG.  Cleaning your skin with CHG may help lower the risk for infection:  While you are staying in the intensive care unit of the hospital.  If you have a vascular access, such as a central line, to provide short-term or long-term access to your veins.  If you have a catheter to drain urine from your bladder.  If you are on a ventilator. A ventilator is a machine that helps you breathe by moving air in and out of your lungs.  After surgery.  What are the risks?  Risks of using CHG include:  A skin reaction.  Hearing loss, if CHG gets in your ears and you have a perforated eardrum.  Eye injury, if CHG gets in your eyes and is not rinsed out.  The CHG product catching fire.  Make sure that you avoid smoking and flames after applying CHG to your skin.  Do not use CHG:  If you have a chlorhexidine allergy or have previously reacted to chlorhexidine.  On babies younger than 2 months of age.  How to use CHG solution  Use CHG only as told by your health care provider, and follow the instructions on the label.  Use the full amount of CHG as directed. Usually, this is one bottle.  During a shower    Follow these steps when using CHG solution during a shower (unless your health care provider gives you different instructions):  Start the shower.  Use your normal soap and shampoo to wash your face and hair.  Turn off the shower or move out of the shower stream.  Pour the CHG onto a clean washcloth. Do not use any  type of brush or rough-edged sponge.  Starting at your neck, lather your body down to your toes. Make sure you follow these instructions:  If you will be having surgery, pay special attention to the part of your body where you will be having surgery. Scrub this area for at least 1 minute.  Do not use CHG on your head or face. If the solution gets into your ears or eyes, rinse them well with water.  Avoid your genital area.  Avoid any areas of skin that have broken skin, cuts, or scrapes.  Scrub your back and under your arms. Make sure to wash skin folds.  Let the lather sit on your skin for 1-2 minutes or as long as told by your health care provider.  Thoroughly rinse your entire body in the shower. Make sure that all body creases and crevices are rinsed well.  Dry off with a clean towel. Do not put any substances on your body afterward--such as powder, lotion, or perfume--unless you are told to do so by your health care provider. Only use lotions that are recommended by the .  Put on clean clothes or pajamas.  If it is the night before your surgery, sleep in clean sheets.     During a sponge bath  Follow these steps when using CHG solution during a sponge bath (unless your health care provider gives you different instructions):  Use your normal soap and shampoo to wash your face and hair.  Pour the CHG onto a clean washcloth.  Starting at your neck, lather your body down to your toes. Make sure you follow these instructions:  If you will be having surgery, pay special attention to the part of your body where you will be having surgery. Scrub this area for at least 1 minute.  Do not use CHG on your head or face. If the solution gets into your ears or eyes, rinse them well with water.  Avoid your genital area.  Avoid any areas of skin that have broken skin, cuts, or scrapes.  Scrub your back and under your arms. Make sure to wash skin folds.  Let the lather sit on your skin for 1-2 minutes or as long as  told by your health care provider.  Using a different clean, wet washcloth, thoroughly rinse your entire body. Make sure that all body creases and crevices are rinsed well.  Dry off with a clean towel. Do not put any substances on your body afterward--such as powder, lotion, or perfume--unless you are told to do so by your health care provider. Only use lotions that are recommended by the .  Put on clean clothes or pajamas.  If it is the night before your surgery, sleep in clean sheets.  How to use CHG prepackaged cloths  Only use CHG cloths as told by your health care provider, and follow the instructions on the label.  Use the CHG cloth on clean, dry skin.  Do not use the CHG cloth on your head or face unless your health care provider tells you to.  When washing with the CHG cloth:  Avoid your genital area.  Avoid any areas of skin that have broken skin, cuts, or scrapes.  Before surgery    Follow these steps when using a CHG cloth to clean before surgery (unless your health care provider gives you different instructions):  Using the CHG cloth, vigorously scrub the part of your body where you will be having surgery. Scrub using a back-and-forth motion for 3 minutes. The area on your body should be completely wet with CHG when you are done scrubbing.  Do not rinse. Discard the cloth and let the area air-dry. Do not put any substances on the area afterward, such as powder, lotion, or perfume.  Put on clean clothes or pajamas.  If it is the night before your surgery, sleep in clean sheets.     For general bathing  Follow these steps when using CHG cloths for general bathing (unless your health care provider gives you different instructions).  Use a separate CHG cloth for each area of your body. Make sure you wash between any folds of skin and between your fingers and toes. Wash your body in the following order, switching to a new cloth after each step:  The front of your neck, shoulders, and chest.  Both  of your arms, under your arms, and your hands.  Your stomach and groin area, avoiding the genitals.  Your right leg and foot.  Your left leg and foot.  The back of your neck, your back, and your buttocks.  Do not rinse. Discard the cloth and let the area air-dry. Do not put any substances on your body afterward--such as powder, lotion, or perfume--unless you are told to do so by your health care provider. Only use lotions that are recommended by the .  Put on clean clothes or pajamas.  Contact a health care provider if:  Your skin gets irritated after scrubbing.  You have questions about using your solution or cloth.  You swallow any chlorhexidine. Call your local poison control center (1-102.919.7170 in the [place]  Get help right away if:  Your eyes itch badly, or they become very red or swollen.  Your skin itches badly and is red or swollen.  Your hearing changes.  You have trouble seeing.  You have swelling or tingling in your mouth or throat.  You have trouble breathing.  These symptoms may represent a serious problem that is an emergency. Do not wait to see if the symptoms will go away. Get medical help right away. Call your local emergency services (887 in the [place] Do not drive yourself to the hospital.  Summary  Chlorhexidine gluconate (CHG) is a germ-killing (antiseptic) solution that is used to clean the skin. Cleaning your skin with CHG may help to lower your risk for infection.  You may be given CHG to use for bathing. It may be in a bottle or in a prepackaged cloth to use on your skin. Carefully follow your health care provider's instructions and the instructions on the product label.  Do not use CHG if you have a chlorhexidine allergy.  Contact your health care provider if your skin gets irritated after scrubbing.  This information is not intended to replace advice given to you by your health care provider. Make sure you discuss any questions you have with your health care  provider.  Document Revised: 04/17/2023 Document Reviewed: 02/28/2022  [employer] Patient Education © 2023 [employer]

## 2024-09-16 ENCOUNTER — TELEPHONE (OUTPATIENT)
Dept: NEUROSURGERY | Facility: CLINIC | Age: 79
End: 2024-09-16
Payer: MEDICARE

## 2024-09-24 ENCOUNTER — ANESTHESIA (OUTPATIENT)
Dept: PERIOP | Facility: HOSPITAL | Age: 79
End: 2024-09-24
Payer: MEDICARE

## 2024-09-24 ENCOUNTER — HOSPITAL ENCOUNTER (OUTPATIENT)
Facility: HOSPITAL | Age: 79
Setting detail: HOSPITAL OUTPATIENT SURGERY
Discharge: HOME | End: 2024-09-24
Attending: NEUROLOGICAL SURGERY | Admitting: NEUROLOGICAL SURGERY
Payer: MEDICARE

## 2024-09-24 ENCOUNTER — ANESTHESIA EVENT (OUTPATIENT)
Dept: PERIOP | Facility: HOSPITAL | Age: 79
End: 2024-09-24
Payer: MEDICARE

## 2024-09-24 VITALS
SYSTOLIC BLOOD PRESSURE: 105 MMHG | WEIGHT: 235.89 LBS | HEART RATE: 82 BPM | RESPIRATION RATE: 16 BRPM | HEIGHT: 73 IN | OXYGEN SATURATION: 97 % | DIASTOLIC BLOOD PRESSURE: 73 MMHG | BODY MASS INDEX: 31.26 KG/M2 | TEMPERATURE: 97.2 F

## 2024-09-24 DIAGNOSIS — M96.1 FAILED BACK SYNDROME: ICD-10-CM

## 2024-09-24 DIAGNOSIS — G56.02 LEFT CARPAL TUNNEL SYNDROME: ICD-10-CM

## 2024-09-24 DIAGNOSIS — G56.03 CARPAL TUNNEL SYNDROME, BILATERAL: Primary | ICD-10-CM

## 2024-09-24 LAB
GLUCOSE BLDC GLUCOMTR-MCNC: 100 MG/DL (ref 70–130)
GLUCOSE BLDC GLUCOMTR-MCNC: 115 MG/DL (ref 70–130)
INR PPP: 1.11 (ref 0.91–1.09)
PROTHROMBIN TIME: 14.8 SECONDS (ref 11.8–14.8)

## 2024-09-24 PROCEDURE — 25010000002 FENTANYL CITRATE (PF) 100 MCG/2ML SOLUTION

## 2024-09-24 PROCEDURE — 25010000002 PROPOFOL 10 MG/ML EMULSION

## 2024-09-24 PROCEDURE — 25010000002 VANCOMYCIN 10 G RECONSTITUTED SOLUTION: Performed by: NURSE PRACTITIONER

## 2024-09-24 PROCEDURE — 25010000002 ONDANSETRON PER 1 MG

## 2024-09-24 PROCEDURE — 85610 PROTHROMBIN TIME: CPT | Performed by: NEUROLOGICAL SURGERY

## 2024-09-24 PROCEDURE — 82948 REAGENT STRIP/BLOOD GLUCOSE: CPT | Performed by: INTERNAL MEDICINE

## 2024-09-24 PROCEDURE — 82948 REAGENT STRIP/BLOOD GLUCOSE: CPT

## 2024-09-24 PROCEDURE — 25810000003 SODIUM CHLORIDE 0.9 % SOLUTION: Performed by: NURSE PRACTITIONER

## 2024-09-24 PROCEDURE — 64721 CARPAL TUNNEL SURGERY: CPT | Performed by: NEUROLOGICAL SURGERY

## 2024-09-24 PROCEDURE — 25810000003 LACTATED RINGERS PER 1000 ML: Performed by: NEUROLOGICAL SURGERY

## 2024-09-24 PROCEDURE — S0260 H&P FOR SURGERY: HCPCS | Performed by: NEUROLOGICAL SURGERY

## 2024-09-24 RX ORDER — VANCOMYCIN/0.9 % SOD CHLORIDE 1.5G/250ML
15 PLASTIC BAG, INJECTION (ML) INTRAVENOUS ONCE
Status: COMPLETED | OUTPATIENT
Start: 2024-09-24 | End: 2024-09-24

## 2024-09-24 RX ORDER — HYDROCODONE BITARTRATE AND ACETAMINOPHEN 5; 325 MG/1; MG/1
1 TABLET ORAL EVERY 4 HOURS PRN
Status: DISCONTINUED | OUTPATIENT
Start: 2024-09-24 | End: 2024-09-24 | Stop reason: HOSPADM

## 2024-09-24 RX ORDER — ONDANSETRON 2 MG/ML
4 INJECTION INTRAMUSCULAR; INTRAVENOUS ONCE AS NEEDED
Status: DISCONTINUED | OUTPATIENT
Start: 2024-09-24 | End: 2024-09-24 | Stop reason: HOSPADM

## 2024-09-24 RX ORDER — HYDROCODONE BITARTRATE AND ACETAMINOPHEN 7.5; 325 MG/1; MG/1
1 TABLET ORAL EVERY 6 HOURS PRN
Qty: 12 TABLET | Refills: 0 | Status: SHIPPED | OUTPATIENT
Start: 2024-09-24 | End: 2024-09-27

## 2024-09-24 RX ORDER — DROPERIDOL 2.5 MG/ML
0.62 INJECTION, SOLUTION INTRAMUSCULAR; INTRAVENOUS ONCE AS NEEDED
Status: DISCONTINUED | OUTPATIENT
Start: 2024-09-24 | End: 2024-09-24 | Stop reason: HOSPADM

## 2024-09-24 RX ORDER — SODIUM CHLORIDE 0.9 % (FLUSH) 0.9 %
3 SYRINGE (ML) INJECTION AS NEEDED
Status: DISCONTINUED | OUTPATIENT
Start: 2024-09-24 | End: 2024-09-24 | Stop reason: HOSPADM

## 2024-09-24 RX ORDER — LIDOCAINE HYDROCHLORIDE 20 MG/ML
INJECTION, SOLUTION EPIDURAL; INFILTRATION; INTRACAUDAL; PERINEURAL AS NEEDED
Status: DISCONTINUED | OUTPATIENT
Start: 2024-09-24 | End: 2024-09-24 | Stop reason: SURG

## 2024-09-24 RX ORDER — FLUMAZENIL 0.1 MG/ML
0.2 INJECTION INTRAVENOUS AS NEEDED
Status: DISCONTINUED | OUTPATIENT
Start: 2024-09-24 | End: 2024-09-24 | Stop reason: HOSPADM

## 2024-09-24 RX ORDER — MAGNESIUM HYDROXIDE 1200 MG/15ML
LIQUID ORAL AS NEEDED
Status: DISCONTINUED | OUTPATIENT
Start: 2024-09-24 | End: 2024-09-24 | Stop reason: HOSPADM

## 2024-09-24 RX ORDER — HYDROCODONE BITARTRATE AND ACETAMINOPHEN 10; 325 MG/1; MG/1
1 TABLET ORAL EVERY 4 HOURS PRN
Status: DISCONTINUED | OUTPATIENT
Start: 2024-09-24 | End: 2024-09-24 | Stop reason: HOSPADM

## 2024-09-24 RX ORDER — BUPIVACAINE HCL/0.9 % NACL/PF 0.125 %
PLASTIC BAG, INJECTION (ML) EPIDURAL AS NEEDED
Status: DISCONTINUED | OUTPATIENT
Start: 2024-09-24 | End: 2024-09-24 | Stop reason: SURG

## 2024-09-24 RX ORDER — ONDANSETRON 2 MG/ML
INJECTION INTRAMUSCULAR; INTRAVENOUS AS NEEDED
Status: DISCONTINUED | OUTPATIENT
Start: 2024-09-24 | End: 2024-09-24 | Stop reason: SURG

## 2024-09-24 RX ORDER — IBUPROFEN 600 MG/1
600 TABLET, FILM COATED ORAL EVERY 6 HOURS PRN
Status: DISCONTINUED | OUTPATIENT
Start: 2024-09-24 | End: 2024-09-24 | Stop reason: HOSPADM

## 2024-09-24 RX ORDER — FENTANYL CITRATE 50 UG/ML
INJECTION, SOLUTION INTRAMUSCULAR; INTRAVENOUS AS NEEDED
Status: DISCONTINUED | OUTPATIENT
Start: 2024-09-24 | End: 2024-09-24 | Stop reason: SURG

## 2024-09-24 RX ORDER — PROPOFOL 10 MG/ML
VIAL (ML) INTRAVENOUS AS NEEDED
Status: DISCONTINUED | OUTPATIENT
Start: 2024-09-24 | End: 2024-09-24 | Stop reason: SURG

## 2024-09-24 RX ORDER — SODIUM CHLORIDE, SODIUM LACTATE, POTASSIUM CHLORIDE, CALCIUM CHLORIDE 600; 310; 30; 20 MG/100ML; MG/100ML; MG/100ML; MG/100ML
1000 INJECTION, SOLUTION INTRAVENOUS CONTINUOUS
Status: DISCONTINUED | OUTPATIENT
Start: 2024-09-24 | End: 2024-09-24 | Stop reason: HOSPADM

## 2024-09-24 RX ORDER — LABETALOL HYDROCHLORIDE 5 MG/ML
5 INJECTION, SOLUTION INTRAVENOUS
Status: DISCONTINUED | OUTPATIENT
Start: 2024-09-24 | End: 2024-09-24 | Stop reason: HOSPADM

## 2024-09-24 RX ORDER — LIDOCAINE HYDROCHLORIDE 10 MG/ML
0.5 INJECTION, SOLUTION EPIDURAL; INFILTRATION; INTRACAUDAL; PERINEURAL ONCE AS NEEDED
Status: DISCONTINUED | OUTPATIENT
Start: 2024-09-24 | End: 2024-09-24 | Stop reason: HOSPADM

## 2024-09-24 RX ORDER — NALOXONE HCL 0.4 MG/ML
0.4 VIAL (ML) INJECTION AS NEEDED
Status: DISCONTINUED | OUTPATIENT
Start: 2024-09-24 | End: 2024-09-24 | Stop reason: HOSPADM

## 2024-09-24 RX ORDER — BUPIVACAINE HYDROCHLORIDE AND EPINEPHRINE 2.5; 5 MG/ML; UG/ML
INJECTION, SOLUTION INFILTRATION; PERINEURAL AS NEEDED
Status: DISCONTINUED | OUTPATIENT
Start: 2024-09-24 | End: 2024-09-24 | Stop reason: HOSPADM

## 2024-09-24 RX ORDER — FENTANYL CITRATE 50 UG/ML
50 INJECTION, SOLUTION INTRAMUSCULAR; INTRAVENOUS
Status: DISCONTINUED | OUTPATIENT
Start: 2024-09-24 | End: 2024-09-24 | Stop reason: HOSPADM

## 2024-09-24 RX ADMIN — FENTANYL CITRATE 50 MCG: 50 INJECTION, SOLUTION INTRAMUSCULAR; INTRAVENOUS at 13:22

## 2024-09-24 RX ADMIN — VANCOMYCIN HYDROCHLORIDE 1500 MG: 10 INJECTION, POWDER, LYOPHILIZED, FOR SOLUTION INTRAVENOUS at 12:44

## 2024-09-24 RX ADMIN — Medication 200 MCG: at 13:38

## 2024-09-24 RX ADMIN — PROPOFOL 150 MG: 10 INJECTION, EMULSION INTRAVENOUS at 13:15

## 2024-09-24 RX ADMIN — LIDOCAINE HYDROCHLORIDE 100 MG: 20 INJECTION, SOLUTION EPIDURAL; INFILTRATION; INTRACAUDAL; PERINEURAL at 13:15

## 2024-09-24 RX ADMIN — SODIUM CHLORIDE, POTASSIUM CHLORIDE, SODIUM LACTATE AND CALCIUM CHLORIDE 1000 ML: 600; 310; 30; 20 INJECTION, SOLUTION INTRAVENOUS at 11:45

## 2024-09-24 RX ADMIN — Medication 100 MCG: at 13:27

## 2024-09-24 RX ADMIN — Medication 200 MCG: at 13:32

## 2024-09-24 RX ADMIN — FENTANYL CITRATE 50 MCG: 50 INJECTION, SOLUTION INTRAMUSCULAR; INTRAVENOUS at 13:18

## 2024-09-24 RX ADMIN — ONDANSETRON 4 MG: 2 INJECTION INTRAMUSCULAR; INTRAVENOUS at 13:48

## 2024-10-08 ENCOUNTER — OFFICE VISIT (OUTPATIENT)
Dept: NEUROSURGERY | Facility: CLINIC | Age: 79
End: 2024-10-08
Payer: MEDICARE

## 2024-10-08 VITALS — BODY MASS INDEX: 31.14 KG/M2 | WEIGHT: 235 LBS | HEIGHT: 73 IN

## 2024-10-08 DIAGNOSIS — Z98.890 S/P CARPAL TUNNEL RELEASE: Primary | ICD-10-CM

## 2024-10-08 DIAGNOSIS — E66.811 CLASS 1 OBESITY DUE TO EXCESS CALORIES WITH SERIOUS COMORBIDITY AND BODY MASS INDEX (BMI) OF 31.0 TO 31.9 IN ADULT: ICD-10-CM

## 2024-10-08 DIAGNOSIS — G56.02 LEFT CARPAL TUNNEL SYNDROME: ICD-10-CM

## 2024-10-08 DIAGNOSIS — E66.09 CLASS 1 OBESITY DUE TO EXCESS CALORIES WITH SERIOUS COMORBIDITY AND BODY MASS INDEX (BMI) OF 31.0 TO 31.9 IN ADULT: ICD-10-CM

## 2024-10-08 DIAGNOSIS — Z91.81 AT RISK FOR FALLS: ICD-10-CM

## 2024-10-08 PROCEDURE — 1160F RVW MEDS BY RX/DR IN RCRD: CPT | Performed by: NURSE PRACTITIONER

## 2024-10-08 PROCEDURE — 1159F MED LIST DOCD IN RCRD: CPT | Performed by: NURSE PRACTITIONER

## 2024-10-08 PROCEDURE — 99024 POSTOP FOLLOW-UP VISIT: CPT | Performed by: NURSE PRACTITIONER

## 2024-10-08 RX ORDER — PANTOPRAZOLE SODIUM 40 MG/1
TABLET, DELAYED RELEASE ORAL
COMMUNITY
Start: 2024-09-22

## 2024-10-08 NOTE — PROGRESS NOTES
Chief complaint:   Chief Complaint   Patient presents with    Post-op     Pt is here for 2 wk PO visit.          Subjective     HPI:   Interval History: Jonh Peacock is a 78 y.o.  male who presents today for post operative follow-up from a Carpal Tunnel Release; Left - Left on 9/24/2024.  Mr. Peacock has done extremely well since we last saw him.  Compliant with bandaging to date.  He continues to complain of numbness and tingling dysesthesias to the tips of digits 1-3 of the left hand, however his left wrist and hand pain has resolved and his dexterity has improved.  He denies fevers, chills, or concern for postoperative incision infection.  He currently rates the severity of his symptoms 2/10.    PFSH:  Past Medical History:   Diagnosis Date    Anemia     Anxiety     Arrhythmia     Arthritis     Atrial fibrillation     Cardiomyopathy     CHF (congestive heart failure)     Colon polyp     Colostomy present     10years    Connective tissue disease     Depression     Diabetes mellitus     Diverticulitis     Dizzy     DVT (deep venous thrombosis)     Gastritis     GERD (gastroesophageal reflux disease)     Hiatal hernia     History of transfusion     Hyperlipidemia     Hypertension     Low back pain     Neuropathy     Pneumonia      Past Surgical History:   Procedure Laterality Date    APPENDECTOMY      BACK SURGERY  2002, 2007    Multiple spine surgeries - laminectomy & fusion    CARDIAC CATHETERIZATION      CARDIAC DEFIBRILLATOR PLACEMENT      CARDIAC ELECTROPHYSIOLOGY PROCEDURE N/A 10/31/2019    Procedure: ICD GEN CHANGE;  Surgeon: Chaz Chavez MD;  Location:  [facility];  Service: Cardiology    CARDIAC ELECTROPHYSIOLOGY PROCEDURE N/A 05/29/2024    Procedure: Ablation PVC;  Surgeon: Rossana Horne MD;  Location:  [facility];  Service: Cardiovascular;  Laterality: N/A;    CARPAL TUNNEL RELEASE Right 7/30/2024    Procedure: CARPAL TUNNEL RELEASE,  right;  Surgeon: Noah Oliveros MD;  Location:  [facility] OR;  Service: Neurosurgery;  Laterality: Right;    CARPAL TUNNEL RELEASE Left 9/24/2024    Procedure: CARPAL TUNNEL RELEASE; LEFT;  Surgeon: Noah Oliveros MD;  Location:  [facility] OR;  Service: Neurosurgery;  Laterality: Left;    COLON RESECTION WITH COLOSTOMY      COLONOSCOPY  09/04/2014    diverticulosis    COLONOSCOPY N/A 10/29/2019    Procedure: COLONOSCOPY WITH ANESTHESIA;  Surgeon: Tenzin Abrams MD;  Location:  [facility];  Service: Gastroenterology    ENDOSCOPY  02/24/1999    small sliding hiatal hernia    FOOT SURGERY Bilateral 2005    JOINT REPLACEMENT Right 2002    KNEE    PACEMAKER IMPLANTATION  2012    RECTAL FISSURE INCISION AND DRAINAGE      REPLACEMENT TOTAL KNEE Bilateral     SPINAL CORD STIMULATOR IMPLANT  08/2012    Placed by Dr. DEENA Villela, [email or web address]    THORACIC LAMINECTOMY WITH PLACEMENT OF DORSAL COLUMN STIMULATOR Right 01/28/2021    Procedure: SPINAL CORD STIMULATOR REVISION, right buttocks;  Surgeon: Noah Oliveros MD;  Location:  [facility];  Service: Neurosurgery;  Laterality: Right;    TOTAL HIP ARTHROPLASTY Right 2004    TOTAL HIP ARTHROPLASTY Left 12/2008       Objective      Current Outpatient Medications   Medication Sig Dispense Refill    atorvastatin (LIPITOR) 20 MG tablet Take 1 tablet by mouth Daily. 90 tablet 3    Buprenorphine 10 MCG/HR patch weekly Place 15 mcg on the skin as directed by provider Every 7 (Seven) Days. POSTERIOR RIGHT SHOULDER      Diclofenac Sodium (VOLTAREN) 1 % gel gel Apply 4 g topically to the appropriate area as directed 4 (Four) Times a Day As Needed (bilatearl hand pain). 350 g 6    dilTIAZem (CARDIZEM) 30 MG tablet Take 1 tablet by mouth 3 (Three) Times a Day. 270 tablet 3    furosemide (LASIX) 20 MG tablet Take 1 tablet by mouth Daily. 90 tablet 3    furosemide (LASIX) 40 MG tablet Take 1 tablet by mouth Daily. TAKE WITH 20 MG TABLET FOR 60 PER MG PER DAY 90 tablet 3     "gabapentin (NEURONTIN) 300 MG capsule Take 1 capsule by mouth 4 (Four) Times a Day.      Glucosamine 750 MG tablet Take 1 tablet by mouth 2 (Two) Times a Day.      losartan (COZAAR) 25 MG tablet Take 1 tablet by mouth Daily. 90 tablet 3    methocarbamol (Robaxin) 500 MG tablet Take 1 tablet by mouth 4 (Four) Times a Day. (Patient taking differently: Take 1 tablet by mouth As Needed for Muscle Spasms.) 20 tablet 1    metoprolol succinate XL (TOPROL-XL) 100 MG 24 hr tablet Take 1 tablet by mouth Daily. (Patient taking differently: Take 1 tablet by mouth Daily. Takes 1/2 a pill) 90 tablet 3    metoprolol succinate XL (TOPROL-XL) 50 MG 24 hr tablet Take 1 tablet by mouth Daily.      Multiple Vitamins-Minerals (MULTIVITAMIN ADULT PO) Take 1 tablet by mouth Daily.      naloxone (NARCAN) 4 MG/0.1ML nasal spray Call 911. Don't prime. [place] in 1 nostril for overdose. Repeat in 2-3 minutes in other nostril if no or minimal breathing/responsiveness. 2 each 0    pantoprazole (PROTONIX) 40 MG EC tablet       polyethylene glycol (MIRALAX) powder Take 17 g by mouth Every Night. 1581 g 3    Sotalol HCl AF 80 MG tablet Take 1 tablet by mouth 2 (Two) Times a Day. 60 tablet 11    tamsulosin (FLOMAX) 0.4 MG capsule 24 hr capsule Take 1 capsule by mouth Every Night. 90 capsule 3    Tentative Restart Date for Anticoagulation Restart Warfarin (Coumadin) on August 13, 2024      Tentative Restart Date for Anticoagulation Restart Warfarin (Coumadin) on October 8, 2024      warfarin (COUMADIN) 2 MG tablet Take 1 tablet by mouth Daily. 90 tablet 1    warfarin (COUMADIN) 4 MG tablet Take 1 tablet by mouth Daily. 90 tablet 1    warfarin (COUMADIN) 5 MG tablet Take 1 tablet by mouth Daily. 90 tablet 3     No current facility-administered medications for this visit.       Vital Signs  Ht 185 cm (72.84\")   Wt 107 kg (235 lb)   BMI 31.14 kg/m²   Physical Exam  Vitals and nursing note reviewed.   Constitutional:       General: He is not in acute " distress.     Appearance: Normal appearance. He is well-developed and well-groomed. He is obese. He is not ill-appearing, toxic-appearing or diaphoretic.          Comments: BMI 31.14   HENT:      Head: Normocephalic and atraumatic.      Right Ear: Hearing normal.      Left Ear: Hearing normal.   Eyes:      General: Lids are normal.      Extraocular Movements: Extraocular movements intact.      Conjunctiva/sclera: Conjunctivae normal.      Pupils: Pupils are equal, round, and reactive to light.   Neck:      Trachea: Trachea normal.   Cardiovascular:      Rate and Rhythm: Normal rate and regular rhythm.   Pulmonary:      Effort: Pulmonary effort is normal. No tachypnea, bradypnea, accessory muscle usage or respiratory distress.   Abdominal:      Palpations: Abdomen is soft.   Musculoskeletal:      Cervical back: Full passive range of motion without pain and neck supple.   Skin:     General: Skin is warm and dry.   Neurological:      GCS: GCS eye subscore is 4. GCS verbal subscore is 5. GCS motor subscore is 6.      Coordination: Coordination is intact.   Psychiatric:         Speech: Speech normal.         Behavior: Behavior normal. Behavior is cooperative.       Neurological Exam  Mental Status  Awake, alert and oriented to person, place and time. Speech is normal. Language is fluent with no aphasia. Attention and concentration are normal.    Cranial Nerves  CN I: Sense of smell is normal.  CN II: Visual acuity is normal.  CN III, IV, VI: Extraocular movements intact bilaterally. Normal lids and orbits bilaterally. Pupils equal round and reactive to light bilaterally.  CN V: Facial sensation is normal.  CN VII: Full and symmetric facial movement.  CN IX, X: Palate elevates symmetrically  CN XI: Shoulder shrug strength is normal.  CN XII: Tongue midline without atrophy or fasciculations.    Motor  Normal muscle bulk throughout. Normal muscle tone.                                               Right                      Left  Toe extension                        5                          5                                             Right                     Left  Deltoid                                   4                          4   Biceps                                   5                          5   Triceps                                  5                          5   Wrist extensor                       5                          5   Iliopsoas                               5                          5   Quadriceps                           5                          5   Anterior tibialis                      5                          5   Posterior tibialis                    5                          5    Sensory  Sensation is intact to light touch, pinprick, vibration and proprioception in all four extremities.    Coordination    Finger-to-nose, rapid alternating movements and heel-to-shin normal bilaterally without dysmetria.    Gait    Well-maintained gait with walker.    Incision:   WOUND IMAGE - Sp left carpal release (10/08/2024)  WOUND IMAGE - SP left carpal tunnel release (10/08/2024)  (Consent to obtain photo of postoperative site for documentation purposes only obtained verbally by Mr. Peacock)    Results Review: No new imaging      Assessment/Plan:   Left carpal tunnel status post release  Jonh Peacock is a 78 y.o. male whom presents today for post operative wound check following a Carpal Tunnel Release; Left - Left on 9/24/2024.  Mr. Peacock has done well since we last saw him with resolve of left hand/wrist pain and improved left upper extremity sensation.  Compliant with post-op bandaging to date.  His postoperative incision is clean, dry, intact, and well approximated without signs of soft tissue infection.   4 sutures removed.   Mr. Peacock tolerated this procedure well.  We discussed the signs and symptoms of a soft tissue infection and I recommended they call immediately for any  concerns.  I advised the patient to keep his incision clean and dry.  Avoid excessive use of left hand/ wrist with steady but slow progression back to normal daily activities and use of extremity.  I advised the patient to keep scheduled appointment with Dr. Guaman for reassessment on 12/2/2024.   Jonh known to call the neurosurgical clinic to return sooner for any new or additional concerns.     Class 1 obesity (BMI 30.0-34.9) due to excessive calories with serious comorbidities BMI of 31.0-31.9 in adult  Body mass index is 31.14 kg/m². Information on the DASH diet provided in the AVS.  We will continue to provided diet and exercise information with the goal of weight loss at each scheduled appointment.     Fall risk  [facility] Fall Risk [facility] was completed, and patient is at LOW risk for falls.Assessment completed on:10/8/2024  Fall risk information provided in AVS.     Diagnoses and all orders for this visit:    1. S/P carpal tunnel release (Primary)    2. Left carpal tunnel syndrome    3. Class 1 obesity due to excess calories with serious comorbidity and body mass index (BMI) of 31.0 to 31.9 in adult    4. At risk for falls      Return for KEEP SCHEDULED APPT WITH DR. GUAMAN.    I discussed the patients findings and my recommendations with patient    REYMUNDO Donato; 10/8/2024; 15:14 CDT

## 2024-10-08 NOTE — PATIENT INSTRUCTIONS

## 2024-10-15 DIAGNOSIS — G56.03 BILATERAL CARPAL TUNNEL SYNDROME: ICD-10-CM

## 2024-10-15 DIAGNOSIS — R20.0 BILATERAL HAND NUMBNESS: ICD-10-CM

## 2024-10-15 NOTE — TELEPHONE ENCOUNTER
Rx Refill Note  Requested Prescriptions     Pending Prescriptions Disp Refills    Diclofenac Sodium (VOLTAREN) 1 % gel gel 350 g 6     Sig: Apply 4 g topically to the appropriate area as directed 4 (Four) Times a Day As Needed (bilatearl hand pain).      Last office visit with prescribing clinician: 5/20/2024   Last telemedicine visit with prescribing clinician: Visit date not found   Next office visit with prescribing clinician: Visit date not found                         Would you like a call back once the refill request has been completed: [] Yes [] No    If the office needs to give you a call back, can they leave a voicemail: [] Yes [] No    Magen Bueno MA  10/15/24, 09:51 CDT

## 2024-10-16 ENCOUNTER — LAB (OUTPATIENT)
Dept: LAB | Facility: HOSPITAL | Age: 79
End: 2024-10-16
Payer: MEDICARE

## 2024-10-16 DIAGNOSIS — Z79.01 ANTICOAGULANT LONG-TERM USE: Chronic | ICD-10-CM

## 2024-10-16 DIAGNOSIS — I48.0 PAROXYSMAL ATRIAL FIBRILLATION: Chronic | ICD-10-CM

## 2024-10-16 LAB
INR PPP: 1.7 (ref 0.91–1.09)
PROTHROMBIN TIME: 20.8 SECONDS (ref 10–13.8)

## 2024-10-16 PROCEDURE — 85610 PROTHROMBIN TIME: CPT | Performed by: INTERNAL MEDICINE

## 2024-10-17 RX ORDER — PANTOPRAZOLE SODIUM 40 MG/1
40 TABLET, DELAYED RELEASE ORAL DAILY
Qty: 90 TABLET | Refills: 2 | Status: SHIPPED | OUTPATIENT
Start: 2024-10-17

## 2024-10-17 NOTE — TELEPHONE ENCOUNTER
Rx Refill Note  Requested Prescriptions     Pending Prescriptions Disp Refills    pantoprazole (PROTONIX) 40 MG EC tablet 90 tablet 2     Sig: Take 1 tablet by mouth Daily.      Last office visit with prescribing clinician: 5/20/2024   Last telemedicine visit with prescribing clinician: Visit date not found   Next office visit with prescribing clinician: Visit date not found                         Would you like a call back once the refill request has been completed: [] Yes [] No    If the office needs to give you a call back, can they leave a voicemail: [] Yes [] No    Magen Bueno MA  10/17/24, 15:58 CDT

## 2024-10-31 ENCOUNTER — LAB (OUTPATIENT)
Dept: LAB | Facility: HOSPITAL | Age: 79
End: 2024-10-31
Payer: MEDICARE

## 2024-10-31 ENCOUNTER — TELEPHONE (OUTPATIENT)
Dept: INTERNAL MEDICINE | Facility: CLINIC | Age: 79
End: 2024-10-31
Payer: MEDICARE

## 2024-10-31 DIAGNOSIS — Z79.01 ANTICOAGULANT LONG-TERM USE: Chronic | ICD-10-CM

## 2024-10-31 DIAGNOSIS — I48.0 PAROXYSMAL ATRIAL FIBRILLATION: Chronic | ICD-10-CM

## 2024-10-31 LAB
INR PPP: 3 (ref 0.91–1.09)
PROTHROMBIN TIME: 36.2 SECONDS (ref 10–13.8)

## 2024-10-31 PROCEDURE — 85610 PROTHROMBIN TIME: CPT | Performed by: INTERNAL MEDICINE

## 2024-10-31 NOTE — TELEPHONE ENCOUNTER
PATIENT HAS CALLED, HE STATED THAT THE DICLOFENAC WAS REJECTED BY HIS INSURANCE FOR [facility] (LOCAL).  [employer] MAIL ORDER IS SENDING A REQUEST FOR THE PRESCRIPTION TO BE SENT.  THEY HAVE THIS IN STOCK.

## 2024-11-06 DIAGNOSIS — R20.0 BILATERAL HAND NUMBNESS: ICD-10-CM

## 2024-11-06 DIAGNOSIS — G56.03 BILATERAL CARPAL TUNNEL SYNDROME: ICD-10-CM

## 2024-11-06 NOTE — TELEPHONE ENCOUNTER
Rx Refill Note  Requested Prescriptions     Pending Prescriptions Disp Refills    Diclofenac Sodium (VOLTAREN) 1 % gel gel 100 g 6     Sig: Apply 4 g topically to the appropriate area as directed 4 (Four) Times a Day As Needed (bilatearl hand pain). PATIENT STATED THAT HE GETS 4 100g TUBES EVERY MONTH.      Last office visit with prescribing clinician: 5/20/2024   Last telemedicine visit with prescribing clinician: Visit date not found   Next office visit with prescribing clinician: Visit date not found                         Would you like a call back once the refill request has been completed: [] Yes [] No    If the office needs to give you a call back, can they leave a voicemail: [] Yes [] No    Magen Bueno MA  11/06/24, 11:18 CST        [facility]# [id number] THIS IS THE ONE THE INSURANCE COVERS.         PATIENT ALSO NEEDS A SCRIPT SENT TO [facility] FOR HIS OSTOMY SUPPLIES.

## 2024-11-07 ENCOUNTER — TELEPHONE (OUTPATIENT)
Dept: INTERNAL MEDICINE | Facility: CLINIC | Age: 79
End: 2024-11-07
Payer: MEDICARE

## 2024-11-07 DIAGNOSIS — Z93.3 COLOSTOMY IN PLACE: Primary | ICD-10-CM

## 2024-11-07 NOTE — TELEPHONE ENCOUNTER
Patient needs an order for ostomy supplies faxed to [facility], patient states he called them and they have what he needs in stock.

## 2024-11-11 ENCOUNTER — TELEPHONE (OUTPATIENT)
Dept: INTERNAL MEDICINE | Facility: CLINIC | Age: 79
End: 2024-11-11
Payer: MEDICARE

## 2024-11-11 NOTE — TELEPHONE ENCOUNTER
Pharmacy Name:  [facility]    Reference Number (if applicable): [id number]    Pharmacy representative name: RADHA- PHARMACY TECHNICIAN    Pharmacy representative phone number: [phone]    What medication are you calling in regards to: Diclofenac Sodium (VOLTAREN) 1 % gel gel     What question does the pharmacy have: EDILIA STATES THE MEDICATION IS ON BACKORDER AND THEY NEED AN ALTERNATIVE MEDICATION.

## 2024-11-12 NOTE — TELEPHONE ENCOUNTER
I tried calling patient to ask if he would like the prescription sent to a different pharmacy, but he was not home.  I spoke to patient's wife and she said she will have him call the office tomorrow.

## 2024-11-14 NOTE — TELEPHONE ENCOUNTER
Patient stated there is a nationwide shortage on diclofenac gel and he does not think he will be able to get it from any pharmacy at this time.  He asked if there is something else that can be prescribed.

## 2024-11-14 NOTE — PROGRESS NOTES
[facility] - PODIATRY    Today's Date: 11/18/24    Patient Name: Jonh Peacock  MRN: [id number]  CSN: [id number]  PCP: Ric Antony DO  Referring Provider: No ref. provider found     SUBJECTIVE     Chief Complaint   Patient presents with    Follow-up     Ric Antony 05/20/2024 10 [facility]- pt states feet doing good- pt denies pain     Diabetes       HPI: Jonh Peacock, a 78 y.o.male, comes to clinic as a(n) established patient presenting for diabetic foot exam and complaining of thick fungal toenails. Pt with h/o Anxiety, Arrhythmia, Cardiomyopathy, CHF, Depression, previous DVT, HTN, Obesity, Anticoagulation with Warfarin. Patient presents with thickened nails that are difficult to care for due to thickness and anticoagulation therapy.  Patient is NIDDM and unsure of last BG level.   Denies pain today. Uses rollator for ambulation. Continues Coumadin daily. Notes improvement to feet with regular visits. Reports numbness in his feet. Denies any constitutional symptoms. No other pedal complaints at this time.    Past Medical History:   Diagnosis Date    Anemia     Anxiety     Arrhythmia     Arthritis     Atrial fibrillation     Cardiomyopathy     CHF (congestive heart failure)     Colon polyp     Colostomy present     10years    Connective tissue disease     Depression     Diabetes mellitus     Diverticulitis     Dizzy     DVT (deep venous thrombosis)     Gastritis     GERD (gastroesophageal reflux disease)     Hiatal hernia     History of transfusion     Hyperlipidemia     Hypertension     Low back pain     Neuropathy     Pneumonia      Past Surgical History:   Procedure Laterality Date    APPENDECTOMY      BACK SURGERY  2002, 2007    Multiple spine surgeries - laminectomy & fusion    CARDIAC CATHETERIZATION      CARDIAC DEFIBRILLATOR PLACEMENT      CARDIAC ELECTROPHYSIOLOGY PROCEDURE N/A 10/31/2019    Procedure: ICD GEN CHANGE;  Surgeon: Chaz Chavez  MD Lefty;  Location:  [facility]ATH [facility];  Service: Cardiology    CARDIAC ELECTROPHYSIOLOGY PROCEDURE N/A 05/29/2024    Procedure: Ablation PVC;  Surgeon: Rossana Horne MD;  Location:  [facility]ATH [facility];  Service: Cardiovascular;  Laterality: N/A;    CARPAL TUNNEL RELEASE Right 7/30/2024    Procedure: CARPAL TUNNEL RELEASE, right;  Surgeon: Noah Oliveros MD;  Location:  [facility] OR;  Service: Neurosurgery;  Laterality: Right;    CARPAL TUNNEL RELEASE Left 9/24/2024    Procedure: CARPAL TUNNEL RELEASE; LEFT;  Surgeon: Noah Oliveros MD;  Location:  [facility] OR;  Service: Neurosurgery;  Laterality: Left;    COLON RESECTION WITH COLOSTOMY      COLONOSCOPY  09/04/2014    diverticulosis    COLONOSCOPY N/A 10/29/2019    Procedure: COLONOSCOPY WITH ANESTHESIA;  Surgeon: Tenzin Abrams MD;  Location: [facility] ENDOSCOPY;  Service: Gastroenterology    ENDOSCOPY  02/24/1999    small sliding hiatal hernia    FOOT SURGERY Bilateral 2005    JOINT REPLACEMENT Right 2002    KNEE    PACEMAKER IMPLANTATION  2012    RECTAL FISSURE INCISION AND DRAINAGE      REPLACEMENT TOTAL KNEE Bilateral     SPINAL CORD STIMULATOR IMPLANT  08/2012    Placed by Dr. DEENA Villela, [employer]    THORACIC LAMINECTOMY WITH PLACEMENT OF DORSAL COLUMN STIMULATOR Right 01/28/2021    Procedure: SPINAL CORD STIMULATOR REVISION, right buttocks;  Surgeon: Noah Oliveros MD;  Location:  [facility] OR;  Service: Neurosurgery;  Laterality: Right;    TOTAL HIP ARTHROPLASTY Right 2004    TOTAL HIP ARTHROPLASTY Left 12/2008     Family History   Problem Relation Age of Onset    Heart disease Mother     Heart disease Brother     Breast cancer Brother     Colon cancer Neg Hx     Colon polyps Neg Hx      Social History     Socioeconomic History    Marital status:    Tobacco Use    Smoking status: Never     Passive exposure: Never    Smokeless tobacco: Never   Vaping Use    Vaping status: Never Used   Substance and Sexual  "Activity    Alcohol use: Not Currently    Drug use: Never    Sexual activity: Defer     Allergies   Allergen Reactions    Percocet [Oxycodone-Acetaminophen] Urinary Retention    Amiodarone Other (See Comments)     INTERFERES WITH OTHER MEDICATIONS    Oxycontin [Oxycodone Hcl] Urinary Retention    Penicillins Hives     All \"cillins\"    Vioxx [Rofecoxib] GI Intolerance     Current Outpatient Medications   Medication Sig Dispense Refill    atorvastatin (LIPITOR) 20 MG tablet Take 1 tablet by mouth Daily. 90 tablet 3    Buprenorphine 10 MCG/HR patch weekly Place 15 mcg on the skin as directed by provider Every 7 (Seven) Days. POSTERIOR RIGHT SHOULDER      Diclofenac Sodium (VOLTAREN) 1 % gel gel Apply 4 g topically to the appropriate area as directed 4 (Four) Times a Day As Needed (bilatearl hand pain). 350 g 3    dilTIAZem (CARDIZEM) 30 MG tablet Take 1 tablet by mouth 3 (Three) Times a Day. 270 tablet 3    furosemide (LASIX) 20 MG tablet Take 1 tablet by mouth Daily. 90 tablet 3    furosemide (LASIX) 40 MG tablet Take 1 tablet by mouth Daily. TAKE WITH 20 MG TABLET FOR 60 PER MG PER DAY 90 tablet 3    gabapentin (NEURONTIN) 300 MG capsule Take 1 capsule by mouth 4 (Four) Times a Day.      Glucosamine 750 MG tablet Take 1 tablet by mouth 2 (Two) Times a Day.      losartan (COZAAR) 25 MG tablet Take 1 tablet by mouth Daily. 90 tablet 3    methocarbamol (Robaxin) 500 MG tablet Take 1 tablet by mouth 4 (Four) Times a Day. (Patient taking differently: Take 1 tablet by mouth As Needed for Muscle Spasms.) 20 tablet 1    metoprolol succinate XL (TOPROL-XL) 100 MG 24 hr tablet Take 1 tablet by mouth Daily. (Patient taking differently: Take 1 tablet by mouth Daily. Takes 1/2 a pill) 90 tablet 3    metoprolol succinate XL (TOPROL-XL) 50 MG 24 hr tablet Take 1 tablet by mouth Daily.      Multiple Vitamins-Minerals (MULTIVITAMIN ADULT PO) Take 1 tablet by mouth Daily.      naloxone (NARCAN) 4 MG/0.1ML nasal spray Call 911. Don't " prime. Spray in 1 nostril for overdose. Repeat in 2-3 minutes in other nostril if no or minimal breathing/responsiveness. 2 each 0    pantoprazole (PROTONIX) 40 MG EC tablet       pantoprazole (PROTONIX) 40 MG EC tablet Take 1 tablet by mouth Daily. 90 tablet 2    polyethylene glycol (MIRALAX) powder Take 17 g by mouth Every Night. 1581 g 3    Sotalol HCl AF 80 MG tablet Take 1 tablet by mouth 2 (Two) Times a Day. 60 tablet 11    tamsulosin (FLOMAX) 0.4 MG capsule 24 hr capsule Take 1 capsule by mouth Every Night. 90 capsule 3    Tentative Restart Date for Anticoagulation Restart Warfarin (Coumadin) on August 13, 2024      Tentative Restart Date for Anticoagulation Restart Warfarin (Coumadin) on October 8, 2024      warfarin (COUMADIN) 2 MG tablet Take 1 tablet by mouth Daily. 90 tablet 1    warfarin (COUMADIN) 4 MG tablet Take 1 tablet by mouth Daily. 90 tablet 1    warfarin (COUMADIN) 5 MG tablet Take 1 tablet by mouth Daily. 90 tablet 3     No current facility-administered medications for this visit.     Review of Systems   Constitutional:  Negative for chills and fever.   HENT:  Negative for congestion.    Respiratory:  Negative for shortness of breath.    Cardiovascular:  Positive for leg swelling. Negative for chest pain.   Gastrointestinal:  Negative for constipation, diarrhea, nausea and vomiting.   Musculoskeletal:  Positive for arthralgias, back pain and gait problem.        Foot pain   Skin:  Negative for wound.        Thick Toenails   Neurological:  Positive for numbness.   Hematological:  Bruises/bleeds easily.       OBJECTIVE     Vitals:    11/18/24 1516   BP: 108/60   Pulse: 82   SpO2: 99%         PHYSICAL EXAM  GEN:   Accompanied by wife.    Foot/Ankle Exam    GENERAL  Diabetic foot exam performed    Appearance:  appears stated age  Orientation:  AAOx3  Affect:  appropriate  Gait:  (unsteady)  Assistance:  walker  Right shoe gear: casual shoe  Left shoe gear: casual shoe (with lift)    VASCULAR      Right Foot Vascularity   Dorsalis pedis:  1+  Posterior tibial:  1+  Skin temperature:  warm  Edema grading:  Trace and non-pitting  CFT:  4  Pedal hair growth:  Absent  Varicosities:  none     Left Foot Vascularity   Dorsalis pedis:  1+  Posterior tibial:  1+  Skin temperature:  warm  Edema grading:  Trace and non-pitting  CFT:  4  Pedal hair growth:  Absent  Varicosities:  none     NEUROLOGIC     Right Foot Neurologic   Light touch sensation: diminished  Vibratory sensation: diminished  Hot/Cold sensation: diminished  Protective Sensation using [place] Monofilament:   Sites intact: 7  Sites tested: 10     Left Foot Neurologic   Light touch sensation: diminished  Vibratory sensation: diminished  Hot/Cold sensation:  diminished  Protective Sensation using [place] Monofilament:   Sites intact: 7  Sites tested: 10    MUSCULOSKELETAL     Right Foot Musculoskeletal   Ecchymosis:  none  Tenderness:  toenail problem    Arch:  Pes planus  Hallux valgus: No       Left Foot Musculoskeletal   Ecchymosis:  none  Tenderness:  toenail problem  Arch:  Pes planus  Hallux valgus: No      MUSCLE STRENGTH     Right Foot Muscle Strength   Foot dorsiflexion:  5  Foot plantar flexion:  5  Foot inversion:  5  Foot eversion:  5     Left Foot Muscle Strength   Foot dorsiflexion:  5  Foot plantar flexion:  5  Foot inversion:  5  Foot eversion:  5    RANGE OF MOTION     Right Foot Range of Motion   Foot and ankle ROM within normal limits       Left Foot Range of Motion   Foot and ankle ROM within normal limits      DERMATOLOGIC      Right Foot Dermatologic   Skin  Right foot skin is intact.   Nails  1.  Positive for elongated, onychomycosis, abnormal thickness and dystrophic nail.  2.  Positive for elongated, onychomycosis, abnormal thickness and dystrophic nail.  3.  Positive for elongated, onychomycosis, abnormal thickness and dystrophic nail.  4.  Positive for elongated, onychomycosis, abnormal thickness and dystrophic  nail.  5.  Positive for elongated, onychomycosis, abnormal thickness and dystrophic nail.     Left Foot Dermatologic   Skin  Left foot skin is intact.   Nails  1.  Positive for elongated, onychomycosis, abnormal thickness and dystrophic nail.  2.  Positive for elongated, onychomycosis, abnormal thickness and dystrophic nail.  3.  Positive for elongated, onychomycosis, abnormal thickness and dystrophic nail.  4.  Positive for elongated, onychomycosis, abnormally thick and dystrophic nail.  5.  Positive for elongated, onychomycosis, abnormally thick and dystrophic nail.       RADIOLOGY/NUCLEAR:  No results found.    LABORATORY/CULTURE RESULTS:      PATHOLOGY RESULTS:       ASSESSMENT/PLAN     Diagnoses and all orders for this visit:    1. Onychomycosis (Primary)    2. Type 2 diabetes mellitus with diabetic neuropathy, without long-term current use of insulin    3. Lower limb length difference    4. Anticoagulant long-term use    5. Gait abnormality    6. Foot callus      Comprehensive lower extremity examination and evaluation was performed.  Discussed findings and treatment plan including risks, benefits, and treatment options with patient in detail. Patient agreed with treatment plan.   After verbal consent obtained, nail(s) x10 debrided of length and thickness with nail nipper without incidence  Patient may maintain nails and calluses at home utilizing emery board or pumice stone between visits as needed  Reviewed at home diabetic foot care including daily foot checks   Continue shoes with custom inserts  Continue use of rollator for ambulation assistance.  Elevate legs when possible.  Follow with PCP for management of diabetes  An After Visit Summary was printed and given to the patient at discharge, including (if requested) any available informative/educational handouts regarding diagnosis, treatment, or medications. All questions were answered to patient/family satisfaction. Should symptoms fail to improve or  worsen they agree to call or return to clinic or to go to the Emergency Department. Discussed the importance of following up with any needed screening tests/labs/specialist appointments and any requested follow-up recommended by me today. Importance of maintaining follow-up discussed and patient accepts that missed appointments can delay diagnosis and potentially lead to worsening of conditions.  Return in about 10 weeks (around 1/27/2025) for With Sallie DWYER, Schedule Foot Care Clinic, Diabetic Foot Exam., or sooner if acute issues arise.        This document has been electronically signed by REYMUNDO Santana on November 18, 2024 16:02 CST

## 2024-11-18 ENCOUNTER — OFFICE VISIT (OUTPATIENT)
Age: 79
End: 2024-11-18
Payer: MEDICARE

## 2024-11-18 VITALS
WEIGHT: 235 LBS | BODY MASS INDEX: 31.14 KG/M2 | DIASTOLIC BLOOD PRESSURE: 60 MMHG | HEART RATE: 82 BPM | HEIGHT: 73 IN | SYSTOLIC BLOOD PRESSURE: 108 MMHG | OXYGEN SATURATION: 99 %

## 2024-11-18 DIAGNOSIS — L84 FOOT CALLUS: ICD-10-CM

## 2024-11-18 DIAGNOSIS — E11.40 TYPE 2 DIABETES MELLITUS WITH DIABETIC NEUROPATHY, WITHOUT LONG-TERM CURRENT USE OF INSULIN: ICD-10-CM

## 2024-11-18 DIAGNOSIS — B35.1 ONYCHOMYCOSIS: Primary | ICD-10-CM

## 2024-11-18 DIAGNOSIS — Z79.01 ANTICOAGULANT LONG-TERM USE: ICD-10-CM

## 2024-11-18 DIAGNOSIS — R26.9 GAIT ABNORMALITY: ICD-10-CM

## 2024-11-18 DIAGNOSIS — M21.70 LOWER LIMB LENGTH DIFFERENCE: ICD-10-CM

## 2024-11-18 PROCEDURE — 3078F DIAST BP <80 MM HG: CPT | Performed by: NURSE PRACTITIONER

## 2024-11-18 PROCEDURE — 1160F RVW MEDS BY RX/DR IN RCRD: CPT | Performed by: NURSE PRACTITIONER

## 2024-11-18 PROCEDURE — 1159F MED LIST DOCD IN RCRD: CPT | Performed by: NURSE PRACTITIONER

## 2024-11-18 PROCEDURE — 11721 DEBRIDE NAIL 6 OR MORE: CPT | Performed by: NURSE PRACTITIONER

## 2024-11-18 PROCEDURE — 3074F SYST BP LT 130 MM HG: CPT | Performed by: NURSE PRACTITIONER

## 2024-11-21 NOTE — PROGRESS NOTES
Chief Complaint  Follow-up (No problems reported but patient needs a repair of his motorized chair.  He uses [employer].  He needs it mentioned in the note that he has need for the motorized chair.)    Subjective        Jonh Peacock is a 78 y.o. male who presents today for evaluation of the above problems.      History of Present Illness  The patient presents for evaluation of multiple medical concerns. He is accompanied by an adult female.  This is my initial visit with him but I have reviewed his previous office notes.    He reports a decline in his balance and memory, which he attributes to aging. He has one remaining appointment with his neurosurgeon, who previously treated him for carpal tunnel syndrome.  He sees pain management for chronic back pain.  He feels like he is more tired than usual and his balance is off.  He is using a motorized wheelchair for chronic back pain and impaired balance.  He has a history of a spinal cord stimulator and chronic back pain.  He needs repairs to the motorized wheelchair.  The batteries need to be repaired and that he thinks there is also something wrong with the motor.  He needs the motorized wheelchair to be able to get around in his house, perform ADLs, and reduce risk of falls.      He underwent a cardiac ablation for treatment of A-fib, which unfortunately did not yield the desired results. Consequently, he was prescribed sotalol. However, he reports that this medication has significantly reduced his energy levels, leading to increased sleep and fatigue.  He has not consulted his cardiologist about this problem.    Is due for labs today.  Denies any problems with current meds other than the sotalol and voices compliance.    Review of Systems - History obtained from the patient  General ROS: positive for  - fatigue  negative for - chills or fever  Respiratory ROS: no cough, shortness of breath, or wheezing  Cardiovascular ROS: no chest pain or dyspnea on  "exertion  Gastrointestinal ROS: no abdominal pain, change in bowel habits, or black or bloody stools  Neurological ROS: no TIA or stroke symptoms    Objective   Vital Signs:  Pulse 86   Temp 97.7 °F (36.5 °C) (Temporal)   Resp 16   Ht 185 cm (72.84\")   Wt 111 kg (245 lb)   SpO2 99%   BMI 32.47 kg/m²   Estimated body mass index is 32.47 kg/m² as calculated from the following:    Height as of this encounter: 185 cm (72.84\").    Weight as of this encounter: 111 kg (245 lb).           Physical Exam  Vitals and nursing note reviewed.   Constitutional:       Appearance: Normal appearance. He is normal weight.   HENT:      Mouth/Throat:      Mouth: Mucous membranes are moist.   Cardiovascular:      Rate and Rhythm: Normal rate and regular rhythm.      Pulses: Normal pulses.      Heart sounds: Normal heart sounds. No murmur heard.     No friction rub. No gallop.   Pulmonary:      Effort: Pulmonary effort is normal. No respiratory distress.      Breath sounds: Normal breath sounds. No wheezing.   Musculoskeletal:         General: Swelling (Symmetrical, bilateral, nonpitting) present. Normal range of motion.      Cervical back: Normal range of motion and neck supple.      Comments: Same and in motorized wheelchair   Skin:     General: Skin is warm and dry.      Capillary Refill: Capillary refill takes less than 2 seconds.   Neurological:      General: No focal deficit present.      Mental Status: He is alert and oriented to person, place, and time.   Psychiatric:         Mood and Affect: Mood normal.         Behavior: Behavior normal.         Thought Content: Thought content normal.         Judgment: Judgment normal.          Result Review :                 Assessment and Plan   Diagnoses and all orders for this visit:    1. Type 2 diabetes mellitus with diabetic polyneuropathy, without long-term current use of insulin (Primary)  Assessment & Plan:  Diabetes is stable.   Continue current treatment regimen.  Diabetes " will be reassessed in 6 months  A1c ordered.  Goal is less than 7.5 per ADA guidelines.    Orders:  -     Basic Metabolic Panel; Future  -     Hemoglobin A1c; Future    2. Mixed hyperlipidemia  Assessment & Plan:   Lipid abnormalities are stable    Plan:  Continue same medication/s without change.      Discussed medication dosage, use, side effects, and goals of treatment in detail.    Counseled patient on lifestyle modifications to help control hyperlipidemia.     Patient Treatment Goals:   LDL goal is less than 70    Followup in 6 months  .    Orders:  -     Lipid Panel; Future    3. Failed back syndrome  -     Miscellaneous DME  -     Ambulatory Referral to Physical Therapy for Evaluation & Treatment    4. Impaired functional mobility, balance, and endurance  -     Miscellaneous DME  -     Ambulatory Referral to Physical Therapy for Evaluation & Treatment    5. Other fatigue  Comments:  Labs ordered to further evaluate.  Notify cardiologist about problems with sotalol to see what they would like to switch it to.      I will be providing care over continum for this patient including management of both chronic and acute medical conditions          I spent 30 minutes caring for Jonh on this date of service. This time includes time spent by me in the following activities:preparing for the visit, reviewing tests, obtaining and/or reviewing a separately obtained history, performing a medically appropriate examination and/or evaluation , counseling and educating the patient/family/caregiver, ordering medications, tests, or procedures, referring and communicating with other health care professionals , documenting information in the medical record, and independently interpreting results and communicating that information with the patient/family/caregiver  Follow Up   Return in about 4 months (around 3/22/2025), or [place].  Patient was given instructions and counseling regarding his condition or for health maintenance  advice. Please see specific information pulled into the AVS if appropriate.       Patient or patient representative verbalized consent for the use of Ambient Listening during the visit with  REYMUNDO Meier for chart documentation. 11/22/2024  15:31 CST

## 2024-11-22 ENCOUNTER — LAB (OUTPATIENT)
Dept: LAB | Facility: HOSPITAL | Age: 79
End: 2024-11-22
Payer: MEDICARE

## 2024-11-22 ENCOUNTER — OFFICE VISIT (OUTPATIENT)
Dept: INTERNAL MEDICINE | Facility: CLINIC | Age: 79
End: 2024-11-22
Payer: MEDICARE

## 2024-11-22 VITALS
SYSTOLIC BLOOD PRESSURE: 108 MMHG | WEIGHT: 245 LBS | TEMPERATURE: 97.7 F | BODY MASS INDEX: 32.47 KG/M2 | OXYGEN SATURATION: 99 % | RESPIRATION RATE: 16 BRPM | DIASTOLIC BLOOD PRESSURE: 69 MMHG | HEIGHT: 73 IN | HEART RATE: 86 BPM

## 2024-11-22 DIAGNOSIS — I48.0 PAROXYSMAL ATRIAL FIBRILLATION: ICD-10-CM

## 2024-11-22 DIAGNOSIS — Z79.01 ANTICOAGULANT LONG-TERM USE: Chronic | ICD-10-CM

## 2024-11-22 DIAGNOSIS — Z74.09 IMPAIRED FUNCTIONAL MOBILITY, BALANCE, AND ENDURANCE: ICD-10-CM

## 2024-11-22 DIAGNOSIS — E78.2 MIXED HYPERLIPIDEMIA: ICD-10-CM

## 2024-11-22 DIAGNOSIS — I48.0 PAROXYSMAL ATRIAL FIBRILLATION: Chronic | ICD-10-CM

## 2024-11-22 DIAGNOSIS — E11.42 TYPE 2 DIABETES MELLITUS WITH DIABETIC POLYNEUROPATHY, WITHOUT LONG-TERM CURRENT USE OF INSULIN: ICD-10-CM

## 2024-11-22 DIAGNOSIS — R53.83 OTHER FATIGUE: ICD-10-CM

## 2024-11-22 DIAGNOSIS — E11.42 TYPE 2 DIABETES MELLITUS WITH DIABETIC POLYNEUROPATHY, WITHOUT LONG-TERM CURRENT USE OF INSULIN: Primary | ICD-10-CM

## 2024-11-22 DIAGNOSIS — M96.1 FAILED BACK SYNDROME: ICD-10-CM

## 2024-11-22 LAB
ANION GAP SERPL CALCULATED.3IONS-SCNC: 8 MMOL/L (ref 5–15)
BUN SERPL-MCNC: 25 MG/DL (ref 8–23)
BUN/CREAT SERPL: 18.9 (ref 7–25)
CALCIUM SPEC-SCNC: 9 MG/DL (ref 8.6–10.5)
CHLORIDE SERPL-SCNC: 102 MMOL/L (ref 98–107)
CHOLEST SERPL-MCNC: 190 MG/DL (ref 0–200)
CO2 SERPL-SCNC: 33 MMOL/L (ref 22–29)
CREAT SERPL-MCNC: 1.32 MG/DL (ref 0.76–1.27)
EGFRCR SERPLBLD CKD-EPI 2021: 55.6 ML/MIN/1.73
GLUCOSE SERPL-MCNC: 157 MG/DL (ref 65–99)
HBA1C MFR BLD: 6.7 % (ref 4.8–5.6)
HDLC SERPL-MCNC: 53 MG/DL (ref 40–60)
INR PPP: 3.1 (ref 0.91–1.09)
LDLC SERPL CALC-MCNC: 116 MG/DL (ref 0–100)
LDLC/HDLC SERPL: 2.15 {RATIO}
POTASSIUM SERPL-SCNC: 3.8 MMOL/L (ref 3.5–5.2)
PROTHROMBIN TIME: 36.8 SECONDS (ref 10–13.8)
SODIUM SERPL-SCNC: 143 MMOL/L (ref 136–145)
TRIGL SERPL-MCNC: 116 MG/DL (ref 0–150)
VLDLC SERPL-MCNC: 21 MG/DL (ref 5–40)

## 2024-11-22 PROCEDURE — 36415 COLL VENOUS BLD VENIPUNCTURE: CPT

## 2024-11-22 PROCEDURE — 80048 BASIC METABOLIC PNL TOTAL CA: CPT

## 2024-11-22 PROCEDURE — 83036 HEMOGLOBIN GLYCOSYLATED A1C: CPT

## 2024-11-22 PROCEDURE — 85610 PROTHROMBIN TIME: CPT | Performed by: NURSE PRACTITIONER

## 2024-11-22 PROCEDURE — 80061 LIPID PANEL: CPT

## 2024-11-22 RX ORDER — WARFARIN SODIUM 4 MG/1
4 TABLET ORAL
Qty: 90 TABLET | Refills: 1 | Status: SHIPPED | OUTPATIENT
Start: 2024-11-22

## 2024-11-22 RX ORDER — WARFARIN SODIUM 2 MG/1
2 TABLET ORAL
Qty: 90 TABLET | Refills: 1 | Status: SHIPPED | OUTPATIENT
Start: 2024-11-22

## 2024-11-22 NOTE — ASSESSMENT & PLAN NOTE
Lipid abnormalities are stable    Plan:  Continue same medication/s without change.      Discussed medication dosage, use, side effects, and goals of treatment in detail.    Counseled patient on lifestyle modifications to help control hyperlipidemia.     Patient Treatment Goals:   LDL goal is less than 70    Followup in 6 months  .

## 2024-11-22 NOTE — ASSESSMENT & PLAN NOTE
Diabetes is stable.   Continue current treatment regimen.  Diabetes will be reassessed in 6 months  A1c ordered.  Goal is less than 7.5 per ADA guidelines.

## 2024-12-02 ENCOUNTER — OFFICE VISIT (OUTPATIENT)
Dept: NEUROSURGERY | Facility: CLINIC | Age: 79
End: 2024-12-02
Payer: MEDICARE

## 2024-12-02 VITALS — HEIGHT: 74 IN | BODY MASS INDEX: 31.34 KG/M2 | WEIGHT: 244.2 LBS

## 2024-12-02 DIAGNOSIS — Z98.890 S/P CARPAL TUNNEL RELEASE: Primary | ICD-10-CM

## 2024-12-02 DIAGNOSIS — E66.811 CLASS 1 OBESITY DUE TO EXCESS CALORIES WITH SERIOUS COMORBIDITY AND BODY MASS INDEX (BMI) OF 31.0 TO 31.9 IN ADULT: ICD-10-CM

## 2024-12-02 DIAGNOSIS — Z91.81 AT RISK FOR FALLS: ICD-10-CM

## 2024-12-02 DIAGNOSIS — E66.09 CLASS 1 OBESITY DUE TO EXCESS CALORIES WITH SERIOUS COMORBIDITY AND BODY MASS INDEX (BMI) OF 31.0 TO 31.9 IN ADULT: ICD-10-CM

## 2024-12-02 PROCEDURE — 99024 POSTOP FOLLOW-UP VISIT: CPT | Performed by: NEUROLOGICAL SURGERY

## 2024-12-02 PROCEDURE — 1159F MED LIST DOCD IN RCRD: CPT | Performed by: NEUROLOGICAL SURGERY

## 2024-12-02 PROCEDURE — 1160F RVW MEDS BY RX/DR IN RCRD: CPT | Performed by: NEUROLOGICAL SURGERY

## 2024-12-02 RX ORDER — HYDROCODONE BITARTRATE AND ACETAMINOPHEN 7.5; 325 MG/1; MG/1
TABLET ORAL
COMMUNITY
Start: 2024-11-25

## 2024-12-02 NOTE — PROGRESS NOTES
Chief complaint:   Chief Complaint   Patient presents with    Post-op     2MO POST OP/LEFT CTR      Subjective     HPI:   Interval History: Jonh Peacock is a 78 y.o.  male who presents today for post operative follow-up from a Carpal Tunnel Release; Left - Left on 9/24/2024.      History of Present Illness  The patient presents for a follow-up of bilateral carpal tunnel release.    He reports an improvement in hand sensation and strength, now able to perform tasks such as tying his shoes and buttoning most of his shirts. He mentions a sensation akin to small marbles at the tips of his fingers. He no longer experiences pain at night that would previously wake him up crying. He reports no complications at the surgical incision site, such as pus discharge or fever. He continues to use a roll walker for mobility.    He has a [employer]tr[employer] stimulator implanted in his buttock, which was replaced in 2021. He is interested in learning about the new Medtr[employer] stimulator and its potential benefits. He has a meeting scheduled with a [employer] representative on Tuesday.    His insurance will no longer cover Butrans from 01/01/2025, and he is seeking alternative pain management options.         PFSH:  Past Medical History:   Diagnosis Date    Anemia     Anxiety     Arrhythmia     Arthritis     Atrial fibrillation     Cardiomyopathy     CHF (congestive heart failure)     Colon polyp     Colostomy present     10years    Connective tissue disease     Depression     Diabetes mellitus     Diverticulitis     Dizzy     DVT (deep venous thrombosis)     Gastritis     GERD (gastroesophageal reflux disease)     Hiatal hernia     History of transfusion     Hyperlipidemia     Hypertension     Low back pain     Neuropathy     Pneumonia      Past Surgical History:   Procedure Laterality Date    APPENDECTOMY      BACK SURGERY  2002, 2007    Multiple spine surgeries - laminectomy & fusion    CARDIAC CATHETERIZATION      CARDIAC  DEFIBRILLATOR PLACEMENT      CARDIAC ELECTROPHYSIOLOGY PROCEDURE N/A 10/31/2019    Procedure: ICD GEN CHANGE;  Surgeon: Chaz Chavez MD;  Location:  [facility];  Service: Cardiology    CARDIAC ELECTROPHYSIOLOGY PROCEDURE N/A 05/29/2024    Procedure: Ablation PVC;  Surgeon: Rossana Horne MD;  Location:  [facility];  Service: Cardiovascular;  Laterality: N/A;    CARPAL TUNNEL RELEASE Right 7/30/2024    Procedure: CARPAL TUNNEL RELEASE, right;  Surgeon: Noah Oliveros MD;  Location:  [facility] OR;  Service: Neurosurgery;  Laterality: Right;    CARPAL TUNNEL RELEASE Left 9/24/2024    Procedure: CARPAL TUNNEL RELEASE; LEFT;  Surgeon: Noah Oliveros MD;  Location:  [facility] OR;  Service: Neurosurgery;  Laterality: Left;    COLON RESECTION WITH COLOSTOMY      COLONOSCOPY  09/04/2014    diverticulosis    COLONOSCOPY N/A 10/29/2019    Procedure: COLONOSCOPY WITH ANESTHESIA;  Surgeon: Tenzin Abrams MD;  Location: [facility] ENDOSCOPY;  Service: Gastroenterology    ENDOSCOPY  02/24/1999    small sliding hiatal hernia    FOOT SURGERY Bilateral 2005    JOINT REPLACEMENT Right 2002    KNEE    PACEMAKER IMPLANTATION  2012    RECTAL FISSURE INCISION AND DRAINAGE      REPLACEMENT TOTAL KNEE Bilateral     SPINAL CORD STIMULATOR IMPLANT  08/2012    Placed by Dr. DEENA Villela, [employer]    THORACIC LAMINECTOMY WITH PLACEMENT OF DORSAL COLUMN STIMULATOR Right 01/28/2021    Procedure: SPINAL CORD STIMULATOR REVISION, right buttocks;  Surgeon: Noah Oliveros MD;  Location:  [facility] OR;  Service: Neurosurgery;  Laterality: Right;    TOTAL HIP ARTHROPLASTY Right 2004    TOTAL HIP ARTHROPLASTY Left 12/2008       Objective      Current Outpatient Medications   Medication Sig Dispense Refill    atorvastatin (LIPITOR) 20 MG tablet Take 1 tablet by mouth Daily. 90 tablet 3    Buprenorphine 10 MCG/HR patch weekly Place 15 mcg on the skin as directed by provider Every 7 (Seven) Days.  POSTERIOR RIGHT SHOULDER      Diclofenac Sodium (VOLTAREN) 1 % gel gel Apply 4 g topically to the appropriate area as directed 4 (Four) Times a Day As Needed (bilatearl hand pain). 350 g 3    dilTIAZem (CARDIZEM) 30 MG tablet Take 1 tablet by mouth 3 (Three) Times a Day. 270 tablet 3    furosemide (LASIX) 20 MG tablet Take 1 tablet by mouth Daily. 90 tablet 3    furosemide (LASIX) 40 MG tablet Take 1 tablet by mouth Daily. TAKE WITH 20 MG TABLET FOR 60 PER MG PER DAY 90 tablet 3    gabapentin (NEURONTIN) 300 MG capsule Take 1 capsule by mouth 4 (Four) Times a Day.      Glucosamine 750 MG tablet Take 1 tablet by mouth 2 (Two) Times a Day.      HYDROcodone-acetaminophen (NORCO) 7.5-325 MG per tablet       losartan (COZAAR) 25 MG tablet Take 1 tablet by mouth Daily. 90 tablet 3    metoprolol succinate XL (TOPROL-XL) 100 MG 24 hr tablet Take 1 tablet by mouth Daily. (Patient taking differently: Take 0.5 tablets by mouth Every Night. Takes 1/2 a pill) 90 tablet 3    Multiple Vitamins-Minerals (MULTIVITAMIN ADULT PO) Take 1 tablet by mouth Daily.      pantoprazole (PROTONIX) 40 MG EC tablet Take 1 tablet by mouth Daily. 90 tablet 2    polyethylene glycol (MIRALAX) powder Take 17 g by mouth Every Night. 1581 g 3    Sotalol HCl AF 80 MG tablet Take 1 tablet by mouth 2 (Two) Times a Day. 60 tablet 11    tamsulosin (FLOMAX) 0.4 MG capsule 24 hr capsule Take 1 capsule by mouth Every Night. 90 capsule 3    warfarin (COUMADIN) 2 MG tablet Take 1 tablet by mouth Daily. 90 tablet 1    warfarin (COUMADIN) 4 MG tablet Take 1 tablet by mouth Daily. 90 tablet 1    warfarin (COUMADIN) 5 MG tablet Take 1 tablet by mouth Daily. 90 tablet 3    methocarbamol (Robaxin) 500 MG tablet Take 1 tablet by mouth 4 (Four) Times a Day. (Patient not taking: Reported on 12/2/2024) 20 tablet 1    Tentative Restart Date for Anticoagulation Restart Warfarin (Coumadin) on August 13, 2024      Tentative Restart Date for Anticoagulation Restart Warfarin  "(Coumadin) on October 8, 2024       No current facility-administered medications for this visit.       Vital Signs  Ht 188 cm (74\")   Wt 111 kg (244 lb 3.2 oz)   BMI 31.35 kg/m²   Physical Exam  Vitals and nursing note reviewed.   Constitutional:       General: He is not in acute distress.     Appearance: Normal appearance. He is well-developed and well-groomed. He is obese. He is not ill-appearing, toxic-appearing or diaphoretic.          Comments: BMI 31.14   HENT:      Head: Normocephalic and atraumatic.      Right Ear: Hearing normal.      Left Ear: Hearing normal.   Eyes:      General: Lids are normal.      Extraocular Movements: Extraocular movements intact.      Conjunctiva/sclera: Conjunctivae normal.      Pupils: Pupils are equal, round, and reactive to light.   Neck:      Trachea: Trachea normal.   Cardiovascular:      Rate and Rhythm: Normal rate and regular rhythm.   Pulmonary:      Effort: Pulmonary effort is normal. No tachypnea, bradypnea, accessory muscle usage or respiratory distress.   Abdominal:      Palpations: Abdomen is soft.   Musculoskeletal:      Cervical back: Full passive range of motion without pain and neck supple.   Skin:     General: Skin is warm and dry.   Neurological:      GCS: GCS eye subscore is 4. GCS verbal subscore is 5. GCS motor subscore is 6.      Coordination: Coordination is intact.   Psychiatric:         Speech: Speech normal.         Behavior: Behavior normal. Behavior is cooperative.       Neurological Exam  Mental Status  Awake, alert and oriented to person, place and time. Speech is normal. Language is fluent with no aphasia. Attention and concentration are normal.    Cranial Nerves  CN I: Sense of smell is normal.  CN II: Visual acuity is normal.  CN III, IV, VI: Extraocular movements intact bilaterally. Normal lids and orbits bilaterally. Pupils equal round and reactive to light bilaterally.  CN V: Facial sensation is normal.  CN VII: Full and symmetric facial " movement.  CN IX, X: Palate elevates symmetrically  CN XI: Shoulder shrug strength is normal.  CN XII: Tongue midline without atrophy or fasciculations.    Motor  Normal muscle bulk throughout. Normal muscle tone.                                               Right                     Left  Toe extension                        5                          5                                             Right                     Left  Deltoid                                   4                          4   Biceps                                   5                          5   Triceps                                  5                          5   Wrist extensor                       5                          5   Iliopsoas                               5                          5   Quadriceps                           5                          5   Anterior tibialis                      5                          5   Posterior tibialis                    5                          5    Sensory  Sensation is intact to light touch, pinprick, vibration and proprioception in all four extremities.    Coordination    Finger-to-nose, rapid alternating movements and heel-to-shin normal bilaterally without dysmetria.    Gait    Well-maintained gait with walker.    Incision:   WOUND IMAGE - Sp left carpal release (10/08/2024)  WOUND IMAGE - SP left carpal tunnel release (10/08/2024)  (Consent to obtain photo of postoperative site for documentation purposes only obtained verbally by Mr. Peacock)    Results Review:   2/19/2024                    Assessment/Plan:   Jonh Peacock is a 78 y.o. male with a significant medical history of a lumbar discectomy by Dr. Sandhu in 2002, L3-S1 fusion for bilateral leg pain 2007, cord stimulator placement by Dr. Villela 2012 and revised in 2021; A-fib and DVT currently on Coumadin, CHF, cardiomyopathy, hypertension, hyperlipidemia, connective tissue disease, and obesity.  He presents  today with a new problem of progressively worsening bilateral hand pain, weakness, numbness, and tingling.  TIMUR: 50.  mJOA: 12.  Physical exam findings of bilateral hand atrophy, right  strength is approximately 2 standard deviations below age-matched controls, left  strength is approximately 4 standard deviations below age-matched controls, poor dexterity, and grossly muted reflexes. No recent imaging cervical spine.  EMG and nerve conduction studies of bilateral upper extremities show peripheral polyneuropathy, as well as severe bilateral carpal tunnel syndrome.    Recommendations:  Cervicalgia  Mild cervical stenosis at 3 levels  Jonh and I discussed his history presenting illness, physical exam and imaging findings.  While his MRI does show three-level cervical stenosis this is relatively mild.  At this point his main symptoms are related to his hands.  Therefore I like to address his carpal tunnel syndrome as this is a low risk surgery with relatively good benefit.  If he gets substantial benefit from this then we will not need to address his neck.  If he does not get significant improvement then we will consider three-level anterior cervical discectomy and fusion.    Bilateral Carpal Tunnel Syndrome, severe   In the setting of a peripheral neuropathy  Assessment & Plan  1. Bilateral carpal tunnel release.  The incision has healed well, indicating a positive recovery process. He reports improved sensation and strength in his hands, and no longer experiences midnight pain. He was informed that the sensation in his fingertips should improve over the next few months. He was advised to contact us if he experiences any issues.    2. Cervical stenosis.  He still has minor pinches in the neck. He was advised to avoid falls and to call if any issues arise. Cont to use walker and call for any weakness noted after falls as this could be a sign of central cord syndrome.    3. [employer] stimulator replacement.  He  was advised to consult with his [employer] representative regarding the new stimulator. If a replacement is deemed necessary due to the end of life of the current battery, arrangements will be made accordingly. He has a meeting scheduled with his [employer] representative on Tuesday.    4. Medication management.  He expressed concerns about the cost of Butrans due to upcoming insurance changes. He was advised to discuss alternative pain management options with his pain management provider.          Class 1 obesity (BMI 30.0-34.9) due to excessive calories with serious comorbidities BMI of 31.0-31.9 in adult  Body mass index is 31.35 kg/m². Information on the DASH diet provided in the AVS.  We will continue to provided diet and exercise information with the goal of weight loss at each scheduled appointment.     Fall risk  [facility] Fall Risk Assessment was completed, and patient is at LOW risk for falls.Assessment completed on:10/8/2024  Fall risk information provided in AVS.     Diagnoses and all orders for this visit:    1. S/P carpal tunnel release (Primary)    2. Class 1 obesity due to excess calories with serious comorbidity and body mass index (BMI) of 31.0 to 31.9 in adult    3. At risk for falls        Return if symptoms worsen or fail to improve.    I discussed the patients findings and my recommendations with patient    Noah Oliveros MD; 12/4/2024; 13:52 CST

## 2024-12-27 ENCOUNTER — LAB (OUTPATIENT)
Dept: LAB | Facility: HOSPITAL | Age: 79
End: 2024-12-27
Payer: MEDICARE

## 2024-12-27 DIAGNOSIS — Z79.01 ANTICOAGULANT LONG-TERM USE: Chronic | ICD-10-CM

## 2024-12-27 DIAGNOSIS — I48.0 PAROXYSMAL ATRIAL FIBRILLATION: Primary | ICD-10-CM

## 2024-12-27 DIAGNOSIS — Z79.01 ANTICOAGULANT LONG-TERM USE: ICD-10-CM

## 2024-12-27 DIAGNOSIS — I48.0 PAROXYSMAL ATRIAL FIBRILLATION: Chronic | ICD-10-CM

## 2024-12-27 LAB
INR PPP: 3.9 (ref 0.91–1.09)
PROTHROMBIN TIME: 46.7 SECONDS (ref 10–13.8)

## 2024-12-27 PROCEDURE — 85610 PROTHROMBIN TIME: CPT | Performed by: INTERNAL MEDICINE

## 2025-01-03 ENCOUNTER — LAB (OUTPATIENT)
Dept: LAB | Facility: HOSPITAL | Age: 80
End: 2025-01-03
Payer: MEDICARE

## 2025-01-03 DIAGNOSIS — Z79.01 ANTICOAGULANT LONG-TERM USE: ICD-10-CM

## 2025-01-03 DIAGNOSIS — I48.0 PAROXYSMAL ATRIAL FIBRILLATION: ICD-10-CM

## 2025-01-03 LAB
INR PPP: 2.2 (ref 0.91–1.09)
PROTHROMBIN TIME: 25.9 SECONDS (ref 10–13.8)

## 2025-01-03 PROCEDURE — 85610 PROTHROMBIN TIME: CPT | Performed by: NURSE PRACTITIONER

## 2025-01-20 ENCOUNTER — LAB (OUTPATIENT)
Dept: LAB | Facility: HOSPITAL | Age: 80
End: 2025-01-20
Payer: MEDICARE

## 2025-01-20 DIAGNOSIS — I48.0 PAROXYSMAL ATRIAL FIBRILLATION: Chronic | ICD-10-CM

## 2025-01-20 DIAGNOSIS — Z79.01 ANTICOAGULANT LONG-TERM USE: Chronic | ICD-10-CM

## 2025-01-22 NOTE — PROGRESS NOTES
[facility] - PODIATRY    Today's Date: 01/29/25    Patient Name: Jonh Peacock  MRN: [id number]  CSN: [id number]  PCP: Ric Antony DO  Referring Provider: No ref. provider found     SUBJECTIVE     Chief Complaint   Patient presents with    Follow-up     Ric Antony DO 11/11/24  Return in about 10 weeks (around 1/27/2025) for With Sallie DWYER, Schedule Foot Care Clinic, Diabetic Foot Exam.   Patient here for foot/nail care. No pain today. No questions, concerns or complaints at this time regarding feet    Diabetes     HPI: Jonh Peacock, a 79 y.o.male, comes to clinic as a(n) established patient presenting for diabetic foot exam and complaining of thick fungal toenails. Pt with h/o Anxiety, Arrhythmia, Cardiomyopathy, CHF, Depression, previous DVT, HTN, Obesity, Anticoagulation with Warfarin. Patient presents with thickened nails that are difficult to care for due to thickness and anticoagulation therapy.  Patient is NIDDM and unsure of last BG level.   Denies pain today. Uses rollator for ambulation. Continues Coumadin daily. Notes improvement to feet with regular visits. Reports numbness in his feet. Denies any constitutional symptoms. No other pedal complaints at this time.    Past Medical History:   Diagnosis Date    Anemia     Anxiety     Arrhythmia     Arthritis     Atrial fibrillation     Cardiomyopathy     CHF (congestive heart failure)     Colon polyp     Colostomy present     10years    Connective tissue disease     Depression     Diabetes mellitus     Diverticulitis     Dizzy     DVT (deep venous thrombosis)     Gastritis     GERD (gastroesophageal reflux disease)     Hiatal hernia     History of transfusion     Hyperlipidemia     Hypertension     Low back pain     Neuropathy     Pneumonia      Past Surgical History:   Procedure Laterality Date    APPENDECTOMY      BACK SURGERY  2002, 2007    Multiple spine surgeries - laminectomy & fusion    CARDIAC  CATHETERIZATION      CARDIAC DEFIBRILLATOR PLACEMENT      CARDIAC ELECTROPHYSIOLOGY PROCEDURE N/A 10/31/2019    Procedure: ICD GEN CHANGE;  Surgeon: Chaz Chavez MD;  Location: [facility] CATH INVASIVE LOCATION;  Service: Cardiology    CARDIAC ELECTROPHYSIOLOGY PROCEDURE N/A 05/29/2024    Procedure: Ablation PVC;  Surgeon: Rossana Horne MD;  Location:  [facility] CATH INVASIVE LOCATION;  Service: Cardiovascular;  Laterality: N/A;    CARPAL TUNNEL RELEASE Right 7/30/2024    Procedure: CARPAL TUNNEL RELEASE, right;  Surgeon: Noah Oliveros MD;  Location: [facility] OR;  Service: Neurosurgery;  Laterality: Right;    CARPAL TUNNEL RELEASE Left 9/24/2024    Procedure: CARPAL TUNNEL RELEASE; LEFT;  Surgeon: Noah Oliveros MD;  Location: [facility] OR;  Service: Neurosurgery;  Laterality: Left;    COLON RESECTION WITH COLOSTOMY      COLONOSCOPY  09/04/2014    diverticulosis    COLONOSCOPY N/A 10/29/2019    Procedure: COLONOSCOPY WITH ANESTHESIA;  Surgeon: Tenzin Abrams MD;  Location: [facility] ENDOSCOPY;  Service: Gastroenterology    ENDOSCOPY  02/24/1999    small sliding hiatal hernia    FOOT SURGERY Bilateral 2005    JOINT REPLACEMENT Right 2002    KNEE    PACEMAKER IMPLANTATION  2012    RECTAL FISSURE INCISION AND DRAINAGE      REPLACEMENT TOTAL KNEE Bilateral     SPINAL CORD STIMULATOR IMPLANT  08/2012    Placed by Dr. DEENA Villela, [employer]    THORACIC LAMINECTOMY WITH PLACEMENT OF DORSAL COLUMN STIMULATOR Right 01/28/2021    Procedure: SPINAL CORD STIMULATOR REVISION, right buttocks;  Surgeon: Noah Oliveros MD;  Location: [facility] OR;  Service: Neurosurgery;  Laterality: Right;    TOTAL HIP ARTHROPLASTY Right 2004    TOTAL HIP ARTHROPLASTY Left 12/2008     Family History   Problem Relation Age of Onset    Heart disease Mother     Heart disease Brother     Breast cancer Brother     Colon cancer Neg Hx     Colon polyps Neg Hx      Social History     Socioeconomic History    Marital status:   "  Tobacco Use    Smoking status: Never     Passive exposure: Never    Smokeless tobacco: Never   Vaping Use    Vaping status: Never Used   Substance and Sexual Activity    Alcohol use: Not Currently    Drug use: Never    Sexual activity: Defer     Allergies   Allergen Reactions    Percocet [Oxycodone-Acetaminophen] Urinary Retention    Amiodarone Other (See Comments)     INTERFERES WITH OTHER MEDICATIONS    Oxycontin [Oxycodone Hcl] Urinary Retention    Penicillins Hives     All \"cillins\"    Vioxx [Rofecoxib] GI Intolerance     Current Outpatient Medications   Medication Sig Dispense Refill    atorvastatin (LIPITOR) 20 MG tablet Take 1 tablet by mouth Daily. 90 tablet 3    Buprenorphine 10 MCG/HR patch weekly Place 15 mcg on the skin as directed by provider Every 7 (Seven) Days. POSTERIOR RIGHT SHOULDER      Diclofenac Sodium (VOLTAREN) 1 % gel gel Apply 4 g topically to the appropriate area as directed 4 (Four) Times a Day As Needed (bilatearl hand pain). 350 g 3    dilTIAZem (CARDIZEM) 30 MG tablet Take 1 tablet by mouth 3 (Three) Times a Day. 270 tablet 3    furosemide (LASIX) 20 MG tablet Take 1 tablet by mouth Daily. 90 tablet 3    furosemide (LASIX) 40 MG tablet Take 1 tablet by mouth Daily. TAKE WITH 20 MG TABLET FOR 60 PER MG PER DAY 90 tablet 3    gabapentin (NEURONTIN) 300 MG capsule Take 400 mg by mouth 3 (Three) Times a Day.      Glucosamine 750 MG tablet Take 1 tablet by mouth 2 (Two) Times a Day.      losartan (COZAAR) 25 MG tablet Take 1 tablet by mouth Daily. 90 tablet 3    methocarbamol (Robaxin) 500 MG tablet Take 1 tablet by mouth 4 (Four) Times a Day. 20 tablet 1    metoprolol succinate XL (TOPROL-XL) 100 MG 24 hr tablet Take 1 tablet by mouth Daily. (Patient taking differently: Take 0.5 tablets by mouth Every Night. Takes 1/2 a pill) 90 tablet 3    Multiple Vitamins-Minerals (MULTIVITAMIN ADULT PO) Take 1 tablet by mouth Daily.      pantoprazole (PROTONIX) 40 MG EC tablet Take 1 tablet by mouth " Daily. 90 tablet 2    polyethylene glycol (MIRALAX) powder Take 17 g by mouth Every Night. 1581 g 3    Sotalol HCl AF 80 MG tablet Take 0.5 tablets by mouth 2 (Two) Times a Day. (Patient taking differently: Take 0.5 tablets by mouth Daily.)      tamsulosin (FLOMAX) 0.4 MG capsule 24 hr capsule Take 1 capsule by mouth Every Night. 90 capsule 3    Tentative Restart Date for Anticoagulation Restart Warfarin (Coumadin) on August 13, 2024      Tentative Restart Date for Anticoagulation Restart Warfarin (Coumadin) on October 8, 2024      warfarin (COUMADIN) 2 MG tablet Take 1 tablet by mouth Daily. 90 tablet 1    warfarin (COUMADIN) 4 MG tablet Take 1 tablet by mouth Daily. 90 tablet 1    warfarin (COUMADIN) 5 MG tablet Take 1 tablet by mouth Daily. 90 tablet 3     No current facility-administered medications for this visit.     Review of Systems   Constitutional:  Negative for chills and fever.   HENT:  Negative for congestion.    Respiratory:  Negative for shortness of breath.    Cardiovascular:  Positive for leg swelling. Negative for chest pain.   Gastrointestinal:  Negative for constipation, diarrhea, nausea and vomiting.   Musculoskeletal:  Positive for arthralgias, back pain and gait problem.        Foot pain   Skin:  Negative for wound.        Thick Toenails   Neurological:  Positive for numbness.   Hematological:  Bruises/bleeds easily.       OBJECTIVE     Vitals:    01/29/25 1506   BP: 122/78   Pulse: 80   SpO2: 98%       PHYSICAL EXAM  GEN:   Accompanied by wife.    Foot/Ankle Exam    GENERAL  Diabetic foot exam performed    Appearance:  appears stated age  Orientation:  AAOx3  Affect:  appropriate  Gait:  (unsteady)  Assistance:  walker  Right shoe gear: casual shoe  Left shoe gear: casual shoe (with lift)    VASCULAR     Right Foot Vascularity   Dorsalis pedis:  1+  Posterior tibial:  1+  Skin temperature:  warm  Edema grading:  Trace and non-pitting  CFT:  4  Pedal hair growth:  Absent  Varicosities:   none     Left Foot Vascularity   Dorsalis pedis:  1+  Posterior tibial:  1+  Skin temperature:  warm  Edema grading:  Trace and non-pitting  CFT:  4  Pedal hair growth:  Absent  Varicosities:  none     NEUROLOGIC     Right Foot Neurologic   Light touch sensation: diminished  Vibratory sensation: diminished  Hot/Cold sensation: diminished  Protective Sensation using [place] Monofilament:   Sites intact: 7  Sites tested: 10     Left Foot Neurologic   Light touch sensation: diminished  Vibratory sensation: diminished  Hot/Cold sensation:  diminished  Protective Sensation using [place] Monofilament:   Sites intact: 7  Sites tested: 10    MUSCULOSKELETAL     Right Foot Musculoskeletal   Ecchymosis:  none  Tenderness:  toenail problem    Arch:  Pes planus  Hallux valgus: No       Left Foot Musculoskeletal   Ecchymosis:  none  Tenderness:  toenail problem  Arch:  Pes planus  Hallux valgus: No      MUSCLE STRENGTH     Right Foot Muscle Strength   Foot dorsiflexion:  5  Foot plantar flexion:  5  Foot inversion:  5  Foot eversion:  5     Left Foot Muscle Strength   Foot dorsiflexion:  5  Foot plantar flexion:  5  Foot inversion:  5  Foot eversion:  5    RANGE OF MOTION     Right Foot Range of Motion   Foot and ankle ROM within normal limits       Left Foot Range of Motion   Foot and ankle ROM within normal limits      DERMATOLOGIC      Right Foot Dermatologic   Skin  Right foot skin is intact.   Nails  1.  Positive for elongated, onychomycosis, abnormal thickness and dystrophic nail.  2.  Positive for elongated, onychomycosis, abnormal thickness and dystrophic nail.  3.  Positive for elongated, onychomycosis, abnormal thickness and dystrophic nail.  4.  Positive for elongated, onychomycosis, abnormal thickness and dystrophic nail.  5.  Positive for elongated, onychomycosis, abnormal thickness and dystrophic nail.     Left Foot Dermatologic   Skin  Left foot skin is intact.   Nails  1.  Positive for  elongated, onychomycosis, abnormal thickness and dystrophic nail.  2.  Positive for elongated, onychomycosis, abnormal thickness and dystrophic nail.  3.  Positive for elongated, onychomycosis, abnormal thickness and dystrophic nail.  4.  Positive for elongated, onychomycosis, abnormally thick and dystrophic nail.  5.  Positive for elongated, onychomycosis, abnormally thick and dystrophic nail.       RADIOLOGY/NUCLEAR:  No results found.    LABORATORY/CULTURE RESULTS:      PATHOLOGY RESULTS:       ASSESSMENT/PLAN     Diagnoses and all orders for this visit:    1. Onychomycosis (Primary)    2. Type 2 diabetes mellitus with diabetic neuropathy, without long-term current use of insulin    3. Lower limb length difference    4. Anticoagulant long-term use    5. Gait abnormality    6. Peripheral edema    7. Foot callus    Comprehensive lower extremity examination and evaluation was performed.  Discussed findings and treatment plan including risks, benefits, and treatment options with patient in detail. Patient agreed with treatment plan.   Hemoglobin A1c reviewed.  After verbal consent obtained, nail(s) x10 debrided of length and thickness with nail nipper without incidence  Patient may maintain nails and calluses at home utilizing emery board or pumice stone between visits as needed  Reviewed at home diabetic foot care including daily foot checks   Continue shoes with custom inserts  Continue use of rollator for ambulation assistance.  Elevate legs when possible.  Follow with PCP for management of diabetes  An After Visit Summary was printed and given to the patient at discharge, including (if requested) any available informative/educational handouts regarding diagnosis, treatment, or medications. All questions were answered to patient/family satisfaction. Should symptoms fail to improve or worsen they agree to call or return to clinic or to go to the Emergency Department. Discussed the importance of following up with  any needed screening tests/labs/specialist appointments and any requested follow-up recommended by me today. Importance of maintaining follow-up discussed and patient accepts that missed appointments can delay diagnosis and potentially lead to worsening of conditions.  Return in about 10 weeks (around 4/9/2025) for Schedule Foot Care Clinic, With Sallie DWYER., or sooner if acute issues arise.        This document has been electronically signed by REYMUNDO Santana on January 29, 2025 16:26 CST

## 2025-01-27 ENCOUNTER — LAB (OUTPATIENT)
Dept: LAB | Facility: HOSPITAL | Age: 80
End: 2025-01-27
Payer: MEDICARE

## 2025-01-27 ENCOUNTER — OFFICE VISIT (OUTPATIENT)
Dept: CARDIOLOGY | Facility: CLINIC | Age: 80
End: 2025-01-27
Payer: MEDICARE

## 2025-01-27 VITALS
HEART RATE: 84 BPM | DIASTOLIC BLOOD PRESSURE: 60 MMHG | SYSTOLIC BLOOD PRESSURE: 96 MMHG | WEIGHT: 242 LBS | BODY MASS INDEX: 31.06 KG/M2 | HEIGHT: 74 IN | OXYGEN SATURATION: 99 %

## 2025-01-27 DIAGNOSIS — I48.0 PAROXYSMAL ATRIAL FIBRILLATION: Primary | Chronic | ICD-10-CM

## 2025-01-27 DIAGNOSIS — I48.0 PAROXYSMAL ATRIAL FIBRILLATION: Chronic | ICD-10-CM

## 2025-01-27 DIAGNOSIS — Z79.01 ANTICOAGULANT LONG-TERM USE: Chronic | ICD-10-CM

## 2025-01-27 DIAGNOSIS — I49.3 PVC'S (PREMATURE VENTRICULAR CONTRACTIONS): ICD-10-CM

## 2025-01-27 DIAGNOSIS — R53.83 OTHER FATIGUE: ICD-10-CM

## 2025-01-27 DIAGNOSIS — I47.10 SUSTAINED SVT: ICD-10-CM

## 2025-01-27 LAB
ANION GAP SERPL CALCULATED.3IONS-SCNC: 10 MMOL/L (ref 5–15)
BUN SERPL-MCNC: 28 MG/DL (ref 8–23)
BUN/CREAT SERPL: 17.7 (ref 7–25)
CALCIUM SPEC-SCNC: 9 MG/DL (ref 8.6–10.5)
CHLORIDE SERPL-SCNC: 103 MMOL/L (ref 98–107)
CO2 SERPL-SCNC: 30 MMOL/L (ref 22–29)
CREAT SERPL-MCNC: 1.58 MG/DL (ref 0.76–1.27)
DEPRECATED RDW RBC AUTO: 43.6 FL (ref 37–54)
EGFRCR SERPLBLD CKD-EPI 2021: 44.5 ML/MIN/1.73
ERYTHROCYTE [DISTWIDTH] IN BLOOD BY AUTOMATED COUNT: 13.1 % (ref 12.3–15.4)
GLUCOSE SERPL-MCNC: 162 MG/DL (ref 65–99)
HCT VFR BLD AUTO: 35.3 % (ref 37.5–51)
HGB BLD-MCNC: 11.6 G/DL (ref 13–17.7)
INR PPP: 2.3 (ref 0.91–1.09)
MAGNESIUM SERPL-MCNC: 1.5 MG/DL (ref 1.6–2.4)
MCH RBC QN AUTO: 30.1 PG (ref 26.6–33)
MCHC RBC AUTO-ENTMCNC: 32.9 G/DL (ref 31.5–35.7)
MCV RBC AUTO: 91.5 FL (ref 79–97)
PLATELET # BLD AUTO: 126 10*3/MM3 (ref 140–450)
PMV BLD AUTO: 11.6 FL (ref 6–12)
POTASSIUM SERPL-SCNC: 3.7 MMOL/L (ref 3.5–5.2)
PROTHROMBIN TIME: 27.8 SECONDS (ref 10–13.8)
RBC # BLD AUTO: 3.86 10*6/MM3 (ref 4.14–5.8)
SODIUM SERPL-SCNC: 143 MMOL/L (ref 136–145)
TSH SERPL DL<=0.05 MIU/L-ACNC: 0.71 UIU/ML (ref 0.27–4.2)
WBC NRBC COR # BLD AUTO: 5.23 10*3/MM3 (ref 3.4–10.8)

## 2025-01-27 PROCEDURE — 80048 BASIC METABOLIC PNL TOTAL CA: CPT

## 2025-01-27 PROCEDURE — 84443 ASSAY THYROID STIM HORMONE: CPT

## 2025-01-27 PROCEDURE — 85610 PROTHROMBIN TIME: CPT | Performed by: NURSE PRACTITIONER

## 2025-01-27 PROCEDURE — 3074F SYST BP LT 130 MM HG: CPT | Performed by: PHYSICIAN ASSISTANT

## 2025-01-27 PROCEDURE — 85027 COMPLETE CBC AUTOMATED: CPT

## 2025-01-27 PROCEDURE — 99214 OFFICE O/P EST MOD 30 MIN: CPT | Performed by: PHYSICIAN ASSISTANT

## 2025-01-27 PROCEDURE — 3078F DIAST BP <80 MM HG: CPT | Performed by: PHYSICIAN ASSISTANT

## 2025-01-27 PROCEDURE — 83735 ASSAY OF MAGNESIUM: CPT

## 2025-01-27 PROCEDURE — 93000 ELECTROCARDIOGRAM COMPLETE: CPT | Performed by: PHYSICIAN ASSISTANT

## 2025-01-27 PROCEDURE — 36415 COLL VENOUS BLD VENIPUNCTURE: CPT

## 2025-01-27 NOTE — PROGRESS NOTES
[facility] -  CLINIC FOLLOW UP     Patient Care Team:  Ric Antony DO as PCP - General (Internal Medicine)  Arun Banks MD as Cardiologist (Cardiology)  Celso Ellsworth MD as Consulting Physician (Urology)  Tenzin Abrams MD as Consulting Physician (Gastroenterology)  Deion Avelar DPM as Consulting Physician (Podiatry)  Diomedes Sterling DO as Consulting Physician (Pain Medicine)  Nicholas Martinez APRN as Nurse Practitioner (Nurse Practitioner)    Chief Complaint: Fatigue    Subjective   EP Problems:  1.  Paroxysmal atrial fibrillation  2.  Atrial flutter  3.  Sustained SVT / atrial tachycardia  4.  Sinus node dysfunction  5.  Presence of a cardiac defibrillator  -9/27/2012: DOI, [employer]  -10/31/2019: Generator change, Brenda Chavez  6.  Acquired long QT syndrome  7.  Frequent PVCs  -3/2023: 16% burden (12% single morphology)  -5/29/24: LV summit PVC ablation, but recurrent  6/7/24: Holter 8%, multiple morphologies      Cardiology Problems:  1.  Chronic systolic heart failure  -2/5/2019: EF 31 to 35%  2.  Hypertension  3.  Hyperlipidemia  4.  Prior DVT     Medical Problems:  1.  Obesity, BMI 32  2.  Type 2 diabetes  3.  BPH  4.  Chronic pain  5.  Obstructive sleep apnea  6. Colostomy   7. Spinal stimulator       HPI: Today I had the pleasure of seeing Jonh Peacock in the cardiology clinic for follow up. He is a 79 year old male with a history of history of chronic systolic heart failure secondary to nonischemic cardiomyopathy. He previously had a PVC ablation in May. Initially, PVCs went away but they returned during recovery. The most common PVC was from the LV summit. A follow up monitor showed 8% PVCs with multiple morphologies and a single episode of AF vs atrial flutter. He is now on sotalol and anticoagulated with Warfarin.     He continues to have 1:1 episodes on his device that are in the VT zone. His remote device check reveals 25 episodes  "of SVT since October of 2024 with the longest run lasting 22 minutes. When asked if he was symptomatic during any of these episodes he said that he has neve been symptomatic. He has had a dramatic decline in his energy levels. He states he is too worn out and sleeps frequently since restarting the sotalol. He has to use a motorized chair now due to fatigue. He has a walker as well today. The most strenuous thing he does is take a shower and then that activity will wear him out for the rest of the day. Patient also takes metoprolol, diltiazem, and gabapentin but believes that his decrease in quality of life lines up with the timing of the initiation of the sotolol.     Objective     Visit Vitals  BP 96/60   Pulse 84   Ht 188 cm (74.02\")   Wt 110 kg (242 lb)   SpO2 99%   BMI 31.06 kg/m²           Vitals reviewed.   Constitutional:       Appearance: Not in distress.   Eyes:      Extraocular Movements: Extraocular movements intact.      Conjunctiva/sclera: Conjunctivae normal.      Pupils: Pupils are equal, round, and reactive to light.   HENT:      Head: Normocephalic and atraumatic.      Nose: Nose normal.    Mouth/Throat:      Lips: Pink.      Mouth: Mucous membranes are moist.      Pharynx: Oropharynx is clear.   Neck:      Vascular: No carotid bruit or JVD. JVD normal.   Pulmonary:      Effort: Pulmonary effort is normal.      Breath sounds: Normal breath sounds.   Chest:      Chest wall: Not tender to palpatation.   Cardiovascular:      PMI at left midclavicular line. Normal rate. Regular rhythm. Normal S1. Normal S2.       Murmurs: There is no murmur.      No gallop.  No rub.   Pulses:     Radial: 2+ bilaterally.  Edema:     Peripheral edema absent.   Musculoskeletal:      Extremities: No clubbing present.Skin:     General: Skin is warm and dry.   Neurological:      General: No focal deficit present.      Mental Status: Alert and oriented to person, place, and time.   Psychiatric:         Attention and " Perception: Attention normal.         Mood and Affect: Affect normal.         Speech: Speech normal.         Behavior: Behavior normal.         Cognition and Memory: Cognition normal.             The following portions of the patient's history were reviewed and updated as appropriate: allergies, current medications, past medical history, past social history, past and problem list.     Review of Systems   Constitutional:  Positive for activity change and fatigue.   HENT: Negative.     Eyes: Negative.    Respiratory: Negative.     Cardiovascular: Negative.    Gastrointestinal: Negative.    Endocrine: Negative.    Genitourinary: Negative.    Musculoskeletal: Negative.    Skin: Negative.    Allergic/Immunologic: Negative.    Neurological: Negative.    Hematological: Negative.    Psychiatric/Behavioral: Negative.              ECG 12 Lead    Date/Time: 1/27/2025 1:56 PM  Performed by: Radha Leary PA    Authorized by: Radha Leary PA  Comparison: compared with previous ECG from 7/26/2024  Rhythm: sinus rhythm and paced  Rate: normal  BPM: 84  QRS axis: normal  Other findings: non-specific ST-T wave changes    Clinical impression: abnormal EKG  Comments: Spinal stimulator artifact noted             Medication Review: yes    Current Outpatient Medications:     atorvastatin (LIPITOR) 20 MG tablet, Take 1 tablet by mouth Daily., Disp: 90 tablet, Rfl: 3    Buprenorphine 10 MCG/HR patch weekly, Place 15 mcg on the skin as directed by provider Every 7 (Seven) Days. POSTERIOR RIGHT SHOULDER, Disp: , Rfl:     Diclofenac Sodium (VOLTAREN) 1 % gel gel, Apply 4 g topically to the appropriate area as directed 4 (Four) Times a Day As Needed (bilatearl hand pain)., Disp: 350 g, Rfl: 3    dilTIAZem (CARDIZEM) 30 MG tablet, Take 1 tablet by mouth 3 (Three) Times a Day., Disp: 270 tablet, Rfl: 3    furosemide (LASIX) 20 MG tablet, Take 1 tablet by mouth Daily., Disp: 90 tablet, Rfl: 3    furosemide (LASIX) 40 MG tablet, Take 1  tablet by mouth Daily. TAKE WITH 20 MG TABLET FOR 60 PER MG PER DAY, Disp: 90 tablet, Rfl: 3    gabapentin (NEURONTIN) 300 MG capsule, Take 400 mg by mouth 3 (Three) Times a Day., Disp: , Rfl:     Glucosamine 750 MG tablet, Take 1 tablet by mouth 2 (Two) Times a Day., Disp: , Rfl:     losartan (COZAAR) 25 MG tablet, Take 1 tablet by mouth Daily., Disp: 90 tablet, Rfl: 3    metoprolol succinate XL (TOPROL-XL) 100 MG 24 hr tablet, Take 1 tablet by mouth Daily. (Patient taking differently: Take 0.5 tablets by mouth Every Night. Takes 1/2 a pill), Disp: 90 tablet, Rfl: 3    Multiple Vitamins-Minerals (MULTIVITAMIN ADULT PO), Take 1 tablet by mouth Daily., Disp: , Rfl:     pantoprazole (PROTONIX) 40 MG EC tablet, Take 1 tablet by mouth Daily., Disp: 90 tablet, Rfl: 2    polyethylene glycol (MIRALAX) powder, Take 17 g by mouth Every Night., Disp: 1581 g, Rfl: 3    Sotalol HCl AF 80 MG tablet, Take 0.5 tablets by mouth 2 (Two) Times a Day., Disp: , Rfl:     tamsulosin (FLOMAX) 0.4 MG capsule 24 hr capsule, Take 1 capsule by mouth Every Night., Disp: 90 capsule, Rfl: 3    Tentative Restart Date for Anticoagulation, Restart Warfarin (Coumadin) on August 13, 2024, Disp: , Rfl:     Tentative Restart Date for Anticoagulation, Restart Warfarin (Coumadin) on October 8, 2024, Disp: , Rfl:     warfarin (COUMADIN) 2 MG tablet, Take 1 tablet by mouth Daily., Disp: 90 tablet, Rfl: 1    warfarin (COUMADIN) 4 MG tablet, Take 1 tablet by mouth Daily., Disp: 90 tablet, Rfl: 1    warfarin (COUMADIN) 5 MG tablet, Take 1 tablet by mouth Daily., Disp: 90 tablet, Rfl: 3    methocarbamol (Robaxin) 500 MG tablet, Take 1 tablet by mouth 4 (Four) Times a Day. (Patient not taking: Reported on 1/27/2025), Disp: 20 tablet, Rfl: 1   Allergies   Allergen Reactions    Percocet [Oxycodone-Acetaminophen] Urinary Retention    Amiodarone Other (See Comments)     INTERFERES WITH OTHER MEDICATIONS    Oxycontin [Oxycodone Hcl] Urinary Retention    Penicillins  "Hives     All \"cillins\"    Vioxx [Rofecoxib] GI Intolerance       I have reviewed             CBC:  Lab Results - Last 18 Months   Lab Units 01/27/25  1536   WBC 10*3/mm3 5.23   HEMOGLOBIN g/dL 11.6*   HEMATOCRIT % 35.3*   PLATELETS 10*3/mm3 126*      BMP/CMP:  Lab Results - Last 18 Months   Lab Units 11/22/24  1555   SODIUM mmol/L 143   POTASSIUM mmol/L 3.8   CHLORIDE mmol/L 102   CO2 mmol/L 33.0*   GLUCOSE mg/dL 157*   BUN mg/dL 25*   CREATININE mg/dL 1.32*   CALCIUM mg/dL 9.0       Results for orders placed during the hospital encounter of 06/14/23    Adult Transthoracic Echo Complete w/ Color, Spectral and Contrast if necessary per protocol    Interpretation Summary    Left ventricular systolic function is severely decreased. Left ventricular ejection fraction appears to be 31 - 35%.    The left ventricular cavity is moderately dilated.    Left ventricular wall thickness is consistent with mild concentric hypertrophy.    Normal right ventricular cavity size noted. Borderline low right ventricular systolic function noted.    No significant valvular abnormalities identified on this study.     Assessment:   Diagnoses and all orders for this visit:    1. Paroxysmal atrial fibrillation (Primary)  -     ECG 12 Lead  -     CBC (No Diff); Future  -     TSH; Future  -     Basic Metabolic Panel; Future  -     Magnesium; Future  -     Sotalol HCl AF 80 MG tablet; Take 0.5 tablets by mouth 2 (Two) Times a Day.    2. PVC's (premature ventricular contractions)  -     ECG 12 Lead    3. Sustained SVT    4. Other fatigue  -     Sotalol HCl AF 80 MG tablet; Take 0.5 tablets by mouth 2 (Two) Times a Day.    Paroxysmal Afib/SVT:   - Will draw labs today and assess for anemia, electrolyte abnormalities and thyroid function as noncardiac causes of his significant decline in stamina.  - Continue metoprolol and diltiazem.  - Continue coumadin.    Fatigue:   - He is adamant that his quality of life has decreased since being on the " sotalol. Previously he was on higher doses of gabapentin and did not have an issue.   - Discussed that we could decrease sotalol dosage in half. Take 40 mg (half a tablet) twice a day to see if fatigue improves.  - Consider discontinuing diltiazem at next appointment if symptoms have not improved given his borderline BP.   - May consider Tikosyn loading if reduction in sotalol does not improve symptoms.  - Follow up in 6 weeks with Dr. Horne.        I spent 30 minutes caring for Jonh on this date of service. This time includes time spent by me in the following activities:preparing for the visit, reviewing tests, obtaining and/or reviewing a separately obtained history, performing a medically appropriate examination and/or evaluation , counseling and educating the patient/family/caregiver, ordering medications, tests, or procedures, referring and communicating with other health care professionals , documenting information in the medical record, and independently interpreting results and communicating that information with the patient/family/caregiver        Electronically signed by JOCELYN Florian

## 2025-01-29 ENCOUNTER — OFFICE VISIT (OUTPATIENT)
Age: 80
End: 2025-01-29
Payer: MEDICARE

## 2025-01-29 VITALS
OXYGEN SATURATION: 98 % | HEART RATE: 80 BPM | SYSTOLIC BLOOD PRESSURE: 122 MMHG | DIASTOLIC BLOOD PRESSURE: 78 MMHG | WEIGHT: 242.51 LBS | BODY MASS INDEX: 31.12 KG/M2 | HEIGHT: 74 IN

## 2025-01-29 DIAGNOSIS — R60.0 PERIPHERAL EDEMA: ICD-10-CM

## 2025-01-29 DIAGNOSIS — L84 FOOT CALLUS: ICD-10-CM

## 2025-01-29 DIAGNOSIS — E11.40 TYPE 2 DIABETES MELLITUS WITH DIABETIC NEUROPATHY, WITHOUT LONG-TERM CURRENT USE OF INSULIN: ICD-10-CM

## 2025-01-29 DIAGNOSIS — R26.9 GAIT ABNORMALITY: ICD-10-CM

## 2025-01-29 DIAGNOSIS — M21.70 LOWER LIMB LENGTH DIFFERENCE: ICD-10-CM

## 2025-01-29 DIAGNOSIS — B35.1 ONYCHOMYCOSIS: Primary | ICD-10-CM

## 2025-01-29 DIAGNOSIS — Z79.01 ANTICOAGULANT LONG-TERM USE: ICD-10-CM

## 2025-02-26 ENCOUNTER — OFFICE VISIT (OUTPATIENT)
Dept: CARDIOLOGY | Facility: CLINIC | Age: 80
End: 2025-02-26
Payer: MEDICARE

## 2025-02-26 VITALS
HEART RATE: 83 BPM | DIASTOLIC BLOOD PRESSURE: 60 MMHG | BODY MASS INDEX: 31.06 KG/M2 | HEIGHT: 74 IN | SYSTOLIC BLOOD PRESSURE: 100 MMHG | WEIGHT: 242 LBS

## 2025-02-26 DIAGNOSIS — E66.09 CLASS 1 OBESITY DUE TO EXCESS CALORIES WITH SERIOUS COMORBIDITY AND BODY MASS INDEX (BMI) OF 31.0 TO 31.9 IN ADULT: ICD-10-CM

## 2025-02-26 DIAGNOSIS — E78.2 MIXED HYPERLIPIDEMIA: Chronic | ICD-10-CM

## 2025-02-26 DIAGNOSIS — Z79.01 ANTICOAGULANT LONG-TERM USE: Chronic | ICD-10-CM

## 2025-02-26 DIAGNOSIS — I50.42 CHF (CONGESTIVE HEART FAILURE), NYHA CLASS II, CHRONIC, COMBINED: Primary | ICD-10-CM

## 2025-02-26 DIAGNOSIS — I10 ESSENTIAL HYPERTENSION: Chronic | ICD-10-CM

## 2025-02-26 DIAGNOSIS — E66.811 CLASS 1 OBESITY DUE TO EXCESS CALORIES WITH SERIOUS COMORBIDITY AND BODY MASS INDEX (BMI) OF 31.0 TO 31.9 IN ADULT: ICD-10-CM

## 2025-02-26 DIAGNOSIS — I49.3 PVC'S (PREMATURE VENTRICULAR CONTRACTIONS): ICD-10-CM

## 2025-02-26 DIAGNOSIS — I48.0 PAROXYSMAL ATRIAL FIBRILLATION: Chronic | ICD-10-CM

## 2025-02-26 DIAGNOSIS — I42.8 NON-ISCHEMIC CARDIOMYOPATHY: Chronic | ICD-10-CM

## 2025-02-26 DIAGNOSIS — Z95.810 CARDIAC DEFIBRILLATOR IN SITU: Chronic | ICD-10-CM

## 2025-02-26 DIAGNOSIS — E11.42 TYPE 2 DIABETES MELLITUS WITH DIABETIC POLYNEUROPATHY, WITHOUT LONG-TERM CURRENT USE OF INSULIN: ICD-10-CM

## 2025-02-26 NOTE — PROGRESS NOTES
Subjective:     Encounter Date:02/26/2025      Patient ID: Jonh Peacock is a 79 y.o. male with a known paroxysmal atrial fibrillation/atrial flutter, sustained SVT, frequent PVCs status post PVC ablation, chronic systolic congestive heart failure with ICD, hypertension, hyperlipidemia, type 2 diabetes mellitus, obstructive sleep apnea, prior DVT, warfarin anticoagulation, and obesity who presents to the office today for follow-up.    Chief Complaint: Cardiac follow-up  History of Present Illness    Mr. Peacock presents to the office today accompanied by his wife for routine follow-up.  He was last seen in the office by electrophysiology on 1/27/2025.  He had previously followed with Dr. Banks as well as electrophysiologist Dr. Chavez.  His last visit in the office with Dr. Banks was September 2023.  His cardiac issues have been secondary to nonischemic cardiomyopathy, atrial arrhythmias, and frequent PVCs.  He has been managed most frequently by electrophysiology.    During his most recent office visit with the EP he reported significant fatigue.  He mimics this complaint today.  His wife also endorses increased fatigue and tiredness with minimal activity.  He seems to be sleeping for periods of time after minimal exertion as he has low stamina and activity tolerance.  Although he has other chronic medical conditions he feels that his fatigue has significantly worsened after starting Betapace.  He reported this at his visit in January with JOCELYN Romano.  He feels that his ablation did not improve symptoms as he was hoping and the recommendations were to start antiarrhythmic therapy for suppression however he feels he has been worse and not better consistently over the last year.    Symptoms are not worse than when he was seen by Radha but remained persistent despite recent reduction in sotalol dosing.  He continues with significant fatigue, increased rest periods, and low tolerance to  "activity.    The following portions of the patient's history were reviewed and updated as appropriate: allergies, current medications, past family history, past medical history, past social history, and past surgical history.    Allergies   Allergen Reactions    Percocet [Oxycodone-Acetaminophen] Urinary Retention    Amiodarone Other (See Comments)     INTERFERES WITH OTHER MEDICATIONS    Oxycontin [Oxycodone Hcl] Urinary Retention    Penicillins Hives     All \"cillins\"    Vioxx [Rofecoxib] GI Intolerance       Current Outpatient Medications:     atorvastatin (LIPITOR) 20 MG tablet, Take 1 tablet by mouth Daily., Disp: 90 tablet, Rfl: 3    Buprenorphine 10 MCG/HR patch weekly, Place 15 mcg on the skin as directed by provider Every 7 (Seven) Days. POSTERIOR RIGHT SHOULDER, Disp: , Rfl:     Diclofenac Sodium (VOLTAREN) 1 % gel gel, Apply 4 g topically to the appropriate area as directed 4 (Four) Times a Day As Needed (bilatearl hand pain)., Disp: 350 g, Rfl: 3    dilTIAZem (CARDIZEM) 30 MG tablet, Take 1 tablet by mouth 3 (Three) Times a Day., Disp: 270 tablet, Rfl: 3    furosemide (LASIX) 20 MG tablet, Take 1 tablet by mouth Daily., Disp: 90 tablet, Rfl: 3    furosemide (LASIX) 40 MG tablet, Take 1 tablet by mouth Daily. TAKE WITH 20 MG TABLET FOR 60 PER MG PER DAY, Disp: 90 tablet, Rfl: 3    gabapentin (NEURONTIN) 300 MG capsule, Take 400 mg by mouth 3 (Three) Times a Day., Disp: , Rfl:     Glucosamine 750 MG tablet, Take 1 tablet by mouth 2 (Two) Times a Day., Disp: , Rfl:     losartan (COZAAR) 25 MG tablet, Take 1 tablet by mouth Daily., Disp: 90 tablet, Rfl: 3    methocarbamol (Robaxin) 500 MG tablet, Take 1 tablet by mouth 4 (Four) Times a Day., Disp: 20 tablet, Rfl: 1    metoprolol succinate XL (TOPROL-XL) 100 MG 24 hr tablet, Take 1 tablet by mouth Daily. (Patient taking differently: Take 0.5 tablets by mouth Every Night. Takes 1/2 a pill), Disp: 90 tablet, Rfl: 3    Multiple Vitamins-Minerals (MULTIVITAMIN " ADULT PO), Take 1 tablet by mouth Daily., Disp: , Rfl:     pantoprazole (PROTONIX) 40 MG EC tablet, Take 1 tablet by mouth Daily., Disp: 90 tablet, Rfl: 2    polyethylene glycol (MIRALAX) powder, Take 17 g by mouth Every Night., Disp: 1581 g, Rfl: 3    Sotalol HCl AF 80 MG tablet, Take 0.5 tablets by mouth 2 (Two) Times a Day. (Patient taking differently: Take 0.5 tablets by mouth Daily.), Disp: , Rfl:     tamsulosin (FLOMAX) 0.4 MG capsule 24 hr capsule, Take 1 capsule by mouth Every Night., Disp: 90 capsule, Rfl: 3    warfarin (COUMADIN) 2 MG tablet, Take 1 tablet by mouth Daily., Disp: 90 tablet, Rfl: 1    warfarin (COUMADIN) 4 MG tablet, Take 1 tablet by mouth Daily., Disp: 90 tablet, Rfl: 1    warfarin (COUMADIN) 5 MG tablet, Take 1 tablet by mouth Daily., Disp: 90 tablet, Rfl: 3      Review of Systems   Constitutional: Positive for decreased appetite and malaise/fatigue. Negative for chills, diaphoresis and fever.   Cardiovascular:  Positive for dyspnea on exertion. Negative for chest pain, orthopnea, palpitations, paroxysmal nocturnal dyspnea and syncope.   Respiratory:  Negative for cough, shortness of breath and wheezing.    Hematologic/Lymphatic: Negative for bleeding problem. Bruises/bleeds easily.   Musculoskeletal:  Positive for arthritis and muscle weakness.   Gastrointestinal:  Negative for nausea and vomiting.   Neurological:  Positive for excessive daytime sleepiness, dizziness (With standing, chronic) and weakness.   Psychiatric/Behavioral:  Negative for altered mental status. The patient is not nervous/anxious.        Procedures       Objective:     Vitals reviewed.   Constitutional:       General: Awake. Not in acute distress.     Appearance: Normal appearance. Well-developed, well-groomed and not in distress. Obese. Chronically ill-appearing.   HENT:      Head: Normocephalic and atraumatic.   Pulmonary:      Effort: Pulmonary effort is normal.      Breath sounds: Normal breath sounds.  "  Cardiovascular:      Normal rate. Occasional ectopic beats. Regular rhythm.   Edema:     Peripheral edema present.  Musculoskeletal:      Cervical back: Normal range of motion and neck supple. Skin:     General: Skin is warm and dry.   Neurological:      Mental Status: Alert, oriented to person, place, and time and oriented to person, place and time.   Psychiatric:         Attention and Perception: Attention and perception normal.         Mood and Affect: Mood normal.         Speech: Speech normal.         Behavior: Behavior normal. Behavior is cooperative.         Thought Content: Thought content normal.         Cognition and Memory: Cognition and memory normal.         /60   Pulse 83   Ht 188 cm (74.02\")   Wt 110 kg (242 lb)   BMI 31.05 kg/m²     Lab Review:   Results for orders placed during the hospital encounter of 06/14/23    Adult Transthoracic Echo Complete w/ Color, Spectral and Contrast if necessary per protocol    Interpretation Summary    Left ventricular systolic function is severely decreased. Left ventricular ejection fraction appears to be 31 - 35%.    The left ventricular cavity is moderately dilated.    Left ventricular wall thickness is consistent with mild concentric hypertrophy.    Normal right ventricular cavity size noted. Borderline low right ventricular systolic function noted.    No significant valvular abnormalities identified on this study.          Assessment:          Diagnosis Plan   1. CHF (congestive heart failure), NYHA class II, chronic, combined        2. Non-ischemic cardiomyopathy        3. Paroxysmal atrial fibrillation        4. PVC's (premature ventricular contractions)        5. Essential hypertension        6. Mixed hyperlipidemia        7. Cardiac defibrillator in situ        8. Anticoagulant long-term use        9. Type 2 diabetes mellitus with diabetic polyneuropathy, without long-term current use of insulin        10. Class 1 obesity due to excess calories " with serious comorbidity and body mass index (BMI) of 31.0 to 31.9 in adult               Plan:         Currently continues to have symptoms of fatigue.  He has felt that his symptoms have been persistent and started with initiation of sotalol.  This medication has been reduced at his last visit however his symptoms have not improved.  There are plans to follow-up with electrophysiology for further consideration of discontinuation of sotalol and initiation of Tikosyn or other antiarrhythmics for management of his atrial arrhythmias and PVCs.    He has no symptoms that seem to be consistent with congestive heart failure.  He is managed on heart failure medications including losartan, Toprol-XL, and daily Lasix.     His most recent echo is referenced above with EF 30 to 35% he has an ICD.  He is also on intermittent dosing of diltiazem for management of palpitations.    He would like to continue routine follow-up with Dr. Banks as he has seen him in the past multiple times but understands that his current complaints and current recommendations will come from EP with follow-up planned next week.

## 2025-02-27 ENCOUNTER — LAB (OUTPATIENT)
Dept: LAB | Facility: HOSPITAL | Age: 80
End: 2025-02-27
Payer: MEDICARE

## 2025-02-27 LAB
INR PPP: 2.4 (ref 0.91–1.09)
PROTHROMBIN TIME: 28.6 SECONDS (ref 10–13.8)

## 2025-02-27 PROCEDURE — 85610 PROTHROMBIN TIME: CPT | Performed by: INTERNAL MEDICINE

## 2025-02-28 DIAGNOSIS — E11.42 TYPE 2 DIABETES MELLITUS WITH DIABETIC POLYNEUROPATHY, WITHOUT LONG-TERM CURRENT USE OF INSULIN: ICD-10-CM

## 2025-02-28 DIAGNOSIS — G56.03 BILATERAL CARPAL TUNNEL SYNDROME: ICD-10-CM

## 2025-02-28 DIAGNOSIS — E78.2 MIXED HYPERLIPIDEMIA: ICD-10-CM

## 2025-02-28 DIAGNOSIS — I48.0 PAROXYSMAL ATRIAL FIBRILLATION: ICD-10-CM

## 2025-02-28 DIAGNOSIS — R20.0 BILATERAL HAND NUMBNESS: ICD-10-CM

## 2025-02-28 RX ORDER — TAMSULOSIN HYDROCHLORIDE 0.4 MG/1
1 CAPSULE ORAL NIGHTLY
Qty: 90 CAPSULE | Refills: 3 | Status: SHIPPED | OUTPATIENT
Start: 2025-02-28

## 2025-02-28 RX ORDER — DILTIAZEM HYDROCHLORIDE 30 MG/1
30 TABLET, FILM COATED ORAL 3 TIMES DAILY
Qty: 270 TABLET | Refills: 3 | Status: SHIPPED | OUTPATIENT
Start: 2025-02-28

## 2025-02-28 RX ORDER — FUROSEMIDE 40 MG/1
40 TABLET ORAL DAILY
Qty: 90 TABLET | Refills: 3 | Status: SHIPPED | OUTPATIENT
Start: 2025-02-28

## 2025-02-28 RX ORDER — ATORVASTATIN CALCIUM 20 MG/1
20 TABLET, FILM COATED ORAL DAILY
Qty: 90 TABLET | Refills: 3 | Status: SHIPPED | OUTPATIENT
Start: 2025-02-28

## 2025-02-28 RX ORDER — FUROSEMIDE 20 MG/1
20 TABLET ORAL DAILY
Qty: 90 TABLET | Refills: 3 | Status: SHIPPED | OUTPATIENT
Start: 2025-02-28

## 2025-02-28 RX ORDER — WARFARIN SODIUM 5 MG/1
5 TABLET ORAL
Qty: 90 TABLET | Refills: 3 | Status: SHIPPED | OUTPATIENT
Start: 2025-02-28

## 2025-02-28 RX ORDER — WARFARIN SODIUM 2 MG/1
2 TABLET ORAL
Qty: 90 TABLET | Refills: 3 | Status: SHIPPED | OUTPATIENT
Start: 2025-02-28

## 2025-02-28 RX ORDER — WARFARIN SODIUM 4 MG/1
4 TABLET ORAL
Qty: 90 TABLET | Refills: 3 | Status: SHIPPED | OUTPATIENT
Start: 2025-02-28

## 2025-02-28 RX ORDER — PANTOPRAZOLE SODIUM 40 MG/1
40 TABLET, DELAYED RELEASE ORAL DAILY
Qty: 90 TABLET | Refills: 3 | Status: SHIPPED | OUTPATIENT
Start: 2025-02-28

## 2025-02-28 RX ORDER — LOSARTAN POTASSIUM 25 MG/1
25 TABLET ORAL DAILY
Qty: 90 TABLET | Refills: 3 | Status: SHIPPED | OUTPATIENT
Start: 2025-02-28

## 2025-02-28 NOTE — TELEPHONE ENCOUNTER
Rx Refill Note  Requested Prescriptions     Pending Prescriptions Disp Refills    atorvastatin (LIPITOR) 20 MG tablet 90 tablet 3     Sig: Take 1 tablet by mouth Daily.    Diclofenac Sodium (VOLTAREN) 1 % gel gel 400 g 3     Sig: Apply 4 g topically to the appropriate area as directed 4 (Four) Times a Day As Needed (bilatearl hand pain).    dilTIAZem (CARDIZEM) 30 MG tablet 270 tablet 3     Sig: Take 1 tablet by mouth 3 (Three) Times a Day.    furosemide (LASIX) 20 MG tablet 90 tablet 3     Sig: Take 1 tablet by mouth Daily.    furosemide (LASIX) 40 MG tablet 90 tablet 3     Sig: Take 1 tablet by mouth Daily. TAKE WITH 20 MG TABLET FOR 60 PER MG PER DAY    losartan (COZAAR) 25 MG tablet 90 tablet 3     Sig: Take 1 tablet by mouth Daily.    pantoprazole (PROTONIX) 40 MG EC tablet 90 tablet 3     Sig: Take 1 tablet by mouth Daily.    tamsulosin (FLOMAX) 0.4 MG capsule 24 hr capsule 90 capsule 3     Sig: Take 1 capsule by mouth Every Night.    warfarin (COUMADIN) 2 MG tablet 90 tablet 3     Sig: Take 1 tablet by mouth Daily.    warfarin (COUMADIN) 4 MG tablet 90 tablet 3     Sig: Take 1 tablet by mouth Daily.    warfarin (COUMADIN) 5 MG tablet 90 tablet 3     Sig: Take 1 tablet by mouth Daily.      Last office visit with prescribing clinician: 5/20/2024   Last telemedicine visit with prescribing clinician: Visit date not found   Next office visit with prescribing clinician: 3/24/2025                         Would you like a call back once the refill request has been completed: [] Yes [] No    If the office needs to give you a call back, can they leave a voicemail: [] Yes [] No    Magen Bueno MA  02/28/25, 14:48 CST    PATIENT STATED THAT HE DID HIS PT/INR ON 02/27/2025 AND IT WAS 2.4 SO HE STATED THAT HE IS GOING TO CONTINUE ON HIS REGULAR DOSE WHICH IS 5mg

## 2025-03-04 ENCOUNTER — OFFICE VISIT (OUTPATIENT)
Dept: CARDIOLOGY | Facility: CLINIC | Age: 80
End: 2025-03-04
Payer: MEDICARE

## 2025-03-04 VITALS
BODY MASS INDEX: 31.44 KG/M2 | DIASTOLIC BLOOD PRESSURE: 62 MMHG | SYSTOLIC BLOOD PRESSURE: 94 MMHG | HEART RATE: 82 BPM | HEIGHT: 74 IN | WEIGHT: 245 LBS

## 2025-03-04 DIAGNOSIS — I48.0 PAROXYSMAL ATRIAL FIBRILLATION: Chronic | ICD-10-CM

## 2025-03-04 DIAGNOSIS — I47.10 SUSTAINED SVT: Primary | ICD-10-CM

## 2025-03-04 DIAGNOSIS — I49.3 PVC'S (PREMATURE VENTRICULAR CONTRACTIONS): ICD-10-CM

## 2025-03-04 RX ORDER — MAGNESIUM OXIDE 400 MG/1
400 TABLET ORAL DAILY
Qty: 90 TABLET | Refills: 3 | Status: SHIPPED | OUTPATIENT
Start: 2025-03-04

## 2025-03-04 NOTE — PROGRESS NOTES
Chief Complaint  Atrial Fibrillation    Subjective        History of Present Illness    EP Problems:  1.  Paroxysmal atrial fibrillation  2.  Atrial flutter  3.  Sustained SVT / atrial tachycardia  4.  Sinus node dysfunction  5.  Presence of a cardiac defibrillator  -9/27/2012: DOI, [employer]  -10/31/2019: Generator change, Brenda Chavez  6.  Acquired long QT syndrome  7.  Frequent PVCs  -3/2023: 16% burden (12% single morphology)     Cardiology Problems:  1.  Chronic systolic heart failure  -2/5/2019: EF 31 to 35%  2.  Hypertension  3.  Hyperlipidemia  4.  Prior DVT     Medical Problems:  1.  Obesity, BMI 32  2.  Type 2 diabetes  3.  BPH  4.  Chronic pain  5.  Obstructive sleep apnea       History of Present Illness  The patient is a 79-year-old male presenting to the clinic for follow-up of atrial fibrillation, atrial flutter, sustained supraventricular tachycardia (SVT), sinus node dysfunction, presence of a cardiac defibrillator, acquired long QT syndrome, and frequent premature ventricular contractions (PVCs).    He was last seen in May 2024 after a planned PVC ablation at the hospital, which revealed frequent PVCs with multiple different morphologies. He underwent attempted PVC ablation from the left ventricular (LV) summit, treated from the right ventricular (RV) outflow tract, great cardiac vein, anterior mitral commissure (AMC), and left coronary cusp, but experienced early PVC recurrence.    He reports experiencing mild edema and a general feeling of malaise since the initiation of sotalol therapy. Despite reducing the dosage from one tablet to half a tablet twice daily, there has been no noticeable improvement in his condition. He expresses a desire to discontinue the medication. He has not experienced any shocks from his new defibrillator device. He was taking sotalol, however, this was causing fatigue and weakness. He wore a repeat Holter monitor in June 2024, which showed a 28-minute episode of  "atrial fibrillation with an 8 percent PVC burden. He is currently on a regimen of sotalol, administered as half a tablet twice daily.    MEDICATIONS  Current: Sotalol      Objective   Vital Signs:  BP 94/62   Pulse 82   Ht 188 cm (74.02\")   Wt 111 kg (245 lb)   BMI 31.44 kg/m²   Estimated body mass index is 31.44 kg/m² as calculated from the following:    Height as of this encounter: 188 cm (74.02\").    Weight as of this encounter: 111 kg (245 lb).      Physical Exam  Vitals reviewed.   Constitutional:       Appearance: Normal appearance.   Cardiovascular:      Rate and Rhythm: Normal rate and regular rhythm.      Pulses: Normal pulses.      Heart sounds: Normal heart sounds.      Comments: Pulse generator site is clean, dry, intact  Pulmonary:      Effort: Pulmonary effort is normal.      Breath sounds: Normal breath sounds.   Musculoskeletal:         General: No swelling.   Neurological:      Mental Status: He is alert.   Psychiatric:         Mood and Affect: Mood normal.         Judgment: Judgment normal.          Physical Exam      Result Review :  The following data was reviewed by: Rossana Horne MD on today's date:    Lab Results   Component Value Date    WBC 5.23 01/27/2025    HGB 11.6 (L) 01/27/2025    HCT 35.3 (L) 01/27/2025    MCV 91.5 01/27/2025     (L) 01/27/2025      Lab Results   Component Value Date    GLUCOSE 162 (H) 01/27/2025    CALCIUM 9.0 01/27/2025     01/27/2025    K 3.7 01/27/2025    CO2 30.0 (H) 01/27/2025     01/27/2025    BUN 28 (H) 01/27/2025    CREATININE 1.58 (H) 01/27/2025    EGFR 44.5 (L) 01/27/2025    BCR 17.7 01/27/2025    ANIONGAP 10.0 01/27/2025      Lab Results   Component Value Date    MG 1.5 (L) 01/27/2025     ECG today shows an atrial paced ventricular sensed rhythm, inferior infarction pattern, single PVC, nonspecific ST and T wave changes             Assessment and Plan   Diagnoses and all orders for this visit:    1. Sustained SVT (Primary)    2. " Paroxysmal atrial fibrillation    3. PVC's (premature ventricular contractions)    Other orders  -     magnesium oxide (MAG-OX) 400 MG tablet; Take 1 tablet by mouth Daily.  Dispense: 90 tablet; Refill: 3      His device interrogation shows that his PVC burden is overall fairly well-controlled at less than 50 PVCs per hour.  He is having symptoms of fatigue and weakness since starting the sotalol.  At this time, I do think that stopping the sotalol is going to be reasonable.  We can bring him back in 6 weeks to reassess symptoms as well as see what his overall PVC burden is looking like at that time.    He is having evidence of recurrent atrial arrhythmias on his device.  I have enabled atrial ATP on his device to see if some of these rhythm issues can be treated in this manner.  His overall atrial fibrillation burden is still less than 0.1% and as such, no obvious role for very aggressive treatment at this time unless his symptoms were to worsen or his burden would arise.    Plan:  -Stop sotalol  -Atrial ATP enabled on his defibrillator  -6-week follow-up to reassess symptoms and PVC burden  -Long-term follow-up in our clinic for frequent multifocal PVCs, paroxysmal atrial fibrillation, sustained SVT, presence of a cardiac defibrillator         Follow Up   Return in about 6 weeks (around 4/15/2025).  Patient was given instructions and counseling regarding his condition or for health maintenance advice. Please see specific information pulled into the AVS if appropriate.     Part of this note may be an electronic transcription/translation of spoken language to printed text using the Dragon Dictation System.    Patient or patient representative verbalized consent for the use of Ambient Listening during the visit with  Rossana Horne MD for chart documentation. 3/4/2025  11:14 CST

## 2025-03-14 RX ORDER — METOPROLOL SUCCINATE 100 MG/1
50 TABLET, EXTENDED RELEASE ORAL DAILY
COMMUNITY

## 2025-03-24 ENCOUNTER — OFFICE VISIT (OUTPATIENT)
Dept: INTERNAL MEDICINE | Facility: CLINIC | Age: 80
End: 2025-03-24
Payer: MEDICARE

## 2025-03-24 VITALS
OXYGEN SATURATION: 98 % | HEART RATE: 40 BPM | SYSTOLIC BLOOD PRESSURE: 103 MMHG | DIASTOLIC BLOOD PRESSURE: 62 MMHG | BODY MASS INDEX: 32.02 KG/M2 | WEIGHT: 249.5 LBS | HEIGHT: 74 IN | RESPIRATION RATE: 16 BRPM

## 2025-03-24 DIAGNOSIS — I42.8 NON-ISCHEMIC CARDIOMYOPATHY: Chronic | ICD-10-CM

## 2025-03-24 DIAGNOSIS — R05.9 COUGH, UNSPECIFIED TYPE: ICD-10-CM

## 2025-03-24 DIAGNOSIS — E66.09 CLASS 1 OBESITY DUE TO EXCESS CALORIES WITH SERIOUS COMORBIDITY AND BODY MASS INDEX (BMI) OF 32.0 TO 32.9 IN ADULT: ICD-10-CM

## 2025-03-24 DIAGNOSIS — I10 ESSENTIAL HYPERTENSION: Chronic | ICD-10-CM

## 2025-03-24 DIAGNOSIS — R25.1 TREMOR: ICD-10-CM

## 2025-03-24 DIAGNOSIS — Z12.5 ENCOUNTER FOR PROSTATE CANCER SCREENING: ICD-10-CM

## 2025-03-24 DIAGNOSIS — E11.42 TYPE 2 DIABETES MELLITUS WITH DIABETIC POLYNEUROPATHY, WITHOUT LONG-TERM CURRENT USE OF INSULIN: ICD-10-CM

## 2025-03-24 DIAGNOSIS — I50.42 CHF (CONGESTIVE HEART FAILURE), NYHA CLASS II, CHRONIC, COMBINED: Primary | ICD-10-CM

## 2025-03-24 DIAGNOSIS — E66.811 CLASS 1 OBESITY DUE TO EXCESS CALORIES WITH SERIOUS COMORBIDITY AND BODY MASS INDEX (BMI) OF 32.0 TO 32.9 IN ADULT: ICD-10-CM

## 2025-03-24 PROCEDURE — 3074F SYST BP LT 130 MM HG: CPT | Performed by: INTERNAL MEDICINE

## 2025-03-24 PROCEDURE — 1160F RVW MEDS BY RX/DR IN RCRD: CPT | Performed by: INTERNAL MEDICINE

## 2025-03-24 PROCEDURE — 99214 OFFICE O/P EST MOD 30 MIN: CPT | Performed by: INTERNAL MEDICINE

## 2025-03-24 PROCEDURE — 3078F DIAST BP <80 MM HG: CPT | Performed by: INTERNAL MEDICINE

## 2025-03-24 PROCEDURE — 1126F AMNT PAIN NOTED NONE PRSNT: CPT | Performed by: INTERNAL MEDICINE

## 2025-03-24 PROCEDURE — 1159F MED LIST DOCD IN RCRD: CPT | Performed by: INTERNAL MEDICINE

## 2025-03-24 PROCEDURE — G2211 COMPLEX E/M VISIT ADD ON: HCPCS | Performed by: INTERNAL MEDICINE

## 2025-03-24 RX ORDER — DIAZEPAM 2 MG/1
2 TABLET ORAL DAILY PRN
Qty: 10 TABLET | Refills: 0 | Status: SHIPPED | OUTPATIENT
Start: 2025-03-24

## 2025-03-24 RX ORDER — BENZONATATE 200 MG/1
200 CAPSULE ORAL DAILY PRN
Qty: 30 CAPSULE | Refills: 0 | Status: SHIPPED | OUTPATIENT
Start: 2025-03-24

## 2025-03-24 NOTE — PROGRESS NOTES
CC: f/u hypertension    History:  Jonh Peacock is a 79 y.o. male   History of Present Illness  The patient came in with his wife, reporting issues with back pain, tremors, and a dry cough.    His back pain flared up again with the change in spring weather, something he hasn't experienced in several years. Despite using a back stimulator, he had to take Lortab 7.5 occasionally and uses a patch for pain relief.    His wife has noticed he's been getting shaky again, especially during meals and when using the computer. There have been about four really bad episodes since before Thanksgiving. He had stopped taking diazepam for tremors after getting a new stimulator but might need it again.    He's also developed a dry cough that tends to get worse in the late afternoon, possibly due to allergies. He loves being outside but is limited by his physical condition. He avoids certain over-the-counter medications because they might interact with his prescriptions.        ROS:  Review of Systems   Constitutional:  Negative for chills and fever.   Respiratory:  Negative for cough and shortness of breath.    Cardiovascular:  Negative for chest pain and palpitations.   Gastrointestinal:  Negative for abdominal pain and constipation.   Neurological:  Positive for tremors.        reports that he has never smoked. He has never been exposed to tobacco smoke. He has never used smokeless tobacco. He reports that he does not currently use alcohol. He reports that he does not use drugs.      Current Outpatient Medications:     atorvastatin (LIPITOR) 20 MG tablet, Take 1 tablet by mouth Daily., Disp: 90 tablet, Rfl: 3    Buprenorphine 10 MCG/HR patch weekly, Place 15 mcg on the skin as directed by provider Every 7 (Seven) Days. POSTERIOR RIGHT SHOULDER, Disp: , Rfl:     Diclofenac Sodium (VOLTAREN) 1 % gel gel, Apply 4 g topically to the appropriate area as directed 4 (Four) Times a Day As Needed (bilatearl hand pain)., Disp: 400 g,  "Rfl: 3    dilTIAZem (CARDIZEM) 30 MG tablet, Take 1 tablet by mouth 3 (Three) Times a Day., Disp: 270 tablet, Rfl: 3    furosemide (LASIX) 20 MG tablet, Take 1 tablet by mouth Daily., Disp: 90 tablet, Rfl: 3    furosemide (LASIX) 40 MG tablet, Take 1 tablet by mouth Daily. TAKE WITH 20 MG TABLET FOR 60 PER MG PER DAY, Disp: 90 tablet, Rfl: 3    gabapentin (NEURONTIN) 300 MG capsule, Take 400 mg by mouth 3 (Three) Times a Day., Disp: , Rfl:     Glucosamine 750 MG tablet, Take 1 tablet by mouth 2 (Two) Times a Day., Disp: , Rfl:     losartan (COZAAR) 25 MG tablet, Take 1 tablet by mouth Daily., Disp: 90 tablet, Rfl: 3    magnesium oxide (MAG-OX) 400 MG tablet, Take 1 tablet by mouth Daily., Disp: 90 tablet, Rfl: 3    methocarbamol (Robaxin) 500 MG tablet, Take 1 tablet by mouth 4 (Four) Times a Day., Disp: 20 tablet, Rfl: 1    metoprolol succinate XL (TOPROL-XL) 100 MG 24 hr tablet, Take 0.5 tablets by mouth Daily., Disp: , Rfl:     Multiple Vitamins-Minerals (MULTIVITAMIN ADULT PO), Take 1 tablet by mouth Daily., Disp: , Rfl:     pantoprazole (PROTONIX) 40 MG EC tablet, Take 1 tablet by mouth Daily., Disp: 90 tablet, Rfl: 3    polyethylene glycol (MIRALAX) powder, Take 17 g by mouth Every Night., Disp: 1581 g, Rfl: 3    tamsulosin (FLOMAX) 0.4 MG capsule 24 hr capsule, Take 1 capsule by mouth Every Night., Disp: 90 capsule, Rfl: 3    warfarin (COUMADIN) 5 MG tablet, Take 1 tablet by mouth Daily., Disp: 90 tablet, Rfl: 3    OBJECTIVE:  /62 (BP Location: Left arm, Patient Position: Sitting, Cuff Size: Adult)   Pulse (!) 40   Resp 16   Ht 188 cm (74.02\")   Wt 113 kg (249 lb 8 oz)   SpO2 98%   BMI 32.02 kg/m²    Physical Exam  Constitutional:       General: He is not in acute distress.  Pulmonary:      Effort: Pulmonary effort is normal. No respiratory distress.   Neurological:      Mental Status: He is alert and oriented to person, place, and time.   Psychiatric:         Mood and Affect: Mood normal.         " Behavior: Behavior normal.           Assessment/Plan     Diagnoses and all orders for this visit:    1. CHF (congestive heart failure), NYHA class II, chronic, combined (Primary)  4. Non-ischemic cardiomyopathy  Continue GDMT with no decompensation today.     2. Essential hypertension  -     Lipid Panel; Future    3. Type 2 diabetes mellitus with diabetic polyneuropathy, without long-term current use of insulin  -     Comprehensive Metabolic Panel; Future  -     Hemoglobin A1c; Future  -     Lipid Panel; Future  -     Microalbumin / Creatinine Urine Ratio - Urine, Clean Catch; Future  Labs for surveillance.     5. Tremor  -     diazePAM (Valium) 2 MG tablet; Take 1 tablet by mouth Daily As Needed (tremor).  Dispense: 10 tablet; Refill: 0    6. Encounter for prostate cancer screening  -     PSA Screen; Future    7. Cough, unspecified type  -     benzonatate (TESSALON) 200 MG capsule; Take 1 capsule by mouth Daily As Needed for Cough.  Dispense: 30 capsule; Refill: 0    8. Class 1 obesity due to excess calories with serious comorbidity and body mass index (BMI) of 32.0 to 32.9 in adult      Assessment & Plan  1. Back pain: Recurrence.  - Uses back stimulator, patch, and sporadic Lortab 7.5 mg for severe pain.    2. Tremors: Increased.  - Prescription for diazepam (Valium) to be sent to [facility] pharmacy.  - Advised for use only when necessary.    3. Dry cough: Periodic.  - Recommend OTC Robitussin for symptomatic relief  - Tessalon sent as well.     4. Health maintenance.  - Plans to schedule colonoscopy today.  - Lab tests, including PSA, this week.  - Blood pressure well-controlled.    Follow-up in 6 months or earlier if necessary.      An After Visit Summary was printed and given to the patient at discharge.  Return in about 6 months (around 9/24/2025) for Medicare Wellness.      Patient or patient representative verbalized consent for the use of Ambient Listening during the visit with  Ric Antony DO for  chart documentation. 3/24/2025  16:37 CDT    Ric Antony D.O. 3/24/2025   Electronically signed.

## 2025-03-27 ENCOUNTER — TELEPHONE (OUTPATIENT)
Dept: INTERNAL MEDICINE | Facility: CLINIC | Age: 80
End: 2025-03-27
Payer: MEDICARE

## 2025-03-27 DIAGNOSIS — J40 BRONCHITIS: Primary | ICD-10-CM

## 2025-03-27 RX ORDER — DOXYCYCLINE 100 MG/1
100 CAPSULE ORAL 2 TIMES DAILY
Qty: 10 CAPSULE | Refills: 0 | Status: SHIPPED | OUTPATIENT
Start: 2025-03-27 | End: 2025-04-01

## 2025-03-27 NOTE — TELEPHONE ENCOUNTER
Antibiotic, doxycycline sent to pharmacy.  
Length of symptoms?  
PATIENTS WIFE HAS BEEN CALLED, SHE STATED SINCE AROUND 03/22/2025  
PATIENTS WIFE HAS CALLED, SHE STATED THAT PATIENT HAS A CONTINUOUS COUGH, RESTLESS AT NIGHT, NO TEMP, SOME BODY ACHES.  FATIGUE.      TAKING OTC ROBITUSSIN BUT IT HAS NOT BEEN HELPING MUCH.  THEY ARE ASKING IF SOMETHING COULD BE CALLED IN FOR HIM.       PATIENT HAS TAKEN A HOME COVID TEST AND IT IS NEGATIVE.    
Patient's wife informed.  
Detail Level: Zone
Initiate Treatment: TMC 0.1% cream BID PRN flare
Initiate Treatment: Ketoconazole shampoo

## 2025-04-08 ENCOUNTER — LAB (OUTPATIENT)
Dept: LAB | Facility: HOSPITAL | Age: 80
End: 2025-04-08
Payer: MEDICARE

## 2025-04-08 DIAGNOSIS — E11.42 TYPE 2 DIABETES MELLITUS WITH DIABETIC POLYNEUROPATHY, WITHOUT LONG-TERM CURRENT USE OF INSULIN: ICD-10-CM

## 2025-04-08 DIAGNOSIS — Z12.5 ENCOUNTER FOR PROSTATE CANCER SCREENING: ICD-10-CM

## 2025-04-08 DIAGNOSIS — I10 ESSENTIAL HYPERTENSION: Chronic | ICD-10-CM

## 2025-04-08 LAB
ALBUMIN SERPL-MCNC: 3.8 G/DL (ref 3.5–5.2)
ALBUMIN/GLOB SERPL: 1.3 G/DL
ALP SERPL-CCNC: 97 U/L (ref 39–117)
ALT SERPL W P-5'-P-CCNC: 23 U/L (ref 1–41)
ANION GAP SERPL CALCULATED.3IONS-SCNC: 11 MMOL/L (ref 5–15)
AST SERPL-CCNC: 25 U/L (ref 1–40)
BILIRUB SERPL-MCNC: 0.4 MG/DL (ref 0–1.2)
BUN SERPL-MCNC: 30 MG/DL (ref 8–23)
BUN/CREAT SERPL: 18 (ref 7–25)
CALCIUM SPEC-SCNC: 9 MG/DL (ref 8.6–10.5)
CHLORIDE SERPL-SCNC: 103 MMOL/L (ref 98–107)
CHOLEST SERPL-MCNC: 170 MG/DL (ref 0–200)
CO2 SERPL-SCNC: 27 MMOL/L (ref 22–29)
CREAT SERPL-MCNC: 1.67 MG/DL (ref 0.76–1.27)
EGFRCR SERPLBLD CKD-EPI 2021: 41.6 ML/MIN/1.73
GLOBULIN UR ELPH-MCNC: 3 GM/DL
GLUCOSE SERPL-MCNC: 141 MG/DL (ref 65–99)
HBA1C MFR BLD: 6.7 % (ref 4.8–5.6)
HDLC SERPL-MCNC: 42 MG/DL (ref 40–60)
INR PPP: 3.4 (ref 0.91–1.09)
LDLC SERPL CALC-MCNC: 84 MG/DL (ref 0–100)
LDLC/HDLC SERPL: 1.79 {RATIO}
POTASSIUM SERPL-SCNC: 3.6 MMOL/L (ref 3.5–5.2)
PROT SERPL-MCNC: 6.8 G/DL (ref 6–8.5)
PROTHROMBIN TIME: 41.3 SECONDS (ref 10–13.8)
SODIUM SERPL-SCNC: 141 MMOL/L (ref 136–145)
TRIGL SERPL-MCNC: 264 MG/DL (ref 0–150)
VLDLC SERPL-MCNC: 44 MG/DL (ref 5–40)

## 2025-04-08 PROCEDURE — 36415 COLL VENOUS BLD VENIPUNCTURE: CPT

## 2025-04-08 PROCEDURE — 82043 UR ALBUMIN QUANTITATIVE: CPT

## 2025-04-08 PROCEDURE — 82570 ASSAY OF URINE CREATININE: CPT

## 2025-04-08 PROCEDURE — 80061 LIPID PANEL: CPT

## 2025-04-08 PROCEDURE — G0103 PSA SCREENING: HCPCS

## 2025-04-08 PROCEDURE — 80053 COMPREHEN METABOLIC PANEL: CPT

## 2025-04-08 PROCEDURE — 83036 HEMOGLOBIN GLYCOSYLATED A1C: CPT

## 2025-04-08 PROCEDURE — 85610 PROTHROMBIN TIME: CPT | Performed by: INTERNAL MEDICINE

## 2025-04-09 LAB
ALBUMIN UR-MCNC: 2.7 MG/DL
CREAT UR-MCNC: 175.4 MG/DL
MICROALBUMIN/CREAT UR: 15.4 MG/G (ref 0–29)
PSA SERPL-MCNC: 2.65 NG/ML (ref 0–4)

## 2025-04-15 ENCOUNTER — OFFICE VISIT (OUTPATIENT)
Dept: GASTROENTEROLOGY | Facility: CLINIC | Age: 80
End: 2025-04-15
Payer: MEDICARE

## 2025-04-15 ENCOUNTER — OFFICE VISIT (OUTPATIENT)
Dept: CARDIOLOGY | Facility: CLINIC | Age: 80
End: 2025-04-15
Payer: MEDICARE

## 2025-04-15 VITALS
WEIGHT: 245 LBS | OXYGEN SATURATION: 100 % | SYSTOLIC BLOOD PRESSURE: 102 MMHG | HEIGHT: 74 IN | BODY MASS INDEX: 31.44 KG/M2 | HEART RATE: 85 BPM | TEMPERATURE: 97.1 F | DIASTOLIC BLOOD PRESSURE: 60 MMHG

## 2025-04-15 VITALS
BODY MASS INDEX: 30.8 KG/M2 | WEIGHT: 240 LBS | DIASTOLIC BLOOD PRESSURE: 62 MMHG | OXYGEN SATURATION: 97 % | HEART RATE: 88 BPM | HEIGHT: 74 IN | SYSTOLIC BLOOD PRESSURE: 96 MMHG

## 2025-04-15 DIAGNOSIS — I48.0 PAROXYSMAL ATRIAL FIBRILLATION: ICD-10-CM

## 2025-04-15 DIAGNOSIS — Z95.810 CARDIAC DEFIBRILLATOR IN SITU: ICD-10-CM

## 2025-04-15 DIAGNOSIS — D68.9 COAGULOPATHY: ICD-10-CM

## 2025-04-15 DIAGNOSIS — I49.3 PVC'S (PREMATURE VENTRICULAR CONTRACTIONS): ICD-10-CM

## 2025-04-15 DIAGNOSIS — I42.8 NON-ISCHEMIC CARDIOMYOPATHY: ICD-10-CM

## 2025-04-15 DIAGNOSIS — Z86.0101 HISTORY OF ADENOMATOUS POLYP OF COLON: Primary | ICD-10-CM

## 2025-04-15 DIAGNOSIS — I47.10 SUSTAINED SVT: Primary | ICD-10-CM

## 2025-04-15 PROCEDURE — 93000 ELECTROCARDIOGRAM COMPLETE: CPT

## 2025-04-15 PROCEDURE — 3074F SYST BP LT 130 MM HG: CPT

## 2025-04-15 PROCEDURE — 99214 OFFICE O/P EST MOD 30 MIN: CPT

## 2025-04-15 PROCEDURE — 3078F DIAST BP <80 MM HG: CPT

## 2025-04-15 NOTE — PROGRESS NOTES
[facility] Gastroenterology    Primary Physician Ric Antony DO    4/15/2025    Jonh Peacock   1945      Chief Complaint   Patient presents with    Colonoscopy       Subjective     HPI    Drew C Vanderford is a 79 y.o. male who presents as a referral for preventative maintenance. He has no complaints of nausea or vomiting. No change in bowels. No wt loss. No BRBPR. No melena. No abdominal pain.       He has a colostomy. He had diverticulitis. No history of colon cancer.   He is on coumadin.  Afib. Dr. Banks.   Creatinine 1.67   4-8-25.         COLONOSCOPY (10/29/2019 09:07) recall 5 years.   Tissue Pathology Exam (10/29/2019 09:25) tubular adenomatous.   No family history of colon cancer/polyps.     Past Medical History:   Diagnosis Date    Anemia     Anxiety     Arrhythmia     Arthritis     Atrial fibrillation     Cardiomyopathy     CHF (congestive heart failure)     Colon polyp     Colostomy present     10years    Connective tissue disease     CTS (carpal tunnel syndrome)     Depression     Diabetes mellitus     Diverticulitis     Dizzy     DVT (deep venous thrombosis)     Gastritis     GERD (gastroesophageal reflux disease)     Hiatal hernia     History of transfusion     Hyperlipidemia     Hypertension     Low back pain     Lumbosacral disc disease     Neuropathy     Pneumonia        Past Surgical History:   Procedure Laterality Date    ABLATION OF DYSRHYTHMIC FOCUS      APPENDECTOMY      BACK SURGERY  2002, 2007    Multiple spine surgeries - laminectomy & fusion    CARDIAC CATHETERIZATION      CARDIAC DEFIBRILLATOR PLACEMENT      CARDIAC ELECTROPHYSIOLOGY PROCEDURE N/A 10/31/2019    Procedure: ICD GEN CHANGE;  Surgeon: Chaz Chavez MD;  Location:  [facility] CATH INVASIVE LOCATION;  Service: Cardiology    CARDIAC ELECTROPHYSIOLOGY PROCEDURE N/A 05/29/2024    Procedure: Ablation PVC;  Surgeon: Rossana Horne MD;  Location:  [facility] CATH [facility]IVE LOCATION;  Service:  Cardiovascular;  Laterality: N/A;    CARPAL TUNNEL RELEASE Right 07/30/2024    Procedure: CARPAL TUNNEL RELEASE, right;  Surgeon: Noah Oliveros MD;  Location: [facility] OR;  Service: Neurosurgery;  Laterality: Right;    CARPAL TUNNEL RELEASE Left 09/24/2024    Procedure: CARPAL TUNNEL RELEASE; LEFT;  Surgeon: Noah Oliveros MD;  Location: [facility] OR;  Service: Neurosurgery;  Laterality: Left;    COLON RESECTION WITH COLOSTOMY      COLONOSCOPY  09/04/2014    diverticulosis    COLONOSCOPY N/A 10/29/2019    Procedure: COLONOSCOPY WITH ANESTHESIA;  Surgeon: Tenzin Abrams MD;  Location: [facility] ENDOSCOPY;  Service: Gastroenterology    ENDOSCOPY  02/24/1999    small sliding hiatal hernia    FOOT SURGERY Bilateral 2005    INSERT / REPLACE / REMOVE PACEMAKER      JOINT REPLACEMENT Right 2002    KNEE    PACEMAKER IMPLANTATION  2012    RECTAL FISSURE INCISION AND DRAINAGE      REPLACEMENT TOTAL KNEE Bilateral     SPINAL CORD STIMULATOR IMPLANT  08/2012    Placed by Dr. DEENA Villela, [employer]    SPINAL FUSION      THORACIC LAMINECTOMY WITH PLACEMENT OF DORSAL COLUMN STIMULATOR Right 01/28/2021    Procedure: SPINAL CORD STIMULATOR REVISION, right buttocks;  Surgeon: Noah Oliveros MD;  Location: [facility] OR;  Service: Neurosurgery;  Laterality: Right;    TOTAL HIP ARTHROPLASTY Right 2004    TOTAL HIP ARTHROPLASTY Left 12/2008    TRIGGER POINT INJECTION         Outpatient Medications Marked as Taking for the 4/15/25 encounter (Office Visit) with Sujatha Barlow APRN   Medication Sig Dispense Refill    atorvastatin (LIPITOR) 20 MG tablet Take 1 tablet by mouth Daily. 90 tablet 3    Buprenorphine 10 MCG/HR patch weekly Place 15 mcg on the skin as directed by provider Every 7 (Seven) Days. POSTERIOR RIGHT SHOULDER      diazePAM (Valium) 2 MG tablet Take 1 tablet by mouth Daily As Needed (tremor). 10 tablet 0    Diclofenac Sodium (VOLTAREN) 1 % gel gel Apply 4 g topically to the appropriate area as directed 4  "(Four) Times a Day As Needed (bilatearl hand pain). 400 g 3    dilTIAZem (CARDIZEM) 30 MG tablet Take 1 tablet by mouth 3 (Three) Times a Day. 270 tablet 3    furosemide (LASIX) 20 MG tablet Take 1 tablet by mouth Daily. 90 tablet 3    furosemide (LASIX) 40 MG tablet Take 1 tablet by mouth Daily. TAKE WITH 20 MG TABLET FOR 60 PER MG PER DAY 90 tablet 3    gabapentin (NEURONTIN) 300 MG capsule Take 400 mg by mouth 3 (Three) Times a Day.      Glucosamine 750 MG tablet Take 1 tablet by mouth 2 (Two) Times a Day.      losartan (COZAAR) 25 MG tablet Take 1 tablet by mouth Daily. 90 tablet 3    magnesium oxide (MAG-OX) 400 MG tablet Take 1 tablet by mouth Daily. 90 tablet 3    metoprolol succinate XL (TOPROL-XL) 100 MG 24 hr tablet Take 0.5 tablets by mouth Daily.      Multiple Vitamins-Minerals (MULTIVITAMIN ADULT PO) Take 1 tablet by mouth Daily.      pantoprazole (PROTONIX) 40 MG EC tablet Take 1 tablet by mouth Daily. 90 tablet 3    polyethylene glycol (MIRALAX) powder Take 17 g by mouth Every Night. 1581 g 3    tamsulosin (FLOMAX) 0.4 MG capsule 24 hr capsule Take 1 capsule by mouth Every Night. 90 capsule 3    warfarin (COUMADIN) 5 MG tablet Take 1 tablet by mouth Daily. 90 tablet 3       Allergies   Allergen Reactions    Percocet [Oxycodone-Acetaminophen] Urinary Retention    Amiodarone Other (See Comments)     INTERFERES WITH OTHER MEDICATIONS    Oxycontin [Oxycodone Hcl] Urinary Retention    Penicillins Hives     All \"cillins\"    Vioxx [Rofecoxib] GI Intolerance       Social History     Socioeconomic History    Marital status:    Tobacco Use    Smoking status: Never     Passive exposure: Never    Smokeless tobacco: Never   Vaping Use    Vaping status: Never Used   Substance and Sexual Activity    Alcohol use: Not Currently    Drug use: Never    Sexual activity: Defer       Family History   Problem Relation Age of Onset    Heart disease Mother     Heart disease Brother     Breast cancer Brother     Colon " cancer Neg Hx     Colon polyps Neg Hx        Review of Systems   Constitutional:  Negative for chills, fever and unexpected weight change.   Respiratory:  Negative for shortness of breath.    Cardiovascular:  Negative for chest pain.   Gastrointestinal:  Negative for abdominal distention, abdominal pain, anal bleeding, blood in stool, constipation, diarrhea, nausea and vomiting.       Objective     Vitals:    04/15/25 1249   BP: 102/60   Pulse: 85   Temp: 97.1 °F (36.2 °C)   SpO2: 100%         04/15/25  1249   Weight: 111 kg (245 lb)     Body mass index is 31.46 kg/m².    Physical Exam  Vitals reviewed.   Constitutional:       General: He is not in acute distress.  Cardiovascular:      Rate and Rhythm: Normal rate and regular rhythm.      Heart sounds: Normal heart sounds.   Pulmonary:      Effort: Pulmonary effort is normal.      Breath sounds: Normal breath sounds.   Abdominal:      General: Bowel sounds are normal. There is no distension.      Palpations: Abdomen is soft.      Tenderness: There is no abdominal tenderness.      Comments: Llq ostomy with appliance, brown stool. Stoma pink friable. No blood noted.    Skin:     General: Skin is warm and dry.   Neurological:      Mental Status: He is alert.         Imaging Results (Most Recent)       None            Assessment & Plan     Diagnoses and all orders for this visit:    1. History of adenomatous polyp of colon (Primary)    2. Coagulopathy  Comments:  coumadin      Plan for colonoscopy. However prior to scheduling I will send a letter to Dr. Banks in regards to if the patient can safely hold coumadin 5 days prior to procedure. I will contact patient once he has responded.  I have instructed the patient to contact our office if he has not heard from us within 2 weeks.  He will need golytely prep.     The patient was advised on the risks of stopping blood thinners for a procedure.  The risks discussed included the risk of developing myocardial infarction,  CVA, embolus, clogging of the arteries or stents, etc.  We discussed the potential consequences of the risks discussed.  The benefits of stopping as well as the alternatives were discussed as well.    This is to prevent any risk or complication from bleeding intra and post procedure. If they develop bleeding post procedure they are to go the emergency department for further evaluation and treatment immediately.                * Surgery not found *  All risks, benefits, alternatives, and indications of colonoscopy procedure have been discussed with the patient. Risks to include perforation of the colon requiring possible surgery or colostomy, risk of bleeding from biopsies or removal of colon tissue, possibility of missing a colon polyp or cancer, or adverse drug reaction.  Benefits to include the diagnosis and management of disease of the colon and rectum. Alternatives to include barium enema, radiographic evaluation, lab testing or no intervention. Pt verbalizes understanding and agrees.     There are no Patient Instructions on file for this visit.    REYMUNDO Mcghee

## 2025-04-15 NOTE — PROGRESS NOTES
[facility] Electrophysiology   Reason For Visit:  Sustained SVT     Subjective        EP Problems:  1.  Paroxysmal atrial fibrillation  2.  Atrial flutter  3.  Sustained SVT / atrial tachycardia  4.  Sinus node dysfunction  5.  Presence of a cardiac defibrillator  -9/27/2012: HUEY, [employer]  -10/31/2019: Generator change, Brenda Chavez  6.  Acquired long QT syndrome  7.  Frequent PVCs  -3/2023: 16% burden (12% single morphology)  -5/29/2024: RF delivery to LV summit PVC from the RVOT,GCV/AIV junction, AMC, and LCC with good initial response but PVC recurrence during recovery      Cardiology Problems:  1.  Chronic systolic heart failure  -2/5/2019: EF 31 to 35%  2.  Hypertension  3.  Hyperlipidemia  4.  Prior DVT     Medical Problems:  1.  Obesity, BMI 32  2.  Type 2 diabetes  3.  BPH  4.  Chronic pain  5.  Obstructive sleep apnea          Jonh Peacock is a 79 y.o. male with above pertinent PMH who presents for follow-up of atrial fibrillation, atrial flutter, sustained SVT.    Last seen in March 2025.  Stopped sotalol due to fatigue and weakness.  Atrial ATP enabled due to evidence of recurrent atrial arrhythmias.    Since his previous appointment has been doing well.  He says stopping the sotalol made him feel like a new man.  He has a lot more energy.  He has not noticed any cardiac symptoms.  He is tolerating the rest of his regimen well.  Denies any palpitations or fluttering.  Denies any bleeding concerns on his Coumadin.        ROS: Pertinent findings as noted above        Pertinent past medical, surgical, family, and social history were reviewed.      Current Outpatient Medications:     atorvastatin (LIPITOR) 20 MG tablet, Take 1 tablet by mouth Daily., Disp: 90 tablet, Rfl: 3    benzonatate (TESSALON) 200 MG capsule, Take 1 capsule by mouth Daily As Needed for Cough., Disp: 30 capsule, Rfl: 0    Buprenorphine 10 MCG/HR patch weekly, Place 15 mcg on the skin as directed by provider Every 7 (Seven)  "Days. POSTERIOR RIGHT SHOULDER, Disp: , Rfl:     diazePAM (Valium) 2 MG tablet, Take 1 tablet by mouth Daily As Needed (tremor)., Disp: 10 tablet, Rfl: 0    Diclofenac Sodium (VOLTAREN) 1 % gel gel, Apply 4 g topically to the appropriate area as directed 4 (Four) Times a Day As Needed (bilatearl hand pain)., Disp: 400 g, Rfl: 3    dilTIAZem (CARDIZEM) 30 MG tablet, Take 1 tablet by mouth 3 (Three) Times a Day., Disp: 270 tablet, Rfl: 3    furosemide (LASIX) 20 MG tablet, Take 1 tablet by mouth Daily., Disp: 90 tablet, Rfl: 3    furosemide (LASIX) 40 MG tablet, Take 1 tablet by mouth Daily. TAKE WITH 20 MG TABLET FOR 60 PER MG PER DAY, Disp: 90 tablet, Rfl: 3    gabapentin (NEURONTIN) 300 MG capsule, Take 400 mg by mouth 3 (Three) Times a Day., Disp: , Rfl:     Glucosamine 750 MG tablet, Take 1 tablet by mouth 2 (Two) Times a Day., Disp: , Rfl:     losartan (COZAAR) 25 MG tablet, Take 1 tablet by mouth Daily., Disp: 90 tablet, Rfl: 3    magnesium oxide (MAG-OX) 400 MG tablet, Take 1 tablet by mouth Daily., Disp: 90 tablet, Rfl: 3    methocarbamol (Robaxin) 500 MG tablet, Take 1 tablet by mouth 4 (Four) Times a Day., Disp: 20 tablet, Rfl: 1    metoprolol succinate XL (TOPROL-XL) 100 MG 24 hr tablet, Take 0.5 tablets by mouth Daily., Disp: , Rfl:     Multiple Vitamins-Minerals (MULTIVITAMIN ADULT PO), Take 1 tablet by mouth Daily., Disp: , Rfl:     pantoprazole (PROTONIX) 40 MG EC tablet, Take 1 tablet by mouth Daily., Disp: 90 tablet, Rfl: 3    polyethylene glycol (MIRALAX) powder, Take 17 g by mouth Every Night., Disp: 1581 g, Rfl: 3    tamsulosin (FLOMAX) 0.4 MG capsule 24 hr capsule, Take 1 capsule by mouth Every Night., Disp: 90 capsule, Rfl: 3    warfarin (COUMADIN) 5 MG tablet, Take 1 tablet by mouth Daily., Disp: 90 tablet, Rfl: 3     Objective   Vital Signs:  BP 96/62   Pulse 88   Ht 188 cm (74.02\")   Wt 109 kg (240 lb)   SpO2 97%   BMI 30.80 kg/m²   Estimated body mass index is 30.8 kg/m² as calculated " "from the following:    Height as of this encounter: 188 cm (74.02\").    Weight as of this encounter: 109 kg (240 lb).      Constitutional:       Appearance: Healthy appearance. Not in distress.   Pulmonary:      Effort: Pulmonary effort is normal.      Breath sounds: Normal breath sounds and air entry.   Cardiovascular:      PMI at left midclavicular line. Normal rate. Regular rhythm. Normal S1. Normal S2.       Murmurs: There is no murmur.      No gallop.  No click. No rub.   Pulses:     Intact distal pulses.   Edema:     Peripheral edema absent.   Neurological:      Mental Status: Alert and oriented to person, place and time.        Result Review :  The following data was reviewed by: REYMUNDO Martins on 04/15/2025:  CMP   CMP          11/22/2024    15:55 1/27/2025    15:36 4/8/2025    15:48   CMP   Glucose 157  162  141    BUN 25  28  30    Creatinine 1.32  1.58  1.67    EGFR 55.6  44.5  41.6    Sodium 143  143  141    Potassium 3.8  3.7  3.6    Chloride 102  103  103    Calcium 9.0  9.0  9.0    Total Protein   6.8    Albumin   3.8    Globulin   3.0    Total Bilirubin   0.4    Alkaline Phosphatase   97    AST (SGOT)   25    ALT (SGPT)   23    Albumin/Globulin Ratio   1.3    BUN/Creatinine Ratio 18.9  17.7  18.0    Anion Gap 8.0  10.0  11.0      CBC   CBC          7/3/2024    10:27 9/12/2024    16:22 1/27/2025    15:36   CBC   WBC 4.49  5.89  5.23    RBC 3.51  3.87  3.86    Hemoglobin 10.7  11.9  11.6    Hematocrit 32.3  35.6  35.3    MCV 92.0  92.0  91.5    MCH 30.5  30.7  30.1    MCHC 33.1  33.4  32.9    RDW 13.6  13.2  13.1    Platelets 144  143  126           ECG 12 Lead    Date/Time: 4/15/2025 2:19 PM  Performed by: Ebenezer Davison APRN    Authorized by: Ebenezer Davison APRN  Comparison: compared with previous ECG from 1/27/2025  Similar to previous ECG  Comparison to previous ECG: Atrial paced rhythm  Rhythm: paced  Rate: normal  QRS axis: normal    Clinical impression: normal ECG          "   Assessment and Plan   Diagnoses and all orders for this visit:    1. Sustained SVT (Primary)  2. Paroxysmal atrial fibrillation  3. PVC's (premature ventricular contractions)  4. Non-ischemic cardiomyopathy  5. Cardiac defibrillator in situ  -Overall the patient is doing well today with no specific cardiac symptoms  - Continue anticoagulation with Coumadin  - Continue Toprol-XL 50 mg daily as well as Cardizem 30 mg 3 times daily  - Recent pacemaker interrogation does show increased PVC burden as well as episodes of SVT and A-fib, however the patient is asymptomatic.  The episodes alone are not significant enough to raise concern currently.  Will continue to monitor with remote transmissions  - Follow-up in the EP clinic in 6 months                       I spent 2 minutes on the separately reported service of EKG interpretation. This time is not included in the time used to support the E/M service also reported today.      Follow Up   Return in about 6 months (around 10/15/2025).  Patient was given instructions and counseling regarding his condition or for health maintenance advice. Please see specific information pulled into the AVS if appropriate.       Part of this note may be an electronic transcription/translation of spoken language to printed text using the Dragon Dictation System.

## 2025-04-21 ENCOUNTER — TELEPHONE (OUTPATIENT)
Dept: GASTROENTEROLOGY | Facility: CLINIC | Age: 80
End: 2025-04-21
Payer: MEDICARE

## 2025-04-21 ENCOUNTER — PREP FOR SURGERY (OUTPATIENT)
Dept: OTHER | Facility: HOSPITAL | Age: 80
End: 2025-04-21
Payer: MEDICARE

## 2025-04-21 DIAGNOSIS — Z86.0101 HISTORY OF ADENOMATOUS POLYP OF COLON: Primary | ICD-10-CM

## 2025-04-21 RX ORDER — POLYETHYLENE GLYCOL 3350, SODIUM SULFATE ANHYDROUS, SODIUM BICARBONATE, SODIUM CHLORIDE, POTASSIUM CHLORIDE 236; 22.74; 6.74; 5.86; 2.97 G/4L; G/4L; G/4L; G/4L; G/4L
4 POWDER, FOR SOLUTION ORAL ONCE
Qty: 4000 ML | Refills: 0 | Status: SHIPPED | OUTPATIENT
Start: 2025-04-21 | End: 2025-04-21

## 2025-04-21 NOTE — TELEPHONE ENCOUNTER
Please let patient know that Dr. Banks approves him to hold Coumadin 5 days prior to procedure.  Please schedule colonoscopy.  GoLytely prep.          ----- Message from Arun Banks sent at 4/17/2025  2:09 PM CDT -----  It would be considered reasonably safe to discontinue the medication for the time requested.  No Lovenox bridge would be needed in this particular setting.  ----- Message -----  From: Sujatha Barlow APRN  Sent: 4/16/2025  12:43 PM CDT  To: Arun Banks MD

## 2025-05-02 NOTE — PROGRESS NOTES
[facility] - PODIATRY    Today's Date: 05/05/25    Patient Name: Jonh Peacock  MRN: [id number]  CSN: [id number]  PCP: Ric Antony DO  Referring Provider: No ref. provider found     SUBJECTIVE     Chief Complaint   Patient presents with    Diabetes    Follow-up     Ric Antony DO 3/24/2025  Patient is here for foot/nail care.  Denies pain today.  No complaints.     HPI: Jonh Peacock, a 79 y.o.male, comes to clinic as a(n) established patient presenting for diabetic foot exam and complaining of thick fungal toenails. Pt with h/o Anxiety, Arrhythmia, Cardiomyopathy, CHF, Depression, previous DVT, HTN, Obesity, Anticoagulation with Warfarin. Patient presents with thickened nails that are difficult to care for due to thickness and anticoagulation therapy.  Patient is NIDDM and unsure of last BG level.  Last A1c of 6.7%.  Denies pain today.  Reports that due to issues getting to last appointment, he had to trim his left hallux nail.  Denies any issues clipping this nail back.  Uses rollator for ambulation. Continues Coumadin daily. Notes improvement to feet with regular visits. Reports numbness in his feet. Denies any constitutional symptoms. No other pedal complaints at this time.    Past Medical History:   Diagnosis Date    Anemia     Anxiety     Arrhythmia     Arthritis     Atrial fibrillation     Cardiomyopathy     CHF (congestive heart failure)     Colon polyp     Colostomy present     10years    Connective tissue disease     CTS (carpal tunnel syndrome)     Depression     Diabetes mellitus     Diverticulitis     Dizzy     DVT (deep venous thrombosis)     Gastritis     GERD (gastroesophageal reflux disease)     Hiatal hernia     History of transfusion     Hyperlipidemia     Hypertension     Low back pain     Lumbosacral disc disease     Neuropathy     Pneumonia      Past Surgical History:   Procedure Laterality Date    ABLATION OF DYSRHYTHMIC FOCUS      APPENDECTOMY       BACK SURGERY  2002, 2007    Multiple spine surgeries - laminectomy & fusion    CARDIAC CATHETERIZATION      CARDIAC DEFIBRILLATOR PLACEMENT      CARDIAC ELECTROPHYSIOLOGY PROCEDURE N/A 10/31/2019    Procedure: ICD GEN CHANGE;  Surgeon: Chaz Chavez MD;  Location:  [facility];  Service: Cardiology    CARDIAC ELECTROPHYSIOLOGY PROCEDURE N/A 05/29/2024    Procedure: Ablation PVC;  Surgeon: Rossana Horne MD;  Location:  [facility] CATH [facility];  Service: Cardiovascular;  Laterality: N/A;    CARPAL TUNNEL RELEASE Right 07/30/2024    Procedure: CARPAL TUNNEL RELEASE, right;  Surgeon: Noah Oliveros MD;  Location:  [facility] OR;  Service: Neurosurgery;  Laterality: Right;    CARPAL TUNNEL RELEASE Left 09/24/2024    Procedure: CARPAL TUNNEL RELEASE; LEFT;  Surgeon: Noah Oliveros MD;  Location:  [facility] OR;  Service: Neurosurgery;  Laterality: Left;    COLON RESECTION WITH COLOSTOMY      COLONOSCOPY  09/04/2014    diverticulosis    COLONOSCOPY N/A 10/29/2019    Procedure: COLONOSCOPY WITH ANESTHESIA;  Surgeon: Tenzin Abrams MD;  Location: [facility] ENDOSCOPY;  Service: Gastroenterology    ENDOSCOPY  02/24/1999    small sliding hiatal hernia    FOOT SURGERY Bilateral 2005    INSERT / REPLACE / REMOVE PACEMAKER      JOINT REPLACEMENT Right 2002    KNEE    PACEMAKER IMPLANTATION  2012    RECTAL FISSURE INCISION AND DRAINAGE      REPLACEMENT TOTAL KNEE Bilateral     SPINAL CORD STIMULATOR IMPLANT  08/2012    Placed by Dr. DEENA Villela, [employer]    SPINAL FUSION      THORACIC LAMINECTOMY WITH PLACEMENT OF DORSAL COLUMN STIMULATOR Right 01/28/2021    Procedure: SPINAL CORD STIMULATOR REVISION, right buttocks;  Surgeon: Noah Oliveros MD;  Location: [facility] OR;  Service: Neurosurgery;  Laterality: Right;    TOTAL HIP ARTHROPLASTY Right 2004    TOTAL HIP ARTHROPLASTY Left 12/2008    TRIGGER POINT INJECTION       Family History   Problem Relation Age of Onset    Heart disease  "Mother     Heart disease Brother     Breast cancer Brother     Colon cancer Neg Hx     Colon polyps Neg Hx      Social History     Socioeconomic History    Marital status:    Tobacco Use    Smoking status: Never     Passive exposure: Never    Smokeless tobacco: Never   Vaping Use    Vaping status: Never Used   Substance and Sexual Activity    Alcohol use: Not Currently    Drug use: Never    Sexual activity: Defer     Allergies   Allergen Reactions    Percocet [Oxycodone-Acetaminophen] Urinary Retention    Amiodarone Other (See Comments)     INTERFERES WITH OTHER MEDICATIONS    Oxycontin [Oxycodone Hcl] Urinary Retention    Penicillins Hives     All \"cillins\"    Vioxx [Rofecoxib] GI Intolerance     Current Outpatient Medications   Medication Sig Dispense Refill    atorvastatin (LIPITOR) 20 MG tablet Take 1 tablet by mouth Daily. 90 tablet 3    benzonatate (TESSALON) 200 MG capsule Take 1 capsule by mouth Daily As Needed for Cough. 30 capsule 0    Buprenorphine 10 MCG/HR patch weekly Place 15 mcg on the skin as directed by provider Every 7 (Seven) Days. POSTERIOR RIGHT SHOULDER      diazePAM (Valium) 2 MG tablet Take 1 tablet by mouth Daily As Needed (tremor). 10 tablet 0    Diclofenac Sodium (VOLTAREN) 1 % gel gel Apply 4 g topically to the appropriate area as directed 4 (Four) Times a Day As Needed (bilatearl hand pain). 400 g 3    dilTIAZem (CARDIZEM) 30 MG tablet Take 1 tablet by mouth 3 (Three) Times a Day. 270 tablet 3    furosemide (LASIX) 20 MG tablet Take 1 tablet by mouth Daily. 90 tablet 3    furosemide (LASIX) 40 MG tablet Take 1 tablet by mouth Daily. TAKE WITH 20 MG TABLET FOR 60 PER MG PER DAY 90 tablet 3    gabapentin (NEURONTIN) 300 MG capsule Take 400 mg by mouth 3 (Three) Times a Day.      Glucosamine 750 MG tablet Take 1 tablet by mouth 2 (Two) Times a Day.      losartan (COZAAR) 25 MG tablet Take 1 tablet by mouth Daily. 90 tablet 3    magnesium oxide (MAG-OX) 400 MG tablet Take 1 tablet by " mouth Daily. 90 tablet 3    methocarbamol (Robaxin) 500 MG tablet Take 1 tablet by mouth 4 (Four) Times a Day. 20 tablet 1    metoprolol succinate XL (TOPROL-XL) 100 MG 24 hr tablet Take 0.5 tablets by mouth Daily.      Multiple Vitamins-Minerals (MULTIVITAMIN ADULT PO) Take 1 tablet by mouth Daily.      pantoprazole (PROTONIX) 40 MG EC tablet Take 1 tablet by mouth Daily. 90 tablet 3    polyethylene glycol (MIRALAX) powder Take 17 g by mouth Every Night. 1581 g 3    tamsulosin (FLOMAX) 0.4 MG capsule 24 hr capsule Take 1 capsule by mouth Every Night. 90 capsule 3    warfarin (COUMADIN) 5 MG tablet Take 1 tablet by mouth Daily. 90 tablet 3     No current facility-administered medications for this visit.     Review of Systems   Constitutional:  Negative for chills and fever.   HENT:  Negative for congestion.    Respiratory:  Negative for shortness of breath.    Cardiovascular:  Positive for leg swelling. Negative for chest pain.   Gastrointestinal:  Negative for constipation, diarrhea, nausea and vomiting.   Musculoskeletal:  Positive for arthralgias, back pain and gait problem.        Foot pain   Skin:  Negative for wound.        Thick Toenails   Neurological:  Positive for numbness.   Hematological:  Bruises/bleeds easily.       OBJECTIVE     Vitals:    05/05/25 1342   BP: 128/78   Pulse: 89   SpO2: 95%       PHYSICAL EXAM  GEN:   Accompanied by wife.    Foot/Ankle Exam    GENERAL  Diabetic foot exam performed    Appearance:  appears stated age  Orientation:  AAOx3  Affect:  appropriate  Gait:  (unsteady)  Assistance:  walker  Right shoe gear: casual shoe  Left shoe gear: casual shoe (with lift)    VASCULAR     Right Foot Vascularity   Dorsalis pedis:  1+  Posterior tibial:  1+  Skin temperature:  warm  Edema grading:  Trace and non-pitting  CFT:  4  Pedal hair growth:  Absent  Varicosities:  none     Left Foot Vascularity   Dorsalis pedis:  1+  Posterior tibial:  1+  Skin temperature:  warm  Edema grading:  Trace  and non-pitting  CFT:  4  Pedal hair growth:  Absent  Varicosities:  none     NEUROLOGIC     Right Foot Neurologic   Light touch sensation: diminished  Vibratory sensation: diminished  Hot/Cold sensation: diminished  Protective Sensation using [place] Monofilament:   Sites intact: 7  Sites tested: 10     Left Foot Neurologic   Light touch sensation: diminished  Vibratory sensation: diminished  Hot/Cold sensation:  diminished  Protective Sensation using [place] Monofilament:   Sites intact: 7  Sites tested: 10    MUSCULOSKELETAL     Right Foot Musculoskeletal   Ecchymosis:  none  Tenderness:  toenail problem    Arch:  Pes planus  Hallux valgus: No       Left Foot Musculoskeletal   Ecchymosis:  none  Tenderness:  toenail problem  Arch:  Pes planus  Hallux valgus: No      MUSCLE STRENGTH     Right Foot Muscle Strength   Foot dorsiflexion:  5  Foot plantar flexion:  5  Foot inversion:  5  Foot eversion:  5     Left Foot Muscle Strength   Foot dorsiflexion:  5  Foot plantar flexion:  5  Foot inversion:  5  Foot eversion:  5    RANGE OF MOTION     Right Foot Range of Motion   Foot and ankle ROM within normal limits       Left Foot Range of Motion   Foot and ankle ROM within normal limits      DERMATOLOGIC      Right Foot Dermatologic   Skin  Right foot skin is intact.   Nails  1.  Positive for elongated, onychomycosis, abnormal thickness and dystrophic nail.  2.  Positive for elongated, onychomycosis, abnormal thickness and dystrophic nail.  3.  Positive for elongated, onychomycosis, abnormal thickness and dystrophic nail.  4.  Positive for elongated, onychomycosis, abnormal thickness and dystrophic nail.  5.  Positive for elongated, onychomycosis, abnormal thickness and dystrophic nail.     Left Foot Dermatologic   Skin  Left foot skin is intact.   Nails  1.  Positive for elongated, onychomycosis, abnormal thickness and dystrophic nail.  2.  Positive for elongated, onychomycosis, abnormal thickness and  dystrophic nail.  3.  Positive for elongated, onychomycosis, abnormal thickness and dystrophic nail.  4.  Positive for elongated, onychomycosis, abnormally thick and dystrophic nail.  5.  Positive for elongated, onychomycosis, abnormally thick and dystrophic nail.       RADIOLOGY/NUCLEAR:  No results found.    LABORATORY/CULTURE RESULTS:      PATHOLOGY RESULTS:       ASSESSMENT/PLAN     Diagnoses and all orders for this visit:    1. Onychomycosis (Primary)    2. Lower limb length difference    3. Type 2 diabetes mellitus with diabetic neuropathy, without long-term current use of insulin    4. Anticoagulant long-term use    5. Gait abnormality    6. Encounter for diabetic foot exam      Comprehensive lower extremity examination and evaluation was performed.  Discussed findings and treatment plan including risks, benefits, and treatment options with patient in detail. Patient agreed with treatment plan.   Hemoglobin A1c reviewed.  CMP reviewed.  PCP note reviewed.  GI note reviewed.  Diabetic foot exam performed.  After verbal consent obtained, nail(s) x10 debrided of length and thickness with nail nipper without incidence  Patient may maintain nails and calluses at home utilizing emery board or pumice stone between visits as needed  Reviewed at home diabetic foot care including daily foot checks   Continue shoes with custom inserts  Continue use of rollator for ambulation assistance.  Elevate legs when possible.  Follow with PCP for this management.  Encourage patient to call with any questions or concerns before his next appointment.  An After Visit Summary was printed and given to the patient at discharge, including (if requested) any available informative/educational handouts regarding diagnosis, treatment, or medications. All questions were answered to patient/family satisfaction. Should symptoms fail to improve or worsen they agree to call or return to clinic or to go to the Emergency Department. Discussed the  importance of following up with any needed screening tests/labs/specialist appointments and any requested follow-up recommended by me today. Importance of maintaining follow-up discussed and patient accepts that missed appointments can delay diagnosis and potentially lead to worsening of conditions.  Return in about 10 weeks (around 7/14/2025) for Diabetic foot care clinic, With Sallie DWYER., or sooner if acute issues arise.        This document has been electronically signed by REYMUNDO Santana on May 5, 2025 14:47 CDT

## 2025-05-05 ENCOUNTER — OFFICE VISIT (OUTPATIENT)
Age: 80
End: 2025-05-05
Payer: MEDICARE

## 2025-05-05 VITALS
HEART RATE: 89 BPM | HEIGHT: 74 IN | SYSTOLIC BLOOD PRESSURE: 128 MMHG | WEIGHT: 240 LBS | DIASTOLIC BLOOD PRESSURE: 78 MMHG | BODY MASS INDEX: 30.8 KG/M2 | OXYGEN SATURATION: 95 %

## 2025-05-05 DIAGNOSIS — E11.9 ENCOUNTER FOR DIABETIC FOOT EXAM: ICD-10-CM

## 2025-05-05 DIAGNOSIS — E11.40 TYPE 2 DIABETES MELLITUS WITH DIABETIC NEUROPATHY, WITHOUT LONG-TERM CURRENT USE OF INSULIN: ICD-10-CM

## 2025-05-05 DIAGNOSIS — B35.1 ONYCHOMYCOSIS: Primary | ICD-10-CM

## 2025-05-05 DIAGNOSIS — M21.70 LOWER LIMB LENGTH DIFFERENCE: ICD-10-CM

## 2025-05-05 DIAGNOSIS — Z79.01 ANTICOAGULANT LONG-TERM USE: ICD-10-CM

## 2025-05-05 DIAGNOSIS — R26.9 GAIT ABNORMALITY: ICD-10-CM

## 2025-05-13 DIAGNOSIS — Z79.01 ANTICOAGULANT LONG-TERM USE: Primary | Chronic | ICD-10-CM

## 2025-05-13 DIAGNOSIS — I48.0 PAROXYSMAL ATRIAL FIBRILLATION: Chronic | ICD-10-CM

## 2025-05-23 RX ORDER — SOTALOL HYDROCHLORIDE 80 MG/1
80 TABLET ORAL EVERY 12 HOURS SCHEDULED
OUTPATIENT
Start: 2025-05-23

## 2025-05-28 ENCOUNTER — LAB (OUTPATIENT)
Dept: LAB | Facility: HOSPITAL | Age: 80
End: 2025-05-28
Payer: MEDICARE

## 2025-05-28 DIAGNOSIS — Z79.01 ANTICOAGULANT LONG-TERM USE: Chronic | ICD-10-CM

## 2025-05-28 DIAGNOSIS — I48.0 PAROXYSMAL ATRIAL FIBRILLATION: Chronic | ICD-10-CM

## 2025-05-28 LAB
INR PPP: 2 (ref 0.91–1.09)
PROTHROMBIN TIME: 24.1 SECONDS (ref 10–13.8)

## 2025-05-28 PROCEDURE — 85610 PROTHROMBIN TIME: CPT | Performed by: INTERNAL MEDICINE

## 2025-06-03 ENCOUNTER — TELEPHONE (OUTPATIENT)
Dept: GASTROENTEROLOGY | Facility: CLINIC | Age: 80
End: 2025-06-03
Payer: MEDICARE

## 2025-06-03 ENCOUNTER — ANESTHESIA EVENT (OUTPATIENT)
Dept: GASTROENTEROLOGY | Facility: HOSPITAL | Age: 80
End: 2025-06-03
Payer: MEDICARE

## 2025-06-03 ENCOUNTER — HOSPITAL ENCOUNTER (OUTPATIENT)
Facility: HOSPITAL | Age: 80
Setting detail: HOSPITAL OUTPATIENT SURGERY
Discharge: HOME | End: 2025-06-03
Attending: INTERNAL MEDICINE | Admitting: INTERNAL MEDICINE
Payer: MEDICARE

## 2025-06-03 ENCOUNTER — ANESTHESIA (OUTPATIENT)
Dept: GASTROENTEROLOGY | Facility: HOSPITAL | Age: 80
End: 2025-06-03
Payer: MEDICARE

## 2025-06-03 VITALS
RESPIRATION RATE: 20 BRPM | OXYGEN SATURATION: 99 % | HEART RATE: 80 BPM | SYSTOLIC BLOOD PRESSURE: 114 MMHG | TEMPERATURE: 96.2 F | WEIGHT: 237 LBS | BODY MASS INDEX: 30.42 KG/M2 | DIASTOLIC BLOOD PRESSURE: 78 MMHG | HEIGHT: 74 IN

## 2025-06-03 LAB — GLUCOSE BLDC GLUCOMTR-MCNC: 115 MG/DL (ref 70–130)

## 2025-06-03 PROCEDURE — 82948 REAGENT STRIP/BLOOD GLUCOSE: CPT

## 2025-06-03 PROCEDURE — 25010000002 PROPOFOL 10 MG/ML EMULSION

## 2025-06-03 PROCEDURE — 25010000002 LIDOCAINE PF 2% 2 % SOLUTION

## 2025-06-03 PROCEDURE — 25810000003 SODIUM CHLORIDE 0.9 % SOLUTION: Performed by: ANESTHESIOLOGY

## 2025-06-03 PROCEDURE — 44388 COLONOSCOPY THRU STOMA SPX: CPT | Performed by: INTERNAL MEDICINE

## 2025-06-03 RX ORDER — LIDOCAINE HYDROCHLORIDE 20 MG/ML
INJECTION, SOLUTION EPIDURAL; INFILTRATION; INTRACAUDAL; PERINEURAL AS NEEDED
Status: DISCONTINUED | OUTPATIENT
Start: 2025-06-03 | End: 2025-06-03 | Stop reason: SURG

## 2025-06-03 RX ORDER — SODIUM CHLORIDE 9 MG/ML
500 INJECTION, SOLUTION INTRAVENOUS CONTINUOUS PRN
Status: DISCONTINUED | OUTPATIENT
Start: 2025-06-03 | End: 2025-06-03 | Stop reason: HOSPADM

## 2025-06-03 RX ORDER — SODIUM CHLORIDE 0.9 % (FLUSH) 0.9 %
10 SYRINGE (ML) INJECTION AS NEEDED
Status: DISCONTINUED | OUTPATIENT
Start: 2025-06-03 | End: 2025-06-03 | Stop reason: HOSPADM

## 2025-06-03 RX ORDER — LIDOCAINE HYDROCHLORIDE 10 MG/ML
0.5 INJECTION, SOLUTION EPIDURAL; INFILTRATION; INTRACAUDAL; PERINEURAL ONCE AS NEEDED
Status: DISCONTINUED | OUTPATIENT
Start: 2025-06-03 | End: 2025-06-03 | Stop reason: HOSPADM

## 2025-06-03 RX ORDER — ONDANSETRON 2 MG/ML
4 INJECTION INTRAMUSCULAR; INTRAVENOUS ONCE AS NEEDED
Status: DISCONTINUED | OUTPATIENT
Start: 2025-06-03 | End: 2025-06-03 | Stop reason: HOSPADM

## 2025-06-03 RX ORDER — PROPOFOL 10 MG/ML
VIAL (ML) INTRAVENOUS AS NEEDED
Status: DISCONTINUED | OUTPATIENT
Start: 2025-06-03 | End: 2025-06-03 | Stop reason: SURG

## 2025-06-03 RX ADMIN — PROPOFOL 150 MG: 10 INJECTION, EMULSION INTRAVENOUS at 11:15

## 2025-06-03 RX ADMIN — LIDOCAINE HYDROCHLORIDE 50 MG: 20 INJECTION, SOLUTION EPIDURAL; INFILTRATION; INTRACAUDAL; PERINEURAL at 11:15

## 2025-06-03 RX ADMIN — SODIUM CHLORIDE: 0.9 INJECTION, SOLUTION INTRAVENOUS at 11:14

## 2025-06-03 NOTE — ANESTHESIA PREPROCEDURE EVALUATION
Anesthesia Evaluation     Patient summary reviewed   no history of anesthetic complications:   NPO Solid Status: > 8 hours             Airway   Mallampati: II  No difficulty expected  Dental - normal exam     Pulmonary    (-) COPD, asthma, sleep apnea, not a smoker  Cardiovascular   Exercise tolerance: poor (<4 METS)    (+) pacemaker ([employer]) ICD, hypertension, dysrhythmias Atrial Fib, Tachycardia, CHF Systolic <55%, DVT, hyperlipidemia  (-) past MI, angina, cardiac stents    ROS comment: Echo:  ·  Left ventricular systolic function is severely decreased. Left ventricular ejection fraction appears to be 31 - 35%.  ·  The left ventricular cavity is moderately dilated.  ·  Left ventricular wall thickness is consistent with mild concentric hypertrophy.  ·  Normal right ventricular cavity size noted. Borderline low right ventricular systolic function noted.  ·  No significant valvular abnormalities identified on this study.    Interrogation:   AP: 81 % RVp: 1.09 %         Neuro/Psych  (-) seizures, TIA, CVA  GI/Hepatic/Renal/Endo    (+) obesity, GERD, renal disease- CRI, diabetes mellitus  (-) liver disease    ROS Comment: colostomy    Musculoskeletal     Abdominal    Substance History       Comment: Buprenorphine patch     OB/GYN          Other                      Anesthesia Plan    ASA 3     general     (Defib, have magnet)  intravenous induction     Anesthetic plan, risks, benefits, and alternatives have been provided, discussed and informed consent has been obtained with: patient.    CODE STATUS:

## 2025-06-03 NOTE — H&P
[facility] Gastroenterology  Pre Procedure History & Physical    Chief Complaint:   Colon polyps    Subjective     HPI:   The patient has a history of colon polyps who presents for exam.    Past Medical History:   Past Medical History:   Diagnosis Date    Anemia     Anxiety     Arrhythmia     Arthritis     Atrial fibrillation     Cardiomyopathy     CHF (congestive heart failure)     Colon polyp     Colostomy present     10years    Connective tissue disease     CTS (carpal tunnel syndrome)     Depression     Diabetes mellitus     Diverticulitis     Dizzy     DVT (deep venous thrombosis)     Gastritis     GERD (gastroesophageal reflux disease)     Hiatal hernia     History of transfusion     Hyperlipidemia     Hypertension     Low back pain     Lumbosacral disc disease     Neuropathy     Pneumonia        Past Surgical History:  Past Surgical History:   Procedure Laterality Date    ABLATION OF DYSRHYTHMIC FOCUS      APPENDECTOMY      BACK SURGERY  2002, 2007    Multiple spine surgeries - laminectomy & fusion    CARDIAC CATHETERIZATION      CARDIAC DEFIBRILLATOR PLACEMENT      CARDIAC ELECTROPHYSIOLOGY PROCEDURE N/A 10/31/2019    Procedure: ICD GEN CHANGE;  Surgeon: Chaz Chavez MD;  Location:  [facility] CATH [facility];  Service: Cardiology    CARDIAC ELECTROPHYSIOLOGY PROCEDURE N/A 05/29/2024    Procedure: Ablation PVC;  Surgeon: Rossana Horne MD;  Location:  [facility] CATH INVAS[facility];  Service: Cardiovascular;  Laterality: N/A;    CARPAL TUNNEL RELEASE Right 07/30/2024    Procedure: CARPAL TUNNEL RELEASE, right;  Surgeon: Noah Oliveros MD;  Location:  [facility] OR;  Service: Neurosurgery;  Laterality: Right;    CARPAL TUNNEL RELEASE Left 09/24/2024    Procedure: CARPAL TUNNEL RELEASE; LEFT;  Surgeon: Noah Oliveros MD;  Location:  [facility] OR;  Service: Neurosurgery;  Laterality: Left;    COLON RESECTION WITH COLOSTOMY      COLONOSCOPY  09/04/2014    diverticulosis    COLONOSCOPY N/A  10/29/2019    Procedure: COLONOSCOPY WITH ANESTHESIA;  Surgeon: Tenzin Abrams MD;  Location:  [facility] ENDOSCOPY;  Service: Gastroenterology    ENDOSCOPY  02/24/1999    small sliding hiatal hernia    FOOT SURGERY Bilateral 2005    INSERT / REPLACE / REMOVE PACEMAKER      JOINT REPLACEMENT Right 2002    KNEE    PACEMAKER IMPLANTATION  2012    RECTAL FISSURE INCISION AND DRAINAGE      REPLACEMENT TOTAL KNEE Bilateral     SPINAL CORD STIMULATOR IMPLANT  08/2012    Placed by Dr. DEENA Villela, [employer]    SPINAL FUSION      THORACIC LAMINECTOMY WITH PLACEMENT OF DORSAL COLUMN STIMULATOR Right 01/28/2021    Procedure: SPINAL CORD STIMULATOR REVISION, right buttocks;  Surgeon: Noah Oliveros MD;  Location:  [facility] OR;  Service: Neurosurgery;  Laterality: Right;    TOTAL HIP ARTHROPLASTY Right 2004    TOTAL HIP ARTHROPLASTY Left 12/2008    TRIGGER POINT INJECTION         Family History:  Family History   Problem Relation Age of Onset    Heart disease Mother     Heart disease Brother     Breast cancer Brother     Colon cancer Neg Hx     Colon polyps Neg Hx        Social History:   reports that he has never smoked. He has never been exposed to tobacco smoke. He has never used smokeless tobacco. He reports that he does not currently use alcohol. He reports that he does not use drugs.    Medications:   Prior to Admission medications    Medication Sig Start Date End Date Taking? Authorizing Provider   atorvastatin (LIPITOR) 20 MG tablet Take 1 tablet by mouth Daily. 2/28/25  Yes Ric Antony, DO   Diclofenac Sodium (VOLTAREN) 1 % gel gel Apply 4 g topically to the appropriate area as directed 4 (Four) Times a Day As Needed (bilatearl hand pain). 2/28/25  Yes Ric Antony, DO   dilTIAZem (CARDIZEM) 30 MG tablet Take 1 tablet by mouth 3 (Three) Times a Day. 2/28/25  Yes Ric Antony, DO   furosemide (LASIX) 20 MG tablet Take 1 tablet by mouth Daily. 2/28/25  Yes Ric Antony, DO   furosemide  (LASIX) 40 MG tablet Take 1 tablet by mouth Daily. TAKE WITH 20 MG TABLET FOR 60 PER MG PER DAY 2/28/25  Yes Ric Antony DO   gabapentin (NEURONTIN) 300 MG capsule Take 400 mg by mouth 3 (Three) Times a Day.   Yes ProviderShane MD   Glucosamine 750 MG tablet Take 1 tablet by mouth 2 (Two) Times a Day.   Yes Shane Agarwal MD   losartan (COZAAR) 25 MG tablet Take 1 tablet by mouth Daily. 2/28/25  Yes Ric Antony DO   magnesium oxide (MAG-OX) 400 MG tablet Take 1 tablet by mouth Daily. 3/4/25  Yes Rossana Horne MD   metoprolol succinate XL (TOPROL-XL) 100 MG 24 hr tablet Take 0.5 tablets by mouth Daily.   Yes Shane Agarwal MD   pantoprazole (PROTONIX) 40 MG EC tablet Take 1 tablet by mouth Daily. 2/28/25  Yes Ric Antony DO   polyethylene glycol (MIRALAX) powder Take 17 g by mouth Every Night. 10/29/19  Yes Ric Antony DO   tamsulosin (FLOMAX) 0.4 MG capsule 24 hr capsule Take 1 capsule by mouth Every Night. 2/28/25  Yes Ric Antony DO   benzonatate (TESSALON) 200 MG capsule Take 1 capsule by mouth Daily As Needed for Cough. 3/24/25   Ric Antony DO   Buprenorphine 10 MCG/HR patch weekly Place 15 mcg on the skin as directed by provider Every 7 (Seven) Days. POSTERIOR RIGHT SHOULDER    Diomedes Sterling,    diazePAM (Valium) 2 MG tablet Take 1 tablet by mouth Daily As Needed (tremor). 3/24/25   Ric Antony DO   methocarbamol (Robaxin) 500 MG tablet Take 1 tablet by mouth 4 (Four) Times a Day.  Patient taking differently: Take 1 tablet by mouth 4 (Four) Times a Day As Needed. 12/28/23   Amina De La Torre APRN   Multiple Vitamins-Minerals (MULTIVITAMIN ADULT PO) Take 1 tablet by mouth Daily.    ProviderShane MD   warfarin (COUMADIN) 5 MG tablet Take 1 tablet by mouth Daily. 2/28/25   Ric Antony DO       Allergies:  Percocet [oxycodone-acetaminophen], Amiodarone, Oxycontin [oxycodone hcl], Penicillins, and  "Vioxx [rofecoxib]    ROS:    General: Weight stable  Resp: No SOA  Cardiovascular: No CP    Objective     Blood pressure 113/73, pulse 82, temperature 96.2 °F (35.7 °C), temperature source Temporal, resp. rate 20, height 188 cm (74\"), weight 108 kg (237 lb), SpO2 98%.    Physical Exam   Constitutional: Pt is oriented to person, place, and in no distress.   Cardiovascular: Normal rate, regular rhythm.    Pulmonary/Chest: Effort normal. No respiratory distress.   Abdominal:  Non-distended.  Psychiatric: Mood, memory, affect and judgment appear normal.     Assessment & Plan     Diagnosis:  Colon polyps    Anticipated Surgical Procedure:  Colonoscopy    The risks, benefits, and alternatives of this procedure have been discussed with the patient or the responsible party- the patient understands and agrees to proceed.      EMR Dragon/transcription disclaimer:  Much of this encounter note is electronic transcription/translation of spoken language to printed text.  The electronic translation of spoken language may be erroneous, or at times, nonsensical words or phrases may be inadvertently transcribed.  Although I have reviewed the note for such errors, some may still exist.  "

## 2025-06-03 NOTE — ANESTHESIA POSTPROCEDURE EVALUATION
Patient: Jonh Peacock    Procedure Summary       Date: 06/03/25 Room / Location: [facility] ENDOSCOPY 4 / [facility]    Anesthesia Start: 1112 Anesthesia Stop: 1131    Procedure: COLONOSCOPY WITH ANESTHESIA Diagnosis:       History of adenomatous polyp of colon      (History of adenomatous polyp of colon [Z86.0101])    Surgeons: Tenzin Abrams MD Provider: Yossi Duran CRNA    Anesthesia Type: general ASA Status: 3            Anesthesia Type: general    Vitals  Vitals Value Taken Time   BP 94/67 06/03/25 11:35   Temp     Pulse 100 06/03/25 11:40   Resp 16 06/03/25 11:35   SpO2 98 % 06/03/25 11:40   Vitals shown include unfiled device data.        Post Anesthesia Care and Evaluation    Patient location during evaluation: PHASE II  Patient participation: complete - patient participated  Level of consciousness: awake and alert  Pain score: 0  Pain management: adequate    Airway patency: patent  Anesthetic complications: No anesthetic complications  PONV Status: none  Cardiovascular status: acceptable and blood pressure returned to baseline  Respiratory status: acceptable  Hydration status: acceptable    Comments: Patient discharged from PACU based on Connie score >8  No anesthesia care post op

## 2025-07-11 LAB
MC_CV_MDC_IDC_RATE_1: 133
MC_CV_MDC_IDC_RATE_1: 140
MC_CV_MDC_IDC_RATE_1: 200
MC_CV_MDC_IDC_RATE_1: 200
MC_CV_MDC_IDC_RATE_2: 182
MC_CV_MDC_IDC_RATE_2: 250
MC_CV_MDC_IDC_RV_HV_IMPEDANCE: 34
MC_CV_MDC_IDC_SVC_MEASURED_IMPEDANCE: 38
MC_CV_MDC_IDC_THERAPIES: NORMAL
MC_CV_MDC_IDC_ZONE_ID: 2
MC_CV_MDC_IDC_ZONE_ID: 3
MC_CV_MDC_IDC_ZONE_ID: 4
MC_CV_MDC_IDC_ZONE_ID: 5
MC_CV_MDC_IDC_ZONE_ID: 6
MDC_IDC_MSMT_BATTERY_REMAINING_LONGEVITY: 41 MO
MDC_IDC_MSMT_BATTERY_RRT_TRIGGER: 2.73
MDC_IDC_MSMT_BATTERY_STATUS: NORMAL
MDC_IDC_MSMT_BATTERY_VOLTAGE: 2.96
MDC_IDC_MSMT_CAP_CHARGE_TIME: 4.17
MDC_IDC_MSMT_LEADCHNL_RA_DTM: NORMAL
MDC_IDC_MSMT_LEADCHNL_RA_IMPEDANCE_VALUE: 361
MDC_IDC_MSMT_LEADCHNL_RA_PACING_THRESHOLD_AMPLITUDE: 0.5
MDC_IDC_MSMT_LEADCHNL_RA_PACING_THRESHOLD_POLARITY: NORMAL
MDC_IDC_MSMT_LEADCHNL_RA_PACING_THRESHOLD_PULSEWIDTH: 0.4
MDC_IDC_MSMT_LEADCHNL_RA_SENSING_INTR_AMPL: 2.25
MDC_IDC_MSMT_LEADCHNL_RV_DTM: NORMAL
MDC_IDC_MSMT_LEADCHNL_RV_IMPEDANCE_VALUE: 342
MDC_IDC_MSMT_LEADCHNL_RV_PACING_THRESHOLD_AMPLITUDE: 0.75
MDC_IDC_MSMT_LEADCHNL_RV_PACING_THRESHOLD_POLARITY: NORMAL
MDC_IDC_MSMT_LEADCHNL_RV_PACING_THRESHOLD_PULSEWIDTH: 0.4
MDC_IDC_MSMT_LEADCHNL_RV_SENSING_INTR_AMPL: 3.88
MDC_IDC_PG_IMPLANT_DTM: NORMAL
MDC_IDC_PG_MFG: NORMAL
MDC_IDC_PG_MODEL: NORMAL
MDC_IDC_PG_SERIAL: NORMAL
MDC_IDC_PG_TYPE: NORMAL
MDC_IDC_SESS_DTM: NORMAL
MDC_IDC_SESS_TYPE: NORMAL
MDC_IDC_SET_BRADY_AT_MODE_SWITCH_RATE: 140
MDC_IDC_SET_BRADY_LOWRATE: 80
MDC_IDC_SET_BRADY_MAX_SENSOR_RATE: 120
MDC_IDC_SET_BRADY_MAX_TRACKING_RATE: 130
MDC_IDC_SET_BRADY_MODE: NORMAL
MDC_IDC_SET_BRADY_PAV_DELAY: 180
MDC_IDC_SET_BRADY_SAV_DELAY: 150
MDC_IDC_SET_LEADCHNL_RA_PACING_AMPLITUDE: 1.5
MDC_IDC_SET_LEADCHNL_RA_PACING_POLARITY: NORMAL
MDC_IDC_SET_LEADCHNL_RA_PACING_PULSEWIDTH: 0.4
MDC_IDC_SET_LEADCHNL_RA_SENSING_POLARITY: NORMAL
MDC_IDC_SET_LEADCHNL_RA_SENSING_SENSITIVITY: 0.6
MDC_IDC_SET_LEADCHNL_RV_PACING_AMPLITUDE: 2
MDC_IDC_SET_LEADCHNL_RV_PACING_POLARITY: NORMAL
MDC_IDC_SET_LEADCHNL_RV_PACING_PULSEWIDTH: 0.4
MDC_IDC_SET_LEADCHNL_RV_SENSING_POLARITY: NORMAL
MDC_IDC_SET_LEADCHNL_RV_SENSING_SENSITIVITY: 0.3
MDC_IDC_SET_ZONE_STATUS: NORMAL
MDC_IDC_SET_ZONE_TYPE: NORMAL
MDC_IDC_STAT_AT_BURDEN_PERCENT: 0
MDC_IDC_STAT_BRADY_RA_PERCENT_PACED: 81.03
MDC_IDC_STAT_BRADY_RV_PERCENT_PACED: 0.8
MDC_IDC_STAT_TACHYTHERAPY_ATP_DELIVERED_RECENT: 0
MDC_IDC_STAT_TACHYTHERAPY_SHOCKS_ABORTED_RECENT: 0
MDC_IDC_STAT_TACHYTHERAPY_SHOCKS_DELIVERED_RECENT: 0

## 2025-07-11 NOTE — PROGRESS NOTES
[facility] - PODIATRY    Today's Date: 07/14/25    Patient Name: Jonh Peacock  MRN: [id number]  CSN: [id number]  PCP: Ric Antony DO  Referring Provider: No ref. provider found     SUBJECTIVE     Chief Complaint   Patient presents with    Follow-up     Ric Antony DO 03/22/25  3 month follow up   Pt here for nail/foot care. Pt reports no pain or issues.    Diabetes     HPI: Jonh Peacock, a 79 y.o.male, comes to clinic as a(n) established patient presenting for diabetic foot exam and complaining of thick fungal toenails. Pt with h/o Anxiety, Arrhythmia, Cardiomyopathy, CHF, Depression, previous DVT, HTN, Obesity, Anticoagulation with Warfarin. Patient presents with thickened nails that are difficult to care for due to thickness and anticoagulation therapy.  Patient is NIDDM and unsure of last BG level.  Denies pain today.   Uses rollator for ambulation. Continues Coumadin daily. Notes improvement to feet with regular visits. Reports numbness in his feet. Denies any constitutional symptoms. No other pedal complaints at this time.    Past Medical History:   Diagnosis Date    Anemia     Anxiety     Arrhythmia     Arthritis     Atrial fibrillation     Cardiomyopathy     CHF (congestive heart failure)     Colon polyp     Colostomy present     10years    Connective tissue disease     CTS (carpal tunnel syndrome)     Depression     Diabetes mellitus     Diverticulitis     Dizzy     DVT (deep venous thrombosis)     Gastritis     GERD (gastroesophageal reflux disease)     Hiatal hernia     History of transfusion     Hyperlipidemia     Hypertension     Low back pain     Lumbosacral disc disease     Neuropathy     Pneumonia      Past Surgical History:   Procedure Laterality Date    ABLATION OF DYSRHYTHMIC FOCUS      APPENDECTOMY      BACK SURGERY  2002, 2007    Multiple spine surgeries - laminectomy & fusion    CARDIAC CATHETERIZATION      CARDIAC DEFIBRILLATOR PLACEMENT       CARDIAC ELECTROPHYSIOLOGY PROCEDURE N/A 10/31/2019    Procedure: ICD GEN CHANGE;  Surgeon: Chaz Chavez MD;  Location: [facility] CATH INVASIVE LOCATION;  Service: Cardiology    CARDIAC ELECTROPHYSIOLOGY PROCEDURE N/A 05/29/2024    Procedure: Ablation PVC;  Surgeon: Rossana oHrne MD;  Location:  [facility] CATH INVASIVE LOCATION;  Service: Cardiovascular;  Laterality: N/A;    CARPAL TUNNEL RELEASE Right 07/30/2024    Procedure: CARPAL TUNNEL RELEASE, right;  Surgeon: Noah Oliveros MD;  Location: [facility] OR;  Service: Neurosurgery;  Laterality: Right;    CARPAL TUNNEL RELEASE Left 09/24/2024    Procedure: CARPAL TUNNEL RELEASE; LEFT;  Surgeon: Noah Oliveros MD;  Location: [facility] OR;  Service: Neurosurgery;  Laterality: Left;    COLON RESECTION WITH COLOSTOMY      COLONOSCOPY  09/04/2014    diverticulosis    COLONOSCOPY N/A 10/29/2019    Procedure: COLONOSCOPY WITH ANESTHESIA;  Surgeon: Tenzin Abrams MD;  Location: [facility] ENDOSCOPY;  Service: Gastroenterology    COLONOSCOPY N/A 6/3/2025    Procedure: COLONOSCOPY WITH ANESTHESIA;  Surgeon: Tenzin Abrams MD;  Location: [facility] ENDOSCOPY;  Service: Gastroenterology;  Laterality: N/A;  pre op: hx polyps  post op: normal   PCP:    Ric Antony, DO    ENDOSCOPY  02/24/1999    small sliding hiatal hernia    FOOT SURGERY Bilateral 2005    INSERT / REPLACE / REMOVE PACEMAKER      JOINT REPLACEMENT Right 2002    KNEE    PACEMAKER IMPLANTATION  2012    RECTAL FISSURE INCISION AND DRAINAGE      REPLACEMENT TOTAL KNEE Bilateral     SPINAL CORD STIMULATOR IMPLANT  08/2012    Placed by Dr. DEENA Villela, [employer]    SPINAL FUSION      THORACIC LAMINECTOMY WITH PLACEMENT OF DORSAL COLUMN STIMULATOR Right 01/28/2021    Procedure: SPINAL CORD STIMULATOR REVISION, right buttocks;  Surgeon: Noah Oliveros MD;  Location: [facility] OR;  Service: Neurosurgery;  Laterality: Right;    TOTAL HIP ARTHROPLASTY Right 2004    TOTAL HIP ARTHROPLASTY Left  "12/2008    TRIGGER POINT INJECTION       Family History   Problem Relation Age of Onset    Heart disease Mother     Heart disease Brother     Breast cancer Brother     Colon cancer Neg Hx     Colon polyps Neg Hx      Social History     Socioeconomic History    Marital status:    Tobacco Use    Smoking status: Never     Passive exposure: Never    Smokeless tobacco: Never   Vaping Use    Vaping status: Never Used   Substance and Sexual Activity    Alcohol use: Not Currently    Drug use: Never    Sexual activity: Defer     Allergies   Allergen Reactions    Percocet [Oxycodone-Acetaminophen] Urinary Retention    Amiodarone Other (See Comments)     INTERFERES WITH OTHER MEDICATIONS    Oxycontin [Oxycodone Hcl] Urinary Retention    Penicillins Hives     All \"cillins\"    Vioxx [Rofecoxib] GI Intolerance     Current Outpatient Medications   Medication Sig Dispense Refill    atorvastatin (LIPITOR) 20 MG tablet Take 1 tablet by mouth Daily. 90 tablet 3    benzonatate (TESSALON) 200 MG capsule Take 1 capsule by mouth Daily As Needed for Cough. 30 capsule 0    Buprenorphine 10 MCG/HR patch weekly Place 15 mcg on the skin as directed by provider Every 7 (Seven) Days. POSTERIOR RIGHT SHOULDER      diazePAM (Valium) 2 MG tablet Take 1 tablet by mouth Daily As Needed (tremor). 10 tablet 0    Diclofenac Sodium (VOLTAREN) 1 % gel gel Apply 4 g topically to the appropriate area as directed 4 (Four) Times a Day As Needed (bilatearl hand pain). 400 g 3    dilTIAZem (CARDIZEM) 30 MG tablet Take 1 tablet by mouth 3 (Three) Times a Day. 270 tablet 3    furosemide (LASIX) 20 MG tablet Take 1 tablet by mouth Daily. 90 tablet 3    furosemide (LASIX) 40 MG tablet Take 1 tablet by mouth Daily. TAKE WITH 20 MG TABLET FOR 60 PER MG PER DAY 90 tablet 3    gabapentin (NEURONTIN) 300 MG capsule Take 400 mg by mouth 3 (Three) Times a Day.      Glucosamine 750 MG tablet Take 1 tablet by mouth 2 (Two) Times a Day.      losartan (COZAAR) 25 MG " tablet Take 1 tablet by mouth Daily. 90 tablet 3    magnesium oxide (MAG-OX) 400 MG tablet Take 1 tablet by mouth Daily. 90 tablet 3    methocarbamol (Robaxin) 500 MG tablet Take 1 tablet by mouth 4 (Four) Times a Day. (Patient taking differently: Take 1 tablet by mouth 4 (Four) Times a Day As Needed.) 20 tablet 1    metoprolol succinate XL (TOPROL-XL) 100 MG 24 hr tablet Take 0.5 tablets by mouth Daily.      Multiple Vitamins-Minerals (MULTIVITAMIN ADULT PO) Take 1 tablet by mouth Daily.      pantoprazole (PROTONIX) 40 MG EC tablet Take 1 tablet by mouth Daily. 90 tablet 3    polyethylene glycol (MIRALAX) powder Take 17 g by mouth Every Night. 1581 g 3    tamsulosin (FLOMAX) 0.4 MG capsule 24 hr capsule Take 1 capsule by mouth Every Night. 90 capsule 3    warfarin (COUMADIN) 5 MG tablet Take 1 tablet by mouth Daily. 90 tablet 3     No current facility-administered medications for this visit.     Review of Systems   Constitutional:  Negative for chills and fever.   HENT:  Negative for congestion.    Respiratory:  Negative for shortness of breath.    Cardiovascular:  Positive for leg swelling. Negative for chest pain.   Gastrointestinal:  Negative for constipation, diarrhea, nausea and vomiting.   Musculoskeletal:  Positive for arthralgias, back pain and gait problem.        Foot pain   Skin:  Negative for wound.        Thick Toenails   Neurological:  Positive for numbness.   Hematological:  Bruises/bleeds easily.       OBJECTIVE     Vitals:    07/14/25 1341   BP: 128/78   Pulse: 59   SpO2: 98%         PHYSICAL EXAM  GEN:   Accompanied by wife.    Foot/Ankle Exam    GENERAL  Diabetic foot exam performed    Appearance:  appears stated age  Orientation:  AAOx3  Affect:  appropriate  Gait:  (unsteady)  Assistance:  walker  Right shoe gear: casual shoe  Left shoe gear: casual shoe (with lift)    VASCULAR     Right Foot Vascularity   Dorsalis pedis:  1+  Posterior tibial:  1+  Skin temperature:  warm  Edema grading:   Trace and non-pitting  CFT:  4  Pedal hair growth:  Absent  Varicosities:  none     Left Foot Vascularity   Dorsalis pedis:  1+  Posterior tibial:  1+  Skin temperature:  warm  Edema grading:  Trace and non-pitting  CFT:  4  Pedal hair growth:  Absent  Varicosities:  none     NEUROLOGIC     Right Foot Neurologic   Light touch sensation: diminished  Vibratory sensation: diminished  Hot/Cold sensation: diminished  Protective Sensation using [place] Monofilament:   Sites intact: 7  Sites tested: 10     Left Foot Neurologic   Light touch sensation: diminished  Vibratory sensation: diminished  Hot/Cold sensation:  diminished  Protective Sensation using [place] Monofilament:   Sites intact: 7  Sites tested: 10    MUSCULOSKELETAL     Right Foot Musculoskeletal   Ecchymosis:  none  Tenderness:  toenail problem    Arch:  Pes planus  Hallux valgus: No       Left Foot Musculoskeletal   Ecchymosis:  none  Tenderness:  toenail problem  Arch:  Pes planus  Hallux valgus: No      MUSCLE STRENGTH     Right Foot Muscle Strength   Foot dorsiflexion:  5  Foot plantar flexion:  5  Foot inversion:  5  Foot eversion:  5     Left Foot Muscle Strength   Foot dorsiflexion:  5  Foot plantar flexion:  5  Foot inversion:  5  Foot eversion:  5    RANGE OF MOTION     Right Foot Range of Motion   Foot and ankle ROM within normal limits       Left Foot Range of Motion   Foot and ankle ROM within normal limits      DERMATOLOGIC      Right Foot Dermatologic   Skin  Right foot skin is intact.   Nails  1.  Positive for elongated, onychomycosis, abnormal thickness and dystrophic nail.  2.  Positive for elongated, onychomycosis, abnormal thickness and dystrophic nail.  3.  Positive for elongated, onychomycosis, abnormal thickness and dystrophic nail.  4.  Positive for elongated, onychomycosis, abnormal thickness and dystrophic nail.  5.  Positive for elongated, onychomycosis, abnormal thickness and dystrophic nail.     Left Foot  Dermatologic   Skin  Left foot skin is intact.   Nails  1.  Positive for elongated, onychomycosis, abnormal thickness and dystrophic nail.  2.  Positive for elongated, onychomycosis, abnormal thickness and dystrophic nail.  3.  Positive for elongated, onychomycosis, abnormal thickness and dystrophic nail.  4.  Positive for elongated, onychomycosis, abnormally thick and dystrophic nail.  5.  Positive for elongated, onychomycosis, abnormally thick and dystrophic nail.       RADIOLOGY/NUCLEAR:  No results found.    LABORATORY/CULTURE RESULTS:      PATHOLOGY RESULTS:       ASSESSMENT/PLAN     Diagnoses and all orders for this visit:    1. Lower limb length difference (Primary)    2. Onychomycosis    3. Type 2 diabetes mellitus with diabetic neuropathy, without long-term current use of insulin    4. Anticoagulant long-term use    5. Gait abnormality    6. Peripheral edema      Comprehensive lower extremity examination and evaluation was performed.  Discussed findings and treatment plan including risks, benefits, and treatment options with patient in detail. Patient agreed with treatment plan.   PT/INR reviewed.  GI note reviewed.  After verbal consent obtained, nail(s) x10 debrided of length and thickness with nail nipper without incidence  Patient may maintain nails and calluses at home utilizing emery board or pumice stone between visits as needed  Reviewed at home diabetic foot care including daily foot checks   Continue shoes with custom inserts  Continue use of rollator for ambulation assistance.  Elevate legs when possible.  Follow with PCP for this management.  Encourage patient to call with any questions or concerns before his next appointment.  An After Visit Summary was printed and given to the patient at discharge, including (if requested) any available informative/educational handouts regarding diagnosis, treatment, or medications. All questions were answered to patient/family satisfaction. Should symptoms  fail to improve or worsen they agree to call or return to clinic or to go to the Emergency Department. Discussed the importance of following up with any needed screening tests/labs/specialist appointments and any requested follow-up recommended by me today. Importance of maintaining follow-up discussed and patient accepts that missed appointments can delay diagnosis and potentially lead to worsening of conditions.  Return in about 9 weeks (around 9/15/2025) for Diabetic foot care clinic, With Sallie DWYER., or sooner if acute issues arise.        This document has been electronically signed by REYMUNDO Santana on July 14, 2025 16:15 CDT

## 2025-07-14 ENCOUNTER — OFFICE VISIT (OUTPATIENT)
Age: 80
End: 2025-07-14
Payer: MEDICARE

## 2025-07-14 VITALS
DIASTOLIC BLOOD PRESSURE: 78 MMHG | WEIGHT: 237 LBS | HEIGHT: 74 IN | BODY MASS INDEX: 30.42 KG/M2 | HEART RATE: 59 BPM | SYSTOLIC BLOOD PRESSURE: 128 MMHG | OXYGEN SATURATION: 98 %

## 2025-07-14 DIAGNOSIS — R60.0 PERIPHERAL EDEMA: ICD-10-CM

## 2025-07-14 DIAGNOSIS — R26.9 GAIT ABNORMALITY: ICD-10-CM

## 2025-07-14 DIAGNOSIS — B35.1 ONYCHOMYCOSIS: ICD-10-CM

## 2025-07-14 DIAGNOSIS — Z79.01 ANTICOAGULANT LONG-TERM USE: ICD-10-CM

## 2025-07-14 DIAGNOSIS — E11.40 TYPE 2 DIABETES MELLITUS WITH DIABETIC NEUROPATHY, WITHOUT LONG-TERM CURRENT USE OF INSULIN: ICD-10-CM

## 2025-07-14 DIAGNOSIS — M21.70 LOWER LIMB LENGTH DIFFERENCE: Primary | ICD-10-CM

## 2025-07-14 PROCEDURE — 3078F DIAST BP <80 MM HG: CPT | Performed by: NURSE PRACTITIONER

## 2025-07-14 PROCEDURE — 3074F SYST BP LT 130 MM HG: CPT | Performed by: NURSE PRACTITIONER

## 2025-07-14 PROCEDURE — 99212 OFFICE O/P EST SF 10 MIN: CPT | Performed by: NURSE PRACTITIONER

## 2025-07-14 PROCEDURE — 1160F RVW MEDS BY RX/DR IN RCRD: CPT | Performed by: NURSE PRACTITIONER

## 2025-07-14 PROCEDURE — 11721 DEBRIDE NAIL 6 OR MORE: CPT | Performed by: NURSE PRACTITIONER

## 2025-07-14 PROCEDURE — 1159F MED LIST DOCD IN RCRD: CPT | Performed by: NURSE PRACTITIONER

## 2025-08-01 ENCOUNTER — LAB (OUTPATIENT)
Dept: LAB | Facility: HOSPITAL | Age: 80
End: 2025-08-01
Payer: MEDICARE

## 2025-08-01 DIAGNOSIS — Z79.01 ANTICOAGULANT LONG-TERM USE: Chronic | ICD-10-CM

## 2025-08-01 DIAGNOSIS — I48.0 PAROXYSMAL ATRIAL FIBRILLATION: Chronic | ICD-10-CM

## 2025-08-01 LAB
INR PPP: 2 (ref 0.91–1.09)
PROTHROMBIN TIME: 23.8 SECONDS (ref 10–13.8)

## 2025-08-01 PROCEDURE — 85610 PROTHROMBIN TIME: CPT | Performed by: INTERNAL MEDICINE

## 2025-08-27 ENCOUNTER — LAB (OUTPATIENT)
Dept: LAB | Facility: HOSPITAL | Age: 80
End: 2025-08-27
Payer: MEDICARE

## 2025-08-27 ENCOUNTER — OFFICE VISIT (OUTPATIENT)
Dept: CARDIOLOGY | Facility: CLINIC | Age: 80
End: 2025-08-27
Payer: MEDICARE

## 2025-08-27 VITALS
SYSTOLIC BLOOD PRESSURE: 118 MMHG | WEIGHT: 237 LBS | BODY MASS INDEX: 30.42 KG/M2 | HEART RATE: 65 BPM | OXYGEN SATURATION: 98 % | DIASTOLIC BLOOD PRESSURE: 70 MMHG | HEIGHT: 74 IN

## 2025-08-27 DIAGNOSIS — I10 ESSENTIAL HYPERTENSION: ICD-10-CM

## 2025-08-27 DIAGNOSIS — I48.0 PAROXYSMAL ATRIAL FIBRILLATION: ICD-10-CM

## 2025-08-27 DIAGNOSIS — Z79.01 ANTICOAGULANT LONG-TERM USE: Chronic | ICD-10-CM

## 2025-08-27 DIAGNOSIS — I42.8 NON-ISCHEMIC CARDIOMYOPATHY: Primary | Chronic | ICD-10-CM

## 2025-08-27 DIAGNOSIS — I48.0 PAROXYSMAL ATRIAL FIBRILLATION: Chronic | ICD-10-CM

## 2025-08-27 LAB
INR PPP: 1.8 (ref 0.91–1.09)
PROTHROMBIN TIME: 21.5 SECONDS (ref 10–13.8)

## 2025-08-27 PROCEDURE — 1159F MED LIST DOCD IN RCRD: CPT | Performed by: INTERNAL MEDICINE

## 2025-08-27 PROCEDURE — 3074F SYST BP LT 130 MM HG: CPT | Performed by: INTERNAL MEDICINE

## 2025-08-27 PROCEDURE — 93000 ELECTROCARDIOGRAM COMPLETE: CPT | Performed by: INTERNAL MEDICINE

## 2025-08-27 PROCEDURE — 85610 PROTHROMBIN TIME: CPT | Performed by: INTERNAL MEDICINE

## 2025-08-27 PROCEDURE — 3078F DIAST BP <80 MM HG: CPT | Performed by: INTERNAL MEDICINE

## 2025-08-27 PROCEDURE — 1160F RVW MEDS BY RX/DR IN RCRD: CPT | Performed by: INTERNAL MEDICINE

## 2025-08-27 PROCEDURE — 99214 OFFICE O/P EST MOD 30 MIN: CPT | Performed by: INTERNAL MEDICINE

## (undated) DEVICE — CONN FLX BREATHE CIRCT

## (undated) DEVICE — GLV SURG SENSICARE W/ALOE PF LF 7.5 STRL

## (undated) DEVICE — TRAP FLD MINIVAC MEGADYNE 100ML

## (undated) DEVICE — PK SPINE POST 30

## (undated) DEVICE — ELECTRD PAD DEFIB A/

## (undated) DEVICE — PK PM 30

## (undated) DEVICE — ELECTRD BLD EZ CLN MOD XLNG 2.75IN

## (undated) DEVICE — Device: Brand: DEFENDO AIR/WATER/SUCTION AND BIOPSY VALVE

## (undated) DEVICE — PROXIMATE RH ROTATING HEAD SKIN STAPLERS (35 WIDE) CONTAINS 35 STAINLESS STEEL STAPLES: Brand: PROXIMATE

## (undated) DEVICE — CABL BIPOL MEGADYNE 12FT DISP

## (undated) DEVICE — SOL IRR NACL 0.9PCT BT 1000ML

## (undated) DEVICE — DESTINATION PERIPHERAL GUIDING SHEATH: Brand: DESTINATION

## (undated) DEVICE — ARGYLE YANKAUER BULB TIP WITH VENT: Brand: ARGYLE

## (undated) DEVICE — DEFENDO AIR WATER SUCTION AND BIOPSY VALVE KIT FOR  OLYMPUS: Brand: DEFENDO AIR/WATER/SUCTION AND BIOPSY VALVE

## (undated) DEVICE — SNAR POLYP SENSATION MICRO OVL 13 240X40

## (undated) DEVICE — NDL ART SECUR LK 18G 2 3/4IN

## (undated) DEVICE — DRESSING,GAUZE,XEROFORM,CURAD,5"X9",ST: Brand: CURAD

## (undated) DEVICE — SYRINGE,CONTROL,LL,FINGER,GRIP: Brand: MEDLINE INDUSTRIES, INC.

## (undated) DEVICE — BI-DIRECTIONAL NAVIGATION CATHETER, NAV, D-F: Brand: QDOT MICRO

## (undated) DEVICE — ARM SLING II: Brand: DEROYAL

## (undated) DEVICE — MASK,OXYGEN,MED CONC,ADLT,7' TUB, UC: Brand: PENDING

## (undated) DEVICE — SUT MNCRYL 4/0 PS2 27IN UD MCP426H

## (undated) DEVICE — GAUZE,SPONGE,FLUFF,6"X6.75",STRL,10/TRAY: Brand: MEDLINE

## (undated) DEVICE — GLV SURG BIOGEL LTX PF 6 1/2

## (undated) DEVICE — APPL CHLORAPREP HI/LITE 26ML ORNG

## (undated) DEVICE — THE CHANNEL CLEANING BRUSH IS A NYLON FLEXI BRUSH ATTACHED TO A FLEXIBLE PLASTIC SHEATH DESIGNED TO SAFELY REMOVE DEBRIS FROM FLEXIBLE ENDOSCOPES.

## (undated) DEVICE — NEEDLE,22GX1.5",REG,BEVEL: Brand: MEDLINE

## (undated) DEVICE — STPCK 3/WY HP M/RA W/OFF/HNDL 1050PSI STRL

## (undated) DEVICE — THE SINGLE USE ETRAP – POLYP TRAP IS USED FOR SUCTION RETRIEVAL OF ENDOSCOPICALLY REMOVED POLYPS.: Brand: ETRAP

## (undated) DEVICE — 3M™ IOBAN™ 2 ANTIMICROBIAL INCISE DRAPE 6650EZ: Brand: IOBAN™ 2

## (undated) DEVICE — PK EXTRM 30

## (undated) DEVICE — BNDG ELAS ECON W/CLIP 3IN 5YD LF STRL

## (undated) DEVICE — SURGICAL SUCTION CONNECTING TUBE WITH MALE CONNECTOR AND SUCTION CLAMP, 2 FT. LONG (.6 M), 5 MM I.D.: Brand: CONMED

## (undated) DEVICE — THE STERILE LIGHT HANDLE COVER IS USED WITH STERIS SURGICAL LIGHTING AND VISUALIZATION SYSTEMS.

## (undated) DEVICE — KT NDL GUIDE STRL 18GA

## (undated) DEVICE — PAD, DEFIB, ADULT, RADIOTRANS, PHYSIO: Brand: MEDLINE

## (undated) DEVICE — SI AVANTI+ 7F STD W/GW  NO OBT: Brand: AVANTI

## (undated) DEVICE — CUFF,BP,DISP,1 TUBE,ADULT,HP: Brand: MEDLINE

## (undated) DEVICE — SKIN AFFIX SURG ADHESIVE 72/CS 0.55ML: Brand: MEDLINE

## (undated) DEVICE — BANDAGE,GAUZE,BULKEE II,4.5"X4.1YD,STRL: Brand: MEDLINE

## (undated) DEVICE — Device: Brand: WEBSTER CS

## (undated) DEVICE — SI AVANTI+ 8F STD W/GW  NO OBT: Brand: AVANTI

## (undated) DEVICE — SENSR O2 OXIMAX FNGR A/ 18IN NONSTR

## (undated) DEVICE — SNAP KOVER: Brand: UNBRANDED

## (undated) DEVICE — SUREFIT, DUAL DISPERSIVE ELECTRODE, CONTACT QUALITY MONITOR: Brand: SUREFIT

## (undated) DEVICE — PK CATH CARD 30 CA/4

## (undated) DEVICE — PK TURNOVER RM ADV

## (undated) DEVICE — GLV SURG DERMASSURE GRN LF PF 8.0

## (undated) DEVICE — SPK10277 JACKSON/PRO-AXIS KIT: Brand: SPK10277 JACKSON/PRO-AXIS KIT

## (undated) DEVICE — ENDOGATOR AUXILIARY WATER JET CONNECTOR: Brand: ENDOGATOR

## (undated) DEVICE — GLV SURG DERMASSURE GRN LF PF 7.0

## (undated) DEVICE — Device: Brand: REFERENCE PATCH CARTO 3

## (undated) DEVICE — Device: Brand: SMARTABLATE

## (undated) DEVICE — SUT ETHLN 2/0 FSLX 30IN 1674H

## (undated) DEVICE — SOLIDIFIER LIQUI LOC PLUS 2000CC

## (undated) DEVICE — CATH IV ANGIO FEP 12G 3IN LTBLU 10PK

## (undated) DEVICE — SOL NS 500ML

## (undated) DEVICE — ANTIBACTERIAL UNDYED BRAIDED (POLYGLACTIN 910), SYNTHETIC ABSORBABLE SUTURE: Brand: COATED VICRYL

## (undated) DEVICE — CABL BIPOL W/ALLGTR CLIP/SM 12FT

## (undated) DEVICE — PATIENT RETURN ELECTRODE, SINGLE-USE, CONTACT QUALITY MONITORING, ADULT, WITH 9FT CORD, FOR PATIENTS WEIGING OVER 33LBS. (15KG): Brand: MEGADYNE

## (undated) DEVICE — SYS COL WAST NAMIC IV SGL/LN FML/FIT W/VNT/SPK/HD 72IN

## (undated) DEVICE — SUT VIC 4/0 P3 18IN UD VCP494H

## (undated) DEVICE — TBG SMPL FLTR LINE NASL 02/C02 A/ BX/100

## (undated) DEVICE — SUT SILK 2/0 SUTUPAK TIES 24IN SA75H

## (undated) DEVICE — ANGIO-SEAL VIP VASCULAR CLOSURE DEVICE: Brand: ANGIO-SEAL

## (undated) DEVICE — GLV SURG GRN DERMASSURE LF PF 7.5

## (undated) DEVICE — SLV CBL IVL 5X96IN

## (undated) DEVICE — SUT VIC 0 MO4 CR8 18IN VCP701D

## (undated) DEVICE — YANKAUER,BULB TIP WITH VENT: Brand: ARGYLE

## (undated) DEVICE — SYS CLS VASC/VENI VASCADE MVP 6TO12F

## (undated) DEVICE — Device: Brand: SOUNDSTAR

## (undated) DEVICE — SUT ETHIB 3/0 SH DA 36IN X522H

## (undated) DEVICE — SYR CONTRL LUERLOK 10CC

## (undated) DEVICE — THE STERILE THE STERIS STERILE CAMERA HANDLE COVERS ARE DESIGNED FOR HARMONYAIR 4K CAMERA MODULE, AND PROVIDE STERILE CONTROL THAT ALLOW FOR INCREASING AND DECREASING ILLUMINATION THROUGH SEVEN INTENSITY LEVELS.

## (undated) DEVICE — TBG PRESS/MONITR FIX M/F LL A/ 48IN STRL

## (undated) DEVICE — PROGRAMMER CONTRL INTELLIS NEUROSTM

## (undated) DEVICE — PRESSURE MONITORING SET: Brand: TRUWAVE

## (undated) DEVICE — DRAPE,HAND,STERILE: Brand: MEDLINE

## (undated) DEVICE — DRP C/ARMOR

## (undated) DEVICE — CODMAN® SURGICAL PATTIES 1/2" X1 1/2" (1.27CM X 3.81CM): Brand: CODMAN®

## (undated) DEVICE — CHRGR BATRY STIM INTELLIS NEUROSTM SYS

## (undated) DEVICE — SI AVANTI+ 10F STD W/GW: Brand: AVANTI